# Patient Record
Sex: FEMALE | Race: WHITE | NOT HISPANIC OR LATINO | Employment: OTHER | URBAN - METROPOLITAN AREA
[De-identification: names, ages, dates, MRNs, and addresses within clinical notes are randomized per-mention and may not be internally consistent; named-entity substitution may affect disease eponyms.]

---

## 2017-02-24 ENCOUNTER — TRANSCRIBE ORDERS (OUTPATIENT)
Dept: ADMINISTRATIVE | Facility: HOSPITAL | Age: 77
End: 2017-02-24

## 2017-02-24 DIAGNOSIS — R10.30 LOWER ABDOMINAL PAIN: Primary | ICD-10-CM

## 2017-03-07 ENCOUNTER — HOSPITAL ENCOUNTER (OUTPATIENT)
Dept: ULTRASOUND IMAGING | Facility: HOSPITAL | Age: 77
Discharge: HOME | End: 2017-03-07

## 2017-03-07 ENCOUNTER — HOSPITAL ENCOUNTER (OUTPATIENT)
Dept: ULTRASOUND IMAGING | Facility: HOSPITAL | Age: 77
Discharge: HOME | End: 2017-03-07
Admitting: INTERNAL MEDICINE

## 2017-03-07 DIAGNOSIS — R10.30 LOWER ABDOMINAL PAIN: ICD-10-CM

## 2017-03-07 PROCEDURE — 76700 US EXAM ABDOM COMPLETE: CPT

## 2017-03-07 PROCEDURE — 76856 US EXAM PELVIC COMPLETE: CPT

## 2017-03-15 ENCOUNTER — APPOINTMENT (OUTPATIENT)
Dept: WOMENS IMAGING | Facility: HOSPITAL | Age: 77
End: 2017-03-15

## 2017-03-15 PROCEDURE — G0202 SCR MAMMO BI INCL CAD: HCPCS | Performed by: RADIOLOGY

## 2017-03-15 PROCEDURE — 77063 BREAST TOMOSYNTHESIS BI: CPT | Performed by: RADIOLOGY

## 2018-03-19 ENCOUNTER — APPOINTMENT (OUTPATIENT)
Dept: WOMENS IMAGING | Facility: HOSPITAL | Age: 78
End: 2018-03-19

## 2018-03-19 PROCEDURE — 77063 BREAST TOMOSYNTHESIS BI: CPT | Performed by: RADIOLOGY

## 2018-03-19 PROCEDURE — 77067 SCR MAMMO BI INCL CAD: CPT | Performed by: RADIOLOGY

## 2018-05-14 ENCOUNTER — APPOINTMENT (OUTPATIENT)
Dept: GENERAL RADIOLOGY | Facility: HOSPITAL | Age: 78
End: 2018-05-14

## 2018-05-14 ENCOUNTER — HOSPITAL ENCOUNTER (EMERGENCY)
Facility: HOSPITAL | Age: 78
Discharge: HOME | End: 2018-05-14
Attending: EMERGENCY MEDICINE | Admitting: EMERGENCY MEDICINE

## 2018-05-14 VITALS
WEIGHT: 140 LBS | DIASTOLIC BLOOD PRESSURE: 88 MMHG | HEART RATE: 72 BPM | HEIGHT: 60 IN | TEMPERATURE: 98.3 F | SYSTOLIC BLOOD PRESSURE: 128 MMHG | BODY MASS INDEX: 27.48 KG/M2 | RESPIRATION RATE: 18 BRPM | OXYGEN SATURATION: 98 %

## 2018-05-14 DIAGNOSIS — M54.6 CHRONIC MIDLINE THORACIC BACK PAIN: ICD-10-CM

## 2018-05-14 DIAGNOSIS — R10.84 GENERALIZED ABDOMINAL PAIN: Primary | ICD-10-CM

## 2018-05-14 DIAGNOSIS — E87.1 HYPONATREMIA: ICD-10-CM

## 2018-05-14 DIAGNOSIS — G89.29 CHRONIC MIDLINE THORACIC BACK PAIN: ICD-10-CM

## 2018-05-14 LAB
ALBUMIN SERPL-MCNC: 4.8 G/DL (ref 3.5–5.2)
ALBUMIN/GLOB SERPL: 1.6 G/DL
ALP SERPL-CCNC: 79 U/L (ref 39–117)
ALT SERPL W P-5'-P-CCNC: 8 U/L (ref 1–33)
ANION GAP SERPL CALCULATED.3IONS-SCNC: 13.7 MMOL/L
AST SERPL-CCNC: 14 U/L (ref 1–32)
BACTERIA UR QL AUTO: ABNORMAL /HPF
BASOPHILS # BLD AUTO: 0.01 10*3/MM3 (ref 0–0.2)
BASOPHILS NFR BLD AUTO: 0.1 % (ref 0–1.5)
BILIRUB SERPL-MCNC: 0.6 MG/DL (ref 0.1–1.2)
BILIRUB UR QL STRIP: NEGATIVE
BUN BLD-MCNC: 9 MG/DL (ref 8–23)
BUN/CREAT SERPL: 16.1 (ref 7–25)
CALCIUM SPEC-SCNC: 9.9 MG/DL (ref 8.6–10.5)
CHLORIDE SERPL-SCNC: 84 MMOL/L (ref 98–107)
CLARITY UR: CLEAR
CO2 SERPL-SCNC: 28.3 MMOL/L (ref 22–29)
COLOR UR: YELLOW
CREAT BLD-MCNC: 0.56 MG/DL (ref 0.57–1)
DEPRECATED RDW RBC AUTO: 41.3 FL (ref 37–54)
EOSINOPHIL # BLD AUTO: 0.05 10*3/MM3 (ref 0–0.7)
EOSINOPHIL NFR BLD AUTO: 0.5 % (ref 0.3–6.2)
ERYTHROCYTE [DISTWIDTH] IN BLOOD BY AUTOMATED COUNT: 12.2 % (ref 11.7–13)
GFR SERPL CREATININE-BSD FRML MDRD: 105 ML/MIN/1.73
GLOBULIN UR ELPH-MCNC: 3 GM/DL
GLUCOSE BLD-MCNC: 107 MG/DL (ref 65–99)
GLUCOSE UR STRIP-MCNC: NEGATIVE MG/DL
HCT VFR BLD AUTO: 34.1 % (ref 35.6–45.5)
HGB BLD-MCNC: 11.8 G/DL (ref 11.9–15.5)
HGB UR QL STRIP.AUTO: NEGATIVE
HYALINE CASTS UR QL AUTO: ABNORMAL /LPF
IMM GRANULOCYTES # BLD: 0.02 10*3/MM3 (ref 0–0.03)
IMM GRANULOCYTES NFR BLD: 0.2 % (ref 0–0.5)
KETONES UR QL STRIP: ABNORMAL
LEUKOCYTE ESTERASE UR QL STRIP.AUTO: ABNORMAL
LIPASE SERPL-CCNC: 28 U/L (ref 13–60)
LYMPHOCYTES # BLD AUTO: 1.1 10*3/MM3 (ref 0.9–4.8)
LYMPHOCYTES NFR BLD AUTO: 10.8 % (ref 19.6–45.3)
MCH RBC QN AUTO: 32.2 PG (ref 26.9–32)
MCHC RBC AUTO-ENTMCNC: 34.6 G/DL (ref 32.4–36.3)
MCV RBC AUTO: 92.9 FL (ref 80.5–98.2)
MONOCYTES # BLD AUTO: 0.62 10*3/MM3 (ref 0.2–1.2)
MONOCYTES NFR BLD AUTO: 6.1 % (ref 5–12)
NEUTROPHILS # BLD AUTO: 8.36 10*3/MM3 (ref 1.9–8.1)
NEUTROPHILS NFR BLD AUTO: 82.5 % (ref 42.7–76)
NITRITE UR QL STRIP: NEGATIVE
PH UR STRIP.AUTO: 8 [PH] (ref 5–8)
PLATELET # BLD AUTO: 370 10*3/MM3 (ref 140–500)
PMV BLD AUTO: 8.4 FL (ref 6–12)
POTASSIUM BLD-SCNC: 3.6 MMOL/L (ref 3.5–5.2)
PROT SERPL-MCNC: 7.8 G/DL (ref 6–8.5)
PROT UR QL STRIP: NEGATIVE
RBC # BLD AUTO: 3.67 10*6/MM3 (ref 3.9–5.2)
RBC # UR: ABNORMAL /HPF
REF LAB TEST METHOD: ABNORMAL
SODIUM BLD-SCNC: 126 MMOL/L (ref 136–145)
SP GR UR STRIP: 1.01 (ref 1–1.03)
SQUAMOUS #/AREA URNS HPF: ABNORMAL /HPF
TROPONIN T SERPL-MCNC: <0.01 NG/ML (ref 0–0.03)
TSH SERPL DL<=0.05 MIU/L-ACNC: 2.72 MIU/ML (ref 0.27–4.2)
UROBILINOGEN UR QL STRIP: ABNORMAL
WBC NRBC COR # BLD: 10.14 10*3/MM3 (ref 4.5–10.7)
WBC UR QL AUTO: ABNORMAL /HPF

## 2018-05-14 PROCEDURE — 96374 THER/PROPH/DIAG INJ IV PUSH: CPT

## 2018-05-14 PROCEDURE — 99283 EMERGENCY DEPT VISIT LOW MDM: CPT

## 2018-05-14 PROCEDURE — 72072 X-RAY EXAM THORAC SPINE 3VWS: CPT

## 2018-05-14 PROCEDURE — 80053 COMPREHEN METABOLIC PANEL: CPT | Performed by: EMERGENCY MEDICINE

## 2018-05-14 PROCEDURE — 71046 X-RAY EXAM CHEST 2 VIEWS: CPT

## 2018-05-14 PROCEDURE — 83690 ASSAY OF LIPASE: CPT | Performed by: EMERGENCY MEDICINE

## 2018-05-14 PROCEDURE — 84484 ASSAY OF TROPONIN QUANT: CPT | Performed by: EMERGENCY MEDICINE

## 2018-05-14 PROCEDURE — 85025 COMPLETE CBC W/AUTO DIFF WBC: CPT | Performed by: EMERGENCY MEDICINE

## 2018-05-14 PROCEDURE — 81001 URINALYSIS AUTO W/SCOPE: CPT | Performed by: EMERGENCY MEDICINE

## 2018-05-14 PROCEDURE — 96375 TX/PRO/DX INJ NEW DRUG ADDON: CPT

## 2018-05-14 PROCEDURE — 96361 HYDRATE IV INFUSION ADD-ON: CPT

## 2018-05-14 PROCEDURE — 84443 ASSAY THYROID STIM HORMONE: CPT | Performed by: EMERGENCY MEDICINE

## 2018-05-14 PROCEDURE — 25010000002 KETOROLAC TROMETHAMINE PER 15 MG: Performed by: EMERGENCY MEDICINE

## 2018-05-14 PROCEDURE — 93010 ELECTROCARDIOGRAM REPORT: CPT | Performed by: INTERNAL MEDICINE

## 2018-05-14 PROCEDURE — 93005 ELECTROCARDIOGRAM TRACING: CPT | Performed by: EMERGENCY MEDICINE

## 2018-05-14 RX ORDER — LEVOTHYROXINE SODIUM 0.03 MG/1
25 TABLET ORAL DAILY
COMMUNITY
End: 2022-04-20

## 2018-05-14 RX ORDER — TRIAMTERENE AND HYDROCHLOROTHIAZIDE 37.5; 25 MG/1; MG/1
1 CAPSULE ORAL EVERY MORNING
COMMUNITY
End: 2022-04-20

## 2018-05-14 RX ORDER — FERROUS SULFATE 325(65) MG
325 TABLET ORAL
Status: ON HOLD | COMMUNITY
End: 2022-10-31 | Stop reason: SDUPTHER

## 2018-05-14 RX ORDER — DICYCLOMINE HCL 20 MG
20 TABLET ORAL EVERY 6 HOURS PRN
Qty: 20 TABLET | Refills: 0 | Status: SHIPPED | OUTPATIENT
Start: 2018-05-14 | End: 2022-04-20

## 2018-05-14 RX ORDER — VALSARTAN 320 MG/1
320 TABLET ORAL DAILY
COMMUNITY
End: 2022-04-20

## 2018-05-14 RX ORDER — ZOLPIDEM TARTRATE 10 MG/1
10 TABLET ORAL NIGHTLY
COMMUNITY
End: 2022-05-11 | Stop reason: HOSPADM

## 2018-05-14 RX ORDER — MULTIVIT-MIN/IRON/FOLIC ACID/K 18-600-40
CAPSULE ORAL
COMMUNITY

## 2018-05-14 RX ORDER — POTASSIUM CHLORIDE 600 MG/1
8 CAPSULE, EXTENDED RELEASE ORAL 2 TIMES DAILY
COMMUNITY
End: 2022-04-20

## 2018-05-14 RX ORDER — UBIDECARENONE 75 MG
50 CAPSULE ORAL DAILY
COMMUNITY

## 2018-05-14 RX ORDER — ONDANSETRON 4 MG/1
4 TABLET, ORALLY DISINTEGRATING ORAL EVERY 6 HOURS PRN
Qty: 10 TABLET | Refills: 0 | Status: SHIPPED | OUTPATIENT
Start: 2018-05-14 | End: 2022-04-20

## 2018-05-14 RX ORDER — SODIUM CHLORIDE 0.9 % (FLUSH) 0.9 %
10 SYRINGE (ML) INJECTION AS NEEDED
Status: DISCONTINUED | OUTPATIENT
Start: 2018-05-14 | End: 2018-05-15 | Stop reason: HOSPADM

## 2018-05-14 RX ORDER — KETOROLAC TROMETHAMINE 15 MG/ML
15 INJECTION, SOLUTION INTRAMUSCULAR; INTRAVENOUS ONCE
Status: COMPLETED | OUTPATIENT
Start: 2018-05-14 | End: 2018-05-14

## 2018-05-14 RX ORDER — ALPRAZOLAM 0.25 MG/1
0.25 TABLET ORAL 2 TIMES DAILY
Status: ON HOLD | COMMUNITY
End: 2022-05-11 | Stop reason: SDUPTHER

## 2018-05-14 RX ORDER — ROSUVASTATIN CALCIUM 20 MG/1
20 TABLET, COATED ORAL NIGHTLY
COMMUNITY

## 2018-05-14 RX ORDER — FAMOTIDINE 10 MG/ML
20 INJECTION, SOLUTION INTRAVENOUS ONCE
Status: COMPLETED | OUTPATIENT
Start: 2018-05-14 | End: 2018-05-14

## 2018-05-14 RX ORDER — OMEPRAZOLE 40 MG/1
40 CAPSULE, DELAYED RELEASE ORAL DAILY
COMMUNITY
End: 2022-04-20

## 2018-05-14 RX ORDER — GABAPENTIN 300 MG/1
600 CAPSULE ORAL DAILY
COMMUNITY
End: 2022-04-20

## 2018-05-14 RX ADMIN — KETOROLAC TROMETHAMINE 15 MG: 15 INJECTION, SOLUTION INTRAMUSCULAR; INTRAVENOUS at 20:07

## 2018-05-14 RX ADMIN — SODIUM CHLORIDE 1000 ML: 9 INJECTION, SOLUTION INTRAVENOUS at 20:17

## 2018-05-14 RX ADMIN — FAMOTIDINE 20 MG: 10 INJECTION, SOLUTION INTRAVENOUS at 20:17

## 2018-05-14 NOTE — ED PROVIDER NOTES
[facility] EMERGENCY DEPARTMENT ENCOUNTER    CHIEF COMPLAINT  Chief Complaint: back pain, abd pain  History given by: patient, spouse  History limited by: poor historian  [facility] Room Number:   PMD: Miriam Mercado Reyes, MD      HPI:  Pt is a 78 y.o. female who presents complaining of generalized abd pain for the last week. Pt denies CP, SOA, cough, diarrhea, fever, dysuria, hematuria or unusual urinary frequency but complains of chills and worsening of her chronic thoracic back pain and cold sweats since this morning. Pt's spouse states that the pt has had decreased appetite and generalized malaise over the last week.    Onset: gradual  Duration: one day  Severity: severe  Associated symptoms: cold sweats, chills, thoracic back pain, decreased appetite, generalized malaise  Previous treatment: none    PAST MEDICAL HISTORY  Active Ambulatory Problems     Diagnosis Date Noted   • No Active Ambulatory Problems     Resolved Ambulatory Problems     Diagnosis Date Noted   • No Resolved Ambulatory Problems     Past Medical History:   Diagnosis Date   • Arthritis    • Disease of thyroid gland    • Hernia, hiatal    • Hyperlipidemia    • Hypertension        PAST SURGICAL HISTORY  Past Surgical History:   Procedure Laterality Date   • APPENDECTOMY     • BACK SURGERY     •  SECTION     • HEMORRHOIDECTOMY     • KNEE ARTHROPLASTY         FAMILY HISTORY  History reviewed. No pertinent family history.    SOCIAL HISTORY  Social History     Social History   • Marital status: Unknown     Spouse name: N/A   • Number of children: N/A   • Years of education: N/A     Occupational History   • Not on file.     Social History Main Topics   • Smoking status: Never Smoker   • Smokeless tobacco: Not on file   • Alcohol use 0.6 oz/week     1 Glasses of wine per week   • Drug use: No   • Sexual activity: Not on file     Other Topics Concern   • Not on file     Social History Narrative   • No narrative on file       ALLERGIES  Review of  "patient's allergies indicates no known allergies.    REVIEW OF SYSTEMS  Review of Systems   Constitutional: Positive for appetite change (decreased), chills and diaphoresis (\"cold sweats\"). Negative for fever.        Generalized malaise   HENT: Negative for sore throat.    Eyes: Negative.    Respiratory: Negative for cough and shortness of breath.    Cardiovascular: Negative for chest pain.   Gastrointestinal: Positive for abdominal pain (generalized). Negative for diarrhea and vomiting.   Genitourinary: Negative for dysuria.   Musculoskeletal: Positive for back pain (chronic thoracic back pain, worse this morning). Negative for neck pain.   Skin: Negative for rash.   Allergic/Immunologic: Negative.    Neurological: Negative for weakness, numbness and headaches.   Hematological: Negative.    Psychiatric/Behavioral: Negative.    All other systems reviewed and are negative.      PHYSICAL EXAM  ED Triage Vitals   Temp Heart Rate Resp BP SpO2   05/14/18 1509 05/14/18 1509 05/14/18 1509 05/14/18 1533 05/14/18 1509   98.3 °F (36.8 °C) 99 15 132/90 99 %      Temp src Heart Rate Source Patient Position BP Location FiO2 (%)   05/14/18 1509 05/14/18 1509 05/14/18 1533 05/14/18 1533 --   Tympanic Monitor Sitting Right arm        Physical Exam   Constitutional: She appears distressed (mild).   Pt appears elderly   HENT:   Head: Normocephalic and atraumatic.   Eyes: EOM are normal. Pupils are equal, round, and reactive to light.   Neck: Normal range of motion. Neck supple.   Cardiovascular: Normal rate, regular rhythm and normal heart sounds.    No murmur heard.  Pulmonary/Chest: Effort normal and breath sounds normal. No respiratory distress.   Abdominal: Soft. There is tenderness in the right upper quadrant. There is no rebound, no guarding and negative Velez's sign.   Musculoskeletal: Normal range of motion. She exhibits no edema.   Neurological: She is alert. She has normal sensation and normal strength.   Skin: Skin is " warm and dry. No rash noted.   Psychiatric: Mood and affect normal.   Nursing note and vitals reviewed.      LAB RESULTS  Lab Results (last 24 hours)     Procedure Component Value Units Date/Time    Comprehensive Metabolic Panel [[id number]]  (Abnormal) Collected:  05/14/18 2008    Specimen:  Blood Updated:  05/14/18 2045     Glucose 107 (H) mg/dL      BUN 9 mg/dL      Creatinine 0.56 (L) mg/dL      Sodium 126 (L) mmol/L      Potassium 3.6 mmol/L      Chloride 84 (L) mmol/L      CO2 28.3 mmol/L      Calcium 9.9 mg/dL      Total Protein 7.8 g/dL      Albumin 4.80 g/dL      ALT (SGPT) 8 U/L      AST (SGOT) 14 U/L      Alkaline Phosphatase 79 U/L      Total Bilirubin 0.6 mg/dL      eGFR Non African Amer 105 mL/min/1.73      Globulin 3.0 gm/dL      A/G Ratio 1.6 g/dL      BUN/Creatinine Ratio 16.1     Anion Gap 13.7 mmol/L     Narrative:       The MDRD GFR formula is only valid for adults with stable renal function between ages 18 and 70.    Lipase [[id number]]  (Normal) Collected:  05/14/18 2008    Specimen:  Blood Updated:  05/14/18 2045     Lipase 28 U/L     TSH [[id number]]  (Normal) Collected:  05/14/18 2008    Specimen:  Blood Updated:  05/14/18 2052     TSH 2.720 mIU/mL     Urinalysis With / Culture If Indicated - Urine, Clean Catch [[id number]]  (Abnormal) Collected:  05/14/18 2008    Specimen:  Urine from Urine, Clean Catch Updated:  05/14/18 2029     Color, UA Yellow     Appearance, UA Clear     pH, UA 8.0     Specific Gravity, UA 1.008     Glucose, UA Negative     Ketones, UA Trace (A)     Bilirubin, UA Negative     Blood, UA Negative     Protein, UA Negative     Leuk Esterase, UA Small (1+) (A)     Nitrite, UA Negative     Urobilinogen, UA 1.0 E.U./dL    Troponin [[id number]]  (Normal) Collected:  05/14/18 2008    Specimen:  Blood Updated:  05/14/18 2052     Troponin T <0.010 ng/mL     Narrative:       Troponin T Reference Ranges:  Less than 0.03 ng/mL:    Negative for AMI  0.03 to 0.09 ng/mL:      Indeterminant  for AMI  Greater than 0.09 ng/mL: Positive for AMI    CBC & Differential [[id number]] Collected:  05/14/18 2008    Specimen:  Blood Updated:  05/14/18 2021    Narrative:       The following orders were created for panel order CBC & Differential.  Procedure                               Abnormality         Status                     ---------                               -----------         ------                     CBC Auto Differential[[id number]]        Abnormal            Final result                 Please view results for these tests on the individual orders.    CBC Auto Differential [[id number]]  (Abnormal) Collected:  05/14/18 2008    Specimen:  Blood Updated:  05/14/18 2021     WBC 10.14 10*3/mm3      RBC 3.67 (L) 10*6/mm3      Hemoglobin 11.8 (L) g/dL      Hematocrit 34.1 (L) %      MCV 92.9 fL      MCH 32.2 (H) pg      MCHC 34.6 g/dL      RDW 12.2 %      RDW-SD 41.3 fl      MPV 8.4 fL      Platelets 370 10*3/mm3      Neutrophil % 82.5 (H) %      Lymphocyte % 10.8 (L) %      Monocyte % 6.1 %      Eosinophil % 0.5 %      Basophil % 0.1 %      Immature Grans % 0.2 %      Neutrophils, Absolute 8.36 (H) 10*3/mm3      Lymphocytes, Absolute 1.10 10*3/mm3      Monocytes, Absolute 0.62 10*3/mm3      Eosinophils, Absolute 0.05 10*3/mm3      Basophils, Absolute 0.01 10*3/mm3      Immature Grans, Absolute 0.02 10*3/mm3     Urinalysis, Microscopic Only - Urine, Clean Catch [[id number]]  (Abnormal) Collected:  05/14/18 2008    Specimen:  Urine from Urine, Clean Catch Updated:  05/14/18 2029     RBC, UA 0-2 /HPF      WBC, UA 3-5 (A) /HPF      Bacteria, UA None Seen /HPF      Squamous Epithelial Cells, UA 0-2 /HPF      Hyaline Casts, UA None Seen /LPF      Methodology Automated Microscopy          I ordered the above labs and reviewed the results    RADIOLOGY  XR Chest 2 View         XR Spine Thoracic 3 View   Final Result         T-Spine XR shows mild diffuse osteoporosis but no evidence of acute compression fracture.  There is some mild disc space narrowing and spurring, particularly in the mid and lower thoracic region.     CXR shows a calcified granuloma in the upper lobe seen on the lateral view. There is no acute disease.    I ordered the above noted radiological studies. Interpreted by radiologist. Reviewed by me in PACS.       PROCEDURES  Procedures  EKG           EKG time: 2024  Rhythm/Rate: NSR, 66  P waves and NC: normal  QRS, axis: normal   ST and T waves: normal     Interpreted Contemporaneously by me, independently viewed  unchanged compared to prior on 1/7/15      PROGRESS AND CONSULTS  ED Course     1953- Notified pt and family of the pt's T-Spine XR results. D/w pt and family that there were no gallstones seen on an abdominal US about one year ago and that it would be unusual for the pt to have developed gallstones within the last year. Discussed the plan to order lab work for further evaluation of her symptoms. Pt understands and agrees with the plan, all questions answered.    1958- Ordered blood work, TSH, lipase, troponin, UA, EKG and CXR for further evaluation. Ordered Toradol and Pepcid for pain and IVF for hydration.     2107- Rechecked pt. Pt is resting comfortably. Notified pt and family of the pt's lab and imaging results, including the pt's normal TSH, mild chronic anemia, WBC count and hyponatremia. Discussed the plan to discharge the pt home with instructions to follow up with her PCP and endocrinologist. Pt understands and agrees with the plan, all questions answered.      MEDICAL DECISION MAKING  Results were reviewed/discussed with the patient and they were also made aware of online access. Pt also made aware that some labs, such as cultures, will not be resulted during ER visit and follow up with PMD is necessary.     MDM  Number of Diagnoses or Management Options  Acute on chronic midline thoracic back pain:   Generalized abdominal pain:   Hyponatremia:      Amount and/or Complexity of Data  Reviewed  Clinical lab tests: ordered and reviewed (Sodium=126)  Tests in the radiology section of CPT®: ordered and reviewed (T- spine XR shows chronic changes but nothing acute)  Tests in the medicine section of CPT®: ordered and reviewed (See EKG procedure note)  Decide to obtain previous medical records or to obtain history from someone other than the patient: yes  Obtain history from someone other than the patient: yes (D/w spouse)  Review and summarize past medical records: yes (Pt's sodium was 129 three years ago)  Independent visualization of images, tracings, or specimens: yes    Patient Progress  Patient progress: stable         DIAGNOSIS  Final diagnoses:   Generalized abdominal pain   Acute on chronic midline thoracic back pain   Hyponatremia       DISPOSITION  DISCHARGE    Patient discharged in stable condition.    Reviewed implications of results, diagnosis, meds, responsibility to follow up, warning signs and symptoms of possible worsening, potential complications and reasons to return to ER, including fever, worsening pain or any concerns.    Patient/Family voiced understanding of above instructions.    Discussed plan for discharge, as there is no emergent indication for admission. Patient referred to primary care provider for BP management due to today's BP. Pt/family is agreeable and understands need for follow up and repeat testing.  Pt is aware that discharge does not mean that nothing is wrong but it indicates no emergency is present that requires admission and they must continue care with follow-up as given below or physician of their choice.     FOLLOW-UP  Leyda Acevedo MD  [address] [facility] [id number] [phone]    Go on 5/16/2018  as scheduled    Miriam Mercado Reyes, MD  [address] [facility] [id number] [phone]    Schedule an appointment as soon as possible for a visit            Medication List      New Prescriptions    dicyclomine 20 MG  tablet  Commonly known as:  BENTYL  Take 1 tablet by mouth Every 6 (Six) Hours As Needed (cramping).     ondansetron ODT 4 MG disintegrating tablet  Commonly known as:  ZOFRAN-ODT  Take 1 tablet by mouth Every 6 (Six) Hours As Needed for Nausea.              Latest Documented Vital Signs:  As of 9:26 PM  BP- 132/90 HR- 99 Temp- 98.3 °F (36.8 °C) (Tympanic) O2 sat- 99%    --  Documentation assistance provided by tangela Hays for Dr. Rodriguez.  Information recorded by the tangela was done at my direction and has been verified and validated by me.     April Hays  05/14/18 2124       Geovanni Rodriguez MD  05/14/18 [id number]

## 2018-05-14 NOTE — ED TRIAGE NOTES
Patient presents from home via private vehicle.  Patient c/o mid back pain for five days.  NKI.  Pt denies numbness or tingling down legs. Pt denies loosing control or bowel or bladder.

## 2018-05-15 NOTE — ED NOTES
Pt c/o mid back pain that started approx 1 week ago. Pt reports generalized abd pain, generalized malaise and chills. Denies fever, n/v/d. Bowel sounds present. Pt tender to abd     Angela Rashid RN  05/14/18 2040

## 2018-05-15 NOTE — ED NOTES
As per Dr. Rodriguez pt. to receive a 300 mL more prior to discharge.     Kelvin Jamison RN  05/14/18 [id number]

## 2018-07-26 ENCOUNTER — OFFICE (OUTPATIENT)
Dept: URBAN - METROPOLITAN AREA CLINIC 75 | Facility: CLINIC | Age: 78
End: 2018-07-26

## 2018-07-26 VITALS — WEIGHT: 142 LBS | DIASTOLIC BLOOD PRESSURE: 82 MMHG | SYSTOLIC BLOOD PRESSURE: 150 MMHG | HEART RATE: 108 BPM

## 2018-07-26 DIAGNOSIS — R10.31 RIGHT LOWER QUADRANT PAIN: ICD-10-CM

## 2018-07-26 DIAGNOSIS — R10.32 LEFT LOWER QUADRANT PAIN: ICD-10-CM

## 2018-07-26 DIAGNOSIS — R19.7 DIARRHEA, UNSPECIFIED: ICD-10-CM

## 2018-07-26 DIAGNOSIS — D50.9 IRON DEFICIENCY ANEMIA, UNSPECIFIED: ICD-10-CM

## 2018-07-26 DIAGNOSIS — K57.92 DIVERTICULITIS OF INTESTINE, PART UNSPECIFIED, WITHOUT PERFO: ICD-10-CM

## 2018-07-26 PROCEDURE — 99203 OFFICE O/P NEW LOW 30 MIN: CPT | Performed by: INTERNAL MEDICINE

## 2018-07-31 ENCOUNTER — OFFICE (OUTPATIENT)
Dept: URBAN - METROPOLITAN AREA LAB 2 | Facility: LAB | Age: 78
End: 2018-07-31
Payer: MEDICARE

## 2018-07-31 DIAGNOSIS — R19.7 DIARRHEA, UNSPECIFIED: ICD-10-CM

## 2018-07-31 PROCEDURE — 87507 IADNA-DNA/RNA PROBE TQ 12-25: CPT | Performed by: INTERNAL MEDICINE

## 2018-07-31 PROCEDURE — 83630 LACTOFERRIN FECAL (QUAL): CPT | Performed by: INTERNAL MEDICINE

## 2018-08-06 VITALS
SYSTOLIC BLOOD PRESSURE: 144 MMHG | HEART RATE: 78 BPM | SYSTOLIC BLOOD PRESSURE: 148 MMHG | RESPIRATION RATE: 14 BRPM | HEART RATE: 87 BPM | DIASTOLIC BLOOD PRESSURE: 85 MMHG | RESPIRATION RATE: 29 BRPM | HEART RATE: 73 BPM | TEMPERATURE: 98.2 F | HEART RATE: 97 BPM | DIASTOLIC BLOOD PRESSURE: 67 MMHG | HEART RATE: 70 BPM | SYSTOLIC BLOOD PRESSURE: 146 MMHG | HEART RATE: 83 BPM | SYSTOLIC BLOOD PRESSURE: 129 MMHG | OXYGEN SATURATION: 96 % | DIASTOLIC BLOOD PRESSURE: 74 MMHG | HEART RATE: 74 BPM | OXYGEN SATURATION: 99 % | DIASTOLIC BLOOD PRESSURE: 77 MMHG | HEART RATE: 81 BPM | OXYGEN SATURATION: 100 % | RESPIRATION RATE: 34 BRPM | DIASTOLIC BLOOD PRESSURE: 82 MMHG | DIASTOLIC BLOOD PRESSURE: 80 MMHG | OXYGEN SATURATION: 98 % | HEART RATE: 90 BPM | WEIGHT: 142 LBS | OXYGEN SATURATION: 97 % | SYSTOLIC BLOOD PRESSURE: 151 MMHG | RESPIRATION RATE: 36 BRPM | HEART RATE: 60 BPM | DIASTOLIC BLOOD PRESSURE: 84 MMHG | DIASTOLIC BLOOD PRESSURE: 64 MMHG | SYSTOLIC BLOOD PRESSURE: 133 MMHG | DIASTOLIC BLOOD PRESSURE: 68 MMHG | SYSTOLIC BLOOD PRESSURE: 169 MMHG | RESPIRATION RATE: 16 BRPM | SYSTOLIC BLOOD PRESSURE: 143 MMHG | RESPIRATION RATE: 28 BRPM | DIASTOLIC BLOOD PRESSURE: 79 MMHG | SYSTOLIC BLOOD PRESSURE: 159 MMHG | TEMPERATURE: 98.1 F | RESPIRATION RATE: 35 BRPM

## 2018-08-08 ENCOUNTER — AMBULATORY SURGICAL CENTER (OUTPATIENT)
Dept: URBAN - METROPOLITAN AREA SURGERY 17 | Facility: SURGERY | Age: 78
End: 2018-08-08

## 2018-08-08 DIAGNOSIS — D64.9 ANEMIA, UNSPECIFIED: ICD-10-CM

## 2018-08-08 DIAGNOSIS — R19.7 DIARRHEA, UNSPECIFIED: ICD-10-CM

## 2018-08-08 DIAGNOSIS — K57.30 DIVERTICULOSIS OF LARGE INTESTINE WITHOUT PERFORATION OR ABS: ICD-10-CM

## 2018-08-08 DIAGNOSIS — K64.9 UNSPECIFIED HEMORRHOIDS: ICD-10-CM

## 2018-08-08 DIAGNOSIS — R10.31 RIGHT LOWER QUADRANT PAIN: ICD-10-CM

## 2018-08-08 DIAGNOSIS — R10.32 LEFT LOWER QUADRANT PAIN: ICD-10-CM

## 2018-08-08 DIAGNOSIS — D50.9 IRON DEFICIENCY ANEMIA, UNSPECIFIED: ICD-10-CM

## 2018-08-08 PROCEDURE — 45378 DIAGNOSTIC COLONOSCOPY: CPT | Performed by: INTERNAL MEDICINE

## 2018-08-08 NOTE — SERVICEHPINOTES
Patient is a 79 yo F with h/o HLD, HTN, hypothyroidism, diverticulitis, s/p appendectomy here due to multiple GI symptoms. She had been having flank pain/back pain. She was seen by spine center and she had an MRI, which was unrevealing. She was told she had some spots on her liver. She had CT scan that showed multifocal hepatic cysts and diverticulitis with possible contained perf. She was given abx- cipro/flagyl for 10 days. She still continues to have abdominal pain. It is not like the pain she had prior to being diagnosed with the diverticulits. She had abdominal pain this AM and went to the bathroom 7 times. Usually the rest of the day is ok. She is on ferrous sulfate. For months, her BMs have not been normal. The abx made her stools even more loose. She is unsure why her iron levels were low. She was told she had anemia mid July and that is when iron tablets were started. The pain is in her lower abdomen and her upper abdomen sometimes too. The pain in her lower abdomen gets better with BMs. Per patient, her daughter passed away with GIST at age 45. Patient has never had colon polyps before. She does not take NSAIDs regularly. Per chart reviewBR7/10/2018 - LABS - hgb 9.8, WBC 7.2, plat 395, creat 0.77, AP 64, AST 29, ALT 19 BR6/25/2018 - CT - ABDOMEN - multifocal hepatic cysts, focal diverticulitis vs contained perf, small HH, gastric fundal diverticulum BR1/23/2014 EGD-Colonoscopy - small HH, GEj ring s/p 15mm balloon dilator. diverticulosis with mucosal hypertrophy and congestion, int hemorrhoidsBRnormal duodenum, minimal chronic gastritis, neg HP. normal DC and sigmoid colon BR11/19/2009 - Colonoscopy - sigmoid diverticulosis, int hemorrhoids. 5 year recommended

## 2018-08-20 VITALS
TEMPERATURE: 97.3 F | DIASTOLIC BLOOD PRESSURE: 65 MMHG | SYSTOLIC BLOOD PRESSURE: 139 MMHG | OXYGEN SATURATION: 97 % | HEART RATE: 86 BPM | SYSTOLIC BLOOD PRESSURE: 138 MMHG | HEART RATE: 77 BPM | RESPIRATION RATE: 20 BRPM | HEART RATE: 69 BPM | RESPIRATION RATE: 16 BRPM | OXYGEN SATURATION: 99 % | HEART RATE: 76 BPM | TEMPERATURE: 96 F | RESPIRATION RATE: 18 BRPM | OXYGEN SATURATION: 95 % | SYSTOLIC BLOOD PRESSURE: 168 MMHG | WEIGHT: 135 LBS | DIASTOLIC BLOOD PRESSURE: 70 MMHG | SYSTOLIC BLOOD PRESSURE: 134 MMHG | RESPIRATION RATE: 12 BRPM | SYSTOLIC BLOOD PRESSURE: 145 MMHG | DIASTOLIC BLOOD PRESSURE: 55 MMHG | SYSTOLIC BLOOD PRESSURE: 122 MMHG | HEART RATE: 70 BPM | HEART RATE: 68 BPM | OXYGEN SATURATION: 100 % | DIASTOLIC BLOOD PRESSURE: 80 MMHG | DIASTOLIC BLOOD PRESSURE: 66 MMHG

## 2018-08-22 ENCOUNTER — AMBULATORY SURGICAL CENTER (OUTPATIENT)
Dept: URBAN - METROPOLITAN AREA SURGERY 17 | Facility: SURGERY | Age: 78
End: 2018-08-22

## 2018-08-22 ENCOUNTER — OFFICE (OUTPATIENT)
Dept: URBAN - METROPOLITAN AREA PATHOLOGY 4 | Facility: PATHOLOGY | Age: 78
End: 2018-08-22
Payer: MEDICARE

## 2018-08-22 DIAGNOSIS — K29.70 GASTRITIS, UNSPECIFIED, WITHOUT BLEEDING: ICD-10-CM

## 2018-08-22 DIAGNOSIS — D50.9 IRON DEFICIENCY ANEMIA, UNSPECIFIED: ICD-10-CM

## 2018-08-22 DIAGNOSIS — K29.50 UNSPECIFIED CHRONIC GASTRITIS WITHOUT BLEEDING: ICD-10-CM

## 2018-08-22 DIAGNOSIS — K22.2 ESOPHAGEAL OBSTRUCTION: ICD-10-CM

## 2018-08-22 DIAGNOSIS — K44.9 DIAPHRAGMATIC HERNIA WITHOUT OBSTRUCTION OR GANGRENE: ICD-10-CM

## 2018-08-22 LAB
GI HISTOLOGY: A. UNSPECIFIED: (no result)
GI HISTOLOGY: B. SELECT: (no result)
GI HISTOLOGY: PDF REPORT: (no result)

## 2018-08-22 PROCEDURE — 88342 IMHCHEM/IMCYTCHM 1ST ANTB: CPT | Performed by: INTERNAL MEDICINE

## 2018-08-22 PROCEDURE — 43239 EGD BIOPSY SINGLE/MULTIPLE: CPT | Performed by: INTERNAL MEDICINE

## 2018-08-22 PROCEDURE — 88305 TISSUE EXAM BY PATHOLOGIST: CPT | Performed by: INTERNAL MEDICINE

## 2018-11-29 ENCOUNTER — OFFICE (OUTPATIENT)
Dept: URBAN - METROPOLITAN AREA CLINIC 75 | Facility: CLINIC | Age: 78
End: 2018-11-29

## 2018-11-29 VITALS — WEIGHT: 132 LBS | SYSTOLIC BLOOD PRESSURE: 110 MMHG | DIASTOLIC BLOOD PRESSURE: 78 MMHG | HEART RATE: 70 BPM

## 2018-11-29 DIAGNOSIS — R19.7 DIARRHEA, UNSPECIFIED: ICD-10-CM

## 2018-11-29 DIAGNOSIS — D50.9 IRON DEFICIENCY ANEMIA, UNSPECIFIED: ICD-10-CM

## 2018-11-29 PROCEDURE — 99213 OFFICE O/P EST LOW 20 MIN: CPT | Performed by: INTERNAL MEDICINE

## 2018-11-29 RX ORDER — LINACLOTIDE 145 UG/1
CAPSULE, GELATIN COATED ORAL
Qty: 90 | Refills: 3 | Status: COMPLETED
End: 2018-11-29

## 2019-01-08 ENCOUNTER — OFFICE (OUTPATIENT)
Dept: URBAN - METROPOLITAN AREA CLINIC 75 | Facility: CLINIC | Age: 79
End: 2019-01-08

## 2019-01-08 VITALS
HEART RATE: 90 BPM | WEIGHT: 134 LBS | DIASTOLIC BLOOD PRESSURE: 82 MMHG | RESPIRATION RATE: 16 BRPM | SYSTOLIC BLOOD PRESSURE: 128 MMHG

## 2019-01-08 DIAGNOSIS — K59.00 CONSTIPATION, UNSPECIFIED: ICD-10-CM

## 2019-01-08 DIAGNOSIS — K64.9 UNSPECIFIED HEMORRHOIDS: ICD-10-CM

## 2019-01-08 PROCEDURE — 99213 OFFICE O/P EST LOW 20 MIN: CPT | Performed by: INTERNAL MEDICINE

## 2019-01-08 RX ORDER — HYDROCORTISONE ACETATE AND PRAMOXINE HYDROCHLORIDE 25; 10 MG/G; MG/G
CREAM TOPICAL
Qty: 30 | Refills: 2 | Status: COMPLETED
Start: 2019-01-08 | End: 2019-03-26

## 2019-03-21 ENCOUNTER — HOSPITAL ENCOUNTER (EMERGENCY)
Facility: HOSPITAL | Age: 79
Discharge: HOME | End: 2019-03-21
Attending: EMERGENCY MEDICINE | Admitting: EMERGENCY MEDICINE

## 2019-03-21 ENCOUNTER — APPOINTMENT (OUTPATIENT)
Dept: CT IMAGING | Facility: HOSPITAL | Age: 79
End: 2019-03-21

## 2019-03-21 VITALS
RESPIRATION RATE: 18 BRPM | TEMPERATURE: 99 F | BODY MASS INDEX: 25.72 KG/M2 | WEIGHT: 131 LBS | HEIGHT: 60 IN | OXYGEN SATURATION: 97 % | DIASTOLIC BLOOD PRESSURE: 54 MMHG | SYSTOLIC BLOOD PRESSURE: 116 MMHG | HEART RATE: 91 BPM

## 2019-03-21 DIAGNOSIS — K52.9 COLITIS: Primary | ICD-10-CM

## 2019-03-21 DIAGNOSIS — R53.1 WEAKNESS: ICD-10-CM

## 2019-03-21 LAB
ALBUMIN SERPL-MCNC: 4 G/DL (ref 3.5–5.2)
ALBUMIN/GLOB SERPL: 1.4 G/DL
ALP SERPL-CCNC: 60 U/L (ref 39–117)
ALT SERPL W P-5'-P-CCNC: 12 U/L (ref 1–33)
ANION GAP SERPL CALCULATED.3IONS-SCNC: 15.5 MMOL/L
AST SERPL-CCNC: 15 U/L (ref 1–32)
BACTERIA UR QL AUTO: ABNORMAL /HPF
BILIRUB SERPL-MCNC: 0.6 MG/DL (ref 0.2–1.2)
BILIRUB UR QL STRIP: NEGATIVE
BUN BLD-MCNC: 25 MG/DL (ref 8–23)
BUN/CREAT SERPL: 30.1 (ref 7–25)
CALCIUM SPEC-SCNC: 9.3 MG/DL (ref 8.6–10.5)
CHLORIDE SERPL-SCNC: 90 MMOL/L (ref 98–107)
CLARITY UR: CLEAR
CO2 SERPL-SCNC: 22.5 MMOL/L (ref 22–29)
COLOR UR: YELLOW
CREAT BLD-MCNC: 0.83 MG/DL (ref 0.57–1)
DEPRECATED RDW RBC AUTO: 42 FL (ref 37–54)
EOSINOPHIL # BLD MANUAL: 0.08 10*3/MM3 (ref 0–0.4)
EOSINOPHIL NFR BLD MANUAL: 1 % (ref 0.3–6.2)
ERYTHROCYTE [DISTWIDTH] IN BLOOD BY AUTOMATED COUNT: 12.6 % (ref 12.3–15.4)
GFR SERPL CREATININE-BSD FRML MDRD: 66 ML/MIN/1.73
GLOBULIN UR ELPH-MCNC: 2.9 GM/DL
GLUCOSE BLD-MCNC: 114 MG/DL (ref 65–99)
GLUCOSE UR STRIP-MCNC: NEGATIVE MG/DL
HCT VFR BLD AUTO: 32.3 % (ref 34–46.6)
HGB BLD-MCNC: 10.9 G/DL (ref 12–15.9)
HGB UR QL STRIP.AUTO: ABNORMAL
HOLD SPECIMEN: NORMAL
HOLD SPECIMEN: NORMAL
HYALINE CASTS UR QL AUTO: ABNORMAL /LPF
KETONES UR QL STRIP: ABNORMAL
LEUKOCYTE ESTERASE UR QL STRIP.AUTO: ABNORMAL
LYMPHOCYTES # BLD MANUAL: 0.72 10*3/MM3 (ref 0.7–3.1)
LYMPHOCYTES NFR BLD MANUAL: 24 % (ref 5–12)
LYMPHOCYTES NFR BLD MANUAL: 9 % (ref 19.6–45.3)
MAGNESIUM SERPL-MCNC: 1.7 MG/DL (ref 1.6–2.4)
MCH RBC QN AUTO: 30.8 PG (ref 26.6–33)
MCHC RBC AUTO-ENTMCNC: 33.7 G/DL (ref 31.5–35.7)
MCV RBC AUTO: 91.2 FL (ref 79–97)
MONOCYTES # BLD AUTO: 1.92 10*3/MM3 (ref 0.1–0.9)
NEUTROPHILS # BLD AUTO: 5.11 10*3/MM3 (ref 1.4–7)
NEUTROPHILS NFR BLD MANUAL: 64 % (ref 42.7–76)
NITRITE UR QL STRIP: NEGATIVE
PH UR STRIP.AUTO: 5.5 [PH] (ref 5–8)
PLAT MORPH BLD: NORMAL
PLATELET # BLD AUTO: 327 10*3/MM3 (ref 140–450)
PMV BLD AUTO: 8.8 FL (ref 6–12)
POTASSIUM BLD-SCNC: 3.5 MMOL/L (ref 3.5–5.2)
PROT SERPL-MCNC: 6.9 G/DL (ref 6–8.5)
PROT UR QL STRIP: NEGATIVE
RBC # BLD AUTO: 3.54 10*6/MM3 (ref 3.77–5.28)
RBC # UR: ABNORMAL /HPF
RBC MORPH BLD: NORMAL
REF LAB TEST METHOD: ABNORMAL
SODIUM BLD-SCNC: 128 MMOL/L (ref 136–145)
SP GR UR STRIP: >=1.03 (ref 1–1.03)
SQUAMOUS #/AREA URNS HPF: ABNORMAL /HPF
UROBILINOGEN UR QL STRIP: ABNORMAL
VARIANT LYMPHS NFR BLD MANUAL: 2 % (ref 0–5)
WBC MORPH BLD: NORMAL
WBC NRBC COR # BLD: 7.99 10*3/MM3 (ref 3.4–10.8)
WBC UR QL AUTO: ABNORMAL /HPF
WHOLE BLOOD HOLD SPECIMEN: NORMAL
WHOLE BLOOD HOLD SPECIMEN: NORMAL

## 2019-03-21 PROCEDURE — 83735 ASSAY OF MAGNESIUM: CPT | Performed by: EMERGENCY MEDICINE

## 2019-03-21 PROCEDURE — 85007 BL SMEAR W/DIFF WBC COUNT: CPT | Performed by: EMERGENCY MEDICINE

## 2019-03-21 PROCEDURE — 99284 EMERGENCY DEPT VISIT MOD MDM: CPT

## 2019-03-21 PROCEDURE — 80053 COMPREHEN METABOLIC PANEL: CPT | Performed by: EMERGENCY MEDICINE

## 2019-03-21 PROCEDURE — 96360 HYDRATION IV INFUSION INIT: CPT

## 2019-03-21 PROCEDURE — 81001 URINALYSIS AUTO W/SCOPE: CPT | Performed by: EMERGENCY MEDICINE

## 2019-03-21 PROCEDURE — 25010000002 IOPAMIDOL 61 % SOLUTION: Performed by: EMERGENCY MEDICINE

## 2019-03-21 PROCEDURE — 74177 CT ABD & PELVIS W/CONTRAST: CPT

## 2019-03-21 PROCEDURE — 85025 COMPLETE CBC W/AUTO DIFF WBC: CPT | Performed by: EMERGENCY MEDICINE

## 2019-03-21 RX ORDER — METRONIDAZOLE 500 MG/1
500 TABLET ORAL ONCE
Status: COMPLETED | OUTPATIENT
Start: 2019-03-21 | End: 2019-03-21

## 2019-03-21 RX ORDER — METRONIDAZOLE 500 MG/1
500 TABLET ORAL 2 TIMES DAILY
Qty: 13 TABLET | Refills: 0 | Status: SHIPPED | OUTPATIENT
Start: 2019-03-21 | End: 2022-04-20

## 2019-03-21 RX ORDER — CIPROFLOXACIN 500 MG/1
500 TABLET, FILM COATED ORAL EVERY 12 HOURS
Qty: 13 TABLET | Refills: 0 | Status: SHIPPED | OUTPATIENT
Start: 2019-03-21 | End: 2019-03-28

## 2019-03-21 RX ORDER — CIPROFLOXACIN 500 MG/1
500 TABLET, FILM COATED ORAL ONCE
Status: COMPLETED | OUTPATIENT
Start: 2019-03-21 | End: 2019-03-21

## 2019-03-21 RX ORDER — ONDANSETRON 4 MG/1
4 TABLET, ORALLY DISINTEGRATING ORAL EVERY 8 HOURS PRN
Qty: 10 TABLET | Refills: 0 | Status: SHIPPED | OUTPATIENT
Start: 2019-03-21 | End: 2019-03-24

## 2019-03-21 RX ADMIN — IOPAMIDOL 85 ML: 612 INJECTION, SOLUTION INTRAVENOUS at 13:16

## 2019-03-21 RX ADMIN — METRONIDAZOLE 500 MG: 500 TABLET, FILM COATED ORAL at 14:37

## 2019-03-21 RX ADMIN — SODIUM CHLORIDE 1000 ML: 9 INJECTION, SOLUTION INTRAVENOUS at 12:14

## 2019-03-21 RX ADMIN — CIPROFLOXACIN 500 MG: 500 TABLET, FILM COATED ORAL at 14:37

## 2019-03-21 NOTE — ED PROVIDER NOTES
EMERGENCY DEPARTMENT ENCOUNTER    CHIEF COMPLAINT  Chief Complaint: Diarrhea  History given by: Pt  History limited by: Nothing  Room Number: /  PMD: Reyes, Miriam Mercado, MD      HPI:  Pt is a 79 y.o. female who presents complaining of diarrhea for the last 5 days, and is starting to improve. The pt states that she has had a decreased appetite beginning 6 days ago, due to having a bad taste in her mouth after taking a bite of food. The pt confirms nausea and vomiting (small amount per pt), and denies fever or cough. The pt has not had any recent travel or taken antibiotics.     Duration:  5 days  Onset: Gradual  Timing: Intermittent  Location: GI  Quality: Light brown  Intensity/Severity: Moderate  Progression: Improving  Associated Symptoms: Nausea, decreased appetite, vomiting  Aggravating Factors: Unknown  Alleviating Factors: Unknown  Previous Episodes: Unknown  Treatment before arrival: None    PAST MEDICAL HISTORY  Active Ambulatory Problems     Diagnosis Date Noted   • No Active Ambulatory Problems     Resolved Ambulatory Problems     Diagnosis Date Noted   • No Resolved Ambulatory Problems     Past Medical History:   Diagnosis Date   • Arthritis    • Disease of thyroid gland    • Hernia, hiatal    • Hyperlipidemia    • Hypertension        PAST SURGICAL HISTORY  Past Surgical History:   Procedure Laterality Date   • APPENDECTOMY     • BACK SURGERY     •  SECTION     • HEMORRHOIDECTOMY     • KNEE ARTHROPLASTY         FAMILY HISTORY  History reviewed. No pertinent family history.    SOCIAL HISTORY  Social History     Socioeconomic History   • Marital status: Unknown     Spouse name: Not on file   • Number of children: Not on file   • Years of education: Not on file   • Highest education level: Not on file   Tobacco Use   • Smoking status: Never Smoker   • Smokeless tobacco: Never Used   Substance and Sexual Activity   • Alcohol use: Yes     Alcohol/week: 12.6 oz     Types: 7 Glasses of wine, 14  Shots of liquor per week   • Drug use: No       ALLERGIES  Patient has no known allergies.    REVIEW OF SYSTEMS  Review of Systems   Constitutional: Positive for appetite change (Decreased) and chills. Negative for fever.   HENT: Negative for sore throat.    Eyes: Negative.    Respiratory: Negative for cough and shortness of breath.    Cardiovascular: Negative for chest pain.   Gastrointestinal: Positive for diarrhea, nausea and vomiting. Negative for abdominal pain.   Genitourinary: Negative for dysuria.   Musculoskeletal: Negative for neck pain.   Skin: Negative for rash.   Neurological: Negative for weakness, numbness and headaches.   Hematological: Negative.    Psychiatric/Behavioral: Negative.    All other systems reviewed and are negative.      PHYSICAL EXAM  ED Triage Vitals   Temp Heart Rate Resp BP SpO2   03/21/19 1058 03/21/19 1058 03/21/19 1058 03/21/19 1127 03/21/19 1058   99 °F (37.2 °C) 108 18 100/57 97 %      Temp src Heart Rate Source Patient Position BP Location FiO2 (%)   03/21/19 1058 -- -- -- --   Tympanic           Physical Exam   Constitutional: She is oriented to person, place, and time and well-developed, well-nourished, and in no distress. No distress.   Well appearing   HENT:   Mouth/Throat: Mucous membranes are normal. Mucous membranes are not dry.   Eyes: EOM are normal.   Cardiovascular: Normal rate and regular rhythm. Exam reveals no gallop and no friction rub.   No murmur heard.  Pulmonary/Chest: Effort normal. No respiratory distress. She has no wheezes. She has no rhonchi. She has no rales.   Breath sounds are symmetric.   Abdominal: Soft. She exhibits no distension. There is generalized tenderness (Mild). There is no guarding.   Musculoskeletal: Normal range of motion. She exhibits no deformity.   Neurological: She is alert and oriented to person, place, and time.   moving all extremities, no focal deficits   Skin: Skin is warm and dry.   Psychiatric:   Calm, cooperative       LAB  RESULTS  Lab Results (last 24 hours)     Procedure Component Value Units Date/Time    CBC & Differential [[id number]] Collected:  03/21/19 1137    Specimen:  Blood Updated:  03/21/19 1246    Narrative:       The following orders were created for panel order CBC & Differential.  Procedure                               Abnormality         Status                     ---------                               -----------         ------                     CBC Auto Differential[[id number]]        Abnormal            Final result                 Please view results for these tests on the individual orders.    Comprehensive Metabolic Panel [[id number]]  (Abnormal) Collected:  03/21/19 1137    Specimen:  Blood Updated:  03/21/19 1218     Glucose 114 mg/dL      BUN 25 mg/dL      Creatinine 0.83 mg/dL      Sodium 128 mmol/L      Potassium 3.5 mmol/L      Chloride 90 mmol/L      CO2 22.5 mmol/L      Calcium 9.3 mg/dL      Total Protein 6.9 g/dL      Albumin 4.00 g/dL      ALT (SGPT) 12 U/L      AST (SGOT) 15 U/L      Alkaline Phosphatase 60 U/L      Total Bilirubin 0.6 mg/dL      eGFR Non African Amer 66 mL/min/1.73      Globulin 2.9 gm/dL      A/G Ratio 1.4 g/dL      BUN/Creatinine Ratio 30.1     Anion Gap 15.5 mmol/L     Narrative:       GFR Normal >60  Chronic Kidney Disease <60  Kidney Failure <15    Magnesium [[id number]]  (Normal) Collected:  03/21/19 1137    Specimen:  Blood Updated:  03/21/19 1218     Magnesium 1.7 mg/dL     CBC Auto Differential [[id number]]  (Abnormal) Collected:  03/21/19 1137    Specimen:  Blood Updated:  03/21/19 1246     WBC 7.99 10*3/mm3      RBC 3.54 10*6/mm3      Hemoglobin 10.9 g/dL      Hematocrit 32.3 %      MCV 91.2 fL      MCH 30.8 pg      MCHC 33.7 g/dL      RDW 12.6 %      RDW-SD 42.0 fl      MPV 8.8 fL      Platelets 327 10*3/mm3     Manual Differential [[id number]]  (Abnormal) Collected:  03/21/19 1137    Specimen:  Blood Updated:  03/21/19 1246     Neutrophil % 64.0 %      Lymphocyte % 9.0 %       Monocyte % 24.0 %      Eosinophil % 1.0 %      Atypical Lymphocyte % 2.0 %      Neutrophils Absolute 5.11 10*3/mm3      Lymphocytes Absolute 0.72 10*3/mm3      Monocytes Absolute 1.92 10*3/mm3      Eosinophils Absolute 0.08 10*3/mm3      RBC Morphology Normal     WBC Morphology Normal     Platelet Morphology Normal    Urinalysis With Microscopic If Indicated (No Culture) - Urine, Clean Catch [[id number]]  (Abnormal) Collected:  03/21/19 1350    Specimen:  Urine, Clean Catch Updated:  03/21/19 1412     Color, UA Yellow     Appearance, UA Clear     pH, UA 5.5     Specific Gravity, UA >=1.030     Glucose, UA Negative     Ketones, UA Trace     Bilirubin, UA Negative     Blood, UA Trace     Protein, UA Negative     Leuk Esterase, UA Small (1+)     Nitrite, UA Negative     Urobilinogen, UA 0.2 E.U./dL    Urinalysis, Microscopic Only - Urine, Clean Catch [[id number]]  (Abnormal) Collected:  03/21/19 1350    Specimen:  Urine, Clean Catch Updated:  03/21/19 1423     RBC, UA 0-2 /HPF      WBC, UA 6-12 /HPF      Bacteria, UA None Seen /HPF      Squamous Epithelial Cells, UA 0-2 /HPF      Hyaline Casts, UA 0-2 /LPF      Methodology Manual Light Microscopy          I ordered the above labs and reviewed the results    RADIOLOGY  CT Abdomen Pelvis With Contrast      CONCLUSION: Pancolitis.      Findings of this report called to Dr. Mueller in the emergency room at the  time of completion 1:49 PM.        I ordered the above noted radiological studies. Interpreted by radiologist. Discussed with radiologist (Dr. Porter). Reviewed by me in PACS.       PROCEDURES  Procedures      PROGRESS AND CONSULTS     1203 Ordered CBC, CMP, UA, and magnesium for further evaluation. Ordered fluids for hydration.     1247 Ordered CT abd pelvis for further evaluation.     1420 Rechecked the pt who is resting comfortably. Discussed the pt's labs which were overall good, and CT abd pelvis which showed pancolitis. Discussed inpatient and outpatient  treatment options. The pt states that she is comfortable going home with antibiotics. The pt agrees with the plan and all questions were addressed.     1429 Ordered cipro and flagyl for the pt's colitis.     MEDICAL DECISION MAKING  Results were reviewed/discussed with the patient and they were also made aware of online access. Pt also made aware that some labs, such as cultures, will not be resulted during ER visit and follow up with PMD is necessary.     MDM  Number of Diagnoses or Management Options  Colitis:   Weakness:   Diagnosis management comments: Patient with pan-colitis on CT, labs unremarkable. She has received IVF, as well as oral doses of cipro and flagyl. Discussed with patient observation vs discharge, patient wants to go home. Discussed PCP follow up, and completing course of prescribed antibiotics. Patient given nausea medications as well. Return for worsening symptoms.        Amount and/or Complexity of Data Reviewed  Clinical lab tests: ordered and reviewed (Sodium: 128)  Tests in the radiology section of CPT®: ordered and reviewed (CT abd pelvis: significant pancolitis)  Discussion of test results with the performing providers: yes (Dr. Porter (radiology))  Decide to obtain previous medical records or to obtain history from someone other than the patient: yes  Review and summarize past medical records: yes    Patient Progress  Patient progress: stable         DIAGNOSIS  Final diagnoses:   Colitis   Weakness       DISPOSITION  Disposition: Discharged.    Patient discharged in stable condition.    Reviewed implications of results, diagnosis, meds, responsibility to follow up, warning signs and symptoms of possible worsening, potential complications and reasons to return to ER, including new or worsening symptoms.    Patient/Family voiced understanding of above instructions.    Discussed plan for discharge, as there is no emergent indication for admission.  Pt/family is agreeable and understands  need for follow up and repeat testing.  Pt is aware that discharge does not mean that nothing is wrong but it indicates no emergency is present and they must continue care with follow-up as given below or physician of their choice.     FOLLOW-UP  Reyes, Miriam Mercado, MD  [address] [id number] [phone]    Call       [facility] Emergency Department  [address] [facility] [id number] [phone]    As needed, If symptoms worsen    Amada Oliva MD  [address] [id number] [phone]    Schedule an appointment as soon as possible for a visit            Medication List      No changes were made to your prescriptions during this visit.         Latest Documented Vital Signs:  As of 2:47 PM  BP- 112/56 HR- 84 Temp- 99 °F (37.2 °C) (Tympanic) O2 sat- 99%    --  Documentation assistance provided by tangela Leong for Dr. Mueller.  Information recorded by the scribe was done at my direction and has been verified and validated by me.       Joann Leong  03/21/19 1422       Joann Leong  03/21/19 4215       Ramos Mueller MD  03/21/19 7100

## 2019-03-21 NOTE — DISCHARGE INSTRUCTIONS
Follow up with PCP and GI as recommended. Complete course of prescribed antibiotics. Drink plenty of fluids. Return for worsening symptoms as needed.

## 2019-03-21 NOTE — ED NOTES
Pt c/o diarrhea since Saturday. Reports 2-6 episodes daily. Reports decrease appetite with weakness and chills. Denies fever. Reports right sided pain that started last night.      Angela Rashid, RN  03/21/19 2845

## 2019-03-26 ENCOUNTER — OFFICE (OUTPATIENT)
Dept: URBAN - METROPOLITAN AREA CLINIC 75 | Facility: CLINIC | Age: 79
End: 2019-03-26

## 2019-03-26 VITALS — SYSTOLIC BLOOD PRESSURE: 124 MMHG | HEART RATE: 80 BPM | WEIGHT: 129 LBS | DIASTOLIC BLOOD PRESSURE: 78 MMHG

## 2019-03-26 DIAGNOSIS — R10.10 UPPER ABDOMINAL PAIN, UNSPECIFIED: ICD-10-CM

## 2019-03-26 DIAGNOSIS — R93.3 ABNORMAL FINDINGS ON DIAGNOSTIC IMAGING OF OTHER PARTS OF DI: ICD-10-CM

## 2019-03-26 DIAGNOSIS — R11.0 NAUSEA: ICD-10-CM

## 2019-03-26 DIAGNOSIS — R19.7 DIARRHEA, UNSPECIFIED: ICD-10-CM

## 2019-03-26 PROCEDURE — 99213 OFFICE O/P EST LOW 20 MIN: CPT | Performed by: INTERNAL MEDICINE

## 2019-03-26 RX ORDER — ONDANSETRON 4 MG/1
12 TABLET, ORALLY DISINTEGRATING ORAL
Qty: 45 | Refills: 4 | Status: COMPLETED
Start: 2019-03-26 | End: 2019-07-16

## 2019-05-07 ENCOUNTER — OFFICE (OUTPATIENT)
Dept: URBAN - METROPOLITAN AREA CLINIC 75 | Facility: CLINIC | Age: 79
End: 2019-05-07

## 2019-05-07 VITALS — SYSTOLIC BLOOD PRESSURE: 126 MMHG | DIASTOLIC BLOOD PRESSURE: 64 MMHG | WEIGHT: 126 LBS | HEART RATE: 80 BPM

## 2019-05-07 DIAGNOSIS — R19.7 DIARRHEA, UNSPECIFIED: ICD-10-CM

## 2019-05-07 PROCEDURE — 99213 OFFICE O/P EST LOW 20 MIN: CPT | Performed by: INTERNAL MEDICINE

## 2019-06-06 ENCOUNTER — APPOINTMENT (OUTPATIENT)
Dept: WOMENS IMAGING | Facility: HOSPITAL | Age: 79
End: 2019-06-06

## 2019-06-06 PROCEDURE — 77063 BREAST TOMOSYNTHESIS BI: CPT | Performed by: RADIOLOGY

## 2019-06-06 PROCEDURE — 77067 SCR MAMMO BI INCL CAD: CPT | Performed by: RADIOLOGY

## 2019-07-16 ENCOUNTER — OFFICE (OUTPATIENT)
Dept: URBAN - METROPOLITAN AREA CLINIC 75 | Facility: CLINIC | Age: 79
End: 2019-07-16

## 2019-07-16 VITALS — WEIGHT: 130 LBS | SYSTOLIC BLOOD PRESSURE: 122 MMHG | DIASTOLIC BLOOD PRESSURE: 60 MMHG | HEART RATE: 66 BPM

## 2019-07-16 DIAGNOSIS — K59.00 CONSTIPATION, UNSPECIFIED: ICD-10-CM

## 2019-07-16 PROCEDURE — 99213 OFFICE O/P EST LOW 20 MIN: CPT | Performed by: INTERNAL MEDICINE

## 2020-04-07 ENCOUNTER — OFFICE (OUTPATIENT)
Dept: URBAN - METROPOLITAN AREA CLINIC 75 | Facility: CLINIC | Age: 80
End: 2020-04-07
Payer: MEDICARE

## 2020-04-07 DIAGNOSIS — K59.04 CHRONIC IDIOPATHIC CONSTIPATION: ICD-10-CM

## 2020-04-07 PROCEDURE — G2012 BRIEF CHECK IN BY MD/QHP: HCPCS | Performed by: INTERNAL MEDICINE

## 2020-04-07 RX ORDER — PLECANATIDE 3 MG/1
3 TABLET ORAL
Qty: 90 | Refills: 3 | Status: COMPLETED
Start: 2020-03-27 | End: 2021-01-28

## 2020-04-07 NOTE — SERVICEHPINOTES
Patient is an 81 yo F with h/o HLD, HTN, hypothyroidism, diverticulitis, s/p appendectomy here for virtual check in regarding constipation. She had CT scan 6/18 that showed multifocal hepatic cysts and diverticulitis with possible contained perf. She was given abx- cipro/flagyl for 10 days. She had colonoscopy as below. She also underwent EGD due to having YAIR. She was started on Dexilant and this has helped her epigastric pain. I saw her 11/29/18 due to diarrhea. This resolved after stopping linzess and ferralet. She was seen at [facility] ER 3/21/19 due to severe diarrhea. She had CT scan as below that showed pancolitis. She was discharged on cipro/flagyl as well as zofran. BRShe had stools studies with normal cx and cdiff. Her calprotectin was elevated. She was started on apriso, which helped. We had decided if symptoms recurred, we would send for colonoscopy. Her symptoms did not recure and she is now off the apriso. She has tried OTC stool softeners. She had previously tried linzess 145mcg and did not feel like it did anything. She gets this every 2.5-3 weeks. She does take miralax and prune juice, but still is having issues. She is still having constipation.  she says she is constipated most of the time.  she tried amitiza 8cmg and then 24mcg with no difference.  Now that she has been out of the samples, so now she is taking amitiza 8mcg because that is what she had left. When she does have a BM, they are hard and uncomfortable.  Trulance is very expensive for her so she has not tried this.  She continues to take iron supplementation.  she did have blood test with her pcp.  She was told her labs were ok.  Per chart reviewBR1/23/2014 EGD-Colonoscopy - small HH, GEj ring s/p 15mm balloon dilator. diverticulosis with mucosal hypertrophy and congestion, int hemorrhoidsBRnormal duodenum, minimal chronic gastritis, neg HP. normal DC and sigmoid colon BR11/19/2009 - Colonoscopy - sigmoid diverticulosis, int hemorrhoids. 5 year recommendedBR7/10/2018 - LABS - hgb 9.8, WBC 7.2, plat 395, creat 0.77, AP 64, AST 29, ALT 19 BR6/25/2018 - CT - ABDOMEN - multifocal hepatic cysts, focal diverticulitis vs contained perf, small HH, gastric fundal diverticulum BR7/31/2018 - Biofire report - negative 7/31/2018 - Lactoferrin report - negative BR8/18 colonoscopy- diverticulosis, int/ext hemorrhoids BREGD 8/18- paraesoph hernia, esophageal ring, gastritisBRpath- normal duodenum, chronic gastritis, neg HP BR1/4/2019- WBC 8.4, hgb 11.1, plat 349, iron 61, TIBC 232L, ferritin 643H BR3/21/19 creat 0.83, nL LFTs, hgb 10.9, plat 327BR3/21/19 CT scan with- hepatic cysts, focus of fatty infilt left lobe, nL GB, gastric fundal diverticulum, pancolitisBR3/27/2019 - calprotectin 331, cdiff neg, stool cx neg BR5/9/2019 Calprotectin, Fecal - borderline- 116

## 2020-11-24 ENCOUNTER — OFFICE (OUTPATIENT)
Dept: URBAN - METROPOLITAN AREA CLINIC 75 | Facility: CLINIC | Age: 80
End: 2020-11-24

## 2020-11-24 VITALS — TEMPERATURE: 96.5 F | WEIGHT: 133 LBS | HEART RATE: 66 BPM | OXYGEN SATURATION: 96 %

## 2020-11-24 DIAGNOSIS — K59.04 CHRONIC IDIOPATHIC CONSTIPATION: ICD-10-CM

## 2020-11-24 PROCEDURE — 99214 OFFICE O/P EST MOD 30 MIN: CPT | Performed by: NURSE PRACTITIONER

## 2021-01-28 ENCOUNTER — OFFICE (OUTPATIENT)
Dept: URBAN - METROPOLITAN AREA CLINIC 75 | Facility: CLINIC | Age: 81
End: 2021-01-28
Payer: MEDICARE

## 2021-01-28 VITALS — WEIGHT: 130 LBS

## 2021-01-28 DIAGNOSIS — R11.0 NAUSEA: ICD-10-CM

## 2021-01-28 DIAGNOSIS — R10.32 LEFT LOWER QUADRANT PAIN: ICD-10-CM

## 2021-01-28 DIAGNOSIS — K59.04 CHRONIC IDIOPATHIC CONSTIPATION: ICD-10-CM

## 2021-01-28 PROCEDURE — G2012 BRIEF CHECK IN BY MD/QHP: HCPCS | Performed by: NURSE PRACTITIONER

## 2021-01-28 RX ORDER — CIPROFLOXACIN 500 MG/1
1000 TABLET, FILM COATED ORAL
Qty: 20 | Refills: 0 | Status: COMPLETED
Start: 2021-01-28 | End: 2021-02-19

## 2021-01-28 RX ORDER — METRONIDAZOLE 500 MG/1
1500 TABLET, FILM COATED ORAL
Qty: 30 | Refills: 0 | Status: COMPLETED
Start: 2021-01-28 | End: 2021-03-12

## 2021-01-29 ENCOUNTER — TRANSCRIBE ORDERS (OUTPATIENT)
Dept: ADMINISTRATIVE | Facility: HOSPITAL | Age: 81
End: 2021-01-29

## 2021-01-29 DIAGNOSIS — R10.32 LEFT LOWER QUADRANT PAIN: Primary | ICD-10-CM

## 2021-02-19 ENCOUNTER — OFFICE (OUTPATIENT)
Dept: URBAN - METROPOLITAN AREA CLINIC 75 | Facility: CLINIC | Age: 81
End: 2021-02-19

## 2021-02-19 VITALS — HEART RATE: 114 BPM | WEIGHT: 134 LBS | OXYGEN SATURATION: 97 % | TEMPERATURE: 96.7 F

## 2021-02-19 DIAGNOSIS — R19.7 DIARRHEA, UNSPECIFIED: ICD-10-CM

## 2021-02-19 DIAGNOSIS — R10.32 LEFT LOWER QUADRANT PAIN: ICD-10-CM

## 2021-02-19 DIAGNOSIS — K57.92 DIVERTICULITIS OF INTESTINE, PART UNSPECIFIED, WITHOUT PERFO: ICD-10-CM

## 2021-02-19 DIAGNOSIS — R10.10 UPPER ABDOMINAL PAIN, UNSPECIFIED: ICD-10-CM

## 2021-02-19 DIAGNOSIS — R11.0 NAUSEA: ICD-10-CM

## 2021-02-19 DIAGNOSIS — R93.3 ABNORMAL FINDINGS ON DIAGNOSTIC IMAGING OF OTHER PARTS OF DI: ICD-10-CM

## 2021-02-19 PROCEDURE — 99214 OFFICE O/P EST MOD 30 MIN: CPT | Performed by: NURSE PRACTITIONER

## 2021-02-19 RX ORDER — PROMETHAZINE HYDROCHLORIDE 12.5 MG/1
TABLET ORAL
Qty: 60 | Refills: 1 | Status: COMPLETED
Start: 2021-02-19 | End: 2022-09-02

## 2021-02-19 RX ORDER — DICYCLOMINE HYDROCHLORIDE 10 MG/1
CAPSULE ORAL
Qty: 120 | Refills: 3 | Status: COMPLETED
Start: 2021-02-19 | End: 2021-04-06 | Stop reason: SDUPTHER

## 2021-02-22 ENCOUNTER — TRANSCRIBE ORDERS (OUTPATIENT)
Dept: ADMINISTRATIVE | Facility: HOSPITAL | Age: 81
End: 2021-02-22

## 2021-02-22 DIAGNOSIS — R93.3 ABNORMAL VIRTUAL COLONOSCOPE: Primary | ICD-10-CM

## 2021-02-26 ENCOUNTER — APPOINTMENT (OUTPATIENT)
Dept: OTHER | Facility: HOSPITAL | Age: 81
End: 2021-02-26

## 2021-02-26 ENCOUNTER — HOSPITAL ENCOUNTER (OUTPATIENT)
Dept: CT IMAGING | Facility: HOSPITAL | Age: 81
Discharge: HOME | End: 2021-02-26

## 2021-02-26 DIAGNOSIS — R93.3 ABNORMAL VIRTUAL COLONOSCOPE: ICD-10-CM

## 2021-02-26 DIAGNOSIS — Z09 FOLLOW UP: ICD-10-CM

## 2021-02-26 LAB — CREAT BLDA-MCNC: 0.4 MG/DL (ref 0.6–1.3)

## 2021-02-26 PROCEDURE — 25010000002 IOPAMIDOL 61 % SOLUTION: Performed by: NURSE PRACTITIONER

## 2021-02-26 PROCEDURE — 74177 CT ABD & PELVIS W/CONTRAST: CPT

## 2021-02-26 PROCEDURE — 82565 ASSAY OF CREATININE: CPT

## 2021-02-26 PROCEDURE — 0 DIATRIZOATE MEGLUMINE & SODIUM PER 1 ML: Performed by: NURSE PRACTITIONER

## 2021-02-26 RX ADMIN — IOPAMIDOL 85 ML: 612 INJECTION, SOLUTION INTRAVENOUS at 14:41

## 2021-02-26 RX ADMIN — DIATRIZOATE MEGLUMINE AND DIATRIZOATE SODIUM 30 ML: 660; 100 LIQUID ORAL; RECTAL at 14:41

## 2021-03-02 DIAGNOSIS — Z23 IMMUNIZATION DUE: ICD-10-CM

## 2021-03-12 ENCOUNTER — OFFICE (OUTPATIENT)
Dept: URBAN - METROPOLITAN AREA CLINIC 75 | Facility: CLINIC | Age: 81
End: 2021-03-12
Payer: MEDICARE

## 2021-03-12 VITALS — WEIGHT: 127 LBS

## 2021-03-12 DIAGNOSIS — K59.04 CHRONIC IDIOPATHIC CONSTIPATION: ICD-10-CM

## 2021-03-12 DIAGNOSIS — K57.92 DIVERTICULITIS OF INTESTINE, PART UNSPECIFIED, WITHOUT PERFO: ICD-10-CM

## 2021-03-12 DIAGNOSIS — K57.30 DIVERTICULOSIS OF LARGE INTESTINE WITHOUT PERFORATION OR ABS: ICD-10-CM

## 2021-03-12 DIAGNOSIS — R10.10 UPPER ABDOMINAL PAIN, UNSPECIFIED: ICD-10-CM

## 2021-03-12 DIAGNOSIS — R10.31 RIGHT LOWER QUADRANT PAIN: ICD-10-CM

## 2021-03-12 DIAGNOSIS — R19.7 DIARRHEA, UNSPECIFIED: ICD-10-CM

## 2021-03-12 DIAGNOSIS — R11.0 NAUSEA: ICD-10-CM

## 2021-03-12 DIAGNOSIS — R10.32 LEFT LOWER QUADRANT PAIN: ICD-10-CM

## 2021-03-12 PROCEDURE — 99214 OFFICE O/P EST MOD 30 MIN: CPT | Mod: 95 | Performed by: NURSE PRACTITIONER

## 2021-03-12 RX ORDER — FERROUS SULFATE TAB EC 325 MG (65 MG FE EQUIVALENT) 325 (65 FE) MG
TABLET DELAYED RESPONSE ORAL
Qty: 180 | Refills: 1 | Status: COMPLETED
Start: 2021-03-12 | End: 2023-04-14

## 2021-03-12 RX ORDER — DICYCLOMINE HYDROCHLORIDE 10 MG/1
CAPSULE ORAL
Qty: 240 | Refills: 11 | Status: COMPLETED
Start: 2021-03-12 | End: 2022-03-04

## 2021-03-12 RX ORDER — PROMETHAZINE HYDROCHLORIDE 12.5 MG/1
TABLET ORAL
Qty: 60 | Refills: 1 | Status: COMPLETED
Start: 2021-02-19 | End: 2022-09-02

## 2021-03-30 ENCOUNTER — TRANSCRIBE ORDERS (OUTPATIENT)
Dept: ADMINISTRATIVE | Facility: HOSPITAL | Age: 81
End: 2021-03-30

## 2021-03-30 DIAGNOSIS — N85.00 ENDOMETRIAL HYPERPLASIA: Primary | ICD-10-CM

## 2021-04-06 VITALS
OXYGEN SATURATION: 100 % | DIASTOLIC BLOOD PRESSURE: 83 MMHG | DIASTOLIC BLOOD PRESSURE: 65 MMHG | DIASTOLIC BLOOD PRESSURE: 91 MMHG | RESPIRATION RATE: 17 BRPM | RESPIRATION RATE: 18 BRPM | RESPIRATION RATE: 22 BRPM | SYSTOLIC BLOOD PRESSURE: 165 MMHG | HEART RATE: 100 BPM | HEART RATE: 76 BPM | SYSTOLIC BLOOD PRESSURE: 162 MMHG | RESPIRATION RATE: 14 BRPM | RESPIRATION RATE: 24 BRPM | DIASTOLIC BLOOD PRESSURE: 76 MMHG | HEART RATE: 84 BPM | RESPIRATION RATE: 16 BRPM | SYSTOLIC BLOOD PRESSURE: 120 MMHG | SYSTOLIC BLOOD PRESSURE: 141 MMHG | HEART RATE: 74 BPM | SYSTOLIC BLOOD PRESSURE: 155 MMHG | HEART RATE: 80 BPM | DIASTOLIC BLOOD PRESSURE: 73 MMHG | RESPIRATION RATE: 23 BRPM | RESPIRATION RATE: 26 BRPM | HEART RATE: 87 BPM | DIASTOLIC BLOOD PRESSURE: 84 MMHG | WEIGHT: 127 LBS | SYSTOLIC BLOOD PRESSURE: 118 MMHG | SYSTOLIC BLOOD PRESSURE: 138 MMHG | TEMPERATURE: 97 F | HEART RATE: 79 BPM | TEMPERATURE: 97.1 F | RESPIRATION RATE: 25 BRPM | DIASTOLIC BLOOD PRESSURE: 77 MMHG | HEART RATE: 82 BPM | SYSTOLIC BLOOD PRESSURE: 125 MMHG | HEART RATE: 88 BPM | SYSTOLIC BLOOD PRESSURE: 135 MMHG | DIASTOLIC BLOOD PRESSURE: 64 MMHG | SYSTOLIC BLOOD PRESSURE: 119 MMHG | HEART RATE: 78 BPM | SYSTOLIC BLOOD PRESSURE: 142 MMHG | HEART RATE: 81 BPM | OXYGEN SATURATION: 99 % | SYSTOLIC BLOOD PRESSURE: 130 MMHG | DIASTOLIC BLOOD PRESSURE: 70 MMHG | RESPIRATION RATE: 12 BRPM | SYSTOLIC BLOOD PRESSURE: 151 MMHG | DIASTOLIC BLOOD PRESSURE: 74 MMHG | OXYGEN SATURATION: 98 %

## 2021-04-07 ENCOUNTER — AMBULATORY SURGICAL CENTER (OUTPATIENT)
Dept: URBAN - METROPOLITAN AREA SURGERY 17 | Facility: SURGERY | Age: 81
End: 2021-04-07

## 2021-04-07 DIAGNOSIS — K64.4 RESIDUAL HEMORRHOIDAL SKIN TAGS: ICD-10-CM

## 2021-04-07 DIAGNOSIS — K57.30 DIVERTICULOSIS OF LARGE INTESTINE WITHOUT PERFORATION OR ABS: ICD-10-CM

## 2021-04-07 DIAGNOSIS — K64.8 OTHER HEMORRHOIDS: ICD-10-CM

## 2021-04-07 DIAGNOSIS — K57.92 DIVERTICULITIS OF INTESTINE, PART UNSPECIFIED, WITHOUT PERFO: ICD-10-CM

## 2021-04-07 DIAGNOSIS — R19.7 DIARRHEA, UNSPECIFIED: ICD-10-CM

## 2021-04-07 PROCEDURE — 45330 DIAGNOSTIC SIGMOIDOSCOPY: CPT | Performed by: INTERNAL MEDICINE

## 2021-04-26 ENCOUNTER — HOSPITAL ENCOUNTER (OUTPATIENT)
Dept: ULTRASOUND IMAGING | Facility: HOSPITAL | Age: 81
End: 2021-04-26

## 2021-04-26 ENCOUNTER — HOSPITAL ENCOUNTER (OUTPATIENT)
Dept: ULTRASOUND IMAGING | Facility: HOSPITAL | Age: 81
Discharge: HOME | End: 2021-04-26
Admitting: FAMILY MEDICINE

## 2021-04-26 DIAGNOSIS — N85.00 ENDOMETRIAL HYPERPLASIA: ICD-10-CM

## 2021-04-26 PROCEDURE — 76856 US EXAM PELVIC COMPLETE: CPT

## 2021-05-21 ENCOUNTER — OFFICE (OUTPATIENT)
Dept: URBAN - METROPOLITAN AREA CLINIC 75 | Facility: CLINIC | Age: 81
End: 2021-05-21

## 2021-05-21 VITALS
HEART RATE: 96 BPM | OXYGEN SATURATION: 100 % | WEIGHT: 128 LBS | TEMPERATURE: 96.6 F | SYSTOLIC BLOOD PRESSURE: 124 MMHG | DIASTOLIC BLOOD PRESSURE: 76 MMHG

## 2021-05-21 DIAGNOSIS — K57.30 DIVERTICULOSIS OF LARGE INTESTINE WITHOUT PERFORATION OR ABS: ICD-10-CM

## 2021-05-21 DIAGNOSIS — R93.3 ABNORMAL FINDINGS ON DIAGNOSTIC IMAGING OF OTHER PARTS OF DI: ICD-10-CM

## 2021-05-21 DIAGNOSIS — R93.5 ABNORMAL FINDINGS ON DIAGNOSTIC IMAGING OF OTHER ABDOMINAL R: ICD-10-CM

## 2021-05-21 DIAGNOSIS — K63.2 FISTULA OF INTESTINE: ICD-10-CM

## 2021-05-21 DIAGNOSIS — R10.32 LEFT LOWER QUADRANT PAIN: ICD-10-CM

## 2021-05-21 DIAGNOSIS — D50.9 IRON DEFICIENCY ANEMIA, UNSPECIFIED: ICD-10-CM

## 2021-05-21 PROCEDURE — 99214 OFFICE O/P EST MOD 30 MIN: CPT | Performed by: NURSE PRACTITIONER

## 2022-03-04 ENCOUNTER — OFFICE (OUTPATIENT)
Dept: URBAN - METROPOLITAN AREA CLINIC 75 | Facility: CLINIC | Age: 82
End: 2022-03-04

## 2022-03-04 VITALS
HEIGHT: 64 IN | OXYGEN SATURATION: 98 % | DIASTOLIC BLOOD PRESSURE: 80 MMHG | HEART RATE: 68 BPM | WEIGHT: 133 LBS | SYSTOLIC BLOOD PRESSURE: 141 MMHG

## 2022-03-04 DIAGNOSIS — Z90.49 ACQUIRED ABSENCE OF OTHER SPECIFIED PARTS OF DIGESTIVE TRACT: ICD-10-CM

## 2022-03-04 DIAGNOSIS — R19.4 CHANGE IN BOWEL HABIT: ICD-10-CM

## 2022-03-04 PROCEDURE — 99213 OFFICE O/P EST LOW 20 MIN: CPT | Performed by: NURSE PRACTITIONER

## 2022-04-07 PROBLEM — E03.9 HYPOTHYROIDISM: Status: ACTIVE | Noted: 2021-12-15

## 2022-04-07 PROBLEM — I10 ESSENTIAL HYPERTENSION: Status: ACTIVE | Noted: 2021-02-04

## 2022-04-07 PROBLEM — E78.5 HYPERLIPIDEMIA: Status: ACTIVE | Noted: 2021-02-04

## 2022-04-07 PROBLEM — F41.9 ANXIETY: Status: ACTIVE | Noted: 2021-12-15

## 2022-04-11 NOTE — PROGRESS NOTES
Ritu Martino, 82 y.o., Gender: female    Assessment/Plan    1.  Pt w/ hypothyroidism secondary to unknown cause suspect is likely age related decline in thyroid status.  TSH not known as no current lab data was provided.  Counseled that the goal of tx will be to keep TSH 2.5-4.5 uIU/mL, which is the rec range for someone on tx at this age.  Rec cont current tx w/ 50 mcg daily.  Took some time but did confirm pt is on branded tx so sent for refill of that to ensure has enough of this dose to get to next visit.  Found out pt has never taken medication correctly mixing with all other AM meds including mxl mineral items.  This explains the varying TSH results and freq dose changes.  First focus will be to take medication correctly and though that is a simple concept it was not easy to explain to the pt and her  what needs to be done.  They were given a handout which clearly explains what to do but still took an extended discussion of about 15 mins just about this one topic.  Hopefully they will get this organized as that is the main issue and why thyroid replacement has not been optimal.  Counseled pt about dose and to have f/u tft's to show being at goal range will then proceed from there w/ other f/u if needed to optimize dose.    2.  Counseled in detail proper way to take thyroid replacement treatment to be as follows.  Pt is to take first thing in the morning on an empty stomach with a large glass of water ONLY. No milk or any juice product. Do not take any other medications or eat anything for 45-60 minutes after taking the medication. You can eat, drink, and take other medications after 45-60 minutes.  Do not take any supplements for at least 3 hours after taking the medication.  Calcium and iron should be taken later in day if possible.  Also, medications to treat excess gastric acid should be taken later in the day.  3.  There was separate notation about pt having low sodium which appears to be  chronic based on limited data available.  Pt was on a diuretic but seems that was stopped after hospital stay in '21.  At this time this is not acute and thus not of sig concern.  Will focus on getting thyroid adequately replaced as that is something that can be fixed and might affect the sodium balance.  Will do some basic labs prior to next visit.  Likely pt will just need to continue with the sodium replacement as she has been doing.       Time Counseled: 30  Minutes  Total Time: 45  Minutes    Return to office in:  2-3   Months      HPI Summary    Pt here for evaluation of hypothyroidism.  Pt reports unsure how long has been on replacement and  thinks has been since around '18.  Pt thinks mostly has been on 50 mcg, though we did see notations of doses from 25 mcg to 75 mcg.  On further questioning pt does remember was on 75 mcg late '21 and that was changed to 50 mcg at some point earlier this year she is not sure.  On further questioning found out pt was never directed on how to correctly take the thyroid medication so has always mixed it with all other AM items including multiple mineral supplements.  Pt has many chronic c/o which unable to determine if are related to thyroid status are not.  Pt reports since having GI surgery mid '21 for ruptured diverticular dz she has ongoing fluctuating bowels, freq nausea, not eating as much as was before, baseline cold intolerance, chronic poor sleep, baseline anxiety made worse when not able to sleep, occ tremor related to anxiety, fatigue, weakness at times, mental slowness, dry skin and recent skin infections related to the surgery, and change in voice.  At this time, pt denies any heat intolerance, excessive sweating, weight change, palpitations,  muscle cramps, joint pain, edema, excessive hair loss, dysphagia, or shortness of breath when lying down.  Pt without any other complaints related to thyroid at this time.    Review of Systems  see HPI    Patient  History    Past History (reviewed):    Medical:   Past Medical History:   Diagnosis Date   • Anxiety    • Arthritis    • Bowel dysfunction 2021    since having surgery for diverticular infection   • Diverticulitis 2021    w/ rupture and infection required surgery and ostomy   • Essential hypertension, benign    • GERD (gastroesophageal reflux disease)    • Hernia, hiatal    • Hypercholesterolemia    • Hypokalemia    • Hyponatremia     chronic low with occasional normal level   • Hypothyroidism     estimated time frame pt nor  could remember exactly how long has been on medication   • Insomnia    • Iron deficiency        Surgery:   Past Surgical History:   Procedure Laterality Date   • APPENDECTOMY     • BACK SURGERY     •  SECTION     • COLON SURGERY  2021    to address ruptured and infected diverticular dz, ostomy also placed   • COLOSTOMY CLOSURE  10/2021    ostomy removed   • HEMORRHOIDECTOMY     • KNEE ARTHROPLASTY           Social History (reviewed):  Smoking 0.25 ppd 35 yrs quit early , occ ETOH, - Drugs, , 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    Allergies:  No Known Allergies    Physical Exam    VITALS:    Vitals:    22 1506   BP: 132/76   Pulse: 102   Temp: 97.7 °F (36.5 °C)   SpO2: 99%     Body mass index is 24.96 kg/m².    GENERAL:  Looks stated age, Well developed  HEAD/EYES:  N/C, A/T, Mask in place  EXTREMITIES:  FROM  CNS:  A&Ox2 some confusion noted was not able to determine if has dementia     Full exam not done today      Labs/Imaging  All available lab and imaging data were reviewed.      Uche Garcia MD, NOHELIA, FACE, ECNU  2022  16:27 EDT

## 2022-04-19 PROBLEM — E78.5 HYPERLIPIDEMIA: Status: RESOLVED | Noted: 2021-02-04 | Resolved: 2022-04-19

## 2022-04-19 PROBLEM — E87.6 HYPOKALEMIA: Status: ACTIVE | Noted: 2022-04-19

## 2022-04-19 PROBLEM — I10 ESSENTIAL HYPERTENSION: Status: RESOLVED | Noted: 2021-02-04 | Resolved: 2022-04-19

## 2022-04-19 PROBLEM — I10 ESSENTIAL HYPERTENSION, BENIGN: Status: ACTIVE | Noted: 2022-04-19

## 2022-04-19 PROBLEM — E61.1 IRON DEFICIENCY: Status: ACTIVE | Noted: 2022-04-19

## 2022-04-20 ENCOUNTER — OFFICE VISIT (OUTPATIENT)
Dept: ENDOCRINOLOGY | Age: 82
End: 2022-04-20

## 2022-04-20 VITALS
HEIGHT: 60 IN | HEART RATE: 102 BPM | TEMPERATURE: 97.7 F | OXYGEN SATURATION: 99 % | BODY MASS INDEX: 25.09 KG/M2 | SYSTOLIC BLOOD PRESSURE: 132 MMHG | WEIGHT: 127.8 LBS | DIASTOLIC BLOOD PRESSURE: 76 MMHG

## 2022-04-20 DIAGNOSIS — E87.1 HYPONATREMIA: ICD-10-CM

## 2022-04-20 DIAGNOSIS — E03.9 HYPOTHYROIDISM, UNSPECIFIED TYPE: Primary | ICD-10-CM

## 2022-04-20 PROBLEM — E87.6 HYPOKALEMIA: Status: RESOLVED | Noted: 2022-04-19 | Resolved: 2022-04-20

## 2022-04-20 PROCEDURE — 99204 OFFICE O/P NEW MOD 45 MIN: CPT | Performed by: INTERNAL MEDICINE

## 2022-04-20 RX ORDER — KETOCONAZOLE 20 MG/G
CREAM TOPICAL 2 TIMES DAILY
COMMUNITY
Start: 2022-04-01 | End: 2022-06-28 | Stop reason: ALTCHOICE

## 2022-04-20 RX ORDER — CYCLOSPORINE 0.5 MG/ML
1 EMULSION OPHTHALMIC DAILY
COMMUNITY
End: 2022-06-21

## 2022-04-20 RX ORDER — LEVOTHYROXINE SODIUM 50 MCG
TABLET ORAL
COMMUNITY
Start: 2022-02-25 | End: 2022-04-20 | Stop reason: SDUPTHER

## 2022-04-20 RX ORDER — LEVOTHYROXINE SODIUM 50 MCG
TABLET ORAL
Qty: 90 TABLET | Refills: 2 | Status: SHIPPED | OUTPATIENT
Start: 2022-04-20 | End: 2022-06-21

## 2022-04-20 RX ORDER — NYSTATIN 100000 [USP'U]/G
POWDER TOPICAL 2 TIMES DAILY
COMMUNITY
Start: 2022-04-12 | End: 2022-06-21

## 2022-04-20 RX ORDER — BENAZEPRIL HYDROCHLORIDE 20 MG/1
20 TABLET ORAL DAILY
COMMUNITY
Start: 2022-03-07 | End: 2022-07-06 | Stop reason: HOSPADM

## 2022-04-25 ENCOUNTER — OFFICE (OUTPATIENT)
Dept: URBAN - METROPOLITAN AREA CLINIC 75 | Facility: CLINIC | Age: 82
End: 2022-04-25

## 2022-04-25 ENCOUNTER — TELEPHONE (OUTPATIENT)
Dept: ENDOCRINOLOGY | Age: 82
End: 2022-04-25

## 2022-04-25 VITALS
WEIGHT: 126 LBS | OXYGEN SATURATION: 99 % | DIASTOLIC BLOOD PRESSURE: 80 MMHG | SYSTOLIC BLOOD PRESSURE: 119 MMHG | HEIGHT: 64 IN | HEART RATE: 69 BPM

## 2022-04-25 DIAGNOSIS — K57.92 DIVERTICULITIS OF INTESTINE, PART UNSPECIFIED, WITHOUT PERFO: ICD-10-CM

## 2022-04-25 DIAGNOSIS — R19.7 DIARRHEA, UNSPECIFIED: ICD-10-CM

## 2022-04-25 DIAGNOSIS — K59.04 CHRONIC IDIOPATHIC CONSTIPATION: ICD-10-CM

## 2022-04-25 PROBLEM — K22.2 ESOPHAGEAL OBSTRUCTION: Status: ACTIVE | Noted: 2018-08-22

## 2022-04-25 PROBLEM — D64.9 UNEXPLAINED IRON DEFICIENCY ANEMIA: Status: ACTIVE | Noted: 2018-08-08

## 2022-04-25 PROBLEM — K29.70 GASTRITIS, UNSPECIFIED, WITHOUT BLEEDING: Status: ACTIVE | Noted: 2018-08-22

## 2022-04-25 PROBLEM — K64.9 UNSPECIFIED HEMORRHOIDS: Status: ACTIVE | Noted: 2018-08-08

## 2022-04-25 PROBLEM — K44.9 DIAPHRAGMATIC HERNIA WITHOUT OBSTRUCTION OR GANGRENE: Status: ACTIVE | Noted: 2018-08-22

## 2022-04-25 PROCEDURE — 99214 OFFICE O/P EST MOD 30 MIN: CPT | Performed by: NURSE PRACTITIONER

## 2022-04-25 RX ORDER — PRUCALOPRIDE 2 MG/1
2 TABLET, FILM COATED ORAL
Qty: 30 | Refills: 11 | Status: COMPLETED
Start: 2022-04-25 | End: 2023-04-14

## 2022-05-03 NOTE — TELEPHONE ENCOUNTER
Left a detailed message for pt that we were returning her call about dose and strength of her Rxs if she could please elizabeth the office back that and ask to speak with myself bishop or megan we are all 3 MA's and would be more than willing to help with any questions or concerns she may be having

## 2022-05-04 ENCOUNTER — TELEPHONE (OUTPATIENT)
Dept: ENDOCRINOLOGY | Age: 82
End: 2022-05-04

## 2022-05-04 NOTE — TELEPHONE ENCOUNTER
pts  called to let us know that the phone message he left yesterday was not intended for  Our office and he apologized for the inconvience

## 2022-05-07 ENCOUNTER — HOSPITAL ENCOUNTER (INPATIENT)
Facility: HOSPITAL | Age: 82
LOS: 4 days | Discharge: HOME HEALTH | End: 2022-05-11
Attending: EMERGENCY MEDICINE | Admitting: INTERNAL MEDICINE

## 2022-05-07 ENCOUNTER — APPOINTMENT (OUTPATIENT)
Dept: CT IMAGING | Facility: HOSPITAL | Age: 82
End: 2022-05-07

## 2022-05-07 ENCOUNTER — APPOINTMENT (OUTPATIENT)
Dept: GENERAL RADIOLOGY | Facility: HOSPITAL | Age: 82
End: 2022-05-07

## 2022-05-07 DIAGNOSIS — R47.01 FLUENT APHASIA: Primary | ICD-10-CM

## 2022-05-07 DIAGNOSIS — F41.9 ANXIETY: ICD-10-CM

## 2022-05-07 DIAGNOSIS — N39.0 URINARY TRACT INFECTION WITHOUT HEMATURIA, SITE UNSPECIFIED: ICD-10-CM

## 2022-05-07 PROBLEM — F13.20 BENZODIAZEPINE DEPENDENCE (HCC): Status: ACTIVE | Noted: 2022-05-07

## 2022-05-07 PROBLEM — E87.8 HYPOCHLOREMIA: Status: ACTIVE | Noted: 2022-05-07

## 2022-05-07 LAB
ALBUMIN SERPL-MCNC: 4.5 G/DL (ref 3.5–5.2)
ALBUMIN/GLOB SERPL: 1.8 G/DL
ALP SERPL-CCNC: 57 U/L (ref 39–117)
ALT SERPL W P-5'-P-CCNC: 19 U/L (ref 1–33)
AMPHET+METHAMPHET UR QL: NEGATIVE
ANION GAP SERPL CALCULATED.3IONS-SCNC: 13.4 MMOL/L (ref 5–15)
APTT PPP: 29.9 SECONDS (ref 22.7–35.4)
AST SERPL-CCNC: 28 U/L (ref 1–32)
BACTERIA UR QL AUTO: ABNORMAL /HPF
BARBITURATES UR QL SCN: NEGATIVE
BASOPHILS # BLD AUTO: 0.01 10*3/MM3 (ref 0–0.2)
BASOPHILS NFR BLD AUTO: 0.1 % (ref 0–1.5)
BENZODIAZ UR QL SCN: POSITIVE
BILIRUB SERPL-MCNC: 1 MG/DL (ref 0–1.2)
BILIRUB UR QL STRIP: NEGATIVE
BUN SERPL-MCNC: 7 MG/DL (ref 8–23)
BUN/CREAT SERPL: 11.1 (ref 7–25)
CALCIUM SPEC-SCNC: 9.4 MG/DL (ref 8.6–10.5)
CANNABINOIDS SERPL QL: NEGATIVE
CHLORIDE SERPL-SCNC: 85 MMOL/L (ref 98–107)
CHOLEST SERPL-MCNC: 149 MG/DL (ref 0–200)
CK SERPL-CCNC: 233 U/L (ref 20–180)
CLARITY UR: ABNORMAL
CO2 SERPL-SCNC: 23.6 MMOL/L (ref 22–29)
COCAINE UR QL: NEGATIVE
COLOR UR: YELLOW
CREAT SERPL-MCNC: 0.63 MG/DL (ref 0.57–1)
DEPRECATED RDW RBC AUTO: 42.3 FL (ref 37–54)
EGFRCR SERPLBLD CKD-EPI 2021: 88.7 ML/MIN/1.73
EOSINOPHIL # BLD AUTO: 0.06 10*3/MM3 (ref 0–0.4)
EOSINOPHIL NFR BLD AUTO: 0.8 % (ref 0.3–6.2)
ERYTHROCYTE [DISTWIDTH] IN BLOOD BY AUTOMATED COUNT: 12.1 % (ref 12.3–15.4)
ETHANOL BLD-MCNC: <10 MG/DL (ref 0–10)
ETHANOL UR QL: <0.01 %
GLOBULIN UR ELPH-MCNC: 2.5 GM/DL
GLUCOSE BLDC GLUCOMTR-MCNC: 111 MG/DL (ref 70–130)
GLUCOSE BLDC GLUCOMTR-MCNC: 129 MG/DL (ref 70–130)
GLUCOSE BLDC GLUCOMTR-MCNC: 150 MG/DL (ref 70–130)
GLUCOSE BLDC GLUCOMTR-MCNC: 97 MG/DL (ref 70–130)
GLUCOSE SERPL-MCNC: 101 MG/DL (ref 65–99)
GLUCOSE UR STRIP-MCNC: NEGATIVE MG/DL
HBA1C MFR BLD: 5 % (ref 4.8–5.6)
HCT VFR BLD AUTO: 37.5 % (ref 34–46.6)
HDLC SERPL-MCNC: 99 MG/DL (ref 40–60)
HGB BLD-MCNC: 12.8 G/DL (ref 12–15.9)
HGB UR QL STRIP.AUTO: NEGATIVE
HYALINE CASTS UR QL AUTO: ABNORMAL /LPF
IMM GRANULOCYTES # BLD AUTO: 0.02 10*3/MM3 (ref 0–0.05)
IMM GRANULOCYTES NFR BLD AUTO: 0.3 % (ref 0–0.5)
INR PPP: 0.87 (ref 0.9–1.1)
KETONES UR QL STRIP: ABNORMAL
LDLC SERPL CALC-MCNC: 39 MG/DL (ref 0–100)
LDLC/HDLC SERPL: 0.41 {RATIO}
LEUKOCYTE ESTERASE UR QL STRIP.AUTO: ABNORMAL
LYMPHOCYTES # BLD AUTO: 0.94 10*3/MM3 (ref 0.7–3.1)
LYMPHOCYTES NFR BLD AUTO: 13.1 % (ref 19.6–45.3)
MAGNESIUM SERPL-MCNC: 1.5 MG/DL (ref 1.6–2.4)
MCH RBC QN AUTO: 32.2 PG (ref 26.6–33)
MCHC RBC AUTO-ENTMCNC: 34.1 G/DL (ref 31.5–35.7)
MCV RBC AUTO: 94.2 FL (ref 79–97)
METHADONE UR QL SCN: NEGATIVE
MONOCYTES # BLD AUTO: 0.85 10*3/MM3 (ref 0.1–0.9)
MONOCYTES NFR BLD AUTO: 11.8 % (ref 5–12)
NEUTROPHILS NFR BLD AUTO: 5.3 10*3/MM3 (ref 1.7–7)
NEUTROPHILS NFR BLD AUTO: 73.9 % (ref 42.7–76)
NITRITE UR QL STRIP: NEGATIVE
NRBC BLD AUTO-RTO: 0 /100 WBC (ref 0–0.2)
NT-PROBNP SERPL-MCNC: 75.9 PG/ML (ref 0–1800)
OPIATES UR QL: NEGATIVE
OSMOLALITY UR: 279 MOSM/KG
OXYCODONE UR QL SCN: NEGATIVE
PH UR STRIP.AUTO: 7 [PH] (ref 5–8)
PLATELET # BLD AUTO: 294 10*3/MM3 (ref 140–450)
PMV BLD AUTO: 8.1 FL (ref 6–12)
POTASSIUM SERPL-SCNC: 3.4 MMOL/L (ref 3.5–5.2)
PROT SERPL-MCNC: 7 G/DL (ref 6–8.5)
PROT UR QL STRIP: NEGATIVE
PROTHROMBIN TIME: 11.7 SECONDS (ref 11.7–14.2)
QT INTERVAL: 361 MS
RBC # BLD AUTO: 3.98 10*6/MM3 (ref 3.77–5.28)
RBC # UR STRIP: ABNORMAL /HPF
REF LAB TEST METHOD: ABNORMAL
SARS-COV-2 RNA RESP QL NAA+PROBE: NOT DETECTED
SODIUM SERPL-SCNC: 122 MMOL/L (ref 136–145)
SODIUM SERPL-SCNC: 124 MMOL/L (ref 136–145)
SODIUM UR-SCNC: 95 MMOL/L
SP GR UR STRIP: 1.01 (ref 1–1.03)
SQUAMOUS #/AREA URNS HPF: ABNORMAL /HPF
TRIGL SERPL-MCNC: 49 MG/DL (ref 0–150)
TROPONIN T SERPL-MCNC: <0.01 NG/ML (ref 0–0.03)
TROPONIN T SERPL-MCNC: <0.01 NG/ML (ref 0–0.03)
TSH SERPL DL<=0.05 MIU/L-ACNC: 1.79 UIU/ML (ref 0.27–4.2)
UROBILINOGEN UR QL STRIP: ABNORMAL
VLDLC SERPL-MCNC: 11 MG/DL (ref 5–40)
WBC # UR STRIP: ABNORMAL /HPF
WBC NRBC COR # BLD: 7.18 10*3/MM3 (ref 3.4–10.8)

## 2022-05-07 PROCEDURE — 80307 DRUG TEST PRSMV CHEM ANLYZR: CPT | Performed by: EMERGENCY MEDICINE

## 2022-05-07 PROCEDURE — 71045 X-RAY EXAM CHEST 1 VIEW: CPT

## 2022-05-07 PROCEDURE — 82962 GLUCOSE BLOOD TEST: CPT

## 2022-05-07 PROCEDURE — 84295 ASSAY OF SERUM SODIUM: CPT | Performed by: HOSPITALIST

## 2022-05-07 PROCEDURE — 93010 ELECTROCARDIOGRAM REPORT: CPT | Performed by: INTERNAL MEDICINE

## 2022-05-07 PROCEDURE — 70498 CT ANGIOGRAPHY NECK: CPT

## 2022-05-07 PROCEDURE — 36415 COLL VENOUS BLD VENIPUNCTURE: CPT | Performed by: NURSE PRACTITIONER

## 2022-05-07 PROCEDURE — 92610 EVALUATE SWALLOWING FUNCTION: CPT

## 2022-05-07 PROCEDURE — 25010000002 CEFTRIAXONE PER 250 MG: Performed by: EMERGENCY MEDICINE

## 2022-05-07 PROCEDURE — 82550 ASSAY OF CK (CPK): CPT | Performed by: EMERGENCY MEDICINE

## 2022-05-07 PROCEDURE — 93005 ELECTROCARDIOGRAM TRACING: CPT | Performed by: EMERGENCY MEDICINE

## 2022-05-07 PROCEDURE — 97535 SELF CARE MNGMENT TRAINING: CPT

## 2022-05-07 PROCEDURE — 0 IOPAMIDOL PER 1 ML: Performed by: EMERGENCY MEDICINE

## 2022-05-07 PROCEDURE — 99285 EMERGENCY DEPT VISIT HI MDM: CPT

## 2022-05-07 PROCEDURE — 84300 ASSAY OF URINE SODIUM: CPT | Performed by: HOSPITALIST

## 2022-05-07 PROCEDURE — 83880 ASSAY OF NATRIURETIC PEPTIDE: CPT | Performed by: EMERGENCY MEDICINE

## 2022-05-07 PROCEDURE — 83036 HEMOGLOBIN GLYCOSYLATED A1C: CPT | Performed by: NURSE PRACTITIONER

## 2022-05-07 PROCEDURE — 83735 ASSAY OF MAGNESIUM: CPT | Performed by: EMERGENCY MEDICINE

## 2022-05-07 PROCEDURE — 85730 THROMBOPLASTIN TIME PARTIAL: CPT | Performed by: EMERGENCY MEDICINE

## 2022-05-07 PROCEDURE — U0003 INFECTIOUS AGENT DETECTION BY NUCLEIC ACID (DNA OR RNA); SEVERE ACUTE RESPIRATORY SYNDROME CORONAVIRUS 2 (SARS-COV-2) (CORONAVIRUS DISEASE [COVID-19]), AMPLIFIED PROBE TECHNIQUE, MAKING USE OF HIGH THROUGHPUT TECHNOLOGIES AS DESCRIBED BY CMS-2020-01-R: HCPCS | Performed by: EMERGENCY MEDICINE

## 2022-05-07 PROCEDURE — 84484 ASSAY OF TROPONIN QUANT: CPT | Performed by: EMERGENCY MEDICINE

## 2022-05-07 PROCEDURE — 83935 ASSAY OF URINE OSMOLALITY: CPT | Performed by: HOSPITALIST

## 2022-05-07 PROCEDURE — 97161 PT EVAL LOW COMPLEX 20 MIN: CPT

## 2022-05-07 PROCEDURE — 99223 1ST HOSP IP/OBS HIGH 75: CPT | Performed by: PSYCHIATRY & NEUROLOGY

## 2022-05-07 PROCEDURE — 25010000002 PROCHLORPERAZINE 10 MG/2ML SOLUTION: Performed by: EMERGENCY MEDICINE

## 2022-05-07 PROCEDURE — 84443 ASSAY THYROID STIM HORMONE: CPT | Performed by: NURSE PRACTITIONER

## 2022-05-07 PROCEDURE — 85610 PROTHROMBIN TIME: CPT | Performed by: EMERGENCY MEDICINE

## 2022-05-07 PROCEDURE — 80053 COMPREHEN METABOLIC PANEL: CPT | Performed by: EMERGENCY MEDICINE

## 2022-05-07 PROCEDURE — 97530 THERAPEUTIC ACTIVITIES: CPT

## 2022-05-07 PROCEDURE — 81001 URINALYSIS AUTO W/SCOPE: CPT | Performed by: EMERGENCY MEDICINE

## 2022-05-07 PROCEDURE — 85025 COMPLETE CBC W/AUTO DIFF WBC: CPT | Performed by: EMERGENCY MEDICINE

## 2022-05-07 PROCEDURE — 36415 COLL VENOUS BLD VENIPUNCTURE: CPT

## 2022-05-07 PROCEDURE — 80061 LIPID PANEL: CPT | Performed by: NURSE PRACTITIONER

## 2022-05-07 PROCEDURE — 97166 OT EVAL MOD COMPLEX 45 MIN: CPT

## 2022-05-07 PROCEDURE — 97110 THERAPEUTIC EXERCISES: CPT

## 2022-05-07 PROCEDURE — 82077 ASSAY SPEC XCP UR&BREATH IA: CPT | Performed by: EMERGENCY MEDICINE

## 2022-05-07 PROCEDURE — 25010000002 MAGNESIUM SULFATE 2 GM/50ML SOLUTION: Performed by: HOSPITALIST

## 2022-05-07 PROCEDURE — 70496 CT ANGIOGRAPHY HEAD: CPT

## 2022-05-07 RX ORDER — ATORVASTATIN CALCIUM 80 MG/1
80 TABLET, FILM COATED ORAL NIGHTLY
Status: DISCONTINUED | OUTPATIENT
Start: 2022-05-07 | End: 2022-05-11 | Stop reason: HOSPADM

## 2022-05-07 RX ORDER — SODIUM CHLORIDE 0.9 % (FLUSH) 0.9 %
10 SYRINGE (ML) INJECTION EVERY 12 HOURS SCHEDULED
Status: DISCONTINUED | OUTPATIENT
Start: 2022-05-07 | End: 2022-05-11 | Stop reason: HOSPADM

## 2022-05-07 RX ORDER — PROCHLORPERAZINE EDISYLATE 5 MG/ML
10 INJECTION INTRAMUSCULAR; INTRAVENOUS ONCE
Status: COMPLETED | OUTPATIENT
Start: 2022-05-07 | End: 2022-05-07

## 2022-05-07 RX ORDER — ACETAMINOPHEN 650 MG/1
650 SUPPOSITORY RECTAL EVERY 4 HOURS PRN
Status: DISCONTINUED | OUTPATIENT
Start: 2022-05-07 | End: 2022-05-11 | Stop reason: HOSPADM

## 2022-05-07 RX ORDER — NYSTATIN 100000 [USP'U]/G
POWDER TOPICAL EVERY 12 HOURS SCHEDULED
Status: DISCONTINUED | OUTPATIENT
Start: 2022-05-07 | End: 2022-05-11 | Stop reason: HOSPADM

## 2022-05-07 RX ORDER — LISINOPRIL 20 MG/1
20 TABLET ORAL
Status: DISCONTINUED | OUTPATIENT
Start: 2022-05-07 | End: 2022-05-11 | Stop reason: HOSPADM

## 2022-05-07 RX ORDER — ACETAMINOPHEN 160 MG/5ML
650 SOLUTION ORAL EVERY 4 HOURS PRN
Status: DISCONTINUED | OUTPATIENT
Start: 2022-05-07 | End: 2022-05-11 | Stop reason: HOSPADM

## 2022-05-07 RX ORDER — MAGNESIUM SULFATE HEPTAHYDRATE 40 MG/ML
2 INJECTION, SOLUTION INTRAVENOUS ONCE
Status: COMPLETED | OUTPATIENT
Start: 2022-05-07 | End: 2022-05-07

## 2022-05-07 RX ORDER — SODIUM CHLORIDE 0.9 % (FLUSH) 0.9 %
10 SYRINGE (ML) INJECTION AS NEEDED
Status: DISCONTINUED | OUTPATIENT
Start: 2022-05-07 | End: 2022-05-11 | Stop reason: HOSPADM

## 2022-05-07 RX ORDER — ASPIRIN 325 MG
325 TABLET ORAL DAILY
Status: DISCONTINUED | OUTPATIENT
Start: 2022-05-07 | End: 2022-05-11 | Stop reason: HOSPADM

## 2022-05-07 RX ORDER — SODIUM CHLORIDE 1000 MG
2 TABLET, SOLUBLE MISCELLANEOUS
Status: DISCONTINUED | OUTPATIENT
Start: 2022-05-07 | End: 2022-05-11 | Stop reason: HOSPADM

## 2022-05-07 RX ORDER — ALPRAZOLAM 0.25 MG/1
0.25 TABLET ORAL 2 TIMES DAILY
Status: DISCONTINUED | OUTPATIENT
Start: 2022-05-07 | End: 2022-05-11 | Stop reason: HOSPADM

## 2022-05-07 RX ORDER — LEVOTHYROXINE SODIUM 0.05 MG/1
50 TABLET ORAL
Status: DISCONTINUED | OUTPATIENT
Start: 2022-05-07 | End: 2022-05-11 | Stop reason: HOSPADM

## 2022-05-07 RX ORDER — ASPIRIN 300 MG/1
300 SUPPOSITORY RECTAL DAILY
Status: DISCONTINUED | OUTPATIENT
Start: 2022-05-07 | End: 2022-05-09

## 2022-05-07 RX ORDER — NITROGLYCERIN 0.4 MG/1
0.4 TABLET SUBLINGUAL
Status: DISCONTINUED | OUTPATIENT
Start: 2022-05-07 | End: 2022-05-11 | Stop reason: HOSPADM

## 2022-05-07 RX ORDER — SODIUM CHLORIDE 1000 MG
1 TABLET, SOLUBLE MISCELLANEOUS
Status: DISCONTINUED | OUTPATIENT
Start: 2022-05-07 | End: 2022-05-07

## 2022-05-07 RX ORDER — ACETAMINOPHEN 325 MG/1
650 TABLET ORAL EVERY 4 HOURS PRN
Status: DISCONTINUED | OUTPATIENT
Start: 2022-05-07 | End: 2022-05-11 | Stop reason: HOSPADM

## 2022-05-07 RX ORDER — KETOCONAZOLE 20 MG/G
1 CREAM TOPICAL
Status: DISCONTINUED | OUTPATIENT
Start: 2022-05-07 | End: 2022-05-11 | Stop reason: HOSPADM

## 2022-05-07 RX ADMIN — Medication 10 ML: at 21:07

## 2022-05-07 RX ADMIN — ALPRAZOLAM 0.25 MG: 0.25 TABLET ORAL at 12:26

## 2022-05-07 RX ADMIN — IOPAMIDOL 95 ML: 755 INJECTION, SOLUTION INTRAVENOUS at 02:55

## 2022-05-07 RX ADMIN — CEFTRIAXONE 1 G: 1 INJECTION, POWDER, FOR SOLUTION INTRAMUSCULAR; INTRAVENOUS at 05:00

## 2022-05-07 RX ADMIN — Medication 15 G: at 22:48

## 2022-05-07 RX ADMIN — LISINOPRIL 20 MG: 20 TABLET ORAL at 12:26

## 2022-05-07 RX ADMIN — KETOCONAZOLE 1 APPLICATION: 20 CREAM TOPICAL at 14:15

## 2022-05-07 RX ADMIN — PROCHLORPERAZINE EDISYLATE 10 MG: 5 INJECTION INTRAMUSCULAR; INTRAVENOUS at 04:58

## 2022-05-07 RX ADMIN — LEVOTHYROXINE SODIUM 50 MCG: 0.05 TABLET ORAL at 12:26

## 2022-05-07 RX ADMIN — Medication 10 ML: at 09:28

## 2022-05-07 RX ADMIN — ALPRAZOLAM 0.25 MG: 0.25 TABLET ORAL at 21:07

## 2022-05-07 RX ADMIN — SODIUM CHLORIDE TAB 1 GM 2 G: 1 TAB at 17:26

## 2022-05-07 RX ADMIN — ATORVASTATIN CALCIUM 80 MG: 80 TABLET, FILM COATED ORAL at 21:07

## 2022-05-07 RX ADMIN — ASPIRIN 325 MG: 325 TABLET ORAL at 09:27

## 2022-05-07 RX ADMIN — NYSTATIN: 100000 POWDER TOPICAL at 21:07

## 2022-05-07 RX ADMIN — MAGNESIUM SULFATE HEPTAHYDRATE 2 G: 2 INJECTION, SOLUTION INTRAVENOUS at 15:34

## 2022-05-07 RX ADMIN — NYSTATIN: 100000 POWDER TOPICAL at 14:16

## 2022-05-07 RX ADMIN — SODIUM CHLORIDE TAB 1 GM 1 G: 1 TAB at 14:16

## 2022-05-07 NOTE — PLAN OF CARE
Goal Outcome Evaluation:  Plan of Care Reviewed With: patient           Outcome Evaluation: Pt is an 81 yo female admitted with AMS, generalized weakness, and difficulty standing. Admitted for UTI without hematuria and stroke workup. She lives with spouse in a home with 3 LEE, independent with gait without assistive device at baseline. She currently presents with generalized weakness and impaired activity tolerance. She was able to perform bed mobility with CGA, STS with SB/CGA, and gait x 40 then 30 ft with RW CGA. Acute PT indicated to address functional deficits and maximize level of independence prior to discharge home with A and HHPT.  ..Patient was not wearing a face mask during this therapy encounter. Therapist used appropriate personal protective equipment including eye protection, mask, and gloves.  Mask used was standard procedure mask. Appropriate PPE was worn during the entire therapy session. Hand hygiene was completed before and after therapy session. Patient is not in enhanced droplet precautions.

## 2022-05-07 NOTE — PLAN OF CARE
Goal Outcome Evaluation:  Plan of Care Reviewed With: patient, spouse           Outcome Evaluation: Pt admit from home with weakness for 2 weeks, AMS 1 day. Spouse reports woke up and pt on floor about midnight on 5/7/22.  Pt work up for UTI and metabolic encephalopathy.  Pt seen by OT and is CGA to ambulate to/from bathroom with unsteadiness noted.  OT brings walker at end of session and pt improve to SBA.  Pt completes UB ADL tasks with set up and cueing.  Mild confusion noted.  WIll cont to follow for skilled OT to increase safety and independence. Pt/spouse anticipate return home with spouse assist.  May benefit from home health OT at d/c.

## 2022-05-07 NOTE — CONSULTS
Stroke Consult Note    Patient Name: Ritu Martino   MRN: [id number]  Age: 82 y.o.  Sex: female  : 1940    Primary Care Physician: Reyes, Miriam Mercado, MD  Referring Physician:  Dwight Haddad MD    Handedness: Right  Race: White    Chief Complaint/Reason for Consultation: Altered mental status    Subjective .  HPI: 82-year-old right-handed white female with known diagnosis of hypertension, hyperlipidemia, hypothyroidism, who comes in with 2 weeks of generalized weakness and 1 day of disorientation/confusion and altered mentation.  No obvious focal weakness/numbness/facial droop or slurred speech was been noted.  Patient has had similar episode in the past with hyponatremia, which had improved after her sodium levels improved.  Patient is generally tired, and most of the information obtained from the daughter at the bedside.    Last Known Normal Date/Time: Unclear EST     Review of Systems   Constitutional: Positive for activity change, appetite change and fatigue.   HENT: Negative.    Eyes: Negative.    Respiratory: Negative.    Cardiovascular: Negative.    Gastrointestinal: Negative.    Endocrine: Negative.    Genitourinary: Negative.    Musculoskeletal: Positive for myalgias.   Skin: Negative.    Allergic/Immunologic: Negative.    Psychiatric/Behavioral: Positive for confusion and decreased concentration.      Past Medical History:   Diagnosis Date   • Anxiety    • Arthritis    • Bowel dysfunction 2021    since having surgery for diverticular infection   • Diverticulitis 2021    w/ rupture and infection required surgery and ostomy   • Essential hypertension, benign    • GERD (gastroesophageal reflux disease)    • Hernia, hiatal    • Hypercholesterolemia    • Hypokalemia    • Hyponatremia     chronic low with occasional normal level   • Hypothyroidism     estimated time frame pt nor  could remember exactly how long has been on medication   • Insomnia    • Iron deficiency       Past Surgical History:   Procedure Laterality Date   • APPENDECTOMY     • BACK SURGERY     •  SECTION     • COLON SURGERY  2021    to address ruptured and infected diverticular dz, ostomy also placed   • COLOSTOMY CLOSURE  10/2021    ostomy removed   • HEMORRHOIDECTOMY     • KNEE ARTHROPLASTY       No family history on file.  Social History     Socioeconomic History   • Marital status:    Tobacco Use   • Smoking status: Never Smoker   • Smokeless tobacco: Never Used   Vaping Use   • Vaping Use: Never used   Substance and Sexual Activity   • Alcohol use: Yes     Alcohol/week: 8.0 standard drinks     Types: 4 Glasses of wine, 4 Shots of liquor per week   • Drug use: No   • Sexual activity: Defer     No Known Allergies  Prior to Admission medications    Medication Sig Start Date End Date Taking? Authorizing Provider   ALPRAZolam (XANAX) 0.25 MG tablet Take 0.25 mg by mouth 2 (Two) Times a Day.   Yes Meet Cook MD   benazepril (LOTENSIN) 20 MG tablet Take 20 mg by mouth Daily. 3/7/22  Yes Meet Cook MD   Cholecalciferol (VITAMIN D) 2000 units capsule Take  by mouth Daily With Dinner.   Yes Meet Cook MD   cycloSPORINE (RESTASIS) 0.05 % ophthalmic emulsion Administer 1 drop to both eyes Daily.   Yes Meet Cook MD   ferrous sulfate 325 (65 FE) MG tablet Take 325 mg by mouth 2 (Two) Times a Day Before Meals.   Yes Meet Cook MD   Probiotic Product (ALIGN PO) Take 1 capsule by mouth Daily With Lunch.   Yes Meet Cook MD   rosuvastatin (CRESTOR) 20 MG tablet Take 20 mg by mouth Every Night.   Yes Meet Cook MD   SODIUM CHLORIDE PO Take  by mouth 3 (Three) Times a Day.   Yes Meet Cook MD   Synthroid 50 MCG tablet By mouth take 1 tab daily in AM as directed on empty stomach with water only.  Brand Medically Necessary 22  Yes Uche Garcia MD   vitamin B-12 (CYANOCOBALAMIN) 100 MCG tablet Take 50  mcg by mouth Daily.   Yes Meet Cook MD   zolpidem (AMBIEN) 10 MG tablet Take 10 mg by mouth Every Night.   Yes Meet Cook MD   ketoconazole (NIZORAL) 2 % cream Apply  topically to the appropriate area as directed 2 (Two) Times a Day. to affected area 4/1/22   Meet Cook MD   nystatin 525164 UNIT/GM powder Apply  topically to the appropriate area as directed 2 (Two) Times a Day. to affected area 4/12/22   Meet Cook MD             Objective     Temp:  [97.8 °F (36.6 °C)-97.9 °F (36.6 °C)] 97.9 °F (36.6 °C)  Heart Rate:  [67-95] 73  Resp:  [16-17] 17  BP: (111-165)/(60-96) 145/78  Neurological Exam  Mental Status  Alert and arousable to verbal stimuli. Speech is normal. Language is fluent with no aphasia. Attention and concentration: Decreased attention and concentration.    Cranial Nerves  CN II: Visual fields full to confrontation.  CN III, IV, VI: Extraocular movements intact bilaterally. Normal lids and orbits bilaterally. Pupils equal round and reactive to light bilaterally.  CN V: Facial sensation is normal.  CN VII: Full and symmetric facial movement.  CN VIII: Equal hearing bilaterally.  CN IX, X: Palate elevates symmetrically  CN XI: Shoulder shrug strength is normal.  CN XII: Tongue midline without atrophy or fasciculations.    Motor  Decreased muscle bulk throughout. No fasciculations present. Normal muscle tone.  No obvious focal weakness appreciated.    Sensory  Light touch is normal in upper and lower extremities.     Reflexes  Deep tendon reflexes are 2+ and symmetric in all four extremities.    Coordination    No dysmetria.    Gait    Not assessed.      Physical Exam  Vitals and nursing note reviewed.   Constitutional:       Appearance: Normal appearance.   HENT:      Head: Normocephalic and atraumatic.      Mouth/Throat:      Mouth: Mucous membranes are moist.      Pharynx: Oropharynx is clear.   Eyes:      General: Lids are normal.      Extraocular  Movements: Extraocular movements intact.      Pupils: Pupils are equal, round, and reactive to light.   Cardiovascular:      Rate and Rhythm: Normal rate and regular rhythm.   Pulmonary:      Effort: Pulmonary effort is normal. No respiratory distress.   Musculoskeletal:      Cervical back: Normal range of motion and neck supple.   Neurological:      Mental Status: She is alert.      Deep Tendon Reflexes: Reflexes are normal and symmetric.   Psychiatric:         Mood and Affect: Mood normal.         Speech: Speech normal.         Behavior: Behavior normal.         Acute Stroke Data    IV Thrombolytic (TPA/Tenecteplase) Inclusion / Exclusion Criteria-not a stroke    Time: 11:33 EDT  Person Administering Scale: Kit Tracy MD    Inclusion Criteria  []   18 years of age or greater   []   Onset of symptoms < 4.5 hours before beginning treatment (stroke onset = time patient was last seen well or without symptoms).   []   Diagnosis of acute ischemic stroke causing measurable disabling deficit (Complete Hemianopia, Any Aphasia, Visual or Sensory Extinction, Any weakness limiting sustained effort against gravity)   []   Any remaining deficit considered potentially disabling in view of patient and practitioner   Exclusion criteria (Do not proceed with Alteplase if any are checked under exclusion criteria)  []   Onset unknown or GREATER than 4.5 hours   []   ICH on CT/MRI   []   CT demonstrates hypodensity representing acute or subacute infarct   []   Significant head trauma or prior stroke in the previous 3 months   []   Symptoms suggestive of subarachnoid hemorrhage   []   History of un-ruptured intracranial aneurysm GREATER than 10 mm   []   Recent intracranial or intraspinal surgery within the last 3 months   []   Arterial puncture at a non-compressible site in the previous 7 days   []   Active internal bleeding   []   Acute bleeding tendency   []   Platelet count LESS than 100,000 for known hematological diseases  such as leukemia, thrombocytopenia or chronic cirrhosis   []   Current use of anticoagulant with INR GREATER than 1.7 or PT GREATER than 15 seconds, aPTT GREATER than 40 seconds   []   Heparin received within 48 hours, resulting in abnormally elevated aPTT GREATER than upper limit of normal   []   Current use of direct thrombin inhibitors or direct factor Xa inhibitors in the past 48 hours   []   Elevated blood pressure refractory to treatment (systolic GREATER than 185 mm/Hg or diastolic  GREATER than 110 mm/Hg   []   Suspected infective endocarditis and aortic arch dissection   []   Current use of therapeutic treatment dose of low-molecular-weight heparin (LMWH) within the previous 24 hours   []   Structural GI malignancy or bleed   Relative exclusion for all patients  []   Only minor nondisabling symptoms   []   Pregnancy   []   Seizure at onset with postictal residual neurological impairments   []   Major surgery or previous trauma within past 14 days   []   History of previous spontaneous ICH, intracranial neoplasm, or AV malformation   []   Postpartum (within previous 14 days)   []   Recent GI or urinary tract hemorrhage (within previous 21 days)   []   Recent acute MI (within previous 3 months)   []   History of unruptured intracranial aneurysm LESS than 10 mm   []   History of ruptured intracranial aneurysm   []   Blood glucose LESS than 50 mg/dL (2.7 mmol/L)   []   Dural puncture within the last 7 days   []   Known GREATER than 10 cerebral microbleeds   Additional exclusions for patients with symptoms onset between 3 and 4.5 hours.  []   Age > 80.   []   On any anticoagulants regardless of INR  >>> Warfarin (Coumadin), Heparin, Enoxaparin (Lovenox), fondaparinux (Arixtra), bivalirudin (Angiomax), Argatroban, dabigatran (Pradaxa), rivaroxaban (Xarelto), or apixaban (Eliquis)   []   Severe stroke (NIHSS > 25).   []   History of BOTH diabetes and previous ischemic stroke.   []   The risks and benefits have  been discussed with the patient or family related to the administration of IV alteplase for stroke symptoms.   []   I have discussed and reviewed the patient's case and imaging with the attending prior to IV Thrombolytic (TPA/Tenecteplase).    Time Thrombolytic administered       Hospital Meds:  Scheduled- ALPRAZolam, 0.25 mg, Oral, BID  aspirin, 325 mg, Oral, Daily   Or  aspirin, 300 mg, Rectal, Daily  atorvastatin, 80 mg, Oral, Nightly  levothyroxine, 50 mcg, Oral, Q AM  lisinopril, 20 mg, Oral, Q24H  sodium chloride, 10 mL, Intravenous, Q12H      Infusions-     PRNs- •  acetaminophen **OR** acetaminophen **OR** acetaminophen  •  nitroglycerin  •  [COMPLETED] Insert peripheral IV **AND** sodium chloride  •  sodium chloride    Functional Status Prior to Current Stroke/Kittery Score: 2    NIH Stroke Scale  Time: 11:33 EDT  Person Administering Scale: iKt Tracy MD    1a  Level of consciousness: 1=not alert but arousable by minor stimulation to obey, answer or respond   1b. LOC questions:  0=Performs both tasks correctly   1c. LOC commands: 0=Performs both tasks correctly   2.  Best Gaze: 0=normal   3.  Visual: 0=No visual loss   4. Facial Palsy: 0=Normal symmetric movement   5a.  Motor left arm: 0=No drift, limb holds 90 (or 45) degrees for full 10 seconds   5b.  Motor right arm: 0=No drift, limb holds 90 (or 45) degrees for full 10 seconds   6a. motor left le=No drift, limb holds 90 (or 45) degrees for full 10 seconds   6b  Motor right le=No drift, limb holds 90 (or 45) degrees for full 10 seconds   7. Limb Ataxia: 0=Absent   8.  Sensory: 0=Normal; no sensory loss   9. Best Language:  0=No aphasia, normal   10. Dysarthria: 0=Normal   11. Extinction and Inattention: 0=No abnormality    Total:   1       Results Reviewed:  I have personally reviewed current lab, radiology, and data  CT head shows no acute changes, no hemorrhage, does have moderate white matter disease.  CT angiogram of head and neck shows  no significant stenosis or occlusion  Reviewed her labs.              Assessment/Plan:      1. Metabolic encephalopathy.  Likely secondary to hyponatremia and hypomagnesemia and urinary tract infection.  Reviewed patient CT head and CT angiogram which looks okay.  Getting MRI brain is low yield, and daughter noticed that patient is claustrophobic.  Okay to hold getting MRI at this time, if she develops any focal symptoms, will reconsider.  Continue her home medications, and correcting electrolyte abnormality.  Avoid any sedatives.  2. Hyponatremia and hypomagnesemia.  Her sodium level is 122 and magnesium 1.5.  Correct electrolyte abnormalities.  3. Urinary tract infection.  Patient also noted to have mild UTI, for which she can be started on antibiotics as appropriate..  4. Essential hypertension.  Normal blood pressure goals for her.  No need for permissive hypertension.  5. Increase activity as tolerated.    Case was discussed with patient, her daughter, nursing and will talk to the primary team.  No further neuro work-up at this time.  Please call us with questions.  Thank you for the consult.          Kit Tracy MD  May 7, 2022  11:33 EDT

## 2022-05-07 NOTE — THERAPY EVALUATION
Patient Name: Ritu Martino  : 1940    MRN: [id number]                              Today's Date: 2022       Admit Date: 2022    Visit Dx:     ICD-10-CM ICD-9-CM   1. Fluent aphasia  R47.01 784.3   2. Urinary tract infection without hematuria, site unspecified  N39.0 599.0     Patient Active Problem List   Diagnosis   • Anxiety   • Hypothyroidism   • Hyponatremia   • Iron deficiency   • Essential hypertension, benign   • Fluent aphasia   • Hypochloremia   • Benzodiazepine dependence (HCC)     Past Medical History:   Diagnosis Date   • Anxiety    • Arthritis    • Bowel dysfunction 2021    since having surgery for diverticular infection   • Diverticulitis 2021    w/ rupture and infection required surgery and ostomy   • Essential hypertension, benign    • GERD (gastroesophageal reflux disease)    • Hernia, hiatal    • Hypercholesterolemia    • Hypokalemia    • Hyponatremia     chronic low with occasional normal level   • Hypothyroidism 2018    estimated time frame pt nor  could remember exactly how long has been on medication   • Insomnia    • Iron deficiency      Past Surgical History:   Procedure Laterality Date   • APPENDECTOMY     • BACK SURGERY     •  SECTION     • COLON SURGERY  2021    to address ruptured and infected diverticular dz, ostomy also placed   • COLOSTOMY CLOSURE  10/2021    ostomy removed   • HEMORRHOIDECTOMY     • KNEE ARTHROPLASTY        General Information     Row Name 22 1444          OT Time and Intention    Document Type evaluation  -LE     Mode of Treatment individual therapy;occupational therapy  -LE     Row Name 22 1444          General Information    Patient Profile Reviewed yes  -LE     Prior Level of Function --  usually does ADL and med without AD but  assisting days prior to admit.  -LE     Existing Precautions/Restrictions fall  -LE     Barriers to Rehab medically complex;previous functional deficit;cognitive  status  -     Row Name 05/07/22 1444          Living Environment    People in Home spouse  -     Row Name 05/07/22 1444          Cognition    Orientation Status (Cognition) oriented to;person;place  cues and assist for attention to detail, problem solving, sequencing.  -     Row Name 05/07/22 1444          Safety Issues, Functional Mobility    Safety Issues Affecting Function (Mobility) insight into deficits/self-awareness;judgment;safety precaution awareness  -LE     Impairments Affecting Function (Mobility) endurance/activity tolerance;postural/trunk control  -LE     Comment, Safety Issues/Impairments (Mobility) gait belt and non skid socks.  did best with walker. report have one at home and  plans for pt to use at home.  -LE           User Key  (r) = Recorded By, (t) = Taken By, (c) = Cosigned By    Initials Name Provider Type    Carrol Solorio OTR Occupational Therapist                 Mobility/ADL's     Row Name 05/07/22 1446          Bed Mobility    Bed Mobility sit-supine;supine-sit-supine  -LE     Supine-Sit [place] (Bed Mobility) standby assist  -LE     Sit-Supine [place] (Bed Mobility) standby assist  -LE     Assistive Device (Bed Mobility) bed rails;head of bed elevated  -LE     Comment, (Bed Mobility) increase time.  -     Row Name 05/07/22 1446          Transfers    Transfers sit-stand transfer;stand-sit transfer;toilet transfer  -LE     Comment, (Transfers) with and without walker.  best with walker.  -LE     Sit-Stand [place] (Transfers) contact guard;verbal cues;nonverbal cues (demo/gesture)  -LE     Stand-Sit [place] (Transfers) standby assist;nonverbal cues (demo/gesture);verbal cues  -LE     [place] Level (Toilet Transfer) nonverbal cues (demo/gesture);verbal cues;contact guard  -LE     Assistive Device (Toilet Transfer) grab bars/safety frame  -     Row Name 05/07/22 1446          Sit-Stand Transfer    Assistive Device (Sit-Stand Transfers) walker,  front-wheeled  -LE     Comment, (Sit-Stand Transfer) CGA without walker, SBA with walker.  -LE     Row Name 05/07/22 1446          Toilet Transfer    Type (Toilet Transfer) stand pivot/stand step  -LE     Row Name 05/07/22 1446          Functional Mobility    Functional Mobility- Ind. Level contact guard assist  -LE     Functional Mobility-Distance (Feet) --  bed to sink to groom to bathroom to sink and back to bed.  -LE     Functional Mobility- Safety Issues step length decreased;sequencing ability decreased;weight-shifting ability decreased  -LE     Functional Mobility- Comment walker for sink back to bed and improved balance. rec. using and spouse plans to get ready for pt to use at  home when d/c.  -LE     Row Name 05/07/22 1446          Activities of Daily Living    BADL Assessment/Intervention toileting;grooming;lower body dressing  -     Row Name 05/07/22 1446          Toileting Assessment/Training    [place] Level (Toileting) perform perineal hygiene;adjust/manage clothing;contact guard assist  -LE     Position (Toileting) unsupported sitting;unsupported standing  -LE     Comment, (Toileting) purwick cont in place. RN spoke with pt about removing at beginning of OT.  Pt insists keeping purwick since does not want to wait for staff to come asssist.  -     Row Name 05/07/22 1446          Grooming Assessment/Training    [place] Level (Grooming) oral care regimen;wash face, hands;set up;standby assist;verbal cues  -LE     Position (Grooming) sink side;unsupported standing  -LE     Row Name 05/07/22 1446          Lower Body Dressing Assessment/Training    Comment, (Lower Body Dressing) pt slides feet into houseshoes when stand at EOB with Close SBA.  -LE           User Key  (r) = Recorded By, (t) = Taken By, (c) = Cosigned By    Initials Name Provider Type    Carrol Solorio OTR Occupational Therapist               Obj/Interventions     Row Name 05/07/22 1452          Range of Motion Comprehensive     General Range of Motion bilateral upper extremity ROM WFL  -LE     [facility] Name 05/07/22 1450          Strength Comprehensive (MMT)    Comment, General Manual Muscle Testing (MMT) Assessment B UE functional for ADL needs.  -[facility] Name 05/07/22 1450          Balance    Balance Assessment sitting static balance;standing static balance;standing dynamic balance  -LE     Static Sitting Balance standby assist  -LE     Static Standing Balance standby assist  -LE     Dynamic Standing Balance contact guard  -LE     Comment, Balance cues for posture, benefit of walker,  -LE           User Key  (r) = Recorded By, (t) = Taken By, (c) = Cosigned By    Initials Name Provider Type    Carrol Solorio OTR Occupational Therapist               Goals/Plan     [facility] Name 05/07/22 1456          Transfer Goal 1 (OT)    Activity/Assistive Device (Transfer Goal 1, OT) sit-to-stand/stand-to-sit;bed-to-chair/chair-to-bed;toilet  distant SBA.  -LE     [place] Level/Cues Needed (Transfer Goal 1, OT) standby assist  -LE     Time Frame (Transfer Goal 1, OT) 2 weeks  -LE     Progress/Outcome (Transfer Goal 1, OT) goal ongoing  -[facility] Name 05/07/22 1456          Dressing Goal 1 (OT)    Activity/Device (Dressing Goal 1, OT) dressing skills, all  -LE     [place]/Cues Needed (Dressing Goal 1, OT) standby assist;set-up required  -LE     Time Frame (Dressing Goal 1, OT) 2 weeks  -LE     Progress/Outcome (Dressing Goal 1, OT) goal ongoing  -[facility] Name 05/07/22 1456          Toileting Goal 1 (OT)    Activity/Device (Toileting Goal 1, OT) toileting skills, all  -LE     [place] Level/Cues Needed (Toileting Goal 1, OT) standby assist;set-up required  -LE     Time Frame (Toileting Goal 1, OT) 2 weeks  -LE     Progress/Outcome (Toileting Goal 1, OT) goal ongoing  -LE     Row Name 05/07/22 1456          Problem Specific Goal 1 (OT)    Problem Specific Goal 1 (OT) Distant SBA with AD PRN to walk to/from bathroom  -LE     Time Frame  (Problem Specific Goal 1, OT) 2 weeks  -LE     Progress/Outcome (Problem Specific Goal 1, OT) goal ongoing  -LE     Row Name 05/07/22 2656          Therapy Assessment/Plan (OT)    Planned Therapy Interventions (OT) activity tolerance training;adaptive equipment training;BADL retraining;ROM/therapeutic exercise;transfer/mobility retraining;patient/caregiver education/training;occupation/activity based interventions  -LE           User Key  (r) = Recorded By, (t) = Taken By, (c) = Cosigned By    Initials Name Provider Type    Carrol Solorio OTR Occupational Therapist               Clinical Impression     Row Name 05/07/22 1451          Pain Assessment    Pretreatment Pain Rating 0/10 - no pain  -LE     Row Name 05/07/22 9963          Plan of Care Review    Plan of Care Reviewed With patient;spouse  -LE     Outcome Evaluation Pt admit from home with weakness for 2 weeks, AMS 1 day. Spouse reports woke up and pt on floor about midnight on 5/7/22.  Pt work up for UTI and metabolic encephalopathy.  Pt seen by OT and is CGA to ambulate to/from bathroom with unsteadiness noted.  OT brings walker at end of session and pt improve to SBA.  Pt completes UB ADL tasks with set up and cueing.  Mild confusion noted.  WIll cont to follow for skilled OT to increase safety and independence. Pt/spouse anticipate return home with spouse assist.  May benefit from home health OT at d/c.  -     Row Name 05/07/22 4290          Therapy Assessment/Plan (OT)    Patient/Family Therapy Goal Statement (OT) be able to care for self.  -LE     Rehab Potential (OT) fair, will monitor progress closely  -LE     Criteria for Skilled Therapeutic Interventions Met (OT) meets criteria;yes  -LE     Therapy Frequency (OT) 3 times/wk  -LE     Row Name 05/07/22 1916          Therapy Plan Review/Discharge Plan (OT)    Equipment Needs Upon Discharge (OT) --  rec. walker which already own and pt used in past.  -LE     Anticipated Discharge Disposition (OT)  home with 24/7 care;home with home health  -     Row Name 05/07/22 1453          Vital Signs    O2 Delivery Pre Treatment room air  -LE     O2 Delivery Intra Treatment room air  -LE     O2 Delivery Post Treatment room air  -LE     Pre Patient Position Supine  -LE     Intra Patient Position Standing  -LE     Post Patient Position Supine  -LE     Row Name 05/07/22 1453          Positioning and Restraints    Pre-Treatment Position in bed  -LE     Post Treatment Position bed  -LE     In Bed notified nsg;fowlers;call light within reach;encouraged to call for assist;exit alarm on;patient within staff view;with family/caregiver;heels elevated  -LE           User Key  (r) = Recorded By, (t) = Taken By, (c) = Cosigned By    Initials Name Provider Type    Carrol Solorio OTR Occupational Therapist               Outcome Measures     Row Name 05/07/22 1458          How much help from another is currently needed...    Putting on and taking off regular lower body clothing? 3  -LE     Bathing (including washing, rinsing, and drying) 3  -LE     Toileting (which includes using toilet bed pan or urinal) 3  -LE     Putting on and taking off regular upper body clothing 3  -LE     Taking care of personal grooming (such as brushing teeth) 3  -LE     Eating meals 4  -LE     AM-PAC 6 Clicks Score (OT) 19  -LE     Row Name 05/07/22 1305          How much help from another person do you currently need...    Turning from your back to your side while in flat bed without using bedrails? 3  -MW     Moving from lying on back to sitting on the side of a flat bed without bedrails? 3  -MW     Moving to and from a bed to a chair (including a wheelchair)? 3  -MW     Standing up from a chair using your arms (e.g., wheelchair, bedside chair)? 3  -MW     Climbing 3-5 steps with a railing? 2  -MW     To walk in hospital room? 3  -MW     AM-PAC 6 Clicks Score (PT) 17  -MW     Highest level of mobility 5 --> Static standing  -MW     Row Name 05/07/22  1458 05/07/22 1305       Modified [place] Scale    Modified [place] Scale 4 - Moderately severe disability.  Unable to walk without assistance, and unable to attend to own bodily needs without assistance.  -LE 4 - Moderately severe disability.  Unable to walk without assistance, and unable to attend to own bodily needs without assistance.  -AIXA    Row Name 05/07/22 1458 05/07/22 1305       Functional Assessment    Outcome Measure Options AM-PAC 6 Clicks Daily Activity (OT)  -LE AM-PAC 6 Clicks Basic Mobility (PT);Modified [place]  -MW          User Key  (r) = Recorded By, (t) = Taken By, (c) = Cosigned By    Initials Name Provider Type    Carrol Solorio, OTR Occupational Therapist    Mray Mcmillan, PT Physical Therapist                Occupational Therapy Education                 Title: PT OT SLP Therapies (In Progress)     Topic: Occupational Therapy (Done)     Point: ADL training (Done)     Description:   Instruct learner(s) on proper safety adaptation and remediation techniques during self care or transfers.   Instruct in proper use of assistive devices.              Learning Progress Summary           Patient Acceptance, E,D, Bed IU by PAN at 5/7/2022 1458    Comment: role of OT,plan of care, xer tech. use of walker and need for assist when up and prompting cues.   Family Acceptance, E,D, Bed IU by PAN at 5/7/2022 1458    Comment: role of OT,plan of care, xer tech. use of walker and need for assist when up and prompting cues.                   Point: Home exercise program (Done)     Description:   Instruct learner(s) on appropriate technique for monitoring, assisting and/or progressing therapeutic exercises/activities.              Learning Progress Summary           Patient Acceptance, E,D, Bed IU by PAN at 5/7/2022 1458    Comment: role of OT,plan of care, xer tech. use of walker and need for assist when up and prompting cues.   Family Acceptance, E,D, Bed IU by PAN at 5/7/2022 1458    Comment: role of OT,plan  of care, xer tech. use of walker and need for assist when up and prompting cues.                   Point: Precautions (Done)     Description:   Instruct learner(s) on prescribed precautions during self-care and functional transfers.              Learning Progress Summary           Patient Acceptance, E,D, Bed IU by PAN at 5/7/2022 1458    Comment: role of OT,plan of care, xer tech. use of walker and need for assist when up and prompting cues.   Family Acceptance, E,D, Bed IU by PAN at 5/7/2022 1458    Comment: role of OT,plan of care, xer tech. use of walker and need for assist when up and prompting cues.                   Point: Body mechanics (Done)     Description:   Instruct learner(s) on proper positioning and spine alignment during self-care, functional mobility activities and/or exercises.              Learning Progress Summary           Patient Acceptance, E,D, Bed IU by PAN at 5/7/2022 1458    Comment: role of OT,plan of care, xer tech. use of walker and need for assist when up and prompting cues.   Family Acceptance, E,D, Bed IU by PAN at 5/7/2022 1458    Comment: role of OT,plan of care, xer tech. use of walker and need for assist when up and prompting cues.                               User Key     Initials Effective Dates Name Provider Type Discipline    PAN 06/16/21 -  Carrol Robertson OTR Occupational Therapist OT              OT Recommendation and Plan  Planned Therapy Interventions (OT): activity tolerance training, adaptive equipment training, BADL retraining, ROM/therapeutic exercise, transfer/mobility retraining, patient/caregiver education/training, occupation/activity based interventions  Therapy Frequency (OT): 3 times/wk  Plan of Care Review  Plan of Care Reviewed With: patient, spouse  Outcome Evaluation: Pt admit from home with weakness for 2 weeks, AMS 1 day. Spouse reports woke up and pt on floor about midnight on 5/7/22.  Pt work up for UTI and metabolic encephalopathy.  Pt seen by OT and is  CGA to ambulate to/from bathroom with unsteadiness noted.  OT brings walker at end of session and pt improve to SBA.  Pt completes UB ADL tasks with set up and cueing.  Mild confusion noted.  WIll cont to follow for skilled OT to increase safety and independence. Pt/spouse anticipate return home with spouse assist.  May benefit from home health OT at d/c.     Time Calculation:    Time Calculation- OT     Row Name 05/07/22 1459             Time Calculation- OT    OT Start Time 1418  -LE      OT Stop Time 1440  -LE      OT Time Calculation (min) 22 min  -LE      Total Timed Code Minutes- OT 12 minute(s)  -LE      OT Received On 05/07/22  -LE      OT - Next Appointment 05/09/22  -LE      OT Goal Re-Cert Due Date 05/21/22  -LE              Timed Charges    90702 - OT Self Care/Mgmt Minutes 12  -LE              Untimed Charges    OT Eval/Re-eval Minutes 10  -LE              Total Minutes    Timed Charges Total Minutes 12  -LE      Untimed Charges Total Minutes 10  -LE       Total Minutes 22  -LE            User Key  (r) = Recorded By, (t) = Taken By, (c) = Cosigned By    Initials Name Provider Type    Carrol Solorio OTR Occupational Therapist              Therapy Charges for Today     Code Description Service Date Service Provider Modifiers Qty    83143080953 HC OT EVAL MOD COMPLEXITY 2 5/7/2022 Carrol Robertson OTR GO 1    10788960335 HC OT SELF CARE/MGMT/TRAIN EA 15 MIN 5/7/2022 Carrol Robertson OTR GO 1               GATO Benson  5/7/2022

## 2022-05-07 NOTE — THERAPY EVALUATION
Patient Name: Ritu Martino  : 1940    MRN: [id number]                              Today's Date: 2022       Admit Date: 2022    Visit Dx:     ICD-10-CM ICD-9-CM   1. Fluent aphasia  R47.01 784.3   2. Urinary tract infection without hematuria, site unspecified  N39.0 599.0     Patient Active Problem List   Diagnosis   • Anxiety   • Hypothyroidism   • Hyponatremia   • Iron deficiency   • Essential hypertension, benign   • Fluent aphasia   • Hypochloremia   • Benzodiazepine dependence (HCC)     Past Medical History:   Diagnosis Date   • Anxiety    • Arthritis    • Bowel dysfunction 2021    since having surgery for diverticular infection   • Diverticulitis 2021    w/ rupture and infection required surgery and ostomy   • Essential hypertension, benign    • GERD (gastroesophageal reflux disease)    • Hernia, hiatal    • Hypercholesterolemia    • Hypokalemia    • Hyponatremia     chronic low with occasional normal level   • Hypothyroidism     estimated time frame pt nor  could remember exactly how long has been on medication   • Insomnia    • Iron deficiency      Past Surgical History:   Procedure Laterality Date   • APPENDECTOMY     • BACK SURGERY     •  SECTION     • COLON SURGERY  2021    to address ruptured and infected diverticular dz, ostomy also placed   • COLOSTOMY CLOSURE  10/2021    ostomy removed   • HEMORRHOIDECTOMY     • KNEE ARTHROPLASTY        General Information     Row Name 22 1121          Physical Therapy Time and Intention    Document Type evaluation  -MW     Mode of Treatment physical therapy  -MW     Row Name 22 1121          General Information    Patient Profile Reviewed yes  -MW     Prior Level of Function independent:;gait  -MW     Existing Precautions/Restrictions fall  -MW     Row Name 22 1121          Living Environment    People in Home spouse  -MW     Row Name 22 1121          Home Main Entrance    Number of  Stairs, Main Entrance three  -MW     Row Name 05/07/22 1121          Cognition    Orientation Status (Cognition) oriented x 3  -MW     Row Name 05/07/22 1121          Safety Issues, Functional Mobility    Impairments Affecting Function (Mobility) endurance/activity tolerance;balance;strength  -MW     Comment, Safety Issues/Impairments (Mobility) Gait belt and nonslip socks used for saety  -MW           User Key  (r) = Recorded By, (t) = Taken By, (c) = Cosigned By    Initials Name Provider Type    Mary Mcmillan PT Physical Therapist               Mobility     Row Name 05/07/22 1248          Bed Mobility    Bed Mobility supine-sit  -MW     Supine-Sit [place] (Bed Mobility) contact guard  -MW     Assistive Device (Bed Mobility) bed rails;head of bed elevated  -MW     Row Name 05/07/22 1248          Sit-Stand Transfer    Sit-Stand [place] (Transfers) contact guard;verbal cues;standby assist  -MW     Assistive Device (Sit-Stand Transfers) walker, front-wheeled  -MW     Comment, (Sit-Stand Transfer) STS at EOB and recliner; stand to sit at toilet SB/CGA  -MW     Row Name 05/07/22 1248          Gait/Stairs (Locomotion)    [place] Level (Gait) contact guard  -MW     Assistive Device (Gait) walker, front-wheeled  -MW     Distance in Feet (Gait) 40, 30  -MW     Deviations/Abnormal Patterns (Gait) gait speed decreased;stride length decreased  -MW     Bilateral Gait Deviations forward flexed posture  -MW     Comment, (Gait/Stairs) Pt ambulated from bed to door to recliner than recliner to bathroom, no LOB or overt unsteadiness; asked if she wanted to try without AD since that's what she did at baseline, pt wanted to use RW  -MW           User Key  (r) = Recorded By, (t) = Taken By, (c) = Cosigned By    Initials Name Provider Type    Mary Mcmillan PT Physical Therapist               Obj/Interventions     Row Name 05/07/22 1252          Range of Motion Comprehensive    General Range of Motion bilateral  lower extremity ROM WFL  -MW     Row Name 05/07/22 1252          Strength Comprehensive (MMT)    General Manual Muscle Testing (MMT) Assessment lower extremity strength deficits identified  -MW     Comment, General Manual Muscle Testing (MMT) Assessment Generalized weakness  -MW     Row Name 05/07/22 1252          Balance    Balance Assessment sitting static balance;sitting dynamic balance  -MW     Static Sitting Balance supervision  -MW     Dynamic Sitting Balance contact guard  -MW     Position, Sitting Balance unsupported;sitting edge of bed  -MW     Balance Interventions sitting;standing;sit to stand;supported;static;dynamic  -MW           User Key  (r) = Recorded By, (t) = Taken By, (c) = Cosigned By    Initials Name Provider Type    MW Mary Severino, PT Physical Therapist               Goals/Plan     Row Name 05/07/22 1304          Bed Mobility Goal 1 (PT)    Activity/Assistive Device (Bed Mobility Goal 1, PT) bed mobility activities, all  -MW     [place] Level/Cues Needed (Bed Mobility Goal 1, PT) modified independence  -MW     Time Frame (Bed Mobility Goal 1, PT) 1 week  -MW     Row Name 05/07/22 1304          Transfer Goal 1 (PT)    Activity/Assistive Device (Transfer Goal 1, PT) transfers, all;walker, rolling  -MW     [place] Level/Cues Needed (Transfer Goal 1, PT) modified independence  -MW     Time Frame (Transfer Goal 1, PT) 1 week  -MW     Row Name 05/07/22 1304          Gait Training Goal 1 (PT)    Activity/Assistive Device (Gait Training Goal 1, PT) gait (walking locomotion);assistive device use;walker, rolling  -MW     [place] Level (Gait Training Goal 1, PT) supervision required  -MW     Distance (Gait Training Goal 1, PT) 100  -MW     Time Frame (Gait Training Goal 1, PT) 1 week  -MW     Row Name 05/07/22 1304          Therapy Assessment/Plan (PT)    Planned Therapy Interventions (PT) balance training;bed mobility training;gait training;home exercise program;neuromuscular  re-education;patient/family education;postural re-education;strengthening;transfer training  -MW           User Key  (r) = Recorded By, (t) = Taken By, (c) = Cosigned By    Initials Name Provider Type    Mary Mcmillan, PT Physical Therapist               Clinical Impression     Row Name 05/07/22 1257          Pain    Pretreatment Pain Rating 0/10 - no pain  -MW     Posttreatment Pain Rating 0/10 - no pain  -MW     Row Name 05/07/22 1256          Plan of Care Review    Plan of Care Reviewed With patient  -MW     Outcome Evaluation Pt is an 81 yo female admitted with AMS, generalized weakness, and difficulty standing. Admitted for UTI without hematuria and stroke workup. She lives with spouse in a home with 3 LEE, independent with gait without assistive device at baseline. She currently presents with generalized weakness and impaired activity tolerance. She was able to perform bed mobility with CGA, STS with SB/CGA, and gait x 40 then 30 ft with RW CGA. Acute PT indicated to address functional deficits and maximize level of independence prior to discharge home with A and HHPT.  -MW     Row Name 05/07/22 1257          Therapy Assessment/Plan (PT)    Rehab Potential (PT) good, to achieve stated therapy goals  -     Criteria for Skilled Interventions Met (PT) yes;meets criteria  -MW     Therapy Frequency (PT) 5 times/wk  -MW     Row Name 05/07/22 1253          Vital Signs    Pre Systolic BP Rehab 145  -MW     Pre Treatment Diastolic BP 78  -MW     Pretreatment Heart Rate (beats/min) 76  -MW     O2 Delivery Pre Treatment room air  -MW     O2 Delivery Intra Treatment room air  -MW     O2 Delivery Post Treatment room air  -MW     Pre Patient Position Supine  -MW     Intra Patient Position Standing  -MW     Post Patient Position Sitting  -MW     Row Name 05/07/22 1257          Positioning and Restraints    Pre-Treatment Position in bed  -MW     Post Treatment Position bathroom  -MW     Bathroom notified  nsg;sitting;call light within reach;encouraged to call for assist;with family/caregiver;with nsg  with nsg assistant  -           User Key  (r) = Recorded By, (t) = Taken By, (c) = Cosigned By    Initials Name Provider Type    Mary Mcmillan PT Physical Therapist               Outcome Measures     Row Name 05/07/22 1305          How much help from another person do you currently need...    Turning from your back to your side while in flat bed without using bedrails? 3  -MW     Moving from lying on back to sitting on the side of a flat bed without bedrails? 3  -MW     Moving to and from a bed to a chair (including a wheelchair)? 3  -MW     Standing up from a chair using your arms (e.g., wheelchair, bedside chair)? 3  -MW     Climbing 3-5 steps with a railing? 2  -MW     To walk in hospital room? 3  -MW     AM-PAC 6 Clicks Score (PT) 17  -MW     Highest level of mobility 5 --> Static standing  -MW     Row Name 05/07/22 1305          Modified [place] Scale    Modified [place] Scale 4 - Moderately severe disability.  Unable to walk without assistance, and unable to attend to own bodily needs without assistance.  -MW     Row Name 05/07/22 1305          Functional Assessment    Outcome Measure Options AM-PAC 6 Clicks Basic Mobility (PT);Modified [place]  -           User Key  (r) = Recorded By, (t) = Taken By, (c) = Cosigned By    Initials Name Provider Type    Mary Mcmillan PT Physical Therapist                             Physical Therapy Education                 Title: PT OT SLP Therapies (In Progress)     Topic: Physical Therapy (In Progress)     Point: Mobility training (Done)     Learning Progress Summary           Patient Acceptance, E, VU,NR by AIXA at 5/7/2022 1306   Significant Other Acceptance, E, VU,NR by AIXA at 5/7/2022 1306                   Point: Home exercise program (Not Started)     Learner Progress:  Not documented in this visit.          Point: Body mechanics (Done)     Learning Progress  Summary           Patient Acceptance, E, VU,NR by  at 5/7/2022 1306   Significant Other Acceptance, E, VU,NR by  at 5/7/2022 1306                   Point: Precautions (Done)     Learning Progress Summary           Patient Acceptance, E, VU,NR by  at 5/7/2022 1306   Significant Other Acceptance, E, VU,NR by  at 5/7/2022 1306                               User Key     Initials Effective Dates Name Provider Type Discipline     06/16/21 -  Mary Severino PT Physical Therapist PT              PT Recommendation and Plan  Planned Therapy Interventions (PT): balance training, bed mobility training, gait training, home exercise program, neuromuscular re-education, patient/family education, postural re-education, strengthening, transfer training  Plan of Care Reviewed With: patient  Outcome Evaluation: Pt is an 83 yo female admitted with AMS, generalized weakness, and difficulty standing. Admitted for UTI without hematuria and stroke workup. She lives with spouse in a home with 3 LEE, independent with gait without assistive device at baseline. She currently presents with generalized weakness and impaired activity tolerance. She was able to perform bed mobility with CGA, STS with SB/CGA, and gait x 40 then 30 ft with RW CGA. Acute PT indicated to address functional deficits and maximize level of independence prior to discharge home with A and HHPT.     Time Calculation:    PT Charges     Row Name 05/07/22 1224 05/07/22 1109          Time Calculation    Start Time 1058  -MW --     Stop Time 1128  -MW --     Time Calculation (min) 30 min  -MW --     PT Received On -- 05/07/22  -MW     PT - Next Appointment 05/09/22  -[facility]IXA --     PT Goal Re-Cert Due Date 05/21/22  -AIXA --            Time Calculation- PT    Total Timed Code Minutes- PT 28 minute(s)  -MW --           User Key  (r) = Recorded By, (t) = Taken By, (c) = Cosigned By    Initials Name Provider Type    Mary Mcmillan PT Physical Therapist              Therapy  Charges for Today     Code Description Service Date Service Provider Modifiers Qty    79219677343 HC PT EVAL LOW COMPLEXITY 2 5/7/2022 Mary Severino, PT GP 1    39444826210 HC PT THER PROC EA 15 MIN 5/7/2022 Mary Severino, PT GP 1    80051990252 HC PT THERAPEUTIC ACT EA 15 MIN 5/7/2022 Mary Severino, PT GP 1          PT G-Codes  Outcome Measure Options: AM-PAC 6 Clicks Basic Mobility (PT), Modified [place]  AM-PAC 6 Clicks Score (PT): 17  Modified [place] Scale: 4 - Moderately severe disability.  Unable to walk without assistance, and unable to attend to own bodily needs without assistance.    Mary Severino, PT  5/7/2022

## 2022-05-07 NOTE — ED NOTES
.  Nursing report ED to floor  Ritu Martino  82 y.o.  female    HPI :   Chief Complaint   Patient presents with   • Altered Mental Status   • Unable to speak   • Fatigue       Admitting doctor:   Dwight Haddad MD    Admitting diagnosis:   The primary encounter diagnosis was Fluent aphasia. A diagnosis of Urinary tract infection without hematuria, site unspecified was also pertinent to this visit.    Code status:   Current Code Status     Date Active Code Status Order ID Comments User Context       Not on file    Advance Care Planning Activity          Allergies:   Patient has no known allergies.    Intake and Output  No intake or output data in the 24 hours ending 05/07/22 0531    Weight:   There were no vitals filed for this visit.    Most recent vitals:   Vitals:    05/07/22 0231 05/07/22 0235 05/07/22 0431 05/07/22 0501   BP: 163/96  137/69 142/70   Pulse: 85 95 69 86   Resp:       Temp:       TempSrc:       SpO2: 98% 96% 96% 95%       Active LDAs/IV Access:   Lines, Drains & Airways     Active LDAs     Name Placement date Placement time Site Days    Peripheral IV 05/07/22 0145 Right Antecubital 05/07/22  0145  Antecubital  less than 1                Labs (abnormal labs have a star):   Labs Reviewed   COMPREHENSIVE METABOLIC PANEL - Abnormal; Notable for the following components:       Result Value    Glucose 101 (*)     BUN 7 (*)     Sodium 122 (*)     Potassium 3.4 (*)     Chloride 85 (*)     All other components within normal limits    Narrative:     GFR Normal >60  Chronic Kidney Disease <60  Kidney Failure <15     PROTIME-INR - Abnormal; Notable for the following components:    INR 0.87 (*)     All other components within normal limits   URINALYSIS W/ MICROSCOPIC IF INDICATED (NO CULTURE) - Abnormal; Notable for the following components:    Appearance, UA Cloudy (*)     Ketones, UA 15 mg/dL (1+) (*)     Leuk Esterase, UA Moderate (2+) (*)     All other components within normal limits   URINE DRUG  SCREEN - Abnormal; Notable for the following components:    Benzodiazepine Screen, Urine Positive (*)     All other components within normal limits    Narrative:     Negative Thresholds Per Drugs Screened:    Amphetamines                 500 ng/ml  Barbiturates                 200 ng/ml  Benzodiazepines              100 ng/ml  Cocaine                      300 ng/ml  Methadone                    300 ng/ml  Opiates                      300 ng/ml  Oxycodone                    100 ng/ml  THC                           50 ng/ml    The Normal Value for all drugs tested is negative. This report includes final unconfirmed screening results to be used for medical treatment purposes only. Unconfirmed results must not be used for non-medical purposes such as employment or legal testing. Clinical consideration should be applied to any drug of abuse test, particularly when unconfirmed results are used.           CK - Abnormal; Notable for the following components:    Creatine Kinase 233 (*)     All other components within normal limits   MAGNESIUM - Abnormal; Notable for the following components:    Magnesium 1.5 (*)     All other components within normal limits   CBC WITH AUTO DIFFERENTIAL - Abnormal; Notable for the following components:    RDW 12.1 (*)     Lymphocyte % 13.1 (*)     All other components within normal limits   URINALYSIS, MICROSCOPIC ONLY - Abnormal; Notable for the following components:    WBC, UA 3-5 (*)     Bacteria, UA Trace (*)     All other components within normal limits   COVID-19,[facility] IN-HOUSE CEPHEID/TAO, NP SWAB IN TRANSPORT MEDIA 8-12 HR TAT - Normal    Narrative:     Fact sheet for providers: https://www.fda.gov/media/781955/download     Fact sheet for patients: https://www.fda.gov/media/726580/download   APTT - Normal   BNP (IN-HOUSE) - Normal    Narrative:     Among patients with dyspnea, NT-proBNP is highly sensitive for the detection of acute congestive heart failure. In addition NT-proBNP of  <300 pg/ml effectively rules out acute congestive heart failure with 99% negative predictive value.    Results may be falsely decreased if patient taking Biotin.     TROPONIN (IN-HOUSE) - Normal    Narrative:     Troponin T Reference Range:  <= 0.03 ng/mL-   Negative for AMI  >0.03 ng/mL-     Abnormal for myocardial necrosis.  Clinicians would have to utilize clinical acumen, EKG, Troponin and serial changes to determine if it is an Acute Myocardial Infarction or myocardial injury due to an underlying chronic condition.       Results may be falsely decreased if patient taking Biotin.     POCT GLUCOSE FINGERSTICK - Normal   COVID PRE-OP / PRE-PROCEDURE SCREENING ORDER (NO ISOLATION)    Narrative:     The following orders were created for panel order COVID PRE-OP / PRE-PROCEDURE SCREENING ORDER (NO ISOLATION) - Swab, Nasopharynx.  Procedure                               Abnormality         Status                     ---------                               -----------         ------                     COVID-19,[facility] IN-HOUSE...[[id number]]  Normal              Final result                 Please view results for these tests on the individual orders.   ETHANOL   TROPONIN (IN-HOUSE)   POCT GLUCOSE FINGERSTICK   CBC AND DIFFERENTIAL    Narrative:     The following orders were created for panel order CBC & Differential.  Procedure                               Abnormality         Status                     ---------                               -----------         ------                     CBC Auto Differential[[id number]]        Abnormal            Final result                 Please view results for these tests on the individual orders.       EKG:   ECG 12 Lead   Preliminary Result   HEART RATE= 88  bpm   RR Interval= 680  ms   NV Interval= 152  ms   P Horizontal Axis= 15  deg   P Front Axis= 58  deg   QRSD Interval= 88  ms   QT Interval= 361  ms   QRS Axis= 27  deg   T Wave Axis= 6  deg   - NORMAL ECG -   Sinus rhythm    Electronically Signed By:    Date and Time of Study: 2022-05-07 01:59:47          Meds given in ED:   Medications   sodium chloride 0.9 % flush 10 mL (has no administration in time range)   iopamidol (ISOVUE-370) 76 % injection 100 mL (95 mL Intravenous Given 5/7/22 7387)   prochlorperazine (COMPAZINE) injection 10 mg (10 mg Intravenous Given 5/7/22 2697)   cefTRIAXone (ROCEPHIN) 1 g in sodium chloride 0.9 % 100 mL IVPB-VTB (1 g Intravenous New Bag 5/7/22 8080)       Imaging results:  CT Angiogram Neck    Result Date: 5/7/2022  Electronically signed by Lakeisha Madden MD on 05-07-22 at 0424    XR Chest 1 View    Result Date: 5/7/2022  Electronically signed by Naseem Mccray MD on 05-07-22 at 0206    CT Angiogram Head w AI Analysis of LVO    Result Date: 5/7/2022  Electronically signed by Lakeisha Madden MD on 05-07-22 at 0422      Ambulatory status:   - asstx2    Social issues:   Social History     Socioeconomic History   • Marital status:    Tobacco Use   • Smoking status: Never Smoker   • Smokeless tobacco: Never Used   Substance and Sexual Activity   • Alcohol use: Yes     Alcohol/week: 21.0 standard drinks     Types: 7 Glasses of wine, 14 Shots of liquor per week   • Drug use: No       NIH Stroke Scale:   Interval: baseline : 1

## 2022-05-07 NOTE — PROGRESS NOTES
Acute rehab screening referral received via stroke order set. Please note that the acute rehab admission RNs will not be actively evaluating this patient. If it is felt that the patient is or will be acute rehab appropriate please call the admissions office at [phone] and a full evaluation will be initiated. Thank you.     Clary Francis RN  Rehab Admissions Nurse  [phone]

## 2022-05-07 NOTE — ED PROVIDER NOTES
EMERGENCY DEPARTMENT ENCOUNTER    Room Number:    Date of encounter:  2022  PCP: Reyes, Miriam Mercado, MD  Historian: Patient, EMS, family      HPI:  Chief Complaint: Speech difficulties, altered mental status, generalized weakness  A complete HPI/ROS/PMH/PSH/SH/FH are unobtainable due to: None    Context: Ritu Martino is a 82 y.o. female who presents to the ED last seen normal 2 days ago.  Was found today with some generalized weakness difficulty standing up.  Patient also was having some speech difficulties.  His daughter relates that patient has had similar episodes when her sodium gets too low.      PAST MEDICAL HISTORY  Active Ambulatory Problems     Diagnosis Date Noted   • Anxiety 12/15/2021   • Hypothyroidism 12/15/2021   • Hyponatremia    • Iron deficiency 2022   • Essential hypertension, benign 2022     Resolved Ambulatory Problems     Diagnosis Date Noted   • Essential hypertension 2021   • Hyperlipidemia 2021   • Hypokalemia 2022     Past Medical History:   Diagnosis Date   • Arthritis    • Bowel dysfunction 2021   • Diverticulitis 2021   • GERD (gastroesophageal reflux disease)    • Hernia, hiatal    • Hypercholesterolemia    • Insomnia          PAST SURGICAL HISTORY  Past Surgical History:   Procedure Laterality Date   • APPENDECTOMY     • BACK SURGERY     •  SECTION     • COLON SURGERY  2021    to address ruptured and infected diverticular dz, ostomy also placed   • COLOSTOMY CLOSURE  10/2021    ostomy removed   • HEMORRHOIDECTOMY     • KNEE ARTHROPLASTY           FAMILY HISTORY  No family history on file.      SOCIAL HISTORY  Social History     Socioeconomic History   • Marital status:    Tobacco Use   • Smoking status: Never Smoker   • Smokeless tobacco: Never Used   Substance and Sexual Activity   • Alcohol use: Yes     Alcohol/week: 21.0 standard drinks     Types: 7 Glasses of wine, 14 Shots of liquor per week   •  Drug use: No         ALLERGIES  Patient has no known allergies.        REVIEW OF SYSTEMS  Review of Systems     All systems reviewed and negative except for those discussed in HPI.       PHYSICAL EXAM    I have reviewed the triage vital signs and nursing notes.    ED Triage Vitals   Temp Heart Rate Resp BP SpO2   05/07/22 0126 05/07/22 0122 05/07/22 0122 05/07/22 0122 05/07/22 0122   97.8 °F (36.6 °C) 94 16 162/86 99 %      Temp src Heart Rate Source Patient Position BP Location FiO2 (%)   05/07/22 0126 -- -- -- --   Tympanic           Physical Exam  GENERAL: not distressed  HENT: nares patent  EYES: no scleral icterus  CV: regular rhythm, regular rate  RESPIRATORY: normal effort  ABDOMEN: soft  MUSCULOSKELETAL: no deformity  NEURO: alert, moves all extremities, follows commands, 5 and 5 strength upper and lower extremities, patient with fluent aphasia that is intermittent  SKIN: warm, dry        LAB RESULTS  Recent Results (from the past 24 hour(s))   Comprehensive Metabolic Panel    Collection Time: 05/07/22  1:49 AM    Specimen: Blood   Result Value Ref Range    Glucose 101 (H) 65 - 99 mg/dL    BUN 7 (L) 8 - 23 mg/dL    Creatinine 0.63 0.57 - 1.00 mg/dL    Sodium 122 (L) 136 - 145 mmol/L    Potassium 3.4 (L) 3.5 - 5.2 mmol/L    Chloride 85 (L) 98 - 107 mmol/L    CO2 23.6 22.0 - 29.0 mmol/L    Calcium 9.4 8.6 - 10.5 mg/dL    Total Protein 7.0 6.0 - 8.5 g/dL    Albumin 4.50 3.50 - 5.20 g/dL    ALT (SGPT) 19 1 - 33 U/L    AST (SGOT) 28 1 - 32 U/L    Alkaline Phosphatase 57 39 - 117 U/L    Total Bilirubin 1.0 0.0 - 1.2 mg/dL    Globulin 2.5 gm/dL    A/G Ratio 1.8 g/dL    BUN/Creatinine Ratio 11.1 7.0 - 25.0    Anion Gap 13.4 5.0 - 15.0 mmol/L    eGFR 88.7 >60.0 mL/min/1.73   Protime-INR    Collection Time: 05/07/22  1:49 AM    Specimen: Blood   Result Value Ref Range    Protime 11.7 11.7 - 14.2 Seconds    INR 0.87 (L) 0.90 - 1.10   aPTT    Collection Time: 05/07/22  1:49 AM    Specimen: Blood   Result Value Ref Range     PTT 29.9 22.7 - 35.4 seconds   BNP    Collection Time: 05/07/22  1:49 AM    Specimen: Blood   Result Value Ref Range    proBNP 75.9 0.0 - 1,800.0 pg/mL   Troponin    Collection Time: 05/07/22  1:49 AM    Specimen: Blood   Result Value Ref Range    Troponin T <0.010 0.000 - 0.030 ng/mL   Ethanol    Collection Time: 05/07/22  1:49 AM    Specimen: Blood   Result Value Ref Range    Ethanol <10 0 - 10 mg/dL    Ethanol % <0.010 %   CK    Collection Time: 05/07/22  1:49 AM    Specimen: Blood   Result Value Ref Range    Creatine Kinase 233 (H) 20 - 180 U/L   Magnesium    Collection Time: 05/07/22  1:49 AM    Specimen: Blood   Result Value Ref Range    Magnesium 1.5 (L) 1.6 - 2.4 mg/dL   CBC Auto Differential    Collection Time: 05/07/22  1:49 AM    Specimen: Blood   Result Value Ref Range    WBC 7.18 3.40 - 10.80 10*3/mm3    RBC 3.98 3.77 - 5.28 10*6/mm3    Hemoglobin 12.8 12.0 - 15.9 g/dL    Hematocrit 37.5 34.0 - 46.6 %    MCV 94.2 79.0 - 97.0 fL    MCH 32.2 26.6 - 33.0 pg    MCHC 34.1 31.5 - 35.7 g/dL    RDW 12.1 (L) 12.3 - 15.4 %    RDW-SD 42.3 37.0 - 54.0 fl    MPV 8.1 6.0 - 12.0 fL    Platelets 294 140 - 450 10*3/mm3    Neutrophil % 73.9 42.7 - 76.0 %    Lymphocyte % 13.1 (L) 19.6 - 45.3 %    Monocyte % 11.8 5.0 - 12.0 %    Eosinophil % 0.8 0.3 - 6.2 %    Basophil % 0.1 0.0 - 1.5 %    Immature Grans % 0.3 0.0 - 0.5 %    Neutrophils, Absolute 5.30 1.70 - 7.00 10*3/mm3    Lymphocytes, Absolute 0.94 0.70 - 3.10 10*3/mm3    Monocytes, Absolute 0.85 0.10 - 0.90 10*3/mm3    Eosinophils, Absolute 0.06 0.00 - 0.40 10*3/mm3    Basophils, Absolute 0.01 0.00 - 0.20 10*3/mm3    Immature Grans, Absolute 0.02 0.00 - 0.05 10*3/mm3    nRBC 0.0 0.0 - 0.2 /100 WBC   POC Glucose Once    Collection Time: 05/07/22  1:52 AM    Specimen: Blood   Result Value Ref Range    Glucose 97 70 - 130 mg/dL   COVID-19,[facility] CEPHEID/TAO NP SWAB IN TRANSPORT MEDIA 8-12 HR TAT - Swab, Nasopharynx    Collection Time: 05/07/22  1:53 AM     Specimen: Nasopharynx; Swab   Result Value Ref Range    COVID19 Not Detected Not Detected - Ref. Range   ECG 12 Lead    Collection Time: 05/07/22  1:59 AM   Result Value Ref Range    QT Interval 361 ms   Urinalysis With Microscopic If Indicated (No Culture) - Urine, Clean Catch    Collection Time: 05/07/22  2:35 AM    Specimen: Urine, Clean Catch   Result Value Ref Range    Color, UA Yellow Yellow, Straw    Appearance, UA Cloudy (A) Clear    pH, UA 7.0 5.0 - 8.0    Specific Gravity, UA 1.015 1.005 - 1.030    Glucose, UA Negative Negative    Ketones, UA 15 mg/dL (1+) (A) Negative    Bilirubin, UA Negative Negative    Blood, UA Negative Negative    Protein, UA Negative Negative    Leuk Esterase, UA Moderate (2+) (A) Negative    Nitrite, UA Negative Negative    Urobilinogen, UA 0.2 E.U./dL 0.2 - 1.0 E.U./dL   Urine Drug Screen - Urine, Clean Catch    Collection Time: 05/07/22  2:35 AM    Specimen: Urine, Clean Catch   Result Value Ref Range    Amphet/Methamphet, Screen Negative Negative    Barbiturates Screen, Urine Negative Negative    Benzodiazepine Screen, Urine Positive (A) Negative    Cocaine Screen, Urine Negative Negative    Opiate Screen Negative Negative    THC, Screen, Urine Negative Negative    Methadone Screen, Urine Negative Negative    Oxycodone Screen, Urine Negative Negative   Urinalysis, Microscopic Only - Urine, Clean Catch    Collection Time: 05/07/22  2:35 AM    Specimen: Urine, Clean Catch   Result Value Ref Range    RBC, UA None Seen None Seen, 0-2 /HPF    WBC, UA 3-5 (A) None Seen, 0-2 /HPF    Bacteria, UA Trace (A) None Seen /HPF    Squamous Epithelial Cells, UA 0-2 None Seen, 0-2 /HPF    Hyaline Casts, UA 0-2 None Seen /LPF    Methodology Manual Light Microscopy    Troponin    Collection Time: 05/07/22  4:57 AM    Specimen: Blood   Result Value Ref Range    Troponin T <0.010 0.000 - 0.030 ng/mL       Ordered the above labs and independently reviewed the results.        RADIOLOGY  CT Angiogram  Neck    Result Date: 5/7/2022  Patient: NUBIA SCHILLING  Time Out: 04:24 Exam(s): CTA NECK EXAM:   CT Angiography Neck With Intravenous Contrast CLINICAL HISTORY:   Fluent aphasia. TECHNIQUE:   Axial computed tomographic angiography images of the neck with intravenous contrast.  CTDI is 27.6 mGy and DLP is 1117.6 mGy-cm.  This CT exam was performed according to the principle of ALARA (As Low As Reasonably Achievable) by using one or more of the following dose reduction techniques: automated exposure control, adjustment of the mA and or kV according to patient size, and or use of iterative reconstruction technique.   3D reconstructed images were created and reviewed. COMPARISON:   No relevant prior studies available. FINDINGS:  VASCULATURE:   Right common carotid artery:  Minimal atherosclerosis of the proximal right common carotid artery without significant stenosis.  No dissection or occlusion.   Right internal carotid artery:  There is minimal atherosclerosis of the right carotid bulb without significant stenosis.  No dissection or occlusion.   Right external carotid artery:  Unremarkable.  No occlusion.   Right vertebral artery:  Unremarkable.  No significant stenosis.  No dissection or occlusion.   Left common carotid artery:  Unremarkable.  No significant stenosis.  No dissection or occlusion.   Left internal carotid artery:  Unremarkable.  Extracranial segment is patent with no significant stenosis.  No dissection or occlusion.   Left external carotid artery:  Unremarkable.  No occlusion.   Left vertebral artery:  Unremarkable.  No significant stenosis.  No dissection or occlusion.  NECK:   Bones joints:  No acute fracture.  No dislocation.   Soft tissues:  Unremarkable as visualized.  No mass.  CAROTID STENOSIS REFERENCE USING NASCET CRITERIA:   % ICA stenosis = (1 - narrowest ICA diameter diameter of distal cervical ICA) x 100.   Mild - <50% stenosis.   Moderate - 50-69% stenosis.   Severe - 70-94%  stenosis.   Near occlusion - 95-99% stenosis.   Occluded - 100% stenosis. IMPRESSION:       No acute findings in the arteries of the neck.     Electronically signed by Lakeisha Madden MD on 05-07-22 at 0424    XR Chest 1 View    Result Date: 5/7/2022  Patient: NUBIA SCHILLING  Time Out: 02:06 Exam(s): FILM CXR 1 VIEW EXAM:   XR Chest, 1 View CLINICAL HISTORY:    Reason for exam: soa. TECHNIQUE:   Frontal view of the chest. COMPARISON:   5 14 2018. FINDINGS:   Lungs:  Calcified granuloma measuring 0.8 cm at the periphery of the left midlung.   Pleural space:  Unremarkable.  No pneumothorax.   Heart:  Cardiomegaly.   Mediastinum:  Unremarkable.   Bones joints:  Osteopenia.   Soft tissues:  Soft tissues are unremarkable.   Vasculature:  Minimal atherosclerotic disease of the aortic knob.   Other findings:  Mild hypoaeration. IMPRESSION:     1.  Mild cardiomegaly. 2.  Hypoaeration. 3.  Osteopenia.     Electronically signed by Naseem Mccray MD on 05-07-22 at 0206    CT Angiogram Head w AI Analysis of LVO    Result Date: 5/7/2022  Patient: NUBIA SCHILLING  Time Out: 04:22 Exam(s): CTA HEAD EXAM:   CT Angiography Head With Intravenous Contrast CLINICAL HISTORY:   Stroke. TECHNIQUE: AI analysis of LVO was utilized   Axial computed tomographic angiography images of the head with intravenous contrast.  CTDI is 54.8 mGy and DLP is 1001.7 mGy-cm.  This CT exam was performed according to the principle of ALARA (As Low As Reasonably Achievable) by using one or more of the following dose reduction techniques: automated exposure control, adjustment of the mA and or kV according to patient size, and or use of iterative reconstruction technique.   3D and MIP reconstructed images were created and reviewed. COMPARISON:   No relevant prior studies available. FINDINGS:   Right internal carotid artery:  Minimal atherosclerosis of the intracranial portion of the internal carotid arteries bilaterally.  No significant stenosis.  No  aneurysm.   Right anterior cerebral artery:  Unremarkable.  No occlusion or significant stenosis.  No aneurysm.   Right middle cerebral artery:  Unremarkable.  No occlusion or significant stenosis.  No aneurysm.   Right posterior cerebral artery:  Fetal origin of the right PCA, normal variant.  No occlusion or significant stenosis.  No aneurysm.   Right vertebral artery:  Unremarkable as visualized.   Left internal carotid artery:  See above.   Left anterior cerebral artery:  Unremarkable.  No occlusion or significant stenosis.  No aneurysm.   Left middle cerebral artery:  Unremarkable.  No occlusion or significant stenosis.  No aneurysm.   Left posterior cerebral artery:  Unremarkable.  No occlusion or significant stenosis.  No aneurysm.   Left vertebral artery:  Unremarkable as visualized.   Basilar artery:  Unremarkable.  No occlusion or significant stenosis.  No aneurysm.   Brain:  No intracranial hemorrhage, mass-effect or midline shift.  There is no abnormal extra axial fluid collection.  No evidence of acute infarct.  Severe periventricular white matter hypodensities are most consistent with chronic microangiopathy.  There is mild cortical atrophy. IMPRESSION:       No acute findings in the arteries of the head brain.     Electronically signed by Lakeisha Madden MD on 05-07-22 at 0422      I ordered the above noted radiological studies. Reviewed by me and discussed with radiologist.  See dictation for official radiology interpretation.      PROCEDURES    Procedures      MEDICATIONS GIVEN IN ER    Medications   sodium chloride 0.9 % flush 10 mL (has no administration in time range)   sodium chloride 0.9 % flush 10 mL (has no administration in time range)   sodium chloride 0.9 % flush 10 mL (has no administration in time range)   atorvastatin (LIPITOR) tablet 80 mg (has no administration in time range)   nitroglycerin (NITROSTAT) SL tablet 0.4 mg (has no administration in time range)   aspirin tablet 325 mg  (has no administration in time range)     Or   aspirin suppository 300 mg (has no administration in time range)   acetaminophen (TYLENOL) tablet 650 mg (has no administration in time range)     Or   acetaminophen (TYLENOL) 160 MG/5ML solution 650 mg (has no administration in time range)     Or   acetaminophen (TYLENOL) suppository 650 mg (has no administration in time range)   iopamidol (ISOVUE-370) 76 % injection 100 mL (95 mL Intravenous Given 5/7/22 1285)   prochlorperazine (COMPAZINE) injection 10 mg (10 mg Intravenous Given 5/7/22 8578)   cefTRIAXone (ROCEPHIN) 1 g in sodium chloride 0.9 % 100 mL IVPB-VTB (0 g Intravenous Stopped 5/7/22 4429)         PROGRESS, DATA ANALYSIS, CONSULTS, AND MEDICAL DECISION MAKING    All labs have been independently reviewed by me.  All radiology studies have been reviewed by me and discussed with radiologist dictating the report.   EKG's independently viewed and interpreted by me.  Discussion below represents my analysis of pertinent findings related to patient's condition, differential diagnosis, treatment plan and final disposition.        ED Course as of 05/07/22 0627   Sat May 07, 2022   0200 Discussed with Dr. Tracy on-call for stroke.  Will obtain CTA. [TJ]   0625 Leukocytes, UA(!): Moderate (2+) [TJ]   0625 WBC, UA(!): 3-5 [TJ]   0625 Bacteria, UA(!): Trace [TJ]   0625 COVID19: Not Detected [TJ]   0625 Potassium(!): 3.4 [TJ]   0625 Magnesium(!): 1.5 [TJ]      ED Course User Index  [TJ] Todd Medel MD           PPE: The patient wore a surgical mask throughout the entire patient encounter. I wore an N95.    AS OF 06:27 EDT VITALS:    BP - 142/70  HR - 86  TEMP - 97.8 °F (36.6 °C) (Tympanic)  O2 SATS - 95%        DIAGNOSIS  Final diagnoses:   Fluent aphasia   Urinary tract infection without hematuria, site unspecified         DISPOSITION  Admit           Todd Medel MD  05/07/22 0627

## 2022-05-07 NOTE — ED TRIAGE NOTES
Pt to ER via EMS with c/o altered mental status, lethargy, aphasia, last known normal 2 days ago per daughter and . Oriented to person and place, and time. Generalized weakness.

## 2022-05-07 NOTE — PLAN OF CARE
Goal Outcome Evaluation:  Plan of Care Reviewed With: patient        Progress: no change  Outcome Evaluation: Bedside swallow evaluation completed, orders received per stroke protocol. Pt initially admitted with fluent aphasia, primarily resolved, CTH and imaging unremarkable. Oral phase unremarkable. A-P transport and mastication timely and efficient. No oral residuals. Pharyngeal phase with no overt s/s aspiration.  Palpation suggests age-appropriate hyolaryngeal elevation.       Recommend: regular and thin liquid diet. Medications: with thin liquids. Compensations: small bites/sips, upright for all meals.       Discussed results, rationale and recommendations with pt and daughter at bedside. Aphasia resolved, MD notes defer additional MRI imaging d/t claustrophobia.       SLP to sign off at this time as swallow WNL. Please re-consult if additional workup required for dysphagia or speech/language.

## 2022-05-07 NOTE — THERAPY EVALUATION
Acute Care - Speech Language Pathology   Swallow Initial Evaluation [facility]     Patient Name: Ritu Martino  : 1940  MRN: [id number]  Today's Date: 2022               Admit Date: 2022    Visit Dx:     ICD-10-CM ICD-9-CM   1. Fluent aphasia  R47.01 784.3   2. Urinary tract infection without hematuria, site unspecified  N39.0 599.0     Patient Active Problem List   Diagnosis   • Anxiety   • Hypothyroidism   • Hyponatremia   • Iron deficiency   • Essential hypertension, benign   • Fluent aphasia   • Hypochloremia   • Benzodiazepine dependence (HCC)     Past Medical History:   Diagnosis Date   • Anxiety    • Arthritis    • Bowel dysfunction 2021    since having surgery for diverticular infection   • Diverticulitis 2021    w/ rupture and infection required surgery and ostomy   • Essential hypertension, benign    • GERD (gastroesophageal reflux disease)    • Hernia, hiatal    • Hypercholesterolemia    • Hypokalemia    • Hyponatremia     chronic low with occasional normal level   • Hypothyroidism     estimated time frame pt nor  could remember exactly how long has been on medication   • Insomnia    • Iron deficiency      Past Surgical History:   Procedure Laterality Date   • APPENDECTOMY     • BACK SURGERY     •  SECTION     • COLON SURGERY  2021    to address ruptured and infected diverticular dz, ostomy also placed   • COLOSTOMY CLOSURE  10/2021    ostomy removed   • HEMORRHOIDECTOMY     • KNEE ARTHROPLASTY         SLP Recommendation and Plan  Recommend: regular and thin liquid diet. Medications: with thin liquids. Compensations: small bites/sips, upright for all meals.       Discussed results, rationale and recommendations with pt and daughter at bedside. Aphasia resolved, MD notes defer additional MRI imaging d/t claustrophobia.       SLP to sign off at this time as swallow WNL. Please re-consult if additional workup required for dysphagia or  "speech/language.      SWALLOW EVALUATION (last 72 hours)     SLP Adult Swallow Evaluation     Row Name 05/07/22 1200       Rehab Evaluation    Document Type evaluation  -AB    Subjective Information no complaints  -AB    Patient Observations alert;cooperative;agree to therapy  -AB    Patient/Family/Caregiver Comments/Observations Pt pleasant, cooperative. Daughter at bedside throughout. question STM impairment, frequently requesting to urinate despite reminders from daughter/SLP catheter in place.  -AB    Patient Effort adequate  -AB    Symptoms Noted During/After Treatment none  -AB            General Information    Patient Profile Reviewed yes  -AB    Pertinent History Of Current Problem Per H&P, \"82-year-old right-handed white female with known diagnosis of hypertension, hyperlipidemia, hypothyroidism, who comes in with 2 weeks of generalized weakness and 1 day of disorientation/confusion and altered mentation.  No obvious focal weakness/numbness/facial droop or slurred speech was been noted.  Patient has had similar episode in the past with hyponatremia, which had improved after her sodium levels improved.  Patient is generally tired, and most of the information obtained from the daughter at the bedside.\"  -AB    Current Method of Nutrition NPO  -AB    Precautions/Limitations, Vision WFL  -AB    Precautions/Limitations, Hearing WFL  -AB    Prior Level of Function-Communication WFL  -AB    Prior Level of Function-Swallowing regular textures;thin liquids  -AB    Plans/Goals Discussed with patient and family;agreed upon  -AB    Barriers to Rehab none identified  -AB    Patient's Goals for Discharge return to regular diet  -AB    Family Goals for Discharge patient able to return to regular diet  -AB            Pain    Additional Documentation Pain Scale: Numbers Pre/Post-Treatment (Group)  -AB            Pain Scale: Numbers Pre/Post-Treatment    Pretreatment Pain Rating 0/10 - no pain  -AB    Posttreatment Pain " Rating 0/10 - no pain  -AB            Oral Motor Structure and Function    Oral Lesions or Structural Abnormalities and/or variants none  -AB    Dentition Assessment natural, present and adequate  -AB    Secretion Management WNL/WFL  -AB    Mucosal Quality moist, healthy  -AB    Gag Response --  did not assess  -AB    Volitional Swallow WFL  -AB    Volitional Cough weak  -AB            Oral Musculature and Cranial Nerve Assessment    Oral Motor General Assessment WFL  -AB    Oral Labial or Buccal Impairment, Detail, Cranial Nerve VII (Facial): --  mildly reduced protrusion  -AB            General Eating/Swallowing Observations    Respiratory Support Currently in Use room air  -AB    Eating/Swallowing Skills fed by SLP  -AB    Positioning During Eating upright 90 degree  -AB    Utensils Used spoon;cup;straw  -AB    Consistencies Trialed regular textures;mixed consistency;chopped;pureed;ice chips;thin liquids  -AB            Clinical Swallow Eval    Clinical Swallow Evaluation Summary Bedside swallow evaluation completed, orders received per stroke protocol. Pt initially admitted with fluent aphasia, primarily resolved, CTH and imaging unremarkable. Oral phase unremarkable. A-P transport and mastication timely and efficient. No oral residuals. Pharyngeal phase with no overt s/s aspiration.  Palpation suggests age-appropriate hyolaryngeal elevation.   Recommend: regular and thin liquid diet. Medications: with thin liquids. Compensations: small bites/sips, upright for all meals.   Discussed results, rationale and recommendations with pt and daughter at bedside. Aphasia resolved, MD notes defer additional MRI imaging d/t claustrophobia.   SLP to sign off at this time as swallow WNL. Please re-consult if additional workup required for dysphagia or speech/language.  -AB            SLP Evaluation Clinical Impression    SLP Swallowing Diagnosis functional oral phase;functional pharyngeal phase  -AB    Functional Impact no  impact on function  -AB    Swallow Criteria for Skilled Therapeutic Interventions Met no problems identified which require skilled intervention;baseline status  -AB            SLP Treatment Clinical Impressions    Care Plan Review evaluation/treatment results reviewed;care plan/treatment goals reviewed;risks/benefits reviewed;current/potential barriers reviewed;patient/other agree to care plan  -AB    Care Plan Review, Other Participant(s) daughter  -AB            Recommendations    Therapy Frequency (Swallow) evaluation only  -AB    SLP Diet Recommendation regular textures;thin liquids  -AB    Recommended Diagnostics No further SLP services recommended  -AB    Recommended Precautions and Strategies upright posture during/after eating;small bites of food and sips of liquid  -AB    Oral Care Recommendations Oral Care BID/PRN  -AB    SLP Rec. for Method of Medication Administration with thin liquids  -AB    Monitor for Signs of Aspiration yes;notify SLP if any concerns  -AB    Anticipated Discharge Disposition (SLP) unknown  -AB          User Key  (r) = Recorded By, (t) = Taken By, (c) = Cosigned By    Initials Name Effective Dates    Kelsey Schwartz MS CCC-SLP 08/01/21 -                 EDUCATION  The patient has been educated in the following areas:   Dysphagia (Swallowing Impairment).         SLP Outcome Measures (last 72 hours)     SLP Outcome Measures     Row Name 05/07/22 1200             SLP Outcome Measures    Outcome Measure Used? Adult NOMS  -AB              Adult FCM Scores    FCM Chosen Swallowing  -AB      Swallowing FCM Score 7  -AB            User Key  (r) = Recorded By, (t) = Taken By, (c) = Cosigned By    Initials Name Effective Dates    Kelsey Schwartz MS CCC-SLP 08/01/21 -                  Time Calculation:    Time Calculation- SLP     Row Name 05/07/22 5927             Time Calculation- SLP    SLP Received On 05/07/22  -AB              Untimed Charges    21511-IQ Eval Oral Pharyng Swallow  Minutes 60  -AB              Total Minutes    Untimed Charges Total Minutes 60  -AB       Total Minutes 60  -AB            User Key  (r) = Recorded By, (t) = Taken By, (c) = Cosigned By    Initials Name Provider Type    Kelsey Schwartz, MS CCC-SLP Speech and Language Pathologist                Therapy Charges for Today     Code Description Service Date Service Provider Modifiers Qty    [id number] HC ST EVAL ORAL PHARYNG SWALLOW 4 5/7/2022 Kelsey Chavez MS CCC-SLP GN 1             PPE  Patient was not wearing a face mask during this therapy encounter. Therapist used appropriate personal protective equipment including mask, eye protection and gloves.  Mask used was standard procedure mask. Appropriate PPE was worn during the entire therapy session. The patient coughed during this evaluation. Therapist was within 6 feet for 15 minutes or more during the evaluation. Hand hygiene was completed before and after therapy session. Patient is not in enhanced droplet precautions.       Kelsey Chavez MS CCC-SLP  5/7/2022

## 2022-05-07 NOTE — CONSULTS
[facility] Consult Note      Patient Name: Ritu Martino  : 1940  MRN: [id number]  Primary Care Physician:  Reyes, Miriam Mercado, MD  Referring Physician: Dwight Haddad MD  Date of admission: 2022    Subjective     Reason for Consult:  hyponatremia    HPI:   Ritu Martino is a 82 y.o. female known to have history of hypothyroidism, hypertension,  history of anxiety and chronic hyponatremia  followed by [facility] who was brought to the hospital today after being found down unresponsive.  She was noted to have fatigue and aphasia.  Her sodium presentation was 122.  Has been at bedside noted progressive weakness and decline in general health for the past couple of weeks with decreased oral intake.  Patient noted that she drinks around 64 ounces of fluid per day.  The patient is known to have history of chronic hyponatremia and she was to follow with [facility] currently on salt tablets of 1 g 3 times daily patient claims to be compliant with her salt tablets.  She denies any nausea no vomiting.  She had diarrhea that resolved 2 weeks ago.        Review of systems  14 point review of systems is otherwise negative except for mentioned above on HPI    Personal History     Past Medical History:   Diagnosis Date   • Anxiety    • Arthritis    • Bowel dysfunction 2021    since having surgery for diverticular infection   • Diverticulitis 2021    w/ rupture and infection required surgery and ostomy   • Essential hypertension, benign    • GERD (gastroesophageal reflux disease)    • Hernia, hiatal    • Hypercholesterolemia    • Hypokalemia    • Hyponatremia     chronic low with occasional normal level   • Hypothyroidism     estimated time frame pt nor  could remember exactly how long has been on medication   • Insomnia    • Iron deficiency        Past Surgical History:   Procedure Laterality Date   • APPENDECTOMY     • BACK SURGERY     •   SECTION     • COLON SURGERY  2021    to address ruptured and infected diverticular dz, ostomy also placed   • COLOSTOMY CLOSURE  10/2021    ostomy removed   • HEMORRHOIDECTOMY     • KNEE ARTHROPLASTY         Family History: family history is not on file.    Social History:  reports that she has never smoked. She has never used smokeless tobacco. She reports current alcohol use of about 8.0 standard drinks of alcohol per week. She reports that she does not use drugs.    Home Medications:  Prior to Admission medications    Medication Sig Start Date End Date Taking? Authorizing Provider   ALPRAZolam (XANAX) 0.25 MG tablet Take 0.25 mg by mouth 2 (Two) Times a Day.   Yes Meet Cook MD   benazepril (LOTENSIN) 20 MG tablet Take 20 mg by mouth Daily. 3/7/22  Yes Meet Cook MD   Cholecalciferol (VITAMIN D) 2000 units capsule Take  by mouth Daily With Dinner.   Yes Meet Cook MD   cycloSPORINE (RESTASIS) 0.05 % ophthalmic emulsion Administer 1 drop to both eyes Daily.   Yes Meet Cook MD   ferrous sulfate 325 (65 FE) MG tablet Take 325 mg by mouth 2 (Two) Times a Day Before Meals.   Yes Meet Cook MD   Probiotic Product (ALIGN PO) Take 1 capsule by mouth Daily With Lunch.   Yes Meet Cook MD   rosuvastatin (CRESTOR) 20 MG tablet Take 20 mg by mouth Every Night.   Yes Meet Cook MD   SODIUM CHLORIDE PO Take  by mouth 3 (Three) Times a Day.   Yes Meet Cook MD   Synthroid 50 MCG tablet By mouth take 1 tab daily in AM as directed on empty stomach with water only.  Brand Medically Necessary 22  Yes Uche Garcia MD   vitamin B-12 (CYANOCOBALAMIN) 100 MCG tablet Take 50 mcg by mouth Daily.   Yes Meet Cook MD   zolpidem (AMBIEN) 10 MG tablet Take 10 mg by mouth Every Night.   Yes Meet Cook MD   ketoconazole (NIZORAL) 2 % cream Apply  topically to the appropriate area as directed 2 (Two) Times a  Day. to affected area 4/1/22   Provider, MD Meet   nystatin 915777 UNIT/GM powder Apply  topically to the appropriate area as directed 2 (Two) Times a Day. to affected area 4/12/22   Provider, MD Meet       Allergies:  No Known Allergies    Objective     Vitals:   Temp:  [97.8 °F (36.6 °C)-97.9 °F (36.6 °C)] 97.9 °F (36.6 °C)  Heart Rate:  [67-95] 73  Resp:  [16-17] 17  BP: (111-165)/(60-96) 145/78    Intake/Output Summary (Last 24 hours) at 5/7/2022 1308  Last data filed at 5/7/2022 0552  Gross per 24 hour   Intake 100 ml   Output --   Net 100 ml       Physical Exam:   Constitutional: Ill looking and frail.  Slow to respond.  HEENT: Sclera anicteric, no conjunctival injection  Neck: Supple, no thyromegaly, no lymphadenopathy, trachea at midline, no JVD  Respiratory: Clear to auscultation bilaterally, nonlabored respiration  Cardiovascular: RRR, no murmurs, no rubs or gallops, no carotid bruit  Gastrointestinal: Positive bowel sounds, abdomen is soft, nontender and nondistended  : No palpable bladder  Musculoskeletal: No edema, no clubbing or cyanosis  Psychiatric: Slow  Neurologic: Awake and oriented  Skin: Warm and dry       Scheduled Meds:     ALPRAZolam, 0.25 mg, Oral, BID  aspirin, 325 mg, Oral, Daily   Or  aspirin, 300 mg, Rectal, Daily  atorvastatin, 80 mg, Oral, Nightly  ketoconazole, 1 application, Topical, Q24H  levothyroxine, 50 mcg, Oral, Q AM  lisinopril, 20 mg, Oral, Q24H  nystatin, , Topical, Q12H  sodium chloride, 10 mL, Intravenous, Q12H  sodium chloride, 1 g, Oral, TID With Meals      IV Meds:        Results Reviewed:   I have personally reviewed the results from the time of this admission to 5/7/2022 13:08 EDT     Lab Results   Component Value Date    GLUCOSE 101 (H) 05/07/2022    CALCIUM 9.4 05/07/2022     (L) 05/07/2022    K 3.4 (L) 05/07/2022    CO2 23.6 05/07/2022    CL 85 (L) 05/07/2022    BUN 7 (L) 05/07/2022    CREATININE 0.63 05/07/2022    EGFRIFNONA 66 03/21/2019     BCR 11.1 05/07/2022    ANIONGAP 13.4 05/07/2022      Lab Results   Component Value Date    MG 1.5 (L) 05/07/2022    ALBUMIN 4.50 05/07/2022           Assessment / Plan     ASSESSMENT:  Hyponatremia euvolemic: Mildly symptomatic.  Chronic issue for patient with acute exacerbation at this time.  Etiology for acute exacerbation could be related to poor solute intake and mild hypovolemia.  She had chronic hyponatremia  on salt tablets presumably for SIADH.  Patient claimed that she is really compliant with her salt tablet but not with fluid restriction.  She denies any recent NSAID use.  She does have a history of hypothyroidism but her last TSH is at 1.79.  Hypertension blood pressure is controlled.  Hypomagnesemia   Aphasia followed by neurology  5. Metabolic encephalopathy likely secondary to hyponatremia improving.        PLAN:  I believe that recently the reason for acute exacerbation of chronic hyponatremia is secondary to decreased solute intake in the setting of a ACE inhibitor use, as ACE inhibitor use has been associated with hyponatremia by decreasing effective blood volume and stimulating ADH secretion.  Will place patient on fluid restriction at 1000 cc per 24 hours.  Increase salt tablets to 2 g 3 times daily.  Avoid hypotonic fluids.  Check urine sodium and urine osmolarity.  Check morning cortisol  Check sodium every 6 hours  Replace magnesium      Thank you for involving us in the care of Ritu Martino.  Please feel free to call with any questions.    Al Lagos MD  05/07/22  13:08 EDT    [facility]  [phone]      Much of this encounter note is an electronic transcription/translation of spoken language to printed text. The electronic translation of spoken language may permit erroneous, or at times, nonsensical words or phrases to be inadvertently transcribed; Although I have reviewed the note for such errors, some may still exist.

## 2022-05-07 NOTE — H&P
Patient Name:  Ritu Martino  YOB: 1940  MRN:  [id number]  Admit Date:  5/7/2022  Patient Care Team:  Reyes, Miriam Mercado, MD as PCP - General (Family Medicine)      Subjective   History Present Illness     Chief Complaint   Patient presents with   • Altered Mental Status   • Unable to speak   • Fatigue       Ms. Martino is a 82 y.o. never smoker with a history of hypercholesterolemia, hyponatremia, hypothyroidism, hypertension, GERD who presents to [facility] ER with chief complaint of altered mental status, unable to speak, fatigue & admitted for fluent aphasia.    Two days ago normal & progressively weak & went to bed then found on floor at 0100 at 5/7/2022 &  placed on blanket on her at that time. Then got up on her own &  concerned & brought to hospital.    No evidence of lateralizing features on exam.  Recently reported word salad /tangential thought process appears to have resolved.  CTA head and neck unremarkable for acute findings.  Questionable UTI on urinalysis and received empiric antibiotic therapy IV ceftriaxone once.  Denies urinary complaints.    Recommendations pending hospital course.     History of Present Illness  Review of Systems   Constitutional: Positive for activity change, appetite change and fatigue. Negative for chills and fever.   HENT: Negative for congestion and rhinorrhea.    Eyes: Negative for visual disturbance.   Respiratory: Negative for cough and shortness of breath.    Cardiovascular: Negative for chest pain and leg swelling.   Gastrointestinal: Negative for abdominal pain, constipation, diarrhea, nausea and vomiting.   Endocrine: Negative for polydipsia, polyphagia and polyuria.   Genitourinary: Negative for difficulty urinating and dysuria.   Musculoskeletal: Positive for gait problem (due to weak). Negative for myalgias.   Skin: Negative for rash and wound.   Neurological: Positive for weakness (generalized). Negative for syncope and  headaches.   Psychiatric/Behavioral: Positive for confusion. Negative for hallucinations.        Personal History     Past Medical History:   Diagnosis Date   • Anxiety    • Arthritis    • Bowel dysfunction 2021    since having surgery for diverticular infection   • Diverticulitis 2021    w/ rupture and infection required surgery and ostomy   • Essential hypertension, benign    • GERD (gastroesophageal reflux disease)    • Hernia, hiatal    • Hypercholesterolemia    • Hypokalemia    • Hyponatremia     chronic low with occasional normal level   • Hypothyroidism     estimated time frame pt nor  could remember exactly how long has been on medication   • Insomnia    • Iron deficiency      Past Surgical History:   Procedure Laterality Date   • APPENDECTOMY     • BACK SURGERY     •  SECTION     • COLON SURGERY  2021    to address ruptured and infected diverticular dz, ostomy also placed   • COLOSTOMY CLOSURE  10/2021    ostomy removed   • HEMORRHOIDECTOMY     • KNEE ARTHROPLASTY       No family history on file.  Social History     Tobacco Use   • Smoking status: Never Smoker   • Smokeless tobacco: Never Used   Vaping Use   • Vaping Use: Never used   Substance Use Topics   • Alcohol use: Yes     Alcohol/week: 8.0 standard drinks     Types: 4 Glasses of wine, 4 Shots of liquor per week   • Drug use: No     No current facility-administered medications on file prior to encounter.     Current Outpatient Medications on File Prior to Encounter   Medication Sig Dispense Refill   • ALPRAZolam (XANAX) 0.25 MG tablet Take 0.25 mg by mouth 2 (Two) Times a Day.     • benazepril (LOTENSIN) 20 MG tablet Take 20 mg by mouth Daily.     • Cholecalciferol (VITAMIN D) 2000 units capsule Take  by mouth Daily With Dinner.     • cycloSPORINE (RESTASIS) 0.05 % ophthalmic emulsion Administer 1 drop to both eyes Daily.     • ferrous sulfate 325 (65 FE) MG tablet Take 325 mg by mouth 2 (Two) Times a Day  Before Meals.     • Probiotic Product (ALIGN PO) Take 1 capsule by mouth Daily With Lunch.     • rosuvastatin (CRESTOR) 20 MG tablet Take 20 mg by mouth Every Night.     • SODIUM CHLORIDE PO Take  by mouth 3 (Three) Times a Day.     • Synthroid 50 MCG tablet By mouth take 1 tab daily in AM as directed on empty stomach with water only.  Brand Medically Necessary 90 tablet 2   • vitamin B-12 (CYANOCOBALAMIN) 100 MCG tablet Take 50 mcg by mouth Daily.     • zolpidem (AMBIEN) 10 MG tablet Take 10 mg by mouth Every Night.     • ketoconazole (NIZORAL) 2 % cream Apply  topically to the appropriate area as directed 2 (Two) Times a Day. to affected area     • nystatin 178993 UNIT/GM powder Apply  topically to the appropriate area as directed 2 (Two) Times a Day. to affected area       No Known Allergies    Objective    Objective     Vital Signs  Temp:  [97.8 °F (36.6 °C)-97.9 °F (36.6 °C)] 97.9 °F (36.6 °C)  Heart Rate:  [67-95] 73  Resp:  [16-17] 17  BP: (111-165)/(60-96) 145/78  SpO2:  [92 %-99 %] 97 %  on   ;   Device (Oxygen Therapy): room air  There is no height or weight on file to calculate BMI.    Physical Exam  Constitutional:       General: She is not in acute distress.     Appearance: She is not toxic-appearing.      Comments: Lethargic and generally weak   HENT:      Head: Normocephalic and atraumatic.   Eyes:      Extraocular Movements: Extraocular movements intact.      Conjunctiva/sclera: Conjunctivae normal.   Cardiovascular:      Rate and Rhythm: Normal rate.      Heart sounds: Normal heart sounds.   Pulmonary:      Effort: Pulmonary effort is normal.      Breath sounds: Normal breath sounds.   Abdominal:      General: Bowel sounds are normal.      Palpations: Abdomen is soft.   Musculoskeletal:         General: No tenderness.      Cervical back: Normal range of motion and neck supple.      Right lower leg: No edema.      Left lower leg: No edema.   Skin:     General: Skin is warm and dry.   Neurological:       Mental Status: She is alert and oriented to person, place, and time.      Cranial Nerves: No cranial nerve deficit.      Motor: Weakness (Generalized) present.   Psychiatric:         Thought Content: Thought content normal.      Comments: Flat affect         Results Review:  I reviewed the patient's new clinical results.  I reviewed the patient's new imaging results and agree with the interpretation.  I reviewed the patient's other test results and agree with the interpretation  I personally viewed and interpreted the patient's EKG/Telemetry data  Discussed with ED provider.    Lab Results (last 24 hours)     Procedure Component Value Units Date/Time    CBC & Differential [[id number]]  (Abnormal) Collected: 05/07/22 0149    Specimen: Blood Updated: 05/07/22 0157    Narrative:      The following orders were created for panel order CBC & Differential.  Procedure                               Abnormality         Status                     ---------                               -----------         ------                     CBC Auto Differential[[id number]]        Abnormal            Final result                 Please view results for these tests on the individual orders.    Comprehensive Metabolic Panel [[id number]]  (Abnormal) Collected: 05/07/22 0149    Specimen: Blood Updated: 05/07/22 0217     Glucose 101 mg/dL      BUN 7 mg/dL      Creatinine 0.63 mg/dL      Sodium 122 mmol/L      Potassium 3.4 mmol/L      Chloride 85 mmol/L      CO2 23.6 mmol/L      Calcium 9.4 mg/dL      Total Protein 7.0 g/dL      Albumin 4.50 g/dL      ALT (SGPT) 19 U/L      AST (SGOT) 28 U/L      Alkaline Phosphatase 57 U/L      Total Bilirubin 1.0 mg/dL      Globulin 2.5 gm/dL      A/G Ratio 1.8 g/dL      BUN/Creatinine Ratio 11.1     Anion Gap 13.4 mmol/L      eGFR 88.7 mL/min/1.73      Comment: National Kidney Foundation and American Society of Nephrology (ASN) Task Force recommended calculation based on the Chronic Kidney Disease  Epidemiology Collaboration (CKD-EPI) equation refit without adjustment for race.       Narrative:      GFR Normal >60  Chronic Kidney Disease <60  Kidney Failure <15      Protime-INR [[id number]]  (Abnormal) Collected: 05/07/22 0149    Specimen: Blood Updated: 05/07/22 0217     Protime 11.7 Seconds      INR 0.87    aPTT [[id number]]  (Normal) Collected: 05/07/22 0149    Specimen: Blood Updated: 05/07/22 0217     PTT 29.9 seconds     BNP [[id number]]  (Normal) Collected: 05/07/22 0149    Specimen: Blood Updated: 05/07/22 0214     proBNP 75.9 pg/mL     Narrative:      Among patients with dyspnea, NT-proBNP is highly sensitive for the detection of acute congestive heart failure. In addition NT-proBNP of <300 pg/ml effectively rules out acute congestive heart failure with 99% negative predictive value.    Results may be falsely decreased if patient taking Biotin.      Troponin [[id number]]  (Normal) Collected: 05/07/22 0149    Specimen: Blood Updated: 05/07/22 0217     Troponin T <0.010 ng/mL     Narrative:      Troponin T Reference Range:  <= 0.03 ng/mL-   Negative for AMI  >0.03 ng/mL-     Abnormal for myocardial necrosis.  Clinicians would have to utilize clinical acumen, EKG, Troponin and serial changes to determine if it is an Acute Myocardial Infarction or myocardial injury due to an underlying chronic condition.       Results may be falsely decreased if patient taking Biotin.      Ethanol [[id number]] Collected: 05/07/22 0149    Specimen: Blood Updated: 05/07/22 0217     Ethanol <10 mg/dL      Ethanol % <0.010 %     CK [[id number]]  (Abnormal) Collected: 05/07/22 0149    Specimen: Blood Updated: 05/07/22 0217     Creatine Kinase 233 U/L     Magnesium [[id number]]  (Abnormal) Collected: 05/07/22 0149    Specimen: Blood Updated: 05/07/22 0217     Magnesium 1.5 mg/dL     CBC Auto Differential [[id number]]  (Abnormal) Collected: 05/07/22 0149    Specimen: Blood Updated: 05/07/22 0157     WBC 7.18 10*3/mm3      RBC 3.98  10*6/mm3      Hemoglobin 12.8 g/dL      Hematocrit 37.5 %      MCV 94.2 fL      MCH 32.2 pg      MCHC 34.1 g/dL      RDW 12.1 %      RDW-SD 42.3 fl      MPV 8.1 fL      Platelets 294 10*3/mm3      Neutrophil % 73.9 %      Lymphocyte % 13.1 %      Monocyte % 11.8 %      Eosinophil % 0.8 %      Basophil % 0.1 %      Immature Grans % 0.3 %      Neutrophils, Absolute 5.30 10*3/mm3      Lymphocytes, Absolute 0.94 10*3/mm3      Monocytes, Absolute 0.85 10*3/mm3      Eosinophils, Absolute 0.06 10*3/mm3      Basophils, Absolute 0.01 10*3/mm3      Immature Grans, Absolute 0.02 10*3/mm3      nRBC 0.0 /100 WBC     POC Glucose Once [[id number]]  (Normal) Collected: 05/07/22 0152    Specimen: Blood Updated: 05/07/22 0153     Glucose 97 mg/dL      Comment: Meter: [id number] : [id number] Marianne Jovel RN       COVID PRE-OP / PRE-PROCEDURE SCREENING ORDER (NO ISOLATION) - Swab, Nasopharynx [[id number]]  (Normal) Collected: 05/07/22 0153    Specimen: Swab from Nasopharynx Updated: 05/07/22 0241    Narrative:      The following orders were created for panel order COVID PRE-OP / PRE-PROCEDURE SCREENING ORDER (NO ISOLATION) - Swab, Nasopharynx.  Procedure                               Abnormality         Status                     ---------                               -----------         ------                     COVID-19,[facility]...[[id number]]  Normal              Final result                 Please view results for these tests on the individual orders.    COVID-19,[facility] CEPHEID/TAO NP SWAB IN TRANSPORT MEDIA 8-12 HR TAT - Swab, Nasopharynx [[id number]]  (Normal) Collected: 05/07/22 0153    Specimen: Swab from Nasopharynx Updated: 05/07/22 0241     COVID19 Not Detected    Narrative:      Fact sheet for providers: https://www.fda.gov/media/955852/download     Fact sheet for patients: https://www.fda.gov/media/776157/download    Urinalysis With Microscopic If Indicated (No Culture) - Urine, Clean Catch  [[id number]]  (Abnormal) Collected: 05/07/22 0235    Specimen: Urine, Clean Catch Updated: 05/07/22 0301     Color, UA Yellow     Appearance, UA Cloudy     pH, UA 7.0     Specific Gravity, UA 1.015     Glucose, UA Negative     Ketones, UA 15 mg/dL (1+)     Bilirubin, UA Negative     Blood, UA Negative     Protein, UA Negative     Leuk Esterase, UA Moderate (2+)     Nitrite, UA Negative     Urobilinogen, UA 0.2 E.U./dL    Urine Drug Screen - Urine, Clean Catch [[id number]]  (Abnormal) Collected: 05/07/22 0235    Specimen: Urine, Clean Catch Updated: 05/07/22 0304     Amphet/Methamphet, Screen Negative     Barbiturates Screen, Urine Negative     Benzodiazepine Screen, Urine Positive     Cocaine Screen, Urine Negative     Opiate Screen Negative     THC, Screen, Urine Negative     Methadone Screen, Urine Negative     Oxycodone Screen, Urine Negative    Narrative:      Negative Thresholds Per Drugs Screened:    Amphetamines                 500 ng/ml  Barbiturates                 200 ng/ml  Benzodiazepines              100 ng/ml  Cocaine                      300 ng/ml  Methadone                    300 ng/ml  Opiates                      300 ng/ml  Oxycodone                    100 ng/ml  THC                           50 ng/ml    The Normal Value for all drugs tested is negative. This report includes final unconfirmed screening results to be used for medical treatment purposes only. Unconfirmed results must not be used for non-medical purposes such as employment or legal testing. Clinical consideration should be applied to any drug of abuse test, particularly when unconfirmed results are used.            Urinalysis, Microscopic Only - Urine, Clean Catch [[id number]]  (Abnormal) Collected: 05/07/22 0235    Specimen: Urine, Clean Catch Updated: 05/07/22 0321     RBC, UA None Seen /HPF      WBC, UA 3-5 /HPF      Bacteria, UA Trace /HPF      Squamous Epithelial Cells, UA 0-2 /HPF      Hyaline Casts, UA 0-2 /LPF      Methodology  Manual Light Microscopy    Troponin [[id number]]  (Normal) Collected: 05/07/22 0457    Specimen: Blood Updated: 05/07/22 0536     Troponin T <0.010 ng/mL     Narrative:      Troponin T Reference Range:  <= 0.03 ng/mL-   Negative for AMI  >0.03 ng/mL-     Abnormal for myocardial necrosis.  Clinicians would have to utilize clinical acumen, EKG, Troponin and serial changes to determine if it is an Acute Myocardial Infarction or myocardial injury due to an underlying chronic condition.       Results may be falsely decreased if patient taking Biotin.      Hemoglobin A1c [[id number]]  (Normal) Collected: 05/07/22 0911    Specimen: Blood Updated: 05/07/22 0937     Hemoglobin A1C 5.00 %     Narrative:      Hemoglobin A1C Ranges:    Increased Risk for Diabetes  5.7% to 6.4%  Diabetes                     >= 6.5%  Diabetic Goal                < 7.0%    Lipid Panel [[id number]]  (Abnormal) Collected: 05/07/22 0911    Specimen: Blood Updated: 05/07/22 0945     Total Cholesterol 149 mg/dL      Triglycerides 49 mg/dL      HDL Cholesterol 99 mg/dL      LDL Cholesterol  39 mg/dL      VLDL Cholesterol 11 mg/dL      LDL/HDL Ratio 0.41    Narrative:      Cholesterol Reference Ranges  (U.S. Department of Health and Human Services ATP III Classifications)    Desirable          <200 mg/dL  Borderline High    200-239 mg/dL  High Risk          >240 mg/dL      Triglyceride Reference Ranges  (U.S. Department of Health and Human Services ATP III Classifications)    Normal           <150 mg/dL  Borderline High  150-199 mg/dL  High             200-499 mg/dL  Very High        >500 mg/dL    HDL Reference Ranges  (U.S. Department of Health and Human Services ATP III Classifications)    Low     <40 mg/dl (major risk factor for CHD)  High    >60 mg/dl ('negative' risk factor for CHD)        LDL Reference Ranges  (U.S. Department of Health and Human Services ATP III Classifications)    Optimal          <100 mg/dL  Near Optimal     100-129  mg/dL  Borderline High  130-159 mg/dL  High             160-189 mg/dL  Very High        >189 mg/dL    TSH [[id number]]  (Normal) Collected: 05/07/22 0911    Specimen: Blood Updated: 05/07/22 1141     TSH 1.790 uIU/mL     POC Glucose Once [[id number]]  (Abnormal) Collected: 05/07/22 1100    Specimen: Blood Updated: 05/07/22 1101     Glucose 150 mg/dL      Comment: Meter: [id number] : [id number] Alida BENTON             Imaging Results (Last 24 Hours)     Procedure Component Value Units Date/Time    CT Angiogram Neck [[id number]] Collected: 05/07/22 0424     Updated: 05/07/22 0424    Narrative:        Patient: NUBIA SCHILLING  Time Out: 04:24  Exam(s): CTA NECK     EXAM:    CT Angiography Neck With Intravenous Contrast    CLINICAL HISTORY:    Fluent aphasia.    TECHNIQUE:    Axial computed tomographic angiography images of the neck with   intravenous contrast.  CTDI is 27.6 mGy and DLP is 1117.6 mGy-cm.  This   CT exam was performed according to the principle of ALARA (As Low As   Reasonably Achievable) by using one or more of the following dose   reduction techniques: automated exposure control, adjustment of the mA   and or kV according to patient size, and or use of iterative   reconstruction technique.    3D reconstructed images were created and reviewed.    COMPARISON:    No relevant prior studies available.    FINDINGS:     VASCULATURE:    Right common carotid artery:  Minimal atherosclerosis of the proximal   right common carotid artery without significant stenosis.  No dissection   or occlusion.    Right internal carotid artery:  There is minimal atherosclerosis of the   right carotid bulb without significant stenosis.  No dissection or   occlusion.    Right external carotid artery:  Unremarkable.  No occlusion.    Right vertebral artery:  Unremarkable.  No significant stenosis.  No   dissection or occlusion.      Left common carotid artery:  Unremarkable.  No significant stenosis.    No dissection or  occlusion.    Left internal carotid artery:  Unremarkable.  Extracranial segment is   patent with no significant stenosis.  No dissection or occlusion.    Left external carotid artery:  Unremarkable.  No occlusion.    Left vertebral artery:  Unremarkable.  No significant stenosis.  No   dissection or occlusion.     NECK:    Bones joints:  No acute fracture.  No dislocation.    Soft tissues:  Unremarkable as visualized.  No mass.     CAROTID STENOSIS REFERENCE USING NASCET CRITERIA:    % ICA stenosis = (1 - narrowest ICA diameter diameter of distal   cervical ICA) x 100.    Mild - <50% stenosis.    Moderate - 50-69% stenosis.    Severe - 70-94% stenosis.    Near occlusion - 95-99% stenosis.    Occluded - 100% stenosis.    IMPRESSION:         No acute findings in the arteries of the neck.      Impression:          Electronically signed by Lakeisha Madden MD on 05-07-22 at 0424    CT Angiogram Head w AI Analysis of LVO [[id number]] Collected: 05/07/22 0423     Updated: 05/07/22 0423    Narrative:        Patient: NUBIA SCHILLING  Time Out: 04:22  Exam(s): CTA HEAD     EXAM:    CT Angiography Head With Intravenous Contrast    CLINICAL HISTORY:    Stroke.    TECHNIQUE:  AI analysis of LVO was utilized    Axial computed tomographic angiography images of the head with   intravenous contrast.  CTDI is 54.8 mGy and DLP is 1001.7 mGy-cm.  This   CT exam was performed according to the principle of ALARA (As Low As   Reasonably Achievable) by using one or more of the following dose   reduction techniques: automated exposure control, adjustment of the mA   and or kV according to patient size, and or use of iterative   reconstruction technique.    3D and MIP reconstructed images were created and reviewed.    COMPARISON:    No relevant prior studies available.    FINDINGS:    Right internal carotid artery:  Minimal atherosclerosis of the   intracranial portion of the internal carotid arteries bilaterally.  No   significant  stenosis.  No aneurysm.    Right anterior cerebral artery:  Unremarkable.  No occlusion or   significant stenosis.  No aneurysm.    Right middle cerebral artery:  Unremarkable.  No occlusion or   significant stenosis.  No aneurysm.    Right posterior cerebral artery:  Fetal origin of the right PCA, normal   variant.  No occlusion or significant stenosis.  No aneurysm.    Right vertebral artery:  Unremarkable as visualized.      Left internal carotid artery:  See above.    Left anterior cerebral artery:  Unremarkable.  No occlusion or   significant stenosis.  No aneurysm.    Left middle cerebral artery:  Unremarkable.  No occlusion or   significant stenosis.  No aneurysm.    Left posterior cerebral artery:  Unremarkable.  No occlusion or   significant stenosis.  No aneurysm.    Left vertebral artery:  Unremarkable as visualized.      Basilar artery:  Unremarkable.  No occlusion or significant stenosis.    No aneurysm.    Brain:  No intracranial hemorrhage, mass-effect or midline shift.    There is no abnormal extra axial fluid collection.  No evidence of acute   infarct.  Severe periventricular white matter hypodensities are most   consistent with chronic microangiopathy.  There is mild cortical atrophy.    IMPRESSION:         No acute findings in the arteries of the head brain.      Impression:          Electronically signed by Lakeisha Madden MD on 05-07-22 at 0422    XR Chest 1 View [[id number]] Collected: 05/07/22 0206     Updated: 05/07/22 0206    Narrative:        Patient: NUBIA SCHILLING  Time Out: 02:06  Exam(s): FILM CXR 1 VIEW     EXAM:    XR Chest, 1 View    CLINICAL HISTORY:     Reason for exam: soa.    TECHNIQUE:    Frontal view of the chest.    COMPARISON:    5 14 2018.    FINDINGS:    Lungs:  Calcified granuloma measuring 0.8 cm at the periphery of the   left midlung.    Pleural space:  Unremarkable.  No pneumothorax.    Heart:  Cardiomegaly.    Mediastinum:  Unremarkable.    Bones joints:   Osteopenia.    Soft tissues:  Soft tissues are unremarkable.    Vasculature:  Minimal atherosclerotic disease of the aortic knob.    Other findings:  Mild hypoaeration.    IMPRESSION:       1.  Mild cardiomegaly.  2.  Hypoaeration.  3.  Osteopenia.      Impression:          Electronically signed by Naseem Mccray MD on 05-07-22 at 0206              ECG 12 Lead   Preliminary Result   HEART RATE= 88  bpm   RR Interval= 680  ms   IL Interval= 152  ms   P Horizontal Axis= 15  deg   P Front Axis= 58  deg   QRSD Interval= 88  ms   QT Interval= 361  ms   QRS Axis= 27  deg   T Wave Axis= 6  deg   - NORMAL ECG -   Sinus rhythm   Electronically Signed By:    Date and Time of Study: 2022-05-07 01:59:47           Assessment/Plan     Active Hospital Problems    Diagnosis  POA   • **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       Ms. Martino is a 82 y.o. never smoker with a history of hypercholesterolemia, hyponatremia, hypothyroidism, hypertension, GERD who presents to [facility] ER with chief complaint of altered mental status, unable to speak, fatigue & admitted for fluent aphasia.      Fluent aphasia: Doubtful acute neurological event given unremarkable CTA head and neck findings and no lateralizing features on exam.  Neurology following recommend MRI brain; however, patient refusing due to claustrophobia.  Recommend avoid sedatives.  Unremarkable TSH.  PT consult.  Urinalysis less concerning for UTI and patient denies urinary complaints.  Status post IV ceftriaxone once.  Monitor patient off antibiotics for now.      Hyponatremia /hypochloremia: Persistent despite reported compliance with sodium chloride 1 g p.o. 3 times daily.  Possibly contributing to #1.  Consult nephrology to advise further.  Resume sodium  chloride 1 g p.o. 3 times daily per home dose regimen for now.  A.m. labs.      Essential hypertension, benign: BP acceptable, hemodynamically stable.  Resume antihypertensive agents per home dose regimen.      Benzodiazepine dependence (HCC): Likely contributing to #1.  Consult psychiatry.      I discussed the patient's findings and my recommendations with Dr. Huber.     VTE Prophylaxis - SCDs.  Code Status - CPR       FIONA Godwin  [facility]  05/07/22  12:15 EDT

## 2022-05-08 LAB
ALBUMIN SERPL-MCNC: 4.3 G/DL (ref 3.5–5.2)
ANION GAP SERPL CALCULATED.3IONS-SCNC: 14 MMOL/L (ref 5–15)
BASOPHILS # BLD AUTO: 0.01 10*3/MM3 (ref 0–0.2)
BASOPHILS NFR BLD AUTO: 0.1 % (ref 0–1.5)
BUN SERPL-MCNC: 15 MG/DL (ref 8–23)
BUN/CREAT SERPL: 33.3 (ref 7–25)
CALCIUM SPEC-SCNC: 9.2 MG/DL (ref 8.6–10.5)
CHLORIDE SERPL-SCNC: 87 MMOL/L (ref 98–107)
CO2 SERPL-SCNC: 23 MMOL/L (ref 22–29)
CREAT SERPL-MCNC: 0.45 MG/DL (ref 0.57–1)
DEPRECATED RDW RBC AUTO: 36.7 FL (ref 37–54)
EGFRCR SERPLBLD CKD-EPI 2021: 96.2 ML/MIN/1.73
EOSINOPHIL # BLD AUTO: 0.03 10*3/MM3 (ref 0–0.4)
EOSINOPHIL NFR BLD AUTO: 0.4 % (ref 0.3–6.2)
ERYTHROCYTE [DISTWIDTH] IN BLOOD BY AUTOMATED COUNT: 11.4 % (ref 12.3–15.4)
GLUCOSE BLDC GLUCOMTR-MCNC: 103 MG/DL (ref 70–130)
GLUCOSE BLDC GLUCOMTR-MCNC: 113 MG/DL (ref 70–130)
GLUCOSE BLDC GLUCOMTR-MCNC: 113 MG/DL (ref 70–130)
GLUCOSE BLDC GLUCOMTR-MCNC: 114 MG/DL (ref 70–130)
GLUCOSE SERPL-MCNC: 122 MG/DL (ref 65–99)
HCT VFR BLD AUTO: 35.7 % (ref 34–46.6)
HGB BLD-MCNC: 13.2 G/DL (ref 12–15.9)
IMM GRANULOCYTES # BLD AUTO: 0.03 10*3/MM3 (ref 0–0.05)
IMM GRANULOCYTES NFR BLD AUTO: 0.4 % (ref 0–0.5)
LYMPHOCYTES # BLD AUTO: 0.58 10*3/MM3 (ref 0.7–3.1)
LYMPHOCYTES NFR BLD AUTO: 7.6 % (ref 19.6–45.3)
MAGNESIUM SERPL-MCNC: 1.9 MG/DL (ref 1.6–2.4)
MCH RBC QN AUTO: 32.6 PG (ref 26.6–33)
MCHC RBC AUTO-ENTMCNC: 37 G/DL (ref 31.5–35.7)
MCV RBC AUTO: 88.1 FL (ref 79–97)
MONOCYTES # BLD AUTO: 0.81 10*3/MM3 (ref 0.1–0.9)
MONOCYTES NFR BLD AUTO: 10.6 % (ref 5–12)
NEUTROPHILS NFR BLD AUTO: 6.16 10*3/MM3 (ref 1.7–7)
NEUTROPHILS NFR BLD AUTO: 80.9 % (ref 42.7–76)
NRBC BLD AUTO-RTO: 0 /100 WBC (ref 0–0.2)
PHOSPHATE SERPL-MCNC: 2.8 MG/DL (ref 2.5–4.5)
PLATELET # BLD AUTO: 291 10*3/MM3 (ref 140–450)
PMV BLD AUTO: 8.2 FL (ref 6–12)
POTASSIUM SERPL-SCNC: 2.8 MMOL/L (ref 3.5–5.2)
POTASSIUM SERPL-SCNC: 4.5 MMOL/L (ref 3.5–5.2)
RBC # BLD AUTO: 4.05 10*6/MM3 (ref 3.77–5.28)
SODIUM SERPL-SCNC: 124 MMOL/L (ref 136–145)
WBC NRBC COR # BLD: 7.62 10*3/MM3 (ref 3.4–10.8)

## 2022-05-08 PROCEDURE — 84132 ASSAY OF SERUM POTASSIUM: CPT | Performed by: INTERNAL MEDICINE

## 2022-05-08 PROCEDURE — 85025 COMPLETE CBC W/AUTO DIFF WBC: CPT | Performed by: NURSE PRACTITIONER

## 2022-05-08 PROCEDURE — 82962 GLUCOSE BLOOD TEST: CPT

## 2022-05-08 PROCEDURE — 83735 ASSAY OF MAGNESIUM: CPT | Performed by: HOSPITALIST

## 2022-05-08 PROCEDURE — 25010000002 CEFTRIAXONE PER 250 MG: Performed by: NURSE PRACTITIONER

## 2022-05-08 PROCEDURE — 80069 RENAL FUNCTION PANEL: CPT | Performed by: HOSPITALIST

## 2022-05-08 RX ORDER — POTASSIUM CHLORIDE 750 MG/1
40 TABLET, FILM COATED, EXTENDED RELEASE ORAL ONCE
Status: COMPLETED | OUTPATIENT
Start: 2022-05-08 | End: 2022-05-08

## 2022-05-08 RX ORDER — POTASSIUM CHLORIDE 750 MG/1
40 TABLET, FILM COATED, EXTENDED RELEASE ORAL AS NEEDED
Status: DISCONTINUED | OUTPATIENT
Start: 2022-05-08 | End: 2022-05-11 | Stop reason: HOSPADM

## 2022-05-08 RX ORDER — POTASSIUM CHLORIDE 7.45 MG/ML
10 INJECTION INTRAVENOUS
Status: DISCONTINUED | OUTPATIENT
Start: 2022-05-08 | End: 2022-05-11 | Stop reason: HOSPADM

## 2022-05-08 RX ORDER — POTASSIUM CHLORIDE 1.5 G/1.77G
40 POWDER, FOR SOLUTION ORAL AS NEEDED
Status: DISCONTINUED | OUTPATIENT
Start: 2022-05-08 | End: 2022-05-11 | Stop reason: HOSPADM

## 2022-05-08 RX ADMIN — ASPIRIN 325 MG: 325 TABLET ORAL at 11:21

## 2022-05-08 RX ADMIN — ALPRAZOLAM 0.25 MG: 0.25 TABLET ORAL at 21:03

## 2022-05-08 RX ADMIN — ATORVASTATIN CALCIUM 80 MG: 80 TABLET, FILM COATED ORAL at 21:03

## 2022-05-08 RX ADMIN — NYSTATIN: 100000 POWDER TOPICAL at 11:22

## 2022-05-08 RX ADMIN — SODIUM CHLORIDE TAB 1 GM 2 G: 1 TAB at 18:10

## 2022-05-08 RX ADMIN — SODIUM CHLORIDE TAB 1 GM 2 G: 1 TAB at 15:32

## 2022-05-08 RX ADMIN — LISINOPRIL 20 MG: 20 TABLET ORAL at 11:20

## 2022-05-08 RX ADMIN — POTASSIUM CHLORIDE 40 MEQ: 10 TABLET, EXTENDED RELEASE ORAL at 11:21

## 2022-05-08 RX ADMIN — SODIUM CHLORIDE TAB 1 GM 2 G: 1 TAB at 11:21

## 2022-05-08 RX ADMIN — KETOCONAZOLE 1 APPLICATION: 20 CREAM TOPICAL at 11:22

## 2022-05-08 RX ADMIN — LEVOTHYROXINE SODIUM 50 MCG: 0.05 TABLET ORAL at 05:14

## 2022-05-08 RX ADMIN — CEFTRIAXONE 1 G: 1 INJECTION, POWDER, FOR SOLUTION INTRAMUSCULAR; INTRAVENOUS at 15:33

## 2022-05-08 RX ADMIN — Medication 15 G: at 11:20

## 2022-05-08 RX ADMIN — ALPRAZOLAM 0.25 MG: 0.25 TABLET ORAL at 11:21

## 2022-05-08 RX ADMIN — POTASSIUM CHLORIDE 40 MEQ: 10 TABLET, EXTENDED RELEASE ORAL at 18:10

## 2022-05-08 RX ADMIN — Medication 10 ML: at 11:21

## 2022-05-08 RX ADMIN — Medication 30 G: at 21:03

## 2022-05-08 RX ADMIN — POTASSIUM CHLORIDE 40 MEQ: 10 TABLET, EXTENDED RELEASE ORAL at 15:33

## 2022-05-08 RX ADMIN — NYSTATIN: 100000 POWDER TOPICAL at 21:12

## 2022-05-08 NOTE — PROGRESS NOTES
[facility] Progress Note      Patient Name: Ritu Martino  : 1940  MRN: [id number]  Primary Care Physician:  Reyes, Miriam Mercado, MD  Date of admission: 2022    Subjective     Interval History:   The patient was seen and examined today for follow-up on  Hyponatremia   Patient more awake today.  Denies any nausea no vomiting.  No fever no chills.  She continues to feel weak.  She has very poor oral intake    Review of Systems:   As noted above    Objective     Vitals:   Temp:  [97.8 °F (36.6 °C)-98.6 °F (37 °C)] 98.6 °F (37 °C)  Heart Rate:  [74-98] 98  Resp:  [16-18] 16  BP: (150-171)/(76-89) 150/76    Intake/Output Summary (Last 24 hours) at 2022 1635  Last data filed at 2022 1445  Gross per 24 hour   Intake 130 ml   Output 540 ml   Net -410 ml       Physical Exam:    General Appearance: Ill looking, slow and weak  Skin: warm and dry  HEENT: oral mucosa normal, nonicteric sclera  Neck: supple, no JVD  Lungs: CTA  Heart: RRR, normal S1 and S2  Abdomen: soft, nontender, nondistended  : no palpable bladder  Extremities: No lower extremity edema, cyanosis or clubbing  Neuro: Slow speech.  Moves extremity very slowly    Scheduled Meds:     ALPRAZolam, 0.25 mg, Oral, BID  aspirin, 325 mg, Oral, Daily   Or  aspirin, 300 mg, Rectal, Daily  atorvastatin, 80 mg, Oral, Nightly  cefTRIAXone, 1 g, Intravenous, Q24H  ketoconazole, 1 application, Topical, Q24H  levothyroxine, 50 mcg, Oral, Q AM  lisinopril, 20 mg, Oral, Q24H  nystatin, , Topical, Q12H  sodium chloride, 10 mL, Intravenous, Q12H  sodium chloride, 2 g, Oral, TID With Meals  Urea, 15 g, Oral, BID      IV Meds:        Results Reviewed:   I have personally reviewed the results from the time of this admission to 2022 16:35 EDT     Results from last 7 days   Lab Units 22  0614 22  1512 22  0149   SODIUM mmol/L 124* 124* 122*   POTASSIUM mmol/L 2.8*  --  3.4*   CHLORIDE mmol/L 87*  --  85*    CO2 mmol/L 23.0  --  23.6   BUN mg/dL 15  --  7*   CREATININE mg/dL 0.45*  --  0.63   CALCIUM mg/dL 9.2  --  9.4   BILIRUBIN mg/dL  --   --  1.0   ALK PHOS U/L  --   --  57   ALT (SGPT) U/L  --   --  19   AST (SGOT) U/L  --   --  28   GLUCOSE mg/dL 122*  --  101*       Estimated Creatinine Clearance: 76.8 mL/min (A) (by C-G formula based on SCr of 0.45 mg/dL (L)).    Results from last 7 days   Lab Units 05/08/22  0614 05/07/22  0149   MAGNESIUM mg/dL 1.9 1.5*   PHOSPHORUS mg/dL 2.8  --              Results from last 7 days   Lab Units 05/08/22  0614 05/07/22  0149   WBC 10*3/mm3 7.62 7.18   HEMOGLOBIN g/dL 13.2 12.8   PLATELETS 10*3/mm3 291 294       Results from last 7 days   Lab Units 05/07/22  0149   INR  0.87*       Assessment / Plan     ASSESSMENT:  Hyponatremia euvolemic: Mildly symptomatic.  Chronic issue for patient with acute exacerbation.  Sodium on presentation was 122. 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  She denies any recent NSAID use. She does have a history of hypothyroidism but her last TSH is at 1.79.        sodium is slightly up to 124 despite fluid restriction and upping her salt tablet to 2       g 3 times daily.         Hypertension blood pressure is uncontrolled  Hypomagnesemia improved   Aphasia likely secondary to hyponatremia improved followed by neurology  5. Metabolic encephalopathy likely secondary to hyponatremia improving.  6.  Hypokalemia           PLAN:  Sodium mildly improving and patient continues to have very poor oral intake.  We will add urea tablets and continue salt tablets 2 g p.o. 3 times daily.  Continue fluid restriction at 1000 cc per 24 hours  Avoid hypotonic fluids.  Replace potassium and magnesium as needed  Surveillance labs       Thank you for  involving us in the care of Ritu Martino.  Please feel free to call with any questions.    Al Lagos MD  05/08/22  16:35 EDT    [facility]  [phone]      Much of this encounter note is an electronic transcription/translation of spoken language to printed text. The electronic translation of spoken language may permit erroneous, or at times, nonsensical words or phrases to be inadvertently transcribed; Although I have reviewed the note for such errors, some may still exist.

## 2022-05-08 NOTE — PROGRESS NOTES
Name: Ritu Martino ADMIT: 2022   : 1940  PCP: Reyes, Miriam Mercado, MD    MRN: [id number] LOS: 1 days   AGE/SEX: 82 y.o. female  ROOM: [facility]     Subjective   Subjective   Patient appears more alert today.  Anxious at times.  Wants to leave the hospital.   at bedside--provides redirection.  Tolerating p.o.  Reportedly slept overnight.    Review of Systems   Constitutional: Negative for chills and fever.   Respiratory: Negative for cough and shortness of breath.    Cardiovascular: Negative for chest pain and leg swelling.   Gastrointestinal: Negative for constipation, diarrhea, nausea and vomiting.   Genitourinary: Negative for difficulty urinating and dysuria.   Musculoskeletal: Positive for gait problem (2/2 generalized weakness).   Neurological: Positive for weakness (Generalized). Negative for light-headedness.   Psychiatric/Behavioral: Positive for confusion (Forgot events of yesterday). Negative for hallucinations. The patient is nervous/anxious.         Objective   Objective   Vital Signs  Temp:  [97.8 °F (36.6 °C)-98.6 °F (37 °C)] 98.6 °F (37 °C)  Heart Rate:  [74-98] 98  Resp:  [16-18] 16  BP: (150-171)/(76-89) 150/76  SpO2:  [96 %-97 %] 96 %  on   ;   Device (Oxygen Therapy): room air  Body mass index is 24.97 kg/m².     Physical Exam   Constitutional:       General: She is not in acute distress.     Appearance: She is not toxic-appearing.      Comments: generally weak   Cardiovascular:      Rate and Rhythm: Normal rate.      Heart sounds: Normal heart sounds.   Pulmonary:      Effort: Pulmonary effort is normal.      Breath sounds: Normal breath sounds.   Abdominal:      General: Bowel sounds are normal.      Palpations: Abdomen is soft.   Neurological:      Mental Status: She is alert and oriented to person, place, and time.      Cranial Nerves: No cranial nerve deficit.      Motor: Weakness (Generalized) present.   Psychiatric:         Thought Content: Thought content normal.       Comments: Anxious at times.    Results Review     I reviewed the patient's new clinical results.  Results from last 7 days   Lab Units 05/08/22  0614 05/07/22  0149   WBC 10*3/mm3 7.62 7.18   HEMOGLOBIN g/dL 13.2 12.8   PLATELETS 10*3/mm3 291 294     Results from last 7 days   Lab Units 05/08/22  0614 05/07/22  1512 05/07/22  0149   SODIUM mmol/L 124* 124* 122*   POTASSIUM mmol/L 2.8*  --  3.4*   CHLORIDE mmol/L 87*  --  85*   CO2 mmol/L 23.0  --  23.6   BUN mg/dL 15  --  7*   CREATININE mg/dL 0.45*  --  0.63   GLUCOSE mg/dL 122*  --  101*   EGFR mL/min/1.73 96.2  --  88.7     Results from last 7 days   Lab Units 05/08/22  0614 05/07/22  0149   ALBUMIN g/dL 4.30 4.50   BILIRUBIN mg/dL  --  1.0   ALK PHOS U/L  --  57   AST (SGOT) U/L  --  28   ALT (SGPT) U/L  --  19     Results from last 7 days   Lab Units 05/08/22  0614 05/07/22  0149   CALCIUM mg/dL 9.2 9.4   ALBUMIN g/dL 4.30 4.50   MAGNESIUM mg/dL 1.9 1.5*   PHOSPHORUS mg/dL 2.8  --        Hemoglobin A1C   Date/Time Value Ref Range Status   05/07/2022 0911 5.00 4.80 - 5.60 % Final     Glucose   Date/Time Value Ref Range Status   05/08/2022 1106 113 70 - 130 mg/dL Final     Comment:     Meter: [id number] : [id number] Todorcas Bourbon NA   05/08/2022 0619 114 70 - 130 mg/dL Final     Comment:     Meter: [id number] : [id number] Gabrieleme Sibite NA   05/07/2022 2118 129 70 - 130 mg/dL Final     Comment:     Meter: [id number] : [id number] Kpintchame Sibite NA   05/07/2022 1548 111 70 - 130 mg/dL Final     Comment:     Meter: [id number] : [id number] Todorovic Bourbon NA   05/07/2022 1100 150 (H) 70 - 130 mg/dL Final     Comment:     Meter: [id number] : [id number] Alida BENTON   05/07/2022 0152 97 70 - 130 mg/dL Final     Comment:     Meter: [id number] : [id number] Marianne Jovel RN       CT Angiogram Neck    Result Date: 5/7/2022  Electronically signed by Lakeisha Madden MD on 05-07-22 at 0424    XR Chest 1 View    Result Date:  5/7/2022  Electronically signed by Naseem Mccray MD on 05-07-22 at 0206    CT Angiogram Head w AI Analysis of LVO    Result Date: 5/7/2022  Electronically signed by Lakeisha Madden MD on 05-07-22 at 0422    Scheduled Medications  ALPRAZolam, 0.25 mg, Oral, BID  aspirin, 325 mg, Oral, Daily   Or  aspirin, 300 mg, Rectal, Daily  atorvastatin, 80 mg, Oral, Nightly  cefTRIAXone, 1 g, Intravenous, Q24H  ketoconazole, 1 application, Topical, Q24H  levothyroxine, 50 mcg, Oral, Q AM  lisinopril, 20 mg, Oral, Q24H  nystatin, , Topical, Q12H  sodium chloride, 10 mL, Intravenous, Q12H  sodium chloride, 2 g, Oral, TID With Meals  Urea, 15 g, Oral, BID    Infusions   Diet  Diet Regular; Daily Fluid Restriction; 1000 mL Fluid Per Day       Assessment/Plan     Active Hospital Problems    Diagnosis  POA   • **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       82 y.o. female admitted with Fluent aphasia.      Fluent aphasia: Doubtful acute neurological event given unremarkable CTA head and neck findings and no lateralizing features on exam.  Neurology following recommend no further neuro work-up at this time given MRI brain is low yield.  Recommend avoid sedatives.  Unremarkable TSH.  PT /  OT following recommend home with HHPT / OT.  Urinalysis suspicious for UTI & plan to continue empiric IV ceftriaxone now given increasing neutrophil % 80.       Hyponatremia /hypochloremia: Persistent despite reported compliance with sodium chloride 1 g p.o. 3 times daily.  Serum sodium slightly better today. Possibly contributing to #1.  Nephrology following plan fluid restriction at 1000 cc per 24 hours.  Resume sodium chloride 1 g p.o. 3 times daily per home dose regimen for now.  A.m. labs.       Essential hypertension, benign: BP  acceptable, hemodynamically stable.  continue current antihypertensive agents.       Benzodiazepine dependence (HCC): Likely contributing to #1.  Psychiatry consulted.  Continue Xanax per home regimen to avoid withdrawal.    SCDs for DVT prophylaxis.  Full code.  Discussed with patient, family, & RN.   Anticipate discharge hopeful discharge home in next 1-2 days.      FIONA Godwin  [facility]  05/08/22  14:21 EDT

## 2022-05-08 NOTE — PLAN OF CARE
Problem: Fall Injury Risk  Goal: Absence of Fall and Fall-Related Injury  Outcome: Ongoing, Progressing  Intervention: Identify and Manage Contributors  Description: Develop a fall prevention plan with the patient and caregiver/family.  Provide reorientation, appropriate sensory stimulation and routines with changes in mental status to decrease risk of fall.  Promote use of personal vision and auditory aids.  Assess assistance level required for safe and effective self-care; provide support as needed, such as toileting, mobilization. For age 65 and older, implement timed toileting with assistance.  Encourage physical activity, such as performance of mobility and self-care at highest level of patient ability, multicomponent exercise program and provision of appropriate assistive devices.  If fall occurs, assess the severity of injury; implement fall injury protocol. Determine the cause and revise fall injury prevention plan.  Regularly review medication contribution to fall risk; adjust medication administration times to minimize risk of falling.  Consider risk related to polypharmacy and age.  Balance adequate pain management with potential for oversedation.  Recent Flowsheet Documentation  Taken 5/8/2022 0200 by Esperanza Monsalve RN  Medication Review/Management: medications reviewed  Taken 5/8/2022 0005 by Esperanza Monsalve, RN  Medication Review/Management: medications reviewed  Intervention: Promote Injury-Free Environment  Description: Provide a safe, barrier-free environment that encourages independent activity.  Keep care area uncluttered and well-lighted.  Determine need for increased observation or monitoring.  Avoid use of devices that minimize mobility, such as restraints or indwelling urinary catheter.  Recent Flowsheet Documentation  Taken 5/8/2022 0200 by Esperanza Monsalve, RN  Safety Promotion/Fall Prevention:   assistive device/personal items within reach   clutter free environment maintained   fall  prevention program maintained   nonskid shoes/slippers when out of bed   room organization consistent   safety round/check completed  Taken 5/8/2022 0005 by Esperanza Monsalve RN  Safety Promotion/Fall Prevention:   activity supervised   assistive device/personal items within reach   clutter free environment maintained   fall prevention program maintained   nonskid shoes/slippers when out of bed   room organization consistent   safety round/check completed   lighting adjusted     Problem: Adult Inpatient Plan of Care  Goal: Plan of Care Review  Outcome: Ongoing, Progressing  Flowsheets (Taken 5/8/2022 0248)  Plan of Care Reviewed With: patient  Outcome Evaluation: VITALS AS DOCUMENTED. SBA TO AMBULATE TO BR. STEADY GAIT NOTED. SOME ANXIETY, IMPULSIVENESS NOTED. FORGETFUL. BED ALARM ACTIVATED FOR SAFETY. CALL LIGHT IN REACH. NO S/SX OF DISTRESS. DENIES PAIN, CP OR SOA. SINUS ON MONITOR. RA. PURE WICK PLACED PER REQUEST R/T INCONT. OF URINE.  Goal: Patient-Specific Goal (Individualized)  Outcome: Ongoing, Progressing  Goal: Absence of Hospital-Acquired Illness or Injury  Outcome: Ongoing, Progressing  Intervention: Identify and Manage Fall Risk  Description: Perform standard risk assessment on admission using a validated tool or comprehensive approach appropriate to the patient; reassess fall risk frequently, with change in status or transfer to another level of care.  Communicate fall injury risk to interprofessional healthcare team.  Determine need for increased observation, equipment and environmental modification, such as low bed, signage and supportive, nonskid footwear.  Adjust safety measures to individual developmental age, stage and identified risk factors.  Reinforce the importance of safety and physical activity with patient and family.  Perform regular intentional rounding to assess need for position change, pain assessment and personal needs, including assistance with toileting.  Recent Flowsheet  Documentation  Taken 5/8/2022 0200 by Esperanza Monsalve RN  Safety Promotion/Fall Prevention:   assistive device/personal items within reach   clutter free environment maintained   fall prevention program maintained   nonskid shoes/slippers when out of bed   room organization consistent   safety round/check completed  Taken 5/8/2022 0005 by Esperanza Monsalve RN  Safety Promotion/Fall Prevention:   activity supervised   assistive device/personal items within reach   clutter free environment maintained   fall prevention program maintained   nonskid shoes/slippers when out of bed   room organization consistent   safety round/check completed   lighting adjusted  Intervention: Prevent Skin Injury  Description: Perform a screening for skin injury risk, such as pressure or moisture associated skin damage on admission and at regular intervals throughout hospital stay.  Keep all areas of skin (especially folds) clean and dry.  Maintain adequate skin hydration.  Relieve and redistribute pressure and protect bony prominences; implement measures based on patient-specific risk factors.  Match turning and repositioning schedule to clinical condition.  Encourage weight shift frequently; assist with reposition if unable to complete independently.  Float heels off bed; avoid pressure on the Achilles tendon.  Keep skin free from extended contact with medical devices.  Encourage functional activity and mobility, as early as tolerated.  Use aids (e.g., slide boards, mechanical lift) during transfer.  Recent Flowsheet Documentation  Taken 5/8/2022 0200 by Esperanza Monsalve, RN  Body Position:   position changed independently   left   side-lying   neutral body alignment   neutral head position  Taken 5/8/2022 0005 by Esperanza Monsalve RN  Body Position:   position changed independently   neutral body alignment   neutral head position   supine   legs elevated  Skin Protection:   adhesive use limited   incontinence pads utilized   tubing/devices  free from skin contact   skin sealant/moisture barrier applied  Intervention: Prevent and Manage VTE (Venous Thromboembolism) Risk  Description: Assess for VTE (venous thromboembolism) risk.  Encourage and assist with early ambulation.  Initiate and maintain compression or other therapy, as indicated, based on identified risk in accordance with organizational protocol and provider order.  Encourage both active and passive leg exercises while in bed, if unable to ambulate.  Recent Flowsheet Documentation  Taken 5/8/2022 0200 by Esperanza Monsalve RN  Activity Management: activity adjusted per tolerance  Taken 5/8/2022 0005 by Esperanza Monsalve RN  Activity Management:   activity adjusted per tolerance   ambulated to bathroom  VTE Prevention/Management:   bilateral   dorsiflexion/plantar flexion performed  Intervention: Prevent Infection  Description: Maintain skin and mucous membrane integrity; promote hand, oral and pulmonary hygiene.  Optimize fluid balance, nutrition, sleep and glycemic control to maximize infection resistance.  Identify potential sources of infection early to prevent or mitigate progression of infection (e.g., wound, lines, devices).  Evaluate ongoing need for invasive devices; remove promptly when no longer indicated.  Recent Flowsheet Documentation  Taken 5/8/2022 0200 by Esperanza Monsalve, RN  Infection Prevention:   environmental surveillance performed   hand hygiene promoted   equipment surfaces disinfected   personal protective equipment utilized   rest/sleep promoted   single patient room provided   visitors restricted/screened  Taken 5/8/2022 0005 by Esperanza Monsalve RN  Infection Prevention:   environmental surveillance performed   equipment surfaces disinfected   hand hygiene promoted   personal protective equipment utilized   rest/sleep promoted   single patient room provided   visitors restricted/screened  Goal: Optimal Comfort and Wellbeing  Outcome: Ongoing, Progressing  Intervention:  Provide Person-Centered Care  Description: Use a family-focused approach to care.  Develop trust and rapport by proactively providing information, encouraging questions, addressing concerns and offering reassurance.  Acknowledge emotional response to hospitalization.  Recognize and utilize personal coping strategies.  Honor spiritual and cultural preferences.  Recent Flowsheet Documentation  Taken 5/8/2022 0005 by Esperanza Monsalve RN  Trust Relationship/Rapport:   care explained   choices provided   emotional support provided   empathic listening provided   questions answered   questions encouraged   reassurance provided   thoughts/feelings acknowledged  Goal: Readiness for Transition of Care  Outcome: Ongoing, Progressing     Problem: Skin Injury Risk Increased  Goal: Skin Health and Integrity  Outcome: Ongoing, Progressing  Intervention: Optimize Skin Protection  Description: Perform a full pressure injury risk assessment, as indicated by screening, upon admission to care unit.  Reassess skin (injury risk, full inspection) frequently (e.g., scheduled interval, with change in condition) to provide optimal early detection and prevention.  Maintain adequate tissue perfusion (e.g., encourage fluid balance; avoid crossing legs, constrictive clothing or devices) to promote tissue oxygenation.  Maintain head of bed at lowest degree of elevation tolerated, considering medical condition and other restrictions.  Avoid positioning onto an area that remains reddened.  Minimize incontinence and moisture (e.g., toileting schedule; moisture-wicking pad, diaper or incontinence collection device; skin moisture barrier).  Cleanse skin promptly and gently when soiled utilizing a pH-balanced cleanser.  Relieve and redistribute pressure (e.g., scheduled position changes, weight shifts, use of support surface, medical device repositioning, protective dressing application, use of positioning device, microclimate control, use of  pressure-injury-monitor  Encourage increased activity, such as sitting in a chair at the bedside or early mobilization, when able to tolerate.  Recent Flowsheet Documentation  Taken 5/8/2022 0200 by Esperanza Monsalve, RN  Head of Bed (HOB) Positioning: HOB at 20-30 degrees  Taken 5/8/2022 0005 by Esperanza Monsalve, RN  Pressure Reduction Techniques: frequent weight shift encouraged  Head of Bed (HOB) Positioning: HOB at 30 degrees  Pressure Reduction Devices: alternating pressure pump (ADD)  Skin Protection:   adhesive use limited   incontinence pads utilized   tubing/devices free from skin contact   skin sealant/moisture barrier applied     Problem: Electrolyte Imbalance  Goal: Electrolyte Balance  Outcome: Ongoing, Progressing   Goal Outcome Evaluation:  Plan of Care Reviewed With: patient           Outcome Evaluation: VITALS AS DOCUMENTED. SBA TO AMBULATE TO BR. STEADY GAIT NOTED. SOME ANXIETY, IMPULSIVENESS NOTED. FORGETFUL. BED ALARM ACTIVATED FOR SAFETY. CALL LIGHT IN REACH. NO S/SX OF DISTRESS. DENIES PAIN, CP OR SOA. SINUS ON MONITOR. RA. PURE WICK PLACED PER REQUEST R/T INCONT. OF URINE.

## 2022-05-09 ENCOUNTER — APPOINTMENT (OUTPATIENT)
Dept: MRI IMAGING | Facility: HOSPITAL | Age: 82
End: 2022-05-09

## 2022-05-09 ENCOUNTER — APPOINTMENT (OUTPATIENT)
Dept: CT IMAGING | Facility: HOSPITAL | Age: 82
End: 2022-05-09

## 2022-05-09 LAB
ALBUMIN SERPL-MCNC: 4.6 G/DL (ref 3.5–5.2)
ALBUMIN/GLOB SERPL: 1.6 G/DL
ALP SERPL-CCNC: 61 U/L (ref 39–117)
ALT SERPL W P-5'-P-CCNC: 26 U/L (ref 1–33)
ANION GAP SERPL CALCULATED.3IONS-SCNC: 16.5 MMOL/L (ref 5–15)
ARTERIAL PATENCY WRIST A: POSITIVE
AST SERPL-CCNC: 20 U/L (ref 1–32)
ATMOSPHERIC PRESS: 748.1 MMHG
BASE EXCESS BLDA CALC-SCNC: -1.4 MMOL/L (ref 0–2)
BASOPHILS # BLD AUTO: 0.01 10*3/MM3 (ref 0–0.2)
BASOPHILS NFR BLD AUTO: 0.1 % (ref 0–1.5)
BDY SITE: ABNORMAL
BILIRUB SERPL-MCNC: 0.9 MG/DL (ref 0–1.2)
BILIRUB UR QL STRIP: NEGATIVE
BUN SERPL-MCNC: 36 MG/DL (ref 8–23)
BUN/CREAT SERPL: 65.5 (ref 7–25)
CALCIUM SPEC-SCNC: 9.8 MG/DL (ref 8.6–10.5)
CHLORIDE SERPL-SCNC: 97 MMOL/L (ref 98–107)
CLARITY UR: CLEAR
CO2 SERPL-SCNC: 16.5 MMOL/L (ref 22–29)
COLOR UR: YELLOW
CREAT SERPL-MCNC: 0.55 MG/DL (ref 0.57–1)
DEPRECATED RDW RBC AUTO: 40.1 FL (ref 37–54)
EGFRCR SERPLBLD CKD-EPI 2021: 91.6 ML/MIN/1.73
EOSINOPHIL # BLD AUTO: 0.02 10*3/MM3 (ref 0–0.4)
EOSINOPHIL NFR BLD AUTO: 0.3 % (ref 0.3–6.2)
ERYTHROCYTE [DISTWIDTH] IN BLOOD BY AUTOMATED COUNT: 12 % (ref 12.3–15.4)
GLOBULIN UR ELPH-MCNC: 2.9 GM/DL
GLUCOSE BLDC GLUCOMTR-MCNC: 100 MG/DL (ref 70–130)
GLUCOSE BLDC GLUCOMTR-MCNC: 126 MG/DL (ref 70–130)
GLUCOSE BLDC GLUCOMTR-MCNC: 131 MG/DL (ref 70–130)
GLUCOSE BLDC GLUCOMTR-MCNC: 147 MG/DL (ref 70–130)
GLUCOSE BLDC GLUCOMTR-MCNC: 95 MG/DL (ref 70–130)
GLUCOSE BLDC GLUCOMTR-MCNC: 98 MG/DL (ref 70–130)
GLUCOSE SERPL-MCNC: 129 MG/DL (ref 65–99)
GLUCOSE UR STRIP-MCNC: NEGATIVE MG/DL
HCO3 BLDA-SCNC: 18.6 MMOL/L (ref 22–28)
HCT VFR BLD AUTO: 40 % (ref 34–46.6)
HGB BLD-MCNC: 14.4 G/DL (ref 12–15.9)
HGB UR QL STRIP.AUTO: NEGATIVE
IMM GRANULOCYTES # BLD AUTO: 0.02 10*3/MM3 (ref 0–0.05)
IMM GRANULOCYTES NFR BLD AUTO: 0.3 % (ref 0–0.5)
KETONES UR QL STRIP: ABNORMAL
LEUKOCYTE ESTERASE UR QL STRIP.AUTO: NEGATIVE
LYMPHOCYTES # BLD AUTO: 0.66 10*3/MM3 (ref 0.7–3.1)
LYMPHOCYTES NFR BLD AUTO: 8.4 % (ref 19.6–45.3)
MAGNESIUM SERPL-MCNC: 1.8 MG/DL (ref 1.6–2.4)
MCH RBC QN AUTO: 32.9 PG (ref 26.6–33)
MCHC RBC AUTO-ENTMCNC: 36 G/DL (ref 31.5–35.7)
MCV RBC AUTO: 91.3 FL (ref 79–97)
MODALITY: ABNORMAL
MONOCYTES # BLD AUTO: 0.77 10*3/MM3 (ref 0.1–0.9)
MONOCYTES NFR BLD AUTO: 9.8 % (ref 5–12)
NEUTROPHILS NFR BLD AUTO: 6.38 10*3/MM3 (ref 1.7–7)
NEUTROPHILS NFR BLD AUTO: 81.1 % (ref 42.7–76)
NITRITE UR QL STRIP: NEGATIVE
NRBC BLD AUTO-RTO: 0 /100 WBC (ref 0–0.2)
PCO2 BLDA: 20.6 MM HG (ref 35–45)
PH BLDA: 7.56 PH UNITS (ref 7.35–7.45)
PH UR STRIP.AUTO: 7.5 [PH] (ref 5–8)
PHOSPHATE SERPL-MCNC: 1.5 MG/DL (ref 2.5–4.5)
PHOSPHATE SERPL-MCNC: 3.4 MG/DL (ref 2.5–4.5)
PLATELET # BLD AUTO: 321 10*3/MM3 (ref 140–450)
PMV BLD AUTO: 8.2 FL (ref 6–12)
PO2 BLDA: 61 MM HG (ref 80–100)
POTASSIUM SERPL-SCNC: 4 MMOL/L (ref 3.5–5.2)
PROT SERPL-MCNC: 7.5 G/DL (ref 6–8.5)
PROT UR QL STRIP: ABNORMAL
RBC # BLD AUTO: 4.38 10*6/MM3 (ref 3.77–5.28)
SAO2 % BLDCOA: 94.8 % (ref 92–99)
SODIUM SERPL-SCNC: 130 MMOL/L (ref 136–145)
SP GR UR STRIP: 1.02 (ref 1–1.03)
TOTAL RATE: 17 BREATHS/MINUTE
UROBILINOGEN UR QL STRIP: ABNORMAL
WBC NRBC COR # BLD: 7.86 10*3/MM3 (ref 3.4–10.8)

## 2022-05-09 PROCEDURE — 82962 GLUCOSE BLOOD TEST: CPT

## 2022-05-09 PROCEDURE — 0 IOPAMIDOL PER 1 ML: Performed by: INTERNAL MEDICINE

## 2022-05-09 PROCEDURE — 25010000002 LORAZEPAM PER 2 MG: Performed by: STUDENT IN AN ORGANIZED HEALTH CARE EDUCATION/TRAINING PROGRAM

## 2022-05-09 PROCEDURE — 0042T HC CT CEREBRAL PERFUSION W/WO CONTRAST: CPT

## 2022-05-09 PROCEDURE — 70496 CT ANGIOGRAPHY HEAD: CPT

## 2022-05-09 PROCEDURE — 81003 URINALYSIS AUTO W/O SCOPE: CPT | Performed by: HOSPITALIST

## 2022-05-09 PROCEDURE — 97116 GAIT TRAINING THERAPY: CPT

## 2022-05-09 PROCEDURE — 83735 ASSAY OF MAGNESIUM: CPT | Performed by: HOSPITALIST

## 2022-05-09 PROCEDURE — 85025 COMPLETE CBC W/AUTO DIFF WBC: CPT | Performed by: NURSE PRACTITIONER

## 2022-05-09 PROCEDURE — A9577 INJ MULTIHANCE: HCPCS | Performed by: INTERNAL MEDICINE

## 2022-05-09 PROCEDURE — 25010000002 LORAZEPAM PER 2 MG: Performed by: PSYCHIATRY & NEUROLOGY

## 2022-05-09 PROCEDURE — 70553 MRI BRAIN STEM W/O & W/DYE: CPT

## 2022-05-09 PROCEDURE — 0 GADOBENATE DIMEGLUMINE 529 MG/ML SOLUTION: Performed by: INTERNAL MEDICINE

## 2022-05-09 PROCEDURE — 80053 COMPREHEN METABOLIC PANEL: CPT | Performed by: NURSE PRACTITIONER

## 2022-05-09 PROCEDURE — 36600 WITHDRAWAL OF ARTERIAL BLOOD: CPT

## 2022-05-09 PROCEDURE — 25010000002 CEFTRIAXONE PER 250 MG: Performed by: NURSE PRACTITIONER

## 2022-05-09 PROCEDURE — 84100 ASSAY OF PHOSPHORUS: CPT | Performed by: HOSPITALIST

## 2022-05-09 PROCEDURE — 70498 CT ANGIOGRAPHY NECK: CPT

## 2022-05-09 PROCEDURE — 99222 1ST HOSP IP/OBS MODERATE 55: CPT | Performed by: PSYCHIATRY & NEUROLOGY

## 2022-05-09 PROCEDURE — 84100 ASSAY OF PHOSPHORUS: CPT | Performed by: INTERNAL MEDICINE

## 2022-05-09 PROCEDURE — 82803 BLOOD GASES ANY COMBINATION: CPT

## 2022-05-09 RX ORDER — LORAZEPAM 2 MG/ML
1 INJECTION INTRAMUSCULAR EVERY 4 HOURS PRN
Status: DISCONTINUED | OUTPATIENT
Start: 2022-05-09 | End: 2022-05-11 | Stop reason: HOSPADM

## 2022-05-09 RX ORDER — LORAZEPAM 2 MG/ML
2 INJECTION INTRAMUSCULAR ONCE AS NEEDED
Status: COMPLETED | OUTPATIENT
Start: 2022-05-09 | End: 2022-05-09

## 2022-05-09 RX ADMIN — Medication 10 ML: at 08:27

## 2022-05-09 RX ADMIN — Medication 2 PACKET: at 06:15

## 2022-05-09 RX ADMIN — LORAZEPAM 2 MG: 2 INJECTION INTRAMUSCULAR; INTRAVENOUS at 21:19

## 2022-05-09 RX ADMIN — LEVOTHYROXINE SODIUM 50 MCG: 0.05 TABLET ORAL at 07:40

## 2022-05-09 RX ADMIN — SODIUM CHLORIDE TAB 1 GM 2 G: 1 TAB at 17:54

## 2022-05-09 RX ADMIN — KETOCONAZOLE 1 APPLICATION: 20 CREAM TOPICAL at 08:30

## 2022-05-09 RX ADMIN — GADOBENATE DIMEGLUMINE 11 ML: 529 INJECTION, SOLUTION INTRAVENOUS at 22:14

## 2022-05-09 RX ADMIN — NYSTATIN: 100000 POWDER TOPICAL at 20:53

## 2022-05-09 RX ADMIN — ATORVASTATIN CALCIUM 80 MG: 80 TABLET, FILM COATED ORAL at 20:52

## 2022-05-09 RX ADMIN — ALPRAZOLAM 0.25 MG: 0.25 TABLET ORAL at 20:52

## 2022-05-09 RX ADMIN — Medication 10 ML: at 20:53

## 2022-05-09 RX ADMIN — IOPAMIDOL 150 ML: 755 INJECTION, SOLUTION INTRAVENOUS at 10:09

## 2022-05-09 RX ADMIN — CEFTRIAXONE 1 G: 1 INJECTION, POWDER, FOR SOLUTION INTRAMUSCULAR; INTRAVENOUS at 14:46

## 2022-05-09 RX ADMIN — LORAZEPAM 1 MG: 2 INJECTION INTRAMUSCULAR; INTRAVENOUS at 09:56

## 2022-05-09 NOTE — PROGRESS NOTES
Name: Ritu Martino ADMIT: 2022   : 1940  PCP: Reyes, Miriam Mercado, MD    MRN: [id number] LOS: 2 days   AGE/SEX: 82 y.o. female  ROOM: [facility]     Subjective   Subjective   Patient was found unresponsive at the time of my evaluation, rapid response was called, code neuro.       Objective   Objective   Vital Signs  Temp:  [97 °F (36.1 °C)-99 °F (37.2 °C)] 97 °F (36.1 °C)  Heart Rate:  [] 112  Resp:  [16-22] 22  BP: (139-190)/() 146/90  SpO2:  [95 %-100 %] 98 %  on   ;   Device (Oxygen Therapy): room air  Body mass index is 24.97 kg/m².  Physical Exam  General, altered mental status  Head and ENT, normocephalic and atraumatic.  Lungs, symmetric expansion, equal air entry bilaterally.  Heart, regular rate and rhythm.  Abdomen, soft and nontender.  Extremities, no clubbing or cyanosis.  Neuro, unable to fully evaluate.  Clinically appears to be seizing  Skin: Warm and no rash.  Psych, unable to fully evaluate  Musculoskeletal, joint examination is grossly normal.    Results Review     I reviewed the patient's new clinical results.  Results from last 7 days   Lab Units 22  0455 22  0614 22  0149   WBC 10*3/mm3 7.86 7.62 7.18   HEMOGLOBIN g/dL 14.4 13.2 12.8   PLATELETS 10*3/mm3 321 291 294     Results from last 7 days   Lab Units 22  0455 22  2320 22  0614 22  1512 22  0149   SODIUM mmol/L 130*  --  124* 124* 122*   POTASSIUM mmol/L 4.0 4.5 2.8*  --  3.4*   CHLORIDE mmol/L 97*  --  87*  --  85*   CO2 mmol/L 16.5*  --  23.0  --  23.6   BUN mg/dL 36*  --  15  --  7*   CREATININE mg/dL 0.55*  --  0.45*  --  0.63   GLUCOSE mg/dL 129*  --  122*  --  101*   EGFR mL/min/1.73 91.6  --  96.2  --  88.7     Results from last 7 days   Lab Units 22  0455 22  0614 22  0149   ALBUMIN g/dL 4.60 4.30 4.50   BILIRUBIN mg/dL 0.9  --  1.0   ALK PHOS U/L 61  --  57   AST (SGOT) U/L 20  --  28   ALT (SGPT) U/L 26  --  19     Results from last 7  days   Lab Units 05/09/22  1245 05/09/22  0455 05/08/22  0614 05/07/22  0149   CALCIUM mg/dL  --  9.8 9.2 9.4   ALBUMIN g/dL  --  4.60 4.30 4.50   MAGNESIUM mg/dL  --  1.8 1.9 1.5*   PHOSPHORUS mg/dL 3.4 1.5* 2.8  --        Hemoglobin A1C   Date/Time Value Ref Range Status   05/07/2022 0911 5.00 4.80 - 5.60 % Final     Glucose   Date/Time Value Ref Range Status   05/09/2022 1102 98 70 - 130 mg/dL Final     Comment:     Meter: [id number] : [id number] Eric [place] CNA   05/09/2022 0931 126 70 - 130 mg/dL Final     Comment:     Meter: [id number] : [id number] Hill Mcmullen RN   05/09/2022 0610 131 (H) 70 - 130 mg/dL Final     Comment:     Meter: [id number] : [id number] Anthony BENTON   05/09/2022 0428 147 (H) 70 - 130 mg/dL Final     Comment:     Meter: [id number] : [id number] Jair Lopes RN   05/08/2022 2233 113 70 - 130 mg/dL Final     Comment:     Meter: [id number] : [id number] Anthony [place] NA   05/08/2022 1616 103 70 - 130 mg/dL Final     Comment:     Meter: [id number] : [id number] Alida Orellanaa NA   05/08/2022 1106 113 70 - 130 mg/dL Final     Comment:     Meter: [id number] : [id number] Todorovic [place] NA       CT Angiogram Neck    Result Date: 5/9/2022  No evidence of acute infarction, intracranial hemorrhage or of abnormal intra-axial enhancement. A small partially calcified dural based mass is appreciated overlying the left frontal lobe superolaterally consistent with a meningioma measuring approximately 10 x 8 x 4 mm in size. There was 0% stenosis involving the carotid bifurcations. There is no evidence of proximal intracranial high-grade stenosis, aneurysm or occlusion. The CT perfusion demonstrated a 5 cc questionable area of mildly delayed (4-6 seconds) perfusion involving the left temporal lobe inferiorly and medially. This may be artifactual.  The noncontrasted CT examination was made available for interpretation at 1009 hours and a preliminary report called to   Angela at 1010 hours. The CT angiogram was made available for interpretation at 1022 hours and the preliminary report given to Dr. Miller at 1025 hours.    AI analysis of LVO was utilized.  Radiation dose reduction techniques were utilized, including automated exposure control and exposure modulation based on body size.  This report was finalized on 5/9/2022 12:03 PM by Dr. Santiago Hurtado M.D.      CT Angiogram Head w AI Analysis of LVO    Result Date: 5/9/2022  No evidence of acute infarction, intracranial hemorrhage or of abnormal intra-axial enhancement. A small partially calcified dural based mass is appreciated overlying the left frontal lobe superolaterally consistent with a meningioma measuring approximately 10 x 8 x 4 mm in size. There was 0% stenosis involving the carotid bifurcations. There is no evidence of proximal intracranial high-grade stenosis, aneurysm or occlusion. The CT perfusion demonstrated a 5 cc questionable area of mildly delayed (4-6 seconds) perfusion involving the left temporal lobe inferiorly and medially. This may be artifactual.  The noncontrasted CT examination was made available for interpretation at 1009 hours and a preliminary report called to Dr. Miller at 1010 hours. The CT angiogram was made available for interpretation at 1022 hours and the preliminary report given to Dr. Miller at 1025 hours.    AI analysis of LVO was utilized.  Radiation dose reduction techniques were utilized, including automated exposure control and exposure modulation based on body size.  This report was finalized on 5/9/2022 12:03 PM by Dr. Santiago Hurtado M.D.      CT CEREBRAL PERFUSION WITH & WITHOUT CONTRAST    Result Date: 5/9/2022  No evidence of acute infarction, intracranial hemorrhage or of abnormal intra-axial enhancement. A small partially calcified dural based mass is appreciated overlying the left frontal lobe superolaterally consistent with a meningioma measuring approximately 10 x  8 x 4 mm in size. There was 0% stenosis involving the carotid bifurcations. There is no evidence of proximal intracranial high-grade stenosis, aneurysm or occlusion. The CT perfusion demonstrated a 5 cc questionable area of mildly delayed (4-6 seconds) perfusion involving the left temporal lobe inferiorly and medially. This may be artifactual.  The noncontrasted CT examination was made available for interpretation at 1009 hours and a preliminary report called to Dr. Miller at 1010 hours. The CT angiogram was made available for interpretation at 1022 hours and the preliminary report given to Dr. Miller at 1025 hours.    AI analysis of LVO was utilized.  Radiation dose reduction techniques were utilized, including automated exposure control and exposure modulation based on body size.  This report was finalized on 5/9/2022 12:03 PM by Dr. Santiago Hurtado M.D.      Scheduled Medications  ALPRAZolam, 0.25 mg, Oral, BID  aspirin, 325 mg, Oral, Daily  atorvastatin, 80 mg, Oral, Nightly  cefTRIAXone, 1 g, Intravenous, Q24H  ketoconazole, 1 application, Topical, Q24H  levothyroxine, 50 mcg, Oral, Q AM  lisinopril, 20 mg, Oral, Q24H  nystatin, , Topical, Q12H  sodium chloride, 10 mL, Intravenous, Q12H  sodium chloride, 2 g, Oral, TID With Meals  Urea, 30 g, Oral, BID    Infusions   Diet  NPO Diet NPO Type: Strict NPO       Assessment/Plan     Active Hospital Problems    Diagnosis  POA   • **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       82 y.o. female admitted with Fluent aphasia.    Assessment and plan:  1.  Seizure disorder, neurology consultation was requested, code neuro was called, I evaluated patient at bedside.  Appears clinically to be having seizures.  EEG and MRI of the brain will also be ordered.  Plan  of care discussed with patient's  at bedside.  CT scan of the head, shows cerebral meningioma.    2.  Hyponatremia and metabolic acidosis with hypophosphatemia, continue to recheck labs.  Repeat labs show improved phosphorus levels.  Nephrology on board and following, follow recommendations.    3.  Hypothyroidism, continue Synthroid.    4.  Essential hypertension, continue lisinopril.     5.  Chronic benzodiazepine dependence, contributing towards altered mental status.    6.  CODE STATUS is full code.  Further plans based on hospital course.    7.  DVT prophylaxis, use SCDs.           Yusef Huber MD  [facility]  05/09/22  14:30 EDT

## 2022-05-09 NOTE — THERAPY TREATMENT NOTE
Patient Name: Ritu Martino  : 1940    MRN: [id number]                              Today's Date: 2022       Admit Date: 2022    Visit Dx:     ICD-10-CM ICD-9-CM   1. Fluent aphasia  R47.01 784.3   2. Urinary tract infection without hematuria, site unspecified  N39.0 599.0     Patient Active Problem List   Diagnosis   • Anxiety   • Hypothyroidism   • Hyponatremia   • Iron deficiency   • Essential hypertension, benign   • Fluent aphasia   • Hypochloremia   • Benzodiazepine dependence (HCC)     Past Medical History:   Diagnosis Date   • Anxiety    • Arthritis    • Bowel dysfunction 2021    since having surgery for diverticular infection   • Diverticulitis 2021    w/ rupture and infection required surgery and ostomy   • Essential hypertension, benign    • GERD (gastroesophageal reflux disease)    • Hernia, hiatal    • Hypercholesterolemia    • Hypokalemia    • Hyponatremia 2014    chronic low with occasional normal level   • Hypothyroidism 2018    estimated time frame pt nor  could remember exactly how long has been on medication   • Insomnia    • Iron deficiency      Past Surgical History:   Procedure Laterality Date   • APPENDECTOMY     • BACK SURGERY     •  SECTION     • COLON SURGERY  2021    to address ruptured and infected diverticular dz, ostomy also placed   • COLOSTOMY CLOSURE  10/2021    ostomy removed   • HEMORRHOIDECTOMY     • KNEE ARTHROPLASTY        General Information     Row Name 22 1609          Physical Therapy Time and Intention    Document Type therapy note (daily note)  -AG     Mode of Treatment individual therapy;physical therapy  -AG     Row Name 22 1609          General Information    Patient Profile Reviewed yes  -AG     Existing Precautions/Restrictions fall  -AG           User Key  (r) = Recorded By, (t) = Taken By, (c) = Cosigned By    Initials Name Provider Type    AG Malorie Mcgregor, DEMETRIUS Physical Therapist               Mobility      Row Name 05/09/22 1609          Bed Mobility    Bed Mobility sit-supine;supine-sit-supine  -AG     Supine-Sit [place] (Bed Mobility) standby assist  -AG     Sit-Supine [place] (Bed Mobility) standby assist  -AG     Assistive Device (Bed Mobility) bed rails  -AG     Comment, (Bed Mobility) increased time  -AG     Row Name 05/09/22 1609          Transfers    Comment, (Transfers) CGA with RW, min A/CGA without AD  -AG     Row Name 05/09/22 1609          Bed-Chair Transfer    Bed-Chair [place] (Transfers) contact guard;minimum assist (75% patient effort)  -AG     Assistive Device (Bed-Chair Transfers) walker, front-wheeled  -AG     Row Name 05/09/22 1609          Sit-Stand Transfer    Sit-Stand [place] (Transfers) contact guard;verbal cues;nonverbal cues (demo/gesture)  -AG     Assistive Device (Sit-Stand Transfers) walker, front-wheeled  -AG     Comment, (Sit-Stand Transfer) CGA with RW, min A/CGA without AD  -AG     Row Name 05/09/22 1609          Gait/Stairs (Locomotion)    [place] Level (Gait) minimum assist (75% patient effort)  -AG     Assistive Device (Gait) walker, front-wheeled  -AG     Distance in Feet (Gait) 150ft with AD and 80ft without AD  -AG     Comment, (Gait/Stairs) 150ft with RW with path deviation noted and impaired obstacle navigation especially with turning to left requiring cuing and assistance to keep FWW within BERTRAM. min A up to mod A due to 2x LOB when ambulating 80ft without AD due to occaasional scissoring type gait noted with left foot.  -AG           User Key  (r) = Recorded By, (t) = Taken By, (c) = Cosigned By    Initials Name Provider Type    Malorie Zamudio PT Physical Therapist               Obj/Interventions    No documentation.                Goals/Plan    No documentation.                Clinical Impression     Row Name 05/09/22 1613          Plan of Care Review    Plan of Care Reviewed With patient;daughter  -AG     Outcome Evaluation Pt and daughter  report she appears more confused and lethargic today. Per RN pt had a rapid response earlier this morning, possible seizure. Pt requires min A for ambulation with RW up to 180ft due to path deviation, poor navigation of obsticales, especially with left turns. Pt able to ambualte 80ft without AD however demonstrates occasionall scissoring type gate requiring up to mod A when LOB occurs. Recommend continued acute skiled PT to maximize independence with least restrictive AD, increase balance, safety, endurance and generalized strength required for functional tasks in order to decrease risk for falls and burden of care. at this time pt does not appears safe to d/c home alone with spouse and recommend assistance other than spouse vs SNF pending progress next session  -AG     Row Name 05/09/22 1613          Therapy Assessment/Plan (PT)    Rehab Potential (PT) good, to achieve stated therapy goals  -AG     Criteria for Skilled Interventions Met (PT) yes;meets criteria  -AG     Therapy Frequency (PT) 5 times/wk  -AG     Row Name 05/09/22 1613          Vital Signs    O2 Delivery Pre Treatment room air  -AG     O2 Delivery Intra Treatment room air  -AG     O2 Delivery Post Treatment room air  -AG     Pre Patient Position Supine  -AG     Post Patient Position Supine  -AG     Row Name 05/09/22 1613          Positioning and Restraints    Pre-Treatment Position in bed  -AG     In Bed call light within reach;encouraged to call for assist;exit alarm on;with family/caregiver  -AG           User Key  (r) = Recorded By, (t) = Taken By, (c) = Cosigned By    Initials Name Provider Type    AG Malorie Mcgregor, PT Physical Therapist               Outcome Measures     Row Name 05/09/22 1617          How much help from another person do you currently need...    Turning from your back to your side while in flat bed without using bedrails? 3  -AG     Moving from lying on back to sitting on the side of a flat bed without bedrails? 3  -AG      Moving to and from a bed to a chair (including a wheelchair)? 3  -AG     Standing up from a chair using your arms (e.g., wheelchair, bedside chair)? 3  -AG     Climbing 3-5 steps with a railing? 2  -AG     To walk in hospital room? 3  -AG     AM-PAC 6 Clicks Score (PT) 17  -AG     Highest level of mobility 5 --> Static standing  -AG           User Key  (r) = Recorded By, (t) = Taken By, (c) = Cosigned By    Initials Name Provider Type    AG Malorie Mcgregor, DEMETRIUS Physical Therapist                             Physical Therapy Education                 Title: PT OT SLP Therapies (In Progress)     Topic: Physical Therapy (In Progress)     Point: Mobility training (Done)     Learning Progress Summary           Patient Acceptance, E, VU,NR by  at 5/7/2022 1306   Significant Other Acceptance, E, VU,NR by  at 5/7/2022 1306                   Point: Home exercise program (Not Started)     Learner Progress:  Not documented in this visit.          Point: Body mechanics (Done)     Learning Progress Summary           Patient Acceptance, E, VU,NR by  at 5/7/2022 1306   Significant Other Acceptance, E, VU,NR by  at 5/7/2022 1306                   Point: Precautions (Done)     Learning Progress Summary           Patient Acceptance, E, VU,NR by  at 5/7/2022 1306   Significant Other Acceptance, E, VU,NR by  at 5/7/2022 1306                               User Key     Initials Effective Dates Name Provider Type Discipline     06/16/21 -  Mary Severino, PT Physical Therapist PT              PT Recommendation and Plan     Plan of Care Reviewed With: patient, daughter  Outcome Evaluation: Pt and daughter report she appears more confused and lethargic today. Per RN pt had a rapid response earlier this morning, possible seizure. Pt requires min A for ambulation with RW up to 180ft due to path deviation, poor navigation of obsticales, especially with left turns. Pt able to ambualte 80ft without AD however demonstrates  occasionall scissoring type gate requiring up to mod A when LOB occurs. Recommend continued acute skiled PT to maximize independence with least restrictive AD, increase balance, safety, endurance and generalized strength required for functional tasks in order to decrease risk for falls and burden of care. at this time pt does not appears safe to d/c home alone with spouse and recommend assistance other than spouse vs SNF pending progress next session     Time Calculation:    PT Charges     Row Name 05/09/22 1619             Time Calculation    Start Time 1507  -AG      Stop Time 1524  -AG      Time Calculation (min) 17 min  -AG      PT Received On 05/09/22  -AG      PT - Next Appointment 05/10/22  -AG      PT Goal Re-Cert Due Date 05/21/22  -AG              Time Calculation- PT    Total Timed Code Minutes- PT 17 minute(s)  -AG              Timed Charges    99773 - Gait Training Minutes  17  -AG              Total Minutes    Timed Charges Total Minutes 17  -AG       Total Minutes 17  -AG            User Key  (r) = Recorded By, (t) = Taken By, (c) = Cosigned By    Initials Name Provider Type    AG Malorie Mcgregor, PT Physical Therapist              Therapy Charges for Today     Code Description Service Date Service Provider Modifiers Qty    [id number] HC GAIT TRAINING EA 15 MIN 5/9/2022 Malorie Mcgregor, PT GP 1          PT G-Codes  Outcome Measure Options: AM-PAC 6 Clicks Daily Activity (OT)  AM-PAC 6 Clicks Score (PT): 17  AM-PAC 6 Clicks Score (OT): 19  Modified [place] Scale: 4 - Moderately severe disability.  Unable to walk without assistance, and unable to attend to own bodily needs without assistance.    Malorie Mcgregor PT  5/9/2022

## 2022-05-09 NOTE — PLAN OF CARE
Goal Outcome Evaluation:  Plan of Care Reviewed With: patient     Aox3. VSS on RA. NIH 0. Agitated and confused most of the night, attempting to get out bed. Episode of labored breathing,  tachycardia, abdominal pain, increased anxiousness, and increased confusion. STAT ABG with critical pH, provider notified. Patient improved after therapeutic communication and calming techniques. Critical phosphorus replaced per protocol. Await follow up labs.

## 2022-05-09 NOTE — PROGRESS NOTES
[facility] Progress Note      Patient Name: Ritu Martino  : 1940  MRN: [id number]  Primary Care Physician:  Reyes, Miriam Mercado, MD  Date of admission: 2022    Subjective     Interval History:   The patient was seen and examined today for follow-up on hyponatremia.  Confused today.  Unable to respond to any questions.  Constantly talking.  No events noted per nursing staff R       Objective     Vitals:   Temp:  [97 °F (36.1 °C)-99 °F (37.2 °C)] 97 °F (36.1 °C)  Heart Rate:  [] 112  Resp:  [16-22] 22  BP: (139-190)/() 146/90    Intake/Output Summary (Last 24 hours) at 2022 1553  Last data filed at 2022 0700  Gross per 24 hour   Intake 390 ml   Output --   Net 390 ml       Physical Exam:    General Appearance: Confused  Skin: warm and dry  HEENT: oral mucosa normal, nonicteric sclera  Neck: supple, no JVD  Lungs: CTA  Heart: RRR, normal S1 and S2  Abdomen: soft, nontender, nondistended  : no palpable bladder  Extremities: No lower extremity edema, cyanosis or clubbing  Neuro: Unable to assess    Scheduled Meds:     ALPRAZolam, 0.25 mg, Oral, BID  aspirin, 325 mg, Oral, Daily  atorvastatin, 80 mg, Oral, Nightly  cefTRIAXone, 1 g, Intravenous, Q24H  ketoconazole, 1 application, Topical, Q24H  levothyroxine, 50 mcg, Oral, Q AM  lisinopril, 20 mg, Oral, Q24H  nystatin, , Topical, Q12H  sodium chloride, 10 mL, Intravenous, Q12H  sodium chloride, 2 g, Oral, TID With Meals  Urea, 30 g, Oral, BID      IV Meds:        Results Reviewed:   I have personally reviewed the results from the time of this admission to 2022 15:53 EDT     Results from last 7 days   Lab Units 22  0455 22  2320 22  0614 22  1512 22  0149   SODIUM mmol/L 130*  --  124* 124* 122*   POTASSIUM mmol/L 4.0 4.5 2.8*  --  3.4*   CHLORIDE mmol/L 97*  --  87*  --  85*   CO2 mmol/L 16.5*  --  23.0  --  23.6   BUN mg/dL 36*  --  15  --  7*   CREATININE mg/dL 0.55*   --  0.45*  --  0.63   CALCIUM mg/dL 9.8  --  9.2  --  9.4   BILIRUBIN mg/dL 0.9  --   --   --  1.0   ALK PHOS U/L 61  --   --   --  57   ALT (SGPT) U/L 26  --   --   --  19   AST (SGOT) U/L 20  --   --   --  28   GLUCOSE mg/dL 129*  --  122*  --  101*       Estimated Creatinine Clearance: 62.9 mL/min (A) (by C-G formula based on SCr of 0.55 mg/dL (L)).    Results from last 7 days   Lab Units 05/09/22  1245 05/09/22  0455 05/08/22  0614 05/07/22  0149   MAGNESIUM mg/dL  --  1.8 1.9 1.5*   PHOSPHORUS mg/dL 3.4 1.5* 2.8  --              Results from last 7 days   Lab Units 05/09/22  0455 05/08/22  0614 05/07/22  0149   WBC 10*3/mm3 7.86 7.62 7.18   HEMOGLOBIN g/dL 14.4 13.2 12.8   PLATELETS 10*3/mm3 321 291 294       Results from last 7 days   Lab Units 05/07/22  0149   INR  0.87*       Assessment / Plan     ASSESSMENT:  Hyponatremia euvolemic: Mildly symptomatic.  Chronic issue for patient with acute exacerbation.  Sodium on presentation was 122. 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  She denies any recent NSAID use. She does have a history of hypothyroidism but her last TSH is at 1.79.        sodium is slightly up to 124 despite fluid restriction and upping her salt tablet to 2       g 3 times daily.         Hypertension blood pressure is uncontrolled  Hypomagnesemia improved   Aphasia likely secondary to hyponatremia improved followed by neurology  5. Metabolic encephalopathy likely secondary to hyponatremia improving.  6.  Hypokalemia           PLAN:  Her sodium continues to improve.  We will continue salt tablets in addition to urea tablets.  Continue surveillance labs  We will check urinalysis and culture because of confusion.       Thank you for involving us in the care of Ritu Martino.   Please feel free to call with any questions.    Al Lagos MD  05/09/22  15:53 EDT    [facility]  [phone]      Much of this encounter note is an electronic transcription/translation of spoken language to printed text. The electronic translation of spoken language may permit erroneous, or at times, nonsensical words or phrases to be inadvertently transcribed; Although I have reviewed the note for such errors, some may still exist.

## 2022-05-09 NOTE — NURSING NOTE
Pt returned to room. Lethargic, likely d/t ativan, but responsive. Seizure precautions initiated. Awaiting EEG and neuro consult.  at bedside and updated on POC. WCTM.

## 2022-05-09 NOTE — PLAN OF CARE
Goal Outcome Evaluation:  Plan of Care Reviewed With: patient, daughter           Outcome Evaluation: Pt and daughter report she appears more confused and lethargic today. Per RN pt had a rapid response earlier this morning, possible seizure. Pt requires min A for ambulation with RW up to 180ft due to path deviation, poor navigation of obsticales, especially with left turns. Pt able to ambualte 80ft without AD however demonstrates occasionall scissoring type gate requiring up to mod A when LOB occurs. Recommend continued acute skiled PT to maximize independence with least restrictive AD, increase balance, safety, endurance and generalized strength required for functional tasks in order to decrease risk for falls and burden of care. at this time pt does not appears safe to d/c home alone with spouse and recommend assistance other than spouse vs SNF pending progress next session

## 2022-05-09 NOTE — CODE DOCUMENTATION
Patient Name:  Ritu Martino  YOB: 1940  MRN:  [id number]  Admit Date:  5/7/2022    Visit Diagnoses:     ICD-10-CM ICD-9-CM   1. Fluent aphasia  R47.01 784.3   2. Urinary tract infection without hematuria, site unspecified  N39.0 599.0       Reason For Rapid: In house team D   LKN 0925.  On aspirin. NKA  Ambulated to  with assist x1. After returning to bed she became globally unresponsive. Hypertensive    0937 On my arrival eyes were partially open and rhythmic blinking of eyelids noted bilaterally. The blinking was intermittent in slow groups of 3-4 blinks at a time. She has some inconsistent roving eye movements with brief periods of fixed deviation towards left and then towards right.  She did not make any attempts to interact or respond to painful stimuli. NIH scored 26.  reports she had a similar episode in 2021.   0944 Team D pager called.  Dr Miller updated. New orders for ativan 1 mg and team D imaging.  CT called. Will go to CT 3   0956 Ativan given  1003 arrived at CT.  Dr Miller notified  1005 Update to Dr Hurtado  1014 Ok to proceed with full imaging  1024 NEG CTA/CTP  1026 Dr Miller at bedside.  She is now responding to painful stimuli.  New orders for EEG and general neuroology consult  1036 Returned to room.  Repeat VS and NIH much improved      RN Communicated With:  Dr Miller    Rapid Outcome:  Remain on unit. General neurology consult    Communication From Rapid Team:   Please re-call rapid for any worsening neurological symptoms.     Most Recent Vital Signs  Temp:  [98.5 °F (36.9 °C)-99 °F (37.2 °C)] 98.5 °F (36.9 °C)  Heart Rate:  [] 97  Resp:  [16-22] 22  BP: (139-190)/() 160/88  SpO2:  [95 %-100 %] 98 %  on   ;   Device (Oxygen Therapy): room air    Labs:  Results from last 7 days   Lab Units 05/07/22  0153   COVID19  Not Detected     Glucose   Date/Time Value Ref Range Status   05/09/2022 0931 126 70 - 130 mg/dL Final     Comment:     Meter:  [id number] : [id number] Hill Mcmullen RN   05/09/2022 0610 131 (H) 70 - 130 mg/dL Final     Comment:     Meter: [id number] : [id number] Anthony Espana NA   05/09/2022 0428 147 (H) 70 - 130 mg/dL Final     Comment:     Meter: [id number] : [id number] Jair Lopes RN   05/08/2022 2233 113 70 - 130 mg/dL Final     Comment:     Meter: [id number] : [id number] Anthony Espana NA   05/08/2022 1616 103 70 - 130 mg/dL Final     Comment:     Meter: [id number] : [id number] [place]issa [place] NA   05/08/2022 1106 113 70 - 130 mg/dL Final     Comment:     Meter: [id number] : [id number] [place]issa [place] NA   05/08/2022 0619 114 70 - 130 mg/dL Final     Comment:     Meter: [id number] : [id number] Radha Umernicole NA     Site   Date Value Ref Range Status   05/09/2022 Arterial: right radial  Final     Lex's Test   Date Value Ref Range Status   05/09/2022 Positive  Final     pH, Arterial   Date Value Ref Range Status   05/09/2022 7.564 (C) 7.350 - 7.450 pH units Final     Comment:     Meter: [id number] : [id number] Dav NyCritical:Notify DANYEL caicedo (09-May-22 04:52:00)Read back ok     pCO2, Arterial   Date Value Ref Range Status   05/09/2022 20.6 (L) 35.0 - 45.0 mm Hg Final     pO2, Arterial   Date Value Ref Range Status   05/09/2022 61.0 (L) 80.0 - 100.0 mm Hg Final     HCO3, Arterial   Date Value Ref Range Status   05/09/2022 18.6 (L) 22.0 - 28.0 mmol/L Final     Base Excess, Arterial   Date Value Ref Range Status   05/09/2022 -1.4 (L) 0.0 - 2.0 mmol/L Final     Barometric Pressure for Blood Gas   Date Value Ref Range Status   05/09/2022 748.1 mmHg Final     Modality   Date Value Ref Range Status   05/09/2022 Room Air  Final     Results from last 7 days   Lab Units 05/09/22  0455 05/08/22  0614 05/07/22  0149   WBC 10*3/mm3 7.86 7.62 7.18   HEMOGLOBIN g/dL 14.4 13.2 12.8   PLATELETS 10*3/mm3 321 291 294     Results from last 7 days   Lab Units 05/09/22  0455 05/08/22  2325  05/08/22  0614 05/07/22  1512 05/07/22  0149   SODIUM mmol/L 130*  --  124* 124* 122*   POTASSIUM mmol/L 4.0 4.5 2.8*  --  3.4*   CHLORIDE mmol/L 97*  --  87*  --  85*   CO2 mmol/L 16.5*  --  23.0  --  23.6   BUN mg/dL 36*  --  15  --  7*   CREATININE mg/dL 0.55*  --  0.45*  --  0.63   GLUCOSE mg/dL 129*  --  122*  --  101*   ALBUMIN g/dL 4.60  --  4.30  --  4.50   BILIRUBIN mg/dL 0.9  --   --   --  1.0   ALK PHOS U/L 61  --   --   --  57   AST (SGOT) U/L 20  --   --   --  28   ALT (SGPT) U/L 26  --   --   --  19   Estimated Creatinine Clearance: 62.9 mL/min (A) (by C-G formula based on SCr of 0.55 mg/dL (L)).  Results from last 7 days   Lab Units 05/07/22  0457 05/07/22  0149   CK TOTAL U/L  --  233*   TROPONIN T ng/mL <0.010 <0.010   PROBNP pg/mL  --  75.9         Results from last 7 days   Lab Units 05/09/22  0450   PH, ARTERIAL pH units 7.564*   PO2 ART mm Hg 61.0*   PCO2, ARTERIAL mm Hg 20.6*   HCO3 ART mmol/L 18.6*   MODALITY  Room Air   No results found for: STREPPNEUAG, LEGANTIGENUR        NIH Stroke Scale:  Interval: baseline  1a. Level of Consciousness: 3-->Responds only with reflex motor or autonomic effects or totally unresponsive, flaccid, and areflexic (per Madelyn Devries)  1b. LOC Questions: 2-->Answers neither question correctly  1c. LOC Commands: 2-->Performs neither task correctly  2. Best Gaze: 0-->Normal  3. Visual: 3-->Bilateral hemianopia (blind including cortical blindness)  4. Facial Palsy: 0-->Normal symmetrical movements  5a. Motor Arm, Left: 2-->Some effort against gravity, limb cannot get to or maintain (if cued) 90 (or 45) degrees, drifts down to bed, but has some effort against gravity  5b. Motor Arm, Right: 2-->Some effort against gravity, limb cannot get to or maintain (if cued) 90 (or 45) degrees, drifts down to bed, but has some effort against gravity  6a. Motor Leg, Left: 2-->Some effort against gravity, leg falls to bed by 5 secs, but has some effort against gravity  6b. Motor Leg,  Right: 2-->Some effort against gravity, leg falls to bed by 5 secs, but has some effort against gravity  7. Limb Ataxia: 0-->Absent  8. Sensory: 1-->Mild-to-moderate sensory loss, patient feels pinprick is less sharp or is dull on the affected side, or there is a loss of superficial pain with pinprick, but patient is aware of being touched  9. Best Language: 3-->Mute, global aphasia, no usable speech or auditory comprehension  10. Dysarthria: 2-->Severe dysarthria, patients speech is so slurred as to be unintelligible in the absence of or out of proportion to any dysphasia, or is mute/anarthric  11. Extinction and Inattention (formerly Neglect): 2-->Profound ford-inattention/extinction more than 1 modality    Total (NIH Stroke Scale): 26    Please refer to full rapid documentation on summary page under Index / Code Timeline

## 2022-05-09 NOTE — PROGRESS NOTES
The patient seemed to be doing reasonably well when seen by this physician and  reported that she seemed improved with adjustment to her previous dose of alprazolam.  Shortly after having been seen by this physician, the patient became quite lethargic and a rapid response was called.  Continuing to follow with you.

## 2022-05-09 NOTE — PLAN OF CARE
Goal Outcome Evaluation:              Outcome Evaluation: Pt a/oX4 forgetful at times, VSS. Pt given K & Na replacement see MAR. Pt tends to be impulsive, bed alarm set and family at bedside at this time. Will continue to monitor

## 2022-05-09 NOTE — PROGRESS NOTES
[facility] Acute Inpt Rehab Note     Referral received via stroke order set.  Please note this is a screening only, rehab admissions will not actively be evaluating this patient.  If felt patient is appropriate for our services once therapies begin, please call our office at [phone], to initiate a full referral.    Thank you,   Sarah More RN   Rehab Admission Nurse

## 2022-05-09 NOTE — DISCHARGE PLACEMENT REQUEST
"Ritu Schilling (82 y.o. Female)             Date of Birth   1940    Social Security Number       [address]   [address] [place] [address]    Home Phone   [phone]    MRN   [id number]       Sabianism   None    Marital Status                               Admission Date   5/7/22    Admission Type   Emergency    Admitting Provider   Dwight Haddad MD    Attending Provider   Yusef Huber MD    Department, Room/Bed   [address], [address]/1       Discharge Date       Discharge Disposition       Discharge Destination                               Attending Provider: Yusef Huber MD    Allergies: No Known Allergies    Isolation: None   Infection: None   Code Status: CPR   Advance Care Planning Activity    Ht: 152.4 cm (60\")   Wt: 58 kg (127 lb 13.9 oz)    Admission Cmt: None   Principal Problem: Fluent aphasia [R47.01]                 Active Insurance as of 5/7/2022     Primary Coverage     Payor Plan Insurance Group Employer/Plan Group    [facility] MEDICARE REPLACEMENT [facility] MEDICARE REPLACEMENT [id number]     Payor Plan Address Payor Plan Phone Number Payor Plan Fax Number Effective Dates    [address] [address]   1/1/2021 - None Entered    [facility] [id number]       Subscriber Name Subscriber Birth Date Member ID       RITU SCHILLING 1940 [id number]                 Emergency Contacts      (Rel.) Home Phone Work Phone Mobile Phone    Nu Schilling (Spouse) [phone] -- [phone]    Marielle Regan (Daughter) -- -- [phone]              "

## 2022-05-09 NOTE — NURSING NOTE
Went to pt's room to assist to bathroom. When pt got back to bed she became nonverbal and not following commands. Winced to sternal rub, but otherwise nonresponsive. Rapid response called. Pt received 1 mg ativan during rapid response and left floor to CT with rapid response RN.

## 2022-05-09 NOTE — CASE MANAGEMENT/SOCIAL WORK
Discharge Planning Assessment  [facility]     Patient Name: Ritu Martino  MRN: [id number]  Today's Date: 5/9/2022    Admit Date: 5/7/2022     Discharge Needs Assessment     Row Name 05/09/22 1813       Living Environment    People in Home spouse    Current Living Arrangements condominium    Primary Care Provided by spouse/significant other    Provides Primary Care For no one, unable/limited ability to care for self    Family Caregiver if Needed spouse;child(huseyin), adult    Quality of Family Relationships helpful;involved;supportive       Transition Planning    Patient/Family Anticipates Transition to inpatient rehabilitation facility;home with help/services    Patient/Family Anticipated Services at Transition skilled nursing;home health care    Transportation Anticipated family or friend will provide       Discharge Needs Assessment    Readmission Within the Last 30 Days no previous admission in last 30 days    Equipment Currently Used at Home cane, straight;walker, rolling;rollator;commode;shower chair    Concerns to be Addressed discharge planning    Anticipated Changes Related to Illness inability to care for self    Provided Post Acute Provider List? Yes    Post Acute Provider List Nursing Home    Provided Post Acute Provider Quality & Resource List? Yes    Post Acute Provider Quality and Resource List Nursing Home    Delivered To Support Person    Method of Delivery In person    Current Discharge Risk physical impairment               Discharge Plan     Row Name 05/09/22 1817       Plan    Plan SNF    Plan Comments S/W pt, her  Nu and their dtr Marielle Chaney at bedside.  Facesheet info confirmed.  Pt lives in a 2 story condo with Nu - bedroom and bath are on the main floor.  See list of home DME.  Nu assists pt at home as needed, including with managing her meds, household chores and transporation.  Pthas use HH in the past but does not recall which agency. No hx of SNF.  Nu feels pt will  need rehab upon DC. CMS Compare info given.  He requests a facilty with at least a 3 star rating.  Referrals made to [facility], [facility], [facility], [facility].  Pt would need [facility] MCR precert. NORIS mary followup tomorrow  .........Bailey DE LEON/ NORIS              Continued Care and Services - Admitted Since 5/7/2022     Destination     Service Provider Request Status Selected Services Address Phone Fax Patient Preferred    [facility]  Pending - Request Sent N/A [address] , [facility] [id number] [phone] --    [facility]  [facility] - [facility] N[facility] [address] [facility] [facility] [id number] [phone] --    [facility]  Pending - Request Sent N/A [address] [address] [phone] --    [facility]  Pending - Request Sent N/A [address] [facility] [address] [phone] --    [facility]  Pending - Request Sent N/A [address] [facility] [address] [phone] --              Expected Discharge Date and Time     Expected Discharge Date Expected Discharge Time    May 11, 2022          Demographic Summary     Row Name 05/09/22 1812       General Information    Admission Type inpatient    Arrived From home    Required Notices Provided Important Message from Medicare    Referral Source admission list    Reason for Consult discharge planning    Preferred Language English               Functional Status     Row Name 05/09/22 1812       Functional Status    Usual Activity Tolerance moderate    Current Activity Tolerance moderate       Functional Status, IADL    Medications assistive person    Meal Preparation assistive person    Housekeeping assistive person    Laundry assistive person    Shopping assistive person                         Bailey Harris RN

## 2022-05-09 NOTE — PLAN OF CARE
Goal Outcome Evaluation:  Plan of Care Reviewed With: patient        Progress: no change  Outcome Evaluation: VSS on RA. Only oriented to self and occasionally time. Orientation waxes and wanes. Had period of unresponsiveness today and Team D initiated. 1 mg ativan given for possible seizure. CT Head showed a maningioma. Neuro saw pt and MRI and EEG was ordered. 2 mg ativan ordered as pre-med for MRI.When pt returned from scans she was responsive and talking again. Pt has been attempting to get OOB multiple times throughout day. Daughter and spouse have remained at bedside and help with care. KELLY.

## 2022-05-09 NOTE — SIGNIFICANT NOTE
05/09/22 0914   OTHER   Discipline occupational therapist   Rehab Time/Intention   Session Not Performed patient/family declined treatment  (OT tx attempted however pt declined. RN reported increased confusion and pt very upset. Will f/u tomorrow.)   Recommendation   OT - Next Appointment 05/10/22

## 2022-05-10 ENCOUNTER — APPOINTMENT (OUTPATIENT)
Dept: NEUROLOGY | Facility: HOSPITAL | Age: 82
End: 2022-05-10

## 2022-05-10 LAB
ALBUMIN SERPL-MCNC: 4.7 G/DL (ref 3.5–5.2)
ALBUMIN/GLOB SERPL: 1.9 G/DL
ALP SERPL-CCNC: 56 U/L (ref 39–117)
ALT SERPL W P-5'-P-CCNC: 17 U/L (ref 1–33)
ANION GAP SERPL CALCULATED.3IONS-SCNC: 13.2 MMOL/L (ref 5–15)
AST SERPL-CCNC: 22 U/L (ref 1–32)
BASOPHILS # BLD AUTO: 0.01 10*3/MM3 (ref 0–0.2)
BASOPHILS NFR BLD AUTO: 0.1 % (ref 0–1.5)
BILIRUB SERPL-MCNC: 0.9 MG/DL (ref 0–1.2)
BUN SERPL-MCNC: 20 MG/DL (ref 8–23)
BUN/CREAT SERPL: 30.3 (ref 7–25)
CALCIUM SPEC-SCNC: 9.6 MG/DL (ref 8.6–10.5)
CHLORIDE SERPL-SCNC: 96 MMOL/L (ref 98–107)
CO2 SERPL-SCNC: 22.8 MMOL/L (ref 22–29)
CREAT SERPL-MCNC: 0.66 MG/DL (ref 0.57–1)
DEPRECATED RDW RBC AUTO: 39.8 FL (ref 37–54)
EGFRCR SERPLBLD CKD-EPI 2021: 87.7 ML/MIN/1.73
EOSINOPHIL # BLD AUTO: 0.03 10*3/MM3 (ref 0–0.4)
EOSINOPHIL NFR BLD AUTO: 0.4 % (ref 0.3–6.2)
ERYTHROCYTE [DISTWIDTH] IN BLOOD BY AUTOMATED COUNT: 11.9 % (ref 12.3–15.4)
GLOBULIN UR ELPH-MCNC: 2.5 GM/DL
GLUCOSE BLDC GLUCOMTR-MCNC: 106 MG/DL (ref 70–130)
GLUCOSE BLDC GLUCOMTR-MCNC: 110 MG/DL (ref 70–130)
GLUCOSE BLDC GLUCOMTR-MCNC: 121 MG/DL (ref 70–130)
GLUCOSE SERPL-MCNC: 100 MG/DL (ref 65–99)
HCT VFR BLD AUTO: 39.8 % (ref 34–46.6)
HGB BLD-MCNC: 14 G/DL (ref 12–15.9)
IMM GRANULOCYTES # BLD AUTO: 0.03 10*3/MM3 (ref 0–0.05)
IMM GRANULOCYTES NFR BLD AUTO: 0.4 % (ref 0–0.5)
LYMPHOCYTES # BLD AUTO: 0.74 10*3/MM3 (ref 0.7–3.1)
LYMPHOCYTES NFR BLD AUTO: 10.3 % (ref 19.6–45.3)
MCH RBC QN AUTO: 32.3 PG (ref 26.6–33)
MCHC RBC AUTO-ENTMCNC: 35.2 G/DL (ref 31.5–35.7)
MCV RBC AUTO: 91.7 FL (ref 79–97)
MONOCYTES # BLD AUTO: 0.72 10*3/MM3 (ref 0.1–0.9)
MONOCYTES NFR BLD AUTO: 10 % (ref 5–12)
NEUTROPHILS NFR BLD AUTO: 5.64 10*3/MM3 (ref 1.7–7)
NEUTROPHILS NFR BLD AUTO: 78.8 % (ref 42.7–76)
NRBC BLD AUTO-RTO: 0 /100 WBC (ref 0–0.2)
PLATELET # BLD AUTO: 330 10*3/MM3 (ref 140–450)
PMV BLD AUTO: 8.3 FL (ref 6–12)
POTASSIUM SERPL-SCNC: 3.6 MMOL/L (ref 3.5–5.2)
PROT SERPL-MCNC: 7.2 G/DL (ref 6–8.5)
RBC # BLD AUTO: 4.34 10*6/MM3 (ref 3.77–5.28)
SODIUM SERPL-SCNC: 132 MMOL/L (ref 136–145)
WBC NRBC COR # BLD: 7.17 10*3/MM3 (ref 3.4–10.8)

## 2022-05-10 PROCEDURE — 95816 EEG AWAKE AND DROWSY: CPT | Performed by: PSYCHIATRY & NEUROLOGY

## 2022-05-10 PROCEDURE — 85025 COMPLETE CBC W/AUTO DIFF WBC: CPT | Performed by: INTERNAL MEDICINE

## 2022-05-10 PROCEDURE — 95816 EEG AWAKE AND DROWSY: CPT

## 2022-05-10 PROCEDURE — 82962 GLUCOSE BLOOD TEST: CPT

## 2022-05-10 PROCEDURE — 25010000002 CEFTRIAXONE PER 250 MG: Performed by: NURSE PRACTITIONER

## 2022-05-10 PROCEDURE — 92610 EVALUATE SWALLOWING FUNCTION: CPT

## 2022-05-10 PROCEDURE — 99233 SBSQ HOSP IP/OBS HIGH 50: CPT | Performed by: NURSE PRACTITIONER

## 2022-05-10 PROCEDURE — 80053 COMPREHEN METABOLIC PANEL: CPT | Performed by: INTERNAL MEDICINE

## 2022-05-10 RX ADMIN — SODIUM CHLORIDE TAB 1 GM 2 G: 1 TAB at 11:13

## 2022-05-10 RX ADMIN — NYSTATIN: 100000 POWDER TOPICAL at 20:30

## 2022-05-10 RX ADMIN — Medication 30 G: at 20:29

## 2022-05-10 RX ADMIN — KETOCONAZOLE 1 APPLICATION: 20 CREAM TOPICAL at 15:28

## 2022-05-10 RX ADMIN — ALPRAZOLAM 0.25 MG: 0.25 TABLET ORAL at 11:13

## 2022-05-10 RX ADMIN — ASPIRIN 325 MG: 325 TABLET ORAL at 11:13

## 2022-05-10 RX ADMIN — ATORVASTATIN CALCIUM 80 MG: 80 TABLET, FILM COATED ORAL at 20:29

## 2022-05-10 RX ADMIN — ALPRAZOLAM 0.25 MG: 0.25 TABLET ORAL at 20:29

## 2022-05-10 RX ADMIN — Medication 10 ML: at 15:29

## 2022-05-10 RX ADMIN — SODIUM CHLORIDE TAB 1 GM 2 G: 1 TAB at 17:12

## 2022-05-10 RX ADMIN — CEFTRIAXONE 1 G: 1 INJECTION, POWDER, FOR SOLUTION INTRAMUSCULAR; INTRAVENOUS at 17:12

## 2022-05-10 RX ADMIN — Medication 10 ML: at 20:29

## 2022-05-10 RX ADMIN — LISINOPRIL 20 MG: 20 TABLET ORAL at 11:13

## 2022-05-10 RX ADMIN — NYSTATIN: 100000 POWDER TOPICAL at 15:29

## 2022-05-10 NOTE — PROGRESS NOTES
[facility] Progress Note      Patient Name: Ritu Martino  : 1940  MRN: [id number]  Primary Care Physician:  Reyes, Miriam Mercado, MD  Date of admission: 2022    Subjective     Interval History:   Follow up hyponatremia. Patient in EEG.  Notes reviewed.       Objective     Vitals:   Temp:  [98 °F (36.7 °C)-98.8 °F (37.1 °C)] 98 °F (36.7 °C)  Heart Rate:  [70-99] 92  Resp:  [16] 16  BP: (116-138)/(71-81) 120/81    Intake/Output Summary (Last 24 hours) at 5/10/2022 1408  Last data filed at 2022  Gross per 24 hour   Intake 840 ml   Output --   Net 840 ml       Physical Exam:    In EEG.    Scheduled Meds:     ALPRAZolam, 0.25 mg, Oral, BID  aspirin, 325 mg, Oral, Daily  atorvastatin, 80 mg, Oral, Nightly  cefTRIAXone, 1 g, Intravenous, Q24H  ketoconazole, 1 application, Topical, Q24H  levothyroxine, 50 mcg, Oral, Q AM  lisinopril, 20 mg, Oral, Q24H  nystatin, , Topical, Q12H  sodium chloride, 10 mL, Intravenous, Q12H  sodium chloride, 2 g, Oral, TID With Meals  Urea, 30 g, Oral, BID      IV Meds:        Results Reviewed:   I have personally reviewed the results from the time of this admission to 5/10/2022 14:08 EDT     Results from last 7 days   Lab Units 05/10/22  0951 22  0455 22  2320 22  0614 22  1512 22  0149   SODIUM mmol/L 132* 130*  --  124*   < > 122*   POTASSIUM mmol/L 3.6 4.0 4.5 2.8*  --  3.4*   CHLORIDE mmol/L 96* 97*  --  87*  --  85*   CO2 mmol/L 22.8 16.5*  --  23.0  --  23.6   BUN mg/dL 20 36*  --  15  --  7*   CREATININE mg/dL 0.66 0.55*  --  0.45*  --  0.63   CALCIUM mg/dL 9.6 9.8  --  9.2  --  9.4   BILIRUBIN mg/dL 0.9 0.9  --   --   --  1.0   ALK PHOS U/L 56 61  --   --   --  57   ALT (SGPT) U/L 17 26  --   --   --  19   AST (SGOT) U/L 22 20  --   --   --  28   GLUCOSE mg/dL 100* 129*  --  122*  --  101*    < > = values in this interval not displayed.       Estimated Creatinine Clearance: 51.9 mL/min (by C-G formula  based on SCr of 0.66 mg/dL).    Results from last 7 days   Lab Units 05/09/22  1245 05/09/22  0455 05/08/22  0614 05/07/22  0149   MAGNESIUM mg/dL  --  1.8 1.9 1.5*   PHOSPHORUS mg/dL 3.4 1.5* 2.8  --              Results from last 7 days   Lab Units 05/10/22  0951 05/09/22  0455 05/08/22  0614 05/07/22  0149   WBC 10*3/mm3 7.17 7.86 7.62 7.18   HEMOGLOBIN g/dL 14.0 14.4 13.2 12.8   PLATELETS 10*3/mm3 330 321 291 294       Results from last 7 days   Lab Units 05/07/22  0149   INR  0.87*       Assessment / Plan     ASSESSMENT:  1. Hyponatremia euvolemic: Acute on chronic. Urine chemistries with uosm 279 and Sarika 95.  Sodium improved with increasing salt tablets and ureNa.   2. HTN better controlled past 24 hours. On lisinopril.   3. Altered mental status, LOC.    Incidental meningioma.     4. Hypothyroid on replacement .     PLAN:   1. Continue current meds.         Nel Bradshaw MD  05/10/22  14:08 EDT    [facility]  [phone]

## 2022-05-10 NOTE — THERAPY EVALUATION
Acute Care - Speech Language Pathology   Swallow Initial Evaluation [facility]     Patient Name: Ritu Martino  : 1940  MRN: [id number]  Today's Date: 5/10/2022               Admit Date: 2022    Visit Dx:     ICD-10-CM ICD-9-CM   1. Fluent aphasia  R47.01 784.3   2. Urinary tract infection without hematuria, site unspecified  N39.0 599.0     Patient Active Problem List   Diagnosis   • Anxiety   • Hypothyroidism   • Hyponatremia   • Iron deficiency   • Essential hypertension, benign   • Fluent aphasia   • Hypochloremia   • Benzodiazepine dependence (HCC)     Past Medical History:   Diagnosis Date   • Anxiety    • Arthritis    • Bowel dysfunction 2021    since having surgery for diverticular infection   • Diverticulitis 2021    w/ rupture and infection required surgery and ostomy   • Essential hypertension, benign    • GERD (gastroesophageal reflux disease)    • Hernia, hiatal    • Hypercholesterolemia    • Hypokalemia    • Hyponatremia     chronic low with occasional normal level   • Hypothyroidism     estimated time frame pt nor  could remember exactly how long has been on medication   • Insomnia    • Iron deficiency      Past Surgical History:   Procedure Laterality Date   • APPENDECTOMY     • BACK SURGERY     •  SECTION     • COLON SURGERY  2021    to address ruptured and infected diverticular dz, ostomy also placed   • COLOSTOMY CLOSURE  10/2021    ostomy removed   • HEMORRHOIDECTOMY     • KNEE ARTHROPLASTY         SLP Recommendation and Plan  SLP Swallowing Diagnosis: R/O pharyngeal dysphagia (05/10/22 1000)  SLP Diet Recommendation: mechanical soft with no mixed consistencies, nectar thick liquids (05/10/22 1000)  Recommended Precautions and Strategies: upright posture during/after eating, small bites of food and sips of liquid (05/10/22 1000)  SLP Rec. for Method of Medication Administration: meds whole, meds crushed, with thick liquids, with pudding or  "applesauce, as tolerated (05/10/22 1000)     Monitor for Signs of Aspiration: yes, notify SLP if any concerns (05/10/22 1000)  Recommended Diagnostics: reassess via VFSS ([facility]) (05/10/22 1000)  Swallow Criteria for Skilled Therapeutic Interventions Met: demonstrates skilled criteria (05/10/22 1000)  Anticipated Discharge Disposition (SLP): unknown (05/10/22 1000)  Rehab Potential/Prognosis, Swallowing: good, to achieve stated therapy goals (05/10/22 1000)  Therapy Frequency (Swallow): PRN (05/10/22 1000)  Predicted Duration Therapy Intervention (Days): until discharge (05/10/22 1000)                                  Plan of Care Reviewed With: patient  Outcome Evaluation: Patient seen for clinical swallow assessment after status change. MRI revealed frontoparietal mass.      SWALLOW EVALUATION (last 72 hours)     SLP Adult Swallow Evaluation     Row Name 05/10/22 1000 05/07/22 1200                Rehab Evaluation    Document Type evaluation  -SH evaluation  -AB       Subjective Information -- no complaints  -AB       Patient Observations -- alert;cooperative;agree to therapy  -AB       Patient/Family/Caregiver Comments/Observations -- Pt pleasant, cooperative. Daughter at bedside throughout. question STM impairment, frequently requesting to urinate despite reminders from daughter/SLP catheter in place.  -AB       Patient Effort adequate  -SH adequate  -AB       Symptoms Noted During/After Treatment -- none  -AB                General Information    Patient Profile Reviewed yes  -SH yes  -AB       Pertinent History Of Current Problem L frontoparietal mass  -SH Per H&P, \"82-year-old right-handed white female with known diagnosis of hypertension, hyperlipidemia, hypothyroidism, who comes in with 2 weeks of generalized weakness and 1 day of disorientation/confusion and altered mentation.  No obvious focal weakness/numbness/facial droop or slurred speech was been noted.  Patient has had similar episode in the past with " "hyponatremia, which had improved after her sodium levels improved.  Patient is generally tired, and most of the information obtained from the daughter at the bedside.\"  -AB       Current Method of Nutrition NPO  -SH NPO  -AB       Precautions/Limitations, Vision WFL  -SH WFL  -AB       Precautions/Limitations, Hearing WFL  -SH WFL  -AB       Prior Level of Function-Communication WFL  -SH WFL  -AB       Prior Level of Function-Swallowing regular textures;thin liquids  -SH regular textures;thin liquids  -AB       Plans/Goals Discussed with patient;agreed upon  -SH patient and family;agreed upon  -AB       Barriers to Rehab none identified  -SH none identified  -AB       Patient's Goals for Discharge -- return to regular diet  -AB       Family Goals for Discharge -- patient able to return to regular diet  -AB                Pain    Additional Documentation Pain Scale: Word Pre/Post-Treatment (Group)  -SH Pain Scale: Numbers Pre/Post-Treatment (Group)  -AB                Pain Scale: Numbers Pre/Post-Treatment    Pretreatment Pain Rating 0/10 - no pain  -SH 0/10 - no pain  -AB       Posttreatment Pain Rating -- 0/10 - no pain  -AB                Oral Motor Structure and Function    Oral Lesions or Structural Abnormalities and/or variants -- none  -AB       Dentition Assessment natural, present and adequate  - natural, present and adequate  -AB       Secretion Management WNL/WFL  -SH WNL/WFL  -AB       Mucosal Quality -- moist, healthy  -AB       Gag Response -- --  did not assess  -AB       Volitional Swallow WFL  -SH WFL  -AB       Volitional Cough weak  -SH weak  -AB                Oral Musculature and Cranial Nerve Assessment    Oral Motor General Assessment oral labial or buccal impairment  -SH WFL  -AB       Oral Labial or Buccal Impairment, Detail, Cranial Nerve VII (Facial): -- --  mildly reduced protrusion  -AB       Oral Motor, Comment Mild L facial assymetry  - --                General Eating/Swallowing " Observations    Respiratory Support Currently in Use -- room air  -AB       Eating/Swallowing Skills -- fed by SLP  -AB       Positioning During Eating -- upright 90 degree  -AB       Utensils Used -- spoon;cup;straw  -AB       Consistencies Trialed -- regular textures;mixed consistency;chopped;pureed;ice chips;thin liquids  -AB                Clinical Swallow Eval    Clinical Swallow Evaluation Summary Patient seen for clinical swallow assessment after status change. MRI revealed L frontoparietal mass. Pt with question of L facial droop at times. Prolonged oral phase with thins and solids. Question of swallow delay with thins. No overt s/s of aspiration with nectar via straw, puree, or mech soft. Immediate cough with thins via cup and straw as liquid wash with regular. SLP recs nectar and mech soft, no mixed. Meds whole or crushed with nectar or puree. Will follow for VFSS to further assess need for diet modification.  - Bedside swallow evaluation completed, orders received per stroke protocol. Pt initially admitted with fluent aphasia, primarily resolved, CTH and imaging unremarkable. Oral phase unremarkable. A-P transport and mastication timely and efficient. No oral residuals. Pharyngeal phase with no overt s/s aspiration.  Palpation suggests age-appropriate hyolaryngeal elevation.   Recommend: regular and thin liquid diet. Medications: with thin liquids. Compensations: small bites/sips, upright for all meals.   Discussed results, rationale and recommendations with pt and daughter at bedside. Aphasia resolved, MD notes defer additional MRI imaging d/t claustrophobia.   SLP to sign off at this time as swallow WNL. Please re-consult if additional workup required for dysphagia or speech/language.  -AB                SLP Evaluation Clinical Impression    SLP Swallowing Diagnosis R/O pharyngeal dysphagia  - functional oral phase;functional pharyngeal phase  -AB       Functional Impact risk of aspiration/pneumonia   -SH no impact on function  -AB       Rehab Potential/Prognosis, Swallowing good, to achieve stated therapy goals  -SH --       Swallow Criteria for Skilled Therapeutic Interventions Met demonstrates skilled criteria  -SH no problems identified which require skilled intervention;baseline status  -AB                SLP Treatment Clinical Impressions    Care Plan Review -- evaluation/treatment results reviewed;care plan/treatment goals reviewed;risks/benefits reviewed;current/potential barriers reviewed;patient/other agree to care plan  -AB       Care Plan Review, Other Participant(s) -- daughter  -AB                Recommendations    Therapy Frequency (Swallow) PRN  -SH evaluation only  -AB       Predicted Duration Therapy Intervention (Days) until discharge  -SH --       SLP Diet Recommendation mechanical soft with no mixed consistencies;nectar thick liquids  -SH regular textures;thin liquids  -AB       Recommended Diagnostics reassess via VFSS ([facility])  -SH No further SLP services recommended  -AB       Recommended Precautions and Strategies upright posture during/after eating;small bites of food and sips of liquid  -SH upright posture during/after eating;small bites of food and sips of liquid  -AB       Oral Care Recommendations Oral Care BID/PRN  -SH Oral Care BID/PRN  -AB       SLP Rec. for Method of Medication Administration meds whole;meds crushed;with thick liquids;with pudding or applesauce;as tolerated  -SH with thin liquids  -AB       Monitor for Signs of Aspiration yes;notify SLP if any concerns  -SH yes;notify SLP if any concerns  -AB       Anticipated Discharge Disposition (SLP) unknown  -SH unknown  -AB                Swallow Goals (SLP)    Oral Nutrition/Hydration Goal Selection (SLP) oral nutrition/hydration, SLP goal 1  -SH --                Oral Nutrition/Hydration Goal 1 (SLP)    Oral Nutrition/Hydration Goal 1, SLP Pt will aren PO without overt s/s of asp.  -SH --       Time Frame (Oral  Nutrition/Hydration Goal 1, SLP) by discharge  - --             User Key  (r) = Recorded By, (t) = Taken By, (c) = Cosigned By    Initials Name Effective Dates     Alejandra Davidson, MS CCC-SLP 06/16/21 -     AB Kelsey Chavez, MS CCC-SLP 08/01/21 -                 EDUCATION  The patient has been educated in the following areas:   Dysphagia (Swallowing Impairment).        SLP GOALS     Row Name 05/10/22 1000             Oral Nutrition/Hydration Goal 1 (SLP)    Oral Nutrition/Hydration Goal 1, SLP Pt will aren PO without overt s/s of asp.  -      Time Frame (Oral Nutrition/Hydration Goal 1, SLP) by discharge  -            User Key  (r) = Recorded By, (t) = Taken By, (c) = Cosigned By    Initials Name Provider Type     Alejandra Davidson MS CCC-SLP Speech and Language Pathologist              SLP Outcome Measures (last 72 hours)     SLP Outcome Measures     Row Name 05/10/22 1000 05/07/22 1200          SLP Outcome Measures    Outcome Measure Used? Adult NOMS  -SH Adult NOMS  -AB            Adult FCM Scores    FCM Chosen Swallowing  -SH Swallowing  -AB     Swallowing FCM Score 4  -SH 7  -AB           User Key  (r) = Recorded By, (t) = Taken By, (c) = Cosigned By    Initials Name Effective Dates     Alejandra Davidson MS ANGELES CCC-SLP 06/16/21 -     AB Kelsey Chavez, MS CCC-SLP 08/01/21 -                  Time Calculation:    Time Calculation- SLP     Row Name 05/10/22 1025             Time Calculation- [facility]    SLP Start Time 0920  -      SLP Received On 05/10/22  -              Untimed Charges    48321-JS Eval Oral Pharyng Swallow Minutes 60  -              Total Minutes    Untimed Charges Total Minutes 60  -       Total Minutes 60  -SH            User Key  (r) = Recorded By, (t) = Taken By, (c) = Cosigned By    Initials Name Provider Type     Alejandra Davidson MS CCC-SLP Speech and Language Pathologist                Therapy Charges for Today     Code Description Service Date Service Provider Modifiers Qty     [id number] [facility] [place] ORAL PHARYNG SWALLOW 4 5/10/2022 Alejandra Davidson, MS CCC-SLP GN 1               Alejandra Davidson MS CCC-SLP  5/10/2022

## 2022-05-10 NOTE — PLAN OF CARE
Goal Outcome Evaluation:  Plan of Care Reviewed With: patient           Outcome Evaluation: Patient seen for clinical swallow assessment after status change. MRI revealed L frontoparietal mass. Pt with question of L facial droop at times. Prolonged oral phase with thins and solids. Question of swallow delay with thins. No overt s/s of aspiration with nectar via straw, puree, or mech soft. Immediate cough with thins via cup and straw as liquid wash with regular. SLP recs nectar and mech soft, no mixed. Meds whole or crushed with nectar or puree. Will follow for VFSS to further assess need for diet modification.     Patient was not wearing a face mask during this therapy encounter. Therapist used appropriate personal protective equipment including mask, eye protection and gloves.  Mask used was standard procedure mask. Appropriate PPE was worn during the entire therapy session. Hand hygiene was completed before and after therapy session. Patient is not in enhanced droplet precautions.

## 2022-05-10 NOTE — PROGRESS NOTES
"DOS: 5/10/2022  NAME: Ritu Martino   : 1940  PCP: Reyes, Miriam Mercado, MD  Chief Complaint   Patient presents with   • Altered Mental Status   • Unable to speak   • Fatigue     Neurology    Subjective: The patient states she feels a little better today. Denies headache or focal weakness. Does not remember the episode of loss of consciousness yesterday. There was not family at the bedside when I saw her.     Objective:  Vital signs: /81 (BP Location: Right arm, Patient Position: Lying)   Pulse 92   Temp 98 °F (36.7 °C) (Oral)   Resp 16   Ht 152.4 cm (60\")   Wt 56.8 kg (125 lb 4.8 oz)   SpO2 96%   BMI 24.47 kg/m²       General appearance: Well developed, well nourished, alert and cooperative.   HEENT: Normocephalic.   Neck: Supple.   Cardiac: Regular rate and rhythm.   Peripheral Vasculature: Radial pulses are equal and symmetric.  Chest Exam: Clear to auscultation bilaterally, no wheezes, no rhonchi.  Extremities: Normal, no edema.   Skin: No rashes or birthmarks.     Higher integrative function: Oriented to time, place, person. Mildly impaired recent memory, normal attn/concentration. Spontaneous speech, fund of vocabulary are normal.   CN II: Visual fields intact.    CN III IV VI: Extraocular movements are full without nystagmus. Pupils are equal, round, and reactive to light.   CN V: Normal facial sensation.  CN VII: Facial movements are symmetric, no weakness.   CN VIII: Auditory acuity is normal.   CN IX & X: Symmetric palatal movement.   CN XI: Sternocleidomastoid and trapezius are normal. No weakness.   CN XII: The tongue is midline. No atrophy or fasciculations.   Motor: Normal muscle strength, bulk, and tone in upper and lower extremities. No fasciculations, rigidity, spasticity or abnormal movements.   Sensation: Normal light touch.  Station and gait: Deferred.  Muscle stretch reflexes:  Plantar reflexes are flexor bilaterally.   Coordination: Finger to nose test showed no " dysmetria. Rapid alternating movements were normal. Heel to shin normal.     Scheduled Meds:ALPRAZolam, 0.25 mg, Oral, BID  aspirin, 325 mg, Oral, Daily  atorvastatin, 80 mg, Oral, Nightly  cefTRIAXone, 1 g, Intravenous, Q24H  ketoconazole, 1 application, Topical, Q24H  levothyroxine, 50 mcg, Oral, Q AM  lisinopril, 20 mg, Oral, Q24H  nystatin, , Topical, Q12H  sodium chloride, 10 mL, Intravenous, Q12H  sodium chloride, 2 g, Oral, TID With Meals  Urea, 30 g, Oral, BID      Continuous Infusions:   PRN Meds:.•  acetaminophen **OR** acetaminophen **OR** acetaminophen  •  LORazepam  •  nitroglycerin  •  potassium & sodium phosphates **OR** potassium & sodium phosphates  •  potassium chloride **OR** potassium chloride **OR** potassium chloride  •  [COMPLETED] Insert peripheral IV **AND** sodium chloride  •  sodium chloride    Laboratory results:  Lab Results   Component Value Date    GLUCOSE 100 (H) 05/10/2022    CALCIUM 9.6 05/10/2022     (L) 05/10/2022    K 3.6 05/10/2022    CO2 22.8 05/10/2022    CL 96 (L) 05/10/2022    BUN 20 05/10/2022    CREATININE 0.66 05/10/2022    EGFRIFNONA 66 03/21/2019    BCR 30.3 (H) 05/10/2022    ANIONGAP 13.2 05/10/2022     Lab Results   Component Value Date    WBC 7.17 05/10/2022    HGB 14.0 05/10/2022    HCT 39.8 05/10/2022    MCV 91.7 05/10/2022     05/10/2022     Lab Results   Component Value Date    CHOL 149 05/07/2022     Lab Results   Component Value Date    HDL 99 (H) 05/07/2022     Lab Results   Component Value Date    LDL 39 05/07/2022     Lab Results   Component Value Date    TRIG 49 05/07/2022         Lab 05/07/22  0911   HEMOGLOBIN A1C 5.00      Review and interpretation of imaging:    Impression:  82-year-old female with HTN, HLD, chronic hyponatremia, hypothyroidism, and GERD who was admitted 5/7 with AMS and difficulty speaking.  She has a questionable UTI and was treated with ceftriaxone.  Sodium was 122 on presentation and she was evaluated by nephrology who  felt she had an acute exacerbation of chronic hyponatremia due to decreased solute intake in the setting of ACE inhibitor use, hyponatremia has improved on fluid restriction and with increase in salt tablet intake.      She was initially seen by Dr. Kit Tracy for AMS and concern for aphasia however he felt she had a metabolic encephalopathy secondary to hyponatremia, hypomagnesemia, and UTI.  CT head and CTA were unremarkable.    Neurology was reconsulted and she was seen by Dr. Amaya yesterday after a rapid response team was called when the patient had a sudden episode of eyes fluttering and eyes rolling back with decreased responsiveness.  Team D imaging was repeated and was only significant for an incidental meningioma, MRI ordered.    Work-up:  CT head 5/9: No acute infarct, small left frontal lobe intra-axial enhancement consistent with meningioma.  CTA head/neck 5/9: No significant stenosis, aneurysm, or occlusion.  CT perfusion 5/9: 5 cc questionable area of mild lead delayed perfusion in the left temporal lobe, possibly artifactual.  MRI brain with and without contrast 5/9: Moderately extensive small vessel disease, tiny enhancing dural based mass in the left frontal parietal region consistent with a meningioma otherwise unremarkable MRI brain.  EEG 5/10: Normal EEG during wakefulness and very early drowsiness, no focal or epileptiform activity seen.  Labs:Alcohol level not elevated, CK level 233, COVID testing negative this admission, hemoglobin A1c 5.0, LDL 39, TSH 1.79    Diagnoses:  Episode of LOC  Incidental finding of small left meningioma  Toxic metabolic encephalopathy related to hyponatremia and UTI, resolved      Plan:  Unsure what caused her episode of LOC however MRI negative for acute findings and left meningioma felt to be incidental, EEG negative, so no AED recommended.  Recommend outpatient follow-up with neurosurgery to follow her meningioma.  Recommend no driving for 90 days following  episode of loss of consciousness, discussed with the patient.  Okay for discharge from a neurology standpoint.  Dr. Davidson has arranged outpatient follow-up with Dr. Emmanuel.  D/W Dr Amaya today. Will sign off but please call with further questions/concerns.

## 2022-05-10 NOTE — PLAN OF CARE
Problem: Fall Injury Risk  Goal: Absence of Fall and Fall-Related Injury  Outcome: Ongoing, Progressing  Intervention: Identify and Manage Contributors  Recent Flowsheet Documentation  Taken 5/10/2022 0016 by Mely Stone RN  Medication Review/Management: medications reviewed  Taken 5/9/2022 2050 by Mely Stone RN  Medication Review/Management: medications reviewed  Intervention: Promote Injury-Free Environment  Recent Flowsheet Documentation  Taken 5/10/2022 0440 by Mely Stone RN  Safety Promotion/Fall Prevention:   activity supervised   assistive device/personal items within reach   clutter free environment maintained   fall prevention program maintained   nonskid shoes/slippers when out of bed   room organization consistent   safety round/check completed  Taken 5/10/2022 0252 by Mely Stone RN  Safety Promotion/Fall Prevention:   assistive device/personal items within reach   activity supervised   clutter free environment maintained   fall prevention program maintained   nonskid shoes/slippers when out of bed   room organization consistent   safety round/check completed  Taken 5/10/2022 0016 by Mely Stone RN  Safety Promotion/Fall Prevention:   activity supervised   assistive device/personal items within reach   clutter free environment maintained   fall prevention program maintained   nonskid shoes/slippers when out of bed   room organization consistent   safety round/check completed  Taken 5/9/2022 2240 by Mely Stone RN  Safety Promotion/Fall Prevention:   activity supervised   assistive device/personal items within reach   clutter free environment maintained   fall prevention program maintained   nonskid shoes/slippers when out of bed   room organization consistent   safety round/check completed  Taken 5/9/2022 2050 by Mely Stone RN  Safety Promotion/Fall Prevention:   activity supervised   assistive device/personal items within reach   clutter free environment maintained   fall  prevention program maintained   nonskid shoes/slippers when out of bed   room organization consistent   safety round/check completed     Problem: Adult Inpatient Plan of Care  Goal: Plan of Care Review  Outcome: Ongoing, Progressing  Goal: Patient-Specific Goal (Individualized)  Outcome: Ongoing, Progressing  Goal: Absence of Hospital-Acquired Illness or Injury  Outcome: Ongoing, Progressing  Intervention: Identify and Manage Fall Risk  Recent Flowsheet Documentation  Taken 5/10/2022 0440 by Mely Stone RN  Safety Promotion/Fall Prevention:   activity supervised   assistive device/personal items within reach   clutter free environment maintained   fall prevention program maintained   nonskid shoes/slippers when out of bed   room organization consistent   safety round/check completed  Taken 5/10/2022 0252 by Mely Stone RN  Safety Promotion/Fall Prevention:   assistive device/personal items within reach   activity supervised   clutter free environment maintained   fall prevention program maintained   nonskid shoes/slippers when out of bed   room organization consistent   safety round/check completed  Taken 5/10/2022 0016 by Mely Stone RN  Safety Promotion/Fall Prevention:   activity supervised   assistive device/personal items within reach   clutter free environment maintained   fall prevention program maintained   nonskid shoes/slippers when out of bed   room organization consistent   safety round/check completed  Taken 5/9/2022 2240 by Mely Stone, DANYEL  Safety Promotion/Fall Prevention:   activity supervised   assistive device/personal items within reach   clutter free environment maintained   fall prevention program maintained   nonskid shoes/slippers when out of bed   room organization consistent   safety round/check completed  Taken 5/9/2022 2050 by Mely Stone, RN  Safety Promotion/Fall Prevention:   activity supervised   assistive device/personal items within reach   clutter free environment  maintained   fall prevention program maintained   nonskid shoes/slippers when out of bed   room organization consistent   safety round/check completed  Intervention: Prevent Skin Injury  Recent Flowsheet Documentation  Taken 5/10/2022 0440 by Mely Stone RN  Body Position: position changed independently  Taken 5/10/2022 0252 by Mely Stone RN  Body Position: position changed independently  Taken 5/10/2022 0016 by Mely Stone RN  Body Position: position changed independently  Skin Protection:   adhesive use limited   tubing/devices free from skin contact   transparent dressing maintained   incontinence pads utilized  Taken 5/9/2022 2240 by Mely Stone RN  Body Position: position changed independently  Taken 5/9/2022 2050 by Mely Stone RN  Body Position: position changed independently  Skin Protection:   adhesive use limited   transparent dressing maintained   tubing/devices free from skin contact  Intervention: Prevent and Manage VTE (Venous Thromboembolism) Risk  Recent Flowsheet Documentation  Taken 5/10/2022 0016 by Mely Stone RN  VTE Prevention/Management:   bilateral   dorsiflexion/plantar flexion performed  Taken 5/9/2022 2050 by Mely Stone RN  VTE Prevention/Management:   bilateral   dorsiflexion/plantar flexion performed  Intervention: Prevent Infection  Recent Flowsheet Documentation  Taken 5/10/2022 0440 by Mely Stone RN  Infection Prevention:   rest/sleep promoted   single patient room provided  Taken 5/10/2022 0252 by Mely Stone RN  Infection Prevention:   rest/sleep promoted   single patient room provided  Taken 5/10/2022 0016 by Mely Stone RN  Infection Prevention:   rest/sleep promoted   single patient room provided  Taken 5/9/2022 2240 by Mely Stone RN  Infection Prevention:   rest/sleep promoted   single patient room provided  Taken 5/9/2022 2050 by Mely Stone RN  Infection Prevention:   rest/sleep promoted   single patient room provided  Goal: Optimal  Comfort and Wellbeing  Outcome: Ongoing, Progressing  Intervention: Provide Person-Centered Care  Recent Flowsheet Documentation  Taken 5/10/2022 0016 by Mely Stone RN  Trust Relationship/Rapport: thoughts/feelings acknowledged  Taken 5/9/2022 2050 by Mely Stone RN  Trust Relationship/Rapport:   care explained   choices provided   emotional support provided   empathic listening provided   questions answered   questions encouraged   reassurance provided   thoughts/feelings acknowledged  Goal: Readiness for Transition of Care  Outcome: Ongoing, Progressing     Problem: Skin Injury Risk Increased  Goal: Skin Health and Integrity  Outcome: Ongoing, Progressing  Intervention: Optimize Skin Protection  Recent Flowsheet Documentation  Taken 5/10/2022 0440 by Mely Stone RN  Head of Bed (HOB) Positioning: HOB at 20-30 degrees  Taken 5/10/2022 0252 by Mely Stone RN  Head of Bed (HOB) Positioning: HOB at 20-30 degrees  Taken 5/10/2022 0016 by Mely Stone RN  Pressure Reduction Techniques:   frequent weight shift encouraged   heels elevated off bed   weight shift assistance provided  Head of Bed (HOB) Positioning: HOB at 20-30 degrees  Pressure Reduction Devices:   alternating pressure pump (ADD)   heel offloading device utilized   positioning supports utilized   pressure-redistributing mattress utilized  Skin Protection:   adhesive use limited   tubing/devices free from skin contact   transparent dressing maintained   incontinence pads utilized  Taken 5/9/2022 2240 by Mely Stone RN  Head of Bed (HOB) Positioning: HOB at 20-30 degrees  Taken 5/9/2022 2050 by Mely Stone RN  Pressure Reduction Techniques:   frequent weight shift encouraged   heels elevated off bed   weight shift assistance provided  Head of Bed (HOB) Positioning: HOB at 20-30 degrees  Pressure Reduction Devices:   alternating pressure pump (ADD)   heel offloading device utilized   positioning supports utilized   pressure-redistributing  mattress utilized  Skin Protection:   adhesive use limited   transparent dressing maintained   tubing/devices free from skin contact     Problem: Electrolyte Imbalance  Goal: Electrolyte Balance  Outcome: Ongoing, Progressing     Problem: Fall Injury Risk  Goal: Absence of Fall and Fall-Related Injury  Outcome: Ongoing, Progressing  Intervention: Identify and Manage Contributors  Recent Flowsheet Documentation  Taken 5/10/2022 0016 by Mely Stone RN  Medication Review/Management: medications reviewed  Taken 5/9/2022 2050 by Mely Stone RN  Medication Review/Management: medications reviewed  Intervention: Promote Injury-Free Environment  Recent Flowsheet Documentation  Taken 5/10/2022 0440 by Mely Stone RN  Safety Promotion/Fall Prevention:   activity supervised   assistive device/personal items within reach   clutter free environment maintained   fall prevention program maintained   nonskid shoes/slippers when out of bed   room organization consistent   safety round/check completed  Taken 5/10/2022 0252 by Mely Stone RN  Safety Promotion/Fall Prevention:   assistive device/personal items within reach   activity supervised   clutter free environment maintained   fall prevention program maintained   nonskid shoes/slippers when out of bed   room organization consistent   safety round/check completed  Taken 5/10/2022 0016 by Mely Stone RN  Safety Promotion/Fall Prevention:   activity supervised   assistive device/personal items within reach   clutter free environment maintained   fall prevention program maintained   nonskid shoes/slippers when out of bed   room organization consistent   safety round/check completed  Taken 5/9/2022 2240 by Mely Stone RN  Safety Promotion/Fall Prevention:   activity supervised   assistive device/personal items within reach   clutter free environment maintained   fall prevention program maintained   nonskid shoes/slippers when out of bed   room organization  consistent   safety round/check completed  Taken 5/9/2022 2050 by Mely Stone RN  Safety Promotion/Fall Prevention:   activity supervised   assistive device/personal items within reach   clutter free environment maintained   fall prevention program maintained   nonskid shoes/slippers when out of bed   room organization consistent   safety round/check completed     Problem: Adult Inpatient Plan of Care  Goal: Plan of Care Review  Outcome: Ongoing, Progressing  Goal: Patient-Specific Goal (Individualized)  Outcome: Ongoing, Progressing  Goal: Absence of Hospital-Acquired Illness or Injury  Outcome: Ongoing, Progressing  Intervention: Identify and Manage Fall Risk  Recent Flowsheet Documentation  Taken 5/10/2022 0440 by Mely Stone RN  Safety Promotion/Fall Prevention:   activity supervised   assistive device/personal items within reach   clutter free environment maintained   fall prevention program maintained   nonskid shoes/slippers when out of bed   room organization consistent   safety round/check completed  Taken 5/10/2022 0252 by Mely Stone RN  Safety Promotion/Fall Prevention:   assistive device/personal items within reach   activity supervised   clutter free environment maintained   fall prevention program maintained   nonskid shoes/slippers when out of bed   room organization consistent   safety round/check completed  Taken 5/10/2022 0016 by Mely Stone RN  Safety Promotion/Fall Prevention:   activity supervised   assistive device/personal items within reach   clutter free environment maintained   fall prevention program maintained   nonskid shoes/slippers when out of bed   room organization consistent   safety round/check completed  Taken 5/9/2022 2240 by Mely Stone, RN  Safety Promotion/Fall Prevention:   activity supervised   assistive device/personal items within reach   clutter free environment maintained   fall prevention program maintained   nonskid shoes/slippers when out of bed   room  organization consistent   safety round/check completed  Taken 5/9/2022 2050 by Mely Stone RN  Safety Promotion/Fall Prevention:   activity supervised   assistive device/personal items within reach   clutter free environment maintained   fall prevention program maintained   nonskid shoes/slippers when out of bed   room organization consistent   safety round/check completed  Intervention: Prevent Skin Injury  Recent Flowsheet Documentation  Taken 5/10/2022 0440 by Mely Stone RN  Body Position: position changed independently  Taken 5/10/2022 0252 by Mely Stone RN  Body Position: position changed independently  Taken 5/10/2022 0016 by Mely Stone RN  Body Position: position changed independently  Skin Protection:   adhesive use limited   tubing/devices free from skin contact   transparent dressing maintained   incontinence pads utilized  Taken 5/9/2022 2240 by Mely Stone RN  Body Position: position changed independently  Taken 5/9/2022 2050 by Mely Stone RN  Body Position: position changed independently  Skin Protection:   adhesive use limited   transparent dressing maintained   tubing/devices free from skin contact  Intervention: Prevent and Manage VTE (Venous Thromboembolism) Risk  Recent Flowsheet Documentation  Taken 5/10/2022 0016 by Mely Stone RN  VTE Prevention/Management:   bilateral   dorsiflexion/plantar flexion performed  Taken 5/9/2022 2050 by Mely Stone RN  VTE Prevention/Management:   bilateral   dorsiflexion/plantar flexion performed  Intervention: Prevent Infection  Recent Flowsheet Documentation  Taken 5/10/2022 0440 by Mely Stone RN  Infection Prevention:   rest/sleep promoted   single patient room provided  Taken 5/10/2022 0252 by Mely Stone RN  Infection Prevention:   rest/sleep promoted   single patient room provided  Taken 5/10/2022 0016 by Mely Stone RN  Infection Prevention:   rest/sleep promoted   single patient room provided  Taken 5/9/2022 2240 by  Stone, Mely, RN  Infection Prevention:   rest/sleep promoted   single patient room provided  Taken 5/9/2022 2050 by Mely Stone RN  Infection Prevention:   rest/sleep promoted   single patient room provided  Goal: Optimal Comfort and Wellbeing  Outcome: Ongoing, Progressing  Intervention: Provide Person-Centered Care  Recent Flowsheet Documentation  Taken 5/10/2022 0016 by Mely Stone RN  Trust Relationship/Rapport: thoughts/feelings acknowledged  Taken 5/9/2022 2050 by Mely Stone RN  Trust Relationship/Rapport:   care explained   choices provided   emotional support provided   empathic listening provided   questions answered   questions encouraged   reassurance provided   thoughts/feelings acknowledged  Goal: Readiness for Transition of Care  Outcome: Ongoing, Progressing     Problem: Skin Injury Risk Increased  Goal: Skin Health and Integrity  Outcome: Ongoing, Progressing  Intervention: Optimize Skin Protection  Recent Flowsheet Documentation  Taken 5/10/2022 0440 by Mely Stone RN  Head of Bed (HOB) Positioning: HOB at 20-30 degrees  Taken 5/10/2022 0252 by Mely Stone RN  Head of Bed (HOB) Positioning: HOB at 20-30 degrees  Taken 5/10/2022 0016 by Mely Stone RN  Pressure Reduction Techniques:   frequent weight shift encouraged   heels elevated off bed   weight shift assistance provided  Head of Bed (HOB) Positioning: HOB at 20-30 degrees  Pressure Reduction Devices:   alternating pressure pump (ADD)   heel offloading device utilized   positioning supports utilized   pressure-redistributing mattress utilized  Skin Protection:   adhesive use limited   tubing/devices free from skin contact   transparent dressing maintained   incontinence pads utilized  Taken 5/9/2022 2240 by Mely Stone RN  Head of Bed (HOB) Positioning: HOB at 20-30 degrees  Taken 5/9/2022 2050 by Mely Stone RN  Pressure Reduction Techniques:   frequent weight shift encouraged   heels elevated off bed   weight shift  assistance provided  Head of Bed (HOB) Positioning: HOB at 20-30 degrees  Pressure Reduction Devices:   alternating pressure pump (ADD)   heel offloading device utilized   positioning supports utilized   pressure-redistributing mattress utilized  Skin Protection:   adhesive use limited   transparent dressing maintained   tubing/devices free from skin contact     Problem: Electrolyte Imbalance  Goal: Electrolyte Balance  Outcome: Ongoing, Progressing   Goal Outcome Evaluation:

## 2022-05-10 NOTE — CASE MANAGEMENT/SOCIAL WORK
Continued Stay Note  [facility]     Patient Name: Ritu Martino  MRN: [id number]  Today's Date: 5/10/2022    Admit Date: 5/7/2022     Discharge Plan     Row Name 05/10/22 1637       Plan    Plan [facility]    Plan Comments S/W pt, her spouse Nu and her dtr Marielle Chaney to discuss SNF options.  [facility] has accepted pending precert. Pt/ family request to check w/ Manfred Velasquez.  S/[facility], they cannot accept.  Pt and family are in agreement / [facility].  Arin/ Vanessa Hart notified and they will start precert.  .........Bailey DE LEON/ NORIS               Discharge Codes    No documentation.               Expected Discharge Date and Time     Expected Discharge Date Expected Discharge Time    May 11, 2022             Bailey Harris RN

## 2022-05-10 NOTE — PROGRESS NOTES
Name: Ritu Martino ADMIT: 2022   : 1940  PCP: Reyes, Miriam Mercado, MD    MRN: [id number] LOS: 3 days   AGE/SEX: 82 y.o. female  ROOM: [facility]     Subjective   Subjective   Patient is seen at bedside.       Objective   Objective   Vital Signs  Temp:  [98 °F (36.7 °C)-98.8 °F (37.1 °C)] 98 °F (36.7 °C)  Heart Rate:  [70-99] 92  Resp:  [16] 16  BP: (116-138)/(71-81) 120/81  SpO2:  [96 %-97 %] 96 %  on   ;   Device (Oxygen Therapy): room air  Body mass index is 24.47 kg/m².  Physical Exam  General, awake and alert.  Appears globally weak  Head and ENT, normocephalic and atraumatic.  Lungs, symmetric expansion, equal air entry bilaterally.  Heart, regular rate and rhythm.  Abdomen, soft and nontender.  Extremities, no clubbing or cyanosis.  Neuro, no focal deficits.  Skin: Warm and no rash.  Psych, normal mood and affect.  Musculoskeletal, joint examination is grossly normal.    Results Review     I reviewed the patient's new clinical results.  Results from last 7 days   Lab Units 05/10/22  0951 22  0149   WBC 10*3/mm3 7.17 7.86 7.62 7.18   HEMOGLOBIN g/dL 14.0 14.4 13.2 12.8   PLATELETS 10*3/mm3 330 321 291 294     Results from last 7 days   Lab Units 05/10/22  0951 22  0455 22  2320 22  0614 22  1512 22  0149   SODIUM mmol/L 132* 130*  --  124* 124* 122*   POTASSIUM mmol/L 3.6 4.0 4.5 2.8*  --  3.4*   CHLORIDE mmol/L 96* 97*  --  87*  --  85*   CO2 mmol/L 22.8 16.5*  --  23.0  --  23.6   BUN mg/dL 20 36*  --  15  --  7*   CREATININE mg/dL 0.66 0.55*  --  0.45*  --  0.63   GLUCOSE mg/dL 100* 129*  --  122*  --  101*   EGFR mL/min/1.73 87.7 91.6  --  96.2  --  88.7     Results from last 7 days   Lab Units 05/10/22  0951 22  0455 22  0614 22  0149   ALBUMIN g/dL 4.70 4.60 4.30 4.50   BILIRUBIN mg/dL 0.9 0.9  --  1.0   ALK PHOS U/L 56 61  --  57   AST (SGOT) U/L 22 20  --  28   ALT (SGPT) U/L 17 26  --  19     Results  from last 7 days   Lab Units 05/10/22  0951 05/09/22  1245 05/09/22  0455 05/08/22  0614 05/07/22  0149   CALCIUM mg/dL 9.6  --  9.8 9.2 9.4   ALBUMIN g/dL 4.70  --  4.60 4.30 4.50   MAGNESIUM mg/dL  --   --  1.8 1.9 1.5*   PHOSPHORUS mg/dL  --  3.4 1.5* 2.8  --        Glucose   Date/Time Value Ref Range Status   05/10/2022 1030 110 70 - 130 mg/dL Final     Comment:     Meter: [id number] : [id number] Kelvin Claire CNA   05/10/2022 0617 106 70 - 130 mg/dL Final     Comment:     Meter: [id number] : [id number] Sotne Mely RN   05/09/2022 2347 100 70 - 130 mg/dL Final     Comment:     Meter: [id number] : [id number] Paddysamuel Moseley NA   05/09/2022 1539 95 70 - 130 mg/dL Final     Comment:     Meter: [id number] : [id number] Jer Lewis NA   05/09/2022 1102 98 70 - 130 mg/dL Final     Comment:     Meter: [id number] : [id number] [facility] CNA   05/09/2022 0931 126 70 - 130 mg/dL Final     Comment:     Meter: [id number] : [id number] Hill Mcmullen RN   05/09/2022 0610 131 (H) 70 - 130 mg/dL Final     Comment:     Meter: [id number] : [id number] Cruz Malorie SERENITY       CT Angiogram Neck    Result Date: 5/9/2022  No evidence of acute infarction, intracranial hemorrhage or of abnormal intra-axial enhancement. A small partially calcified dural based mass is appreciated overlying the left frontal lobe superolaterally consistent with a meningioma measuring approximately 10 x 8 x 4 mm in size. There was 0% stenosis involving the carotid bifurcations. There is no evidence of proximal intracranial high-grade stenosis, aneurysm or occlusion. The CT perfusion demonstrated a 5 cc questionable area of mildly delayed (4-6 seconds) perfusion involving the left temporal lobe inferiorly and medially. This may be artifactual.  The noncontrasted CT examination was made available for interpretation at 1009 hours and a preliminary report called to Dr. Miller at 1010 hours. The CT angiogram was made  available for interpretation at 1022 hours and the preliminary report given to Dr. Miller at 1025 hours.    AI analysis of LVO was utilized.  Radiation dose reduction techniques were utilized, including automated exposure control and exposure modulation based on body size.  This report was finalized on 5/9/2022 12:03 PM by Dr. Santigao Hurtado M.D.      MRI Brain With & Without Contrast    Result Date: 5/9/2022  Evidence of moderately extensive small vessel chronic ischemic change as described. Tiny enhancing dural based mass in the left frontoparietal region consistent with a meningioma. Otherwise unremarkable MRI of the brain. There is no evidence of intracranial metastatic disease.  This report was finalized on 5/9/2022 11:07 PM by Dr. Dex Cuellar M.D.      CT Angiogram Head w AI Analysis of LVO    Result Date: 5/9/2022  No evidence of acute infarction, intracranial hemorrhage or of abnormal intra-axial enhancement. A small partially calcified dural based mass is appreciated overlying the left frontal lobe superolaterally consistent with a meningioma measuring approximately 10 x 8 x 4 mm in size. There was 0% stenosis involving the carotid bifurcations. There is no evidence of proximal intracranial high-grade stenosis, aneurysm or occlusion. The CT perfusion demonstrated a 5 cc questionable area of mildly delayed (4-6 seconds) perfusion involving the left temporal lobe inferiorly and medially. This may be artifactual.  The noncontrasted CT examination was made available for interpretation at 1009 hours and a preliminary report called to Dr. Miller at 1010 hours. The CT angiogram was made available for interpretation at 1022 hours and the preliminary report given to Dr. Miller at 1025 hours.    AI analysis of LVO was utilized.  Radiation dose reduction techniques were utilized, including automated exposure control and exposure modulation based on body size.  This report was finalized on 5/9/2022 12:03 PM  by Dr. Santiago Hurtado M.D.      CT CEREBRAL PERFUSION WITH & WITHOUT CONTRAST    Result Date: 5/9/2022  No evidence of acute infarction, intracranial hemorrhage or of abnormal intra-axial enhancement. A small partially calcified dural based mass is appreciated overlying the left frontal lobe superolaterally consistent with a meningioma measuring approximately 10 x 8 x 4 mm in size. There was 0% stenosis involving the carotid bifurcations. There is no evidence of proximal intracranial high-grade stenosis, aneurysm or occlusion. The CT perfusion demonstrated a 5 cc questionable area of mildly delayed (4-6 seconds) perfusion involving the left temporal lobe inferiorly and medially. This may be artifactual.  The noncontrasted CT examination was made available for interpretation at 1009 hours and a preliminary report called to Dr. Miller at 1010 hours. The CT angiogram was made available for interpretation at 1022 hours and the preliminary report given to Dr. Miller at 1025 hours.    AI analysis of LVO was utilized.  Radiation dose reduction techniques were utilized, including automated exposure control and exposure modulation based on body size.  This report was finalized on 5/9/2022 12:03 PM by Dr. Santiago Hurtado M.D.      Scheduled Medications  ALPRAZolam, 0.25 mg, Oral, BID  aspirin, 325 mg, Oral, Daily  atorvastatin, 80 mg, Oral, Nightly  cefTRIAXone, 1 g, Intravenous, Q24H  ketoconazole, 1 application, Topical, Q24H  levothyroxine, 50 mcg, Oral, Q AM  lisinopril, 20 mg, Oral, Q24H  nystatin, , Topical, Q12H  sodium chloride, 10 mL, Intravenous, Q12H  sodium chloride, 2 g, Oral, TID With Meals  Urea, 30 g, Oral, BID    Infusions   Diet  Diet Dysphagia; IV - Mechanical Soft No Mixed Consistencies; Nectar / Syrup Thick       Assessment/Plan     Active Hospital Problems    Diagnosis  POA   • **Fluent aphasia [R47.01]  Yes   • Hypochloremia [E87.8]  Yes   • Benzodiazepine dependence (HCC) [F13.20]  Yes   •  Essential hypertension, benign [I10]  Yes   • Hyponatremia [E87.1]  Yes      Resolved Hospital Problems   No resolved problems to display.       82 y.o. female admitted with Fluent aphasia.    Assessment and plan:  1.    Altered mental status, neurology consultation was requested, code neuro was called, no indication of antiepileptic drugs based on neurology assessment.  Plan of care discussed with patient's  at bedside.  CT scan of the head, shows cerebral meningioma.  Follow-up with neurosurgery on outpatient basis.     2.  Hyponatremia and metabolic acidosis with hypophosphatemia, continue to recheck labs.  Repeat labs show improved phosphorus levels.  Nephrology on board and following, follow recommendations.     3.  Hypothyroidism, continue Synthroid.     4.  Essential hypertension, continue lisinopril.      5.  Chronic benzodiazepine dependence, contributing towards altered mental status.     6.  CODE STATUS is full code.  Further plans based on hospital course.     7.  DVT prophylaxis, use SCDs.     8.  Disposition, patient will need rehab placement.      Yusef Huber MD  [facility]  05/10/22  13:50 EDT

## 2022-05-11 ENCOUNTER — APPOINTMENT (OUTPATIENT)
Dept: GENERAL RADIOLOGY | Facility: HOSPITAL | Age: 82
End: 2022-05-11

## 2022-05-11 ENCOUNTER — READMISSION MANAGEMENT (OUTPATIENT)
Dept: CALL CENTER | Facility: HOSPITAL | Age: 82
End: 2022-05-11

## 2022-05-11 VITALS
RESPIRATION RATE: 18 BRPM | WEIGHT: 125.3 LBS | TEMPERATURE: 98.4 F | HEART RATE: 89 BPM | BODY MASS INDEX: 24.6 KG/M2 | DIASTOLIC BLOOD PRESSURE: 61 MMHG | SYSTOLIC BLOOD PRESSURE: 108 MMHG | HEIGHT: 60 IN | OXYGEN SATURATION: 99 %

## 2022-05-11 LAB
ALBUMIN SERPL-MCNC: 4.2 G/DL (ref 3.5–5.2)
ALBUMIN/GLOB SERPL: 1.9 G/DL
ALP SERPL-CCNC: 51 U/L (ref 39–117)
ALT SERPL W P-5'-P-CCNC: 20 U/L (ref 1–33)
ANION GAP SERPL CALCULATED.3IONS-SCNC: 10 MMOL/L (ref 5–15)
AST SERPL-CCNC: 21 U/L (ref 1–32)
BASOPHILS # BLD AUTO: 0.02 10*3/MM3 (ref 0–0.2)
BASOPHILS NFR BLD AUTO: 0.4 % (ref 0–1.5)
BILIRUB SERPL-MCNC: 0.6 MG/DL (ref 0–1.2)
BUN SERPL-MCNC: 32 MG/DL (ref 8–23)
BUN/CREAT SERPL: 49.2 (ref 7–25)
CALCIUM SPEC-SCNC: 9.3 MG/DL (ref 8.6–10.5)
CHLORIDE SERPL-SCNC: 101 MMOL/L (ref 98–107)
CO2 SERPL-SCNC: 25 MMOL/L (ref 22–29)
CREAT SERPL-MCNC: 0.65 MG/DL (ref 0.57–1)
DEPRECATED RDW RBC AUTO: 40.6 FL (ref 37–54)
EGFRCR SERPLBLD CKD-EPI 2021: 88 ML/MIN/1.73
EOSINOPHIL # BLD AUTO: 0.07 10*3/MM3 (ref 0–0.4)
EOSINOPHIL NFR BLD AUTO: 1.3 % (ref 0.3–6.2)
ERYTHROCYTE [DISTWIDTH] IN BLOOD BY AUTOMATED COUNT: 11.8 % (ref 12.3–15.4)
GLOBULIN UR ELPH-MCNC: 2.2 GM/DL
GLUCOSE SERPL-MCNC: 109 MG/DL (ref 65–99)
HCT VFR BLD AUTO: 37.6 % (ref 34–46.6)
HGB BLD-MCNC: 12.8 G/DL (ref 12–15.9)
IMM GRANULOCYTES # BLD AUTO: 0.01 10*3/MM3 (ref 0–0.05)
IMM GRANULOCYTES NFR BLD AUTO: 0.2 % (ref 0–0.5)
LYMPHOCYTES # BLD AUTO: 0.77 10*3/MM3 (ref 0.7–3.1)
LYMPHOCYTES NFR BLD AUTO: 14.4 % (ref 19.6–45.3)
MAGNESIUM SERPL-MCNC: 1.9 MG/DL (ref 1.6–2.4)
MCH RBC QN AUTO: 31.8 PG (ref 26.6–33)
MCHC RBC AUTO-ENTMCNC: 34 G/DL (ref 31.5–35.7)
MCV RBC AUTO: 93.5 FL (ref 79–97)
MONOCYTES # BLD AUTO: 0.69 10*3/MM3 (ref 0.1–0.9)
MONOCYTES NFR BLD AUTO: 12.9 % (ref 5–12)
NEUTROPHILS NFR BLD AUTO: 3.77 10*3/MM3 (ref 1.7–7)
NEUTROPHILS NFR BLD AUTO: 70.8 % (ref 42.7–76)
NRBC BLD AUTO-RTO: 0 /100 WBC (ref 0–0.2)
PHOSPHATE SERPL-MCNC: 3.4 MG/DL (ref 2.5–4.5)
PLATELET # BLD AUTO: 299 10*3/MM3 (ref 140–450)
PMV BLD AUTO: 8.2 FL (ref 6–12)
POTASSIUM SERPL-SCNC: 3.8 MMOL/L (ref 3.5–5.2)
PROT SERPL-MCNC: 6.4 G/DL (ref 6–8.5)
RBC # BLD AUTO: 4.02 10*6/MM3 (ref 3.77–5.28)
SODIUM SERPL-SCNC: 136 MMOL/L (ref 136–145)
WBC NRBC COR # BLD: 5.33 10*3/MM3 (ref 3.4–10.8)

## 2022-05-11 PROCEDURE — 83735 ASSAY OF MAGNESIUM: CPT | Performed by: INTERNAL MEDICINE

## 2022-05-11 PROCEDURE — 80053 COMPREHEN METABOLIC PANEL: CPT | Performed by: INTERNAL MEDICINE

## 2022-05-11 PROCEDURE — 85025 COMPLETE CBC W/AUTO DIFF WBC: CPT | Performed by: INTERNAL MEDICINE

## 2022-05-11 PROCEDURE — 74230 X-RAY XM SWLNG FUNCJ C+: CPT

## 2022-05-11 PROCEDURE — 84100 ASSAY OF PHOSPHORUS: CPT | Performed by: INTERNAL MEDICINE

## 2022-05-11 PROCEDURE — 25010000002 LORAZEPAM PER 2 MG: Performed by: STUDENT IN AN ORGANIZED HEALTH CARE EDUCATION/TRAINING PROGRAM

## 2022-05-11 PROCEDURE — 92611 MOTION FLUOROSCOPY/SWALLOW: CPT

## 2022-05-11 RX ORDER — ALPRAZOLAM 0.25 MG/1
0.25 TABLET ORAL 2 TIMES DAILY
Qty: 6 TABLET | Refills: 0 | Status: ON HOLD | OUTPATIENT
Start: 2022-05-11 | End: 2022-06-14 | Stop reason: SDUPTHER

## 2022-05-11 RX ADMIN — NYSTATIN: 100000 POWDER TOPICAL at 09:01

## 2022-05-11 RX ADMIN — SODIUM CHLORIDE TAB 1 GM 2 G: 1 TAB at 09:01

## 2022-05-11 RX ADMIN — LEVOTHYROXINE SODIUM 50 MCG: 0.05 TABLET ORAL at 06:08

## 2022-05-11 RX ADMIN — SODIUM CHLORIDE TAB 1 GM 2 G: 1 TAB at 11:54

## 2022-05-11 RX ADMIN — BARIUM SULFATE 55 ML: 0.81 POWDER, FOR SUSPENSION ORAL at 09:59

## 2022-05-11 RX ADMIN — BARIUM SULFATE 1 TEASPOON(S): 0.6 CREAM ORAL at 09:59

## 2022-05-11 RX ADMIN — Medication 10 ML: at 09:02

## 2022-05-11 RX ADMIN — LORAZEPAM 1 MG: 2 INJECTION INTRAMUSCULAR; INTRAVENOUS at 00:38

## 2022-05-11 RX ADMIN — BARIUM SULFATE 4 ML: 980 POWDER, FOR SUSPENSION ORAL at 09:59

## 2022-05-11 RX ADMIN — ALPRAZOLAM 0.25 MG: 0.25 TABLET ORAL at 09:01

## 2022-05-11 RX ADMIN — LISINOPRIL 20 MG: 20 TABLET ORAL at 09:01

## 2022-05-11 RX ADMIN — Medication 15 G: at 11:55

## 2022-05-11 RX ADMIN — KETOCONAZOLE 1 APPLICATION: 20 CREAM TOPICAL at 09:01

## 2022-05-11 RX ADMIN — BARIUM SULFATE 50 ML: 400 SUSPENSION ORAL at 09:59

## 2022-05-11 RX ADMIN — ASPIRIN 325 MG: 325 TABLET ORAL at 09:01

## 2022-05-11 NOTE — PLAN OF CARE
Problem: Adult Inpatient Plan of Care  Goal: Plan of Care Review  Outcome: Ongoing, Progressing  Flowsheets (Taken 5/11/2022 1021)  Plan of Care Reviewed With: patient  Outcome Evaluation: VFSS completed. Recommend regular textures and thin liquids, small single sips/no straws. Recommend upright, slow rate, alternate liquids/solids, double swallow, and throat clear PRN. SLP to follow for diet tolerance and further evaluation as warranted.   Goal Outcome Evaluation:

## 2022-05-11 NOTE — PLAN OF CARE
VSS on room air. NIH score 1. Pt has been able to ambulate with a gait belt to the bathroom and back to bed with no gait disturbances, weakness, dizziness, or falls. Pt had one episode of anxiety, see MAR. Pt appeared to rest comfortably the rest of the shift.      Problem: Fall Injury Risk  Goal: Absence of Fall and Fall-Related Injury  Outcome: Ongoing, Progressing  Intervention: Identify and Manage Contributors  Description: Develop a fall prevention plan with the patient and caregiver/family.  Provide reorientation, appropriate sensory stimulation and routines with changes in mental status to decrease risk of fall.  Promote use of personal vision and auditory aids.  Assess assistance level required for safe and effective self-care; provide support as needed, such as toileting, mobilization. For age 65 and older, implement timed toileting with assistance.  Encourage physical activity, such as performance of mobility and self-care at highest level of patient ability, multicomponent exercise program and provision of appropriate assistive devices.  If fall occurs, assess the severity of injury; implement fall injury protocol. Determine the cause and revise fall injury prevention plan.  Regularly review medication contribution to fall risk; adjust medication administration times to minimize risk of falling.  Consider risk related to polypharmacy and age.  Balance adequate pain management with potential for oversedation.  Recent Flowsheet Documentation  Taken 5/11/2022 0608 by Ngoc Negrete RN  Medication Review/Management:   medications reviewed   high-risk medications identified  Taken 5/11/2022 0411 by Ngoc Negrete, RN  Medication Review/Management:   medications reviewed   high-risk medications identified  Taken 5/11/2022 0206 by Ngoc Negrete RN  Medication Review/Management:   medications reviewed   high-risk medications identified  Taken 5/11/2022 0039 by Ngoc Negrete, DANYEL  Medication  Review/Management:   medications reviewed   high-risk medications identified  Self-Care Promotion: independence encouraged  Taken 5/10/2022 2200 by Ngoc Negrete RN  Medication Review/Management:   medications reviewed   high-risk medications identified  Taken 5/10/2022 2028 by Ngoc Negrete RN  Medication Review/Management:   medications reviewed   high-risk medications identified  Self-Care Promotion: independence encouraged  Intervention: Promote Injury-Free Environment  Description: Provide a safe, barrier-free environment that encourages independent activity.  Keep care area uncluttered and well-lighted.  Determine need for increased observation or monitoring.  Avoid use of devices that minimize mobility, such as restraints or indwelling urinary catheter.  Recent Flowsheet Documentation  Taken 5/11/2022 0608 by Ngoc Negrete RN  Safety Promotion/Fall Prevention:   safety round/check completed   activity supervised   assistive device/personal items within reach   clutter free environment maintained   lighting adjusted   nonskid shoes/slippers when out of bed   room organization consistent  Taken 5/11/2022 0411 by Ngoc Negrete RN  Safety Promotion/Fall Prevention:   safety round/check completed   activity supervised   assistive device/personal items within reach   clutter free environment maintained   lighting adjusted   nonskid shoes/slippers when out of bed   room organization consistent  Taken 5/11/2022 0206 by Ngoc Negrete RN  Safety Promotion/Fall Prevention:   safety round/check completed   activity supervised   assistive device/personal items within reach   clutter free environment maintained   lighting adjusted   nonskid shoes/slippers when out of bed   room organization consistent  Taken 5/11/2022 0039 by Ngoc Negrete RN  Safety Promotion/Fall Prevention:   safety round/check completed   activity supervised   assistive device/personal items within reach   clutter free  environment maintained   lighting adjusted   nonskid shoes/slippers when out of bed   room organization consistent  Taken 5/10/2022 2200 by Ngoc Negrete, RN  Safety Promotion/Fall Prevention:   safety round/check completed   activity supervised   assistive device/personal items within reach   clutter free environment maintained   lighting adjusted   nonskid shoes/slippers when out of bed   room organization consistent  Taken 5/10/2022 2028 by Ngoc Negrete, RN  Safety Promotion/Fall Prevention:   safety round/check completed   activity supervised   assistive device/personal items within reach   clutter free environment maintained   fall prevention program maintained   lighting adjusted   nonskid shoes/slippers when out of bed   room organization consistent

## 2022-05-11 NOTE — OUTREACH NOTE
Prep Survey    Flowsheet Row Responses   [facility] facility patient discharged from? [facility]   Is LACE score < 7 ? No   Emergency Room discharge w/ pulse ox? No   Eligibility Not Eligible   What are the reasons patient is not eligible? [facility]   Does the patient have one of the following disease processes/diagnoses(primary or secondary)? Other   Prep survey completed? Yes          KARLA REYNOSO - Registered Nurse

## 2022-05-11 NOTE — PLAN OF CARE
Goal Outcome Evaluation:  Plan of Care Reviewed With: patient           Outcome Evaluation: pt being discharged to [facility], transported by family  Problem: Fall Injury Risk  Goal: Absence of Fall and Fall-Related Injury  Outcome: Met  Intervention: Identify and Manage Contributors  Description: Develop a fall prevention plan with the patient and caregiver/family.  Provide reorientation, appropriate sensory stimulation and routines with changes in mental status to decrease risk of fall.  Promote use of personal vision and auditory aids.  Assess assistance level required for safe and effective self-care; provide support as needed, such as toileting, mobilization. For age 65 and older, implement timed toileting with assistance.  Encourage physical activity, such as performance of mobility and self-care at highest level of patient ability, multicomponent exercise program and provision of appropriate assistive devices.  If fall occurs, assess the severity of injury; implement fall injury protocol. Determine the cause and revise fall injury prevention plan.  Regularly review medication contribution to fall risk; adjust medication administration times to minimize risk of falling.  Consider risk related to polypharmacy and age.  Balance adequate pain management with potential for oversedation.  Recent Flowsheet Documentation  Taken 5/11/2022 0905 by Laura Burgess RN  Medication Review/Management: medications reviewed  Intervention: Promote Injury-Free Environment  Description: Provide a safe, barrier-free environment that encourages independent activity.  Keep care area uncluttered and well-lighted.  Determine need for increased observation or monitoring.  Avoid use of devices that minimize mobility, such as restraints or indwelling urinary catheter.  Recent Flowsheet Documentation  Taken 5/11/2022 1000 by Laura Burgess, RN  Safety Promotion/Fall Prevention: patient off unit  Taken 5/11/2022 0905 by Jenifer  DANYEL Sanchez  Safety Promotion/Fall Prevention:   assistive device/personal items within reach   clutter free environment maintained   fall prevention program maintained   nonskid shoes/slippers when out of bed   room organization consistent   safety round/check completed     Problem: Adult Inpatient Plan of Care  Goal: Plan of Care Review  Outcome: Met  Flowsheets (Taken 5/11/2022 1114)  Plan of Care Reviewed With: patient  Outcome Evaluation: pt being discharged to [facility], transported by family  Goal: Patient-Specific Goal (Individualized)  Outcome: Met  Goal: Absence of Hospital-Acquired Illness or Injury  Outcome: Met  Intervention: Identify and Manage Fall Risk  Description: Perform standard risk assessment on admission using a validated tool or comprehensive approach appropriate to the patient; reassess fall risk frequently, with change in status or transfer to another level of care.  Communicate fall injury risk to interprofessional healthcare team.  Determine need for increased observation, equipment and environmental modification, such as low bed, signage and supportive, nonskid footwear.  Adjust safety measures to individual developmental age, stage and identified risk factors.  Reinforce the importance of safety and physical activity with patient and family.  Perform regular intentional rounding to assess need for position change, pain assessment and personal needs, including assistance with toileting.  Recent Flowsheet Documentation  Taken 5/11/2022 1000 by Laura Burgess, RN  Safety Promotion/Fall Prevention: patient off unit  Taken 5/11/2022 0905 by Laura Burgess, RN  Safety Promotion/Fall Prevention:   assistive device/personal items within reach   clutter free environment maintained   fall prevention program maintained   nonskid shoes/slippers when out of bed   room organization consistent   safety round/check completed  Intervention: Prevent Skin Injury  Description: Perform a screening for  skin injury risk, such as pressure or moisture associated skin damage on admission and at regular intervals throughout hospital stay.  Keep all areas of skin (especially folds) clean and dry.  Maintain adequate skin hydration.  Relieve and redistribute pressure and protect bony prominences; implement measures based on patient-specific risk factors.  Match turning and repositioning schedule to clinical condition.  Encourage weight shift frequently; assist with reposition if unable to complete independently.  Float heels off bed; avoid pressure on the Achilles tendon.  Keep skin free from extended contact with medical devices.  Encourage functional activity and mobility, as early as tolerated.  Use aids (e.g., slide boards, mechanical lift) during transfer.  Recent Flowsheet Documentation  Taken 5/11/2022 0905 by Laura Burgess RN  Skin Protection: adhesive use limited  Intervention: Prevent and Manage VTE (Venous Thromboembolism) Risk  Description: Assess for VTE (venous thromboembolism) risk.  Encourage and assist with early ambulation.  Initiate and maintain compression or other therapy, as indicated, based on identified risk in accordance with organizational protocol and provider order.  Encourage both active and passive leg exercises while in bed, if unable to ambulate.  Recent Flowsheet Documentation  Taken 5/11/2022 0905 by Laura Burgess, RN  Activity Management:   activity adjusted per tolerance   activity encouraged  VTE Prevention/Management:   bilateral   dorsiflexion/plantar flexion performed  Intervention: Prevent Infection  Description: Maintain skin and mucous membrane integrity; promote hand, oral and pulmonary hygiene.  Optimize fluid balance, nutrition, sleep and glycemic control to maximize infection resistance.  Identify potential sources of infection early to prevent or mitigate progression of infection (e.g., wound, lines, devices).  Evaluate ongoing need for invasive devices; remove promptly  when no longer indicated.  Recent Flowsheet Documentation  Taken 5/11/2022 0905 by Laura Burgess RN  Infection Prevention: single patient room provided  Goal: Optimal Comfort and Wellbeing  Outcome: Met  Intervention: Provide Person-Centered Care  Description: Use a family-focused approach to care.  Develop trust and rapport by proactively providing information, encouraging questions, addressing concerns and offering reassurance.  Acknowledge emotional response to hospitalization.  Recognize and utilize personal coping strategies.  Honor spiritual and cultural preferences.  Recent Flowsheet Documentation  Taken 5/11/2022 0905 by Laura Burgess RN  Trust Relationship/Rapport:   care explained   thoughts/feelings acknowledged  Goal: Readiness for Transition of Care  Outcome: Met     Problem: Skin Injury Risk Increased  Goal: Skin Health and Integrity  Outcome: Met  Intervention: Optimize Skin Protection  Description: Perform a full pressure injury risk assessment, as indicated by screening, upon admission to care unit.  Reassess skin (injury risk, full inspection) frequently (e.g., scheduled interval, with change in condition) to provide optimal early detection and prevention.  Maintain adequate tissue perfusion (e.g., encourage fluid balance; avoid crossing legs, constrictive clothing or devices) to promote tissue oxygenation.  Maintain head of bed at lowest degree of elevation tolerated, considering medical condition and other restrictions.  Avoid positioning onto an area that remains reddened.  Minimize incontinence and moisture (e.g., toileting schedule; moisture-wicking pad, diaper or incontinence collection device; skin moisture barrier).  Cleanse skin promptly and gently when soiled utilizing a pH-balanced cleanser.  Relieve and redistribute pressure (e.g., scheduled position changes, weight shifts, use of support surface, medical device repositioning, protective dressing application, use of positioning  device, microclimate control, use of pressure-injury-monitor  Encourage increased activity, such as sitting in a chair at the bedside or early mobilization, when able to tolerate.  Recent Flowsheet Documentation  Taken 5/11/2022 0905 by Laura Burgess RN  Pressure Reduction Techniques: frequent weight shift encouraged  Pressure Reduction Devices: positioning supports utilized  Skin Protection: adhesive use limited     Problem: Electrolyte Imbalance  Goal: Electrolyte Balance  Outcome: Met  Intervention: Monitor and Manage Electrolyte Imbalance  Description: Anticipate the need for intravenous or oral electrolyte replacement, binding therapy or restriction.  Monitor weight, intake, output and laboratory values for trends; advocate for adjustment in treatment if imbalance persists.  Assess medication for potential impact on imbalance; advocate need for medication adjustment.  Monitor ECG (electrocardiogram) rate and rhythm for changes that may indicate fluctuation in serum electrolyte levels, such as calcium and potassium.  Recent Flowsheet Documentation  Taken 5/11/2022 0905 by Laura Burgess, RN  Fluid/Electrolyte Management: fluids restricted

## 2022-05-11 NOTE — MBS/VFSS/FEES
Acute Care - Speech Language Pathology   Swallow Initial Evaluation [facility]     Patient Name: Ritu Martino  : 1940  MRN: [id number]  Today's Date: 2022               Admit Date: 2022    Visit Dx:     ICD-10-CM ICD-9-CM   1. Fluent aphasia  R47.01 784.3   2. Urinary tract infection without hematuria, site unspecified  N39.0 599.0   3. Anxiety  F41.9 300.00     Patient Active Problem List   Diagnosis   • Anxiety   • Hypothyroidism   • Hyponatremia   • Iron deficiency   • Essential hypertension, benign   • Fluent aphasia   • Hypochloremia   • Benzodiazepine dependence (HCC)     Past Medical History:   Diagnosis Date   • Anxiety    • Arthritis    • Bowel dysfunction 2021    since having surgery for diverticular infection   • Diverticulitis 2021    w/ rupture and infection required surgery and ostomy   • Essential hypertension, benign    • GERD (gastroesophageal reflux disease)    • Hernia, hiatal    • Hypercholesterolemia    • Hypokalemia    • Hyponatremia     chronic low with occasional normal level   • Hypothyroidism     estimated time frame pt nor  could remember exactly how long has been on medication   • Insomnia    • Iron deficiency      Past Surgical History:   Procedure Laterality Date   • APPENDECTOMY     • BACK SURGERY     •  SECTION     • COLON SURGERY  2021    to address ruptured and infected diverticular dz, ostomy also placed   • COLOSTOMY CLOSURE  10/2021    ostomy removed   • HEMORRHOIDECTOMY     • KNEE ARTHROPLASTY         SLP Recommendation and Plan  SLP Swallowing Diagnosis: mild, oral dysphagia, pharyngeal dysphagia (22 1000)  SLP Diet Recommendation: regular textures, thin liquids, other (see comments) (small single sips) (22 1000)  Recommended Precautions and Strategies: upright posture during/after eating, small bites of food and sips of liquid, no straw, alternate between small bites of food and sips of liquid, other  (see comments) (small single sips) (05/11/22 1000)  SLP Rec. for Method of Medication Administration: meds whole, meds crushed, with pudding or applesauce, as tolerated (05/11/22 1000)     Monitor for Signs of Aspiration: yes, notify SLP if any concerns (05/11/22 1000)     Swallow Criteria for Skilled Therapeutic Interventions Met: demonstrates skilled criteria (05/11/22 1000)  Anticipated Discharge Disposition (SLP): unknown (05/11/22 1000)  Rehab Potential/Prognosis, Swallowing: good, to achieve stated therapy goals (05/11/22 1000)  Therapy Frequency (Swallow): PRN (05/11/22 1000)  Predicted Duration Therapy Intervention (Days): until discharge (05/11/22 1000)                                  Plan of Care Reviewed With: patient  Outcome Evaluation: VFSS completed. Recommend regular textures and thin liquids, small single sips/no straws. Recommend upright, slow rate, alternate liquids/solids, double swallow, and throat clear PRN. SLP to follow for diet tolerance and further evaluation as warranted.      SWALLOW EVALUATION (last 72 hours)     SLP Adult Swallow Evaluation     Row Name 05/11/22 1000 05/10/22 1000                Rehab Evaluation    Document Type evaluation  -OC evaluation  -SH       Subjective Information no complaints  -OC --       Patient Observations alert;cooperative;agree to therapy  -OC --       Patient Effort good  -OC adequate  -SH       Symptoms Noted During/After Treatment none  -OC --                General Information    Patient Profile Reviewed yes  -OC yes  -SH       Pertinent History Of Current Problem -- L frontoparietal mass  -SH       Current Method of Nutrition mechanical soft, no mixed consistencies;nectar/syrup-thick liquids  -OC NPO  -SH       Precautions/Limitations, Vision WFL  -OC WFL  -SH       Precautions/Limitations, Hearing WFL  -OC WFL  -SH       Prior Level of Function-Communication WFL  -OC WFL  -SH       Prior Level of Function-Swallowing regular textures;thin liquids   -OC regular textures;thin liquids  -       Plans/Goals Discussed with patient;agreed upon  -OC patient;agreed upon  -       Barriers to Rehab none identified  - none identified  -       Patient's Goals for Discharge return to regular diet  -OC --                Pain    Additional Documentation -- Pain Scale: Word Pre/Post-Treatment (Group)  -                Pain Scale: Numbers Pre/Post-Treatment    Pretreatment Pain Rating 0/10 - no pain  -OC 0/10 - no pain  -       Posttreatment Pain Rating 0/10 - no pain  - --                Oral Motor Structure and Function    Dentition Assessment -- natural, present and adequate  -       Secretion Management -- WNL/WFL  -       Volitional Swallow -- WFL  -       Volitional Cough -- weak  -                Oral Musculature and Cranial Nerve Assessment    Oral Motor General Assessment -- oral labial or buccal impairment  -       Oral Motor, Comment -- Mild L facial assymetry  -                Clinical Swallow Eval    Clinical Swallow Evaluation Summary -- Patient seen for clinical swallow assessment after status change. MRI revealed L frontoparietal mass. Pt with question of L facial droop at times. Prolonged oral phase with thins and solids. Question of swallow delay with thins. No overt s/s of aspiration with nectar via straw, puree, or mech soft. Immediate cough with thins via cup and straw as liquid wash with regular. SLP recs nectar and mech soft, no mixed. Meds whole or crushed with nectar or puree. Will follow for VFSS to further assess need for diet modification.  -                MBS/VFSS Interpretation    VFSS Summary VFSS completed with Dr. Wyman present. Patient presents with mild oropharyngeal dysphagia characterized by misting and reduced hyolaryngeal excursion. Patient demonstrated inconsistent trace penetration thin liquids, able to clear with throat clear. Patient demonstrated increased penetration with consecutive sips of thin via  straw. No penetration or aspiration with nectar thick via cup, thin single cup sips, puree, mechanical soft, and dry solid. Patient utilized liquid wash to aid in  clearance of mild pharyngeal residue.  -OC --                SLP Communication to Radiology    Summary Statement VFSS completed with Dr. Wyman present. Patient presents with mild oropharyngeal dysphagia characterized by misting and reduced hyolaryngeal excursion. Patient demonstrated inconsistent trace penetration thin liquids, able to clear with throat clear. Patient demonstrated increased penetration with consecutive sips of thin via straw. No penetration or aspiration with nectar thick via cup, thin single cup sips, puree, mechanical soft, and dry solid. Patient utilized liquid wash to aid in  clearance of mild pharyngeal residue.  -OC --                SLP Evaluation Clinical Impression    SLP Swallowing Diagnosis mild;oral dysphagia;pharyngeal dysphagia  -OC R/O pharyngeal dysphagia  -SH       Functional Impact risk of aspiration/pneumonia  -OC risk of aspiration/pneumonia  -       Rehab Potential/Prognosis, Swallowing good, to achieve stated therapy goals  -OC good, to achieve stated therapy goals  -       Swallow Criteria for Skilled Therapeutic Interventions Met demonstrates skilled criteria  -OC demonstrates skilled criteria  -                Recommendations    Therapy Frequency (Swallow) PRN  -OC PRN  -       Predicted Duration Therapy Intervention (Days) until discharge  -OC until discharge  -       SLP Diet Recommendation regular textures;thin liquids;other (see comments)  small single sips  -OC mechanical soft with no mixed consistencies;nectar thick liquids  -       Recommended Diagnostics -- [place] [place]  -       Recommended Precautions and Strategies upright posture during/after eating;small bites of food and sips of liquid;no straw;alternate between small bites of food and sips of liquid;other (see comments)   small single sips  -OC upright posture during/after eating;small bites of food and sips of liquid  -       Oral Care Recommendations Oral Care BID/PRN  -OC Oral Care BID/PRN  -       SLP Rec. for Method of Medication Administration meds whole;meds crushed;with pudding or applesauce;as tolerated  -OC meds whole;meds crushed;with thick liquids;with pudding or applesauce;as tolerated  -       Monitor for Signs of Aspiration yes;notify SLP if any concerns  -OC yes;notify SLP if any concerns  -       Anticipated Discharge Disposition (SLP) unknown  -OC unknown  -                Swallow Goals (SLP)    Oral Nutrition/Hydration Goal Selection (SLP) -- oral nutrition/hydration, SLP goal 1  -                Oral Nutrition/Hydration Goal 1 (SLP)    Oral Nutrition/Hydration Goal 1, SLP Pt will aren PO without overt s/s of asp.  -OC Pt will aren PO without overt s/s of asp.  -       Time Frame (Oral Nutrition/Hydration Goal 1, SLP) by discharge  -OC by discharge  -             User Key  (r) = Recorded By, (t) = Taken By, (c) = Cosigned By    Initials Name Effective Dates    Alysia Haines MA,[facility]-SLP 06/16/21 -      Alejandra Davidson MS CCC-SLP 06/16/21 -                 EDUCATION  The patient has been educated in the following areas:   Dysphagia (Swallowing Impairment).        SLP GOALS     Row Name 05/11/22 1000 05/10/22 1000          Oral Nutrition/Hydration Goal 1 (SLP)    Oral Nutrition/Hydration Goal 1, SLP Pt will aren PO without overt s/s of asp.  -OC Pt will aren PO without overt s/s of asp.  -     Time Frame (Oral Nutrition/Hydration Goal 1, SLP) by discharge  - by discharge  -           User Key  (r) = Recorded By, (t) = Taken By, (c) = Cosigned By    Initials Name Provider Type    Alysia Haines MA,[facility]-SLP Speech and Language Pathologist     Alejandra Davidson MS CCC-SLP Speech and Language Pathologist              SLP Outcome Measures (last 72 hours)     SLP Outcome Measures     Row Name 05/11/22  1000 05/10/22 1000          SLP Outcome Measures    Outcome Measure Used? Adult NOMS  -OC Adult NOMS  -SH            Adult FCM Scores    FCM Chosen Swallowing  -OC Swallowing  -SH     Swallowing FCM Score 6  -OC 4  -SH           User Key  (r) = Recorded By, (t) = Taken By, (c) = Cosigned By    Initials Name Effective Dates    Alysia Haines MA,[facility]-SLP 06/16/21 -      Alejandra Daivdson MS CCC-SLP 06/16/21 -                  Time Calculation:    Time Calculation- SLP     Row Name 05/11/22 1308             Time Calculation- SLP    SLP Start Time 0950  -OC      SLP Received On 05/11/22  -OC              Untimed Charges    SLP Eval/Re-eval  [facility] [employer] Fluoro Eval Swallow - 40920  -OC      81529-LZ Motion Fluoro Eval Swallow Minutes 90  -OC              Total Minutes    Untimed Charges Total Minutes 90  -OC       Total Minutes 90  -OC            User Key  (r) = Recorded By, (t) = Taken By, (c) = Cosigned By    Initials Name Provider Type    Alysia Haines MA,[facility]-SLP Speech and Language Pathologist                Therapy Charges for Today     Code Description Service Date Service Provider Modifiers Qty    [id number]  ST [employer] FLUORO EVAL SWALLOW 6 5/11/2022 Alysia Lau MA,CCC-SLP GN 1               Alysia Lau MA, CCC-SLP  5/11/2022

## 2022-05-11 NOTE — CASE MANAGEMENT/SOCIAL WORK
Case Management Discharge Note      Final Note: d/c [facility]    Provided Post Acute Provider List?: Yes  Post Acute Provider List: Nursing Home  Provided Post Acute Provider Quality & Resource List?: Yes  Post Acute Provider Quality and Resource List: Nursing Home  Delivered To: Support Person  Method of Delivery: In person    Selected Continued Care - Discharged on 5/11/2022 Admission date: 5/7/2022 - Discharge disposition: Home or Self Care    Destination Coordination complete.    Service Provider Selected Services Address Phone Fax Patient Preferred    [facility]  Skilled Nursing [address] [facility] [address] [phone] --          Durable Medical Equipment    No services have been selected for the patient.              Dialysis/Infusion    No services have been selected for the patient.              Home Medical Care    No services have been selected for the patient.              Therapy    No services have been selected for the patient.              Community Resources    No services have been selected for the patient.              Community & DME    No services have been selected for the patient.                  Transportation Services  Private: Car    Final Discharge Disposition Code: 03 - skilled nursing facility (SNF)

## 2022-05-11 NOTE — DISCHARGE PLACEMENT REQUEST
"Ritu Schilling (82 y.o. Female)             Date of Birth   1940    Social Security Number       Address   [address] [place] [address]    Home Phone   [phone]    MRN   [id number]       Adventism   None    Marital Status                               Admission Date   5/7/22    Admission Type   Emergency    Admitting Provider   Dwight Haddad MD    Attending Provider   Yusef Huber MD    Department, Room/Bed   [address], [address]/1       Discharge Date       Discharge Disposition   Home or Self Care    Discharge Destination                               Attending Provider: Yusef Huber MD    Allergies: No Known Allergies    Isolation: None   Infection: None   Code Status: CPR   Advance Care Planning Activity    Ht: 152.4 cm (60\")   Wt: 56.8 kg (125 lb 4.8 oz)    Admission Cmt: None   Principal Problem: Fluent aphasia [R47.01]                 Active Insurance as of 5/7/2022     Primary Coverage     Payor Plan Insurance Group Employer/Plan Group    [facility] MEDICARE REPLACEMENT [facility] MEDICARE REPLACEMENT [id number]     Payor Plan Address Payor Plan Phone Number Payor Plan Fax Number Effective Dates    [address] [address]   1/1/2021 - None Entered    [facility] [id number]       Subscriber Name Subscriber Birth Date Member ID       RITU SCHILLING 1940 [id number]                 Emergency Contacts      (Rel.) Home Phone Work Phone Mobile Phone    Nu Schilling (Spouse) [phone] -- [phone]    Marielle Regan (Daughter) -- -- [phone]                 Discharge Summary      Yusef Huber MD at 05/11/22 [address] [place], [place] [address]IST               [facility]    Date of Discharge:  5/11/2022    PCP: Reyes, Miriam Mercado, MD    Discharge Diagnosis:   Active Hospital Problems    Diagnosis  POA   • **Fluent aphasia [R47.01]  Yes   • Hypochloremia [E87.8]  Yes   • Benzodiazepine " dependence (HCC) [F13.20]  Yes   • Essential hypertension, benign [I10]  Yes   • Hyponatremia [E87.1]  Yes      Resolved Hospital Problems   No resolved problems to display.          Consults     Date and Time Order Name Status Description    5/9/2022 10:36 AM Inpatient Neurology Consult General Completed     5/7/2022 12:11 PM Inpatient Psychiatrist Consult Completed     5/7/2022 11:10 AM Inpatient Nephrology Consult Completed     5/7/2022  6:01 AM Inpatient Neurology Consult Stroke Completed     5/7/2022  4:29 AM LHANGELES (on-call MD unless specified) Details          Hospital Course  82 y.o. female with past medical history significant for hypothyroidism, hypertension, chronic benzodiazepine dependence, presented to the hospital, she has been diagnosed to have aphasia, she had altered mental status.  Neurology evaluation was also requested.  After thorough neuro evaluation, patient did undergo CT scan of the head which shows a cerebral meningioma.  Patient also had an episode of altered mental status, but work-up did not indicate any epilepsy, no indication to start antiepileptic drugs.  She also had hyponatremia, metabolic acidosis with hypophosphatemia, electrolytes were replaced.  Patient was seen by nephrology service as well.  Patient does have history of hypothyroidism, for which Synthroid has been continued.  She does have history of hypertension, for which patient is on ACE inhibitor therapy.  She also had UTI, for which she has completed the course of Rocephin.  I have seen and examined her at bedside, she is deemed stable to be transitioned to rehab facility today.  Total time spent on discharge and management for this patient is more than 30 minutes.        Condition on Discharge: Improved.     Temp:  [97.6 °F (36.4 °C)-98.4 °F (36.9 °C)] 98.4 °F (36.9 °C)  Heart Rate:  [82-96] 89  Resp:  [16-18] 18  BP: (108-120)/(60-81) 108/61  Body mass index is 24.47 kg/m².    Physical Exam   General, awake and alert.   Appears globally weak  Head and ENT, normocephalic and atraumatic.  Lungs, symmetric expansion, equal air entry bilaterally.  Heart, regular rate and rhythm.  Abdomen, soft and nontender.  Extremities, no clubbing or cyanosis.  Neuro, no focal deficits.  Skin: Warm and no rash.  Psych, normal mood and affect.  Musculoskeletal, joint examination is grossly normal.      Disposition: Home or Self Care       Discharge Medications      Continue These Medications      Instructions Start Date   ALIGN PO   1 capsule, Oral, Daily With Lunch      ALPRAZolam 0.25 MG tablet  Commonly known as: XANAX   0.25 mg, Oral, 2 Times Daily      benazepril 20 MG tablet  Commonly known as: LOTENSIN   20 mg, Oral, Daily      cycloSPORINE 0.05 % ophthalmic emulsion  Commonly known as: RESTASIS   1 drop, Both Eyes, Daily      ferrous sulfate 325 (65 FE) MG tablet   325 mg, Oral, 2 Times Daily Before Meals      ketoconazole 2 % cream  Commonly known as: NIZORAL   Topical, 2 Times Daily, to affected area      nystatin 821489 UNIT/GM powder  Generic drug: nystatin   Topical, 2 Times Daily, to affected area      rosuvastatin 20 MG tablet  Commonly known as: CRESTOR   20 mg, Oral, Nightly      SODIUM CHLORIDE PO   Oral, 3 Times Daily      Synthroid 50 MCG tablet  Generic drug: levothyroxine   By mouth take 1 tab daily in AM as directed on empty stomach with water only.  Brand Medically Necessary      vitamin B-12 100 MCG tablet  Commonly known as: CYANOCOBALAMIN   50 mcg, Oral, Daily      Vitamin D 50 MCG (2000 UT) capsule   Oral, Daily With Dinner         Stop These Medications    zolpidem 10 MG tablet  Commonly known as: AMBIEN             Additional Instructions for the Follow-ups that You Need to Schedule     Discharge Follow-up with PCP   As directed       Currently Documented PCP:    Reyes, Miriam Mercado, MD    PCP Phone Number:    [phone]     Follow Up Details: Follow-up with PCP in 7 days.         Discharge Follow-up with Specialty:  Follow-up with neurology in 2 to 3 weeks.   As directed      Specialty: Follow-up with neurology in 2 to 3 weeks.            Contact information for follow-up providers     Farzad Emmanuel MD Follow up in 1 month(s).    Specialty: Neurosurgery  Why: Hospital follow-up for meningioma.  Contact information:  [address]  [facility] [address]  [facility] [id number] [phone]             Reyes, Miriam Mercado, MD .    Specialty: Family Medicine  Why: Follow-up with PCP in 7 days.  Contact information:  [address] [facility] [address]  [phone]                   Contact information for after-discharge care     Destination     [facility] .    Service: Skilled Nursing  Contact information:  [address] [facility] [address] [phone]                               Yusef Thorne MD  05/11/22  11:11 EDT    Discharge time spent greater than 30 minutes.    Electronically signed by Yusef Thorne MD at 05/11/22 1113       Discharge Order (From admission, onward)     Start     Ordered    05/11/22 1110  Discharge patient  Once        Expected Discharge Date: 05/11/22    Discharge Disposition: Home or Self Care    Physician of Record for Attribution - Please select from Treatment Team: YUSEF THORNE [[id number]]    Review needed by CMO to determine Physician of Record: No       Question Answer Comment   Physician of Record for Attribution - Please select from Treatment Team YUSEF THORNE    Review needed by CMO to determine Physician of Record No        05/11/22 1111

## 2022-05-11 NOTE — DISCHARGE SUMMARY
[facility]    Date of Discharge:  5/11/2022    PCP: Reyes, Miriam Mercado, MD    Discharge Diagnosis:   Active Hospital Problems    Diagnosis  POA   • **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          Consults     Date and Time Order Name Status Description    5/9/2022 10:36 AM Inpatient Neurology Consult General Completed     5/7/2022 12:11 PM Inpatient Psychiatrist Consult Completed     5/7/2022 11:10 AM Inpatient Nephrology Consult Completed     5/7/2022  6:01 AM Inpatient Neurology Consult Stroke Completed     5/7/2022  4:29 AM [facility] (on-call MD unless specified) Details          Hospital Course  82 y.o. female with past medical history significant for hypothyroidism, hypertension, chronic benzodiazepine dependence, presented to the hospital, she has been diagnosed to have aphasia, she had altered mental status.  Neurology evaluation was also requested.  After thorough neuro evaluation, patient did undergo CT scan of the head which shows a cerebral meningioma.  Patient also had an episode of altered mental status, but work-up did not indicate any epilepsy, no indication to start antiepileptic drugs.  She also had hyponatremia, metabolic acidosis with hypophosphatemia, electrolytes were replaced.  Patient was seen by nephrology service as well.  Patient does have history of hypothyroidism, for which Synthroid has been continued.  She does have history of hypertension, for which patient is on ACE inhibitor therapy.  She also had UTI, for which she has completed the course of Rocephin.  I have seen and examined her at bedside, she is deemed stable to be transitioned to rehab facility today.  Total time spent on discharge and management for this  patient is more than 30 minutes.        Condition on Discharge: Improved.     Temp:  [97.6 °F (36.4 °C)-98.4 °F (36.9 °C)] 98.4 °F (36.9 °C)  Heart Rate:  [82-96] 89  Resp:  [16-18] 18  BP: (108-120)/(60-81) 108/61  Body mass index is 24.47 kg/m².    Physical Exam   General, awake and alert.  Appears globally weak  Head and ENT, normocephalic and atraumatic.  Lungs, symmetric expansion, equal air entry bilaterally.  Heart, regular rate and rhythm.  Abdomen, soft and nontender.  Extremities, no clubbing or cyanosis.  Neuro, no focal deficits.  Skin: Warm and no rash.  Psych, normal mood and affect.  Musculoskeletal, joint examination is grossly normal.      Disposition: Home or Self Care       Discharge Medications      Continue These Medications      Instructions Start Date   ALIGN PO   1 capsule, Oral, Daily With Lunch      ALPRAZolam 0.25 MG tablet  Commonly known as: XANAX   0.25 mg, Oral, 2 Times Daily      benazepril 20 MG tablet  Commonly known as: LOTENSIN   20 mg, Oral, Daily      cycloSPORINE 0.05 % ophthalmic emulsion  Commonly known as: RESTASIS   1 drop, Both Eyes, Daily      ferrous sulfate 325 (65 FE) MG tablet   325 mg, Oral, 2 Times Daily Before Meals      ketoconazole 2 % cream  Commonly known as: NIZORAL   Topical, 2 Times Daily, to affected area      nystatin 738095 UNIT/GM powder  Generic drug: nystatin   Topical, 2 Times Daily, to affected area      rosuvastatin 20 MG tablet  Commonly known as: CRESTOR   20 mg, Oral, Nightly      SODIUM CHLORIDE PO   Oral, 3 Times Daily      Synthroid 50 MCG tablet  Generic drug: levothyroxine   By mouth take 1 tab daily in AM as directed on empty stomach with water only.  Brand Medically Necessary      vitamin B-12 100 MCG tablet  Commonly known as: CYANOCOBALAMIN   50 mcg, Oral, Daily      Vitamin D 50 MCG (2000 UT) capsule   Oral, Daily With Dinner         Stop These Medications    zolpidem 10 MG tablet  Commonly known as: AMBIEN             Additional  Instructions for the Follow-ups that You Need to Schedule     Discharge Follow-up with PCP   As directed       Currently Documented PCP:    Reyes, Miriam Mercado, MD    PCP Phone Number:    [phone]     Follow Up Details: Follow-up with PCP in 7 days.         Discharge Follow-up with Specialty: Follow-up with neurology in 2 to 3 weeks.   As directed      Specialty: Follow-up with neurology in 2 to 3 weeks.            Contact information for follow-up providers     Farzda Emmanuel MD Follow up in 1 month(s).    Specialty: Neurosurgery  Why: Hospital follow-up for meningioma.  Contact information:  [address] [id number] [phone]             Reyes, Miriam Mercado, MD .    Specialty: Family Medicine  Why: Follow-up with PCP in 7 days.  Contact information:  [address] [facility] [id number] [phone]                   Contact information for after-discharge care     Destination     [facility] .    Service: Skilled Nursing  Contact information:  [address] [place] [address] [phone]                               Yusef Huber MD  05/11/22  11:11 EDT    Discharge time spent greater than 30 minutes.

## 2022-05-12 NOTE — CASE MANAGEMENT/SOCIAL WORK
Case Management Discharge Note      Final Note: Pt went to [facility], but did not want to stay, so family took pt home.  CCP spoke with them today and they would like home health, referrals made.  Nel HARPER RN/CCP    Provided Post Acute Provider List?: Yes  Post Acute Provider List: Nursing Home  Provided Post Acute Provider Quality & Resource List?: Yes  Post Acute Provider Quality and Resource List: Nursing Home  Delivered To: Support Person  Method of Delivery: In person    Selected Continued Care - Discharged on 5/11/2022 Admission date: 5/7/2022 - Discharge disposition: Home or Self Care    Destination Coordination complete.    Service Provider Selected Services Address Phone Fax Patient Preferred    [facility]  Skilled Nursing [address] [facility] [address] [phone] --          Durable Medical Equipment    No services have been selected for the patient.              Dialysis/Infusion    No services have been selected for the patient.              Home Medical Care    No services have been selected for the patient.              Therapy    No services have been selected for the patient.              Community Resources    No services have been selected for the patient.              Community & DME    No services have been selected for the patient.                  Transportation Services  Private: Car    Final Discharge Disposition Code: 01 - home or self-care

## 2022-05-12 NOTE — DISCHARGE PLACEMENT REQUEST
"Ritu Schilling (82 y.o. Female)             Date of Birth   1940    Social Security Number       Address   [address] [place] [address]    Home Phone   [phone]    MRN   [id number]       Oriental orthodox   None    Marital Status                               Admission Date   5/7/22    Admission Type   Emergency    Admitting Provider   Dwight Haddad MD    Attending Provider       Department, Room/Bed   [address], S[address]/1       Discharge Date   5/11/2022    Discharge Disposition   Home or Self Care    Discharge Destination                               Attending Provider: (none)   Allergies: No Known Allergies    Isolation: None   Infection: None   Code Status: Prior   Advance Care Planning Activity    Ht: 152.4 cm (60\")   Wt: 56.8 kg (125 lb 4.8 oz)    Admission Cmt: None   Principal Problem: Fluent aphasia [R47.01]                 Active Insurance as of 5/7/2022     Primary Coverage     Payor Plan Insurance Group Employer/Plan Group    [facility] MEDICARE REPLACEMENT [facility] MEDICARE REPLACEMENT [id number]     Payor Plan Address Payor Plan Phone Number Payor Plan Fax Number Effective Dates    [address] [address]   1/1/2021 - None Entered    [facility] [id number]       Subscriber Name Subscriber Birth Date Member ID       RITU SCHILLING 1940 [id number]                 Emergency Contacts      (Rel.) Home Phone Work Phone Mobile Phone    Nu Schilling (Spouse) [phone] -- [phone]    Marielle Regan (Daughter) -- -- [phone]              "

## 2022-05-13 ENCOUNTER — HOME HEALTH ADMISSION (OUTPATIENT)
Dept: HOME HEALTH SERVICES | Facility: HOME HEALTHCARE | Age: 82
End: 2022-05-13

## 2022-05-13 NOTE — CASE MANAGEMENT/SOCIAL WORK
Case Management Discharge Note      Final Note: [facility] will be taking pt, family updated by phone, spoke with Marielle HARPER RN/CCP    Provided Post Acute Provider List?: Yes  Post Acute Provider List: Nursing Home  Provided Post Acute Provider Quality & Resource List?: Yes  Post Acute Provider Quality and Resource List: Nursing Home  Delivered To: Support Person  Method of Delivery: In person    Selected Continued Care - Discharged on 5/11/2022 Admission date: 5/7/2022 - Discharge disposition: Home or Self Care    Destination Coordination complete.    Service Provider Selected Services Address Phone Fax Patient Preferred    [facility]  Skilled Nursing [address] [facility] [address] [phone] --          Durable Medical Equipment    No services have been selected for the patient.              Dialysis/Infusion    No services have been selected for the patient.              Home Medical Care     Service Provider Selected Services Address Phone Fax Patient Preferred    [facility]  Home Health Services [address] [facility] [address] [phone] --          Therapy    No services have been selected for the patient.              Community Resources    No services have been selected for the patient.              Community & DME    No services have been selected for the patient.                  Transportation Services  Private: Car    Final Discharge Disposition Code: 06 - home with home health care

## 2022-05-24 ENCOUNTER — IMMUNIZATION (OUTPATIENT)
Dept: VACCINE CLINIC | Facility: HOSPITAL | Age: 82
End: 2022-05-24

## 2022-05-24 DIAGNOSIS — Z23 NEED FOR VACCINATION: Primary | ICD-10-CM

## 2022-05-24 PROCEDURE — 91305 HC SARSCOV2 VAC 30 MCG TRS-SUCR PFIZER: CPT | Performed by: INTERNAL MEDICINE

## 2022-05-24 PROCEDURE — 0054A HC ADM SARSCV2 30MCG TRS-SUCR BOOSTER: CPT | Performed by: INTERNAL MEDICINE

## 2022-06-06 ENCOUNTER — LAB (OUTPATIENT)
Dept: ENDOCRINOLOGY | Age: 82
End: 2022-06-06

## 2022-06-06 DIAGNOSIS — E87.1 HYPONATREMIA: ICD-10-CM

## 2022-06-06 DIAGNOSIS — E03.9 HYPOTHYROIDISM, UNSPECIFIED TYPE: ICD-10-CM

## 2022-06-12 ENCOUNTER — APPOINTMENT (OUTPATIENT)
Dept: GENERAL RADIOLOGY | Facility: HOSPITAL | Age: 82
End: 2022-06-12

## 2022-06-12 ENCOUNTER — HOSPITAL ENCOUNTER (OUTPATIENT)
Facility: HOSPITAL | Age: 82
Setting detail: OBSERVATION
Discharge: HOME | End: 2022-06-14
Attending: EMERGENCY MEDICINE | Admitting: INTERNAL MEDICINE

## 2022-06-12 ENCOUNTER — APPOINTMENT (OUTPATIENT)
Dept: CT IMAGING | Facility: HOSPITAL | Age: 82
End: 2022-06-12

## 2022-06-12 DIAGNOSIS — E87.1 HYPONATREMIA: ICD-10-CM

## 2022-06-12 DIAGNOSIS — R40.4 TRANSIENT ALTERATION OF AWARENESS: Primary | ICD-10-CM

## 2022-06-12 DIAGNOSIS — F05 POSTICTAL CONFUSION: ICD-10-CM

## 2022-06-12 DIAGNOSIS — F41.9 ANXIETY: ICD-10-CM

## 2022-06-12 PROBLEM — G47.00 INSOMNIA: Status: ACTIVE | Noted: 2022-06-12

## 2022-06-12 PROBLEM — D64.9 ANEMIA: Status: ACTIVE | Noted: 2022-06-12

## 2022-06-12 PROBLEM — M54.2 CERVICALGIA: Status: ACTIVE | Noted: 2022-06-12

## 2022-06-12 PROBLEM — L30.4 INTERTRIGO: Status: ACTIVE | Noted: 2022-06-12

## 2022-06-12 LAB
ABO GROUP BLD: NORMAL
ALBUMIN SERPL-MCNC: 3.9 G/DL (ref 3.5–5.2)
ALBUMIN/GLOB SERPL: 1.6 G/DL
ALP SERPL-CCNC: 57 U/L (ref 39–117)
ALT SERPL W P-5'-P-CCNC: 18 U/L (ref 1–33)
AMPHET+METHAMPHET UR QL: NEGATIVE
ANION GAP SERPL CALCULATED.3IONS-SCNC: 10 MMOL/L (ref 5–15)
APAP SERPL-MCNC: <5 MCG/ML (ref 0–30)
APTT PPP: 24.3 SECONDS (ref 22.7–35.4)
AST SERPL-CCNC: 19 U/L (ref 1–32)
BACTERIA UR QL AUTO: ABNORMAL /HPF
BARBITURATES UR QL SCN: NEGATIVE
BASOPHILS # BLD AUTO: 0.01 10*3/MM3 (ref 0–0.2)
BASOPHILS NFR BLD AUTO: 0.1 % (ref 0–1.5)
BENZODIAZ UR QL SCN: POSITIVE
BILIRUB SERPL-MCNC: 1.1 MG/DL (ref 0–1.2)
BILIRUB UR QL STRIP: NEGATIVE
BLD GP AB SCN SERPL QL: NEGATIVE
BUN SERPL-MCNC: 7 MG/DL (ref 8–23)
BUN/CREAT SERPL: 10.9 (ref 7–25)
CALCIUM SPEC-SCNC: 9.2 MG/DL (ref 8.6–10.5)
CANNABINOIDS SERPL QL: NEGATIVE
CHLORIDE SERPL-SCNC: 92 MMOL/L (ref 98–107)
CLARITY UR: ABNORMAL
CO2 SERPL-SCNC: 24 MMOL/L (ref 22–29)
COCAINE UR QL: NEGATIVE
COLOR UR: YELLOW
CREAT SERPL-MCNC: 0.64 MG/DL (ref 0.57–1)
DEPRECATED RDW RBC AUTO: 38.6 FL (ref 37–54)
EGFRCR SERPLBLD CKD-EPI 2021: 88.4 ML/MIN/1.73
EOSINOPHIL # BLD AUTO: 0.03 10*3/MM3 (ref 0–0.4)
EOSINOPHIL NFR BLD AUTO: 0.4 % (ref 0.3–6.2)
ERYTHROCYTE [DISTWIDTH] IN BLOOD BY AUTOMATED COUNT: 11.8 % (ref 12.3–15.4)
ETHANOL BLD-MCNC: <10 MG/DL (ref 0–10)
ETHANOL UR QL: <0.01 %
GLOBULIN UR ELPH-MCNC: 2.4 GM/DL
GLUCOSE SERPL-MCNC: 117 MG/DL (ref 65–99)
GLUCOSE UR STRIP-MCNC: NEGATIVE MG/DL
HCT VFR BLD AUTO: 31.2 % (ref 34–46.6)
HGB BLD-MCNC: 11 G/DL (ref 12–15.9)
HGB UR QL STRIP.AUTO: ABNORMAL
HOLD SPECIMEN: NORMAL
HYALINE CASTS UR QL AUTO: ABNORMAL /LPF
IMM GRANULOCYTES # BLD AUTO: 0.03 10*3/MM3 (ref 0–0.05)
IMM GRANULOCYTES NFR BLD AUTO: 0.4 % (ref 0–0.5)
INR PPP: 0.98 (ref 0.9–1.1)
KETONES UR QL STRIP: NEGATIVE
LEUKOCYTE ESTERASE UR QL STRIP.AUTO: ABNORMAL
LYMPHOCYTES # BLD AUTO: 1 10*3/MM3 (ref 0.7–3.1)
LYMPHOCYTES NFR BLD AUTO: 12.9 % (ref 19.6–45.3)
MCH RBC QN AUTO: 31.8 PG (ref 26.6–33)
MCHC RBC AUTO-ENTMCNC: 35.3 G/DL (ref 31.5–35.7)
MCV RBC AUTO: 90.2 FL (ref 79–97)
METHADONE UR QL SCN: NEGATIVE
MONOCYTES # BLD AUTO: 0.67 10*3/MM3 (ref 0.1–0.9)
MONOCYTES NFR BLD AUTO: 8.6 % (ref 5–12)
NEUTROPHILS NFR BLD AUTO: 6.03 10*3/MM3 (ref 1.7–7)
NEUTROPHILS NFR BLD AUTO: 77.6 % (ref 42.7–76)
NITRITE UR QL STRIP: NEGATIVE
NRBC BLD AUTO-RTO: 0 /100 WBC (ref 0–0.2)
OPIATES UR QL: NEGATIVE
OXYCODONE UR QL SCN: NEGATIVE
PH UR STRIP.AUTO: 8.5 [PH] (ref 5–8)
PLATELET # BLD AUTO: 265 10*3/MM3 (ref 140–450)
PMV BLD AUTO: 8.3 FL (ref 6–12)
POTASSIUM SERPL-SCNC: 3.4 MMOL/L (ref 3.5–5.2)
PROT SERPL-MCNC: 6.3 G/DL (ref 6–8.5)
PROT UR QL STRIP: ABNORMAL
PROTHROMBIN TIME: 12.9 SECONDS (ref 11.7–14.2)
RBC # BLD AUTO: 3.46 10*6/MM3 (ref 3.77–5.28)
RBC # UR STRIP: ABNORMAL /HPF
REF LAB TEST METHOD: ABNORMAL
RH BLD: POSITIVE
SALICYLATES SERPL-MCNC: <0.3 MG/DL
SARS-COV-2 RNA PNL SPEC NAA+PROBE: NOT DETECTED
SODIUM SERPL-SCNC: 126 MMOL/L (ref 136–145)
SP GR UR STRIP: 1.01 (ref 1–1.03)
SQUAMOUS #/AREA URNS HPF: ABNORMAL /HPF
T&S EXPIRATION DATE: NORMAL
TROPONIN T SERPL-MCNC: <0.01 NG/ML (ref 0–0.03)
UROBILINOGEN UR QL STRIP: ABNORMAL
WBC # UR STRIP: ABNORMAL /HPF
WBC NRBC COR # BLD: 7.77 10*3/MM3 (ref 3.4–10.8)
WHOLE BLOOD HOLD COAG: NORMAL
WHOLE BLOOD HOLD SPECIMEN: NORMAL

## 2022-06-12 PROCEDURE — 70450 CT HEAD/BRAIN W/O DYE: CPT

## 2022-06-12 PROCEDURE — 86900 BLOOD TYPING SEROLOGIC ABO: CPT | Performed by: EMERGENCY MEDICINE

## 2022-06-12 PROCEDURE — 87635 SARS-COV-2 COVID-19 AMP PRB: CPT | Performed by: EMERGENCY MEDICINE

## 2022-06-12 PROCEDURE — 80143 DRUG ASSAY ACETAMINOPHEN: CPT | Performed by: EMERGENCY MEDICINE

## 2022-06-12 PROCEDURE — G0378 HOSPITAL OBSERVATION PER HR: HCPCS

## 2022-06-12 PROCEDURE — 80307 DRUG TEST PRSMV CHEM ANLYZR: CPT | Performed by: EMERGENCY MEDICINE

## 2022-06-12 PROCEDURE — 93005 ELECTROCARDIOGRAM TRACING: CPT | Performed by: EMERGENCY MEDICINE

## 2022-06-12 PROCEDURE — 87186 SC STD MICRODIL/AGAR DIL: CPT | Performed by: EMERGENCY MEDICINE

## 2022-06-12 PROCEDURE — 96361 HYDRATE IV INFUSION ADD-ON: CPT

## 2022-06-12 PROCEDURE — 99285 EMERGENCY DEPT VISIT HI MDM: CPT

## 2022-06-12 PROCEDURE — 25010000002 LEVETRIRACETAM PER 10 MG: Performed by: EMERGENCY MEDICINE

## 2022-06-12 PROCEDURE — 86850 RBC ANTIBODY SCREEN: CPT | Performed by: EMERGENCY MEDICINE

## 2022-06-12 PROCEDURE — 93010 ELECTROCARDIOGRAM REPORT: CPT | Performed by: INTERNAL MEDICINE

## 2022-06-12 PROCEDURE — 85730 THROMBOPLASTIN TIME PARTIAL: CPT | Performed by: EMERGENCY MEDICINE

## 2022-06-12 PROCEDURE — 86901 BLOOD TYPING SEROLOGIC RH(D): CPT | Performed by: EMERGENCY MEDICINE

## 2022-06-12 PROCEDURE — 71045 X-RAY EXAM CHEST 1 VIEW: CPT

## 2022-06-12 PROCEDURE — 87086 URINE CULTURE/COLONY COUNT: CPT | Performed by: EMERGENCY MEDICINE

## 2022-06-12 PROCEDURE — 84484 ASSAY OF TROPONIN QUANT: CPT | Performed by: EMERGENCY MEDICINE

## 2022-06-12 PROCEDURE — 80179 DRUG ASSAY SALICYLATE: CPT | Performed by: EMERGENCY MEDICINE

## 2022-06-12 PROCEDURE — 82077 ASSAY SPEC XCP UR&BREATH IA: CPT | Performed by: EMERGENCY MEDICINE

## 2022-06-12 PROCEDURE — 87088 URINE BACTERIA CULTURE: CPT | Performed by: EMERGENCY MEDICINE

## 2022-06-12 PROCEDURE — 81001 URINALYSIS AUTO W/SCOPE: CPT | Performed by: EMERGENCY MEDICINE

## 2022-06-12 PROCEDURE — 96375 TX/PRO/DX INJ NEW DRUG ADDON: CPT

## 2022-06-12 PROCEDURE — 80053 COMPREHEN METABOLIC PANEL: CPT | Performed by: EMERGENCY MEDICINE

## 2022-06-12 PROCEDURE — 85610 PROTHROMBIN TIME: CPT | Performed by: EMERGENCY MEDICINE

## 2022-06-12 PROCEDURE — 85025 COMPLETE CBC W/AUTO DIFF WBC: CPT | Performed by: EMERGENCY MEDICINE

## 2022-06-12 RX ORDER — SODIUM CHLORIDE 9 MG/ML
125 INJECTION, SOLUTION INTRAVENOUS CONTINUOUS
Status: DISCONTINUED | OUTPATIENT
Start: 2022-06-12 | End: 2022-06-13

## 2022-06-12 RX ORDER — SODIUM CHLORIDE 0.9 % (FLUSH) 0.9 %
10 SYRINGE (ML) INJECTION AS NEEDED
Status: DISCONTINUED | OUTPATIENT
Start: 2022-06-12 | End: 2022-06-14 | Stop reason: HOSPADM

## 2022-06-12 RX ORDER — HYDROCODONE BITARTRATE AND ACETAMINOPHEN 5; 325 MG/1; MG/1
1 TABLET ORAL EVERY 6 HOURS PRN
COMMUNITY
End: 2022-06-14 | Stop reason: HOSPADM

## 2022-06-12 RX ORDER — ZOLPIDEM TARTRATE 10 MG/1
5 TABLET ORAL NIGHTLY PRN
COMMUNITY
End: 2022-06-14 | Stop reason: HOSPADM

## 2022-06-12 RX ORDER — KETOCONAZOLE 20 MG/G
1 CREAM TOPICAL
Status: DISCONTINUED | OUTPATIENT
Start: 2022-06-12 | End: 2022-06-14 | Stop reason: HOSPADM

## 2022-06-12 RX ORDER — OCTISALATE, AVOBENZONE, HOMOSALATE, AND OCTOCRYLENE 29.4; 29.4; 49; 25.48 MG/ML; MG/ML; MG/ML; MG/ML
4 LOTION TOPICAL
COMMUNITY
End: 2022-06-14 | Stop reason: HOSPADM

## 2022-06-12 RX ORDER — PRUCALOPRIDE 1 MG/1
1 TABLET, FILM COATED ORAL
COMMUNITY
End: 2022-07-12 | Stop reason: SDUPTHER

## 2022-06-12 RX ORDER — LEVETIRACETAM 500 MG/5ML
1000 INJECTION, SOLUTION, CONCENTRATE INTRAVENOUS ONCE
Status: COMPLETED | OUTPATIENT
Start: 2022-06-12 | End: 2022-06-12

## 2022-06-12 RX ORDER — LEVETIRACETAM 500 MG/5ML
500 INJECTION, SOLUTION, CONCENTRATE INTRAVENOUS EVERY 12 HOURS SCHEDULED
Status: DISCONTINUED | OUTPATIENT
Start: 2022-06-13 | End: 2022-06-14 | Stop reason: HOSPADM

## 2022-06-12 RX ADMIN — SODIUM CHLORIDE 500 ML: 9 INJECTION, SOLUTION INTRAVENOUS at 20:52

## 2022-06-12 RX ADMIN — LEVETIRACETAM 1000 MG: 100 INJECTION INTRAVENOUS at 18:43

## 2022-06-12 RX ADMIN — SODIUM CHLORIDE 125 ML/HR: 9 INJECTION, SOLUTION INTRAVENOUS at 20:52

## 2022-06-12 NOTE — ED NOTES
Pt was at home and was reading and fell asleep. pts  states pt was fine before they fell asleep. Pt  attempted to wake pt up, Pt would not wake up and was just sitting there with their mouth open which is abnormal for pt per . Pt  states they called 911 and pt woke up with EMS there and able to answer some questions. Upon arrival to ER pt was unable to answer questions or follow commands, pt was very confused and could not perform for NIH. Pt was taken to ct scan and would not hold still and was unable to be talked down. Pt was eventually calmed and held still for the CT. When pt was brought back to the room, pts  was in the room. Pt began to come around and able to perform NIH and answer questions appropriately. This NIH was documented.

## 2022-06-12 NOTE — ED PROVIDER NOTES
" EMERGENCY DEPARTMENT ENCOUNTER    Room Number:  11/11  Date of encounter:  6/12/2022  PCP: Reyes, Miriam Mercado, MD  Historian: EMS    Patient was placed in face mask during triage process. Patient was wearing facemask when I entered the room and throughout our encounter. I wore full protective equipment throughout this patient encounter including a face mask, eye protection, and gloves. Hand hygiene was performed before donning protective equipment and again following doffing of PPE after leaving the room.    HPI:  Chief Complaint: AMS  A complete HPI/ROS/PMH/PSH/SH/FH are unobtainable due to: AMS  Context: Ritu Martino is a 82 y.o. female who presents to the ED via EMS for altered mental status.  Patient was in the last noted to be in her normal state of health at 1600 today when she lay down for a nap.  Upon waking she try to get out of bed and fell.  Per EMS report, the patient was noted to be more confused than usual.  For them she has had increasing difficulty with expressive aphasia.  She has been restless but seems to be moving all extremities equally.  When she does speak her speech is intelligible.  Patient denies any focal pain for me but tells me she does not feel well.  Per EMS, the patient was initially hypotensive but now is hypertensive.  Blood glucose was within normal limits on their check.  The patient has been in sinus rhythm throughout transport.  Patient reports feeling \"bad\" but cannot provide any further details.      MEDICAL HISTORY REVIEW  EMR reviewed:    Date of admission: 5/7/2022  Date of Discharge:  5/11/2022  Discharge Diagnosis:         Active Hospital Problems     Diagnosis   POA   • **Fluent aphasia [R47.01]   Yes   • Hypochloremia [E87.8]   Yes   • Benzodiazepine dependence (HCC) [F13.20]   Yes   • Essential hypertension, benign [I10]   Yes   • Hyponatremia [E87.1]           PAST MEDICAL HISTORY  Active Ambulatory Problems     Diagnosis Date Noted   • Anxiety 12/15/2021   • " Hypothyroidism 12/15/2021   • Hyponatremia    • Iron deficiency 2022   • Essential hypertension, benign 2022   • Fluent aphasia 2022   • Hypochloremia 2022   • Benzodiazepine dependence (HCC) 2022     Resolved Ambulatory Problems     Diagnosis Date Noted   • Essential hypertension 2021   • Hyperlipidemia 2021   • Hypokalemia 2022     Past Medical History:   Diagnosis Date   • Arthritis    • Bowel dysfunction 2021   • Diverticulitis 2021   • GERD (gastroesophageal reflux disease)    • Hernia, hiatal    • Hypercholesterolemia    • Insomnia          PAST SURGICAL HISTORY  Past Surgical History:   Procedure Laterality Date   • APPENDECTOMY     • BACK SURGERY     •  SECTION     • COLON SURGERY  2021    to address ruptured and infected diverticular dz, ostomy also placed   • COLOSTOMY CLOSURE  10/2021    ostomy removed   • HEMORRHOIDECTOMY     • KNEE ARTHROPLASTY           FAMILY HISTORY  Family History   Problem Relation Age of Onset   • Brain cancer Mother    • Stroke Father          SOCIAL HISTORY  Social History     Socioeconomic History   • Marital status:    Tobacco Use   • Smoking status: Former Smoker   • Smokeless tobacco: Never Used   Vaping Use   • Vaping Use: Never used   Substance and Sexual Activity   • Alcohol use: Yes     Alcohol/week: 8.0 standard drinks     Types: 4 Glasses of wine, 4 Shots of liquor per week   • Drug use: No   • Sexual activity: Defer         ALLERGIES  Patient has no known allergies.        REVIEW OF SYSTEMS  Review of Systems     All systems reviewed and negative except for those discussed in HPI.       PHYSICAL EXAM    I have reviewed the triage vital signs and nursing notes.    Blood pressure 126/73    Physical Exam    Physical Exam   Constitutional: Restless appearing.  Patient moving all extremities equally and moving about the bed.  She is oriented to herself but declines answer very many  questions.  She does not follow all exam requests.  HENT:  Head: Normocephalic and atraumatic.   Oropharynx: Mucous membranes are moist.   Eyes: No scleral icterus. No conjunctival pallor.  PERRL, EOMI  Neck: Painless range of motion noted. Neck supple.  No meningismus appreciated  Cardiovascular: Normal rate, regular rhythm and intact distal pulses.  Pulmonary/Chest: No respiratory distress.  No tachypnea or increased work of breathing.    Abdominal: Soft. There is no tenderness. There is no rebound and no guarding.   Musculoskeletal: Moves all extremities equally spontaneously but not to command..  Neurological: Alert to herself.  Moving all extremities.  Not following all requests on exam..   Skin: Skin is pink, warm, and dry. No pallor.   Psychiatric: Limited evaluation  Nursing note and vitals reviewed.    LAB RESULTS  Recent Results (from the past 24 hour(s))   Comprehensive Metabolic Panel    Collection Time: 06/12/22  6:06 PM    Specimen: Blood   Result Value Ref Range    Glucose 117 (H) 65 - 99 mg/dL    BUN 7 (L) 8 - 23 mg/dL    Creatinine 0.64 0.57 - 1.00 mg/dL    Sodium 126 (L) 136 - 145 mmol/L    Potassium 3.4 (L) 3.5 - 5.2 mmol/L    Chloride 92 (L) 98 - 107 mmol/L    CO2 24.0 22.0 - 29.0 mmol/L    Calcium 9.2 8.6 - 10.5 mg/dL    Total Protein 6.3 6.0 - 8.5 g/dL    Albumin 3.90 3.50 - 5.20 g/dL    ALT (SGPT) 18 1 - 33 U/L    AST (SGOT) 19 1 - 32 U/L    Alkaline Phosphatase 57 39 - 117 U/L    Total Bilirubin 1.1 0.0 - 1.2 mg/dL    Globulin 2.4 gm/dL    A/G Ratio 1.6 g/dL    BUN/Creatinine Ratio 10.9 7.0 - 25.0    Anion Gap 10.0 5.0 - 15.0 mmol/L    eGFR 88.4 >60.0 mL/min/1.73   Protime-INR    Collection Time: 06/12/22  6:06 PM    Specimen: Blood   Result Value Ref Range    Protime 12.9 11.7 - 14.2 Seconds    INR 0.98 0.90 - 1.10   aPTT    Collection Time: 06/12/22  6:06 PM    Specimen: Blood   Result Value Ref Range    PTT 24.3 22.7 - 35.4 seconds   Troponin    Collection Time: 06/12/22  6:06 PM     Specimen: Blood   Result Value Ref Range    Troponin T <0.010 0.000 - 0.030 ng/mL   Type & Screen    Collection Time: 06/12/22  6:06 PM    Specimen: Blood   Result Value Ref Range    ABO Type A     RH type Positive     Antibody Screen Negative     T&S Expiration Date 6/15/2022 11:59:59 PM    Green Top (Gel)    Collection Time: 06/12/22  6:06 PM   Result Value Ref Range    Extra Tube Hold for add-ons.    Lavender Top    Collection Time: 06/12/22  6:06 PM   Result Value Ref Range    Extra Tube hold for add-on    Light Blue Top    Collection Time: 06/12/22  6:06 PM   Result Value Ref Range    Extra Tube Hold for add-ons.    CBC Auto Differential    Collection Time: 06/12/22  6:06 PM    Specimen: Blood   Result Value Ref Range    WBC 7.77 3.40 - 10.80 10*3/mm3    RBC 3.46 (L) 3.77 - 5.28 10*6/mm3    Hemoglobin 11.0 (L) 12.0 - 15.9 g/dL    Hematocrit 31.2 (L) 34.0 - 46.6 %    MCV 90.2 79.0 - 97.0 fL    MCH 31.8 26.6 - 33.0 pg    MCHC 35.3 31.5 - 35.7 g/dL    RDW 11.8 (L) 12.3 - 15.4 %    RDW-SD 38.6 37.0 - 54.0 fl    MPV 8.3 6.0 - 12.0 fL    Platelets 265 140 - 450 10*3/mm3    Neutrophil % 77.6 (H) 42.7 - 76.0 %    Lymphocyte % 12.9 (L) 19.6 - 45.3 %    Monocyte % 8.6 5.0 - 12.0 %    Eosinophil % 0.4 0.3 - 6.2 %    Basophil % 0.1 0.0 - 1.5 %    Immature Grans % 0.4 0.0 - 0.5 %    Neutrophils, Absolute 6.03 1.70 - 7.00 10*3/mm3    Lymphocytes, Absolute 1.00 0.70 - 3.10 10*3/mm3    Monocytes, Absolute 0.67 0.10 - 0.90 10*3/mm3    Eosinophils, Absolute 0.03 0.00 - 0.40 10*3/mm3    Basophils, Absolute 0.01 0.00 - 0.20 10*3/mm3    Immature Grans, Absolute 0.03 0.00 - 0.05 10*3/mm3    nRBC 0.0 0.0 - 0.2 /100 WBC   Acetaminophen Level    Collection Time: 06/12/22  6:06 PM    Specimen: Blood   Result Value Ref Range    Acetaminophen <5.0 0.0 - 30.0 mcg/mL   Salicylate Level    Collection Time: 06/12/22  6:06 PM    Specimen: Blood   Result Value Ref Range    Salicylate <0.3 <=30.0 mg/dL   Ethanol    Collection Time: 06/12/22   6:06 PM    Specimen: Blood   Result Value Ref Range    Ethanol <10 0 - 10 mg/dL    Ethanol % <0.010 %   ECG 12 Lead    Collection Time: 06/12/22  7:19 PM   Result Value Ref Range    QT Interval 422 ms       Ordered the above labs and independently reviewed the results.        RADIOLOGY  XR Chest 1 View    Result Date: 6/12/2022  Portable chest radiograph  HISTORY:Stroke  TECHNIQUE: Single AP portable radiograph of the chest  COMPARISON:Chest radiograph 05/07/2022      FINDINGS AND IMPRESSION: Please note the medial aspects of the bilateral lung apices are obscured by the patient's head and cannot be evaluated.  Somewhat nodular opacification within the left midlung is grossly unchanged since 05/14/2018. Mild bibasilar pulmonary opacification is present, right greater then left, and appears to have increased in size since 05/07/2022 suggestive of atelectasis versus developing pneumonia in the appropriate clinical context and correlation with patient history is recommended. No pneumothorax or pleural effusion is seen. However, a small pneumothorax could be missed within the bilateral medial aspects of the lung apices given obscuration of the patient's head as detailed above.  Cardiac silhouette is at the upper limits of normal to mildly enlarged in size.  This report was finalized on 6/12/2022 7:29 PM by Dr. John Abbott M.D.      CT Head Without Contrast Stroke Protocol    Result Date: 6/12/2022  Patient: NUBIA SCHILLING  Time Out: 19:46 Exam(s): CT HEAD Without Contrast EXAM:   CT Head Without Intravenous Contrast CLINICAL HISTORY:    Reason for exam: Stroke, follow up. TECHNIQUE:   Axial computed tomography images of the head brain without intravenous contrast.  CTDI is 54.8 mGy and DLP is 958 mGy-cm.  This CT exam was performed according to the principle of ALARA (As Low As Reasonably Achievable) by using one or more of the following dose reduction techniques: automated exposure control, adjustment of the mA  and or kV according to patient size, and or use of iterative reconstruction technique. COMPARISON:   MRI brain May 9, 2022. FINDINGS:   Brain:  Gray-white differentiation is maintained.  Moderate supratentorial white matter disease.  No hemorrhage.   Ventricles:  Unremarkable.  No ventriculomegaly.   Bones joints:  Unremarkable.  No acute fracture.   Soft tissues:  Unremarkable.   Sinuses:  Unremarkable as visualized.  No acute sinusitis.   Mastoid air cells:  Unremarkable as visualized.  No mastoid effusion.   Other findings:  Motion degraded exam.  Mild atrophy. IMPRESSION:       No acute intracranial findings.     Electronically signed by Joey Villa MD on 06-12-22 at 1946      I ordered the above noted radiological studies. Reviewed by me and discussed with radiologist.  See dictation for official radiology interpretation.      PROCEDURES    Procedures        MEDICATIONS GIVEN IN ER    Medications   sodium chloride 0.9 % flush 10 mL (has no administration in time range)   sodium chloride 0.9 % bolus 500 mL (has no administration in time range)     Followed by   sodium chloride 0.9 % infusion (has no administration in time range)   levETIRAcetam (KEPPRA) injection 1,000 mg (1,000 mg Intravenous Given 6/12/22 1843)         PROGRESS, DATA ANALYSIS, CONSULTS, AND MEDICAL DECISION MAKING    My differential diagnosis for altered mental status includes but is not limited to:  Hypoglycemia, hyperglycemia, DKA, overdose, ethanol intoxication, thiamine deficiency, niacin deficiency, hypothymia, hyperviscosity, Ced’s disease, hyponatremia, hypernatremia, liver failure, kidney failure, hyper or hypothyroid, no insufficiency, hypoxia, hypercarbia, carbon monoxide poisoning, postanoxic encephalopathy, ischemic stroke, intracranial bleed, subarachnoid hemorrhage, brain tumor, closed head injury, epidural hematoma, epidural hematoma, seizure activity, postictal state, syncopal episode, disseminated encephalomyelitis,  central pontine myelinolysis, post cardiac arrest, bacterial meningitis, viral meningitis, fungal meningitis, encephalitis, brain abscess, subdural empyema, hysteria, catatonic state, malingering, hypertensive encephalopathy, vasculitis, TTP, DIC      All labs have been independently reviewed by me.  All radiology studies have been reviewed by me and discussed with radiologist dictating the report.   EKG's independently viewed and interpreted by me.  Discussion below represents my analysis of pertinent findings related to patient's condition, differential diagnosis, treatment plan and final disposition.      ED Course as of 06/12/22 2000   Sun Jun 12, 2022 1809 CONSULT        Provider: Dr Miller -stroke neurology    Discussion: Reviewed patient history, ED presentation and evaluation.  We also discussed the patient's recent past history and significant work-up 1 month ago.  He recommends only Noncon head CT to ensure no intracranial hemorrhage at this time.  Given the patient's past MRI with meningioma, he also recommends load patient with Keppra for possible seizure.  He would recommend admission for EEG.  He also recommends evaluation for metabolic cause of altered mental status.    Agreeable c treatment and planned disposition.         [RS]   1849 Troponin T: <0.010 [RS]   1930 EKG           EKG time: 1919  Rhythm/Rate: Sinus, 65  P waves and SD: BENNIE within normal limits  QRS, axis: Narrow complex  ST and T waves: STEMI; QTC within normal limits    Interpreted Contemporaneously by me, independently viewed  Comparison: 5/7/2022   [RS]   1930 Sodium(!): 126  Bolus IV fluids initiated. [RS]   1959 CONSULT        Provider: Dr. Shannon-[facility]    Discussion: Reviewed patient history, ED presentation and evaluation.  Agreeable to except patient for observation admission to the telemetry floor for further evaluation and treatment.  We also discussed consultation with neurology and their  recommendations/request.    Agreeable c treatment and planned disposition.         [RS]   2000 Patient much more alert and appropriate at this time.  She is agreeable with plan for admission.  Patient and spouse updated with all results. [RS]      ED Course User Index  [RS] Chalino Arndt MD       AS OF 20:00 EDT VITALS:    BP - 121/63  HR - 74  TEMP -    O2 SATS - 98%        DIAGNOSIS  Final diagnoses:   Transient alteration of awareness   Postictal confusion   Hyponatremia         DISPOSITION  ADMISSION    Discussed treatment plan and reason for admission with pt/family and admitting physician.  Pt/family voiced understanding of the plan for admission for further testing/treatment as needed.               Chalino Arndt MD  06/12/22 2001

## 2022-06-13 ENCOUNTER — APPOINTMENT (OUTPATIENT)
Dept: NEUROLOGY | Facility: HOSPITAL | Age: 82
End: 2022-06-13

## 2022-06-13 PROBLEM — N30.00 ACUTE CYSTITIS: Status: ACTIVE | Noted: 2022-06-13

## 2022-06-13 PROBLEM — G93.41 METABOLIC ENCEPHALOPATHY: Status: ACTIVE | Noted: 2022-06-13

## 2022-06-13 LAB
ANION GAP SERPL CALCULATED.3IONS-SCNC: 10 MMOL/L (ref 5–15)
BUN SERPL-MCNC: 4 MG/DL (ref 8–23)
BUN/CREAT SERPL: 8.3 (ref 7–25)
CALCIUM SPEC-SCNC: 8.3 MG/DL (ref 8.6–10.5)
CHLORIDE SERPL-SCNC: 99 MMOL/L (ref 98–107)
CO2 SERPL-SCNC: 22 MMOL/L (ref 22–29)
CREAT SERPL-MCNC: 0.48 MG/DL (ref 0.57–1)
DEPRECATED RDW RBC AUTO: 37.9 FL (ref 37–54)
EGFRCR SERPLBLD CKD-EPI 2021: 94.7 ML/MIN/1.73
ERYTHROCYTE [DISTWIDTH] IN BLOOD BY AUTOMATED COUNT: 11.7 % (ref 12.3–15.4)
GLUCOSE SERPL-MCNC: 90 MG/DL (ref 65–99)
HCT VFR BLD AUTO: 29.7 % (ref 34–46.6)
HGB BLD-MCNC: 10.4 G/DL (ref 12–15.9)
MAGNESIUM SERPL-MCNC: 1.8 MG/DL (ref 1.6–2.4)
MCH RBC QN AUTO: 31.8 PG (ref 26.6–33)
MCHC RBC AUTO-ENTMCNC: 35 G/DL (ref 31.5–35.7)
MCV RBC AUTO: 90.8 FL (ref 79–97)
PHOSPHATE SERPL-MCNC: 2.6 MG/DL (ref 2.5–4.5)
PLATELET # BLD AUTO: 246 10*3/MM3 (ref 140–450)
PMV BLD AUTO: 8.7 FL (ref 6–12)
POTASSIUM SERPL-SCNC: 3.2 MMOL/L (ref 3.5–5.2)
QT INTERVAL: 422 MS
RBC # BLD AUTO: 3.27 10*6/MM3 (ref 3.77–5.28)
SODIUM SERPL-SCNC: 131 MMOL/L (ref 136–145)
WBC NRBC COR # BLD: 3.31 10*3/MM3 (ref 3.4–10.8)

## 2022-06-13 PROCEDURE — 97161 PT EVAL LOW COMPLEX 20 MIN: CPT | Performed by: PHYSICAL THERAPIST

## 2022-06-13 PROCEDURE — 85027 COMPLETE CBC AUTOMATED: CPT | Performed by: HOSPITALIST

## 2022-06-13 PROCEDURE — G0378 HOSPITAL OBSERVATION PER HR: HCPCS

## 2022-06-13 PROCEDURE — 96376 TX/PRO/DX INJ SAME DRUG ADON: CPT

## 2022-06-13 PROCEDURE — 95816 EEG AWAKE AND DROWSY: CPT

## 2022-06-13 PROCEDURE — 99214 OFFICE O/P EST MOD 30 MIN: CPT | Performed by: PHYSICIAN ASSISTANT

## 2022-06-13 PROCEDURE — 84100 ASSAY OF PHOSPHORUS: CPT | Performed by: HOSPITALIST

## 2022-06-13 PROCEDURE — 95816 EEG AWAKE AND DROWSY: CPT | Performed by: STUDENT IN AN ORGANIZED HEALTH CARE EDUCATION/TRAINING PROGRAM

## 2022-06-13 PROCEDURE — 96361 HYDRATE IV INFUSION ADD-ON: CPT

## 2022-06-13 PROCEDURE — 25010000002 CEFTRIAXONE PER 250 MG: Performed by: INTERNAL MEDICINE

## 2022-06-13 PROCEDURE — 25010000002 LEVETRIRACETAM PER 10 MG: Performed by: HOSPITALIST

## 2022-06-13 PROCEDURE — 83735 ASSAY OF MAGNESIUM: CPT | Performed by: HOSPITALIST

## 2022-06-13 PROCEDURE — 80048 BASIC METABOLIC PNL TOTAL CA: CPT | Performed by: HOSPITALIST

## 2022-06-13 RX ORDER — ONDANSETRON 2 MG/ML
4 INJECTION INTRAMUSCULAR; INTRAVENOUS EVERY 6 HOURS PRN
Status: DISCONTINUED | OUTPATIENT
Start: 2022-06-13 | End: 2022-06-14 | Stop reason: HOSPADM

## 2022-06-13 RX ORDER — LEVOTHYROXINE SODIUM 0.05 MG/1
50 TABLET ORAL
Status: DISCONTINUED | OUTPATIENT
Start: 2022-06-13 | End: 2022-06-14 | Stop reason: HOSPADM

## 2022-06-13 RX ORDER — SODIUM CHLORIDE 1000 MG
1 TABLET, SOLUBLE MISCELLANEOUS
Status: DISCONTINUED | OUTPATIENT
Start: 2022-06-13 | End: 2022-06-14 | Stop reason: HOSPADM

## 2022-06-13 RX ORDER — SODIUM CHLORIDE 0.9 % (FLUSH) 0.9 %
10 SYRINGE (ML) INJECTION EVERY 12 HOURS SCHEDULED
Status: DISCONTINUED | OUTPATIENT
Start: 2022-06-13 | End: 2022-06-14 | Stop reason: HOSPADM

## 2022-06-13 RX ORDER — ONDANSETRON 4 MG/1
4 TABLET, FILM COATED ORAL EVERY 6 HOURS PRN
Status: DISCONTINUED | OUTPATIENT
Start: 2022-06-13 | End: 2022-06-14 | Stop reason: HOSPADM

## 2022-06-13 RX ORDER — LORAZEPAM 2 MG/ML
1 INJECTION INTRAMUSCULAR ONCE AS NEEDED
Status: DISCONTINUED | OUTPATIENT
Start: 2022-06-13 | End: 2022-06-14

## 2022-06-13 RX ORDER — SODIUM CHLORIDE 0.9 % (FLUSH) 0.9 %
10 SYRINGE (ML) INJECTION AS NEEDED
Status: DISCONTINUED | OUTPATIENT
Start: 2022-06-13 | End: 2022-06-14 | Stop reason: HOSPADM

## 2022-06-13 RX ORDER — UREA 10 %
1 LOTION (ML) TOPICAL NIGHTLY
Status: DISCONTINUED | OUTPATIENT
Start: 2022-06-13 | End: 2022-06-14 | Stop reason: HOSPADM

## 2022-06-13 RX ORDER — ACETAMINOPHEN 325 MG/1
650 TABLET ORAL EVERY 4 HOURS PRN
Status: DISCONTINUED | OUTPATIENT
Start: 2022-06-13 | End: 2022-06-14 | Stop reason: HOSPADM

## 2022-06-13 RX ORDER — POTASSIUM CHLORIDE 750 MG/1
40 TABLET, FILM COATED, EXTENDED RELEASE ORAL 2 TIMES DAILY WITH MEALS
Status: COMPLETED | OUTPATIENT
Start: 2022-06-13 | End: 2022-06-13

## 2022-06-13 RX ORDER — ACETAMINOPHEN 160 MG/5ML
650 SOLUTION ORAL EVERY 4 HOURS PRN
Status: DISCONTINUED | OUTPATIENT
Start: 2022-06-13 | End: 2022-06-14 | Stop reason: HOSPADM

## 2022-06-13 RX ORDER — ROSUVASTATIN CALCIUM 20 MG/1
20 TABLET, COATED ORAL NIGHTLY
Status: DISCONTINUED | OUTPATIENT
Start: 2022-06-13 | End: 2022-06-14 | Stop reason: HOSPADM

## 2022-06-13 RX ORDER — ACETAMINOPHEN 650 MG/1
650 SUPPOSITORY RECTAL EVERY 4 HOURS PRN
Status: DISCONTINUED | OUTPATIENT
Start: 2022-06-13 | End: 2022-06-14 | Stop reason: HOSPADM

## 2022-06-13 RX ORDER — ALPRAZOLAM 0.25 MG/1
0.25 TABLET ORAL 2 TIMES DAILY
Status: DISCONTINUED | OUTPATIENT
Start: 2022-06-13 | End: 2022-06-14 | Stop reason: HOSPADM

## 2022-06-13 RX ORDER — UBIDECARENONE 75 MG
50 CAPSULE ORAL DAILY
Status: DISCONTINUED | OUTPATIENT
Start: 2022-06-13 | End: 2022-06-14 | Stop reason: HOSPADM

## 2022-06-13 RX ADMIN — SODIUM CHLORIDE TAB 1 GM 1 G: 1 TAB at 08:59

## 2022-06-13 RX ADMIN — SODIUM CHLORIDE 125 ML/HR: 9 INJECTION, SOLUTION INTRAVENOUS at 01:17

## 2022-06-13 RX ADMIN — ALPRAZOLAM 0.25 MG: 0.25 TABLET ORAL at 23:03

## 2022-06-13 RX ADMIN — ACETAMINOPHEN 650 MG: 325 TABLET ORAL at 01:16

## 2022-06-13 RX ADMIN — LEVETIRACETAM 500 MG: 100 INJECTION INTRAVENOUS at 08:59

## 2022-06-13 RX ADMIN — SODIUM CHLORIDE TAB 1 GM 1 G: 1 TAB at 11:21

## 2022-06-13 RX ADMIN — Medication 10 ML: at 23:03

## 2022-06-13 RX ADMIN — ALPRAZOLAM 0.25 MG: 0.25 TABLET ORAL at 01:16

## 2022-06-13 RX ADMIN — ROSUVASTATIN CALCIUM 20 MG: 20 TABLET, FILM COATED ORAL at 23:03

## 2022-06-13 RX ADMIN — KETOCONAZOLE 1 APPLICATION: 20 CREAM TOPICAL at 23:03

## 2022-06-13 RX ADMIN — Medication 1 MG: at 23:03

## 2022-06-13 RX ADMIN — CEFTRIAXONE 1 G: 1 INJECTION, POWDER, FOR SOLUTION INTRAMUSCULAR; INTRAVENOUS at 06:25

## 2022-06-13 RX ADMIN — KETOCONAZOLE 1 APPLICATION: 20 CREAM TOPICAL at 01:17

## 2022-06-13 RX ADMIN — Medication 50 MCG: at 08:59

## 2022-06-13 RX ADMIN — KETOCONAZOLE 1 APPLICATION: 20 CREAM TOPICAL at 09:00

## 2022-06-13 RX ADMIN — SODIUM CHLORIDE TAB 1 GM 1 G: 1 TAB at 17:27

## 2022-06-13 RX ADMIN — LEVETIRACETAM 500 MG: 100 INJECTION INTRAVENOUS at 23:03

## 2022-06-13 RX ADMIN — POTASSIUM CHLORIDE 40 MEQ: 750 TABLET, EXTENDED RELEASE ORAL at 11:21

## 2022-06-13 RX ADMIN — POTASSIUM CHLORIDE 40 MEQ: 750 TABLET, EXTENDED RELEASE ORAL at 17:27

## 2022-06-13 RX ADMIN — LEVOTHYROXINE SODIUM 50 MCG: 0.05 TABLET ORAL at 05:55

## 2022-06-13 RX ADMIN — ALPRAZOLAM 0.25 MG: 0.25 TABLET ORAL at 08:58

## 2022-06-13 NOTE — H&P
Name: Ritu Martino ADMIT: 2022   : 1940  PCP: Reyes, Miriam Mercado, MD    MRN: [id number] LOS: 0 days   AGE/SEX: 82 y.o. female  ROOM: P586/1     Chief Complaint   Patient presents with   • Stroke       Subjective   Patient is a 82 y.o. female who presents to [facility] with the above chief complaint.  This morning she woke up and was doing pretty much her normal routine.  Per her  she takes frequent naps throughout the day.  Today she was going in to the living room and sat down in her recliner and fell asleep.  Her  left the room and came back and noticed that she was sleeping in a very funny position.  Her mouth was hanging open and she was drooling some.  He went over to find her and she was unarousable.  She could not be awakened and was snoring loudly.  He called 911 and the ambulance was sent.  Per EMS there was some concern the patient may have had a seizure.  She was quite confused and did start to arouse once EMS arrived but was not her normal self.  Slowly over time in the emergency room she has come back to her normal mental status.  She denies any history of seizures but was seen and evaluated here about a month ago during that admission she was evaluated and had an EEG done that was negative for seizure activity.  Imaging did reveal a small meningioma.  Since that last admission they have been titrating her benzodiazepines in the outpatient setting and she was recently started on a sedative hypnotic for sleep.  She has a longstanding history of insomnia and has not slept well the last 3 or 4 nights.  Otherwise she denies any fevers or chills nausea or vomiting but does admit to some urinary frequency.    History of Present Illness    Past Medical History:   Diagnosis Date   • Anxiety    • Arthritis    • Bowel dysfunction 2021    since having surgery for diverticular infection   • Diverticulitis 2021    w/ rupture and infection required surgery  and ostomy   • Essential hypertension, benign    • GERD (gastroesophageal reflux disease)    • Hernia, hiatal    • Hypercholesterolemia    • Hypokalemia    • Hyponatremia     chronic low with occasional normal level   • Hypothyroidism     estimated time frame pt nor  could remember exactly how long has been on medication   • Insomnia    • Iron deficiency      Past Surgical History:   Procedure Laterality Date   • APPENDECTOMY     • BACK SURGERY     •  SECTION     • COLON SURGERY  2021    to address ruptured and infected diverticular dz, ostomy also placed   • COLOSTOMY CLOSURE  10/2021    ostomy removed   • HEMORRHOIDECTOMY     • KNEE ARTHROPLASTY       Family History   Problem Relation Age of Onset   • Brain cancer Mother    • Stroke Father      Social History     Tobacco Use   • Smoking status: Former Smoker   • Smokeless tobacco: Never Used   Vaping Use   • Vaping Use: Never used   Substance Use Topics   • Alcohol use: Yes     Alcohol/week: 8.0 standard drinks     Types: 4 Glasses of wine, 4 Shots of liquor per week   • Drug use: No     Medications Prior to Admission   Medication Sig Dispense Refill Last Dose   • ALPRAZolam (XANAX) 0.25 MG tablet Take 1 tablet by mouth 2 (Two) Times a Day. 6 tablet 0    • benazepril (LOTENSIN) 20 MG tablet Take 20 mg by mouth Daily.      • Cholecalciferol (VITAMIN D) 2000 units capsule Take  by mouth Daily With Dinner.      • cycloSPORINE (RESTASIS) 0.05 % ophthalmic emulsion Administer 1 drop to both eyes Daily.      • ferrous sulfate 325 (65 FE) MG tablet Take 325 mg by mouth 2 (Two) Times a Day Before Meals.      • ketoconazole (NIZORAL) 2 % cream Apply  topically to the appropriate area as directed 2 (Two) Times a Day. to affected area      • nystatin 735873 UNIT/GM powder Apply  topically to the appropriate area as directed 2 (Two) Times a Day. to affected area      • Probiotic Product (ALIGN PO) Take 1 capsule by mouth Daily With Lunch.       • rosuvastatin (CRESTOR) 20 MG tablet Take 20 mg by mouth Every Night.      • SODIUM CHLORIDE PO Take  by mouth 3 (Three) Times a Day.      • Synthroid 50 MCG tablet By mouth take 1 tab daily in AM as directed on empty stomach with water only.  Brand Medically Necessary 90 tablet 2    • vitamin B-12 (CYANOCOBALAMIN) 100 MCG tablet Take 50 mcg by mouth Daily.        Allergies:  Patient has no known allergies.    Review of Systems   Unable to perform ROS: Mental status change        Objective    Vital Signs  Temp:  [97.3 °F (36.3 °C)] 97.3 °F (36.3 °C)  Heart Rate:  [62-90] 69  Resp:  [16-18] 16  BP: (103-127)/() 125/68  SpO2:  [92 %-100 %] 100 %  on   ;   Device (Oxygen Therapy): room air  Body mass index is 23.85 kg/m².    Physical Exam  Vitals and nursing note reviewed.   Constitutional:       General: She is not in acute distress.     Appearance: She is ill-appearing.   Cardiovascular:      Rate and Rhythm: Normal rate and regular rhythm.   Pulmonary:      Effort: Pulmonary effort is normal. No respiratory distress.      Breath sounds: Normal breath sounds.   Abdominal:      General: Bowel sounds are normal.      Palpations: Abdomen is soft.   Skin:     General: Skin is warm and dry.   Neurological:      General: No focal deficit present.      Mental Status: She is alert and oriented to person, place, and time.         Results Review:   I reviewed the patient's new clinical results.  Results from last 7 days   Lab Units 06/12/22  1806   WBC 10*3/mm3 7.77   HEMOGLOBIN g/dL 11.0*   PLATELETS 10*3/mm3 265     Results from last 7 days   Lab Units 06/12/22  1806   SODIUM mmol/L 126*   POTASSIUM mmol/L 3.4*   CHLORIDE mmol/L 92*   CO2 mmol/L 24.0   BUN mg/dL 7*   CREATININE mg/dL 0.64   GLUCOSE mg/dL 117*   ALBUMIN g/dL 3.90   BILIRUBIN mg/dL 1.1   ALK PHOS U/L 57   AST (SGOT) U/L 19   ALT (SGPT) U/L 18   Estimated Creatinine Clearance: 53 mL/min (by C-G formula based on SCr of 0.64 mg/dL).  Results from last 7  days   Lab Units 06/12/22  1806   INR  0.98   TROPONIN T ng/mL <0.010         Invalid input(s): LDLCALC  Results from last 7 days   Lab Units 06/12/22  1922   NITRITE UA  Negative   WBC UA /HPF 6-12*   BACTERIA UA /HPF 3+*   SQUAM EPITHEL UA /HPF 0-2     XR Chest 1 View   Final Result   FINDINGS AND IMPRESSION:   Please note the medial aspects of the bilateral lung apices are obscured   by the patient's head and cannot be evaluated.       Somewhat nodular opacification within the left midlung is grossly   unchanged since 05/14/2018. Mild bibasilar pulmonary opacification is   present, right greater then left, and appears to have increased in size   since 05/07/2022 suggestive of atelectasis versus developing pneumonia   in the appropriate clinical context and correlation with patient history   is recommended. No pneumothorax or pleural effusion is seen. However, a   small pneumothorax could be missed within the bilateral medial aspects   of the lung apices given obscuration of the patient's head as detailed   above.       Cardiac silhouette is at the upper limits of normal to mildly enlarged   in size.       This report was finalized on 6/12/2022 7:29 PM by Dr. John Abbott M.D.          CT Head Without Contrast Stroke Protocol   Final Result         Electronically signed by Joey Villa MD on 06-12-22 at 1946      MRI Brain With & Without Contrast    (Results Pending)     Assessment & Plan       Hypothyroidism    Hyponatremia    Hypokalemia    Essential hypertension, benign    Benzodiazepine dependence (HCC)    Transient alteration of awareness    Anemia    Cervicalgia    Intertrigo      Assessment & Plan  This is an 82-year-old female with a history of a meningioma as well as benzodiazepine dependence hyponatremia among other medical issues who presents to the hospital after an absent episode concerning for possible seizure versus other neurologic issue  -It certainly possible this could represent a  seizure especially given the recurrent symptoms.  She would have lots of reasons to have a seizure.  This could be a symptom of cumulative effect at this point.  She has been tapering off of benzodiazepines she has not been sleeping well she had a new initiation of a sedative hypnotic and also has an underlying meningioma.  We will keep the patient on Keppra for now we will check an EEG in the morning as well as order an MRI of the brain with and without contrast.  Neurology is being consulted for their opinion.  -As far as the cervicalgia goes it is difficult to say much about this as she did not describe it to me.  However neck pain and the constellation of the symptoms above there are some concerns for possible meningeal irritation.  We will hold off on lumbar puncture for now and defer that decision to neurology.  -The patient was adamant about medication for sleep.  I have reordered her home benzodiazepines but I recommend against any additional sleep medication right now.  I do not want this to cloud things further.  -She is also been experiencing urinary frequency hesitancy, urinalysis does have pyuria and bacteriuria and she might have the beginnings of a urinary tract infection.  In the absence of fever and given the low degree of pyuria I am holding off on empiric antibiotics and awaiting the urine culture.  -The patient is quite concerned about her intertrigo.  This was diagnosed on the last hospitalization.  She has 3 creams that she has been putting on this at home but she cannot remember which they are.  I placed the patient on ketoconazole for now we will await the  bring in the rest of the medications to get those ordered.  -Mechanical DVT prophylaxis  -Full code    I discussed the patients findings and my recommendations with patient and nursing staff.      Santiago Shannon MD  [facility]  06/12/22  22:22 EDT

## 2022-06-13 NOTE — PLAN OF CARE
Goal Outcome Evaluation:  Plan of Care Reviewed With: patient           Outcome Evaluation: Pt admitted from home with transient alteration of awareness. Work up in progress. Pt lives with her  and is typically independently mobile without any AD. She has 1 -2 stairs to enter her condo and plans to d/c home. Pt ambulated well without assistance and safely navigated a few stairs. Pt appears to be at or very near baseline level and is safe to d/c home when medically stable. Encouraged pt to ambualte with staff while admitted 2-3 times per day.

## 2022-06-13 NOTE — PROGRESS NOTES
[facility] Medicine Services  PROGRESS NOTE    Patient Name: Ritu Martino  : 1940  MRN: [id number]    Date of Admission: 2022  Primary Care Physician: Reyes, Miriam Mercado, MD    Subjective   Subjective     CC:  Follow-up recent confusion    HPI:  Patient notes that she remembers being confused yesterday but is uncertain why.  She feels much better today.  She notes some possible urinary symptoms including possible dysuria or frequency.  She is currently able to answer orientation questions correctly and denies any other new complaints.  Agrees to MRI only with Ativan.    Review of Systems  No current fevers or chills  No current shortness of breath or cough  No current nausea, vomiting, or diarrhea  No current chest pain or palpitations    Objective   Objective     Vital Signs:   Temp:  [97.3 °F (36.3 °C)-98.3 °F (36.8 °C)] 98.3 °F (36.8 °C)  Heart Rate:  [56-90] 56  Resp:  [16-18] 16  BP: (103-148)/() 132/68  Total (NIH Stroke Scale): 0     Physical Exam:  Constitutional:Awake, alert  HENT: NCAT, mucous membranes moist, neck supple  Respiratory: Clear to auscultation bilaterally, respiratory effort normal, nonlabored breathing   Cardiovascular: RRR, normal radial pulses  Gastrointestinal: Positive bowel sounds, soft, nontender, nondistended  Musculoskeletal: Elderly frail and somewhat chronically debilitated in appearance, no lower extremity edema  Psychiatric: Appropriate affect, cooperative, conversational  Neurologic: Oriented, no slurred speech or facial droop, follows commands  Skin: No rashes or jaundice, warm      Results Reviewed:  Results from last 7 days   Lab Units 22  0724 22  1806   WBC 10*3/mm3 3.31* 7.77   HEMOGLOBIN g/dL 10.4* 11.0*   HEMATOCRIT % 29.7* 31.2*   PLATELETS 10*3/mm3 246 265   INR   --  0.98     Results from last 7 days   Lab Units 22  0724 22  1806   SODIUM mmol/L 131* 126*   POTASSIUM mmol/L 3.2* 3.4*   CHLORIDE mmol/L 99 92*    CO2 mmol/L 22.0 24.0   BUN mg/dL 4* 7*   CREATININE mg/dL 0.48* 0.64   GLUCOSE mg/dL 90 117*   CALCIUM mg/dL 8.3* 9.2   ALT (SGPT) U/L  --  18   AST (SGOT) U/L  --  19   TROPONIN T ng/mL  --  <0.010     Estimated Creatinine Clearance: 73.5 mL/min (A) (by C-G formula based on SCr of 0.48 mg/dL (L)).    Microbiology Results Abnormal     Procedure Component Value - Date/Time    COVID PRE-OP / PRE-PROCEDURE SCREENING ORDER (NO ISOLATION) - Swab, Nasopharynx [[id number]]  (Normal) Collected: 06/12/22 1853    Lab Status: Final result Specimen: Swab from Nasopharynx Updated: 06/12/22 2040    Narrative:      The following orders were created for panel order COVID PRE-OP / PRE-PROCEDURE SCREENING ORDER (NO ISOLATION) - Swab, Nasopharynx.  Procedure                               Abnormality         Status                     ---------                               -----------         ------                     COVID-19,[facility]...[[id number]]  Normal              Final result                 Please view results for these tests on the individual orders.    COVID-19,[facility] CEPHEID/TAO NP SWAB IN TRANSPORT MEDIA 8-12 HR TAT - Swab, Nasopharynx [[id number]]  (Normal) Collected: 06/12/22 1853    Lab Status: Final result Specimen: Swab from Nasopharynx Updated: 06/12/22 2040     COVID19 Not Detected    Narrative:      Fact sheet for providers: https://www.fda.gov/media/444145/download    Fact sheet for patients: https://www.fda.gov/media/937900/download    Test performed by PCR.          Imaging Results (Last 24 Hours)     Procedure Component Value Units Date/Time    CT Head Without Contrast Stroke Protocol [[id number]] Collected: 06/12/22 1903     Updated: 06/12/22 1947    Narrative:        Patient: NUBIA SCHILLING  Time Out: 19:46  Exam(s): CT HEAD Without Contrast     EXAM:    CT Head Without Intravenous Contrast    CLINICAL HISTORY:     Reason for exam: Stroke, follow up.    TECHNIQUE:    Axial computed  tomography images of the head brain without intravenous   contrast.  CTDI is 54.8 mGy and DLP is 958 mGy-cm.  This CT exam was   performed according to the principle of ALARA (As Low As Reasonably   Achievable) by using one or more of the following dose reduction   techniques: automated exposure control, adjustment of the mA and or kV   according to patient size, and or use of iterative reconstruction   technique.    COMPARISON:    MRI brain May 9, 2022.    FINDINGS:    Brain:  Gray-white differentiation is maintained.  Moderate   supratentorial white matter disease.  No hemorrhage.    Ventricles:  Unremarkable.  No ventriculomegaly.    Bones joints:  Unremarkable.  No acute fracture.    Soft tissues:  Unremarkable.    Sinuses:  Unremarkable as visualized.  No acute sinusitis.    Mastoid air cells:  Unremarkable as visualized.  No mastoid effusion.    Other findings:  Motion degraded exam.  Mild atrophy.    IMPRESSION:         No acute intracranial findings.      Impression:          Electronically signed by Joey Villa MD on 06-12-22 at 1946    XR Chest 1 View [[id number]] Collected: 06/12/22 1927     Updated: 06/12/22 1932    Narrative:      Portable chest radiograph     HISTORY:Stroke     TECHNIQUE: Single AP portable radiograph of the chest     COMPARISON:Chest radiograph 05/07/2022       Impression:      FINDINGS AND IMPRESSION:  Please note the medial aspects of the bilateral lung apices are obscured  by the patient's head and cannot be evaluated.     Somewhat nodular opacification within the left midlung is grossly  unchanged since 05/14/2018. Mild bibasilar pulmonary opacification is  present, right greater then left, and appears to have increased in size  since 05/07/2022 suggestive of atelectasis versus developing pneumonia  in the appropriate clinical context and correlation with patient history  is recommended. No pneumothorax or pleural effusion is seen. However, a  small pneumothorax could be  missed within the bilateral medial aspects  of the lung apices given obscuration of the patient's head as detailed  above.     Cardiac silhouette is at the upper limits of normal to mildly enlarged  in size.     This report was finalized on 6/12/2022 7:29 PM by Dr. John Abbott M.D.                 I have reviewed the medications:  Scheduled Meds:ALPRAZolam, 0.25 mg, Oral, BID  cefTRIAXone, 1 g, Intravenous, Q24H  ketoconazole, 1 application, Topical, Q24H  levETIRAcetam, 500 mg, Intravenous, Q12H  levothyroxine, 50 mcg, Oral, Q AM  potassium chloride, 40 mEq, Oral, BID With Meals  rosuvastatin, 20 mg, Oral, Nightly  sodium chloride, 10 mL, Intravenous, Q12H  sodium chloride, 1 g, Oral, TID With Meals  vitamin B-12, 50 mcg, Oral, Daily      Continuous Infusions:   PRN Meds:.•  acetaminophen **OR** acetaminophen **OR** acetaminophen  •  LORazepam  •  ondansetron **OR** ondansetron  •  sodium chloride  •  sodium chloride    Assessment & Plan   Assessment & Plan     Active Hospital Problems    Diagnosis  POA   • **Transient alteration of awareness [R40.4]  Yes   • Acute cystitis [N30.00]  Yes   • Anemia [D64.9]  Yes   • Cervicalgia [M54.2]  Yes   • Intertrigo [L30.4]  Yes   • Insomnia [G47.00]  Yes   • Benzodiazepine dependence (HCC) [F13.20]  Yes   • Essential hypertension, benign [I10]  Yes   • Hypokalemia [E87.6]  Yes   • Hypothyroidism [E03.9]  Yes   • Hyponatremia [E87.1]  Yes      Resolved Hospital Problems   No resolved problems to display.        Brief Hospital Course to date:  Ritu Martino is a 82 y.o. female presents to the hospital with transient alteration of awareness after reportedly recently being started on Ambien and was found to have acute cystitis.  There was also some concern for possible seizure.    Discussion/plan:  -Patient is currently oriented and appears overall hemodynamically stable today.  -Urinalysis came back as pyuria and patient notes recent symptoms and with altered mental  status I feel she warrants UTI treatment with ceftriaxone and culture urine.  -Neurology team consulted for further recommendation.  -Admitting physician has ordered EEG and MRI.  Patient needs premedication with Ativan for MRI she reports.  This is ordered.  -Recommend to discontinue Ambien for insomnia as risks may outweigh benefits.  -Replace potassium today.  -Continue chronic medications for chronic medical issues as listed above.  -PT evaluation for debility.  -Supportive care and symptom treatment.    DVT Prophylaxis: Mechanical    Disposition: Pending    CODE STATUS:   Code Status and Medical Interventions:   Ordered at: 06/12/22 2018     Code Status (Patient has no pulse and is not breathing):    CPR (Attempt to Resuscitate)     Medical Interventions (Patient has pulse or is breathing):    Full Support       Santiago Swift MD  06/13/22

## 2022-06-13 NOTE — PLAN OF CARE
Problem: Adult Inpatient Plan of Care  Goal: Plan of Care Review  Outcome: Ongoing, Progressing  Goal: Patient-Specific Goal (Individualized)  Outcome: Ongoing, Progressing  Goal: Absence of Hospital-Acquired Illness or Injury  Outcome: Ongoing, Progressing  Intervention: Prevent Skin Injury  Recent Flowsheet Documentation  Taken 6/13/2022 0815 by Dia Pires, RN  Skin Protection: skin sealant/moisture barrier applied  Goal: Optimal Comfort and Wellbeing  Outcome: Ongoing, Progressing  Intervention: Monitor Pain and Promote Comfort  Recent Flowsheet Documentation  Taken 6/13/2022 0815 by Dia Pires RN  Pain Management Interventions: position adjusted  Goal: Readiness for Transition of Care  Outcome: Ongoing, Progressing   Goal Outcome Evaluation:                  none known

## 2022-06-13 NOTE — DISCHARGE PLACEMENT REQUEST
"Ritu Schilling (82 y.o. Female)             Date of Birth   1940    Social Security Number       [address]   [address] [place] [address]    Home Phone   [phone]    MRN   [id number]       Mandaen   None    Marital Status                               Admission Date   6/12/22    Admission Type   Emergency    Admitting Provider   Santiago Shannon MD    Attending Provider   Santiago Swift MD    Department, Room/Bed   [address], P586/1       Discharge Date       Discharge Disposition       Discharge Destination                               Attending Provider: Santiago Swift MD    Allergies: No Known Allergies    Isolation: None   Infection: None   Code Status: CPR   Advance Care Planning Activity    Ht: 152.4 cm (60\")   Wt: 60.5 kg (133 lb 6.1 oz)    Admission Cmt: None   Principal Problem: Transient alteration of awareness [R40.4]                 Active Insurance as of 6/12/2022     Primary Coverage     Payor Plan Insurance Group Employer/Plan Group    [facility] MEDICARE REPLACEMENT [facility] MEDICARE REPLACEMENT [id number]     Payor Plan Address Payor Plan Phone Number Payor Plan Fax Number Effective Dates    [address] [address]   1/1/2021 - None Entered    [facility] [id number]       Subscriber Name Subscriber Birth Date Member ID       RITU SCHILLING 1940 [id number]                 Emergency Contacts      (Rel.) Home Phone Work Phone Mobile Phone    Nu Schilling (Spouse) [phone] -- [phone]    Marielle Regan (Daughter) -- -- [phone]            "

## 2022-06-13 NOTE — CONSULTS
Neurology Consult Note    Consult Date: 6/13/2022    Referring MD: Santiago Shannon MD    Reason for Consult I have been asked to see the patient in neurological consultation to render advice and opinion regarding seizure    Ritu Martino is a 82 y.o. female right-handed female who is accompanied by her  Nu.  She presented to the ER after an episode of unresponsiveness.  Patient had a typical morning of running errands.  She was sitting in her arm chair reading the newspaper when she fell asleep for a nap.  This is typical.  Around 3 PM her  noted that she was sleeping with her mouth agape and that was unusual.  He tried to arouse her she could not be awoken.  He gave her small taps on the cheeks she still did not rouse.  He called EMS.  He did not note overt convulsion, no loss of bowel or bladder.  On retrospect he thinks she was shaking her left leg.  She did not bite her tongue.  By the time EMS had gotten there she was starting to come to and was responding to questioning.  She does not really recall the episode.    She has a past medical history of hypertension, chronic hyponatremia, hypothyroidism, insomnia, anxiety, colon abscess status post surgical resection and revised colectomy 1 year ago.  1 month ago she was hospitalized after similar occurrence.  Her  tells that he found her in the middle of the night lying on the floor unable to get up.  She was worked up as team D with altered mental status and aphasia.  MRI brain showed moderate extensive small vessel ischemic disease and tiny dural based mass in the left frontoparietal region consistent with meningioma.  CTA head and neck was negative for significant stenosis or occlusion.  EEG was normal.  Her episode was thought to be secondary to toxic metabolic encephalopathy in the setting of hyponatremia.  Her  tells that it was suggested to decrease her benzodiazepine and stop her sleep aid.  In partnership with her PCP  they did try her off the Ambien that she cannot sleep.  She also decreased her alprazolam to just 1 tablet daily.    Social history: Retired, 2 children-1 ,  for 60 years, does not smoke, drinks 1 glass of wine and bourbon drink nightly    Surgical history: Appendectomy at 15 years old,  x2, back surgery more than 30 years ago, knee surgery 20 years ago, hemorrhoid surgery, colectomy in 2021    Past Medical History:   Diagnosis Date   • Anxiety    • Arthritis    • Bowel dysfunction 2021    since having surgery for diverticular infection   • Diverticulitis 2021    w/ rupture and infection required surgery and ostomy   • Essential hypertension, benign    • GERD (gastroesophageal reflux disease)    • Hernia, hiatal    • Hypercholesterolemia    • Hypokalemia    • Hyponatremia     chronic low with occasional normal level   • Hypothyroidism     estimated time frame pt nor  could remember exactly how long has been on medication   • Insomnia    • Iron deficiency      Past Surgical History:   Procedure Laterality Date   • APPENDECTOMY     • BACK SURGERY     •  SECTION     • COLON SURGERY  2021    to address ruptured and infected diverticular dz, ostomy also placed   • COLOSTOMY CLOSURE  10/2021    ostomy removed   • HEMORRHOIDECTOMY     • KNEE ARTHROPLASTY         Medications On Admission  Medications Prior to Admission   Medication Sig Dispense Refill Last Dose   • ALPRAZolam (XANAX) 0.25 MG tablet Take 1 tablet by mouth 2 (Two) Times a Day. (Patient taking differently: Take 0.25 mg by mouth Daily.) 6 tablet 0    • benazepril (LOTENSIN) 20 MG tablet Take 20 mg by mouth Daily.      • Cholecalciferol (VITAMIN D) 2000 units capsule Take  by mouth Daily With Dinner.      • cycloSPORINE (RESTASIS) 0.05 % ophthalmic emulsion Administer 1 drop to both eyes Daily.      • ferrous sulfate 325 (65 FE) MG tablet Take 325 mg by mouth 2 (Two) Times a Day Before Meals.       • HYDROcodone-acetaminophen (NORCO) 5-325 MG per tablet Take 1 tablet by mouth Every 6 (Six) Hours As Needed.      • ketoconazole (NIZORAL) 2 % cream Apply  topically to the appropriate area as directed 2 (Two) Times a Day. to affected area      • nystatin 083481 UNIT/GM powder Apply  topically to the appropriate area as directed 2 (Two) Times a Day. to affected area      • Probiotic Product (ALIGN PO) Take 1 capsule by mouth Daily With Lunch.      • Probiotic Product (Align) 4 MG capsule Take 4 mg by mouth Daily With Lunch.      • Prucalopride Succinate (Motegrity) 1 MG tablet Take 1 mg by mouth.      • rosuvastatin (CRESTOR) 20 MG tablet Take 20 mg by mouth Every Night.      • SODIUM CHLORIDE PO Take  by mouth 3 (Three) Times a Day.      • Synthroid 50 MCG tablet By mouth take 1 tab daily in AM as directed on empty stomach with water only.  Brand Medically Necessary 90 tablet 2    • vitamin B-12 (CYANOCOBALAMIN) 100 MCG tablet Take 50 mcg by mouth Daily.      • zolpidem (Ambien) 10 MG tablet Take 5 mg by mouth At Night As Needed for Sleep.          Allergies:  No Known Allergies    Social Hx:  Social History     Socioeconomic History   • Marital status:    Tobacco Use   • Smoking status: Former Smoker   • Smokeless tobacco: Never Used   Vaping Use   • Vaping Use: Never used   Substance and Sexual Activity   • Alcohol use: Yes     Alcohol/week: 8.0 standard drinks     Types: 4 Glasses of wine, 4 Shots of liquor per week   • Drug use: No   • Sexual activity: Defer       Family Hx:  Family History   Problem Relation Age of Onset   • Brain cancer Mother    • Stroke Father        Review of systems  Constitutional: [No fevers, chills]  Eye: [No vision loss, diplopia]  HEENT: [no hearing loss, vertigo]  Cardiovascular: [No Chest pain, palpitations]  Neurologic: [No weakness, numbness]  Psychiatric: [No anxiety, depression]    All other systems reviewed and are negative    Exam    /64 (BP Location: Left  "arm, Patient Position: Lying)   Pulse 64   Temp 99 °F (37.2 °C) (Oral)   Resp 16   Ht 152.4 cm (60\")   Wt 60.5 kg (133 lb 6.1 oz)   SpO2 100%   BMI 26.05 kg/m²       General: pt well appearing, no distress  HEENT: Normocephalic, conjunctiva normal, external canals normal, no nasal discharge, moist mucous membranes  Neck: No lymphadenopathy, thyroid not enlarged, no JVD  CV: Regular rate and rhythm, no murmurs negative no bruits auscultated at neck, equal pulses  Pulmonary: Normal respiratory effort, clear to auscultation bilaterally    Mental: alert, conversant, AOx2, good attention, good insight, good memory and 3/3 recall  CN: CN II-XII intact, PERRL, EOMi, no gaze palsy or nystagmus, intact facial sensation, no facial assymetry, intact hearing, symmetric palate elevation, tongue midline, good SCM strength  Motor: no abnormal movements, no pronator drift, normal tone, 5/5 strength b/l UE & LE  Sensory: normal sensation to crude touch, temperature, and vibration throughout  Reflexes: 2+ left brachioradialis, left patella is brisk, on right trace brachioradialis,triceps and patella throughout, neg babinski  Coordination: no ataxia on finger-nose, heel-shin  Gait: normal gait, no ataxia    DATA:    Lab Results   Component Value Date    GLUCOSE 90 06/13/2022    CALCIUM 8.3 (L) 06/13/2022     (L) 06/13/2022    K 3.2 (L) 06/13/2022    CO2 22.0 06/13/2022    CL 99 06/13/2022    BUN 4 (L) 06/13/2022    CREATININE 0.48 (L) 06/13/2022    EGFRIFNONA 66 03/21/2019    BCR 8.3 06/13/2022    ANIONGAP 10.0 06/13/2022     Lab Results   Component Value Date    WBC 3.31 (L) 06/13/2022    HGB 10.4 (L) 06/13/2022    HCT 29.7 (L) 06/13/2022    MCV 90.8 06/13/2022     06/13/2022     Lab Results   Component Value Date    LDL 39 05/07/2022     Lab Results   Component Value Date    HGBA1C 5.00 05/07/2022     Lab Results   Component Value Date    INR 0.98 06/12/2022    INR 0.87 (L) 05/07/2022    INR 1.0 12/12/2014    " PROTIME 12.9 06/12/2022    PROTIME 11.7 05/07/2022    PROTIME 10.9 12/12/2014       Lab review: Glucose 90, creatinine 1.48, sodium 131 today and 126 on 6/12, WBCs 3000, hemoglobin 10, platelets 246.  Urine drug screen positive for benzodiazepines.  Urinalysis with moderate blood, trace protein, moderate leukocytes, negative for nitrites.  Culture in progress    Imaging review: Head CT showing moderate white matter disease and again L frontal dural calcification consistent with meningioma.  No acute findings.  CTA was negative for significant stenosis, aneurysm, or occlusion    Diagnoses:  1. Loss of consciousness  2. Concern for seziure  3. Chronic hyponatremia      Impression: 82-year-old female with history of hypertension, hypothyroidism, chronic hyponatremia, pelvic abscess s/p diverticular resection and later ileostomy takedown who presented to the ER via EMS after an repeat episode of unresponsiveness.  Work-up 1 month ago revealed what was felt to be an incidental left meningioma and her EEG was normal.  Her exam is nonfocal today and so I do not think repeating her MRI is of utility.  We will follow-up on EEG report.  There has been no discrete convulsive activity though on retrospect  thinks that her left leg was shaking yesterday.  There is provocation history with hyponatremia and recent tapering of benzodiazepines, but I do not see such for her initial episode.  I think it prudent to continue her on Keppra.  We can likely DC later in clinic if no other episodes.    Plan:  1.  Do not feel strongly about MRI brain.  Will f/u on EEG reading.  If normal ok to d/c from neurology standpoint  2.  Continue Keppra 500/500.  Patient will need prescription  3.  Discussed seizure precautions with patient, patient's daughter, .  4.  She has taken alprazolam 0.5 mgs twice daily for many years.  This was recently tapered by her PCP over a span of a couple days. We discussed resuming her usual dosage and  she may need a longer taper if goal is to decrease dosage.  Discussed with pharmacist and can try    0.25 mg BID is better than 1 mg daily. Or can taper to 0.5 mg in am and 0.25 mg in pm for a couple weeks then to 0.25 mg BID    Seizure Precautions: Patient is not to drive for at least 3 months until cleared by a physician, operate heavy machinery, take tub baths, swim unattended, climb heights, or perform any other activities that can bring harm to himself/herself or others during an episode of altered awareness.    5. F/u in clinic.  Will send message to office staff to schedule appt    Clemencia Curran PA-C  [phone]      Thank you for this consultation.  Discussed above plan with neuro attending, Dr. Tracy who agrees with above plan.  Neurology team is available for concerns or questions.    I spent at least 30 minutes interviewing, examining, and counseling patient.  I independently reviewed documentation, laboratory and diagnostic findings, external documentation where applicable, and formulated treatment plan which was discussed with the patient.

## 2022-06-13 NOTE — ED NOTES
Nursing report ED to floor  Ritu Martino  82 y.o.  female    HPI :   Chief Complaint   Patient presents with   • Stroke       Admitting doctor:   Santiago Shannon MD    Admitting diagnosis:   The primary encounter diagnosis was Transient alteration of awareness. Diagnoses of Postictal confusion and Hyponatremia were also pertinent to this visit.    Code status:   Current Code Status     Date Active Code Status Order ID Comments User Context       6/12/2022 2018 CPR (Attempt to Resuscitate) [id number]  Santiago Shannon MD ED     Advance Care Planning Activity      Questions for Current Code Status     Question Answer    Code Status (Patient has no pulse and is not breathing) CPR (Attempt to Resuscitate)    Medical Interventions (Patient has pulse or is breathing) Full Support          Allergies:   Patient has no known allergies.    Intake and Output  No intake or output data in the 24 hours ending 06/12/22 2104    Weight:       06/12/22 1803   Weight: 55.4 kg (122 lb 2.2 oz)       Most recent vitals:   Vitals:    06/12/22 2030 06/12/22 2045 06/12/22 2055 06/12/22 2100   BP: 125/68      Pulse: 69 73  69   Resp: 16      Temp:   97.3 °F (36.3 °C)    TempSrc:   Tympanic    SpO2: 99% 99%  100%   Weight:           Active LDAs/IV Access:   Lines, Drains & Airways     Active LDAs     Name Placement date Placement time Site Days    Peripheral IV 06/12/22 Left Antecubital 06/12/22  --  Antecubital  less than 1    Peripheral IV 06/12/22 1805 Right Antecubital 06/12/22 1805  Antecubital  less than 1                Labs (abnormal labs have a star):   Labs Reviewed   COMPREHENSIVE METABOLIC PANEL - Abnormal; Notable for the following components:       Result Value    Glucose 117 (*)     BUN 7 (*)     Sodium 126 (*)     Potassium 3.4 (*)     Chloride 92 (*)     All other components within normal limits    Narrative:     GFR Normal >60  Chronic Kidney Disease <60  Kidney Failure <15     CBC WITH AUTO DIFFERENTIAL -  Abnormal; Notable for the following components:    RBC 3.46 (*)     Hemoglobin 11.0 (*)     Hematocrit 31.2 (*)     RDW 11.8 (*)     Neutrophil % 77.6 (*)     Lymphocyte % 12.9 (*)     All other components within normal limits   URINALYSIS W/ CULTURE IF INDICATED - Abnormal; Notable for the following components:    Appearance, UA Cloudy (*)     pH, UA 8.5 (*)     Blood, UA Moderate (2+) (*)     Protein, UA Trace (*)     Leuk Esterase, UA Moderate (2+) (*)     All other components within normal limits    Narrative:     In absence of clinical symptoms, the presence of pyuria, bacteria, and/or nitrites on the urinalysis result does not correlate with infection.   URINALYSIS, MICROSCOPIC ONLY - Abnormal; Notable for the following components:    RBC, UA 6-12 (*)     WBC, UA 6-12 (*)     Bacteria, UA 3+ (*)     All other components within normal limits   COVID-19,[facility] IN-HOUSE CEPHEID/TAO, NP SWAB IN TRANSPORT MEDIA 8-12 HR TAT - Normal    Narrative:     Fact sheet for providers: https://www.fda.gov/media/487453/download    Fact sheet for patients: https://www.fda.gov/media/561039/download    Test performed by PCR.   PROTIME-INR - Normal   APTT - Normal   TROPONIN (IN-HOUSE) - Normal    Narrative:     Troponin T Reference Range:  <= 0.03 ng/mL-   Negative for AMI  >0.03 ng/mL-     Abnormal for myocardial necrosis.  Clinicians would have to utilize clinical acumen, EKG, Troponin and serial changes to determine if it is an Acute Myocardial Infarction or myocardial injury due to an underlying chronic condition.       Results may be falsely decreased if patient taking Biotin.     ACETAMINOPHEN LEVEL - Normal   SALICYLATE LEVEL - Normal    Narrative:     Therapeutic range for Salicylates:  3.0 - 10.0 mg/dL for antipyretic/analgesic conditions  15.0 - 30.0 mg/dL for anti-inflammatory conditions   COVID PRE-OP / PRE-PROCEDURE SCREENING ORDER (NO ISOLATION)    Narrative:     The following orders were created for panel order  COVID PRE-OP / PRE-PROCEDURE SCREENING ORDER (NO ISOLATION) - Swab, Nasopharynx.  Procedure                               Abnormality         Status                     ---------                               -----------         ------                     COVID-19,[facility] IN-HOUSE...[[id number]]  Normal              Final result                 Please view results for these tests on the individual orders.   URINE CULTURE   RAINBOW DRAW    Narrative:     The following orders were created for panel order [place] Draw.  Procedure                               Abnormality         Status                     ---------                               -----------         ------                     Green Top (Gel)[[id number]]                                  Final result               Lavender Top[[id number]]                                     Final result               Gold Top - SST[[id number]]                                                              Light Blue Top[[id number]]                                   Final result                 Please view results for these tests on the individual orders.   ETHANOL   URINE DRUG SCREEN   POCT GLUCOSE FINGERSTICK   TYPE AND SCREEN   CBC AND DIFFERENTIAL    Narrative:     The following orders were created for panel order CBC & Differential.  Procedure                               Abnormality         Status                     ---------                               -----------         ------                     CBC Auto Differential[[id number]]        Abnormal            Final result                 Please view results for these tests on the individual orders.   GREEN TOP   LAVENDER TOP   LIGHT BLUE TOP       EKG:   ECG 12 Lead   Preliminary Result   HEART RATE= 64  bpm   RR Interval= 938  ms   AL Interval= 170  ms   P Horizontal Axis= 1  deg   P Front Axis= 22  deg   QRSD Interval= 98  ms   QT Interval= 422  ms   QRS Axis= 15  deg   T Wave Axis= 5  deg   - NORMAL ECG -   Sinus rhythm    Electronically Signed By:    Date and Time of Study: 2022-06-12 19:19:19          Meds given in ED:   Medications   sodium chloride 0.9 % flush 10 mL (has no administration in time range)   sodium chloride 0.9 % bolus 500 mL (500 mL Intravenous New Bag 6/12/22 2052)     Followed by   sodium chloride 0.9 % infusion (125 mL/hr Intravenous New Bag 6/12/22 2052)   levETIRAcetam (KEPPRA) injection 500 mg (has no administration in time range)   levETIRAcetam (KEPPRA) injection 1,000 mg (1,000 mg Intravenous Given 6/12/22 1843)       Imaging results:  XR Chest 1 View    Result Date: 6/12/2022  FINDINGS AND IMPRESSION: Please note the medial aspects of the bilateral lung apices are obscured by the patient's head and cannot be evaluated.  Somewhat nodular opacification within the left midlung is grossly unchanged since 05/14/2018. Mild bibasilar pulmonary opacification is present, right greater then left, and appears to have increased in size since 05/07/2022 suggestive of atelectasis versus developing pneumonia in the appropriate clinical context and correlation with patient history is recommended. No pneumothorax or pleural effusion is seen. However, a small pneumothorax could be missed within the bilateral medial aspects of the lung apices given obscuration of the patient's head as detailed above.  Cardiac silhouette is at the upper limits of normal to mildly enlarged in size.  This report was finalized on 6/12/2022 7:29 PM by Dr. John Abbott M.D.      CT Head Without Contrast Stroke Protocol    Result Date: 6/12/2022  Electronically signed by Joey Villa MD on 06-12-22 at 1946      Ambulatory status:   - up with assist    Social issues:   Social History     Socioeconomic History   • Marital status:    Tobacco Use   • Smoking status: Former Smoker   • Smokeless tobacco: Never Used   Vaping Use   • Vaping Use: Never used   Substance and Sexual Activity   • Alcohol use: Yes     Alcohol/week: 8.0 standard  drinks     Types: 4 Glasses of wine, 4 Shots of liquor per week   • Drug use: No   • Sexual activity: Defer       NIH Stroke Scale:  Interval: baseline (pt unable to follow any commands to complete)     Nursing report ED to floor:

## 2022-06-13 NOTE — PROGRESS NOTES
Discharge Planning Assessment  [facility]     Patient Name: Ritu Martino  MRN: [id number]  Today's Date: 6/13/2022    Admit Date: 6/12/2022     Discharge Needs Assessment     Row Name 06/13/22 1511       Living Environment    People in Home spouse    Name(s) of People in Home Jimmy Judge [phone]    Current Living Arrangements home    Primary Care Provided by self    Provides Primary Care For no one    Family Caregiver if Needed spouse    Quality of Family Relationships helpful;involved;supportive    Able to Return to Prior Arrangements yes       Resource/Environmental Concerns    Resource/Environmental Concerns none       Transition Planning    Patient/Family Anticipates Transition to home with family;home with help/services    Patient/Family Anticipated Services at Transition home health care    Transportation Anticipated family or friend will provide       Discharge Needs Assessment    Current Outpatient/Agency/Support Group homecare agency    Equipment Currently Used at Home cane, quad;walker, rolling    Concerns to be Addressed no discharge needs identified;denies needs/concerns at this time    Outpatient/Agency/Support Group Needs homecare agency    Discharge Facility/Level of Care Needs home with home health    Provided Post Acute Provider List? Refused    [facility]Progress [facility]               Discharge Plan     Row Name 06/13/22 8335       [facility]    [facility]    Patient/Family in Agreement with Plan yes    Plan Comments CCP met with pt and  (Nu Martino, [phone]) at bedside to discuss d/c planning. Pt resides with her  in a two level home in which pt remains on main level with no steps. Pt has quad cane and walker. Pt is current with [facility] which she plans to resume at d/c (awaiting confirmation). Pt has been to [facility] for past rehab and would not return. Pt declines Meds to beds, prefers [facility]  pharmacy [place]. CCP to follow. Sabrina German LCSW              Continued Care and Services - Admitted Since 6/12/2022     Home Medical Care     Service Provider Request Status Selected Services Address Phone Fax Patient Preferred    [facility]  Pending - Request Sent N/A [address], [address], [facility] [address] [phone] --            Selected Continued Care - Prior Encounters Includes selections from prior encounters from 3/14/2022 to 6/13/2022    Discharged on 5/11/2022 Admission date: 5/7/2022 - Discharge disposition: Home-Health Care [facility]    Destination     Service Provider Selected Services Address Phone Fax Patient Preferred    [facility]  Skilled Nursing [address] [facility] [address] [phone] --          Home Medical Care     Service Provider Selected Services Address Phone Fax Patient Preferred    [facility]  Home Health Services [address], [facility] [address] [phone] --                    Expected Discharge Date and Time     Expected Discharge Date Expected Discharge Time    Jun 14, 2022          Demographic Summary     Row Name 06/13/22 1510       General Information    Admission Type observation    Arrived From home    Required Notices Provided Observation Status Notice    Referral Source admission list    Reason for Consult discharge planning    Preferred Language English               Functional Status     Row Name 06/13/22 1511       Functional Status    Usual Activity Tolerance good    Current Activity Tolerance moderate       Functional Status, IADL    Medications independent    Meal Preparation independent    Housekeeping independent    Laundry independent    Shopping independent       Mental Status Summary    Recent Changes in Mental Status/Cognitive Functioning no changes               Psychosocial    No documentation.                 Abuse/Neglect    No documentation.                Legal    No documentation.                Substance Abuse    No documentation.                Patient Forms    No documentation.                   Tatianna German LCSW

## 2022-06-13 NOTE — THERAPY EVALUATION
Patient Name: Ritu Martino  : 1940    MRN: [id number]                              Today's Date: 2022       Admit Date: 2022    Visit Dx:     ICD-10-CM ICD-9-CM   1. Transient alteration of awareness  R40.4 780.02   2. Postictal confusion  F05 293.0   3. Hyponatremia  E87.1 276.1     Patient Active Problem List   Diagnosis   • Anxiety   • Hypothyroidism   • Hyponatremia   • Iron deficiency   • Hypokalemia   • Essential hypertension, benign   • Fluent aphasia   • Hypochloremia   • Benzodiazepine dependence (HCC)   • Transient alteration of awareness   • Anemia   • Cervicalgia   • Intertrigo   • Insomnia   • Acute cystitis     Past Medical History:   Diagnosis Date   • Anxiety    • Arthritis    • Bowel dysfunction 2021    since having surgery for diverticular infection   • Diverticulitis 2021    w/ rupture and infection required surgery and ostomy   • Essential hypertension, benign    • GERD (gastroesophageal reflux disease)    • Hernia, hiatal    • Hypercholesterolemia    • Hypokalemia    • Hyponatremia 2014    chronic low with occasional normal level   • Hypothyroidism     estimated time frame pt nor  could remember exactly how long has been on medication   • Insomnia    • Iron deficiency      Past Surgical History:   Procedure Laterality Date   • APPENDECTOMY     • BACK SURGERY     •  SECTION     • COLON SURGERY  2021    to address ruptured and infected diverticular dz, ostomy also placed   • COLOSTOMY CLOSURE  10/2021    ostomy removed   • HEMORRHOIDECTOMY     • KNEE ARTHROPLASTY        General Information     Row Name 22 1434          Physical Therapy Time and Intention    Document Type evaluation  -     Mode of Treatment individual therapy;physical therapy  -     Row Name 22 1434          General Information    Existing Precautions/Restrictions fall  -     Barriers to Rehab none identified  -     Row Name 22 1434          Living  Environment    People in Home spouse  -     Row Name 06/13/22 1434          Stairs Within Home, Primary    Number of Stairs, Within Home, Primary two  -[facility] Name 06/13/22 1434          Cognition    Orientation Status (Cognition) oriented x 4  -           User Key  (r) = Recorded By, (t) = Taken By, (c) = Cosigned By    Initials Name Provider Type    Emi Ruby PT Physical Therapist               Mobility     Row Name 06/13/22 1435          Bed Mobility    Bed Mobility supine-sit;sit-supine  -     Supine-Sit [place] (Bed Mobility) independent  -     Sit-Supine [place] (Bed Mobility) independent  -     Row Name 06/13/22 1435          Sit-Stand Transfer    Sit-Stand [place] (Transfers) standby assist  -[facility] Name 06/13/22 1435          Gait/Stairs (Locomotion)    [place] Level (Gait) standby assist  -     Distance in Feet (Gait) 150  -KH     Deviations/Abnormal Patterns (Gait) gait speed decreased  -     [place] Level (Stairs) contact guard  -     Handrail Location (Stairs) left side (ascending)  -     Number of Steps (Stairs) 3  -KH     Ascending Technique (Stairs) step-to-step  -     Descending Technique (Stairs) step-to-step  -KH           User Key  (r) = Recorded By, (t) = Taken By, (c) = Cosigned By    Initials Name Provider Type    Emi Ruby PT Physical Therapist               Obj/Interventions     Row Name 06/13/22 1436          Range of Motion Comprehensive    General Range of Motion no range of motion deficits identified  -[facility] Name 06/13/22 1436          Strength Comprehensive (MMT)    General Manual Muscle Testing (MMT) Assessment no strength deficits identified  -[facility] Name 06/13/22 1436          Balance    Balance Assessment sitting static balance;sitting dynamic balance;standing static balance;standing dynamic balance  -     Static Sitting Balance independent  -     Dynamic Sitting Balance independent   -     Position, Sitting Balance unsupported;sitting edge of bed  -     Static Standing Balance standby assist  -     Dynamic Standing Balance contact guard  -           User Key  (r) = Recorded By, (t) = Taken By, (c) = Cosigned By    Initials Name Provider Type    Emi Ruby, PT Physical Therapist               Goals/Plan    No documentation.                Clinical Impression     Row Name 06/13/22 1437          Pain    Pretreatment Pain Rating 0/10 - no pain  -     Posttreatment Pain Rating 0/10 - no pain  -     Row Name 06/13/22 1437          Plan of Care Review    Plan of Care Reviewed With patient  -     Outcome Evaluation Pt admitted from home with transient alteration of awareness. Work up in progress. Pt lives with her  and is typically independently mobile without any AD. She has 1 -2 stairs to enter her condo and plans to d/c home. Pt ambulated well without assistance and safely navigated a few stairs. Pt appears to be at or very near baseline level and is safe to d/c home when medically stable. Encouraged pt to ambualte with staff while admitted 2-3 times per day.  -     Row Name 06/13/22 1437          Therapy Assessment/Plan (PT)    Patient/Family Therapy Goals Statement (PT) returnh ome to PLOF  -     Criteria for Skilled Interventions Met (PT) no problems identified which require skilled intervention  -     Therapy Frequency (PT) evaluation only  -     Row Name 06/13/22 1437          Positioning and Restraints    Pre-Treatment Position in bed  -     Post Treatment Position bed  -KH     In Bed fowlers;call light within reach;encouraged to call for assist;exit alarm on  -           User Key  (r) = Recorded By, (t) = Taken By, (c) = Cosigned By    Initials Name Provider Type    Emi Ruby, PT Physical Therapist               Outcome Measures     Row Name 06/13/22 1442          How much help from another person do you currently need...     Turning from your back to your side while in flat bed without using bedrails? 4  -KH     Moving from lying on back to sitting on the side of a flat bed without bedrails? 4  -KH     Moving to and from a bed to a chair (including a wheelchair)? 3  -KH     Standing up from a chair using your arms (e.g., wheelchair, bedside chair)? 4  -KH     Climbing 3-5 steps with a railing? 3  -KH     To walk in hospital room? 3  -KH     AM-PAC 6 Clicks Score (PT) 21  -KH     Highest level of mobility 6 --> Walked 10 steps or more  -     Row Name 06/13/22 1442          Functional Assessment    Outcome Measure Options AM-PAC 6 Clicks Basic Mobility (PT)  -           User Key  (r) = Recorded By, (t) = Taken By, (c) = Cosigned By    Initials Name Provider Type    Emi Ruby PT Physical Therapist                               PT Recommendation and Plan     Plan of Care Reviewed With: patient  Outcome Evaluation: Pt admitted from home with transient alteration of awareness. Work up in progress. Pt lives with her  and is typically independently mobile without any AD. She has 1 -2 stairs to enter her condo and plans to d/c home. Pt ambulated well without assistance and safely navigated a few stairs. Pt appears to be at or very near baseline level and is safe to d/c home when medically stable. Encouraged pt to ambualte with staff while admitted 2-3 times per day.     Time Calculation:    PT Charges     Row Name 06/13/22 1443             Time Calculation    Start Time 0915  -KH      Stop Time 0930  -KH      Time Calculation (min) 15 min  -KH              Untimed Charges    PT Eval/Re-eval Minutes 15  -KH              Total Minutes    Untimed Charges Total Minutes 15  -KH       Total Minutes 15  -KH            User Key  (r) = Recorded By, (t) = Taken By, (c) = Cosigned By    Initials Name Provider Type    Emi Ruby PT Physical Therapist              Therapy Charges for Today     Code Description  Service Date Service Provider Modifiers Qty    [id number] HC PT EVAL LOW COMPLEXITY 1 6/13/2022 Emi Araiza, PT GP 1          PT G-Codes  Outcome Measure Options: AM-PAC 6 Clicks Basic Mobility (PT)  AM-PAC 6 Clicks Score (PT): 21    Emi Araiza, PT  6/13/2022

## 2022-06-13 NOTE — PLAN OF CARE
Goal Outcome Evaluation:     VSS. No seizure activity noted overnight. Pt slept well last night. C/o pain to neck, managed per MAR. MRI and Neurology consult planned for today.       Problem: Adult Inpatient Plan of Care  Goal: Plan of Care Review  Outcome: Ongoing, Progressing  Goal: Patient-Specific Goal (Individualized)  Outcome: Ongoing, Progressing  Goal: Absence of Hospital-Acquired Illness or Injury  Outcome: Ongoing, Progressing  Intervention: Identify and Manage Fall Risk  Recent Flowsheet Documentation  Taken 6/13/2022 0229 by Tram Cross RN  Safety Promotion/Fall Prevention: safety round/check completed  Taken 6/13/2022 0020 by Tram Cross RN  Safety Promotion/Fall Prevention: safety round/check completed  Taken 6/12/2022 2230 by Tram Cross RN  Safety Promotion/Fall Prevention:   assistive device/personal items within reach   clutter free environment maintained   fall prevention program maintained   lighting adjusted   nonskid shoes/slippers when out of bed   room organization consistent   safety round/check completed  Intervention: Prevent Skin Injury  Recent Flowsheet Documentation  Taken 6/13/2022 0229 by Tram Cross RN  Body Position: position changed independently  Taken 6/13/2022 0020 by Tram Cross RN  Body Position: position changed independently  Taken 6/12/2022 2230 by Tram Cross RN  Body Position: position changed independently  Intervention: Prevent and Manage VTE (Venous Thromboembolism) Risk  Recent Flowsheet Documentation  Taken 6/13/2022 0027 by Tram Cross RN  VTE Prevention/Management: patient refused intervention  Goal: Optimal Comfort and Wellbeing  Outcome: Ongoing, Progressing  Goal: Readiness for Transition of Care  Outcome: Ongoing, Progressing  Intervention: Mutually Develop Transition Plan  Recent Flowsheet Documentation  Taken 6/13/2022 0008 by Tram Cross, DANYEL  Equipment Currently Used at Home: none  Transportation Anticipated:  family or friend will provide  Patient/Family Anticipated Services at Transition: none  Patient/Family Anticipates Transition to: home with family     Problem: Seizure, Active Management  Goal: Absence of Seizure/Seizure-Related Injury  Outcome: Ongoing, Progressing     Problem: Electrolyte Imbalance  Goal: Electrolyte Balance  Outcome: Ongoing, Progressing

## 2022-06-14 ENCOUNTER — READMISSION MANAGEMENT (OUTPATIENT)
Dept: CALL CENTER | Facility: HOSPITAL | Age: 82
End: 2022-06-14

## 2022-06-14 VITALS
DIASTOLIC BLOOD PRESSURE: 68 MMHG | HEART RATE: 83 BPM | RESPIRATION RATE: 18 BRPM | SYSTOLIC BLOOD PRESSURE: 132 MMHG | BODY MASS INDEX: 26.19 KG/M2 | HEIGHT: 60 IN | OXYGEN SATURATION: 98 % | TEMPERATURE: 98 F | WEIGHT: 133.38 LBS

## 2022-06-14 PROBLEM — R40.4 TRANSIENT ALTERATION OF AWARENESS: Status: RESOLVED | Noted: 2022-06-12 | Resolved: 2022-06-14

## 2022-06-14 PROBLEM — A49.8 E COLI INFECTION: Status: ACTIVE | Noted: 2022-06-14

## 2022-06-14 PROCEDURE — 25010000002 CEFTRIAXONE PER 250 MG: Performed by: INTERNAL MEDICINE

## 2022-06-14 PROCEDURE — 99214 OFFICE O/P EST MOD 30 MIN: CPT | Performed by: PHYSICIAN ASSISTANT

## 2022-06-14 PROCEDURE — 96376 TX/PRO/DX INJ SAME DRUG ADON: CPT

## 2022-06-14 PROCEDURE — G0378 HOSPITAL OBSERVATION PER HR: HCPCS

## 2022-06-14 PROCEDURE — 96365 THER/PROPH/DIAG IV INF INIT: CPT

## 2022-06-14 PROCEDURE — 25010000002 LEVETRIRACETAM PER 10 MG: Performed by: HOSPITALIST

## 2022-06-14 RX ORDER — ACETAMINOPHEN 325 MG/1
650 TABLET ORAL EVERY 6 HOURS PRN
Start: 2022-06-14

## 2022-06-14 RX ORDER — LISINOPRIL 20 MG/1
20 TABLET ORAL
Status: DISCONTINUED | OUTPATIENT
Start: 2022-06-14 | End: 2022-06-14 | Stop reason: HOSPADM

## 2022-06-14 RX ORDER — UREA 10 %
2 LOTION (ML) TOPICAL NIGHTLY PRN
Qty: 30 TABLET
Start: 2022-06-14 | End: 2022-06-21

## 2022-06-14 RX ORDER — POTASSIUM CHLORIDE 750 MG/1
40 TABLET, FILM COATED, EXTENDED RELEASE ORAL 2 TIMES DAILY WITH MEALS
Status: DISCONTINUED | OUTPATIENT
Start: 2022-06-14 | End: 2022-06-14 | Stop reason: HOSPADM

## 2022-06-14 RX ORDER — LEVETIRACETAM 500 MG/1
500 TABLET ORAL 2 TIMES DAILY
Qty: 60 TABLET | Refills: 0 | Status: SHIPPED | OUTPATIENT
Start: 2022-06-14 | End: 2022-06-21

## 2022-06-14 RX ORDER — CEFDINIR 300 MG/1
300 CAPSULE ORAL 2 TIMES DAILY
Qty: 6 CAPSULE | Refills: 0 | Status: SHIPPED | OUTPATIENT
Start: 2022-06-14 | End: 2022-06-17

## 2022-06-14 RX ORDER — ALPRAZOLAM 0.25 MG/1
0.25 TABLET ORAL DAILY
Start: 2022-06-14 | End: 2022-06-28 | Stop reason: SDUPTHER

## 2022-06-14 RX ADMIN — LISINOPRIL 20 MG: 20 TABLET ORAL at 11:14

## 2022-06-14 RX ADMIN — Medication 10 ML: at 08:17

## 2022-06-14 RX ADMIN — LEVOTHYROXINE SODIUM 50 MCG: 0.05 TABLET ORAL at 07:11

## 2022-06-14 RX ADMIN — ALPRAZOLAM 0.25 MG: 0.25 TABLET ORAL at 08:16

## 2022-06-14 RX ADMIN — POTASSIUM CHLORIDE 40 MEQ: 750 TABLET, EXTENDED RELEASE ORAL at 08:16

## 2022-06-14 RX ADMIN — SODIUM CHLORIDE TAB 1 GM 1 G: 1 TAB at 11:14

## 2022-06-14 RX ADMIN — Medication 50 MCG: at 08:16

## 2022-06-14 RX ADMIN — LEVETIRACETAM 500 MG: 100 INJECTION INTRAVENOUS at 09:24

## 2022-06-14 RX ADMIN — SODIUM CHLORIDE TAB 1 GM 1 G: 1 TAB at 08:16

## 2022-06-14 RX ADMIN — CEFTRIAXONE 1 G: 1 INJECTION, POWDER, FOR SOLUTION INTRAMUSCULAR; INTRAVENOUS at 07:10

## 2022-06-14 NOTE — PROGRESS NOTES
"DOS: 2022  NAME: Ritu Martino   : 1940  PCP: Reyes, Miriam Mercado, MD  Chief Complaint   Patient presents with   • Stroke       Chief complaint: Loss of consciousness, possible seizure  Subjective: Patient sitting in chair.  No overnight events.  She appears drowsy, but reports she feels well and ready to go home.   is at the bedside with the notepad and some questions from daughter.    Objective:  Vital signs: /68 (BP Location: Left arm, Patient Position: Sitting)   Pulse 83   Temp 98 °F (36.7 °C) (Oral)   Resp 18   Ht 152.4 cm (60\")   Wt 60.5 kg (133 lb 6.1 oz)   SpO2 98%   BMI 26.05 kg/m²    Gen: NAD, vitals reviewed  MS: oriented x3, recent/remote memory intact, normal attention/concentration, language intact, no neglect.  CN: visual acuity grossly normal, PERRL, EOMI, no facial droop, no dysarthria  Motor: 5/5 throughout upper and lower extremities, normal tone  Sensory: intact to light touch all 4 ext.    ROS:  No weakness, numbness  No fevers, chills      Laboratory results:  Lab Results   Component Value Date    GLUCOSE 90 2022    CALCIUM 8.3 (L) 2022     (L) 2022    K 3.2 (L) 2022    CO2 22.0 2022    CL 99 2022    BUN 4 (L) 2022    CREATININE 0.48 (L) 2022    EGFRIFNONA 66 2019    BCR 8.3 2022    ANIONGAP 10.0 2022     Lab Results   Component Value Date    WBC 3.31 (L) 2022    HGB 10.4 (L) 2022    HCT 29.7 (L) 2022    MCV 90.8 2022     2022     Lab Results   Component Value Date    LDL 39 2022            Review of labs: Glucose 90, creatinine 0.48, sodium 131, potassium 3.2.  WBCs 3000, hemoglobin 10, platelets 246.Urine drug screen positive for benzodiazepines.  Urinalysis with moderate blood, trace protein, moderate leukocytes, negative for nitrites.  Culture grew E. coli    Review and interpretation of imaging:   Head CT showing moderate white matter " disease and again L frontal dural calcification consistent with meningioma.  No acute findings.  CTA was negative for significant stenosis, aneurysm, or occlusion.  MRI of brain on 5/9 showed moderate extensive small vessel disease, tiny enhancing dural based mass in the left frontoparietal region consistent with meningioma.  Workup to date:  EEG on 5/10 is normal.  EEG on 6/13 was abnormal noting runs of parasagittal sharply contoured slowing but is not epileptiform and is of unclear significance    Diagnoses:  1. Loss of consciousness  2. Concern for seziure  3. Chronic hyponatremia        Impression: 82-year-old female with history of hypertension, hypothyroidism, chronic hyponatremia, pelvic abscess s/p diverticular resection and later ileostomy takedown who presented to the ER via EMS after an repeat episode of unresponsiveness.  Work-up 1 month ago revealed what was felt to be an incidental left meningioma and her EEG was normal.  Her exam is nonfocal today and so I do not think repeating her MRI is of utility.  We will follow-up on EEG report.  There has been no discrete convulsive activity though on retrospect  thinks that her left leg was shaking yesterday.  There is provocation history with hyponatremia and recent tapering of benzodiazepines, but I do not see such for her initial episode.  I think it prudent to continue her on Keppra.  We can likely DC later in clinic if no other episodes.     Plan:  1.  Continue Keppra 500/500  2.  Antibiotic for UTI  3.  Plans for longer benzodiazepine taper.  Pt will follow-up with PCP for continued surveillance during this taper.  4.  Follow-up in clinic        Seizure Precautions: Patient is not to drive for at least 3 months until cleared by a physician, operate heavy machinery, take tub baths, swim unattended, climb heights, or perform any other activities that can bring harm to himself/herself or others during an episode of altered awareness.     Clemencia  Magdy ALCARAZ  [phone]        Thank you for this consultation.  Discussed above plan with neuro attending, Dr. Brian who agrees with above plan.  Neurology team is available for concerns or questions.     I spent at least 30 minutes interviewing, examining, and counseling patient.  I independently reviewed documentation, laboratory and diagnostic findings, external documentation where applicable, and formulated treatment plan which was discussed with the patient.

## 2022-06-14 NOTE — PLAN OF CARE
Patient seen by Dr Swift and ok to d/c home.  Awaiting Lisinopril from pharmacy at this time to administer prior to d/c. Pt instructed to resume BP med at home. Patient will be going home with .      Problem: Adult Inpatient Plan of Care  Goal: Plan of Care Review  Outcome: Met  Flowsheets (Taken 6/14/2022 1042)  Plan of Care Reviewed With:   patient   spouse  Goal: Patient-Specific Goal (Individualized)  Outcome: Met  Goal: Absence of Hospital-Acquired Illness or Injury  Outcome: Met  Intervention: Identify and Manage Fall Risk  Description: Perform standard risk assessment on admission using a validated tool or comprehensive approach appropriate to the patient; reassess fall risk frequently, with change in status or transfer to another level of care.  Communicate fall injury risk to interprofessional healthcare team.  Determine need for increased observation, equipment and environmental modification, such as low bed, signage and supportive, nonskid footwear.  Adjust safety measures to individual developmental age, stage and identified risk factors.  Reinforce the importance of safety and physical activity with patient and family.  Perform regular intentional rounding to assess need for position change, pain assessment and personal needs, including assistance with toileting.  Recent Flowsheet Documentation  Taken 6/14/2022 1042 by Mena Mosher, RN  Safety Promotion/Fall Prevention:   safety round/check completed   assistive device/personal items within reach  Taken 6/14/2022 0820 by Mena Mosher, RN  Safety Promotion/Fall Prevention:   assistive device/personal items within reach   safety round/check completed  Intervention: Prevent Skin Injury  Description: Perform a screening for skin injury risk, such as pressure or moisture associated skin damage on admission and at regular intervals throughout hospital stay.  Keep all areas of skin (especially folds) clean and dry.  Maintain adequate skin  hydration.  Relieve and redistribute pressure and protect bony prominences; implement measures based on patient-specific risk factors.  Match turning and repositioning schedule to clinical condition.  Encourage weight shift frequently; assist with reposition if unable to complete independently.  Float heels off bed; avoid pressure on the Achilles tendon.  Keep skin free from extended contact with medical devices.  Encourage functional activity and mobility, as early as tolerated.  Use aids (e.g., slide boards, mechanical lift) during transfer.  Recent Flowsheet Documentation  Taken 6/14/2022 0820 by Mena Mosher RN  Body Position: position changed independently  Intervention: Prevent and Manage VTE (Venous Thromboembolism) Risk  Description: Assess for VTE (venous thromboembolism) risk.  Encourage and assist with early ambulation.  Initiate and maintain compression or other therapy, as indicated, based on identified risk in accordance with organizational protocol and provider order.  Encourage both active and passive leg exercises while in bed, if unable to ambulate.  Recent Flowsheet Documentation  Taken 6/14/2022 0820 by Mena Mosher RN  Activity Management: activity adjusted per tolerance  VTE Prevention/Management:   bilateral   dorsiflexion/plantar flexion performed   sequential compression devices off   patient refused intervention  Intervention: Prevent Infection  Description: Maintain skin and mucous membrane integrity; promote hand, oral and pulmonary hygiene.  Optimize fluid balance, nutrition, sleep and glycemic control to maximize infection resistance.  Identify potential sources of infection early to prevent or mitigate progression of infection (e.g., wound, lines, devices).  Evaluate ongoing need for invasive devices; remove promptly when no longer indicated.  Recent Flowsheet Documentation  Taken 6/14/2022 1042 by Mena Mosher RN  Infection Prevention:   environmental surveillance performed    personal protective equipment utilized  Taken 6/14/2022 0820 by Mena Mosher, RN  Infection Prevention:   environmental surveillance performed   personal protective equipment utilized  Goal: Optimal Comfort and Wellbeing  Outcome: Met  Goal: Readiness for Transition of Care  Outcome: Met   Goal Outcome Evaluation:  Plan of Care Reviewed With: patient, spouse

## 2022-06-14 NOTE — OUTREACH NOTE
Prep Survey    Flowsheet Row Responses   [facility] patient discharged from? [facility]   Is LACE score < 7 ? Yes   Emergency Room discharge w/ pulse ox? No   Eligibility [facility]   Date of Admission 06/12/22   Date of Discharge 06/14/22   Discharge Disposition Home-Health Care Sv   Discharge diagnosis acute cystitis, hyponatremia   Does the patient have one of the following disease processes/diagnoses(primary or secondary)? Other   Does the patient have Home health ordered? Yes   What is the Home health agency?  continued [facility]    Is there a DME ordered? No   Prep survey completed? Yes          MARCI MATUTE - Registered Nurse

## 2022-06-14 NOTE — PLAN OF CARE
Goal Outcome Evaluation:        Problem: Adult Inpatient Plan of Care  Goal: Plan of Care Review  Outcome: Ongoing, Progressing  Goal: Patient-Specific Goal (Individualized)  Outcome: Ongoing, Progressing  Goal: Absence of Hospital-Acquired Illness or Injury  Outcome: Ongoing, Progressing  Intervention: Identify and Manage Fall Risk  Recent Flowsheet Documentation  Taken 6/14/2022 0006 by Cecilia Cortez RN  Safety Promotion/Fall Prevention: safety round/check completed  Taken 6/13/2022 2206 by Cecilia Cortez RN  Safety Promotion/Fall Prevention: safety round/check completed  Taken 6/13/2022 2000 by Cecilia Cortez RN  Safety Promotion/Fall Prevention:   activity supervised   fall prevention program maintained   nonskid shoes/slippers when out of bed   safety round/check completed  Intervention: Prevent Skin Injury  Recent Flowsheet Documentation  Taken 6/14/2022 0006 by Cecilia Cortez RN  Body Position: position changed independently  Taken 6/13/2022 2206 by Cecilia Cortez RN  Body Position: position changed independently  Taken 6/13/2022 2000 by Cecilia Cortez RN  Body Position: position changed independently  Skin Protection:   adhesive use limited   tubing/devices free from skin contact  Intervention: Prevent and Manage VTE (Venous Thromboembolism) Risk  Recent Flowsheet Documentation  Taken 6/14/2022 0537 by Cecilia Cortez RN  Activity Management: ambulated outside room  Taken 6/13/2022 2000 by Cecilia Cortez RN  Activity Management: activity adjusted per tolerance  VTE Prevention/Management:   bilateral   dorsiflexion/plantar flexion performed   patient refused intervention  Goal: Optimal Comfort and Wellbeing  Outcome: Ongoing, Progressing  Intervention: Provide Person-Centered Care  Recent Flowsheet Documentation  Taken 6/13/2022 2000 by Cecilia Cortez RN  Trust Relationship/Rapport:   care explained   thoughts/feelings acknowledged  Goal: Readiness for Transition of  Care  Outcome: Ongoing, Progressing     Problem: Seizure, Active Management  Goal: Absence of Seizure/Seizure-Related Injury  Outcome: Ongoing, Progressing     Problem: Electrolyte Imbalance  Goal: Electrolyte Balance  Outcome: Ongoing, Progressing     Problem: Fall Injury Risk  Goal: Absence of Fall and Fall-Related Injury  Outcome: Ongoing, Progressing  Intervention: Identify and Manage Contributors  Recent Flowsheet Documentation  Taken 6/13/2022 2000 by Cecilia Cortez RN  Medication Review/Management: medications reviewed  Intervention: Promote Injury-Free Environment  Recent Flowsheet Documentation  Taken 6/14/2022 0006 by Cecilia Cortez, RN  Safety Promotion/Fall Prevention: safety round/check completed  Taken 6/13/2022 2206 by Cecilia Cortez, RN  Safety Promotion/Fall Prevention: safety round/check completed  Taken 6/13/2022 2000 by Cecilia Cortez, RN  Safety Promotion/Fall Prevention:   activity supervised   fall prevention program maintained   nonskid shoes/slippers when out of bed   safety round/check completed

## 2022-06-14 NOTE — DISCHARGE SUMMARY
[facility] Medicine Services  DISCHARGE SUMMARY    Patient Name: Ritu Martino  : 1940  MRN: [id number]    Date of Admission: 2022  6:07 PM  Date of Discharge: 2022  Primary Care Physician: Reyes, Miriam Mercado, MD    Consults     Date and Time Order Name Status Description    2022  9:20 AM Inpatient Neurology Consult General Completed     2022  7:32 PM Inpatient Neurology Consult General Completed     2022  7:32 PM A (on-call MD unless specified) Details      2022  6:03 PM Inpatient Neurology Consult Stroke Completed     2022  6:03 PM Inpatient Neurology Consult Stroke Completed           Hospital Course       Active Hospital Problems    Diagnosis  POA   • Acute cystitis [N30.00]  Yes   • Anemia [D64.9]  Yes   • Cervicalgia [M54.2]  Yes   • Intertrigo [L30.4]  Yes   • Insomnia [G47.00]  Yes   • Benzodiazepine dependence (HCC) [F13.20]  Yes   • Essential hypertension, benign [I10]  Yes   • Hypokalemia [E87.6]  Yes   • Hypothyroidism [E03.9]  Yes   • Hyponatremia [E87.1]  Yes      Resolved Hospital Problems    Diagnosis Date Resolved POA   • **Transient alteration of awareness [R40.4] 2022 Yes          Hospital Course:  Ritu Martino is a 82 y.o. female  presents to the hospital with transient alteration of awareness in the setting of Ambien use for insomnia, acute cystitis, and reported potential seizure-like activities.      Patient was started on Keppra.  She was seen by neurology consult team who recommended Keppra be continued on discharge and few follow-up outpatient neurology clinic.  Neurology is arranging for [facility] outpatient neurology follow-up and patient agrees with the plan to follow-up closely in this clinic.    EEG performed and was abnormal.  Please see full report for details.  They were notable sharp waves.  Initially there was plan for repeat MRI however patient had just received recent MRI and neurology did not feel that repeat MRI  would be useful and patient patient agrees as she has severe claustrophobia.    Recommended discharge she continue to minimize sedating medicines.  I would recommend she replace Ambien for melatonin or at least discuss this with prescribing physician and continue to discuss the risks versus benefits of sleep medicines.    After reportedly recently being started on Ambien and was found to have acute cystitis.  There was also some concern for possible seizure.    Patient additionally had hyponatremia.  Etiology is unclear but sodium level has improved.  Recommend repeat BMP at primary care follow-up and may consider gentle fluid restriction if hyponatremia recurs or worsens.     Patient has been cleared to discharge by neurology team and is eager to go home.  She agrees to see primary care provider within 1 week for general hospital follow-up and will need refills on Keppra through primary care until she follows up with neurology.  At the time of discharge patient was told to take all medications as prescribed, keep all follow-up appointments, and call their doctor or return to the hospital with any worsening or concerning symptoms.    Please note that this note was made using Dragon voice recognition software            Day of Discharge     HPI:   Feels well.  Oriented.  Wants to go home.  Denies any new neurological symptoms.    ROS:  No current fevers or chills  No current shortness of breath or cough  No current nausea, vomiting, or diarrhea  No current chest pain or palpitations    Vital Signs:   Temp:  [98 °F (36.7 °C)-99 °F (37.2 °C)] 98 °F (36.7 °C)  Heart Rate:  [64-97] 83  Resp:  [16-18] 18  BP: (135-175)/() 175/101     Physical Exam:  Constitutional:Awake, alert  HENT: NCAT, mucous membranes moist, neck supple  Respiratory: Clear to auscultation bilaterally, respiratory effort normal, nonlabored breathing   Cardiovascular: RRR, normal radial pulses  Gastrointestinal: Positive bowel sounds, soft,  nontender, nondistended  Musculoskeletal: Elderly and somewhat frail in appearance  Psychiatric: Appropriate affect, cooperative, conversational  Neurologic: Oriented, no slurred speech or facial droop, follows commands  Skin: No rashes or jaundice, warm      Pertinent  and/or Most Recent Results     Results from last 7 days   Lab Units 06/13/22  0724 06/12/22  1806   WBC 10*3/mm3 3.31* 7.77   HEMOGLOBIN g/dL 10.4* 11.0*   HEMATOCRIT % 29.7* 31.2*   PLATELETS 10*3/mm3 246 265   SODIUM mmol/L 131* 126*   POTASSIUM mmol/L 3.2* 3.4*   CHLORIDE mmol/L 99 92*   CO2 mmol/L 22.0 24.0   BUN mg/dL 4* 7*   CREATININE mg/dL 0.48* 0.64   GLUCOSE mg/dL 90 117*   CALCIUM mg/dL 8.3* 9.2     Results from last 7 days   Lab Units 06/12/22  1806   BILIRUBIN mg/dL 1.1   ALK PHOS U/L 57   ALT (SGPT) U/L 18   AST (SGOT) U/L 19   PROTIME Seconds 12.9   INR  0.98   APTT seconds 24.3           Invalid input(s): TG, LDLCALC, LDLREALC  Results from last 7 days   Lab Units 06/12/22  1806   TROPONIN T ng/mL <0.010       Brief Urine Lab Results  (Last result in the past 365 days)      Color   Clarity   Blood   Leuk Est   Nitrite   Protein   CREAT   Urine HCG        06/12/22 1922 Yellow   Cloudy   Moderate (2+)   Moderate (2+)   Negative   Trace                 Microbiology Results Abnormal     Procedure Component Value - Date/Time    COVID PRE-OP / PRE-PROCEDURE SCREENING ORDER (NO ISOLATION) - Swab, Nasopharynx [[id number]]  (Normal) Collected: 06/12/22 1853    Lab Status: Final result Specimen: Swab from Nasopharynx Updated: 06/12/22 2040    Narrative:      The following orders were created for panel order COVID PRE-OP / PRE-PROCEDURE SCREENING ORDER (NO ISOLATION) - Swab, Nasopharynx.  Procedure                               Abnormality         Status                     ---------                               -----------         ------                     COVID-19,[facility] IN-HOUSE...[[id number]]  Normal              Final result                  Please view results for these tests on the individual orders.    COVID-19,[facility] IN-HOUSE CEPHEID/TAO NP SWAB IN TRANSPORT MEDIA 8-12 HR TAT - Swab, Nasopharynx [[id number]]  (Normal) Collected: 06/12/22 1853    Lab Status: Final result Specimen: Swab from Nasopharynx Updated: 06/12/22 2040     COVID19 Not Detected    Narrative:      Fact sheet for providers: https://www.fda.gov/media/888024/download    Fact sheet for patients: https://www.fda.gov/media/067395/download    Test performed by PCR.          Imaging Results (All)     Procedure Component Value Units Date/Time    CT Head Without Contrast Stroke Protocol [[id number]] Collected: 06/12/22 1903     Updated: 06/12/22 1947    Narrative:        Patient: NUBIA SCHILLING  Time Out: 19:46  Exam(s): CT HEAD Without Contrast     EXAM:    CT Head Without Intravenous Contrast    CLINICAL HISTORY:     Reason for exam: Stroke, follow up.    TECHNIQUE:    Axial computed tomography images of the head brain without intravenous   contrast.  CTDI is 54.8 mGy and DLP is 958 mGy-cm.  This CT exam was   performed according to the principle of ALARA (As Low As Reasonably   Achievable) by using one or more of the following dose reduction   techniques: automated exposure control, adjustment of the mA and or kV   according to patient size, and or use of iterative reconstruction   technique.    COMPARISON:    MRI brain May 9, 2022.    FINDINGS:    Brain:  Gray-white differentiation is maintained.  Moderate   supratentorial white matter disease.  No hemorrhage.    Ventricles:  Unremarkable.  No ventriculomegaly.    Bones joints:  Unremarkable.  No acute fracture.    Soft tissues:  Unremarkable.    Sinuses:  Unremarkable as visualized.  No acute sinusitis.    Mastoid air cells:  Unremarkable as visualized.  No mastoid effusion.    Other findings:  Motion degraded exam.  Mild atrophy.    IMPRESSION:         No acute intracranial findings.      Impression:          Electronically  signed by Joey Villa MD on 06-12-22 at 1946    XR Chest 1 View [[id number]] Collected: 06/12/22 1927     Updated: 06/12/22 1932    Narrative:      Portable chest radiograph     HISTORY:Stroke     TECHNIQUE: Single AP portable radiograph of the chest     COMPARISON:Chest radiograph 05/07/2022       Impression:      FINDINGS AND IMPRESSION:  Please note the medial aspects of the bilateral lung apices are obscured  by the patient's head and cannot be evaluated.     Somewhat nodular opacification within the left midlung is grossly  unchanged since 05/14/2018. Mild bibasilar pulmonary opacification is  present, right greater then left, and appears to have increased in size  since 05/07/2022 suggestive of atelectasis versus developing pneumonia  in the appropriate clinical context and correlation with patient history  is recommended. No pneumothorax or pleural effusion is seen. However, a  small pneumothorax could be missed within the bilateral medial aspects  of the lung apices given obscuration of the patient's head as detailed  above.     Cardiac silhouette is at the upper limits of normal to mildly enlarged  in size.     This report was finalized on 6/12/2022 7:29 PM by Dr. John Abbott M.D.             Discharge Details        Discharge Medications      New Medications      Instructions Start Date   acetaminophen 325 MG tablet  Commonly known as: TYLENOL   650 mg, Oral, Every 6 Hours PRN      cefdinir 300 MG capsule  Commonly known as: OMNICEF   300 mg, Oral, 2 Times Daily      levETIRAcetam 500 MG tablet  Commonly known as: Keppra   500 mg, Oral, 2 Times Daily      melatonin 1 MG tablet   2 mg, Oral, Nightly PRN         Changes to Medications      Instructions Start Date   ALIGN PO  What changed: Another medication with the same name was removed. Continue taking this medication, and follow the directions you see here.   1 capsule, Oral, Daily With Lunch         Continue These Medications      Instructions  Start Date   ALPRAZolam 0.25 MG tablet  Commonly known as: XANAX   0.25 mg, Oral, Daily      benazepril 20 MG tablet  Commonly known as: LOTENSIN   20 mg, Oral, Daily      cycloSPORINE 0.05 % ophthalmic emulsion  Commonly known as: RESTASIS   1 drop, Both Eyes, Daily      ferrous sulfate 325 (65 FE) MG tablet   325 mg, Oral, 2 Times Daily Before Meals      ketoconazole 2 % cream  Commonly known as: NIZORAL   Topical, 2 Times Daily, to affected area      Motegrity 1 MG tablet  Generic drug: Prucalopride Succinate   1 mg, Oral      nystatin 315030 UNIT/GM powder  Generic drug: nystatin   Topical, 2 Times Daily, to affected area      rosuvastatin 20 MG tablet  Commonly known as: CRESTOR   20 mg, Oral, Nightly      SODIUM CHLORIDE PO   Oral, 3 Times Daily      Synthroid 50 MCG tablet  Generic drug: levothyroxine   By mouth take 1 tab daily in AM as directed on empty stomach with water only.  Brand Medically Necessary      vitamin B-12 100 MCG tablet  Commonly known as: CYANOCOBALAMIN   50 mcg, Oral, Daily      Vitamin D 50 MCG (2000 UT) capsule   Oral, Daily With Dinner         Stop These Medications    Ambien 10 MG tablet  Generic drug: zolpidem     HYDROcodone-acetaminophen 5-325 MG per tablet  Commonly known as: NORCO            No Known Allergies      Discharge Disposition:  Home or Self Care    Diet:  Hospital:  Diet Order   Procedures   • Diet Regular       Activity:  Activity Instructions     Activity as Tolerated                 CODE STATUS:    Code Status and Medical Interventions:   Ordered at: 06/12/22 2018     Code Status (Patient has no pulse and is not breathing):    CPR (Attempt to Resuscitate)     Medical Interventions (Patient has pulse or is breathing):    Full Support       Future Appointments   Date Time Provider Department Center   6/20/2022  1:30 PM Uche Garcia MD [facility]   6/24/2022  1:30 PM Farzad Emmanuel MD [facility]   7/12/2022  2:00 PM Gaurav Barger MD [facility]   8/16/2022  2:00 PM Clemencia Curran PA [facility]           Additional Instructions for the Follow-ups that You Need to Schedule     Discharge Follow-up with PCP   As directed       Currently Documented PCP:    Reyes, Miriam Mercado, MD    PCP Phone Number:    [phone]     Follow Up Details: 1 week         Discharge Follow-up with Specified Provider: Follow-up in [facility] for possible seizures, they are arranging follow-up.  If you do not receive a call for appointment within 1 week please call [facility] and confirm.   As directed      To: Follow-up in [facility] for possible seizures, they are arranging follow-up.  If you do not receive a call for appointment within 1 week please call [facility] and confirm.            Follow-up Information     Reyes, Miriam Mercado, MD .    Specialty: Family Medicine  Why: 1 week  Contact information:  [address] [address]  [phone]                             Santiago Swift MD  06/14/22      Time Spent on Discharge:  I spent greater than 30 minutes on this discharge activity which included: face-to-face encounter with the patient, reviewing the data in the system, coordination of the care with the nursing staff as well as consultants, documentation, and entering orders.

## 2022-06-15 ENCOUNTER — TRANSITIONAL CARE MANAGEMENT TELEPHONE ENCOUNTER (OUTPATIENT)
Dept: CALL CENTER | Facility: HOSPITAL | Age: 82
End: 2022-06-15

## 2022-06-15 LAB — BACTERIA SPEC AEROBE CULT: ABNORMAL

## 2022-06-15 NOTE — OUTREACH NOTE
Call Center TCM Note    Flowsheet Row Responses   [facility] patient discharged from? [place]   Does the patient have one of the following disease processes/diagnoses(primary or secondary)? Other   TCM attempt successful? Yes   Call start time 1153   Call end time 1159   Discharge diagnosis Acute cystitis, hyponatremia   Person spoke with today (if not patient) and relationship  and patient    Meds reviewed with patient/caregiver? Yes   Is the patient having any side effects they believe may be caused by any medication additions or changes? No   Does the patient have all medications ordered at discharge? Yes   Is the patient taking all medications as directed (includes completed medication regime)? Yes   Does the patient have a primary care provider?  Yes   Does the patient have an appointment with their PCP within 7 days of discharge? Greater than 7 days   Comments regarding PCP Hospital (scheduled as a new patient) d/c f/u appt is on 6/28/22 at 2:00 pm    What is preventing the patient from scheduling follow up appointments within 7 days of discharge? Earlier appointment not available   Nursing Interventions Verified appointment date/time/provider   Has the patient kept scheduled appointments due by today? N/A   Has home health visited the patient within 72 hours of discharge? No   Psychosocial issues? No   Did the patient receive a copy of their discharge instructions? Yes   Nursing interventions Reviewed instructions with patient   What is the patient's perception of their health status since discharge? Same  [Pt reports she doesn't feel well]   Is the patient/caregiver able to teach back signs and symptoms related to disease process for when to call PCP? Yes   Is the patient/caregiver able to teach back signs and symptoms related to disease process for when to call 911? Yes   Is the patient/caregiver able to teach back the hierarchy of who to call/visit for symptoms/problems? PCP, Specialist,  Home health nurse, Urgent Care, ED, 911 Yes   If the patient is a current smoker, are they able to teach back resources for cessation? Not a smoker   TCM call completed? Yes          Anita Ferrell RN    6/15/2022, 12:00 EDT

## 2022-06-16 ENCOUNTER — TELEPHONE (OUTPATIENT)
Dept: ENDOCRINOLOGY | Age: 82
End: 2022-06-16

## 2022-06-16 LAB
BUN SERPL-MCNC: 6 MG/DL (ref 8–27)
BUN/CREAT SERPL: 10 (ref 12–28)
CALCIUM SERPL-MCNC: 10 MG/DL (ref 8.7–10.3)
CHLORIDE SERPL-SCNC: 94 MMOL/L (ref 96–106)
CO2 SERPL-SCNC: 24 MMOL/L (ref 20–29)
CREAT SERPL-MCNC: 0.6 MG/DL (ref 0.57–1)
EGFRCR SERPLBLD CKD-EPI 2021: 90 ML/MIN/1.73
GLUCOSE SERPL-MCNC: 102 MG/DL (ref 65–99)
OSMOLALITY SERPL: 267 MOSMOL/KG (ref 280–301)
OSMOLALITY UR: 573 MOSMOL/KG
POTASSIUM SERPL-SCNC: 4.7 MMOL/L (ref 3.5–5.2)
SODIUM SERPL-SCNC: 132 MMOL/L (ref 134–144)
T3FREE SERPL-MCNC: 2.7 PG/ML (ref 2–4.4)
T4 FREE SERPL-MCNC: 1.71 NG/DL (ref 0.82–1.77)
THYROGLOB AB SERPL-ACNC: 3.2 IU/ML (ref 0–0.9)
THYROPEROXIDASE AB SERPL-ACNC: 13 IU/ML (ref 0–34)
TSH SERPL DL<=0.005 MIU/L-ACNC: 1.54 UIU/ML (ref 0.45–4.5)
VASOPRESSIN SERPL-MCNC: <0.8 PG/ML (ref 0–4.7)

## 2022-06-20 ENCOUNTER — APPOINTMENT (OUTPATIENT)
Dept: GENERAL RADIOLOGY | Facility: HOSPITAL | Age: 82
End: 2022-06-20

## 2022-06-20 ENCOUNTER — HOSPITAL ENCOUNTER (EMERGENCY)
Facility: HOSPITAL | Age: 82
Discharge: HOME | End: 2022-06-20
Attending: EMERGENCY MEDICINE | Admitting: EMERGENCY MEDICINE

## 2022-06-20 VITALS
SYSTOLIC BLOOD PRESSURE: 144 MMHG | DIASTOLIC BLOOD PRESSURE: 88 MMHG | TEMPERATURE: 97.6 F | RESPIRATION RATE: 20 BRPM | HEART RATE: 92 BPM | OXYGEN SATURATION: 97 %

## 2022-06-20 DIAGNOSIS — F41.1 ANXIETY REACTION: Primary | ICD-10-CM

## 2022-06-20 DIAGNOSIS — R07.89 ATYPICAL CHEST PAIN: ICD-10-CM

## 2022-06-20 DIAGNOSIS — E87.1 HYPONATREMIA: ICD-10-CM

## 2022-06-20 LAB
ALBUMIN SERPL-MCNC: 4.8 G/DL (ref 3.5–5.2)
ALBUMIN/GLOB SERPL: 1.9 G/DL
ALP SERPL-CCNC: 71 U/L (ref 39–117)
ALT SERPL W P-5'-P-CCNC: 18 U/L (ref 1–33)
ANION GAP SERPL CALCULATED.3IONS-SCNC: 11 MMOL/L (ref 5–15)
AST SERPL-CCNC: 18 U/L (ref 1–32)
BASOPHILS # BLD AUTO: 0 10*3/MM3 (ref 0–0.2)
BASOPHILS NFR BLD AUTO: 0 % (ref 0–1.5)
BILIRUB SERPL-MCNC: 1 MG/DL (ref 0–1.2)
BUN SERPL-MCNC: 9 MG/DL (ref 8–23)
BUN/CREAT SERPL: 14.1 (ref 7–25)
CALCIUM SPEC-SCNC: 10.1 MG/DL (ref 8.6–10.5)
CHLORIDE SERPL-SCNC: 92 MMOL/L (ref 98–107)
CO2 SERPL-SCNC: 23 MMOL/L (ref 22–29)
CREAT SERPL-MCNC: 0.64 MG/DL (ref 0.57–1)
DEPRECATED RDW RBC AUTO: 38.3 FL (ref 37–54)
EGFRCR SERPLBLD CKD-EPI 2021: 88.4 ML/MIN/1.73
EOSINOPHIL # BLD AUTO: 0 10*3/MM3 (ref 0–0.4)
EOSINOPHIL NFR BLD AUTO: 0 % (ref 0.3–6.2)
ERYTHROCYTE [DISTWIDTH] IN BLOOD BY AUTOMATED COUNT: 11.7 % (ref 12.3–15.4)
GLOBULIN UR ELPH-MCNC: 2.5 GM/DL
GLUCOSE SERPL-MCNC: 116 MG/DL (ref 65–99)
HCT VFR BLD AUTO: 35.4 % (ref 34–46.6)
HGB BLD-MCNC: 12.4 G/DL (ref 12–15.9)
HOLD SPECIMEN: NORMAL
HOLD SPECIMEN: NORMAL
IMM GRANULOCYTES # BLD AUTO: 0.03 10*3/MM3 (ref 0–0.05)
IMM GRANULOCYTES NFR BLD AUTO: 0.3 % (ref 0–0.5)
LYMPHOCYTES # BLD AUTO: 0.33 10*3/MM3 (ref 0.7–3.1)
LYMPHOCYTES NFR BLD AUTO: 3.8 % (ref 19.6–45.3)
MCH RBC QN AUTO: 31.6 PG (ref 26.6–33)
MCHC RBC AUTO-ENTMCNC: 35 G/DL (ref 31.5–35.7)
MCV RBC AUTO: 90.1 FL (ref 79–97)
MONOCYTES # BLD AUTO: 0.31 10*3/MM3 (ref 0.1–0.9)
MONOCYTES NFR BLD AUTO: 3.6 % (ref 5–12)
NEUTROPHILS NFR BLD AUTO: 7.99 10*3/MM3 (ref 1.7–7)
NEUTROPHILS NFR BLD AUTO: 92.3 % (ref 42.7–76)
NRBC BLD AUTO-RTO: 0 /100 WBC (ref 0–0.2)
PLATELET # BLD AUTO: 422 10*3/MM3 (ref 140–450)
PMV BLD AUTO: 8.1 FL (ref 6–12)
POTASSIUM SERPL-SCNC: 3.8 MMOL/L (ref 3.5–5.2)
PROT SERPL-MCNC: 7.3 G/DL (ref 6–8.5)
QT INTERVAL: 346 MS
RBC # BLD AUTO: 3.93 10*6/MM3 (ref 3.77–5.28)
SODIUM SERPL-SCNC: 126 MMOL/L (ref 136–145)
T4 FREE SERPL-MCNC: 2.1 NG/DL (ref 0.93–1.7)
TROPONIN T SERPL-MCNC: <0.01 NG/ML (ref 0–0.03)
TROPONIN T SERPL-MCNC: <0.01 NG/ML (ref 0–0.03)
TSH SERPL DL<=0.05 MIU/L-ACNC: 0.42 UIU/ML (ref 0.27–4.2)
WBC NRBC COR # BLD: 8.66 10*3/MM3 (ref 3.4–10.8)
WHOLE BLOOD HOLD SPECIMEN: NORMAL

## 2022-06-20 PROCEDURE — 85025 COMPLETE CBC W/AUTO DIFF WBC: CPT

## 2022-06-20 PROCEDURE — 36415 COLL VENOUS BLD VENIPUNCTURE: CPT

## 2022-06-20 PROCEDURE — 71046 X-RAY EXAM CHEST 2 VIEWS: CPT

## 2022-06-20 PROCEDURE — 96374 THER/PROPH/DIAG INJ IV PUSH: CPT

## 2022-06-20 PROCEDURE — 80053 COMPREHEN METABOLIC PANEL: CPT

## 2022-06-20 PROCEDURE — 93005 ELECTROCARDIOGRAM TRACING: CPT

## 2022-06-20 PROCEDURE — 84484 ASSAY OF TROPONIN QUANT: CPT

## 2022-06-20 PROCEDURE — 84484 ASSAY OF TROPONIN QUANT: CPT | Performed by: EMERGENCY MEDICINE

## 2022-06-20 PROCEDURE — 25010000002 LORAZEPAM PER 2 MG: Performed by: NURSE PRACTITIONER

## 2022-06-20 PROCEDURE — 84439 ASSAY OF FREE THYROXINE: CPT | Performed by: NURSE PRACTITIONER

## 2022-06-20 PROCEDURE — 99283 EMERGENCY DEPT VISIT LOW MDM: CPT

## 2022-06-20 PROCEDURE — 93010 ELECTROCARDIOGRAM REPORT: CPT | Performed by: INTERNAL MEDICINE

## 2022-06-20 PROCEDURE — 84443 ASSAY THYROID STIM HORMONE: CPT | Performed by: NURSE PRACTITIONER

## 2022-06-20 RX ORDER — SODIUM CHLORIDE 0.9 % (FLUSH) 0.9 %
10 SYRINGE (ML) INJECTION AS NEEDED
Status: DISCONTINUED | OUTPATIENT
Start: 2022-06-20 | End: 2022-06-21 | Stop reason: HOSPADM

## 2022-06-20 RX ORDER — LORAZEPAM 2 MG/ML
1 INJECTION INTRAMUSCULAR ONCE
Status: COMPLETED | OUTPATIENT
Start: 2022-06-20 | End: 2022-06-20

## 2022-06-20 RX ORDER — HYDROXYZINE HYDROCHLORIDE 10 MG/1
10 TABLET, FILM COATED ORAL EVERY 8 HOURS PRN
Qty: 20 TABLET | Refills: 0 | Status: SHIPPED | OUTPATIENT
Start: 2022-06-20 | End: 2022-06-27 | Stop reason: SDUPTHER

## 2022-06-20 RX ADMIN — Medication 10 ML: at 19:44

## 2022-06-20 RX ADMIN — LORAZEPAM 1 MG: 2 INJECTION INTRAMUSCULAR; INTRAVENOUS at 19:43

## 2022-06-20 RX ADMIN — SODIUM CHLORIDE 500 ML: 9 INJECTION, SOLUTION INTRAVENOUS at 20:24

## 2022-06-20 NOTE — ED TRIAGE NOTES
Pt c/o sternal chest pressure that has been intermittent since yesterday. Pt was sitting in chair when chest pain started. Pt reports soa and nausea    Pt wearing mask on arrival. Staff wearing mask and goggles at time of triage.

## 2022-06-20 NOTE — ED PROVIDER NOTES
EMERGENCY DEPARTMENT ENCOUNTER    Room Number:  WALTER/WALTER  Date seen:  6/20/2022  Time seen: 19:17 EDT  PCP: Reyes, Miriam Mercado, MD  Historian: patient,     HPI:  Chief complaint: anxiety, chest tightness  A complete HPI/ROS/PMH/PSH/SH/FH are unobtainable due to: n/a  Context:Ritu Martino is a 82 y.o. female past medical history significant for anxiety, hypothyroid, hyponatremia, hypertension, benzodiazepine dependence and anemia who presents to the ED with c/o with feeling increased restlessness, inability to sit still and several times today she felt tightness in her chest which she thinks is due to recent Prednisone.  Her symptoms are not made better/worse by anything. She reports a painful rash and redness to her perirectal area for which she has been using creams, diaper rash ointment and when it became worse the dermatologist prescribed steroids.  She started these on Friday.  She denies chest pain, tightness and has no shortness of breath.      Patient was placed in face mask in first look. Patient was wearing facemask when I entered the room and throughout our encounter. I wore full protective equipment throughout this patient encounter including a N95 face mask, eye shield and gloves. Hand hygiene/washing of hands was performed before donning protective equipment and after removal when leaving the room.    MEDICAL RECORD REVIEW    ALLERGIES  Patient has no known allergies.    PAST MEDICAL HISTORY  Active Ambulatory Problems     Diagnosis Date Noted   • Anxiety 12/15/2021   • Hypothyroidism 12/15/2021   • Hyponatremia 2014   • Iron deficiency 04/19/2022   • Hypokalemia 04/19/2022   • Essential hypertension, benign 04/19/2022   • Fluent aphasia 05/07/2022   • Hypochloremia 05/07/2022   • Benzodiazepine dependence (HCC) 05/07/2022   • Anemia 06/12/2022   • Cervicalgia 06/12/2022   • Intertrigo 06/12/2022   • Insomnia 06/12/2022   • Acute cystitis 06/13/2022   • E coli infection 06/14/2022      Resolved Ambulatory Problems     Diagnosis Date Noted   • Essential hypertension 2021   • Hyperlipidemia 2021   • Transient alteration of awareness 2022     Past Medical History:   Diagnosis Date   • Arthritis    • Bowel dysfunction 2021   • Diverticulitis 2021   • GERD (gastroesophageal reflux disease)    • Hernia, hiatal    • Hypercholesterolemia        PAST SURGICAL HISTORY  Past Surgical History:   Procedure Laterality Date   • APPENDECTOMY     • BACK SURGERY     •  SECTION     • COLON SURGERY  2021    to address ruptured and infected diverticular dz, ostomy also placed   • COLOSTOMY CLOSURE  10/2021    ostomy removed   • HEMORRHOIDECTOMY     • KNEE ARTHROPLASTY         FAMILY HISTORY  Family History   Problem Relation Age of Onset   • Brain cancer Mother    • Stroke Father        SOCIAL HISTORY  Social History     Socioeconomic History   • Marital status:    Tobacco Use   • Smoking status: Former Smoker   • Smokeless tobacco: Never Used   Vaping Use   • Vaping Use: Never used   Substance and Sexual Activity   • Alcohol use: Yes     Alcohol/week: 8.0 standard drinks     Types: 4 Glasses of wine, 4 Shots of liquor per week   • Drug use: No   • Sexual activity: Defer       REVIEW OF SYSTEMS  Review of Systems    All systems reviewed and negative except for those discussed in HPI.     PHYSICAL EXAM    ED Triage Vitals   Temp Heart Rate Resp BP SpO2   22 1630 22 1630 22 1630 22 1631 22 1630   97.6 °F (36.4 °C) 112 20 173/99 97 %      Temp src Heart Rate Source Patient Position BP Location FiO2 (%)   22 1630 22 1630 22 1630 22 1630 --   Tympanic Monitor Standing Left arm      Physical Exam    I have reviewed the triage vital signs and nursing notes.      GENERAL: anxious, restless  HENT: nares patent, mm moist  EYES: no scleral icterus  NECK: no ROM limitations  CV: regular rhythm, mild tachycardia, no murmur,  no rubs, no gallups  RESPIRATORY: normal effort, CTAB  ABDOMEN: soft  : reddened rash around her anal area.   MUSCULOSKELETAL: no deformity, no lower extremity edema  NEURO: alert, moves all extremities, follows commands  SKIN: warm, dry    HEARTSCORE    History  Highly suspicious              2    Moderately suspicious             1    Slightly or non-suspicious             0    ECG  Significant ST depression              2    Nonspecific repol disturbance            1    Normal                           0    Age  > or = 65                          2     46-65                           1    < or = 45                          0    Risk factors (hypercholesterolemia, HTN, DM, smoking, pos fam hx, obesity)                            > or = to 3 RF for atherosclerotic dx   2    1 or 2                 1    No risk factors                0    Troponin > or = 3x normal limit               2    1-3x normal limit    1    < or = Normal limit    0    Score  0 - 3 is low risk (0.9-1.7% risk of adverse cardiac event)  Score  4 - 6 is moderate risk (12-16.6% risk of adverse cardiac event)  Score  6 - 9 is high risk (50-65% risk of adverse cardiac event)    This patient's HEART score is 3        LAB RESULTS  Recent Results (from the past 24 hour(s))   ECG 12 Lead    Collection Time: 06/20/22  4:35 PM   Result Value Ref Range    QT Interval 346 ms   Comprehensive Metabolic Panel    Collection Time: 06/20/22  6:02 PM    Specimen: Blood   Result Value Ref Range    Glucose 116 (H) 65 - 99 mg/dL    BUN 9 8 - 23 mg/dL    Creatinine 0.64 0.57 - 1.00 mg/dL    Sodium 126 (L) 136 - 145 mmol/L    Potassium 3.8 3.5 - 5.2 mmol/L    Chloride 92 (L) 98 - 107 mmol/L    CO2 23.0 22.0 - 29.0 mmol/L    Calcium 10.1 8.6 - 10.5 mg/dL    Total Protein 7.3 6.0 - 8.5 g/dL    Albumin 4.80 3.50 - 5.20 g/dL    ALT (SGPT) 18 1 - 33 U/L    AST (SGOT) 18 1 - 32 U/L    Alkaline Phosphatase 71 39 - 117 U/L    Total Bilirubin 1.0 0.0 - 1.2 mg/dL    Globulin  2.5 gm/dL    A/G Ratio 1.9 g/dL    BUN/Creatinine Ratio 14.1 7.0 - 25.0    Anion Gap 11.0 5.0 - 15.0 mmol/L    eGFR 88.4 >60.0 mL/min/1.73   Troponin    Collection Time: 06/20/22  6:02 PM    Specimen: Blood   Result Value Ref Range    Troponin T <0.010 0.000 - 0.030 ng/mL   Green Top (Gel)    Collection Time: 06/20/22  6:02 PM   Result Value Ref Range    Extra Tube Hold for add-ons.    Lavender Top    Collection Time: 06/20/22  6:02 PM   Result Value Ref Range    Extra Tube hold for add-on    CBC Auto Differential    Collection Time: 06/20/22  6:02 PM    Specimen: Blood   Result Value Ref Range    WBC 8.66 3.40 - 10.80 10*3/mm3    RBC 3.93 3.77 - 5.28 10*6/mm3    Hemoglobin 12.4 12.0 - 15.9 g/dL    Hematocrit 35.4 34.0 - 46.6 %    MCV 90.1 79.0 - 97.0 fL    MCH 31.6 26.6 - 33.0 pg    MCHC 35.0 31.5 - 35.7 g/dL    RDW 11.7 (L) 12.3 - 15.4 %    RDW-SD 38.3 37.0 - 54.0 fl    MPV 8.1 6.0 - 12.0 fL    Platelets 422 140 - 450 10*3/mm3    Neutrophil % 92.3 (H) 42.7 - 76.0 %    Lymphocyte % 3.8 (L) 19.6 - 45.3 %    Monocyte % 3.6 (L) 5.0 - 12.0 %    Eosinophil % 0.0 (L) 0.3 - 6.2 %    Basophil % 0.0 0.0 - 1.5 %    Immature Grans % 0.3 0.0 - 0.5 %    Neutrophils, Absolute 7.99 (H) 1.70 - 7.00 10*3/mm3    Lymphocytes, Absolute 0.33 (L) 0.70 - 3.10 10*3/mm3    Monocytes, Absolute 0.31 0.10 - 0.90 10*3/mm3    Eosinophils, Absolute 0.00 0.00 - 0.40 10*3/mm3    Basophils, Absolute 0.00 0.00 - 0.20 10*3/mm3    Immature Grans, Absolute 0.03 0.00 - 0.05 10*3/mm3    nRBC 0.0 0.0 - 0.2 /100 WBC   TSH    Collection Time: 06/20/22  6:02 PM    Specimen: Blood   Result Value Ref Range    TSH 0.421 0.270 - 4.200 uIU/mL   T4, Free    Collection Time: 06/20/22  6:02 PM    Specimen: Blood   Result Value Ref Range    Free T4 2.10 (H) 0.93 - 1.70 ng/dL   Troponin    Collection Time: 06/20/22  7:43 PM    Specimen: Blood   Result Value Ref Range    Troponin T <0.010 0.000 - 0.030 ng/mL   Gold Top - SST    Collection Time: 06/20/22  7:43 PM    Result Value Ref Range    Extra Tube Hold for add-ons.          RADIOLOGY RESULTS  XR Chest 2 View    Result Date: 6/20/2022  PA AND LATERAL CHEST  CLINICAL HISTORY: Chest pain  Compared to a previous chest dated 06/27/2012.  The lungs are well-expanded and appear free of active infiltrates. There are no pleural effusions. There is no pneumothorax. The cardiomediastinal silhouette is unremarkable.  IMPRESSIONS: No evidence of active disease within the chest.  This report was finalized on 6/20/2022 5:54 PM by Dr. Dex Cuellar M.D.           PROGRESS, DATA ANALYSIS, CONSULTS AND MEDICAL DECISION MAKING  All labs have been independently reviewed by me.  All radiology studies have been reviewed by me and discussed with radiologist dictating the report.  EKG's independently viewed and interpreted by me unless stated otherwise. Discussion below represents my analysis of pertinent findings related to patient's condition, differential diagnosis, treatment plan and final disposition.     ED Course as of 06/20/22 2317   Mon Jun 20, 2022 2130 EKG   viewed by ER MD prior to my interpretation      EKG time: 1635  Rhythm/Rate: 95, sinus rhythm  P waves and WV: Normal WV, normal OSMAN  QRS, axis: Normal QRS, normal axis  ST and T waves: No acute ST-T wave abnormalities    Interpreted Contemporaneously by me, independently viewed  Change from prior dated 6/12/2022 with regard to rate only   [EW]   2144 Lengthy discussion with patient, her  and her daughter.  I suspect a lot of this is anxiety which somewhat is driven recently by course of prednisone.  She did get some good relief from dose of Ativan here in the ER.  She is asking for this for home which I have declined due to the fact that she takes Ambien at night and is on benzo twice a day.  I discussed trying some L-theanine, CBD Gummies purchased at [facility] pharmacy and that I will give her as needed breakthrough hydroxyzine at lower dose.  I have encouraged her  multiple times to discuss with her primary care provider whether or not an antidepressant would be helpful for her in the long run. [EW]      ED Course User Index  [EW] Saniya Cantrell APRN     DDX: anxiety, atypical chest pain     Reviewed pt's history and workup with Dr. Broussard.  After a bedside evaluation, Dr. Broussard agrees with the plan of care.    The patient's history, physical exam, and lab findings were discussed with the physician, who also performed a face to face history and physical exam.  I discussed all results and noted any abnormalities with patient.  Discussed absoute need to recheck abnormalities with their family physician.  I answered any of the patient's questions.  Discussed plan for discharge, as there is no emergent indication for admission.  Pt is agreeable and understands need for follow up and repeat testing.  Pt is aware that discharge does not mean that nothing is wrong but it indicates no emergency is present and they must continue care with their family physician.  Pt is discharged with instructions to follow up with primary care doctor to have their blood pressure rechecked.     Disposition vitals:  /88   Pulse 92   Temp 97.6 °F (36.4 °C) (Tympanic)   Resp 20   SpO2 97%       DIAGNOSIS  Final diagnoses:   Anxiety reaction   Atypical chest pain   Hyponatremia       FOLLOW UP   Reyes, Miriam Mercado, MD  [address] [address]  [phone]    Schedule an appointment as soon as possible for a visit            Saniya Cantrell APRN  06/20/22 [id number]       Saniya Cantrell APRN  06/20/22 [id number]

## 2022-06-21 ENCOUNTER — OFFICE VISIT (OUTPATIENT)
Dept: ENDOCRINOLOGY | Age: 82
End: 2022-06-21

## 2022-06-21 VITALS
WEIGHT: 122 LBS | OXYGEN SATURATION: 99 % | DIASTOLIC BLOOD PRESSURE: 92 MMHG | BODY MASS INDEX: 23.95 KG/M2 | HEIGHT: 60 IN | TEMPERATURE: 97.1 F | SYSTOLIC BLOOD PRESSURE: 138 MMHG | HEART RATE: 101 BPM

## 2022-06-21 DIAGNOSIS — E03.9 HYPOTHYROIDISM, UNSPECIFIED TYPE: Primary | ICD-10-CM

## 2022-06-21 DIAGNOSIS — E87.1 HYPONATREMIA: ICD-10-CM

## 2022-06-21 PROCEDURE — 99213 OFFICE O/P EST LOW 20 MIN: CPT | Performed by: INTERNAL MEDICINE

## 2022-06-21 RX ORDER — AMMONIUM LACTATE 12 G/100G
LOTION TOPICAL 2 TIMES DAILY
Status: ON HOLD | COMMUNITY
Start: 2022-04-25 | End: 2022-06-30

## 2022-06-21 RX ORDER — CLOBETASOL PROPIONATE 0.5 MG/G
CREAM TOPICAL 2 TIMES DAILY
COMMUNITY
Start: 2022-04-29 | End: 2022-06-28 | Stop reason: ALTCHOICE

## 2022-06-21 RX ORDER — LEVOTHYROXINE SODIUM 0.05 MG/1
50 TABLET ORAL DAILY
COMMUNITY

## 2022-06-21 RX ORDER — ZOLPIDEM TARTRATE 5 MG/1
5 TABLET ORAL
COMMUNITY
Start: 2022-06-04 | End: 2022-08-04 | Stop reason: DRUGHIGH

## 2022-06-21 RX ORDER — FLUCONAZOLE 200 MG/1
200 TABLET ORAL DAILY
COMMUNITY
Start: 2022-04-21 | End: 2022-06-27

## 2022-06-21 RX ORDER — LIDOCAINE HYDROCHLORIDE AND HYDROCORTISONE ACETATE 30; 25 MG/G; MG/G
GEL RECTAL
COMMUNITY
Start: 2022-06-17 | End: 2022-06-28 | Stop reason: ALTCHOICE

## 2022-06-21 RX ORDER — ERYTHROMYCIN 5 MG/G
OINTMENT OPHTHALMIC
Status: ON HOLD | COMMUNITY
End: 2022-06-30

## 2022-06-21 RX ORDER — PREDNISONE 10 MG/1
TABLET ORAL
COMMUNITY
Start: 2022-06-17 | End: 2022-07-06 | Stop reason: HOSPADM

## 2022-06-21 RX ORDER — CYCLOSPORINE 0.5 MG/ML
1 EMULSION OPHTHALMIC 2 TIMES DAILY
COMMUNITY

## 2022-06-21 RX ORDER — NYSTATIN 100000 U/G
OINTMENT TOPICAL
COMMUNITY
Start: 2022-05-31 | End: 2022-06-21

## 2022-06-21 RX ORDER — CLOTRIMAZOLE AND BETAMETHASONE DIPROPIONATE 10; .64 MG/G; MG/G
CREAM TOPICAL SEE ADMIN INSTRUCTIONS
COMMUNITY
Start: 2022-06-17 | End: 2022-06-28 | Stop reason: SDUPTHER

## 2022-06-21 NOTE — PATIENT INSTRUCTIONS
As discussed thyroid problem is a partial problem in that there is still some function remaining and medication is there to fill in the gap for the rest.  With taking medication as directed now you are slightly over replaced.  Advise keeping the branded 50 mcg take 1 tab six days week and once a week same day each week skip that day, thus lowering the weekly dose by 1 tab.  Goal for the TSH is to be between 2.5-4.5 uIU/mL.  Advise having this checked in 3-4 months and if TSH is at goal they could check less often likely twice a year.     As discussed for the low sodium did not find a direct hormonal cause for that so appears is a renal lose of sodium of some sort.  What you are doing is what can be done for that.  Discuss with your PCP closer lab monitoring of that if there is concern for the level fluctuating as was mentioned.

## 2022-06-21 NOTE — PROGRESS NOTES
Ritu Martino, 82 y.o., Gender: female    Assessment/Plan    1.  Pt w/ partial hypothyroidism secondary to prior Hashimoto's dz based on labs.  TSH is slightly below goal.  Counseled that the goal of tx will be to keep TSH 2.5-4.5 uIU/mL, which is the rec range for someone on tx at this age.  Rec cont w/ 50 mcg six days a week and once a week same day each week skip a tab and TSH should improve to goal.  At first visit found out pt has never taken medication correctly mixing with all other AM meds including mxl mineral items.  Think now that pt is taking correctly lower TSH is indication that there was absorption issue present related to mixing with other items.  Counseled pt about dose and to have f/u tft's to show being at goal range will then proceed from there w/ other f/u if needed to optimize dose.    2.  Counseled in detail proper way to take thyroid replacement treatment to be as follows.  Pt is to take first thing in the morning on an empty stomach with a large glass of water ONLY. No milk or any juice product. Do not take any other medications or eat anything for 45-60 minutes after taking the medication. You can eat, drink, and take other medications after 45-60 minutes.  Do not take any supplements for at least 3 hours after taking the medication.  Calcium and iron should be taken later in day if possible.  Also, medications to treat excess gastric acid should be taken later in the day.  3.  There was separate notation about pt having low sodium which appears to be chronic based on limited data available.  Pt was on a diuretic but seems that was stopped after hospital stay in '21.  At this time this is not acute and thus not of sig concern.  After thyroid was adequately replaced did do basic labs for this.  No direct cause found in that this is not hormone mediated and is stable as has been for some time.  Nothing more to do with this from endocrine aspect.  Pt's daughter is concerned not enough lab  f/u is being done for this and leave that to pt's PCP to sort out as they have been monitoring and address this w/ salt tabs for some time.  They then noted they are looking for a new PCP related to concerns about how things were being done at current location.        Time Counseled: 12  Minutes  Total Time: 20  Minutes    Return to office in:  back to PCP      HPI Summary    Pt here for evaluation of hypothyroidism.  Pt reports unsure how long has been on replacement and  thinks has been since around '18.  Pt's daughter is withher today.  At last visit on further questioning found out pt was never directed on how to correctly take the thyroid medication so has always mixed it with all other AM items including multiple mineral supplements.  That was sorted out and pt has been taking correctly over the past 2 mons.  Pt's c/o mostly unchanged from previous.  Pt reports since having GI surgery mid '21 for ruptured diverticular dz she has ongoing fluctuating bowels, freq nausea, not eating as much as was before, baseline cold intolerance as improved recently and pt has had some heat intolerance, chronic poor sleep, baseline anxiety made worse when not able to sleep, occ tremor related to anxiety, cont fatigue, weakness at times, mental slowness, dry skin and recent skin infections related to the surgery, and change in voice.  At this time, pt denies any heat intolerance, excessive sweating, weight change, palpitations,  muscle cramps, joint pain, edema, excessive hair loss, dysphagia, or shortness of breath when lying down.  Pt without any other complaints related to thyroid at this time.    Review of Systems  see HPI    Patient History    Past History (reviewed):    Medical:   Past Medical History:   Diagnosis Date   • Anxiety    • Arthritis    • Bowel dysfunction 08/2021    since having surgery for diverticular infection   • Diverticulitis 08/2021    w/ rupture and infection required surgery and ostomy   •  Essential hypertension, benign    • GERD (gastroesophageal reflux disease)    • Hernia, hiatal    • Hypercholesterolemia    • Hypokalemia    • Hyponatremia     chronic low with occasional normal level   • Hypothyroidism     estimated time frame pt nor  could remember exactly how long has been on medication   • Insomnia    • Iron deficiency        Surgery:   Past Surgical History:   Procedure Laterality Date   • APPENDECTOMY     • BACK SURGERY     •  SECTION     • COLON SURGERY  2021    to address ruptured and infected diverticular dz, ostomy also placed   • COLOSTOMY CLOSURE  10/2021    ostomy removed   • HEMORRHOIDECTOMY     • KNEE ARTHROPLASTY           Social History (reviewed):  Smoking 0.25 ppd 35 yrs quit early , occ ETOH, - Drugs, , Occupation retired [employer] 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    Allergies:  No Known Allergies    Physical Exam    VITALS:    Vitals:    22 1445   BP: 138/92   Pulse: 101   Temp: 97.1 °F (36.2 °C)   SpO2: 99%     Body mass index is 23.83 kg/m².    GENERAL:  Looks stated age, Well developed  HEAD/EYES:  N/C, A/T, Mask in place  EXTREMITIES:  FROM  CNS:  A&Ox2 some confusion noted was not able to determine if has dementia     Full exam not done today      Labs/Imaging  All available lab and imaging data were reviewed.      Uche Garcia MD, NOHELIA, FACE, [facility]  2022  15:09 EDT

## 2022-06-24 NOTE — PROGRESS NOTES
Subjective   History of Present Illness: Ritu Martino is a 82 y.o. female is being seen for consultation today at the request of Yusef Huber MD for a meningioma.  MRI brain done on 22.  She is very anxious today.  She is with her  today.  He reports that on May 7 at 1 AM she was found on the living room floor confused and having difficulty getting up.  She was seen in the hospital at that time.  Since that she has been started on a medication for possible seizures.  She is followed by neurology.  He reports that 2 weeks ago she also had another episode of confusion.  She has not yet followed up with her primary care provider.  She has a history of hyponatremia.  She reports that she recently changed the dose frequency of her Synthroid.  She reports she is being treated for a UTI.  She is on a steroid Dosepak for a skin lesion around her rectum.    History of Present Illness    The following portions of the patient's history were reviewed and updated as appropriate: allergies, past family history, past medical history, past social history, past surgical history and problem list.    Past Medical History:   Diagnosis Date   • Anxiety    • Arthritis    • Bowel dysfunction 2021    since having surgery for diverticular infection   • Diverticulitis 2021    w/ rupture and infection required surgery and ostomy   • Essential hypertension, benign    • GERD (gastroesophageal reflux disease)    • Hernia, hiatal    • Hypercholesterolemia    • Hypokalemia    • Hyponatremia     chronic low with occasional normal level   • Hypothyroidism     estimated time frame pt nor  could remember exactly how long has been on medication   • Insomnia    • Iron deficiency         Past Surgical History:   Procedure Laterality Date   • APPENDECTOMY     • BACK SURGERY     •  SECTION     • COLON SURGERY  2021    to address ruptured and infected diverticular dz, ostomy also placed   •  COLOSTOMY CLOSURE  10/2021    ostomy removed   • HEMORRHOIDECTOMY     • KNEE ARTHROPLASTY            Current Outpatient Medications:   •  ALPRAZolam (XANAX) 0.25 MG tablet, Take 1 tablet by mouth Daily., Disp: , Rfl:   •  ammonium lactate (LAC-HYDRIN) 12 % lotion, Apply  topically to the appropriate area as directed 2 (Two) Times a Day. to affected area, Disp: , Rfl:   •  benazepril (LOTENSIN) 20 MG tablet, Take 20 mg by mouth Daily., Disp: , Rfl:   •  busPIRone (BUSPAR) 5 MG tablet, Take 5 mg by mouth 3 (Three) Times a Day., Disp: , Rfl:   •  Calcium Carb-Cholecalciferol (CALCIUM 600-D PO), Take  by mouth., Disp: , Rfl:   •  Cholecalciferol (VITAMIN D) 2000 units capsule, Take  by mouth Daily With Dinner., Disp: , Rfl:   •  clobetasol (TEMOVATE) 0.05 % cream, Apply  topically to the appropriate area as directed 2 (Two) Times a Day. to affected area, Disp: , Rfl:   •  clotrimazole-betamethasone (LOTRISONE) 1-0.05 % cream, Apply  topically to the appropriate area as directed See Admin Instructions. Apply topically to the affected area twice daily, Disp: , Rfl:   •  cycloSPORINE (RESTASIS) 0.05 % ophthalmic emulsion, 1 drop 2 (Two) Times a Day., Disp: , Rfl:   •  ferrous sulfate 325 (65 FE) MG tablet, Take 325 mg by mouth 2 (Two) Times a Day Before Meals., Disp: , Rfl:   •  hydrocortisone 2.5 % ointment, APPLY TO RASH ON NECK AND CHEST TWICE DAILY AS NEEDED, Disp: , Rfl:   •  hydrOXYzine (ATARAX) 10 MG tablet, Take 1 tablet by mouth Every 8 (Eight) Hours As Needed for Anxiety (take 1-2 tablets)., Disp: 20 tablet, Rfl: 0  •  ketoconazole (NIZORAL) 2 % cream, Apply  topically to the appropriate area as directed 2 (Two) Times a Day. to affected area, Disp: , Rfl:   •  levETIRAcetam (KEPPRA) 500 MG tablet, Take 500 mg by mouth 2 (Two) Times a Day., Disp: , Rfl:   •  levothyroxine (SYNTHROID, LEVOTHROID) 50 MCG tablet, Take 50 mcg by mouth Daily., Disp: , Rfl:   •  predniSONE (DELTASONE) 10 MG tablet, TAKE 4 TABLETS DAILY  FOR 4 DAYS THEN 3 DAILY FOR 4 DAYS THEN 2 DAILY FOR 4 DAYS THEN 1 DAILY FOR 4 DAYS, Disp: , Rfl:   •  Probiotic Product (ALIGN PO), Take 1 capsule by mouth Daily With Lunch., Disp: , Rfl:   •  Prucalopride Succinate (Motegrity) 1 MG tablet, Take 1 mg by mouth., Disp: , Rfl:   •  rosuvastatin (CRESTOR) 20 MG tablet, Take 20 mg by mouth Every Night., Disp: , Rfl:   •  SODIUM CHLORIDE PO, Take  by mouth 3 (Three) Times a Day., Disp: , Rfl:   •  vitamin B-12 (CYANOCOBALAMIN) 100 MCG tablet, Take 50 mcg by mouth Daily., Disp: , Rfl:   •  zolpidem (AMBIEN) 5 MG tablet, Take 5 mg by mouth every night at bedtime., Disp: , Rfl:   •  acetaminophen (TYLENOL) 325 MG tablet, Take 2 tablets by mouth Every 6 (Six) Hours As Needed for Mild Pain ., Disp: , Rfl:   •  erythromycin (ROMYCIN) 5 MG/GM ophthalmic ointment, 2 (two) times a day., Disp: , Rfl:   •  fluconazole (DIFLUCAN) 200 MG tablet, Take 200 mg by mouth Daily., Disp: , Rfl:   •  Lidocaine-Hydrocortisone Ace 3-2.5 % kit, , Disp: , Rfl:      No Known Allergies     Social History     Socioeconomic History   • Marital status:    Tobacco Use   • Smoking status: Former Smoker   • Smokeless tobacco: Never Used   Vaping Use   • Vaping Use: Never used   Substance and Sexual Activity   • Alcohol use: Yes     Alcohol/week: 8.0 standard drinks     Types: 4 Glasses of wine, 4 Shots of liquor per week   • Drug use: No   • Sexual activity: Defer        Family History   Problem Relation Age of Onset   • Brain cancer Mother    • Stroke Father         Review of Systems   Constitutional: Negative for chills and fever.   HENT: Negative for ear pain and tinnitus.    Eyes: Negative for pain and visual disturbance.   Respiratory: Positive for shortness of breath. Negative for cough.    Cardiovascular: Positive for chest pain and palpitations.   Gastrointestinal: Negative for abdominal pain and vomiting.   Genitourinary: Negative for difficulty urinating and enuresis.   Musculoskeletal:  "Positive for gait problem (off balance).   Skin: Positive for rash.   Neurological: Negative for dizziness, tremors, seizures, syncope, speech difficulty, light-headedness, numbness and headaches.   Psychiatric/Behavioral: Positive for decreased concentration. Negative for confusion.       Objective     Vitals:    22 1103   BP: 118/76   Pulse: 120   Resp: 16   Temp: 96.2 °F (35.7 °C)   SpO2: 99%   Weight: 54.4 kg (120 lb)   Height: 152.4 cm (60\")     Body mass index is 23.44 kg/m².      Physical Exam  Neurologic Exam  Awake, alert, oriented x3  Pupils equal round reactive to light  Extraocular muscles intact  Face symmetric  Speech is fluent and clear  No pronator drift  Motor exam  Bilateral deltoids 5/5, bilateral biceps 5/5, bilateral triceps 5/5, bilateral wrist extension 5/5 bilateral hand  5/5  Bilateral hip flexion 5/5, bilateral knee extension 5/5, bilateral DF/PF 5/5  No clonus  No Vianney's reflex  Steady normal gait  Able to walk heel-to-toe without difficulty  Able to detect  light touch in all 4 extremities    Assessment & Plan   Independent Review of Radiographic Studies:      I personally reviewed the images from the following studies.  MRI brain with and without contrast from 2022, CTA head and neck from May 7, 2022  The MRI brain shows a very small extra-axial lesion over the left parietal/frontal lobe consistent with a 3 x 7 mm meningioma there is no contact with the brain.  There is no mass-effect on the brain.  The CTA images show no evidence of large vessel occlusion.    Medical Decision Makin-year-old female with a incidentally found tiny left-sided meningioma  -I reviewed the MRI images and there is a small extra-axial lesion most likely representing a meningioma.  I have recommended a follow-up in 3 to 4 months with repeat MRI to confirm the stability of the lesion.  -She is requesting Xanax from a today for severe anxiety.  I am concerned that her mood " instability/anxiety could have several causes.  I have asked her to call her primary care provider today.  If she is not able to get a hold of her primary care provider I recommended that she go to urgent care.  She has severe hyponatremia, she is on a steroid Dosepak, she was started on a new antiepileptic medication, she has a UTI and is still symptomatic, she has a slightly elevated free T4.  All of these could factor into her emotional lability and anxiety.  I recommended that she follow-up with her PCP or neurologist for further evaluation.  -The meningioma is very small at this time and would not be expected to cause any symptoms.    Diagnoses and all orders for this visit:    1. Meningioma (HCC) (Primary)  -     MRI Brain With & Without Contrast; Future      Return in about 3 months (around 9/27/2022).

## 2022-06-27 ENCOUNTER — OFFICE VISIT (OUTPATIENT)
Dept: NEUROSURGERY | Facility: CLINIC | Age: 82
End: 2022-06-27

## 2022-06-27 VITALS
HEART RATE: 120 BPM | TEMPERATURE: 96.2 F | WEIGHT: 120 LBS | DIASTOLIC BLOOD PRESSURE: 76 MMHG | BODY MASS INDEX: 23.56 KG/M2 | OXYGEN SATURATION: 99 % | HEIGHT: 60 IN | RESPIRATION RATE: 16 BRPM | SYSTOLIC BLOOD PRESSURE: 118 MMHG

## 2022-06-27 DIAGNOSIS — D32.9 MENINGIOMA: Primary | ICD-10-CM

## 2022-06-27 PROCEDURE — 99204 OFFICE O/P NEW MOD 45 MIN: CPT | Performed by: NEUROLOGICAL SURGERY

## 2022-06-27 RX ORDER — LEVETIRACETAM 500 MG/1
500 TABLET ORAL 2 TIMES DAILY
COMMUNITY
End: 2022-07-07 | Stop reason: SDUPTHER

## 2022-06-27 RX ORDER — BUSPIRONE HYDROCHLORIDE 5 MG/1
5 TABLET ORAL 3 TIMES DAILY
COMMUNITY
End: 2022-06-28 | Stop reason: ALTCHOICE

## 2022-06-28 ENCOUNTER — OFFICE VISIT (OUTPATIENT)
Dept: FAMILY MEDICINE CLINIC | Facility: CLINIC | Age: 82
End: 2022-06-28

## 2022-06-28 VITALS
TEMPERATURE: 97.1 F | HEART RATE: 109 BPM | DIASTOLIC BLOOD PRESSURE: 80 MMHG | OXYGEN SATURATION: 99 % | RESPIRATION RATE: 15 BRPM | SYSTOLIC BLOOD PRESSURE: 142 MMHG | BODY MASS INDEX: 23.52 KG/M2 | WEIGHT: 119.8 LBS | HEIGHT: 60 IN

## 2022-06-28 DIAGNOSIS — I10 ESSENTIAL HYPERTENSION, BENIGN: ICD-10-CM

## 2022-06-28 DIAGNOSIS — E55.9 VITAMIN D DEFICIENCY, UNSPECIFIED: ICD-10-CM

## 2022-06-28 DIAGNOSIS — E87.1 HYPONATREMIA: ICD-10-CM

## 2022-06-28 DIAGNOSIS — E03.9 HYPOTHYROIDISM, UNSPECIFIED TYPE: ICD-10-CM

## 2022-06-28 DIAGNOSIS — F41.9 ANXIETY: Primary | ICD-10-CM

## 2022-06-28 PROBLEM — R56.9 SEIZURES (HCC): Status: ACTIVE | Noted: 2022-06-28

## 2022-06-28 PROBLEM — M19.90 ARTHRITIS: Status: ACTIVE | Noted: 2022-06-28

## 2022-06-28 PROBLEM — D32.9 MENINGIOMA: Status: ACTIVE | Noted: 2022-06-28

## 2022-06-28 PROBLEM — K57.92 DIVERTICULITIS: Status: ACTIVE | Noted: 2022-06-28

## 2022-06-28 PROBLEM — K21.9 GERD (GASTROESOPHAGEAL REFLUX DISEASE): Status: ACTIVE | Noted: 2022-06-28

## 2022-06-28 PROBLEM — K44.9 HERNIA, HIATAL: Status: ACTIVE | Noted: 2022-06-28

## 2022-06-28 PROBLEM — K59.9 BOWEL DYSFUNCTION: Status: ACTIVE | Noted: 2021-08-01

## 2022-06-28 LAB
25(OH)D3 SERPL-MCNC: 43.7 NG/ML (ref 30–100)
ALBUMIN SERPL-MCNC: 4.9 G/DL (ref 3.5–5.2)
ALBUMIN/GLOB SERPL: 2.1 G/DL
ALP SERPL-CCNC: 73 U/L (ref 39–117)
ALT SERPL W P-5'-P-CCNC: 18 U/L (ref 1–33)
ANION GAP SERPL CALCULATED.3IONS-SCNC: 13 MMOL/L (ref 5–15)
AST SERPL-CCNC: 14 U/L (ref 1–32)
BILIRUB SERPL-MCNC: 2.5 MG/DL (ref 0–1.2)
BUN SERPL-MCNC: 11 MG/DL (ref 8–23)
BUN/CREAT SERPL: 16.7 (ref 7–25)
CALCIUM SPEC-SCNC: 10.2 MG/DL (ref 8.6–10.5)
CHLORIDE SERPL-SCNC: 85 MMOL/L (ref 98–107)
CHOLEST SERPL-MCNC: 204 MG/DL (ref 0–200)
CO2 SERPL-SCNC: 25 MMOL/L (ref 22–29)
CREAT SERPL-MCNC: 0.66 MG/DL (ref 0.57–1)
DEPRECATED RDW RBC AUTO: 39 FL (ref 37–54)
EGFRCR SERPLBLD CKD-EPI 2021: 87.7 ML/MIN/1.73
ERYTHROCYTE [DISTWIDTH] IN BLOOD BY AUTOMATED COUNT: 11.7 % (ref 12.3–15.4)
GLOBULIN UR ELPH-MCNC: 2.3 GM/DL
GLUCOSE SERPL-MCNC: 105 MG/DL (ref 65–99)
HCT VFR BLD AUTO: 38.9 % (ref 34–46.6)
HDLC SERPL-MCNC: 121 MG/DL (ref 40–60)
HGB BLD-MCNC: 13.5 G/DL (ref 12–15.9)
LDLC SERPL CALC-MCNC: 71 MG/DL (ref 0–100)
LDLC/HDLC SERPL: 0.58 {RATIO}
MCH RBC QN AUTO: 32.1 PG (ref 26.6–33)
MCHC RBC AUTO-ENTMCNC: 34.7 G/DL (ref 31.5–35.7)
MCV RBC AUTO: 92.6 FL (ref 79–97)
OSMOLALITY SERPL: 264 MOSM/KG (ref 280–301)
OSMOLALITY UR: 464 MOSM/KG
PLATELET # BLD AUTO: 459 10*3/MM3 (ref 140–450)
PMV BLD AUTO: 8.5 FL (ref 6–12)
POTASSIUM SERPL-SCNC: 3.9 MMOL/L (ref 3.5–5.2)
PROT SERPL-MCNC: 7.2 G/DL (ref 6–8.5)
RBC # BLD AUTO: 4.2 10*6/MM3 (ref 3.77–5.28)
SODIUM SERPL-SCNC: 123 MMOL/L (ref 136–145)
T-UPTAKE NFR SERPL: 0.91 TBI (ref 0.8–1.3)
T4 SERPL-MCNC: 9.01 MCG/DL (ref 4.5–11.7)
TRIGL SERPL-MCNC: 66 MG/DL (ref 0–150)
TSH SERPL DL<=0.05 MIU/L-ACNC: 0.42 UIU/ML (ref 0.27–4.2)
VLDLC SERPL-MCNC: 12 MG/DL (ref 5–40)
WBC NRBC COR # BLD: 15.77 10*3/MM3 (ref 3.4–10.8)

## 2022-06-28 PROCEDURE — 80053 COMPREHEN METABOLIC PANEL: CPT | Performed by: INTERNAL MEDICINE

## 2022-06-28 PROCEDURE — 80061 LIPID PANEL: CPT | Performed by: INTERNAL MEDICINE

## 2022-06-28 PROCEDURE — 83935 ASSAY OF URINE OSMOLALITY: CPT | Performed by: INTERNAL MEDICINE

## 2022-06-28 PROCEDURE — 84479 ASSAY OF THYROID (T3 OR T4): CPT | Performed by: INTERNAL MEDICINE

## 2022-06-28 PROCEDURE — 84443 ASSAY THYROID STIM HORMONE: CPT | Performed by: INTERNAL MEDICINE

## 2022-06-28 PROCEDURE — 85027 COMPLETE CBC AUTOMATED: CPT | Performed by: INTERNAL MEDICINE

## 2022-06-28 PROCEDURE — 82306 VITAMIN D 25 HYDROXY: CPT | Performed by: INTERNAL MEDICINE

## 2022-06-28 PROCEDURE — 83930 ASSAY OF BLOOD OSMOLALITY: CPT | Performed by: INTERNAL MEDICINE

## 2022-06-28 PROCEDURE — 36415 COLL VENOUS BLD VENIPUNCTURE: CPT | Performed by: INTERNAL MEDICINE

## 2022-06-28 PROCEDURE — 84436 ASSAY OF TOTAL THYROXINE: CPT | Performed by: INTERNAL MEDICINE

## 2022-06-28 PROCEDURE — 99204 OFFICE O/P NEW MOD 45 MIN: CPT | Performed by: INTERNAL MEDICINE

## 2022-06-28 RX ORDER — FLUCONAZOLE 100 MG/1
100 TABLET ORAL ONCE
Qty: 14 TABLET | Refills: 3 | Status: SHIPPED | OUTPATIENT
Start: 2022-06-28 | End: 2022-06-28

## 2022-06-28 RX ORDER — ALPRAZOLAM 0.25 MG/1
0.25 TABLET ORAL 2 TIMES DAILY
Qty: 60 TABLET | Refills: 3
Start: 2022-06-28 | End: 2022-07-12 | Stop reason: SDUPTHER

## 2022-06-28 RX ORDER — CLOTRIMAZOLE AND BETAMETHASONE DIPROPIONATE 10; .64 MG/G; MG/G
CREAM TOPICAL SEE ADMIN INSTRUCTIONS
Qty: 45 G | Refills: 3 | Status: SHIPPED | OUTPATIENT
Start: 2022-06-28 | End: 2022-08-16

## 2022-06-28 NOTE — PROGRESS NOTES
Soo Martino is a 82 y.o. female.     Vitals:    06/28/22 1411   BP: 142/80   Pulse: 109   Resp: 15   Temp: 97.1 °F (36.2 °C)   SpO2: 99%      Body mass index is 23.4 kg/m².     HPI  Current outpatient and discharge medications have been reconciled for the patient.  Reviewed by: Gaurav Barger MD  Patient was seen for hospital follow-up for anxiety.  Patient been having severe anxiety and agitation.  Patient has been taking BuSpar 5 mg 3 times daily, hydroxyzine 10 mg 3 times daily.  Patient was changed to Zoloft 50 mg daily and DC BuSpar.  Patient was also placed on Xanax 0.25 mg twice daily.  Patient wants to continue Ambien 10 mg nightly.  Patient was warned that these could cause her to fall and break a hip.  Patient was instructed to use her walker at bedtime.  Patient was also taking levothyroxine and could possibly be getting too much.  Labs were drawn today and results are pending.  Patient is also hyponatremic with a sodium of 126.  Patient is having labs to check for SIADH.  Patient was advised to fluid restrict until lab results.  Patient's blood sugars been running 140/90 with a pulse rate in the 100s.  Patient is felt to have overactive thyroid.  Medication will probably have to be reduced.    Dictated utilizing Dragon dictation. If there are questions or for further clarification, please contact me.    The following portions of the patient's history were reviewed and updated as appropriate: allergies, current medications, past family history, past medical history, past social history, past surgical history and problem list.    Review of Systems   Constitutional: Negative for fatigue and fever.   HENT: Positive for congestion. Negative for trouble swallowing.    Eyes: Negative for discharge and visual disturbance.   Respiratory: Negative for choking and shortness of breath.    Cardiovascular: Negative for chest pain and palpitations.   Gastrointestinal: Negative for abdominal pain  and blood in stool.   Endocrine: Negative.    Genitourinary: Negative for genital sores and hematuria.   Musculoskeletal: Negative for gait problem and joint swelling.   Skin: Negative for color change, pallor, rash and wound.   Allergic/Immunologic: Positive for environmental allergies. Negative for immunocompromised state.   Neurological: Negative for facial asymmetry and speech difficulty.   Psychiatric/Behavioral: Positive for dysphoric mood. Negative for hallucinations and suicidal ideas. The patient is nervous/anxious.        Objective   Physical Exam  Vitals and nursing note reviewed.   Constitutional:       Appearance: Normal appearance. She is well-developed.   HENT:      Head: Normocephalic and atraumatic.      Nose: Nose normal.      Mouth/Throat:      Mouth: Mucous membranes are moist.      Pharynx: Oropharynx is clear.   Eyes:      Extraocular Movements: Extraocular movements intact.      Conjunctiva/sclera: Conjunctivae normal.      Pupils: Pupils are equal, round, and reactive to light.   Cardiovascular:      Rate and Rhythm: Normal rate and regular rhythm.      Heart sounds: Normal heart sounds. No murmur heard.    No friction rub. No gallop.   Pulmonary:      Effort: Pulmonary effort is normal. No respiratory distress.      Breath sounds: Normal breath sounds. No stridor. No wheezing, rhonchi or rales.   Chest:      Chest wall: No tenderness.   Abdominal:      General: Bowel sounds are normal.      Palpations: Abdomen is soft.   Musculoskeletal:         General: Normal range of motion.      Cervical back: Normal range of motion and neck supple.   Skin:     General: Skin is warm and dry.   Neurological:      General: No focal deficit present.      Mental Status: She is alert and oriented to person, place, and time. Mental status is at baseline.   Psychiatric:         Behavior: Behavior normal.         Thought Content: Thought content normal.         Judgment: Judgment normal.      Comments: Severe  anxiety         Assessment & Plan #1 monitor blood pressure pulse #2 DC BuSpar, Zoloft 50 mg daily number three 0.25 Xanax twice daily, hydroxyzine 10 mg 3 times daily if needed (advised her to stop) #4 labs #5 follow-up in a month 5 list of counselors  Problems Addressed this Visit        Cardiac and Vasculature    Essential hypertension, benign       Endocrine and Metabolic    Hypothyroidism    Relevant Orders    CBC (No Diff)    Comprehensive Metabolic Panel    Lipid Panel    Vitamin D 25 Hydroxy    Thyroid Panel With TSH    Osmolality, Serum    Osmolality, Urine - Urine, Clean Catch       Genitourinary and Reproductive     Hyponatremia    Relevant Orders    CBC (No Diff)    Comprehensive Metabolic Panel    Lipid Panel    Vitamin D 25 Hydroxy    Thyroid Panel With TSH    Osmolality, Serum    Osmolality, Urine - Urine, Clean Catch       Mental Health    Anxiety - Primary    Relevant Medications    ALPRAZolam (XANAX) 0.25 MG tablet    Other Relevant Orders    CBC (No Diff)    Comprehensive Metabolic Panel    Lipid Panel    Vitamin D 25 Hydroxy    Thyroid Panel With TSH    Osmolality, Serum    Osmolality, Urine - Urine, Clean Catch      Other Visit Diagnoses     Vitamin D deficiency, unspecified         Relevant Orders    Vitamin D 25 Hydroxy      Diagnoses     Diagnosis Codes Comments    Anxiety    -  Primary ICD-10-CM: F41.9  ICD-9-CM: 300.00     Hypothyroidism, unspecified type     ICD-10-CM: E03.9  ICD-9-CM: 244.9     Hyponatremia     ICD-10-CM: E87.1  ICD-9-CM: 276.1     Vitamin D deficiency, unspecified      ICD-10-CM: E55.9  ICD-9-CM: 268.9     Essential hypertension, benign     ICD-10-CM: I10  ICD-9-CM: 401.1

## 2022-06-29 ENCOUNTER — TELEPHONE (OUTPATIENT)
Dept: FAMILY MEDICINE CLINIC | Facility: CLINIC | Age: 82
End: 2022-06-29

## 2022-06-29 DIAGNOSIS — R53.1 WEAKNESS: ICD-10-CM

## 2022-06-29 DIAGNOSIS — D72.829 LEUKOCYTOSIS, UNSPECIFIED TYPE: Primary | ICD-10-CM

## 2022-06-29 NOTE — TELEPHONE ENCOUNTER
Marielle Regan () [phone] (H)         Caller: Marielle Regan    Relationship: Emergency Contact        What test was performed: URINE / LABS    When was the test performed: *YESTERDAY     Where was the test performed:  OFFICE    Additional notes: WANTED TO KNOW INFORMATION ABOUT LAB RESULTS AND IF MEDICATIONS NEEDED TO BE CHANGED

## 2022-06-29 NOTE — TELEPHONE ENCOUNTER
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  Try not to give her more than 6 ounces a day of fluids.  Her white count was 15,000.  If she could have a bladder infection I would recommend come in and get tested.  Her yeast infections can cause the high white count.  It needs to be rechecked when her treatment for yeast is finished.  UA C&S and CBC have been ordered if she wants to do them but did not do the CBC until done with her yeast treatment

## 2022-06-30 ENCOUNTER — HOSPITAL ENCOUNTER (INPATIENT)
Facility: HOSPITAL | Age: 82
LOS: 6 days | Discharge: HOME HEALTH | End: 2022-07-06
Attending: EMERGENCY MEDICINE | Admitting: INTERNAL MEDICINE

## 2022-06-30 ENCOUNTER — APPOINTMENT (OUTPATIENT)
Dept: GENERAL RADIOLOGY | Facility: HOSPITAL | Age: 82
End: 2022-06-30

## 2022-06-30 ENCOUNTER — TELEPHONE (OUTPATIENT)
Dept: FAMILY MEDICINE CLINIC | Facility: CLINIC | Age: 82
End: 2022-06-30

## 2022-06-30 DIAGNOSIS — D72.829 LEUKOCYTOSIS, UNSPECIFIED TYPE: ICD-10-CM

## 2022-06-30 DIAGNOSIS — M25.572 ACUTE LEFT ANKLE PAIN: Primary | ICD-10-CM

## 2022-06-30 DIAGNOSIS — S82.832A CLOSED FRACTURE OF DISTAL END OF LEFT FIBULA, UNSPECIFIED FRACTURE MORPHOLOGY, INITIAL ENCOUNTER: Primary | ICD-10-CM

## 2022-06-30 DIAGNOSIS — R29.6 MULTIPLE FALLS: ICD-10-CM

## 2022-06-30 DIAGNOSIS — R07.9 CHEST PAIN, UNSPECIFIED TYPE: ICD-10-CM

## 2022-06-30 DIAGNOSIS — E87.1 HYPONATREMIA: ICD-10-CM

## 2022-06-30 LAB
ALBUMIN SERPL-MCNC: 4.6 G/DL (ref 3.5–5.2)
ALBUMIN/GLOB SERPL: 1.7 G/DL
ALP SERPL-CCNC: 69 U/L (ref 39–117)
ALT SERPL W P-5'-P-CCNC: 23 U/L (ref 1–33)
ANION GAP SERPL CALCULATED.3IONS-SCNC: 10.4 MMOL/L (ref 5–15)
AST SERPL-CCNC: 20 U/L (ref 1–32)
BASOPHILS # BLD AUTO: 0.01 10*3/MM3 (ref 0–0.2)
BASOPHILS NFR BLD AUTO: 0.1 % (ref 0–1.5)
BILIRUB SERPL-MCNC: 2.7 MG/DL (ref 0–1.2)
BILIRUB UR QL STRIP: NEGATIVE
BUN SERPL-MCNC: 11 MG/DL (ref 8–23)
BUN/CREAT SERPL: 18.3 (ref 7–25)
CALCIUM SPEC-SCNC: 8.9 MG/DL (ref 8.6–10.5)
CHLORIDE SERPL-SCNC: 83 MMOL/L (ref 98–107)
CLARITY UR: CLEAR
CO2 SERPL-SCNC: 23.6 MMOL/L (ref 22–29)
COLOR UR: YELLOW
CREAT SERPL-MCNC: 0.6 MG/DL (ref 0.57–1)
DEPRECATED RDW RBC AUTO: 38.8 FL (ref 37–54)
EGFRCR SERPLBLD CKD-EPI 2021: 89.7 ML/MIN/1.73
EOSINOPHIL # BLD AUTO: 0 10*3/MM3 (ref 0–0.4)
EOSINOPHIL NFR BLD AUTO: 0 % (ref 0.3–6.2)
ERYTHROCYTE [DISTWIDTH] IN BLOOD BY AUTOMATED COUNT: 11.8 % (ref 12.3–15.4)
GLOBULIN UR ELPH-MCNC: 2.7 GM/DL
GLUCOSE SERPL-MCNC: 123 MG/DL (ref 65–99)
GLUCOSE UR STRIP-MCNC: NEGATIVE MG/DL
HCT VFR BLD AUTO: 36.1 % (ref 34–46.6)
HGB BLD-MCNC: 12.6 G/DL (ref 12–15.9)
HGB UR QL STRIP.AUTO: NEGATIVE
IMM GRANULOCYTES # BLD AUTO: 0.09 10*3/MM3 (ref 0–0.05)
IMM GRANULOCYTES NFR BLD AUTO: 0.5 % (ref 0–0.5)
KETONES UR QL STRIP: NEGATIVE
LEUKOCYTE ESTERASE UR QL STRIP.AUTO: NEGATIVE
LIPASE SERPL-CCNC: 44 U/L (ref 13–60)
LYMPHOCYTES # BLD AUTO: 0.3 10*3/MM3 (ref 0.7–3.1)
LYMPHOCYTES NFR BLD AUTO: 1.8 % (ref 19.6–45.3)
MCH RBC QN AUTO: 31.6 PG (ref 26.6–33)
MCHC RBC AUTO-ENTMCNC: 34.9 G/DL (ref 31.5–35.7)
MCV RBC AUTO: 90.5 FL (ref 79–97)
MONOCYTES # BLD AUTO: 0.56 10*3/MM3 (ref 0.1–0.9)
MONOCYTES NFR BLD AUTO: 3.4 % (ref 5–12)
NEUTROPHILS NFR BLD AUTO: 15.61 10*3/MM3 (ref 1.7–7)
NEUTROPHILS NFR BLD AUTO: 94.2 % (ref 42.7–76)
NITRITE UR QL STRIP: NEGATIVE
NRBC BLD AUTO-RTO: 0 /100 WBC (ref 0–0.2)
OSMOLALITY UR: 272 MOSM/KG
PH UR STRIP.AUTO: 6.5 [PH] (ref 5–8)
PLATELET # BLD AUTO: 345 10*3/MM3 (ref 140–450)
PMV BLD AUTO: 8 FL (ref 6–12)
POTASSIUM SERPL-SCNC: 3.7 MMOL/L (ref 3.5–5.2)
PROT SERPL-MCNC: 7.3 G/DL (ref 6–8.5)
PROT UR QL STRIP: NEGATIVE
QT INTERVAL: 366 MS
RBC # BLD AUTO: 3.99 10*6/MM3 (ref 3.77–5.28)
SARS-COV-2 ORF1AB RESP QL NAA+PROBE: NOT DETECTED
SODIUM SERPL-SCNC: 117 MMOL/L (ref 136–145)
SODIUM SERPL-SCNC: 118 MMOL/L (ref 136–145)
SODIUM UR-SCNC: 77 MMOL/L
SP GR UR STRIP: 1.02 (ref 1–1.03)
TROPONIN T SERPL-MCNC: <0.01 NG/ML (ref 0–0.03)
TROPONIN T SERPL-MCNC: <0.01 NG/ML (ref 0–0.03)
UROBILINOGEN UR QL STRIP: NORMAL
WBC NRBC COR # BLD: 16.57 10*3/MM3 (ref 3.4–10.8)

## 2022-06-30 PROCEDURE — 83690 ASSAY OF LIPASE: CPT | Performed by: EMERGENCY MEDICINE

## 2022-06-30 PROCEDURE — 73610 X-RAY EXAM OF ANKLE: CPT

## 2022-06-30 PROCEDURE — 93010 ELECTROCARDIOGRAM REPORT: CPT | Performed by: INTERNAL MEDICINE

## 2022-06-30 PROCEDURE — 99284 EMERGENCY DEPT VISIT MOD MDM: CPT

## 2022-06-30 PROCEDURE — 83935 ASSAY OF URINE OSMOLALITY: CPT | Performed by: INTERNAL MEDICINE

## 2022-06-30 PROCEDURE — 84484 ASSAY OF TROPONIN QUANT: CPT | Performed by: EMERGENCY MEDICINE

## 2022-06-30 PROCEDURE — 80053 COMPREHEN METABOLIC PANEL: CPT | Performed by: EMERGENCY MEDICINE

## 2022-06-30 PROCEDURE — 25010000002 ONDANSETRON PER 1 MG: Performed by: EMERGENCY MEDICINE

## 2022-06-30 PROCEDURE — U0004 COV-19 TEST NON-CDC HGH THRU: HCPCS | Performed by: EMERGENCY MEDICINE

## 2022-06-30 PROCEDURE — 85025 COMPLETE CBC W/AUTO DIFF WBC: CPT | Performed by: EMERGENCY MEDICINE

## 2022-06-30 PROCEDURE — 81003 URINALYSIS AUTO W/O SCOPE: CPT | Performed by: EMERGENCY MEDICINE

## 2022-06-30 PROCEDURE — 84295 ASSAY OF SERUM SODIUM: CPT | Performed by: INTERNAL MEDICINE

## 2022-06-30 PROCEDURE — 71045 X-RAY EXAM CHEST 1 VIEW: CPT

## 2022-06-30 PROCEDURE — 25010000002 ENOXAPARIN PER 10 MG: Performed by: INTERNAL MEDICINE

## 2022-06-30 PROCEDURE — 84300 ASSAY OF URINE SODIUM: CPT | Performed by: INTERNAL MEDICINE

## 2022-06-30 PROCEDURE — 93005 ELECTROCARDIOGRAM TRACING: CPT | Performed by: EMERGENCY MEDICINE

## 2022-06-30 PROCEDURE — 36415 COLL VENOUS BLD VENIPUNCTURE: CPT

## 2022-06-30 RX ORDER — SODIUM CHLORIDE 1000 MG
1 TABLET, SOLUBLE MISCELLANEOUS 3 TIMES DAILY
Status: DISCONTINUED | OUTPATIENT
Start: 2022-06-30 | End: 2022-07-01

## 2022-06-30 RX ORDER — L.ACID,PARA/B.BIFIDUM/S.THERM 8B CELL
1 CAPSULE ORAL
Status: DISCONTINUED | OUTPATIENT
Start: 2022-07-01 | End: 2022-07-06 | Stop reason: HOSPADM

## 2022-06-30 RX ORDER — SODIUM CHLORIDE 0.9 % (FLUSH) 0.9 %
10 SYRINGE (ML) INJECTION AS NEEDED
Status: DISCONTINUED | OUTPATIENT
Start: 2022-06-30 | End: 2022-07-06 | Stop reason: HOSPADM

## 2022-06-30 RX ORDER — ENOXAPARIN SODIUM 100 MG/ML
40 INJECTION SUBCUTANEOUS EVERY 24 HOURS
Status: DISCONTINUED | OUTPATIENT
Start: 2022-06-30 | End: 2022-07-06 | Stop reason: HOSPADM

## 2022-06-30 RX ORDER — HYDROCODONE BITARTRATE AND ACETAMINOPHEN 5; 325 MG/1; MG/1
1 TABLET ORAL EVERY 6 HOURS PRN
Status: DISCONTINUED | OUTPATIENT
Start: 2022-06-30 | End: 2022-07-06 | Stop reason: HOSPADM

## 2022-06-30 RX ORDER — LISINOPRIL 20 MG/1
20 TABLET ORAL
Status: DISCONTINUED | OUTPATIENT
Start: 2022-06-30 | End: 2022-07-01

## 2022-06-30 RX ORDER — FERROUS SULFATE 325(65) MG
325 TABLET ORAL
Status: DISCONTINUED | OUTPATIENT
Start: 2022-06-30 | End: 2022-07-06 | Stop reason: HOSPADM

## 2022-06-30 RX ORDER — MELATONIN
2000
Status: DISCONTINUED | OUTPATIENT
Start: 2022-06-30 | End: 2022-07-06 | Stop reason: HOSPADM

## 2022-06-30 RX ORDER — NITROFURANTOIN 25; 75 MG/1; MG/1
100 CAPSULE ORAL EVERY 12 HOURS SCHEDULED
Status: DISCONTINUED | OUTPATIENT
Start: 2022-06-30 | End: 2022-07-01

## 2022-06-30 RX ORDER — FLUCONAZOLE 100 MG/1
100 TABLET ORAL EVERY 24 HOURS
Status: DISCONTINUED | OUTPATIENT
Start: 2022-07-01 | End: 2022-07-06 | Stop reason: HOSPADM

## 2022-06-30 RX ORDER — ONDANSETRON 2 MG/ML
4 INJECTION INTRAMUSCULAR; INTRAVENOUS ONCE
Status: COMPLETED | OUTPATIENT
Start: 2022-06-30 | End: 2022-06-30

## 2022-06-30 RX ORDER — ZOLPIDEM TARTRATE 5 MG/1
5 TABLET ORAL NIGHTLY PRN
Status: DISCONTINUED | OUTPATIENT
Start: 2022-06-30 | End: 2022-07-06 | Stop reason: HOSPADM

## 2022-06-30 RX ORDER — UREA 10 %
3 LOTION (ML) TOPICAL NIGHTLY PRN
Status: DISCONTINUED | OUTPATIENT
Start: 2022-06-30 | End: 2022-07-06 | Stop reason: HOSPADM

## 2022-06-30 RX ORDER — CYCLOSPORINE 0.5 MG/ML
1 EMULSION OPHTHALMIC 2 TIMES DAILY
Status: DISCONTINUED | OUTPATIENT
Start: 2022-06-30 | End: 2022-07-06 | Stop reason: HOSPADM

## 2022-06-30 RX ORDER — ACETAMINOPHEN 325 MG/1
650 TABLET ORAL EVERY 4 HOURS PRN
Status: DISCONTINUED | OUTPATIENT
Start: 2022-06-30 | End: 2022-07-06 | Stop reason: HOSPADM

## 2022-06-30 RX ORDER — PREDNISONE 10 MG/1
10 TABLET ORAL
Status: DISCONTINUED | OUTPATIENT
Start: 2022-07-01 | End: 2022-07-01

## 2022-06-30 RX ORDER — LEVOTHYROXINE SODIUM 0.05 MG/1
50 TABLET ORAL DAILY
Status: DISCONTINUED | OUTPATIENT
Start: 2022-06-30 | End: 2022-07-06 | Stop reason: HOSPADM

## 2022-06-30 RX ORDER — LEVETIRACETAM 500 MG/1
500 TABLET ORAL 2 TIMES DAILY
Status: DISCONTINUED | OUTPATIENT
Start: 2022-06-30 | End: 2022-07-06 | Stop reason: HOSPADM

## 2022-06-30 RX ORDER — ALPRAZOLAM 0.25 MG/1
0.25 TABLET ORAL 2 TIMES DAILY
Status: DISCONTINUED | OUTPATIENT
Start: 2022-06-30 | End: 2022-07-04

## 2022-06-30 RX ORDER — NITROGLYCERIN 0.4 MG/1
0.4 TABLET SUBLINGUAL
Status: DISCONTINUED | OUTPATIENT
Start: 2022-06-30 | End: 2022-07-06 | Stop reason: HOSPADM

## 2022-06-30 RX ORDER — ROSUVASTATIN CALCIUM 20 MG/1
20 TABLET, COATED ORAL NIGHTLY
Status: DISCONTINUED | OUTPATIENT
Start: 2022-06-30 | End: 2022-07-06 | Stop reason: HOSPADM

## 2022-06-30 RX ORDER — UBIDECARENONE 75 MG
50 CAPSULE ORAL DAILY
Status: DISCONTINUED | OUTPATIENT
Start: 2022-06-30 | End: 2022-07-06 | Stop reason: HOSPADM

## 2022-06-30 RX ORDER — ONDANSETRON 2 MG/ML
4 INJECTION INTRAMUSCULAR; INTRAVENOUS EVERY 6 HOURS PRN
Status: DISCONTINUED | OUTPATIENT
Start: 2022-06-30 | End: 2022-07-06 | Stop reason: HOSPADM

## 2022-06-30 RX ORDER — HYDROXYZINE HYDROCHLORIDE 10 MG/1
10 TABLET, FILM COATED ORAL EVERY 8 HOURS PRN
Status: DISCONTINUED | OUTPATIENT
Start: 2022-06-30 | End: 2022-07-04

## 2022-06-30 RX ORDER — ONDANSETRON 4 MG/1
4 TABLET, FILM COATED ORAL EVERY 6 HOURS PRN
Status: DISCONTINUED | OUTPATIENT
Start: 2022-06-30 | End: 2022-07-06 | Stop reason: HOSPADM

## 2022-06-30 RX ADMIN — ENOXAPARIN SODIUM 40 MG: 100 INJECTION SUBCUTANEOUS at 23:27

## 2022-06-30 RX ADMIN — NITROFURANTOIN MONOHYDRATE/MACROCRYSTALLINE 100 MG: 25; 75 CAPSULE ORAL at 23:27

## 2022-06-30 RX ADMIN — Medication 50 MCG: at 23:26

## 2022-06-30 RX ADMIN — ALPRAZOLAM 0.25 MG: 0.25 TABLET ORAL at 21:16

## 2022-06-30 RX ADMIN — ONDANSETRON 4 MG: 2 INJECTION INTRAMUSCULAR; INTRAVENOUS at 16:20

## 2022-06-30 RX ADMIN — SODIUM CHLORIDE 500 ML: 9 INJECTION, SOLUTION INTRAVENOUS at 16:20

## 2022-06-30 RX ADMIN — LEVETIRACETAM 500 MG: 500 TABLET, FILM COATED ORAL at 23:27

## 2022-06-30 RX ADMIN — Medication 2000 UNITS: at 23:28

## 2022-06-30 RX ADMIN — ROSUVASTATIN CALCIUM 20 MG: 20 TABLET, FILM COATED ORAL at 23:27

## 2022-06-30 RX ADMIN — LISINOPRIL 20 MG: 20 TABLET ORAL at 23:28

## 2022-06-30 RX ADMIN — SODIUM CHLORIDE TAB 1 GM 1 G: 1 TAB at 23:27

## 2022-06-30 RX ADMIN — ZOLPIDEM TARTRATE 5 MG: 5 TABLET, FILM COATED ORAL at 23:26

## 2022-07-01 ENCOUNTER — PATIENT ROUNDING (BHMG ONLY) (OUTPATIENT)
Dept: NEUROSURGERY | Facility: CLINIC | Age: 82
End: 2022-07-01
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
## 2022-07-01 PROBLEM — R53.81 DEBILITY: Status: ACTIVE | Noted: 2022-07-01

## 2022-07-01 PROBLEM — Z87.898 HISTORY OF SEIZURE: Status: ACTIVE | Noted: 2022-07-01

## 2022-07-01 LAB
ANION GAP SERPL CALCULATED.3IONS-SCNC: 10.2 MMOL/L (ref 5–15)
ANION GAP SERPL CALCULATED.3IONS-SCNC: 11 MMOL/L (ref 5–15)
ANION GAP SERPL CALCULATED.3IONS-SCNC: 13 MMOL/L (ref 5–15)
BUN SERPL-MCNC: 11 MG/DL (ref 8–23)
BUN SERPL-MCNC: 11 MG/DL (ref 8–23)
BUN SERPL-MCNC: 7 MG/DL (ref 8–23)
BUN/CREAT SERPL: 10.4 (ref 7–25)
BUN/CREAT SERPL: 15.1 (ref 7–25)
BUN/CREAT SERPL: 19 (ref 7–25)
CALCIUM SPEC-SCNC: 8.4 MG/DL (ref 8.6–10.5)
CALCIUM SPEC-SCNC: 8.7 MG/DL (ref 8.6–10.5)
CALCIUM SPEC-SCNC: 9 MG/DL (ref 8.6–10.5)
CHLORIDE SERPL-SCNC: 84 MMOL/L (ref 98–107)
CHLORIDE SERPL-SCNC: 86 MMOL/L (ref 98–107)
CHLORIDE SERPL-SCNC: 89 MMOL/L (ref 98–107)
CO2 SERPL-SCNC: 19 MMOL/L (ref 22–29)
CO2 SERPL-SCNC: 22.8 MMOL/L (ref 22–29)
CO2 SERPL-SCNC: 23 MMOL/L (ref 22–29)
CREAT SERPL-MCNC: 0.58 MG/DL (ref 0.57–1)
CREAT SERPL-MCNC: 0.67 MG/DL (ref 0.57–1)
CREAT SERPL-MCNC: 0.73 MG/DL (ref 0.57–1)
DEPRECATED RDW RBC AUTO: 37.4 FL (ref 37–54)
EGFRCR SERPLBLD CKD-EPI 2021: 82.2 ML/MIN/1.73
EGFRCR SERPLBLD CKD-EPI 2021: 87.4 ML/MIN/1.73
EGFRCR SERPLBLD CKD-EPI 2021: 90.5 ML/MIN/1.73
ERYTHROCYTE [DISTWIDTH] IN BLOOD BY AUTOMATED COUNT: 11.8 % (ref 12.3–15.4)
GLUCOSE SERPL-MCNC: 108 MG/DL (ref 65–99)
GLUCOSE SERPL-MCNC: 127 MG/DL (ref 65–99)
GLUCOSE SERPL-MCNC: 135 MG/DL (ref 65–99)
HCT VFR BLD AUTO: 36.3 % (ref 34–46.6)
HGB BLD-MCNC: 13.2 G/DL (ref 12–15.9)
MCH RBC QN AUTO: 32.2 PG (ref 26.6–33)
MCHC RBC AUTO-ENTMCNC: 36.4 G/DL (ref 31.5–35.7)
MCV RBC AUTO: 88.5 FL (ref 79–97)
OSMOLALITY SERPL: 248 MOSM/KG (ref 280–301)
PLATELET # BLD AUTO: 327 10*3/MM3 (ref 140–450)
PMV BLD AUTO: 8 FL (ref 6–12)
POTASSIUM SERPL-SCNC: 3.3 MMOL/L (ref 3.5–5.2)
POTASSIUM SERPL-SCNC: 3.6 MMOL/L (ref 3.5–5.2)
POTASSIUM SERPL-SCNC: 4.2 MMOL/L (ref 3.5–5.2)
RBC # BLD AUTO: 4.1 10*6/MM3 (ref 3.77–5.28)
SODIUM SERPL-SCNC: 117 MMOL/L (ref 136–145)
SODIUM SERPL-SCNC: 120 MMOL/L (ref 136–145)
SODIUM SERPL-SCNC: 121 MMOL/L (ref 136–145)
SODIUM SERPL-SCNC: 121 MMOL/L (ref 136–145)
URATE SERPL-MCNC: 1.7 MG/DL (ref 2.4–5.7)
WBC NRBC COR # BLD: 10.89 10*3/MM3 (ref 3.4–10.8)

## 2022-07-01 PROCEDURE — 84550 ASSAY OF BLOOD/URIC ACID: CPT | Performed by: INTERNAL MEDICINE

## 2022-07-01 PROCEDURE — 97162 PT EVAL MOD COMPLEX 30 MIN: CPT

## 2022-07-01 PROCEDURE — 85027 COMPLETE CBC AUTOMATED: CPT | Performed by: INTERNAL MEDICINE

## 2022-07-01 PROCEDURE — 97165 OT EVAL LOW COMPLEX 30 MIN: CPT

## 2022-07-01 PROCEDURE — 80048 BASIC METABOLIC PNL TOTAL CA: CPT | Performed by: INTERNAL MEDICINE

## 2022-07-01 PROCEDURE — 63710000001 PREDNISONE PER 5 MG

## 2022-07-01 PROCEDURE — 84295 ASSAY OF SERUM SODIUM: CPT | Performed by: INTERNAL MEDICINE

## 2022-07-01 PROCEDURE — 83930 ASSAY OF BLOOD OSMOLALITY: CPT | Performed by: INTERNAL MEDICINE

## 2022-07-01 PROCEDURE — 25010000002 ENOXAPARIN PER 10 MG: Performed by: INTERNAL MEDICINE

## 2022-07-01 PROCEDURE — 97535 SELF CARE MNGMENT TRAINING: CPT

## 2022-07-01 RX ORDER — TOLVAPTAN 15 MG/1
15 TABLET ORAL ONCE
Status: COMPLETED | OUTPATIENT
Start: 2022-07-01 | End: 2022-07-01

## 2022-07-01 RX ORDER — POTASSIUM CHLORIDE 750 MG/1
20 TABLET, FILM COATED, EXTENDED RELEASE ORAL 2 TIMES DAILY WITH MEALS
Status: COMPLETED | OUTPATIENT
Start: 2022-07-01 | End: 2022-07-02

## 2022-07-01 RX ORDER — AMLODIPINE BESYLATE 5 MG/1
5 TABLET ORAL
Status: DISCONTINUED | OUTPATIENT
Start: 2022-07-01 | End: 2022-07-06 | Stop reason: HOSPADM

## 2022-07-01 RX ADMIN — ROSUVASTATIN CALCIUM 20 MG: 20 TABLET, FILM COATED ORAL at 21:07

## 2022-07-01 RX ADMIN — HYDROXYZINE HYDROCHLORIDE 10 MG: 10 TABLET ORAL at 04:53

## 2022-07-01 RX ADMIN — TOLVAPTAN 15 MG: 15 TABLET ORAL at 14:27

## 2022-07-01 RX ADMIN — FERROUS SULFATE TAB 325 MG (65 MG ELEMENTAL FE) 325 MG: 325 (65 FE) TAB at 08:14

## 2022-07-01 RX ADMIN — SODIUM CHLORIDE TAB 1 GM 1 G: 1 TAB at 08:14

## 2022-07-01 RX ADMIN — FLUCONAZOLE 100 MG: 100 TABLET ORAL at 08:14

## 2022-07-01 RX ADMIN — ALPRAZOLAM 0.25 MG: 0.25 TABLET ORAL at 21:07

## 2022-07-01 RX ADMIN — HYDROCODONE BITARTRATE AND ACETAMINOPHEN 1 TABLET: 5; 325 TABLET ORAL at 09:52

## 2022-07-01 RX ADMIN — ZOLPIDEM TARTRATE 5 MG: 5 TABLET, FILM COATED ORAL at 22:31

## 2022-07-01 RX ADMIN — PREDNISONE 10 MG: 10 TABLET ORAL at 08:14

## 2022-07-01 RX ADMIN — ENOXAPARIN SODIUM 40 MG: 100 INJECTION SUBCUTANEOUS at 21:07

## 2022-07-01 RX ADMIN — CYCLOSPORINE 1 DROP: 0.5 EMULSION OPHTHALMIC at 21:07

## 2022-07-01 RX ADMIN — FERROUS SULFATE TAB 325 MG (65 MG ELEMENTAL FE) 325 MG: 325 (65 FE) TAB at 17:08

## 2022-07-01 RX ADMIN — POTASSIUM CHLORIDE 20 MEQ: 750 TABLET, EXTENDED RELEASE ORAL at 17:08

## 2022-07-01 RX ADMIN — LEVETIRACETAM 500 MG: 500 TABLET, FILM COATED ORAL at 21:07

## 2022-07-01 RX ADMIN — LEVETIRACETAM 500 MG: 500 TABLET, FILM COATED ORAL at 08:14

## 2022-07-01 RX ADMIN — HYDROXYZINE HYDROCHLORIDE 10 MG: 10 TABLET ORAL at 22:31

## 2022-07-01 RX ADMIN — Medication 10 ML: at 08:14

## 2022-07-01 RX ADMIN — LISINOPRIL 20 MG: 20 TABLET ORAL at 08:14

## 2022-07-01 RX ADMIN — Medication 10 ML: at 21:07

## 2022-07-01 RX ADMIN — AMLODIPINE BESYLATE 5 MG: 5 TABLET ORAL at 14:26

## 2022-07-01 RX ADMIN — Medication 2000 UNITS: at 17:08

## 2022-07-01 RX ADMIN — CYCLOSPORINE 1 DROP: 0.5 EMULSION OPHTHALMIC at 08:15

## 2022-07-01 RX ADMIN — NITROFURANTOIN MONOHYDRATE/MACROCRYSTALLINE 100 MG: 25; 75 CAPSULE ORAL at 08:14

## 2022-07-01 RX ADMIN — LEVOTHYROXINE SODIUM 50 MCG: 0.05 TABLET ORAL at 08:14

## 2022-07-01 RX ADMIN — ALPRAZOLAM 0.25 MG: 0.25 TABLET ORAL at 08:14

## 2022-07-01 RX ADMIN — HYDROXYZINE HYDROCHLORIDE 10 MG: 10 TABLET ORAL at 14:30

## 2022-07-01 RX ADMIN — Medication 1 CAPSULE: at 14:28

## 2022-07-01 NOTE — DISCHARGE PLACEMENT REQUEST
"Ritu Schilling (82 y.o. Female)             Date of Birth   1940    Social Security Number       Address   [address] [place] [address]    Home Phone   [phone]    MRN   [id number]       Christianity   None    Marital Status                               Admission Date   6/30/22    Admission Type   Emergency    Admitting Provider   Jessenia Gonzalez MD    Attending Provider   Santiago Swift MD    Department, Room/Bed   [address], [address]/1       Discharge Date       Discharge Disposition       Discharge Destination                               Attending Provider: Santiago Swift MD    Allergies: No Known Allergies    Isolation: None   Infection: None   Code Status: CPR   Advance Care Planning Activity    Ht: 152.4 cm (60\")   Wt: 58.6 kg (129 lb 1.6 oz)    Admission Cmt: None   Principal Problem: Hyponatremia [E87.1] More...                 Active Insurance as of 6/30/2022     Primary Coverage     Payor Plan Insurance Group Employer/Plan Group    [facility] MEDICARE REPLACEMENT [facility] MEDICARE REPLACEMENT [id number]     Payor Plan Address Payor Plan Phone Number Payor Plan Fax Number Effective Dates    [address] [address]   1/1/2021 - None Entered    [facility] [id number]       Subscriber Name Subscriber Birth Date Member ID       RITU SCHILLING 1940 [id number]                 Emergency Contacts      (Rel.) Home Phone Work Phone Mobile Phone    Nu Schilling (Spouse) [phone] -- [phone]    Marielle Regan (Daughter) [phone] -- [phone]            "

## 2022-07-01 NOTE — PLAN OF CARE
Goal Outcome Evaluation:  Plan of Care Reviewed With: patient, spouse    Outcome Evaluation: Pt is an 81 y/o female who presents to [facility] s/p fall at home resulting in L distal fibula fx. Ortho recs pending at this time, followed NWB LLE until clearance. Hx of anxiety, seizures, costomy. Pt lives with her , she reports being indep with ADLs, use of cane for fxl mobility at all times lately d/t falls. Also has SC and RW. Pt found supine in bed with family present, walking boot on LLE. She performs bed mobility with spv, intact sitting balance. Use of RW for stand pivot TF bed<>BSC while NWB on LLE, CGA provided. CGA when standing for clothing mgt for toileting. She is able to take ~5 steps from BSC to sink for participation in grooming tasks with set-upA. CGA for ambulation using RW. Pt will benefit from OT services in the acute care setting to address fxl deficits, anticipate d/c home with family assist.    Hand hygiene completed before and after session. Appropriate PPE worn t/o

## 2022-07-01 NOTE — THERAPY EVALUATION
Patient Name: Ritu Martino  : 1940    MRN: [id number]                              Today's Date: 2022       Admit Date: 2022    Visit Dx:     ICD-10-CM ICD-9-CM   1. Closed fracture of distal end of left fibula, unspecified fracture morphology, initial encounter  S82.832A 824.8   2. Hyponatremia  E87.1 276.1   3. Multiple falls  R29.6 V15.88   4. Leukocytosis, unspecified type  D72.829 288.60   5. Chest pain, unspecified type  R07.9 786.50     Patient Active Problem List   Diagnosis   • Anxiety   • Hypothyroidism   • Hyponatremia   • Iron deficiency   • Hypokalemia   • Essential hypertension, benign   • Fluent aphasia   • Hypochloremia   • Benzodiazepine dependence (HCC)   • Anemia   • Cervicalgia   • Intertrigo   • Insomnia   • Acute cystitis   • E coli infection   • Diverticulitis   • Arthritis   • Bowel dysfunction   • GERD (gastroesophageal reflux disease)   • Hernia, hiatal   • Seizures (HCC)   • Meningioma ([facility])   • Closed fracture of left distal fibula   • Debility   • History of seizure     Past Medical History:   Diagnosis Date   • Anemia    • Anxiety    • Arthritis    • Bowel dysfunction 2021    since having surgery for diverticular infection   • Diverticulitis 2021    w/ rupture and infection required surgery and ostomy   • Essential hypertension, benign    • GERD (gastroesophageal reflux disease)    • Hernia, hiatal    • Hypercholesterolemia    • Hypokalemia    • Hyponatremia     chronic low with occasional normal level   • Hypothyroidism     estimated time frame pt nor  could remember exactly how long has been on medication   • Insomnia    • Iron deficiency    • Seizures (HCC)      Past Surgical History:   Procedure Laterality Date   • APPENDECTOMY     • BACK SURGERY     •  SECTION     • COLON SURGERY  2021    to address ruptured and infected diverticular dz, ostomy also placed   • COLONOSCOPY     • COLOSTOMY CLOSURE  10/2021     ostomy removed   • EYE SURGERY  2 X cataract   • HEMORRHOIDECTOMY     • KNEE ARTHROPLASTY     • TONSILLECTOMY  1946      General Information     Row Name 07/01/22 1606          OT Time and Intention    Document Type evaluation  -     Mode of Treatment individual therapy;occupational therapy  -     Row Name 07/01/22 1606          General Information    Patient Profile Reviewed yes  -     Prior Level of Function independent:;ADL's;all household mobility  indep with ADLs, fxl mobility using a cane all the time recently d/t falls  -     Existing Precautions/Restrictions fall  follow-up with ortho for WB recs. Assume WBAT but followed NWB today until ortho clearance  -     Barriers to Rehab none identified  -     Row Name 07/01/22 1606          Living Environment    People in Home spouse  -     Row Name 07/01/22 1606          Cognition    Orientation Status (Cognition) oriented x 3  -     Row Name 07/01/22 1606          Safety Issues, Functional Mobility    Safety Issues Affecting Function (Mobility) insight into deficits/self-awareness;problem-solving  -     Impairments Affecting Function (Mobility) balance;strength  -           User Key  (r) = Recorded By, (t) = Taken By, (c) = Cosigned By    Initials Name Provider Type     Corinne Eagle OT Occupational Therapist                 Mobility/ADL's     Row Name 07/01/22 1608          Bed Mobility    Bed Mobility supine-sit;sit-supine  -     Supine-Sit [place] (Bed Mobility) supervision  -     Sit-Supine [place] (Bed Mobility) supervision  -     Assistive Device (Bed Mobility) bed rails;head of bed elevated  -     Row Name 07/01/22 1608          Transfers    Transfers toilet transfer;sit-stand transfer  -     Sit-Stand [place] (Transfers) contact guard;verbal cues  -     [place] Level (Toilet Transfer) contact guard;verbal cues  -     Assistive Device (Toilet Transfer) commode, 3-in-1;walker, front-wheeled  -     [place]  Name 07/01/22 1608          Sit-Stand Transfer    Assistive Device (Sit-Stand Transfers) walker, front-wheeled  -     Row Name 07/01/22 1608          Toilet Transfer    Type (Toilet Transfer) stand pivot/stand step  -     Comment, (Toilet Transfer) stand pivot TF to BSC  -     Row Name 07/01/22 1608          Functional Mobility    Functional Mobility- Ind. Level contact guard assist;verbal cues required  -     Functional Mobility- Device walker, front-wheeled  -     Functional Mobility- Comment able to take ~5 steps from bed to sink using RW following NWB on LLE  -     Row Name 07/01/22 1608          Activities of Daily Living    BADL Assessment/Intervention grooming;toileting  -[facility] Name 07/01/22 1608          Mobility    Extremity Weight-bearing Status --  follow-up with ortho for WB  -     Row Name 07/01/22 1608          Grooming Assessment/Training    [place] Level (Grooming) oral care regimen;set up;standby assist  -     Position (Grooming) sink side;unsupported standing  -     Row Name 07/01/22 1608          Toileting Assessment/Training    [place] Level (Toileting) toileting skills;adjust/manage clothing;perform perineal hygiene;standby assist;contact guard assist  -     Assistive Devices (Toileting) commode, 3-in-1  -JW     Position (Toileting) supported standing;supported sitting  -     Comment, (Toileting) CGA when standing for clothing mgt  -           User Key  (r) = Recorded By, (t) = Taken By, (c) = Cosigned By    Initials Name Provider Type    JW Corinne Eagle OT Occupational Therapist               Obj/Interventions     Row Name 07/01/22 1610          Sensory Assessment (Somatosensory)    Sensory Assessment (Somatosensory) UE sensation intact  -     Row Name 07/01/22 1610          Vision Assessment/Intervention    Visual Impairment/Limitations WNL  -     Row Name 07/01/22 1610          Range of Motion Comprehensive    General Range of Motion bilateral upper  extremity ROM WNL  -     Row Name 07/01/22 1610          Strength Comprehensive (MMT)    Comment, General Manual Muscle Testing (MMT) Assessment generalized weakness  -     Row Name 07/01/22 1610          Balance    Balance Assessment sitting static balance;sitting dynamic balance;standing static balance;standing dynamic balance  -     Static Sitting Balance supervision  -     Dynamic Sitting Balance supervision  -     Position, Sitting Balance unsupported;sitting edge of bed  -     Static Standing Balance standby assist  -     Dynamic Standing Balance contact guard  -     Position/Device Used, Standing Balance supported;walker, front-wheeled  -     Balance Interventions sitting;standing;occupation based/functional task  -           User Key  (r) = Recorded By, (t) = Taken By, (c) = Cosigned By    Initials Name Provider Type    Corinne Dunn OT Occupational Therapist               Goals/Plan     Row Name 07/01/22 1617          Transfer Goal 1 (OT)    Activity/Assistive Device (Transfer Goal 1, OT) transfers, all;bed-to-chair/chair-to-bed;toilet;shower chair  -     [place] Level/Cues Needed (Transfer Goal 1, OT) modified independence  -     Time Frame (Transfer Goal 1, OT) short term goal (STG);2 weeks  -     Progress/Outcome (Transfer Goal 1, OT) goal ongoing  -     Row Name 07/01/22 1617          Bathing Goal 1 (OT)    Activity/Device (Bathing Goal 1, OT) bathing skills, all;lower body bathing;grab bar, tub/shower;hand-held shower spray hose;shower chair;long-handled sponge  -     [place] Level/Cues Needed (Bathing Goal 1, OT) modified independence  -     Time Frame (Bathing Goal 1, OT) short term goal (STG);2 weeks  -     Progress/Outcomes (Bathing Goal 1, OT) goal ongoing  -     Row Name 07/01/22 1617          Dressing Goal 1 (OT)    Activity/Device (Dressing Goal 1, OT) dressing skills, all;lower body dressing  -     [place]/Cues Needed (Dressing Goal 1, OT)  modified independence  -     Time Frame (Dressing Goal 1, OT) short term goal (STG);2 weeks  -JW     Strategies/Barriers (Dressing Goal 1, OT) use of AD as needed  -     Progress/Outcome (Dressing Goal 1, OT) goal ongoing  -     Row Name 07/01/22 1617          Toileting Goal 1 (OT)    Activity/Device (Toileting Goal 1, OT) toileting skills, all  -     [place] Level/Cues Needed (Toileting Goal 1, OT) modified independence  -     Time Frame (Toileting Goal 1, OT) short term goal (STG);2 weeks  -JW     Progress/Outcome (Toileting Goal 1, OT) goal ongoing  -     Row Name 07/01/22 1617          Therapy Assessment/Plan (OT)    Planned Therapy Interventions (OT) activity tolerance training;adaptive equipment training;BADL retraining;functional balance retraining;occupation/activity based interventions;patient/caregiver education/training;transfer/mobility retraining  -           User Key  (r) = Recorded By, (t) = Taken By, (c) = Cosigned By    Initials Name Provider Type     Corinne Eagle, OT Occupational Therapist               Clinical Impression     Row Name 07/01/22 1611          Pain Assessment    Pretreatment Pain Rating 2/10  -     Posttreatment Pain Rating 2/10  -     Pain Location - Side/Orientation Left  -     Pain Location lower  -     Pain Location - extremity  -     Pain Intervention(s) Repositioned;Ambulation/increased activity  -     Row Name 07/01/22 1611          Plan of Care Review    Plan of Care Reviewed With patient;spouse  -     Outcome Evaluation Pt is an 83 y/o female who presents to [facility] s/p fall at home resulting in L distal fibula fx. Ortho recs pending at this time, followed NWB LLE until clearance. Hx of anxiety, seizures, costomy. Pt lives with her , she reports being indep with ADLs, use of cane for fxl mobility at all times lately d/t falls. Also has SC and RW. Pt found supine in bed with family present, walking boot on LLE. She performs bed mobility  with spv, intact sitting balance. Use of RW for stand pivot TF bed<>BSC while NWB on LLE, CGA provided. CGA when standing for clothing mgt for toileting. She is able to take ~5 steps from BSC to sink for participation in grooming tasks with set-upA. CGA for ambulation using RW. Pt will benefit from OT services in the acute care setting to address fxl deficits, anticipate d/c home with family assist.  -     Row Name 07/01/22 1611          Therapy Assessment/Plan (OT)    Rehab Potential (OT) good, to achieve stated therapy goals  -     Criteria for Skilled Therapeutic Interventions Met (OT) yes;skilled treatment is necessary  -     Therapy Frequency (OT) 5 times/wk  -     Row Name 07/01/22 1611          Therapy Plan Review/Discharge Plan (OT)    Anticipated Discharge Disposition (OT) home with assist  -     Row Name 07/01/22 1611          Positioning and Restraints    Pre-Treatment Position in bed  -JW     Post Treatment Position bed  -JW     In Bed fowlers;call light within reach;encouraged to call for assist;exit alarm on;with family/caregiver  -           User Key  (r) = Recorded By, (t) = Taken By, (c) = Cosigned By    Initials Name Provider Type    Corinne Dunn, DEREK Occupational Therapist               Outcome Measures     Row Name 07/01/22 1618          How much help from another is currently needed...    Putting on and taking off regular lower body clothing? 3  -JW     Bathing (including washing, rinsing, and drying) 3  -JW     Toileting (which includes using toilet bed pan or urinal) 3  -JW     Putting on and taking off regular upper body clothing 3  -JW     Taking care of personal grooming (such as brushing teeth) 3  -JW     Eating meals 4  -JW     AM-PAC 6 Clicks Score (OT) 19  -     Row Name 07/01/22 1618          Functional Assessment    Outcome Measure Options AM-PAC 6 Clicks Daily Activity (OT)  -           User Key  (r) = Recorded By, (t) = Taken By, (c) = Cosigned By    Initials Name  Provider Type    Corinne Dunn OT Occupational Therapist                Occupational Therapy Education                 Title: PT OT SLP Therapies (Done)     Topic: Occupational Therapy (Done)     Point: ADL training (Done)     Description:   Instruct learner(s) on proper safety adaptation and remediation techniques during self care or transfers.   Instruct in proper use of assistive devices.              Learning Progress Summary           Patient Acceptance, E, VU by  at 7/1/2022 1618    Comment: role of OT, plan of care, safety                   Point: Home exercise program (Done)     Description:   Instruct learner(s) on appropriate technique for monitoring, assisting and/or progressing therapeutic exercises/activities.              Learning Progress Summary           Patient Acceptance, E, VU by MICHAEL at 7/1/2022 1618    Comment: role of OT, plan of care, safety                   Point: Precautions (Done)     Description:   Instruct learner(s) on prescribed precautions during self-care and functional transfers.              Learning Progress Summary           Patient Acceptance, E, VU by MICHAEL at 7/1/2022 1618    Comment: role of OT, plan of care, safety                   Point: Body mechanics (Done)     Description:   Instruct learner(s) on proper positioning and spine alignment during self-care, functional mobility activities and/or exercises.              Learning Progress Summary           Patient Acceptance, E, VU by  at 7/1/2022 1618    Comment: role of OT, plan of care, safety                               User Key     Initials Effective Dates Name Provider Type [facility] 06/10/21 -  Corinne Eagle OT Occupational Therapist OT              OT Recommendation and Plan  Planned Therapy Interventions (OT): activity tolerance training, adaptive equipment training, BADL retraining, functional balance retraining, occupation/activity based interventions, patient/caregiver education/training,  transfer/mobility retraining  Therapy Frequency (OT): 5 times/wk  Plan of Care Review  Plan of Care Reviewed With: patient, spouse  Outcome Evaluation: Pt is an 81 y/o female who presents to [facility] s/p fall at home resulting in L distal fibula fx. Ortho recs pending at this time, followed NWB LLE until clearance. Hx of anxiety, seizures, costomy. Pt lives with her , she reports being indep with ADLs, use of cane for fxl mobility at all times lately d/t falls. Also has SC and RW. Pt found supine in bed with family present, walking boot on LLE. She performs bed mobility with spv, intact sitting balance. Use of RW for stand pivot TF bed<>BSC while NWB on LLE, CGA provided. CGA when standing for clothing mgt for toileting. She is able to take ~5 steps from BSC to sink for participation in grooming tasks with set-upA. CGA for ambulation using RW. Pt will benefit from OT services in the acute care setting to address fxl deficits, anticipate d/c home with family assist.     Time Calculation:    Time Calculation- OT     Row Name 07/01/22 1619             Time Calculation- OT    OT Start Time 1515  -      OT Stop Time 1534  -      OT Time Calculation (min) 19 min  -      Total Timed Code Minutes- OT 10 minute(s)  -      OT Received On 07/01/22  -      OT - Next Appointment 07/04/22  -      OT Goal Re-Cert Due Date 07/15/22  -              Timed Charges    66886 - OT Self Care/Mgmt Minutes 10  -              Untimed Charges    OT Eval/Re-eval Minutes 9  -              Total Minutes    Timed Charges Total Minutes 10  -      Untimed Charges Total Minutes 9  -       Total Minutes 19  -            User Key  (r) = Recorded By, (t) = Taken By, (c) = Cosigned By    Initials Name Provider Type    Corinne Dunn OT Occupational Therapist              Therapy Charges for Today     Code Description Service Date Service Provider Modifiers Qty    [id number] HC OT SELF CARE/MGMT/TRAIN EA 15 MIN 7/1/2022 White,  DEREK Sun GO 1    [id number]  OT EVAL LOW COMPLEXITY 3 7/1/2022 Corinne Eagle OT GO 1               Corinne Eagle OT  7/1/2022

## 2022-07-01 NOTE — THERAPY EVALUATION
Patient Name: Ritu Martino  : 1940    MRN: [id number]                              Today's Date: 2022       Admit Date: 2022    Visit Dx:     ICD-10-CM ICD-9-CM   1. Closed fracture of distal end of left fibula, unspecified fracture morphology, initial encounter  S82.832A 824.8   2. Hyponatremia  E87.1 276.1   3. Multiple falls  R29.6 V15.88   4. Leukocytosis, unspecified type  D72.829 288.60   5. Chest pain, unspecified type  R07.9 786.50     Patient Active Problem List   Diagnosis   • Anxiety   • Hypothyroidism   • Hyponatremia   • Iron deficiency   • Hypokalemia   • Essential hypertension, benign   • Fluent aphasia   • Hypochloremia   • Benzodiazepine dependence (HCC)   • Anemia   • Cervicalgia   • Intertrigo   • Insomnia   • Acute cystitis   • E coli infection   • Diverticulitis   • Arthritis   • Bowel dysfunction   • GERD (gastroesophageal reflux disease)   • Hernia, hiatal   • Seizures (HCC)   • Meningioma ([facility])   • Closed fracture of left distal fibula   • Debility   • History of seizure     Past Medical History:   Diagnosis Date   • Anemia    • Anxiety    • Arthritis    • Bowel dysfunction 2021    since having surgery for diverticular infection   • Diverticulitis 2021    w/ rupture and infection required surgery and ostomy   • Essential hypertension, benign    • GERD (gastroesophageal reflux disease)    • Hernia, hiatal    • Hypercholesterolemia    • Hypokalemia    • Hyponatremia     chronic low with occasional normal level   • Hypothyroidism     estimated time frame pt nor  could remember exactly how long has been on medication   • Insomnia    • Iron deficiency    • Seizures (HCC)      Past Surgical History:   Procedure Laterality Date   • APPENDECTOMY     • BACK SURGERY     •  SECTION     • COLON SURGERY  2021    to address ruptured and infected diverticular dz, ostomy also placed   • COLONOSCOPY     • COLOSTOMY CLOSURE  10/2021     ostomy removed   • EYE SURGERY  2 X cataract   • HEMORRHOIDECTOMY     • KNEE ARTHROPLASTY     • TONSILLECTOMY  1946      General Information     Row Name 07/01/22 1629          Physical Therapy Time and Intention    Document Type evaluation  -     Mode of Treatment individual therapy;physical therapy  -     Row Name 07/01/22 1629          General Information    Prior Level of Function independent:;gait;transfer;bed mobility  -     Existing Precautions/Restrictions fall  waiting for ortho to clarify WBing status, pt remained NWB on LLE for now  -     Barriers to Rehab none identified  -     Row Name 07/01/22 1629          Living Environment    People in Home spouse  -     Row Name 07/01/22 1629          Home Main Entrance    Number of Stairs, Main Entrance two  -     Stair Railings, Main Entrance railings safe and in good condition  -     Row Name 07/01/22 1629          Cognition    Orientation Status (Cognition) oriented x 3  -     Row Name 07/01/22 1629          Safety Issues, Functional Mobility    Impairments Affecting Function (Mobility) balance;strength  -           User Key  (r) = Recorded By, (t) = Taken By, (c) = Cosigned By    Initials Name Provider Type     Ermelinda Brunner, PT Physical Therapist               Mobility     Row Name 07/01/22 1631          Bed Mobility    Bed Mobility supine-sit;sit-supine  -     Supine-Sit [place] (Bed Mobility) verbal cues;supervision  -     Sit-Supine [place] (Bed Mobility) verbal cues;supervision  -     Assistive Device (Bed Mobility) bed rails;head of bed elevated  -     Row Name 07/01/22 1631          Sit-Stand Transfer    Sit-Stand [place] (Transfers) verbal cues;nonverbal cues (demo/gesture);contact guard  -     Assistive Device (Sit-Stand Transfers) walker, front-wheeled  -     Comment, (Sit-Stand Transfer) NWB on LLE until clarified by ortho  -     Row Name 07/01/22 1631          Gait/Stairs (Locomotion)     [place] Level (Gait) verbal cues;nonverbal cues (demo/gesture);contact guard  -     Assistive Device (Gait) walker, front-wheeled  -     Distance in Feet (Gait) 4 side scoots on R foot to HOB  -     Deviations/Abnormal Patterns (Gait) maddy decreased;festinating/shuffling;gait speed decreased;stride length decreased  -     Comment, (Gait/Stairs) NWB maintained on LLE with just verbal cuing  -           User Key  (r) = Recorded By, (t) = Taken By, (c) = Cosigned By    Initials Name Provider Type     Ermelinda Brunner, PT Physical Therapist               Obj/Interventions     Row Name 07/01/22 1633          Range of Motion Comprehensive    Comment, General Range of Motion L ankle not tested, all other ROM WFL  -     Row Name 07/01/22 1633          Strength Comprehensive (MMT)    Comment, General Manual Muscle Testing (MMT) Assessment mild generalized weakness noted  -     Row Name 07/01/22 1633          Motor Skills    Therapeutic Exercise --  10 reps B LE LAQ, R LE AP  -     Row Name 07/01/22 1633          Balance    Balance Assessment standing static balance;standing dynamic balance  -     Static Standing Balance standby assist  -     Dynamic Standing Balance contact guard  -     Position/Device Used, Standing Balance walker, rolling  -           User Key  (r) = Recorded By, (t) = Taken By, (c) = Cosigned By    Initials Name Provider Type     Ermelinda Brunner, PT Physical Therapist               Goals/Plan     Row Name 07/01/22 1637          Bed Mobility Goal 1 (PT)    Activity/Assistive Device (Bed Mobility Goal 1, PT) bed mobility activities, all  -     [place] Level/Cues Needed (Bed Mobility Goal 1, PT) supervision required  -     Time Frame (Bed Mobility Goal 1, PT) 1 week  -     Row Name 07/01/22 1637          Transfer Goal 1 (PT)    Activity/Assistive Device (Transfer Goal 1, PT) transfers, all;walker, rolling  -     [place] Level/Cues Needed (Transfer  Goal 1, PT) supervision required  -CH     Time Frame (Transfer Goal 1, PT) 1 week  -CH     Strategies/Barriers (Transfers Goal 1, PT) if able to WB on LLE  -CH     Row Name 07/01/22 1637          Gait Training Goal 1 (PT)    Activity/Assistive Device (Gait Training Goal 1, PT) gait (walking locomotion);walker, rolling  -CH     [place] Level (Gait Training Goal 1, PT) supervision required  -CH     Distance (Gait Training Goal 1, PT) 150  -CH     Time Frame (Gait Training Goal 1, PT) 1 week  -CH     Strategies/Barriers (Gait Training Goal 1, PT) if able to WB on LLE  -CH     Row Name 07/01/22 1637          Therapy Assessment/Plan (PT)    Planned Therapy Interventions (PT) balance training;bed mobility training;gait training;home exercise program;patient/family education;strengthening;transfer training  -           User Key  (r) = Recorded By, (t) = Taken By, (c) = Cosigned By    Initials Name Provider Type     Ermelinda Brunner, PT Physical Therapist               Clinical Impression     Row Name 07/01/22 1634          Pain    Pretreatment Pain Rating 0/10 - no pain  -     Posttreatment Pain Rating 0/10 - no pain  -     Row Name 07/01/22 1634          Plan of Care Review    Plan of Care Reviewed With patient  -     Outcome Evaluation Pt is a 81 yo F who was admitted with a closed L fibular fracture s/p fall. Ortho has been consulted but had not entered recommendattion at time of PT evaluation, therefore PT instructed pt to remain NWB on LLE until further clarification on WBing status is recieved. Pt presents to PT with some impaired functional mobility and gait secondary to generalized weakness and decreased WBing on L LE. Pt may benefit from skilled PT to address strength, mobility, transfers, and gait.  -     Row Name 07/01/22 1635          Therapy Assessment/Plan (PT)    Patient/Family Therapy Goals Statement (PT) to return to PLOF  -     Rehab Potential (PT) good, to achieve stated therapy  goals  -     Criteria for Skilled Interventions Met (PT) skilled treatment is necessary  -     Therapy Frequency (PT) 6 times/wk  -     Row Name 07/01/22 1634          Positioning and Restraints    Pre-Treatment Position in bed  -     Post Treatment Position bed  -     In Bed supine;call light within reach;encouraged to call for assist;exit alarm on  -           User Key  (r) = Recorded By, (t) = Taken By, (c) = Cosigned By    Initials Name Provider Type     Ermelinda Brunner, PT Physical Therapist               Outcome Measures     Row Name 07/01/22 1638          How much help from another person do you currently need...    Turning from your back to your side while in flat bed without using bedrails? 3  -CH     Moving from lying on back to sitting on the side of a flat bed without bedrails? 3  -CH     Moving to and from a bed to a chair (including a wheelchair)? 3  -CH     Standing up from a chair using your arms (e.g., wheelchair, bedside chair)? 3  -CH     Climbing 3-5 steps with a railing? 2  -CH     To walk in hospital room? 2  -CH     AM-PAC 6 Clicks Score (PT) 16  -     Highest level of mobility 5 --> Static standing  -     Row Name 07/01/22 1638 07/01/22 1618       Functional Assessment    Outcome Measure Options AM-PAC 6 Clicks Basic Mobility (PT)  - AM-PAC 6 Clicks Daily Activity (OT)  -          User Key  (r) = Recorded By, (t) = Taken By, (c) = Cosigned By    Initials Name Provider Type     Ermelinda Brunner, PT Physical Therapist    Corinne Dunn OT Occupational Therapist                             Physical Therapy Education                 Title: PT OT SLP Therapies (Done)     Topic: Physical Therapy (Done)     Point: Mobility training (Done)     Learning Progress Summary           Patient Acceptance, E,TB,D, VU,NR by  at 7/1/2022 1639                   Point: Home exercise program (Done)     Learning Progress Summary           Patient Acceptance, E,TB,D, VU,NR by  at  7/1/2022 1639                   Point: Body mechanics (Done)     Learning Progress Summary           Patient Acceptance, E,TB,D, VU,NR by  at 7/1/2022 1639                   Point: Precautions (Done)     Learning Progress Summary           Patient Acceptance, E,TB,D, VU,NR by  at 7/1/2022 1639                               User Key     Initials Effective Dates Name Provider Type [facility] 06/16/21 -  Ermelinda Brunner, PT Physical Therapist PT              PT Recommendation and Plan  Planned Therapy Interventions (PT): balance training, bed mobility training, gait training, home exercise program, patient/family education, strengthening, transfer training  Plan of Care Reviewed With: patient  Outcome Evaluation: Pt is a 83 yo F who was admitted with a closed L fibular fracture s/p fall. Ortho has been consulted but had not entered recommendattion at time of PT evaluation, therefore PT instructed pt to remain NWB on LLE until further clarification on WBing status is recieved. Pt presents to PT with some impaired functional mobility and gait secondary to generalized weakness and decreased WBing on L LE. Pt may benefit from skilled PT to address strength, mobility, transfers, and gait.     Time Calculation:    PT Charges     Row Name 07/01/22 1639             Time Calculation    Start Time 1430  -      Stop Time 1440  -      Time Calculation (min) 10 min  -      PT Received On 07/01/22  -      PT - Next Appointment 07/02/22  -      PT Goal Re-Cert Due Date 07/08/22  -            User Key  (r) = Recorded By, (t) = Taken By, (c) = Cosigned By    Initials Name Provider Type     Ermelinda Brunner PT Physical Therapist              Therapy Charges for Today     Code Description Service Date Service Provider Modifiers Qty    [id number]  PT EVAL MOD COMPLEXITY 2 7/1/2022 Ermelinda Brunner, PT GP 1          PT G-Codes  Outcome Measure Options: AM-PAC 6 Clicks Basic Mobility (PT)  AM-PAC 6 Clicks  Score (PT): 16  AM-PAC 6 Clicks Score (OT): 19    Ermelinda Brunner, PT  7/1/2022

## 2022-07-01 NOTE — PROGRESS NOTES
[facility] Medicine Services  PROGRESS NOTE    Patient Name: Ritu Martino  : 1940  MRN: [id number]    Date of Admission: 2022  Primary Care Physician: Gaurav Barger MD    Subjective   Subjective     CC:  Follow-up fall, left leg pain    HPI:  Patient continues to complain of left leg pain.  She denies any dysuria.  She notes generalized weakness.  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  She agrees to work on fluid restriction.    Review of Systems  No current fevers or chills  No current shortness of breath or cough  No current nausea, vomiting, or diarrhea  No current chest pain or palpitations      Objective   Objective     Vital Signs:   Temp:  [97.6 °F (36.4 °C)-98.4 °F (36.9 °C)] 98.2 °F (36.8 °C)  Heart Rate:  [68-93] 93  Resp:  [16-18] 18  BP: (120-175)/() 120/69        Physical Exam:  Constitutional:Awake, alert, chronically ill-appearing  HENT: NCAT, mucous membranes moist, neck supple  Respiratory: Clear to auscultation bilaterally, respiratory effort normal, nonlabored breathing   Cardiovascular: RRR, normal radial pulses  Gastrointestinal: Positive bowel sounds, soft, nontender, nondistended  Musculoskeletal: Elderly frail and chronically debilitated in appearance, BMI is 25, mild lower extremity edema  Psychiatric: Anxious but calm affect, cooperative, conversational  Neurologic: No slurred speech or facial droop, follows commands  Skin: No rashes or jaundice, warm      Results Reviewed:  Results from last 7 days   Lab Units 22  0837 22  1542 22  1607   WBC 10*3/mm3 10.89* 16.57* 15.77*   HEMOGLOBIN g/dL 13.2 12.6 13.5   HEMATOCRIT % 36.3 36.1 38.9   PLATELETS 10*3/mm3 327 345 459*     Results from last 7 days   Lab Units 22  1228 22  0837 22  0128 22  1914 22  1658 22  1506 22  1606   SODIUM mmol/L 120* 117* 121*   < >  --  117* 123*   POTASSIUM mmol/L 3.6 3.3*  --   --   --   3.7 3.9   CHLORIDE mmol/L 86* 84*  --   --   --  83* 85*   CO2 mmol/L 23.0 22.8  --   --   --  23.6 25.0   BUN mg/dL 11 7*  --   --   --  11 11   CREATININE mg/dL 0.58 0.67  --   --   --  0.60 0.66   GLUCOSE mg/dL 108* 127*  --   --   --  123* 105*   CALCIUM mg/dL 8.4* 8.7  --   --   --  8.9 10.2   ALT (SGPT) U/L  --   --   --   --   --  23 18   AST (SGOT) U/L  --   --   --   --   --  20 14   TROPONIN T ng/mL  --   --   --   --  <0.010 <0.010  --     < > = values in this interval not displayed.     Estimated Creatinine Clearance: 59.9 mL/min (by C-G formula based on SCr of 0.58 mg/dL).    Microbiology Results Abnormal     Procedure Component Value - Date/Time    COVID PRE-OP / PRE-PROCEDURE SCREENING ORDER (NO ISOLATION) - Swab, Nasopharynx [[id number]]  (Normal) Collected: 06/30/22 1508    Lab Status: Final result Specimen: Swab from Nasopharynx Updated: 06/30/22 2047    Narrative:      The following orders were created for panel order COVID PRE-OP / PRE-PROCEDURE SCREENING ORDER (NO ISOLATION) - Swab, Nasopharynx.  Procedure                               Abnormality         Status                     ---------                               -----------         ------                     COVID-19,APTIMA PANTHER(...[[id number]]  Normal              Final result                 Please view results for these tests on the individual orders.    COVID-19,APTIMA PANTHER(MICHELLE),[facility]/ NOEMÍ, NP/OP SWAB IN UTM/VTM/SALINE TRANSPORT MEDIA,24 HR TAT - Swab, Nasopharynx [[id number]]  (Normal) Collected: 06/30/22 1508    Lab Status: Final result Specimen: Swab from Nasopharynx Updated: 06/30/22 2047     COVID19 Not Detected    Narrative:      Fact sheet for providers: https://www.fda.gov/media/020997/download     Fact sheet for patients: https://www.fda.gov/media/557497/download    Test performed by RT PCR.          Imaging Results (Last 24 Hours)     Procedure Component Value Units Date/Time    XR Ankle 3+ View Left [[id number]]  Collected: 06/30/22 1555     Updated: 06/30/22 1558    Narrative:      XR ANKLE 3+ VW LEFT-     INDICATIONS: Trauma     TECHNIQUE: 3 views of the left ankle     COMPARISON: None available     FINDINGS:        Obliquely oriented fracture is seen at the distal fibular metaphysis  with adjacent soft tissue swelling. No other fractures are noted. No  dislocation.       Impression:         Distal fibular fracture.     This report was finalized on 6/30/2022 3:55 PM by Dr. Homer Maynard M.D.       XR Chest 1 View [[id number]] Collected: 06/30/22 1545     Updated: 06/30/22 1558    Narrative:      XR CHEST 1 VW-     HISTORY: Female who is 82 years-old,  chest pain     TECHNIQUE: Frontal views of the chest     COMPARISON: 06/20/2022     FINDINGS: Heart size is normal. Aorta is tortuous, calcified. Pulmonary  vasculature is unremarkable. No focal pulmonary consolidation, pleural  effusion, or pneumothorax. Old granulomatous disease is apparent. No  acute osseous process.       Impression:      No focal pulmonary consolidation. Tortuous aorta. Follow-up  as clinically indicated.     This report was finalized on 6/30/2022 3:55 PM by Dr. Homer Maynard M.D.                 I have reviewed the medications:  Scheduled Meds:ALPRAZolam, 0.25 mg, Oral, BID  amLODIPine, 5 mg, Oral, Q24H  cholecalciferol, 2,000 Units, Oral, Daily With Dinner  cycloSPORINE, 1 drop, Both Eyes, BID  enoxaparin, 40 mg, Subcutaneous, Q24H  ferrous sulfate, 325 mg, Oral, BID AC  fluconazole, 100 mg, Oral, Q24H  lactobacillus acidophilus, 1 capsule, Oral, Daily With Lunch  levETIRAcetam, 500 mg, Oral, BID  levothyroxine, 50 mcg, Oral, Daily  potassium chloride, 20 mEq, Oral, BID With Meals  rosuvastatin, 20 mg, Oral, Nightly  tolvaptan, 15 mg, Oral, Once  vitamin B-12, 50 mcg, Oral, Daily      Continuous Infusions:   PRN Meds:.•  acetaminophen  •  HYDROcodone-acetaminophen  •  hydrOXYzine  •  melatonin  •  nitroglycerin  •  ondansetron **OR**  ondansetron  •  [COMPLETED] Insert peripheral IV **AND** sodium chloride  •  zolpidem    Assessment & Plan   Assessment & Plan     Active Hospital Problems    Diagnosis  POA   • **Hyponatremia [E87.1]  Yes   • Debility [R53.81]  Yes   • History of seizure [Z87.898]  Yes   • Closed fracture of left distal fibula [S82.832A]  Yes   • GERD (gastroesophageal reflux disease) [K21.9]  Yes   • Arthritis [M19.90]  Yes   • Insomnia [G47.00]  Yes   • Benzodiazepine dependence (HCC) [F13.20]  Yes   • Iron deficiency [E61.1]  Yes   • Essential hypertension, benign [I10]  Yes   • Hypothyroidism [E03.9]  Yes   • Anxiety [F41.9]  Yes      Resolved Hospital Problems   No resolved problems to display.        Brief Hospital Course to date:  Ritu Martino is a 82 y.o. female presents to the hospital with a fall, severe hyponatremia in the setting of reported polydipsia, and left fibular fracture.    Hyponatremia:  Discontinue Zoloft.  Nephrology following.  Samsca has been initiated.  Trend sodium level.  Once Samsca is discontinued will need fluid restriction as patient reports polydipsia.    Fall with left femur fracture:  Fall precautions.  Consult orthopedic surgery.  Supportive care and symptom treatment.  PT.    History of seizure: Continue AED.  Seizure precautions.    Insomnia: History noted, continue chronic Ambien.  Patient is aware of increased fall risk but wishes to continue this medication.    Anxiety with chronic benzodiazepine dependence: Continue home medication.  Stable.    Hypothyroid: Continue Synthroid.  Stable.    Recent urinary tract infection:  Recently taking Macrobid.  Urinalysis completely normal.  Discontinue Macrobid as I do not see any further indication and risks outweigh benefits at this point.    Recent prednisone use:  Patient and family report patient has been taking prednisone to treat a rash.  Rash minimal.  I think at this point the risks of continuing short course prednisone outweigh the  benefit.  Plan to have wound care evaluate for skin needs.  Frequent turning.    Essential hypertension: Continue home medicines.  Currently normotensive.    DVT Prophylaxis: Lovenox      Disposition: Pending clinical course    CODE STATUS:   Code Status and Medical Interventions:   Ordered at: 06/30/22 1724     Code Status (Patient has no pulse and is not breathing):    CPR (Attempt to Resuscitate)     Medical Interventions (Patient has pulse or is breathing):    Full       Santiago Swift MD  07/01/22

## 2022-07-01 NOTE — PLAN OF CARE
Goal Outcome Evaluation:         Family at bedside, VSS. Xanax, Ambien and Atarax given for sleep/anxiety. Immobilizer boot worn on LLE at all times. No complaints of pain. Nitrofurantoin, prednisone, and diflucan orders placed per FIONA Piña. Dr. Flores notified of urine sample results. Left message for ortho consult. Unable to speak with anyone in person.

## 2022-07-01 NOTE — CONSULTS
[facility]                                                                                             Nephrology Initial Consult Note    Patient Identification:  Name: Ritu Martino MRN: [id number]  Age: 82 y.o. : 1940  Sex: female  Date:2022    Requesting Physician: As per consult order.  Reason for Consultation: Hyponatremia  Information from:patient/ family/ chart      History of Present Illness: This is a 82 y.o. year old female who appears to have a longstanding history of hyponatremia dating back to , does not follow with nephrologist currently.  Sodium has been very labile over that period of time since  but seems to average around 130.  Recently started on an SSRI but that was fairly recent, within the last week.  She is not on a thiazide diuretic, is on lisinopril for hypertension.  Came to the ER yesterday after a fall and period of unresponsiveness.  Denies any complaints now but had some abdominal pain earlier in the day.  Main complaint continues to be extreme anxiety which is why she was started on the Zoloft.  Her  reports an unresponsive episode at home but does not sound like a true seizure.  Her  does state that she drinks large amount of fluid over the course of the day, no other current nausea vomiting abdominal pain shortness of breath chest pain or dysuria.  Sodium was initially 117 after IV fluids, got up to 121 but she is back down to 117 this morning.  Continues to feel symptomatic with the hyponatremia.  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      The following medical history and medications personally reviewed by me:    Problem List:   Patient Active Problem List    Diagnosis    • *Hyponatremia [E87.1]    • Debility [R53.81]    • History of seizure [Z87.898]    •  Closed fracture of left distal fibula [S82.832A]    • Diverticulitis [K57.92]    • Arthritis [M19.90]    • GERD (gastroesophageal reflux disease) [K21.9]    • Hernia, hiatal [K44.9]    • Seizures (HCC) [R56.9]    • Meningioma (HCC) [D32.9]    • E coli infection [A49.8]    • Acute cystitis [N30.00]    • Anemia [D64.9]    • Cervicalgia [M54.2]    • Intertrigo [L30.4]    • Insomnia [G47.00]    • Fluent aphasia [R47.01]    • Hypochloremia [E87.8]    • Benzodiazepine dependence (HCC) [F13.20]    • Iron deficiency [E61.1]    • Hypokalemia [E87.6]    • Essential hypertension, benign [I10]    • Anxiety [F41.9]    • Hypothyroidism [E03.9]    • Bowel dysfunction [K59.9]        Past Medical History:  Past Medical History:   Diagnosis Date   • Anemia    • Anxiety    • Arthritis    • Bowel dysfunction 2021    since having surgery for diverticular infection   • Diverticulitis 2021    w/ rupture and infection required surgery and ostomy   • Essential hypertension, benign    • GERD (gastroesophageal reflux disease)    • Hernia, hiatal    • Hypercholesterolemia    • Hypokalemia    • Hyponatremia     chronic low with occasional normal level   • Hypothyroidism 2018    estimated time frame pt nor  could remember exactly how long has been on medication   • Insomnia    • Iron deficiency    • Seizures (HCC)        Past Surgical History:  Past Surgical History:   Procedure Laterality Date   • APPENDECTOMY     • BACK SURGERY     •  SECTION     • COLON SURGERY  2021    to address ruptured and infected diverticular dz, ostomy also placed   • COLONOSCOPY     • COLOSTOMY CLOSURE  10/2021    ostomy removed   • EYE SURGERY  2 X cataract   • HEMORRHOIDECTOMY     • KNEE ARTHROPLASTY     • TONSILLECTOMY          Home Meds:   Medications Prior to Admission   Medication Sig Dispense Refill Last Dose   • acetaminophen (TYLENOL) 325 MG tablet Take 2 tablets by mouth Every 6 (Six) Hours As Needed for  Mild Pain .      • ALPRAZolam (XANAX) 0.25 MG tablet Take 1 tablet by mouth 2 (Two) Times a Day. 60 tablet 3    • benazepril (LOTENSIN) 20 MG tablet Take 20 mg by mouth Daily.      • Cholecalciferol (VITAMIN D) 2000 units capsule Take  by mouth Daily With Dinner.      • clotrimazole-betamethasone (LOTRISONE) 1-0.05 % cream Apply  topically to the appropriate area as directed See Admin Instructions. Apply topically to the affected area twice daily 45 g 3    • cycloSPORINE (RESTASIS) 0.05 % ophthalmic emulsion 1 drop 2 (Two) Times a Day.      • ferrous sulfate 325 (65 FE) MG tablet Take 325 mg by mouth 2 (Two) Times a Day Before Meals.      • hydrOXYzine (ATARAX) 10 MG tablet Take 1 tablet by mouth Every 8 (Eight) Hours As Needed for Anxiety (take 1-2 tablets). 40 tablet 0    • levETIRAcetam (KEPPRA) 500 MG tablet Take 500 mg by mouth 2 (Two) Times a Day.      • levothyroxine (SYNTHROID, LEVOTHROID) 50 MCG tablet Take 50 mcg by mouth Daily.      • predniSONE (DELTASONE) 10 MG tablet TAKE 4 TABLETS DAILY FOR 4 DAYS THEN 3 DAILY FOR 4 DAYS THEN 2 DAILY FOR 4 DAYS THEN 1 DAILY FOR 4 DAYS      • Probiotic Product (ALIGN PO) Take 1 capsule by mouth Daily With Lunch.      • Prucalopride Succinate (Motegrity) 1 MG tablet Take 1 mg by mouth.      • rosuvastatin (CRESTOR) 20 MG tablet Take 20 mg by mouth Every Night.      • sertraline (Zoloft) 50 MG tablet Take 1 tablet by mouth Daily. 30 tablet 3    • SODIUM CHLORIDE PO Take  by mouth 3 (Three) Times a Day.      • vitamin B-12 (CYANOCOBALAMIN) 100 MCG tablet Take 50 mcg by mouth Daily.      • zolpidem (AMBIEN) 5 MG tablet Take 5 mg by mouth every night at bedtime.          Current Meds:   Current Facility-Administered Medications   Medication Dose Route Frequency Provider Last Rate Last Admin   • acetaminophen (TYLENOL) tablet 650 mg  650 mg Oral Q4H PRN Jessenia Gonzalez MD       • ALPRAZolam (XANAX) tablet 0.25 mg  0.25 mg Oral BID Jessenia Gonzalez MD   0.25 mg  at 07/01/22 0814   • cholecalciferol (VITAMIN D3) tablet 2,000 Units  2,000 Units Oral Daily With Dinner Jessenia Gonzalez MD   2,000 Units at 06/30/22 2328   • cycloSPORINE (RESTASIS) 0.05 % ophthalmic emulsion 1 drop  1 drop Both Eyes BID Jessenia Gonzalez MD   1 drop at 07/01/22 0815   • Enoxaparin Sodium (LOVENOX) syringe 40 mg  40 mg Subcutaneous Q24H Jessenia Gonzalez MD   40 mg at 06/30/22 2327   • ferrous sulfate tablet 325 mg  325 mg Oral BID AC Jessenia Gonzalez MD   325 mg at 07/01/22 0814   • fluconazole (DIFLUCAN) tablet 100 mg  100 mg Oral Q24H Stephen Brooks APRN   100 mg at 07/01/22 0814   • HYDROcodone-acetaminophen (NORCO) 5-325 MG per tablet 1 tablet  1 tablet Oral Q6H PRN Jessenia Gonzalez MD   1 tablet at 07/01/22 0952   • hydrOXYzine (ATARAX) tablet 10 mg  10 mg Oral Q8H PRN Jessenia Gonzalez MD   10 mg at 07/01/22 0453   • lactobacillus acidophilus (RISAQUAD) capsule 1 capsule  1 capsule Oral Daily With Lunch Jessenia Gonzalez MD       • levETIRAcetam (KEPPRA) tablet 500 mg  500 mg Oral BID Jessenia Gonzalez MD   500 mg at 07/01/22 0814   • levothyroxine (SYNTHROID, LEVOTHROID) tablet 50 mcg  50 mcg Oral Daily Jessenia Gonzalez MD   50 mcg at 07/01/22 0814   • lisinopril (PRINIVIL,ZESTRIL) tablet 20 mg  20 mg Oral Q24H Jessenia Gonzalez MD   20 mg at 07/01/22 0814   • melatonin tablet 3 mg  3 mg Oral Nightly PRN Jessenia Gonzalez MD       • nitrofurantoin (macrocrystal-monohydrate) (MACROBID) capsule 100 mg  100 mg Oral Q12H Stephen Brooks APRN   100 mg at 07/01/22 0814   • nitroglycerin (NITROSTAT) SL tablet 0.4 mg  0.4 mg Sublingual Q5 Min PRN Jessenia Gonzalez MD       • ondansetron (ZOFRAN) tablet 4 mg  4 mg Oral Q6H PRN Jessenia Gonzalez MD        Or   • ondansetron (ZOFRAN) injection 4 mg  4 mg Intravenous Q6H PRN Jessenia Gonzalez MD       • predniSONE (DELTASONE) tablet 10 mg  10 mg Oral Daily With  Breakfast Stephen Brooks APRN   10 mg at 22   • rosuvastatin (CRESTOR) tablet 20 mg  20 mg Oral Nightly Jessenia Gonzalez MD   20 mg at 22   • sodium chloride 0.9 % flush 10 mL  10 mL Intravenous PRN Nikhil Mazariegos MD   10 mL at 22   • sodium chloride tablet 1 g  1 g Oral TID Jessenia Gonzalez MD   1 g at 22   • vitamin B-12 (CYANOCOBALAMIN) tablet 50 mcg  50 mcg Oral Daily Jessenia Gonzalez MD   50 mcg at 22   • zolpidem (AMBIEN) tablet 5 mg  5 mg Oral Nightly PRN Jessenia Gonzalez MD   5 mg at 22       Allergies:  No Known Allergies    Social History:   Social History     Socioeconomic History   • Marital status:    Tobacco Use   • Smoking status: Former Smoker     Packs/day: 0.25     Years: 15.00     Pack years: 3.75     Types: Cigarettes     Start date: 1956     Quit date: 1971     Years since quittin.5   • Smokeless tobacco: Never Used   Vaping Use   • Vaping Use: Never used   Substance and Sexual Activity   • Alcohol use: Yes     Alcohol/week: 8.0 standard drinks     Types: 4 Glasses of wine, 4 Shots of liquor per week     Comment: occasional   • Drug use: No   • Sexual activity: Not Currently     Partners: Male        Family History:  Family History   Problem Relation Age of Onset   • Brain cancer Mother    • Cancer Mother         Brain tumor,    • Stroke Father         Age 46   • Cancer Brother         pancreatic        Review of Systems: as per HPI, in addition:    General:      + Weakness / fatigue,                       No fevers / chills                       no weight loss  HEENT:       no dysphagia / odynophagia  Neck:           normal range of motion, no swelling  Respiratory: no cough / congestion                      No shortness of air                       No wheezing  CV:              No chest pain                       No palpitations  Abdomen/GI: no nausea / vomiting               "        No diarrhea / constipation                      Resolved abdominal pain  :             no dysuria / urinary frequency                       No urgency, normal output  Endocrine:   no polyuria /+ polydipsia,                      No heat or cold intolerance  Skin:           no rashes or skin breakdown   Vascular:   No edema                     No claudication  Psych:        no depression/ anxiety  Neuro:        Resolved weakness and mental status changes  Musculoskeletal: no joint pain or deformities      Physical Exam:  Vitals:   Temp (24hrs), Av.1 °F (36.7 °C), Min:97.6 °F (36.4 °C), Max:98.4 °F (36.9 °C)    /89 (BP Location: Right arm, Patient Position: Lying)   Pulse 68   Temp 97.8 °F (36.6 °C) (Oral)   Resp 17   Ht 152.4 cm (60\")   Wt 58.6 kg (129 lb 1.6 oz)   SpO2 97%   BMI 25.21 kg/m²   Intake/Output:     Intake/Output Summary (Last 24 hours) at 2022 1143  Last data filed at 2022 1141  Gross per 24 hour   Intake 118 ml   Output 600 ml   Net -482 ml        Wt Readings from Last 1 Encounters:   22 0507 58.6 kg (129 lb 1.6 oz)   22 1432 54 kg (119 lb)       Exam:    General Appearance:  Awake, alert, oriented x3, no acute distress  Chronically ill-appearing   Head and Face:  Normocephalic, atraumatic, mucus membranes moist, oropharynx clear   Eyes:  No icterus, pupils equal round and reactive to light, extraocular movements intact    ENMT: Moist mucosa, tongue symmetric    Neck: Supple  no jugular venous distention  no thyromegaly   Pulmonary:  Respiratory effort: Normal  Auscultation of lungs: Clear bilaterally  No wheezes  No rhonchi  Good air movement, good expansion   Chest wall:  No tenderness or deformity   Cardiovascular:  Auscultation of the heart: Normal rhythm, no murmurs  No significant edema of bilateral lower extremities   Abdomen:  Abdomen: soft, non-tender, normal bowel sounds all four quadrants, no masses   Liver and spleen: no hepatosplenomegaly "   Musculoskeletal: Digits and nails: normal  Normal range of motion  No joint swelling or gross deformities    Skin: Skin inspection: color normal, no visible rashes or lesions  Skin palpation: texture, turgor normal, no palpable lesions   Lymphatic:  no cervical lymphadenopathy    Psychiatric: Judgement and insight: normal  Orientation to person place and time: normal  Mood and affect: normal       DATA:  Radiology and Labs:  The following labs independently reviewed by me, additional AM labs ordered  Old records independently reviewed showing chronic hyponatremia as detailed above  The following radiologic studies independently viewed by me, findings chest x-ray nothing acute, no masses  Interval notes, chart personally reviewed by me.  I have reviewed and summarized old records as detailed above  Plan of care discussed with patient and her  at bedside  New problems include symptomatic hyponatremia, acute on chronic      Risk/ complexity of medical care/ medical decision making: High complexity, need for close monitoring of her sodium level  I discussed with the patient the risks and benefits associated with medical management of their hyponatremia.  Benefits of correction include avoiding any adverse effects of low sodium including seizures, confusion, disorientation and the associated increased risk of falls with persistently low or falling sodium levels.  The side effects can be serious and potentially life-threatening making correction of the low sodium desirable.  I discussed that every effort will be made to closely monitor sodium levels and raise sodium levels at an acceptable rate of correction..  Risks associated with treatment of hyponatremia include osmotic demyelination with permanent brain damage even if every effort is being made to closely monitor levels and raise sodium at an acceptable rate.  I discussed with the patient that hyponatremia can be an extremely difficult condition to manage.   We discussed that at times sodium levels can remain stable or even fall with appropriate corrective measures and then suddenly increase, sometimes at a rate higher than desirable despite no change in the method of correction.  If overcorrection occurs we can give medications including IV fluids to decrease the rate of correction.  After this explanation, patient consents to start treatment to correct the underlying hyponatremia    Chronic illness with severe exacerbation or progression      Labs:   Recent Results (from the past 24 hour(s))   ECG 12 Lead    Collection Time: 06/30/22  2:46 PM   Result Value Ref Range    QT Interval 366 ms   Comprehensive Metabolic Panel    Collection Time: 06/30/22  3:06 PM    Specimen: Blood   Result Value Ref Range    Glucose 123 (H) 65 - 99 mg/dL    BUN 11 8 - 23 mg/dL    Creatinine 0.60 0.57 - 1.00 mg/dL    Sodium 117 (C) 136 - 145 mmol/L    Potassium 3.7 3.5 - 5.2 mmol/L    Chloride 83 (L) 98 - 107 mmol/L    CO2 23.6 22.0 - 29.0 mmol/L    Calcium 8.9 8.6 - 10.5 mg/dL    Total Protein 7.3 6.0 - 8.5 g/dL    Albumin 4.60 3.50 - 5.20 g/dL    ALT (SGPT) 23 1 - 33 U/L    AST (SGOT) 20 1 - 32 U/L    Alkaline Phosphatase 69 39 - 117 U/L    Total Bilirubin 2.7 (H) 0.0 - 1.2 mg/dL    Globulin 2.7 gm/dL    A/G Ratio 1.7 g/dL    BUN/Creatinine Ratio 18.3 7.0 - 25.0    Anion Gap 10.4 5.0 - 15.0 mmol/L    eGFR 89.7 >60.0 mL/min/1.73   Urinalysis With Microscopic If Indicated (No Culture) - Urine, Clean Catch    Collection Time: 06/30/22  3:06 PM    Specimen: Urine, Clean Catch   Result Value Ref Range    Color, UA Yellow Yellow, Straw    Appearance, UA Clear Clear    pH, UA 6.5 5.0 - 8.0    Specific Gravity, UA 1.018 1.005 - 1.030    Glucose, UA Negative Negative    Ketones, UA Negative Negative    Bilirubin, UA Negative Negative    Blood, UA Negative Negative    Protein, UA Negative Negative    Leuk Esterase, UA Negative Negative    Nitrite, UA Negative Negative    Urobilinogen, UA 0.2  E.U./dL 0.2 - 1.0 E.U./dL   Troponin    Collection Time: 06/30/22  3:06 PM    Specimen: Blood   Result Value Ref Range    Troponin T <0.010 0.000 - 0.030 ng/mL   Lipase    Collection Time: 06/30/22  3:06 PM    Specimen: Blood   Result Value Ref Range    Lipase 44 13 - 60 U/L   COVID-19,APTIMA PANTHER(MICHELLE),[facility], NP/OP SWAB IN UTM/VTM/[facility] TRANSPORT MEDIA,24 HR TAT - Swab, Nasopharynx    Collection Time: 06/30/22  3:08 PM    Specimen: Nasopharynx; Swab   Result Value Ref Range    COVID19 Not Detected Not Detected - Ref. Range   CBC Auto Differential    Collection Time: 06/30/22  3:42 PM    Specimen: Blood   Result Value Ref Range    WBC 16.57 (H) 3.40 - 10.80 10*3/mm3    RBC 3.99 3.77 - 5.28 10*6/mm3    Hemoglobin 12.6 12.0 - 15.9 g/dL    Hematocrit 36.1 34.0 - 46.6 %    MCV 90.5 79.0 - 97.0 fL    MCH 31.6 26.6 - 33.0 pg    MCHC 34.9 31.5 - 35.7 g/dL    RDW 11.8 (L) 12.3 - 15.4 %    RDW-SD 38.8 37.0 - 54.0 fl    MPV 8.0 6.0 - 12.0 fL    Platelets 345 140 - 450 10*3/mm3    Neutrophil % 94.2 (H) 42.7 - 76.0 %    Lymphocyte % 1.8 (L) 19.6 - 45.3 %    Monocyte % 3.4 (L) 5.0 - 12.0 %    Eosinophil % 0.0 (L) 0.3 - 6.2 %    Basophil % 0.1 0.0 - 1.5 %    Immature Grans % 0.5 0.0 - 0.5 %    Neutrophils, Absolute 15.61 (H) 1.70 - 7.00 10*3/mm3    Lymphocytes, Absolute 0.30 (L) 0.70 - 3.10 10*3/mm3    Monocytes, Absolute 0.56 0.10 - 0.90 10*3/mm3    Eosinophils, Absolute 0.00 0.00 - 0.40 10*3/mm3    Basophils, Absolute 0.01 0.00 - 0.20 10*3/mm3    Immature Grans, Absolute 0.09 (H) 0.00 - 0.05 10*3/mm3    nRBC 0.0 0.0 - 0.2 /100 WBC   Troponin    Collection Time: 06/30/22  4:58 PM    Specimen: Blood   Result Value Ref Range    Troponin T <0.010 0.000 - 0.030 ng/mL   Sodium    Collection Time: 06/30/22  7:14 PM    Specimen: Blood   Result Value Ref Range    Sodium 118 (C) 136 - 145 mmol/L   Osmolality, Urine - Urine, Clean Catch    Collection Time: 06/30/22  7:36 PM    Specimen: Urine, Clean Catch   Result Value Ref  Range    Osmolality, Urine 272 mOsm/kg   Sodium, Urine, Random - Urine, Clean Catch    Collection Time: 06/30/22  7:36 PM    Specimen: Urine, Clean Catch   Result Value Ref Range    Sodium, Urine 77 mmol/L   Sodium    Collection Time: 07/01/22  1:28 AM    Specimen: Arm, Right; Blood   Result Value Ref Range    Sodium 121 (L) 136 - 145 mmol/L   CBC (No Diff)    Collection Time: 07/01/22  8:37 AM    Specimen: Blood   Result Value Ref Range    WBC 10.89 (H) 3.40 - 10.80 10*3/mm3    RBC 4.10 3.77 - 5.28 10*6/mm3    Hemoglobin 13.2 12.0 - 15.9 g/dL    Hematocrit 36.3 34.0 - 46.6 %    MCV 88.5 79.0 - 97.0 fL    MCH 32.2 26.6 - 33.0 pg    MCHC 36.4 (H) 31.5 - 35.7 g/dL    RDW 11.8 (L) 12.3 - 15.4 %    RDW-SD 37.4 37.0 - 54.0 fl    MPV 8.0 6.0 - 12.0 fL    Platelets 327 140 - 450 10*3/mm3   Osmolality, Serum    Collection Time: 07/01/22  8:37 AM    Specimen: Blood   Result Value Ref Range    Osmolality 248 (L) 280 - 301 mOsm/kg   Basic Metabolic Panel    Collection Time: 07/01/22  8:37 AM    Specimen: Blood   Result Value Ref Range    Glucose 127 (H) 65 - 99 mg/dL    BUN 7 (L) 8 - 23 mg/dL    Creatinine 0.67 0.57 - 1.00 mg/dL    Sodium 117 (C) 136 - 145 mmol/L    Potassium 3.3 (L) 3.5 - 5.2 mmol/L    Chloride 84 (L) 98 - 107 mmol/L    CO2 22.8 22.0 - 29.0 mmol/L    Calcium 8.7 8.6 - 10.5 mg/dL    BUN/Creatinine Ratio 10.4 7.0 - 25.0    Anion Gap 10.2 5.0 - 15.0 mmol/L    eGFR 87.4 >60.0 mL/min/1.73   Uric Acid    Collection Time: 07/01/22  8:37 AM    Specimen: Blood   Result Value Ref Range    Uric Acid 1.7 (L) 2.4 - 5.7 mg/dL       Radiology:  Imaging Results (Last 24 Hours)     Procedure Component Value Units Date/Time    XR Ankle 3+ View Left [[id number]] Collected: 06/30/22 1555     Updated: 06/30/22 1558    Narrative:      XR ANKLE 3+ VW LEFT-     INDICATIONS: Trauma     TECHNIQUE: 3 views of the left ankle     COMPARISON: None available     FINDINGS:        Obliquely oriented fracture is seen at the distal fibular  "metaphysis  with adjacent soft tissue swelling. No other fractures are noted. No  dislocation.       Impression:         Distal fibular fracture.     This report was finalized on 6/30/2022 3:55 PM by Dr. Homer Maynard M.D.       XR Chest 1 View [[id number]] Collected: 06/30/22 1545     Updated: 06/30/22 1558    Narrative:      XR CHEST 1 VW-     HISTORY: Female who is 82 years-old,  chest pain     TECHNIQUE: Frontal views of the chest     COMPARISON: 06/20/2022     FINDINGS: Heart size is normal. Aorta is tortuous, calcified. Pulmonary  vasculature is unremarkable. No focal pulmonary consolidation, pleural  effusion, or pneumothorax. Old granulomatous disease is apparent. No  acute osseous process.       Impression:      No focal pulmonary consolidation. Tortuous aorta. Follow-up  as clinically indicated.     This report was finalized on 6/30/2022 3:55 PM by Dr. Homer Maynard M.D.                  ASSESSMENT:     Hyponatremia, acute on chronic.  Symptomatic.  Likely exacerbated by polydipsia and SSRI.Urine studies and low uric acid consistent with SIADH. Still mildly symptomatic.  A \"baseline\" sodium around 130. Worsening sodium likely due to polydipsia and recent SSRI  New, hypokalemia  Hypouricemia related to SIADH  SIADH    Anxiety    Hypothyroidism    Iron deficiency    Essential hypertension, benign    Benzodiazepine dependence (HCC)    Anemia    Insomnia    Arthritis    GERD (gastroesophageal reflux disease)    Closed fracture of left distal fibula    Debility    History of seizure        DISCUSSION/PLAN:   Agree with discontinuation of Zoloft, recommend alternative medications for treatment of her anxiety  Good candidate for Samsca.  We will give 15 mg dose x1 now, serial sodium levels per Samsca protocol, avoid overcorrection.  Goal sodium by tomorrow 123-125  Once her sodium is improved will restart salt tabs and fluid restriction  Advised patient about restricting fluid intake when she goes " home  Replace potassium orally and check magnesium level  ACE inhibitor may not be the best antihypertensive with chronic hyponatremia: Switch to amlodipine    Continue to monitor electrolytes and volume closely, avoid IV contrast and nephrotoxic medications     I appreciate the consult request, please call if any questions      Jn Baptiste M.D  [facility]  Office phone number: [phone]  Answering service phone number: [phone]        7/1/2022        Dictation via Dragon dictation software

## 2022-07-01 NOTE — PLAN OF CARE
Goal Outcome Evaluation:  Plan of Care Reviewed With: patient           Outcome Evaluation: Pt is a 81 yo F who was admitted with a closed L fibular fracture s/p fall. Ortho has been consulted but had not entered recommendattion at time of PT evaluation, therefore PT instructed pt to remain NWB on LLE until further clarification on WBing status is recieved. Pt presents to PT with some impaired functional mobility and gait secondary to generalized weakness and decreased WBing on L LE. Pt may benefit from skilled PT to address strength, mobility, transfers, and gait.

## 2022-07-02 LAB
ALBUMIN SERPL-MCNC: 3.7 G/DL (ref 3.5–5.2)
ANION GAP SERPL CALCULATED.3IONS-SCNC: 11.4 MMOL/L (ref 5–15)
ANION GAP SERPL CALCULATED.3IONS-SCNC: 9 MMOL/L (ref 5–15)
BUN SERPL-MCNC: 13 MG/DL (ref 8–23)
BUN SERPL-MCNC: 15 MG/DL (ref 8–23)
BUN/CREAT SERPL: 22.4 (ref 7–25)
BUN/CREAT SERPL: 23.1 (ref 7–25)
CALCIUM SPEC-SCNC: 8.5 MG/DL (ref 8.6–10.5)
CALCIUM SPEC-SCNC: 8.8 MG/DL (ref 8.6–10.5)
CHLORIDE SERPL-SCNC: 90 MMOL/L (ref 98–107)
CHLORIDE SERPL-SCNC: 92 MMOL/L (ref 98–107)
CO2 SERPL-SCNC: 21.6 MMOL/L (ref 22–29)
CO2 SERPL-SCNC: 23 MMOL/L (ref 22–29)
CREAT SERPL-MCNC: 0.58 MG/DL (ref 0.57–1)
CREAT SERPL-MCNC: 0.65 MG/DL (ref 0.57–1)
DEPRECATED RDW RBC AUTO: 38.4 FL (ref 37–54)
EGFRCR SERPLBLD CKD-EPI 2021: 88 ML/MIN/1.73
EGFRCR SERPLBLD CKD-EPI 2021: 90.5 ML/MIN/1.73
ERYTHROCYTE [DISTWIDTH] IN BLOOD BY AUTOMATED COUNT: 11.9 % (ref 12.3–15.4)
GLUCOSE SERPL-MCNC: 114 MG/DL (ref 65–99)
GLUCOSE SERPL-MCNC: 94 MG/DL (ref 65–99)
HCT VFR BLD AUTO: 33.3 % (ref 34–46.6)
HGB BLD-MCNC: 11.8 G/DL (ref 12–15.9)
MAGNESIUM SERPL-MCNC: 1.8 MG/DL (ref 1.6–2.4)
MCH RBC QN AUTO: 32 PG (ref 26.6–33)
MCHC RBC AUTO-ENTMCNC: 35.4 G/DL (ref 31.5–35.7)
MCV RBC AUTO: 90.2 FL (ref 79–97)
PHOSPHATE SERPL-MCNC: 3 MG/DL (ref 2.5–4.5)
PLATELET # BLD AUTO: 305 10*3/MM3 (ref 140–450)
PMV BLD AUTO: 8.4 FL (ref 6–12)
POTASSIUM SERPL-SCNC: 3.9 MMOL/L (ref 3.5–5.2)
POTASSIUM SERPL-SCNC: 4 MMOL/L (ref 3.5–5.2)
RBC # BLD AUTO: 3.69 10*6/MM3 (ref 3.77–5.28)
SODIUM SERPL-SCNC: 123 MMOL/L (ref 136–145)
SODIUM SERPL-SCNC: 124 MMOL/L (ref 136–145)
WBC NRBC COR # BLD: 7.36 10*3/MM3 (ref 3.4–10.8)

## 2022-07-02 PROCEDURE — 80048 BASIC METABOLIC PNL TOTAL CA: CPT | Performed by: INTERNAL MEDICINE

## 2022-07-02 PROCEDURE — 97110 THERAPEUTIC EXERCISES: CPT

## 2022-07-02 PROCEDURE — 25010000002 ONDANSETRON PER 1 MG: Performed by: INTERNAL MEDICINE

## 2022-07-02 PROCEDURE — 85027 COMPLETE CBC AUTOMATED: CPT | Performed by: INTERNAL MEDICINE

## 2022-07-02 PROCEDURE — 25010000002 ENOXAPARIN PER 10 MG: Performed by: INTERNAL MEDICINE

## 2022-07-02 PROCEDURE — 80069 RENAL FUNCTION PANEL: CPT | Performed by: INTERNAL MEDICINE

## 2022-07-02 PROCEDURE — 97530 THERAPEUTIC ACTIVITIES: CPT

## 2022-07-02 PROCEDURE — 83735 ASSAY OF MAGNESIUM: CPT | Performed by: INTERNAL MEDICINE

## 2022-07-02 RX ORDER — ACETAMINOPHEN 500 MG
1000 TABLET ORAL EVERY 8 HOURS
Status: DISCONTINUED | OUTPATIENT
Start: 2022-07-02 | End: 2022-07-05

## 2022-07-02 RX ORDER — DOCUSATE SODIUM 100 MG/1
100 CAPSULE, LIQUID FILLED ORAL 2 TIMES DAILY
Status: DISCONTINUED | OUTPATIENT
Start: 2022-07-02 | End: 2022-07-04

## 2022-07-02 RX ORDER — SODIUM CHLORIDE 1000 MG
1 TABLET, SOLUBLE MISCELLANEOUS
Status: DISCONTINUED | OUTPATIENT
Start: 2022-07-02 | End: 2022-07-06 | Stop reason: HOSPADM

## 2022-07-02 RX ORDER — BISACODYL 5 MG/1
10 TABLET, DELAYED RELEASE ORAL DAILY PRN
Status: DISCONTINUED | OUTPATIENT
Start: 2022-07-02 | End: 2022-07-03

## 2022-07-02 RX ADMIN — ALPRAZOLAM 0.25 MG: 0.25 TABLET ORAL at 20:06

## 2022-07-02 RX ADMIN — Medication 1 CAPSULE: at 11:29

## 2022-07-02 RX ADMIN — HYDROCODONE BITARTRATE AND ACETAMINOPHEN 1 TABLET: 5; 325 TABLET ORAL at 11:29

## 2022-07-02 RX ADMIN — Medication 50 MCG: at 09:31

## 2022-07-02 RX ADMIN — POTASSIUM CHLORIDE 20 MEQ: 750 TABLET, EXTENDED RELEASE ORAL at 09:30

## 2022-07-02 RX ADMIN — ACETAMINOPHEN 1000 MG: 500 TABLET ORAL at 18:30

## 2022-07-02 RX ADMIN — FLUCONAZOLE 100 MG: 100 TABLET ORAL at 09:30

## 2022-07-02 RX ADMIN — FERROUS SULFATE TAB 325 MG (65 MG ELEMENTAL FE) 325 MG: 325 (65 FE) TAB at 09:31

## 2022-07-02 RX ADMIN — SODIUM CHLORIDE TAB 1 GM 1 G: 1 TAB at 09:30

## 2022-07-02 RX ADMIN — CYCLOSPORINE 1 DROP: 0.5 EMULSION OPHTHALMIC at 20:06

## 2022-07-02 RX ADMIN — SODIUM CHLORIDE TAB 1 GM 1 G: 1 TAB at 11:29

## 2022-07-02 RX ADMIN — ONDANSETRON 4 MG: 2 INJECTION INTRAMUSCULAR; INTRAVENOUS at 06:57

## 2022-07-02 RX ADMIN — LEVETIRACETAM 500 MG: 500 TABLET, FILM COATED ORAL at 09:30

## 2022-07-02 RX ADMIN — ACETAMINOPHEN 1000 MG: 500 TABLET ORAL at 10:15

## 2022-07-02 RX ADMIN — LEVOTHYROXINE SODIUM 50 MCG: 0.05 TABLET ORAL at 09:31

## 2022-07-02 RX ADMIN — AMLODIPINE BESYLATE 5 MG: 5 TABLET ORAL at 09:31

## 2022-07-02 RX ADMIN — ROSUVASTATIN CALCIUM 20 MG: 20 TABLET, FILM COATED ORAL at 20:06

## 2022-07-02 RX ADMIN — ENOXAPARIN SODIUM 40 MG: 100 INJECTION SUBCUTANEOUS at 20:06

## 2022-07-02 RX ADMIN — ALPRAZOLAM 0.25 MG: 0.25 TABLET ORAL at 09:31

## 2022-07-02 RX ADMIN — POTASSIUM CHLORIDE 20 MEQ: 750 TABLET, EXTENDED RELEASE ORAL at 17:57

## 2022-07-02 RX ADMIN — CYCLOSPORINE 1 DROP: 0.5 EMULSION OPHTHALMIC at 09:31

## 2022-07-02 RX ADMIN — PRUCALOPRIDE 1 MG: 1 TABLET, FILM COATED ORAL at 17:55

## 2022-07-02 RX ADMIN — LEVETIRACETAM 500 MG: 500 TABLET, FILM COATED ORAL at 20:06

## 2022-07-02 RX ADMIN — DOCUSATE SODIUM 100 MG: 100 CAPSULE, LIQUID FILLED ORAL at 20:06

## 2022-07-02 RX ADMIN — DOCUSATE SODIUM 100 MG: 100 CAPSULE, LIQUID FILLED ORAL at 10:15

## 2022-07-02 RX ADMIN — SODIUM CHLORIDE TAB 1 GM 1 G: 1 TAB at 17:54

## 2022-07-02 NOTE — THERAPY TREATMENT NOTE
Patient Name: Ritu Martino  : 1940    MRN: [id number]                              Today's Date: 2022       Admit Date: 2022    Visit Dx:     ICD-10-CM ICD-9-CM   1. Closed fracture of distal end of left fibula, unspecified fracture morphology, initial encounter  S82.832A 824.8   2. Hyponatremia  E87.1 276.1   3. Multiple falls  R29.6 V15.88   4. Leukocytosis, unspecified type  D72.829 288.60   5. Chest pain, unspecified type  R07.9 786.50     Patient Active Problem List   Diagnosis   • Anxiety   • Hypothyroidism   • Hyponatremia   • Iron deficiency   • Hypokalemia   • Essential hypertension, benign   • Fluent aphasia   • Hypochloremia   • Benzodiazepine dependence (HCC)   • Anemia   • Cervicalgia   • Intertrigo   • Insomnia   • Acute cystitis   • E coli infection   • Diverticulitis   • Arthritis   • Bowel dysfunction   • GERD (gastroesophageal reflux disease)   • Hernia, hiatal   • Seizures (HCC)   • Meningioma (HCC)   • Closed fracture of left distal fibula   • Debility   • History of seizure   • Fall     Past Medical History:   Diagnosis Date   • Anemia    • Anxiety    • Arthritis    • Bowel dysfunction 2021    since having surgery for diverticular infection   • Chronic constipation    • Diverticulitis 2021    w/ rupture and infection required surgery and ostomy   • Essential hypertension, benign    • GERD (gastroesophageal reflux disease)    • Hernia, hiatal    • Hypercholesterolemia    • Hypokalemia    • Hyponatremia     chronic low with occasional normal level   • Hypothyroidism     estimated time frame pt nor  could remember exactly how long has been on medication   • Insomnia    • Iron deficiency    • Seizures (HCC)      Past Surgical History:   Procedure Laterality Date   • APPENDECTOMY     • BACK SURGERY     •  SECTION     • COLON SURGERY  2021    to address ruptured and infected diverticular dz, ostomy also placed   • COLONOSCOPY      • COLOSTOMY CLOSURE  10/2021    ostomy removed   • EYE SURGERY  2 X cataract   • HEMORRHOIDECTOMY     • KNEE ARTHROPLASTY     • TONSILLECTOMY  1946      General Information     Row Name 07/02/22 1355          Physical Therapy Time and Intention    Document Type therapy note (daily note)  -RS     Mode of Treatment individual therapy;physical therapy  -RS     Row Name 07/02/22 1356          General Information    Patient Profile Reviewed yes  -RS     Existing Precautions/Restrictions fall  WBAT with boot LLE  -RS           User Key  (r) = Recorded By, (t) = Taken By, (c) = Cosigned By    Initials Name Provider Type    RS Indira Quispe PT Physical Therapist               Mobility     Row Name 07/02/22 1356          Bed Mobility    Bed Mobility supine-sit;sit-supine  -RS     Supine-Sit Dahinda (Bed Mobility) verbal cues;supervision  -RS     Sit-Supine Dahinda (Bed Mobility) verbal cues;supervision  -RS     Assistive Device (Bed Mobility) bed rails;head of bed elevated  -RS     Row Name 07/02/22 1350          Sit-Stand Transfer    Sit-Stand Dahinda (Transfers) verbal cues;nonverbal cues (demo/gesture);contact guard  -RS     Assistive Device (Sit-Stand Transfers) walker, front-wheeled  -RS     Row Name 07/02/22 1352          Gait/Stairs (Locomotion)    Dahinda Level (Gait) verbal cues;nonverbal cues (demo/gesture);contact guard  -RS     Assistive Device (Gait) walker, front-wheeled  -RS     Distance in Feet (Gait) 10 feet  -RS     Deviations/Abnormal Patterns (Gait) maddy decreased;festinating/shuffling;gait speed decreased;stride length decreased  -RS     Comment, (Gait/Stairs) decreased ability to WB LLE due to increased pain, step to pattern, small steps  -RS           User Key  (r) = Recorded By, (t) = Taken By, (c) = Cosigned By    Initials Name Provider Type    RS Indira Quispe PT Physical Therapist               Obj/Interventions     Row Name 07/02/22 1359          Motor Skills     Therapeutic Exercise --  heel slide, LAQ, R AP x 10  -RS           User Key  (r) = Recorded By, (t) = Taken By, (c) = Cosigned By    Initials Name Provider Type    RS Indira Quispe PT Physical Therapist               Goals/Plan    No documentation.                Clinical Impression     Row Name 07/02/22 1357          Pain    Pretreatment Pain Rating 8/10  -RS     Pain Location - Side/Orientation Left  -RS     Pain Location lower  -RS     Pain Location - extremity  -RS     Pain Intervention(s) Repositioned  notified RN  -RS     Row Name 07/02/22 1357          Plan of Care Review    Plan of Care Reviewed With patient  -RS     Progress improving  -RS     Outcome Evaluation Pt reports increased L ankle pain this date however tolerates short distance ambulation in the rool to bedside commode with CGA, she was WBAT LLE with boot donned.  Pt continues to be appropriate for skilled PT to address limited functional mobility as pt has 3 LEE home with B handrails and pain remains elevated.  -RS     Row Name 07/02/22 1357          Positioning and Restraints    Pre-Treatment Position in bed  -RS     Post Treatment Position bed  -RS     In Bed notified nsg;call light within reach;encouraged to call for assist;exit alarm on;supine  -RS           User Key  (r) = Recorded By, (t) = Taken By, (c) = Cosigned By    Initials Name Provider Type    RS Indira Quispe PT Physical Therapist               Outcome Measures     Row Name 07/02/22 1400          How much help from another person do you currently need...    Turning from your back to your side while in flat bed without using bedrails? 4  -RS     Moving from lying on back to sitting on the side of a flat bed without bedrails? 3  -RS     Moving to and from a bed to a chair (including a wheelchair)? 3  -RS     Standing up from a chair using your arms (e.g., wheelchair, bedside chair)? 3  -RS     Climbing 3-5 steps with a railing? 2  -RS     To walk in hospital room? 3  -RS      AM-PAC 6 Clicks Score (PT) 18  -     Highest level of mobility 6 --> Walked 10 steps or more  -     Row Name 07/02/22 1400          Functional Assessment    Outcome Measure Options AM-PAC 6 Clicks Basic Mobility (PT)  -RS           User Key  (r) = Recorded By, (t) = Taken By, (c) = Cosigned By    Initials Name Provider Type    RS Indira Quispe, DEMETRIUS Physical Therapist                             Physical Therapy Education                 Title: PT OT SLP Therapies (Done)     Topic: Physical Therapy (Done)     Point: Mobility training (Done)     Learning Progress Summary           Patient Acceptance, E, VU by  at 7/2/2022 1400    Acceptance, E,TB,D, VU,NR by  at 7/1/2022 1639                   Point: Home exercise program (Done)     Learning Progress Summary           Patient Acceptance, E, VU by  at 7/2/2022 1400    Acceptance, E,TB,D, VU,NR by  at 7/1/2022 1639                   Point: Body mechanics (Done)     Learning Progress Summary           Patient Acceptance, E, VU by  at 7/2/2022 1400    Acceptance, E,TB,D, VU,NR by  at 7/1/2022 1639                   Point: Precautions (Done)     Learning Progress Summary           Patient Acceptance, E, VU by  at 7/2/2022 1400    Acceptance, E,TB,D, VU,NR by  at 7/1/2022 1639                               User Key     Initials Effective Dates Name Provider Type Discipline     06/16/21 -  Ermelinda Brunner, PT Physical Therapist PT     06/16/21 -  Indira Quispe PT Physical Therapist PT              PT Recommendation and Plan     Plan of Care Reviewed With: patient  Progress: improving  Outcome Evaluation: Pt reports increased L ankle pain this date however tolerates short distance ambulation in the rool to bedside commode with CGA, she was WBAT LLE with boot donned.  Pt continues to be appropriate for skilled PT to address limited functional mobility as pt has 3 LEE home with B handrails and pain remains elevated.     Time Calculation:     PT Charges     Row Name 07/02/22 1400             Time Calculation    Start Time 0934  -RS      Stop Time 0957  -RS      Time Calculation (min) 23 min  -RS      PT - Next Appointment 07/03/22  -RS              Timed Charges    74220 - PT Therapeutic Exercise Minutes 8  -RS      72986 - PT Therapeutic Activity Minutes 15  -RS              Total Minutes    Timed Charges Total Minutes 23  -RS       Total Minutes 23  -RS            User Key  (r) = Recorded By, (t) = Taken By, (c) = Cosigned By    Initials Name Provider Type    RS Indira Quispe, PT Physical Therapist              Therapy Charges for Today     Code Description Service Date Service Provider Modifiers Qty    76163943890 HC PT THER PROC EA 15 MIN 7/2/2022 Indira Quispe, PT GP 1    91778572794 HC PT THERAPEUTIC ACT EA 15 MIN 7/2/2022 Indira Quispe, PT GP 1          PT G-Codes  Outcome Measure Options: AM-PAC 6 Clicks Basic Mobility (PT)  AM-PAC 6 Clicks Score (PT): 18  AM-PAC 6 Clicks Score (OT): 19    Indira Quispe PT  7/2/2022

## 2022-07-02 NOTE — PROGRESS NOTES
[facility] Medicine Services  PROGRESS NOTE    Patient Name: Ritu Martino  : 1940  MRN: [id number]    Date of Admission: 2022  Primary Care Physician: Gaurav Barger MD    Subjective   Subjective     CC:      HPI:  Follow-up fall, left leg pain from fibular fracture.  Daughter and granddaughter at bedside.  She is also reporting generalized malaise.  Sodium level has improved to 124.    Review of Systems  No current fevers or chills  No current shortness of breath or cough  No current nausea, vomiting, or diarrhea  No current chest pain or palpitations      Objective   Objective     Vital Signs:   Temp:  [97.7 °F (36.5 °C)-98.2 °F (36.8 °C)] 98.2 °F (36.8 °C)  Heart Rate:  [] 106  Resp:  [16-20] 20  BP: ()/(54-87) 111/57        Physical Exam:  Constitutional:Awake, alert, chronically ill-appearing  HENT: NCAT, mucous membranes moist, neck supple  Respiratory: Clear to auscultation bilaterally, respiratory effort normal, nonlabored breathing   Cardiovascular: RRR, normal radial pulses  Gastrointestinal: Positive bowel sounds, soft, nontender, nondistended  Musculoskeletal: Elderly frail and chronically debilitated in appearance, BMI is 25, mild lower extremity edema  Psychiatric: calm affect, cooperative, conversational  Neurologic: No slurred speech or facial droop, follows commands  Skin: No rashes or jaundice, warm      Results Reviewed:  Results from last 7 days   Lab Units 22  0530 22  0837 22  1542   WBC 10*3/mm3 7.36 10.89* 16.57*   HEMOGLOBIN g/dL 11.8* 13.2 12.6   HEMATOCRIT % 33.3* 36.3 36.1   PLATELETS 10*3/mm3 305 327 345     Results from last 7 days   Lab Units 22  0530 22  0006 22  1815 22  1914 22  1658 22  1506 22  1606   SODIUM mmol/L 124* 123* 121*   < >  --  117* 123*   POTASSIUM mmol/L 3.9 4.0 4.2   < >  --  3.7 3.9   CHLORIDE mmol/L 92* 90* 89*   < >  --  83* 85*   CO2 mmol/L 23.0 21.6* 19.0*   < >   --  23.6 25.0   BUN mg/dL 13 15 11   < >  --  11 11   CREATININE mg/dL 0.58 0.65 0.73   < >  --  0.60 0.66   GLUCOSE mg/dL 94 114* 135*   < >  --  123* 105*   CALCIUM mg/dL 8.5* 8.8 9.0   < >  --  8.9 10.2   ALT (SGPT) U/L  --   --   --   --   --  23 18   AST (SGOT) U/L  --   --   --   --   --  20 14   TROPONIN T ng/mL  --   --   --   --  <0.010 <0.010  --     < > = values in this interval not displayed.     Estimated Creatinine Clearance: 58 mL/min (by C-G formula based on SCr of 0.58 mg/dL).    Microbiology Results Abnormal     Procedure Component Value - Date/Time    COVID PRE-OP / PRE-PROCEDURE SCREENING ORDER (NO ISOLATION) - Swab, Nasopharynx [[id number]]  (Normal) Collected: 06/30/22 1508    Lab Status: Final result Specimen: Swab from Nasopharynx Updated: 06/30/22 2047    Narrative:      The following orders were created for panel order COVID PRE-OP / PRE-PROCEDURE SCREENING ORDER (NO ISOLATION) - Swab, Nasopharynx.  Procedure                               Abnormality         Status                     ---------                               -----------         ------                     COVID-19,APTIMA PANTHER(...[[id number]]  Normal              Final result                 Please view results for these tests on the individual orders.    COVID-19,APTIMA PANTHER(MICHELLE),[facility], NP/OP SWAB IN UTM/VTM/SALINE TRANSPORT MEDIA,24 HR TAT - Swab, Nasopharynx [[id number]]  (Normal) Collected: 06/30/22 1508    Lab Status: Final result Specimen: Swab from Nasopharynx Updated: 06/30/22 2047     COVID19 Not Detected    Narrative:      Fact sheet for providers: https://www.fda.gov/media/528497/download     Fact sheet for patients: https://www.fda.gov/media/292871/download    Test performed by RT PCR.          Imaging Results (Last 24 Hours)     ** No results found for the last 24 hours. **              I have reviewed the medications:  Scheduled Meds:acetaminophen, 1,000 mg, Oral, Q8H  ALPRAZolam, 0.25 mg, Oral,  BID  amLODIPine, 5 mg, Oral, Q24H  cholecalciferol, 2,000 Units, Oral, Daily With Dinner  cycloSPORINE, 1 drop, Both Eyes, BID  docusate sodium, 100 mg, Oral, BID  enoxaparin, 40 mg, Subcutaneous, Q24H  ferrous sulfate, 325 mg, Oral, BID AC  fluconazole, 100 mg, Oral, Q24H  lactobacillus acidophilus, 1 capsule, Oral, Daily With Lunch  levETIRAcetam, 500 mg, Oral, BID  levothyroxine, 50 mcg, Oral, Daily  potassium chloride, 20 mEq, Oral, BID With Meals  rosuvastatin, 20 mg, Oral, Nightly  sodium chloride, 1 g, Oral, TID With Meals  vitamin B-12, 50 mcg, Oral, Daily      Continuous Infusions:   PRN Meds:.acetaminophen  •  bisacodyl  •  HYDROcodone-acetaminophen  •  hydrOXYzine  •  melatonin  •  nitroglycerin  •  ondansetron **OR** ondansetron  •  [COMPLETED] Insert peripheral IV **AND** sodium chloride  •  zolpidem    Assessment & Plan   Assessment & Plan     Active Hospital Problems    Diagnosis  POA   • **Hyponatremia [E87.1]  Yes   • Debility [R53.81]  Yes   • History of seizure [Z87.898]  Yes   • Fall [W19.XXXA]  Yes   • Closed fracture of left distal fibula [S82.832A]  Yes   • GERD (gastroesophageal reflux disease) [K21.9]  Yes   • Arthritis [M19.90]  Yes   • Insomnia [G47.00]  Yes   • Benzodiazepine dependence (HCC) [F13.20]  Yes   • Iron deficiency [E61.1]  Yes   • Essential hypertension, benign [I10]  Yes   • Hypothyroidism [E03.9]  Yes   • Anxiety [F41.9]  Yes      Resolved Hospital Problems   No resolved problems to display.        Brief Hospital Course to date:  Ritu Martino is a 82 y.o. female presents to the hospital with a fall, severe hyponatremia in the setting of reported polydipsia, and left fibular fracture.    Hyponatremia:  Discontinue Zoloft.  Nephrology following.  Samsca given yesterday.  Trend sodium level.  Samsca has been stopped, lhe is on a 1 L fluid restriction and continues sodium chloride 1 g 3 times daily AC.  Daughter is requesting information on antidepressant that would not  cause hyponatremia.    Fall with left fibula fracture:  Fall precautions.  Orthopedic surgery consulted.  Supportive care and symptom treatment.  PT. Weightbearing as tolerated in boot.    History of seizure: Continue AED.  Seizure precautions.    Insomnia: History noted, continue chronic Ambien.  Patient is aware of increased fall risk but wishes to continue this medication.    Anxiety with chronic benzodiazepine dependence: Continue home medication.  Stable.    Hypothyroid: Continue Synthroid.  Stable.    Recent urinary tract infection:  Recently taking Macrobid.  Urinalysis completely normal.  Discontinue Macrobid as I do not see any further indication and risks outweigh benefits at this point.    Recent prednisone use:  Patient and family report patient has been taking prednisone to treat a rash.  Rash minimal.  I think at this point the risks of continuing short course prednisone outweigh the benefit.  Plan to have wound care evaluate for skin needs.  Frequent turning.    Essential hypertension: Continue home medicines.  Currently normotensive.    DVT Prophylaxis: Lovenox    Disposition: Pending clinical course; yoko with patient and daughter, likely ready for discharge early next week Monday/Tuesday    CODE STATUS:   Code Status and Medical Interventions:   Ordered at: 06/30/22 1724     Code Status (Patient has no pulse and is not breathing):    CPR (Attempt to Resuscitate)     Medical Interventions (Patient has pulse or is breathing):    Full       Tatyana Castro MD  07/02/22

## 2022-07-02 NOTE — PROGRESS NOTES
"RENAL/KCC:     LOS: 2 days    Patient Care Team:  Gaurav Barger MD as PCP - General (Internal Medicine)    Chief Complaint:  Hyponatremia    Subjective     Interval History:   Chart reviewed, asking about left foot boot    Objective     Vital Sign Min/Max for last 24 hours  Temp  Min: 97.7 °F (36.5 °C)  Max: 98.2 °F (36.8 °C)   BP  Min: 95/54  Max: 120/69   Pulse  Min: 75  Max: 110   Resp  Min: 16  Max: 20   SpO2  Min: 96 %  Max: 98 %   No data recorded   Weight  Min: 54.6 kg (120 lb 4.8 oz)  Max: 54.6 kg (120 lb 4.8 oz)     Flowsheet Rows    Flowsheet Row First Filed Value   Admission Height 152.4 cm (60\") Documented at 06/30/2022 1432   Admission Weight 54 kg (119 lb) Documented at 06/30/2022 1432          I/O this shift:  In: 118 [P.O.:118]  Out: -   I/O last 3 completed shifts:  In: 258 [P.O.:258]  Out: 1100 [Urine:1100]    Physical Exam:  GEN: Awake, NAD  ENT: PERRL, EOMI, MMM  NECK: Supple, no JVD  CHEST: CTAB, no W/R/C  CV: RRR, no M/G/R  ABD: Soft, NT, +BS  SKIN: Warm and Dry  NEURO: CN's intact      WBC WBC   Date Value Ref Range Status   07/02/2022 7.36 3.40 - 10.80 10*3/mm3 Final   07/01/2022 10.89 (H) 3.40 - 10.80 10*3/mm3 Final   06/30/2022 16.57 (H) 3.40 - 10.80 10*3/mm3 Final      HGB Hemoglobin   Date Value Ref Range Status   07/02/2022 11.8 (L) 12.0 - 15.9 g/dL Final   07/01/2022 13.2 12.0 - 15.9 g/dL Final   06/30/2022 12.6 12.0 - 15.9 g/dL Final      HCT Hematocrit   Date Value Ref Range Status   07/02/2022 33.3 (L) 34.0 - 46.6 % Final   07/01/2022 36.3 34.0 - 46.6 % Final   06/30/2022 36.1 34.0 - 46.6 % Final      Platlets No results found for: LABPLAT   MCV MCV   Date Value Ref Range Status   07/02/2022 90.2 79.0 - 97.0 fL Final   07/01/2022 88.5 79.0 - 97.0 fL Final   06/30/2022 90.5 79.0 - 97.0 fL Final          Sodium Sodium   Date Value Ref Range Status   07/02/2022 124 (L) 136 - 145 mmol/L Final   07/02/2022 123 (L) 136 - 145 mmol/L Final   07/01/2022 121 (L) 136 - 145 mmol/L Final "   07/01/2022 120 (L) 136 - 145 mmol/L Final   07/01/2022 117 (C) 136 - 145 mmol/L Final   07/01/2022 121 (L) 136 - 145 mmol/L Final   06/30/2022 118 (C) 136 - 145 mmol/L Final   06/30/2022 117 (C) 136 - 145 mmol/L Final      Potassium Potassium   Date Value Ref Range Status   07/02/2022 3.9 3.5 - 5.2 mmol/L Final   07/02/2022 4.0 3.5 - 5.2 mmol/L Final   07/01/2022 4.2 3.5 - 5.2 mmol/L Final   07/01/2022 3.6 3.5 - 5.2 mmol/L Final     Comment:     Slight hemolysis detected by analyzer. Results may be affected.   07/01/2022 3.3 (L) 3.5 - 5.2 mmol/L Final   06/30/2022 3.7 3.5 - 5.2 mmol/L Final      Chloride Chloride   Date Value Ref Range Status   07/02/2022 92 (L) 98 - 107 mmol/L Final   07/02/2022 90 (L) 98 - 107 mmol/L Final   07/01/2022 89 (L) 98 - 107 mmol/L Final   07/01/2022 86 (L) 98 - 107 mmol/L Final   07/01/2022 84 (L) 98 - 107 mmol/L Final   06/30/2022 83 (L) 98 - 107 mmol/L Final      CO2 CO2   Date Value Ref Range Status   07/02/2022 23.0 22.0 - 29.0 mmol/L Final   07/02/2022 21.6 (L) 22.0 - 29.0 mmol/L Final   07/01/2022 19.0 (L) 22.0 - 29.0 mmol/L Final   07/01/2022 23.0 22.0 - 29.0 mmol/L Final   07/01/2022 22.8 22.0 - 29.0 mmol/L Final   06/30/2022 23.6 22.0 - 29.0 mmol/L Final      BUN BUN   Date Value Ref Range Status   07/02/2022 13 8 - 23 mg/dL Final   07/02/2022 15 8 - 23 mg/dL Final   07/01/2022 11 8 - 23 mg/dL Final   07/01/2022 11 8 - 23 mg/dL Final   07/01/2022 7 (L) 8 - 23 mg/dL Final   06/30/2022 11 8 - 23 mg/dL Final      Creatinine Creatinine   Date Value Ref Range Status   07/02/2022 0.58 0.57 - 1.00 mg/dL Final   07/02/2022 0.65 0.57 - 1.00 mg/dL Final   07/01/2022 0.73 0.57 - 1.00 mg/dL Final   07/01/2022 0.58 0.57 - 1.00 mg/dL Final   07/01/2022 0.67 0.57 - 1.00 mg/dL Final   06/30/2022 0.60 0.57 - 1.00 mg/dL Final      Calcium Calcium   Date Value Ref Range Status   07/02/2022 8.5 (L) 8.6 - 10.5 mg/dL Final   07/02/2022 8.8 8.6 - 10.5 mg/dL Final   07/01/2022 9.0 8.6 - 10.5 mg/dL  Final   07/01/2022 8.4 (L) 8.6 - 10.5 mg/dL Final   07/01/2022 8.7 8.6 - 10.5 mg/dL Final   06/30/2022 8.9 8.6 - 10.5 mg/dL Final      PO4 No results found for: CAPO4   Albumin Albumin   Date Value Ref Range Status   07/02/2022 3.70 3.50 - 5.20 g/dL Final   06/30/2022 4.60 3.50 - 5.20 g/dL Final      Magnesium Magnesium   Date Value Ref Range Status   07/02/2022 1.8 1.6 - 2.4 mg/dL Final      Uric Acid Uric Acid   Date Value Ref Range Status   07/01/2022 1.7 (L) 2.4 - 5.7 mg/dL Final           Results Review:     I reviewed the patient's new clinical results.    ALPRAZolam, 0.25 mg, Oral, BID  amLODIPine, 5 mg, Oral, Q24H  cholecalciferol, 2,000 Units, Oral, Daily With Dinner  cycloSPORINE, 1 drop, Both Eyes, BID  enoxaparin, 40 mg, Subcutaneous, Q24H  ferrous sulfate, 325 mg, Oral, BID AC  fluconazole, 100 mg, Oral, Q24H  lactobacillus acidophilus, 1 capsule, Oral, Daily With Lunch  levETIRAcetam, 500 mg, Oral, BID  levothyroxine, 50 mcg, Oral, Daily  potassium chloride, 20 mEq, Oral, BID With Meals  rosuvastatin, 20 mg, Oral, Nightly  sodium chloride, 1 g, Oral, TID With Meals  vitamin B-12, 50 mcg, Oral, Daily           Medication Review: Reviewed    Assessment & Plan       SIADH    Hyponatremia    Anxiety    Hypothyroidism    Iron deficiency    Essential hypertension, benign    Benzodiazepine dependence (HCC)    Insomnia    Arthritis    GERD (gastroesophageal reflux disease)    Closed fracture of left distal fibula    Debility    History of seizure    Fall      Plan: Na improved nicely to 124 s/p Samsca.  Resume oral NaCl and fluid restriction.  Keep off Zoloft indefinitely.  Will follow.      Dex Luna MD   [facility]  07/02/22  09:15 EDT

## 2022-07-02 NOTE — PLAN OF CARE
Goal Outcome Evaluation:               No complaints overnight. Private sitter at bedside. Up to BSC with assist x1.Turns as tolerated. VSS. Will continue to monitor.

## 2022-07-02 NOTE — PLAN OF CARE
Goal Outcome Evaluation:  Plan of Care Reviewed With: patient        Progress: improving  Outcome Evaluation: Pt reports increased L ankle pain this date however tolerates short distance ambulation in the rool to bedside commode with CGA, she was WBAT LLE with boot donned.  Pt continues to be appropriate for skilled PT to address limited functional mobility as pt has 3 LEE home with B handrails and pain remains elevated.

## 2022-07-03 LAB
ALBUMIN SERPL-MCNC: 3.4 G/DL (ref 3.5–5.2)
ANION GAP SERPL CALCULATED.3IONS-SCNC: 9.7 MMOL/L (ref 5–15)
BUN SERPL-MCNC: 15 MG/DL (ref 8–23)
BUN/CREAT SERPL: 23.1 (ref 7–25)
CALCIUM SPEC-SCNC: 8.6 MG/DL (ref 8.6–10.5)
CHLORIDE SERPL-SCNC: 97 MMOL/L (ref 98–107)
CO2 SERPL-SCNC: 17.3 MMOL/L (ref 22–29)
CREAT SERPL-MCNC: 0.65 MG/DL (ref 0.57–1)
DEPRECATED RDW RBC AUTO: 38.8 FL (ref 37–54)
EGFRCR SERPLBLD CKD-EPI 2021: 88 ML/MIN/1.73
ERYTHROCYTE [DISTWIDTH] IN BLOOD BY AUTOMATED COUNT: 11.6 % (ref 12.3–15.4)
GLUCOSE SERPL-MCNC: 134 MG/DL (ref 65–99)
HCT VFR BLD AUTO: 36.7 % (ref 34–46.6)
HGB BLD-MCNC: 12.7 G/DL (ref 12–15.9)
MCH RBC QN AUTO: 32.3 PG (ref 26.6–33)
MCHC RBC AUTO-ENTMCNC: 34.6 G/DL (ref 31.5–35.7)
MCV RBC AUTO: 93.4 FL (ref 79–97)
PHOSPHATE SERPL-MCNC: 2.3 MG/DL (ref 2.5–4.5)
PLATELET # BLD AUTO: 329 10*3/MM3 (ref 140–450)
PMV BLD AUTO: 8.5 FL (ref 6–12)
POTASSIUM SERPL-SCNC: 3.8 MMOL/L (ref 3.5–5.2)
QT INTERVAL: 327 MS
RBC # BLD AUTO: 3.93 10*6/MM3 (ref 3.77–5.28)
SODIUM SERPL-SCNC: 124 MMOL/L (ref 136–145)
TROPONIN T SERPL-MCNC: <0.01 NG/ML (ref 0–0.03)
WBC NRBC COR # BLD: 11.71 10*3/MM3 (ref 3.4–10.8)

## 2022-07-03 PROCEDURE — 90791 PSYCH DIAGNOSTIC EVALUATION: CPT

## 2022-07-03 PROCEDURE — 84484 ASSAY OF TROPONIN QUANT: CPT | Performed by: STUDENT IN AN ORGANIZED HEALTH CARE EDUCATION/TRAINING PROGRAM

## 2022-07-03 PROCEDURE — 80069 RENAL FUNCTION PANEL: CPT | Performed by: INTERNAL MEDICINE

## 2022-07-03 PROCEDURE — 93005 ELECTROCARDIOGRAM TRACING: CPT | Performed by: STUDENT IN AN ORGANIZED HEALTH CARE EDUCATION/TRAINING PROGRAM

## 2022-07-03 PROCEDURE — 25010000002 ENOXAPARIN PER 10 MG: Performed by: INTERNAL MEDICINE

## 2022-07-03 PROCEDURE — 85027 COMPLETE CBC AUTOMATED: CPT | Performed by: INTERNAL MEDICINE

## 2022-07-03 PROCEDURE — 93010 ELECTROCARDIOGRAM REPORT: CPT | Performed by: INTERNAL MEDICINE

## 2022-07-03 PROCEDURE — 97530 THERAPEUTIC ACTIVITIES: CPT

## 2022-07-03 RX ORDER — POLYETHYLENE GLYCOL 3350 17 G/17G
17 POWDER, FOR SOLUTION ORAL DAILY
Status: DISCONTINUED | OUTPATIENT
Start: 2022-07-03 | End: 2022-07-03

## 2022-07-03 RX ORDER — BISACODYL 10 MG
10 SUPPOSITORY, RECTAL RECTAL DAILY PRN
Status: DISCONTINUED | OUTPATIENT
Start: 2022-07-03 | End: 2022-07-05

## 2022-07-03 RX ORDER — POLYETHYLENE GLYCOL 3350 17 G/17G
17 POWDER, FOR SOLUTION ORAL 2 TIMES DAILY
Status: DISCONTINUED | OUTPATIENT
Start: 2022-07-03 | End: 2022-07-05

## 2022-07-03 RX ADMIN — Medication 1 CAPSULE: at 11:04

## 2022-07-03 RX ADMIN — LEVETIRACETAM 500 MG: 500 TABLET, FILM COATED ORAL at 20:27

## 2022-07-03 RX ADMIN — ZOLPIDEM TARTRATE 5 MG: 5 TABLET, FILM COATED ORAL at 00:23

## 2022-07-03 RX ADMIN — CYCLOSPORINE 1 DROP: 0.5 EMULSION OPHTHALMIC at 08:12

## 2022-07-03 RX ADMIN — ALPRAZOLAM 0.25 MG: 0.25 TABLET ORAL at 20:27

## 2022-07-03 RX ADMIN — POLYETHYLENE GLYCOL 3350 17 G: 17 POWDER, FOR SOLUTION ORAL at 20:27

## 2022-07-03 RX ADMIN — FERROUS SULFATE TAB 325 MG (65 MG ELEMENTAL FE) 325 MG: 325 (65 FE) TAB at 08:12

## 2022-07-03 RX ADMIN — SODIUM CHLORIDE TAB 1 GM 1 G: 1 TAB at 11:04

## 2022-07-03 RX ADMIN — POLYETHYLENE GLYCOL 3350 17 G: 17 POWDER, FOR SOLUTION ORAL at 12:42

## 2022-07-03 RX ADMIN — DOCUSATE SODIUM 100 MG: 100 CAPSULE, LIQUID FILLED ORAL at 20:27

## 2022-07-03 RX ADMIN — ACETAMINOPHEN 1000 MG: 500 TABLET ORAL at 18:15

## 2022-07-03 RX ADMIN — BISACODYL 10 MG: 10 SUPPOSITORY RECTAL at 11:04

## 2022-07-03 RX ADMIN — HYDROXYZINE HYDROCHLORIDE 10 MG: 10 TABLET ORAL at 19:57

## 2022-07-03 RX ADMIN — ACETAMINOPHEN 1000 MG: 500 TABLET ORAL at 11:04

## 2022-07-03 RX ADMIN — SODIUM CHLORIDE TAB 1 GM 1 G: 1 TAB at 18:15

## 2022-07-03 RX ADMIN — DOCUSATE SODIUM 100 MG: 100 CAPSULE, LIQUID FILLED ORAL at 08:12

## 2022-07-03 RX ADMIN — LEVOTHYROXINE SODIUM 50 MCG: 0.05 TABLET ORAL at 06:03

## 2022-07-03 RX ADMIN — HYDROXYZINE HYDROCHLORIDE 10 MG: 10 TABLET ORAL at 00:23

## 2022-07-03 RX ADMIN — ALPRAZOLAM 0.25 MG: 0.25 TABLET ORAL at 08:12

## 2022-07-03 RX ADMIN — Medication 50 MCG: at 08:12

## 2022-07-03 RX ADMIN — AMLODIPINE BESYLATE 5 MG: 5 TABLET ORAL at 08:12

## 2022-07-03 RX ADMIN — PRUCALOPRIDE 1 MG: 1 TABLET, FILM COATED ORAL at 08:16

## 2022-07-03 RX ADMIN — ZOLPIDEM TARTRATE 5 MG: 5 TABLET, FILM COATED ORAL at 22:52

## 2022-07-03 RX ADMIN — CYCLOSPORINE 1 DROP: 0.5 EMULSION OPHTHALMIC at 20:27

## 2022-07-03 RX ADMIN — LEVETIRACETAM 500 MG: 500 TABLET, FILM COATED ORAL at 08:12

## 2022-07-03 RX ADMIN — SODIUM CHLORIDE TAB 1 GM 1 G: 1 TAB at 08:11

## 2022-07-03 RX ADMIN — ENOXAPARIN SODIUM 40 MG: 100 INJECTION SUBCUTANEOUS at 20:27

## 2022-07-03 RX ADMIN — FLUCONAZOLE 100 MG: 100 TABLET ORAL at 08:11

## 2022-07-03 RX ADMIN — ROSUVASTATIN CALCIUM 20 MG: 20 TABLET, FILM COATED ORAL at 20:27

## 2022-07-03 RX ADMIN — ACETAMINOPHEN 1000 MG: 500 TABLET ORAL at 04:12

## 2022-07-03 NOTE — PROGRESS NOTES
"    [facility] Medicine Services  PROGRESS NOTE    Patient Name: Ritu Martino  : 1940  MRN: [id number]    Date of Admission: 2022  Primary Care Physician: Gaurav Barger MD    Subjective   Subjective     HPI:  Daughter at bedside. Patient asleep on my arrival but easily awakens to voice. Tachycardic on the monitor to 120 but denies any symptoms besides \"feeling poorly.\"     Review of Systems  No current fevers or chills  No current shortness of breath or cough  No current nausea, vomiting, or diarrhea  No current chest pain or palpitations      Objective   Objective     Vital Signs:   Temp:  [97.6 °F (36.4 °C)-98.1 °F (36.7 °C)] 97.6 °F (36.4 °C)  Heart Rate:  [77-87] 87  Resp:  [16] 16  BP: (115-127)/(60-74) 127/74        Physical Exam:  Constitutional:Awake, alert, chronically ill-appearing  HENT: NCAT, mucous membranes moist, neck supple  Respiratory: Clear to auscultation bilaterally, respiratory effort normal, nonlabored breathing   Cardiovascular: Regular rhythm, tachycardic rate, normal radial pulses  Gastrointestinal: Positive bowel sounds, soft, nontender, nondistended  Musculoskeletal: Elderly frail and chronically debilitated in appearance, BMI is 25, mild lower extremity edema  Psychiatric: calm affect, cooperative, conversational  Neurologic: No slurred speech or facial droop, follows commands  Skin: No rashes or jaundice, warm      Results Reviewed:  Results from last 7 days   Lab Units 22  1126 22  0530 22  0837   WBC 10*3/mm3 11.71* 7.36 10.89*   HEMOGLOBIN g/dL 12.7 11.8* 13.2   HEMATOCRIT % 36.7 33.3* 36.3   PLATELETS 10*3/mm3 329 305 327     Results from last 7 days   Lab Units 22  1126 22  0530 22  0006 22  1914 22  1658 22  1506 22  1606   SODIUM mmol/L 124* 124* 123*   < >  --  117* 123*   POTASSIUM mmol/L 3.8 3.9 4.0   < >  --  3.7 3.9   CHLORIDE mmol/L 97* 92* 90*   < >  --  83* 85*   CO2 mmol/L 17.3* 23.0 21.6* "   < >  --  23.6 25.0   BUN mg/dL 15 13 15   < >  --  11 11   CREATININE mg/dL 0.65 0.58 0.65   < >  --  0.60 0.66   GLUCOSE mg/dL 134* 94 114*   < >  --  123* 105*   CALCIUM mg/dL 8.6 8.5* 8.8   < >  --  8.9 10.2   ALT (SGPT) U/L  --   --   --   --   --  23 18   AST (SGOT) U/L  --   --   --   --   --  20 14   TROPONIN T ng/mL <0.010  --   --   --  <0.010 <0.010  --     < > = values in this interval not displayed.     Estimated Creatinine Clearance: 51.8 mL/min (by C-G formula based on SCr of 0.65 mg/dL).    Microbiology Results Abnormal     Procedure Component Value - Date/Time    COVID PRE-OP / PRE-PROCEDURE SCREENING ORDER (NO ISOLATION) - Swab, Nasopharynx [[id number]]  (Normal) Collected: 06/30/22 1508    Lab Status: Final result Specimen: Swab from Nasopharynx Updated: 06/30/22 2047    Narrative:      The following orders were created for panel order COVID PRE-OP / PRE-PROCEDURE SCREENING ORDER (NO ISOLATION) - Swab, Nasopharynx.  Procedure                               Abnormality         Status                     ---------                               -----------         ------                     COVID-19,APTIMA PANTHER(...[[id number]]  Normal              Final result                 Please view results for these tests on the individual orders.    COVID-19,APTIMA PANTHER(MICHELLE),[facility], NP/OP SWAB IN UTM/VTM/SALINE TRANSPORT MEDIA,24 HR TAT - Swab, Nasopharynx [[id number]]  (Normal) Collected: 06/30/22 1508    Lab Status: Final result Specimen: Swab from Nasopharynx Updated: 06/30/22 2047     COVID19 Not Detected    Narrative:      Fact sheet for providers: https://www.fda.gov/media/177187/download     Fact sheet for patients: https://www.fda.gov/media/673830/download    Test performed by RT PCR.          Imaging Results (Last 24 Hours)     ** No results found for the last 24 hours. **              I have reviewed the medications:  Scheduled Meds:acetaminophen, 1,000 mg, Oral, Q8H  ALPRAZolam, 0.25 mg,  Oral, BID  amLODIPine, 5 mg, Oral, Q24H  cholecalciferol, 2,000 Units, Oral, Daily With Dinner  cycloSPORINE, 1 drop, Both Eyes, BID  docusate sodium, 100 mg, Oral, BID  enoxaparin, 40 mg, Subcutaneous, Q24H  ferrous sulfate, 325 mg, Oral, BID AC  fluconazole, 100 mg, Oral, Q24H  lactobacillus acidophilus, 1 capsule, Oral, Daily With Lunch  levETIRAcetam, 500 mg, Oral, BID  levothyroxine, 50 mcg, Oral, Daily  polyethylene glycol, 17 g, Oral, Daily  Prucalopride Succinate, 1 mg, Oral, Daily  rosuvastatin, 20 mg, Oral, Nightly  sodium chloride, 1 g, Oral, TID With Meals  vitamin B-12, 50 mcg, Oral, Daily      Continuous Infusions:   PRN Meds:.acetaminophen  •  bisacodyl  •  HYDROcodone-acetaminophen  •  hydrOXYzine  •  melatonin  •  nitroglycerin  •  ondansetron **OR** ondansetron  •  [COMPLETED] Insert peripheral IV **AND** sodium chloride  •  zolpidem    Assessment & Plan   Assessment & Plan     Active Hospital Problems    Diagnosis  POA   • **Hyponatremia [E87.1]  Yes   • Debility [R53.81]  Yes   • History of seizure [Z87.898]  Yes   • Fall [W19.XXXA]  Yes   • Closed fracture of left distal fibula [S82.832A]  Yes   • GERD (gastroesophageal reflux disease) [K21.9]  Yes   • Arthritis [M19.90]  Yes   • Insomnia [G47.00]  Yes   • Benzodiazepine dependence (HCC) [F13.20]  Yes   • Iron deficiency [E61.1]  Yes   • Essential hypertension, benign [I10]  Yes   • Hypothyroidism [E03.9]  Yes   • Anxiety [F41.9]  Yes      Resolved Hospital Problems   No resolved problems to display.        Brief Hospital Course to date:  Ritu Martino is a 82 y.o. female presents to the hospital with a fall, severe hyponatremia in the setting of reported polydipsia, and left fibular fracture.    Hyponatremia:  Discontinue Zoloft.  Nephrology following.  Samsca given 7/1.  Trend sodium level.  She is on a 1 L fluid restriction and continues sodium chloride 1 g 3 times daily AC.  Sodium stable at 124.     Tachycardia  -Mild, asymptomatic; EKG  obtained, sinus tach; trop negative; monitor    Fall with left fibula fracture:  Fall precautions.  Orthopedic surgery consulted.  Supportive care and symptom treatment.  PT. Weightbearing as tolerated in boot.    History of seizure: Continue AED.  Seizure precautions.    Insomnia: History noted, continue chronic Ambien.  Patient is aware of increased fall risk but wishes to continue this medication.    Anxiety with chronic benzodiazepine dependence: Continue home medication.  Stable.    Hypothyroid: Continue Synthroid.  Stable.    Essential hypertension: Continue home medicines.  Currently normotensive.    DVT Prophylaxis: Lovenox    Disposition: Pending clinical course; yoko with patient and daughter, likely ready for discharge early next week Monday/Tuesday    CODE STATUS:   Code Status and Medical Interventions:   Ordered at: 06/30/22 1724     Code Status (Patient has no pulse and is not breathing):    CPR (Attempt to Resuscitate)     Medical Interventions (Patient has pulse or is breathing):    Full       Tatyana Castro MD  07/03/22

## 2022-07-03 NOTE — CONSULTS
"Access center consult, regarding depression. Pt is evaluated in Rm 611, with daughter at bedside. Permission is given to speak freely. Pt is admitted with hyponatremia.     Pt is an 82 year old MWF, with one daughter. She had another daughter who passed away from cancer about 15 years ago. Pt thinks her depression and anxiety could have started back then. However, anxiety has gotten worse the past 2 months because she hasn't been feeling well. Pt lives at home with her . When she is feeling better, she enjoys dining out, and doing activities with her women's club.     Pt took a small dose of alprazolam once per day for years, and then increased to 2x/day at the start of the pandemic. Meds are prescribed by her PCP. She also takes Atarax PRN, and Ambien at bedtime. She recently started Sertraline, but only took it for 2 days. This was discontinued due to hyponatremia. Pt has not seen an outpatient psychiatrist. She did see Dr. Man and Dr. Piña recently in 5/2022. No hx of inpatient psych admissions. Pt's mother also had anxiety, no other family hx of psych disorders. Pt denies any past or current SI.     Her sleep is ok with Ambien. Pt's appetite hasn't been good lately, but maybe a little better the last couple of days. She states \"I'm so tired of being sick\", and feels miserable because she is constipated. She rates depression 7/10 and anxiety 8/10.     Pt used to drink a cocktail every night with her . Now, she only has an occasional sip of wine. Denies illicit drug use.     Spoke with primary RN. Consult in place for Dr. Piña to discuss psych meds.   Access will follow.   "

## 2022-07-03 NOTE — THERAPY TREATMENT NOTE
Patient Name: Ritu Martino  : 1940    MRN: [id number]                              Today's Date: 7/3/2022       Admit Date: 2022    Visit Dx:     ICD-10-CM ICD-9-CM   1. Closed fracture of distal end of left fibula, unspecified fracture morphology, initial encounter  S82.832A 824.8   2. Hyponatremia  E87.1 276.1   3. Multiple falls  R29.6 V15.88   4. Leukocytosis, unspecified type  D72.829 288.60   5. Chest pain, unspecified type  R07.9 786.50     Patient Active Problem List   Diagnosis   • Anxiety   • Hypothyroidism   • Hyponatremia   • Iron deficiency   • Hypokalemia   • Essential hypertension, benign   • Fluent aphasia   • Hypochloremia   • Benzodiazepine dependence (HCC)   • Anemia   • Cervicalgia   • Intertrigo   • Insomnia   • Acute cystitis   • E coli infection   • Diverticulitis   • Arthritis   • Bowel dysfunction   • GERD (gastroesophageal reflux disease)   • Hernia, hiatal   • Seizures (HCC)   • Meningioma (HCC)   • Closed fracture of left distal fibula   • Debility   • History of seizure   • Fall     Past Medical History:   Diagnosis Date   • Anemia    • Anxiety    • Arthritis    • Bowel dysfunction 2021    since having surgery for diverticular infection   • Chronic constipation    • Diverticulitis 2021    w/ rupture and infection required surgery and ostomy   • Essential hypertension, benign    • GERD (gastroesophageal reflux disease)    • Hernia, hiatal    • Hypercholesterolemia    • Hypokalemia    • Hyponatremia     chronic low with occasional normal level   • Hypothyroidism     estimated time frame pt nor  could remember exactly how long has been on medication   • Insomnia    • Iron deficiency    • Seizures (HCC)      Past Surgical History:   Procedure Laterality Date   • APPENDECTOMY     • BACK SURGERY     •  SECTION     • COLON SURGERY  2021    to address ruptured and infected diverticular dz, ostomy also placed   • COLONOSCOPY      • COLOSTOMY CLOSURE  10/2021    ostomy removed   • EYE SURGERY  2 X cataract   • HEMORRHOIDECTOMY     • KNEE ARTHROPLASTY     • TONSILLECTOMY  1946      General Information     Row Name 07/03/22 1632          Physical Therapy Time and Intention    Document Type therapy note (daily note)  -RS     Mode of Treatment individual therapy;physical therapy  -RS     Row Name 07/03/22 1632          General Information    Patient Profile Reviewed yes  -RS     Existing Precautions/Restrictions fall  -RS           User Key  (r) = Recorded By, (t) = Taken By, (c) = Cosigned By    Initials Name Provider Type    RS Indira Quispe PT Physical Therapist               Mobility     Row Name 07/03/22 1632          Bed Mobility    Bed Mobility supine-sit;sit-supine  -RS     Supine-Sit Lane (Bed Mobility) verbal cues;supervision  -RS     Sit-Supine Lane (Bed Mobility) verbal cues;supervision  -RS     Assistive Device (Bed Mobility) bed rails;head of bed elevated  -RS     Row Name 07/03/22 1632          Sit-Stand Transfer    Sit-Stand Lane (Transfers) verbal cues;nonverbal cues (demo/gesture);supervision  -RS     Assistive Device (Sit-Stand Transfers) walker, front-wheeled  -RS     Row Name 07/03/22 1632          Gait/Stairs (Locomotion)    Lane Level (Gait) verbal cues;nonverbal cues (demo/gesture);contact guard  -RS     Assistive Device (Gait) walker, front-wheeled  -RS     Distance in Feet (Gait) 15 ft x 2 in room to BR  -RS     Deviations/Abnormal Patterns (Gait) maddy decreased;festinating/shuffling;gait speed decreased;stride length decreased  -RS     Row Name 07/03/22 1632          Mobility    Extremity Weight-bearing Status left lower extremity  -RS     Left Lower Extremity (Weight-bearing Status) weight-bearing as tolerated (WBAT)  -RS           User Key  (r) = Recorded By, (t) = Taken By, (c) = Cosigned By    Initials Name Provider Type    RS Indira Quispe PT Physical Therapist        "        Obj/Interventions    No documentation.                Goals/Plan    No documentation.                Clinical Impression     Row Name 07/03/22 1633          Pain    Pretreatment Pain Rating 4/10  -RS     Pain Location - Side/Orientation Left  -RS     Pain Location upper  -RS     Pain Location - extremity  -RS     Row Name 07/03/22 2665          Plan of Care Review    Plan of Care Reviewed With patient  -RS     Progress improving  -RS     Outcome Evaluation Pt reports L ankle pain is decreased compared to previous date however she reports discomfort due to constipation. She voices desire to ambulate further distances therefore education provided regarding MD order to WBAT LLE for transfers only. Reviewed safe functional mobility ith pt and her  and reviewed donning and doffing boot L ankle as pt/ had questions regarding appropriate fit once discharged to home. Pt and  verbalized understanding.Also reviewed stair mechanics verbally with pt (\"up with the good, down with the bad\") and she reported understanding.  She continues to be appropriate for skilled PT to address limitations in mobility and potentially address stair training if recommended by MD as pt has steps to enter home.  -RS     Row Name 07/03/22 9272          Positioning and Restraints    Pre-Treatment Position in bed  -RS     Post Treatment Position bed  -RS     In Bed supine;call light within reach;encouraged to call for assist;exit alarm on  -RS           User Key  (r) = Recorded By, (t) = Taken By, (c) = Cosigned By    Initials Name Provider Type    RS Indira Quispe PT Physical Therapist               Outcome Measures     Row Name 07/03/22 9927          How much help from another person do you currently need...    Turning from your back to your side while in flat bed without using bedrails? 4  -RS     Moving from lying on back to sitting on the side of a flat bed without bedrails? 4  -RS     Moving to and from a bed to " a chair (including a wheelchair)? 3  -RS     Standing up from a chair using your arms (e.g., wheelchair, bedside chair)? 3  -RS     Climbing 3-5 steps with a railing? 3  -RS     To walk in hospital room? 3  -RS     AM-PAC 6 Clicks Score (PT) 20  -RS     Highest level of mobility 6 --> Walked 10 steps or more  -RS     Row Name 07/03/22 1639          Functional Assessment    Outcome Measure Options AM-PAC 6 Clicks Basic Mobility (PT)  -RS           User Key  (r) = Recorded By, (t) = Taken By, (c) = Cosigned By    Initials Name Provider Type    RS Indira Quispe, PT Physical Therapist                             Physical Therapy Education                 Title: PT OT SLP Therapies (Done)     Topic: Physical Therapy (Done)     Point: Mobility training (Done)     Learning Progress Summary           Patient Acceptance, E,D, DU,VU by RS at 7/3/2022 1639    Acceptance, E, VU by RS at 7/2/2022 1400    Acceptance, E,TB,D, VU,NR by  at 7/1/2022 1639   Significant Other Acceptance, E,D, DU,VU by RS at 7/3/2022 1639                   Point: Home exercise program (Done)     Learning Progress Summary           Patient Acceptance, E,D, DU,VU by RS at 7/3/2022 1639    Acceptance, E, VU by RS at 7/2/2022 1400    Acceptance, E,TB,D, VU,NR by  at 7/1/2022 1639   Significant Other Acceptance, E,D, DU,VU by RS at 7/3/2022 1639                   Point: Body mechanics (Done)     Learning Progress Summary           Patient Acceptance, E,D, DU,VU by RS at 7/3/2022 1639    Acceptance, E, VU by RS at 7/2/2022 1400    Acceptance, E,TB,D, VU,NR by  at 7/1/2022 1639   Significant Other Acceptance, E,D, DU,VU by RS at 7/3/2022 1639                   Point: Precautions (Done)     Learning Progress Summary           Patient Acceptance, E,D, DU,VU by RS at 7/3/2022 1639    Acceptance, E, VU by RS at 7/2/2022 1400    Acceptance, E,TB,D, VU,NR by  at 7/1/2022 1637   Significant Other Acceptance, E,D, DU,VU by RS at 7/3/2022 5342              "                  User Key     Initials Effective Dates Name Provider Type Discipline     06/16/21 -  Ermelinda Brunner PT Physical Therapist PT    RS 06/16/21 -  Indira Quispe PT Physical Therapist PT              PT Recommendation and Plan     Plan of Care Reviewed With: patient  Progress: improving  Outcome Evaluation: Pt reports L ankle pain is decreased compared to previous date however she reports discomfort due to constipation. She voices desire to ambulate further distances therefore education provided regarding MD order to WBAT LLE for transfers only. Reviewed safe functional mobility ith pt and her  and reviewed donning and doffing boot L ankle as pt/ had questions regarding appropriate fit once discharged to home. Pt and  verbalized understanding.Also reviewed stair mechanics verbally with pt (\"up with the good, down with the bad\") and she reported understanding.  She continues to be appropriate for skilled PT to address limitations in mobility and potentially address stair training if recommended by MD as pt has steps to enter home.     Time Calculation:    PT Charges     Row Name 07/03/22 1638             Time Calculation    Start Time 1540  -RS      Stop Time 1603  -RS      Time Calculation (min) 23 min  -RS              Timed Charges    77280 - PT Therapeutic Activity Minutes 23  -RS              Total Minutes    Timed Charges Total Minutes 23  -RS       Total Minutes 23  -RS            User Key  (r) = Recorded By, (t) = Taken By, (c) = Cosigned By    Initials Name Provider Type    RS Indira Quispe PT Physical Therapist              Therapy Charges for Today     Code Description Service Date Service Provider Modifiers Qty    10792726506 HC PT THER PROC EA 15 MIN 7/2/2022 Indira Quispe, PT GP 1    10558426514 HC PT THERAPEUTIC ACT EA 15 MIN 7/2/2022 Indira Quispe PT GP 1    03475724389 HC PT THERAPEUTIC ACT EA 15 MIN 7/3/2022 Indira Quispe, PT GP 2    "       PT G-Codes  Outcome Measure Options: AM-PAC 6 Clicks Basic Mobility (PT)  AM-PAC 6 Clicks Score (PT): 20  AM-PAC 6 Clicks Score (OT): 19    Indira Quispe, PT  7/3/2022

## 2022-07-03 NOTE — PLAN OF CARE
"Goal Outcome Evaluation:  Plan of Care Reviewed With: patient        Progress: improving  Outcome Evaluation: Pt reports L ankle pain is decreased compared to previous date however she reports discomfort due to constipation. She voices desire to ambulate further distances therefore education provided regarding MD order to WBAT LLE for transfers only. Reviewed safe functional mobility ith pt and her  and reviewed donning and doffing boot L ankle as pt/ had questions regarding appropriate fit once discharged to home. Pt and  verbalized understanding.Also reviewed stair mechanics verbally with pt (\"up with the good, down with the bad\") and she reported understanding.  She continues to be appropriate for skilled PT to address limitations in mobility and potentially address stair training if recommended by MD as pt has steps to enter home.  "

## 2022-07-03 NOTE — PROGRESS NOTES
"RENAL/KCC:     LOS: 3 days    Patient Care Team:  Gaurav Barger MD as PCP - General (Internal Medicine)    Chief Complaint:  Hyponatremia    Subjective     Interval History:   Chart reviewed, generally states she does not feel well    Objective     Vital Sign Min/Max for last 24 hours  Temp  Min: 97.6 °F (36.4 °C)  Max: 98.1 °F (36.7 °C)   BP  Min: 93/55  Max: 127/74   Pulse  Min: 77  Max: 87   Resp  Min: 16  Max: 16   SpO2  Min: 97 %  Max: 100 %   No data recorded   Weight  Min: 54.7 kg (120 lb 8 oz)  Max: 54.7 kg (120 lb 8 oz)     Flowsheet Rows    Flowsheet Row First Filed Value   Admission Height 152.4 cm (60\") Documented at 06/30/2022 1432   Admission Weight 54 kg (119 lb) Documented at 06/30/2022 1432          I/O this shift:  In: 60 [P.O.:60]  Out: 300 [Urine:300]  I/O last 3 completed shifts:  In: 1228 [P.O.:1228]  Out: 750 [Urine:750]    Physical Exam:  GEN: Awake, NAD  ENT: PERRL, EOMI, MMM  NECK: Supple, no JVD  CHEST: CTAB, no W/R/C  CV: RRR, no M/G/R  ABD: Soft, NT, +BS  SKIN: Warm and Dry  NEURO: CN's intact      WBC WBC   Date Value Ref Range Status   07/02/2022 7.36 3.40 - 10.80 10*3/mm3 Final   07/01/2022 10.89 (H) 3.40 - 10.80 10*3/mm3 Final   06/30/2022 16.57 (H) 3.40 - 10.80 10*3/mm3 Final      HGB Hemoglobin   Date Value Ref Range Status   07/02/2022 11.8 (L) 12.0 - 15.9 g/dL Final   07/01/2022 13.2 12.0 - 15.9 g/dL Final   06/30/2022 12.6 12.0 - 15.9 g/dL Final      HCT Hematocrit   Date Value Ref Range Status   07/02/2022 33.3 (L) 34.0 - 46.6 % Final   07/01/2022 36.3 34.0 - 46.6 % Final   06/30/2022 36.1 34.0 - 46.6 % Final      Platlets No results found for: LABPLAT   MCV MCV   Date Value Ref Range Status   07/02/2022 90.2 79.0 - 97.0 fL Final   07/01/2022 88.5 79.0 - 97.0 fL Final   06/30/2022 90.5 79.0 - 97.0 fL Final          Sodium Sodium   Date Value Ref Range Status   07/02/2022 124 (L) 136 - 145 mmol/L Final   07/02/2022 123 (L) 136 - 145 mmol/L Final   07/01/2022 121 (L) 136 - " 145 mmol/L Final   07/01/2022 120 (L) 136 - 145 mmol/L Final   07/01/2022 117 (C) 136 - 145 mmol/L Final   07/01/2022 121 (L) 136 - 145 mmol/L Final   06/30/2022 118 (C) 136 - 145 mmol/L Final   06/30/2022 117 (C) 136 - 145 mmol/L Final      Potassium Potassium   Date Value Ref Range Status   07/02/2022 3.9 3.5 - 5.2 mmol/L Final   07/02/2022 4.0 3.5 - 5.2 mmol/L Final   07/01/2022 4.2 3.5 - 5.2 mmol/L Final   07/01/2022 3.6 3.5 - 5.2 mmol/L Final     Comment:     Slight hemolysis detected by analyzer. Results may be affected.   07/01/2022 3.3 (L) 3.5 - 5.2 mmol/L Final   06/30/2022 3.7 3.5 - 5.2 mmol/L Final      Chloride Chloride   Date Value Ref Range Status   07/02/2022 92 (L) 98 - 107 mmol/L Final   07/02/2022 90 (L) 98 - 107 mmol/L Final   07/01/2022 89 (L) 98 - 107 mmol/L Final   07/01/2022 86 (L) 98 - 107 mmol/L Final   07/01/2022 84 (L) 98 - 107 mmol/L Final   06/30/2022 83 (L) 98 - 107 mmol/L Final      CO2 CO2   Date Value Ref Range Status   07/02/2022 23.0 22.0 - 29.0 mmol/L Final   07/02/2022 21.6 (L) 22.0 - 29.0 mmol/L Final   07/01/2022 19.0 (L) 22.0 - 29.0 mmol/L Final   07/01/2022 23.0 22.0 - 29.0 mmol/L Final   07/01/2022 22.8 22.0 - 29.0 mmol/L Final   06/30/2022 23.6 22.0 - 29.0 mmol/L Final      BUN BUN   Date Value Ref Range Status   07/02/2022 13 8 - 23 mg/dL Final   07/02/2022 15 8 - 23 mg/dL Final   07/01/2022 11 8 - 23 mg/dL Final   07/01/2022 11 8 - 23 mg/dL Final   07/01/2022 7 (L) 8 - 23 mg/dL Final   06/30/2022 11 8 - 23 mg/dL Final      Creatinine Creatinine   Date Value Ref Range Status   07/02/2022 0.58 0.57 - 1.00 mg/dL Final   07/02/2022 0.65 0.57 - 1.00 mg/dL Final   07/01/2022 0.73 0.57 - 1.00 mg/dL Final   07/01/2022 0.58 0.57 - 1.00 mg/dL Final   07/01/2022 0.67 0.57 - 1.00 mg/dL Final   06/30/2022 0.60 0.57 - 1.00 mg/dL Final      Calcium Calcium   Date Value Ref Range Status   07/02/2022 8.5 (L) 8.6 - 10.5 mg/dL Final   07/02/2022 8.8 8.6 - 10.5 mg/dL Final   07/01/2022 9.0  8.6 - 10.5 mg/dL Final   07/01/2022 8.4 (L) 8.6 - 10.5 mg/dL Final   07/01/2022 8.7 8.6 - 10.5 mg/dL Final   06/30/2022 8.9 8.6 - 10.5 mg/dL Final      PO4 No results found for: CAPO4   Albumin Albumin   Date Value Ref Range Status   07/02/2022 3.70 3.50 - 5.20 g/dL Final   06/30/2022 4.60 3.50 - 5.20 g/dL Final      Magnesium Magnesium   Date Value Ref Range Status   07/02/2022 1.8 1.6 - 2.4 mg/dL Final      Uric Acid Uric Acid   Date Value Ref Range Status   07/01/2022 1.7 (L) 2.4 - 5.7 mg/dL Final           Results Review:     I reviewed the patient's new clinical results.    acetaminophen, 1,000 mg, Oral, Q8H  ALPRAZolam, 0.25 mg, Oral, BID  amLODIPine, 5 mg, Oral, Q24H  cholecalciferol, 2,000 Units, Oral, Daily With Dinner  cycloSPORINE, 1 drop, Both Eyes, BID  docusate sodium, 100 mg, Oral, BID  enoxaparin, 40 mg, Subcutaneous, Q24H  ferrous sulfate, 325 mg, Oral, BID AC  fluconazole, 100 mg, Oral, Q24H  lactobacillus acidophilus, 1 capsule, Oral, Daily With Lunch  levETIRAcetam, 500 mg, Oral, BID  levothyroxine, 50 mcg, Oral, Daily  Prucalopride Succinate, 1 mg, Oral, Daily  rosuvastatin, 20 mg, Oral, Nightly  sodium chloride, 1 g, Oral, TID With Meals  vitamin B-12, 50 mcg, Oral, Daily           Medication Review: Reviewed    Assessment & Plan       SIADH    Hyponatremia    Anxiety    Hypothyroidism    Iron deficiency    Essential hypertension, benign    Benzodiazepine dependence (HCC)    Insomnia    Arthritis    GERD (gastroesophageal reflux disease)    Closed fracture of left distal fibula    Debility    History of seizure    Fall      Plan: Na improved to 124 yesterday s/p Samsca.  Now back on oral NaCl and fluid restriction.  F/U BMP this AM.  Keep off Zoloft indefinitely.  Will follow.      Dex Luna MD   [facility]  07/03/22  10:24 EDT

## 2022-07-04 LAB
ALBUMIN SERPL-MCNC: 4.1 G/DL (ref 3.5–5.2)
ANION GAP SERPL CALCULATED.3IONS-SCNC: 13 MMOL/L (ref 5–15)
ANION GAP SERPL CALCULATED.3IONS-SCNC: 14 MMOL/L (ref 5–15)
BUN SERPL-MCNC: 10 MG/DL (ref 8–23)
BUN SERPL-MCNC: 11 MG/DL (ref 8–23)
BUN/CREAT SERPL: 17.9 (ref 7–25)
BUN/CREAT SERPL: 21.2 (ref 7–25)
CALCIUM SPEC-SCNC: 8.9 MG/DL (ref 8.6–10.5)
CALCIUM SPEC-SCNC: 8.9 MG/DL (ref 8.6–10.5)
CHLORIDE SERPL-SCNC: 94 MMOL/L (ref 98–107)
CHLORIDE SERPL-SCNC: 95 MMOL/L (ref 98–107)
CO2 SERPL-SCNC: 20 MMOL/L (ref 22–29)
CO2 SERPL-SCNC: 21 MMOL/L (ref 22–29)
CREAT SERPL-MCNC: 0.52 MG/DL (ref 0.57–1)
CREAT SERPL-MCNC: 0.56 MG/DL (ref 0.57–1)
DEPRECATED RDW RBC AUTO: 40.3 FL (ref 37–54)
EGFRCR SERPLBLD CKD-EPI 2021: 91.3 ML/MIN/1.73
EGFRCR SERPLBLD CKD-EPI 2021: 92.9 ML/MIN/1.73
ERYTHROCYTE [DISTWIDTH] IN BLOOD BY AUTOMATED COUNT: 12.2 % (ref 12.3–15.4)
GLUCOSE SERPL-MCNC: 136 MG/DL (ref 65–99)
GLUCOSE SERPL-MCNC: 97 MG/DL (ref 65–99)
HCT VFR BLD AUTO: 34.2 % (ref 34–46.6)
HGB BLD-MCNC: 11.9 G/DL (ref 12–15.9)
MCH RBC QN AUTO: 31.9 PG (ref 26.6–33)
MCHC RBC AUTO-ENTMCNC: 34.8 G/DL (ref 31.5–35.7)
MCV RBC AUTO: 91.7 FL (ref 79–97)
PHOSPHATE SERPL-MCNC: 2.1 MG/DL (ref 2.5–4.5)
PLATELET # BLD AUTO: 300 10*3/MM3 (ref 140–450)
PMV BLD AUTO: 8.3 FL (ref 6–12)
POTASSIUM SERPL-SCNC: 3.6 MMOL/L (ref 3.5–5.2)
POTASSIUM SERPL-SCNC: 4 MMOL/L (ref 3.5–5.2)
RBC # BLD AUTO: 3.73 10*6/MM3 (ref 3.77–5.28)
SODIUM SERPL-SCNC: 127 MMOL/L (ref 136–145)
SODIUM SERPL-SCNC: 130 MMOL/L (ref 136–145)
WBC NRBC COR # BLD: 10.26 10*3/MM3 (ref 3.4–10.8)

## 2022-07-04 PROCEDURE — 25010000002 ONDANSETRON PER 1 MG: Performed by: INTERNAL MEDICINE

## 2022-07-04 PROCEDURE — 80048 BASIC METABOLIC PNL TOTAL CA: CPT | Performed by: STUDENT IN AN ORGANIZED HEALTH CARE EDUCATION/TRAINING PROGRAM

## 2022-07-04 PROCEDURE — 80069 RENAL FUNCTION PANEL: CPT | Performed by: INTERNAL MEDICINE

## 2022-07-04 PROCEDURE — 85027 COMPLETE CBC AUTOMATED: CPT | Performed by: INTERNAL MEDICINE

## 2022-07-04 PROCEDURE — 25010000002 ENOXAPARIN PER 10 MG: Performed by: INTERNAL MEDICINE

## 2022-07-04 PROCEDURE — 97530 THERAPEUTIC ACTIVITIES: CPT

## 2022-07-04 RX ORDER — BISACODYL 5 MG/1
10 TABLET, DELAYED RELEASE ORAL DAILY
Status: DISCONTINUED | OUTPATIENT
Start: 2022-07-04 | End: 2022-07-05

## 2022-07-04 RX ORDER — HYDROXYZINE HYDROCHLORIDE 25 MG/1
25 TABLET, FILM COATED ORAL EVERY 8 HOURS PRN
Status: DISCONTINUED | OUTPATIENT
Start: 2022-07-04 | End: 2022-07-04

## 2022-07-04 RX ORDER — AMOXICILLIN 250 MG
2 CAPSULE ORAL 2 TIMES DAILY
Status: DISCONTINUED | OUTPATIENT
Start: 2022-07-04 | End: 2022-07-05

## 2022-07-04 RX ORDER — DESVENLAFAXINE 25 MG/1
25 TABLET, EXTENDED RELEASE ORAL DAILY
Status: DISCONTINUED | OUTPATIENT
Start: 2022-07-04 | End: 2022-07-06 | Stop reason: HOSPADM

## 2022-07-04 RX ORDER — HYDROXYZINE HYDROCHLORIDE 10 MG/1
10 TABLET, FILM COATED ORAL EVERY 8 HOURS PRN
Status: DISCONTINUED | OUTPATIENT
Start: 2022-07-04 | End: 2022-07-06 | Stop reason: HOSPADM

## 2022-07-04 RX ORDER — ALPRAZOLAM 0.25 MG/1
0.25 TABLET ORAL 3 TIMES DAILY PRN
Status: DISCONTINUED | OUTPATIENT
Start: 2022-07-04 | End: 2022-07-06 | Stop reason: HOSPADM

## 2022-07-04 RX ORDER — CLOTRIMAZOLE AND BETAMETHASONE DIPROPIONATE 10; .64 MG/G; MG/G
1 CREAM TOPICAL EVERY 12 HOURS SCHEDULED
Status: DISCONTINUED | OUTPATIENT
Start: 2022-07-04 | End: 2022-07-06 | Stop reason: HOSPADM

## 2022-07-04 RX ADMIN — Medication 2000 UNITS: at 17:53

## 2022-07-04 RX ADMIN — HYDROXYZINE HYDROCHLORIDE 10 MG: 10 TABLET ORAL at 05:12

## 2022-07-04 RX ADMIN — FERROUS SULFATE TAB 325 MG (65 MG ELEMENTAL FE) 325 MG: 325 (65 FE) TAB at 17:53

## 2022-07-04 RX ADMIN — BISACODYL 10 MG: 10 SUPPOSITORY RECTAL at 14:43

## 2022-07-04 RX ADMIN — HYDROXYZINE HYDROCHLORIDE 10 MG: 10 TABLET ORAL at 21:06

## 2022-07-04 RX ADMIN — DOCUSATE SODIUM 50MG AND SENNOSIDES 8.6MG 2 TABLET: 8.6; 5 TABLET, FILM COATED ORAL at 16:31

## 2022-07-04 RX ADMIN — ALPRAZOLAM 0.25 MG: 0.25 TABLET ORAL at 16:32

## 2022-07-04 RX ADMIN — ZOLPIDEM TARTRATE 5 MG: 5 TABLET, FILM COATED ORAL at 22:56

## 2022-07-04 RX ADMIN — BISACODYL 10 MG: 5 TABLET ORAL at 16:31

## 2022-07-04 RX ADMIN — ACETAMINOPHEN 650 MG: 325 TABLET ORAL at 15:09

## 2022-07-04 RX ADMIN — ACETAMINOPHEN 1000 MG: 500 TABLET ORAL at 12:13

## 2022-07-04 RX ADMIN — LEVETIRACETAM 500 MG: 500 TABLET, FILM COATED ORAL at 21:06

## 2022-07-04 RX ADMIN — LEVOTHYROXINE SODIUM 50 MCG: 0.05 TABLET ORAL at 06:40

## 2022-07-04 RX ADMIN — DOCUSATE SODIUM 100 MG: 100 CAPSULE, LIQUID FILLED ORAL at 09:04

## 2022-07-04 RX ADMIN — LEVETIRACETAM 500 MG: 500 TABLET, FILM COATED ORAL at 09:04

## 2022-07-04 RX ADMIN — CLOTRIMAZOLE AND BETAMETHASONE DIPROPIONATE 1 APPLICATION: 10; .5 CREAM TOPICAL at 21:06

## 2022-07-04 RX ADMIN — Medication 50 MCG: at 09:04

## 2022-07-04 RX ADMIN — FLUCONAZOLE 100 MG: 100 TABLET ORAL at 09:04

## 2022-07-04 RX ADMIN — SODIUM CHLORIDE TAB 1 GM 1 G: 1 TAB at 09:04

## 2022-07-04 RX ADMIN — ENOXAPARIN SODIUM 40 MG: 100 INJECTION SUBCUTANEOUS at 21:06

## 2022-07-04 RX ADMIN — SODIUM CHLORIDE TAB 1 GM 1 G: 1 TAB at 17:53

## 2022-07-04 RX ADMIN — Medication 10 ML: at 09:04

## 2022-07-04 RX ADMIN — Medication 1 CAPSULE: at 12:13

## 2022-07-04 RX ADMIN — POLYETHYLENE GLYCOL 3350 17 G: 17 POWDER, FOR SOLUTION ORAL at 09:04

## 2022-07-04 RX ADMIN — PRUCALOPRIDE 1 MG: 1 TABLET, FILM COATED ORAL at 09:04

## 2022-07-04 RX ADMIN — ACETAMINOPHEN 1000 MG: 500 TABLET ORAL at 03:16

## 2022-07-04 RX ADMIN — DESVENLAFAXINE SUCCINATE 25 MG: 25 TABLET, EXTENDED RELEASE ORAL at 17:53

## 2022-07-04 RX ADMIN — HYDROCODONE BITARTRATE AND ACETAMINOPHEN 1 TABLET: 5; 325 TABLET ORAL at 06:50

## 2022-07-04 RX ADMIN — ROSUVASTATIN CALCIUM 20 MG: 20 TABLET, FILM COATED ORAL at 21:06

## 2022-07-04 RX ADMIN — ONDANSETRON 4 MG: 2 INJECTION INTRAMUSCULAR; INTRAVENOUS at 05:12

## 2022-07-04 RX ADMIN — HYDROXYZINE HYDROCHLORIDE 25 MG: 25 TABLET ORAL at 12:12

## 2022-07-04 RX ADMIN — FERROUS SULFATE TAB 325 MG (65 MG ELEMENTAL FE) 325 MG: 325 (65 FE) TAB at 09:04

## 2022-07-04 RX ADMIN — CYCLOSPORINE 1 DROP: 0.5 EMULSION OPHTHALMIC at 09:04

## 2022-07-04 RX ADMIN — AMLODIPINE BESYLATE 5 MG: 5 TABLET ORAL at 09:04

## 2022-07-04 RX ADMIN — Medication 3 MG: at 03:16

## 2022-07-04 RX ADMIN — SODIUM CHLORIDE TAB 1 GM 1 G: 1 TAB at 12:13

## 2022-07-04 RX ADMIN — ALPRAZOLAM 0.25 MG: 0.25 TABLET ORAL at 09:04

## 2022-07-04 NOTE — CONSULTS
"Requesting Provider:  Dr. Castro  Reason for Consult: Alternate antidepressant that would not increased risk of hyponatremia.    Date of admission: June 30, 2022  Date of assessment: July 4, 2022    Chief Complaint: None given    History of presenting illness: Patient is an 82 y.o. female with a past psychiatric history of generalized anxiety disorder seen on consultation for acute mental status changes. The patient had previously been seen on consultation by this physician May 8, 2022.  She was also seen by Dr. Piña at this time.  Please see these notes. The patient had presented to the ED from home after having had 2 episodes in which she may have had a seizure.  During 1 of these episodes she apparently sat on her foot and subsequently fracture of the distal end of her left fibula.    The patient has no history of inpatient psychiatric admissions.  She does not have a current psychiatrist.  She has never had a suicide attempt.  She is currently on Xanax 0.25 mg 2 times daily that is prescribed by her primary care physician.    She has no history of benzodiazepine dependence or withdrawal.  She was placed on Zoloft 2 days prior to presentation for depression and anxiety by her primary care physician.    The patient has a significant history for hyponatremia.  In fact, she has had multiple recent admissions due to hyponatremia.  Her hyponatremia has not been caused by an antidepressant.  Serum osmolality on July 1, 2022 was reduced at 248.  Urine osmolality was normal at 272.  Serum ADH on June 6, 2022 was less than 0.8.  On previous consultation, the patient's  had indicated to me that she drinks \"a lot of water.\"  When asked to further quantify this currently he indicates that she might drink approximately 1 quart of liquids daily.  The patient reports that she typically urinates 5-6 times a day.    Patient reports that her mood has been increasingly depressed.  She also complaining of a great deal " of anxiety.  She feels her depression is related to ongoing medical issues and not being able to find out why her sodium is low.  She denies suicidal ideation, homicidal ideation and audiovisual hallucinations.    Past psychiatric history: See HPI    Past medical history:  Diagnoses:  Hyperlipidemia, hyponatremia, hypothyroidism, GERD, hypertension.  Medications:    Current Facility-Administered Medications   Medication Dose Route Frequency Provider Last Rate Last Admin   • acetaminophen (TYLENOL) tablet 1,000 mg  1,000 mg Oral Q8H Krystle Kenny MD   1,000 mg at 07/04/22 1213   • acetaminophen (TYLENOL) tablet 650 mg  650 mg Oral Q4H PRN Jessenia Gonzalez MD       • ALPRAZolam (XANAX) tablet 0.25 mg  0.25 mg Oral BID Jessenia Gonzalez MD   0.25 mg at 07/04/22 0904   • amLODIPine (NORVASC) tablet 5 mg  5 mg Oral Q24H Jn Baptiste MD   5 mg at 07/04/22 0904   • bisacodyl (DULCOLAX) suppository 10 mg  10 mg Rectal Daily PRN Krystle Kenny MD   10 mg at 07/03/22 1104   • cholecalciferol (VITAMIN D3) tablet 2,000 Units  2,000 Units Oral Daily With Dinner Jessenia Gonzalez MD   2,000 Units at 07/01/22 1708   • clotrimazole-betamethasone (LOTRISONE) 1-0.05 % cream 1 application  1 application Topical Q12H Tatyana Castro MD       • cycloSPORINE (RESTASIS) 0.05 % ophthalmic emulsion 1 drop  1 drop Both Eyes BID Jessenia Gonzalez MD   1 drop at 07/04/22 0904   • docusate sodium (COLACE) capsule 100 mg  100 mg Oral BID Krystle Kenny MD   100 mg at 07/04/22 0904   • Enoxaparin Sodium (LOVENOX) syringe 40 mg  40 mg Subcutaneous Q24H Jessenia Gonzalez MD   40 mg at 07/03/22 2027   • ferrous sulfate tablet 325 mg  325 mg Oral BID AC Jessenia Gonzalez MD   325 mg at 07/04/22 0904   • fluconazole (DIFLUCAN) tablet 100 mg  100 mg Oral Q24H Stephen Brooks APRN   100 mg at 07/04/22 0904   • HYDROcodone-acetaminophen (NORCO) 5-325 MG per tablet 1 tablet  1  tablet Oral Q6H PRN Jessenia Gonzalez MD   1 tablet at 07/04/22 0650   • hydrOXYzine (ATARAX) tablet 25 mg  25 mg Oral Q8H PRN Tatyana Castro MD   25 mg at 07/04/22 1212   • lactobacillus acidophilus (RISAQUAD) capsule 1 capsule  1 capsule Oral Daily With Lunch Jessenia Gonzalez MD   1 capsule at 07/04/22 1213   • levETIRAcetam (KEPPRA) tablet 500 mg  500 mg Oral BID Jessenia Gonzalez MD   500 mg at 07/04/22 0904   • levothyroxine (SYNTHROID, LEVOTHROID) tablet 50 mcg  50 mcg Oral Daily Jessenia Gonzalez MD   50 mcg at 07/04/22 0640   • melatonin tablet 3 mg  3 mg Oral Nightly PRN Jessenia Gonzalez MD   3 mg at 07/04/22 0316   • nitroglycerin (NITROSTAT) SL tablet 0.4 mg  0.4 mg Sublingual Q5 Min PRN Jessenia Gonzalez MD       • ondansetron (ZOFRAN) tablet 4 mg  4 mg Oral Q6H PRN Jessenia Gonzalez MD        Or   • ondansetron (ZOFRAN) injection 4 mg  4 mg Intravenous Q6H PRN Jessenia Gonzalez MD   4 mg at 07/04/22 0512   • polyethylene glycol (MIRALAX) packet 17 g  17 g Oral BID Tatyana Castro MD   17 g at 07/04/22 0904   • Prucalopride Succinate tablet 1 mg  1 mg Oral Daily Tatyana Castro MD   1 mg at 07/04/22 0904   • rosuvastatin (CRESTOR) tablet 20 mg  20 mg Oral Nightly Jessenia Gonzalez MD   20 mg at 07/03/22 2027   • sodium chloride 0.9 % flush 10 mL  10 mL Intravenous PRN Nikhil Mazariegos MD   10 mL at 07/04/22 0904   • sodium chloride tablet 1 g  1 g Oral TID With Meals Dex Luna MD   1 g at 07/04/22 1213   • vitamin B-12 (CYANOCOBALAMIN) tablet 50 mcg  50 mcg Oral Daily Jessenia Gonzalez MD   50 mcg at 07/04/22 0904   • zolpidem (AMBIEN) tablet 5 mg  5 mg Oral Nightly PRN Jessenia Gonzalez MD   5 mg at 07/03/22 8828     Medication allergies: NKDA    Social history: Noncontributory    Family history: Noncontributory    Substance abuse history: None    Vital Signs    Temp:  97.6  Heart Rate: 109   Resp:  18  BP:   145/73    Mental Status Exam: The patient is found sitting up in bed.   is at bedside.  She is awake and alert.  She is dressed in hospital attire.  She is generally appropriate and cooperative to the interview process.  She is oriented times 4.  Speech is fluent with a decreased tone and volume.  Mood is anxious and depressed.  Affect congruent.  She denies any acute suicidal ideation, homicidal ideations or audiovisual hallucinations.  Thought processes are circumstantial.  Judgment and insight are okay.  Memory is intact.    Assessment:   Generalized anxiety disorder;  Major depressive order, moderate, recurrent.    Treatment Plan:     It does not appear that her hyponatremia was due to use of Zoloft as she was only on it for 2 days prior to presentation.  She further has a history of multiple episodes of hyponatremia without being on antidepressant.  Nonetheless, I feel that should be better on a antidepressant that is less likely to cause hyponatremia and then Zoloft.  I will initiate Pristiq 25 mg daily with first dose today.  She will likely need a increase to 50 mg in the next 2 weeks.  The patient has never been physiologically dependent on a benzodiazepine.  She has not had a history of abusing benzodiazepines.  I will increase Xanax 0.25 mg 3 times a day as needed for anxiety.  I will change her Atarax to 10 mg 3 times a day as needed for anxiety given the risk of increase in thirst while being on fluid restrictions.  Regarding her hyponatremia, does not appear she has a primary polydipsia. (? Diabetes insipidus?-Deferred to primary.)    The patient may follow up with this physician at [facility].  Thank you for this consultation.  Please contact [facility] for any additional requests.  Psychiatry will continue to follow.

## 2022-07-04 NOTE — NURSING NOTE
"[facility] follow-up:    Pt sitting bedside.  present. Pt's mood anxious and depressed. She expresses much anxiety regards her health. She rated current anxiety 8/10 due to \"my medical issues\". Depression, 9/10. Appetite is poor. She feels her sleep \"is messed up to.... maybe due to pills\".     She relayed mood has been \"not good\" for past 6 months, as she cannot to the things she used to due to health.  stated, \"a year ago she was going outside, doing things, going to events..\".      reports PCP gave them list of psychiatrists and psychologists in the past week. Explained the difference between the two and recommended pt seek both after discharge to manage any medications prescribed by psychiatrist consult here and counseling for coping regards depression/anxiety.   "

## 2022-07-04 NOTE — PLAN OF CARE
Goal Outcome Evaluation:  Plan of Care Reviewed With: patient        Progress: improving  Outcome Evaluation: Pt reports relatively well controlled L ankle pain, continues to have discomfort due to constipation. She ambulates in room with supervision up to CGA with rolling walker (15ft x 2), further ambulation distance limited per MD order for transfers only and no hallway ambulation. Discussed stair navigation with pt and daughter who reported understanding.  She demonstrated one small LOB while attempting SLS to point to her foot, recovered with CGA and cues to put L foot back down and hold onto RW. She remains appropriate for skilled PT, will address stair navigation or further distance ambulation if deemed appropriate by MD.

## 2022-07-04 NOTE — PROGRESS NOTES
"RENAL/KCC:     LOS: 4 days    Patient Care Team:  Gaurav Barger MD as PCP - General (Internal Medicine)    Chief Complaint:  Hyponatremia    Subjective     Interval History:   Chart reviewed, generally states she does not feel well  Still very anxious, flat affect  No soa, CP, n/v    Objective     Vital Sign Min/Max for last 24 hours  Temp  Min: 97.6 °F (36.4 °C)  Max: 97.8 °F (36.6 °C)   BP  Min: 126/65  Max: 161/82   Pulse  Min: 70  Max: 81   Resp  Min: 18  Max: 20   SpO2  Min: 99 %  Max: 100 %   No data recorded   Weight  Min: 54.4 kg (120 lb)  Max: 54.4 kg (120 lb)     Flowsheet Rows    Flowsheet Row First Filed Value   Admission Height 152.4 cm (60\") Documented at 06/30/2022 1432   Admission Weight 54 kg (119 lb) Documented at 06/30/2022 1432          No intake/output data recorded.  I/O last 3 completed shifts:  In: 840 [P.O.:840]  Out: 500 [Urine:500]    Physical Exam:  GEN: Awake, NAD  ENT: PERRL, EOMI, MMM  NECK: Supple, no JVD  CHEST: CTAB, no W/R/C  CV: RRR, no M/G/R  ABD: Soft, NT, +BS  SKIN: Warm and Dry  NEURO: CN's intact      WBC WBC   Date Value Ref Range Status   07/04/2022 10.26 3.40 - 10.80 10*3/mm3 Final   07/03/2022 11.71 (H) 3.40 - 10.80 10*3/mm3 Final   07/02/2022 7.36 3.40 - 10.80 10*3/mm3 Final      HGB Hemoglobin   Date Value Ref Range Status   07/04/2022 11.9 (L) 12.0 - 15.9 g/dL Final   07/03/2022 12.7 12.0 - 15.9 g/dL Final   07/02/2022 11.8 (L) 12.0 - 15.9 g/dL Final      HCT Hematocrit   Date Value Ref Range Status   07/04/2022 34.2 34.0 - 46.6 % Final   07/03/2022 36.7 34.0 - 46.6 % Final   07/02/2022 33.3 (L) 34.0 - 46.6 % Final      Platlets No results found for: LABPLAT   MCV MCV   Date Value Ref Range Status   07/04/2022 91.7 79.0 - 97.0 fL Final   07/03/2022 93.4 79.0 - 97.0 fL Final   07/02/2022 90.2 79.0 - 97.0 fL Final          Sodium Sodium   Date Value Ref Range Status   07/04/2022 130 (L) 136 - 145 mmol/L Final   07/04/2022 127 (L) 136 - 145 mmol/L Final   07/03/2022 " 124 (L) 136 - 145 mmol/L Final   07/02/2022 124 (L) 136 - 145 mmol/L Final   07/02/2022 123 (L) 136 - 145 mmol/L Final   07/01/2022 121 (L) 136 - 145 mmol/L Final      Potassium Potassium   Date Value Ref Range Status   07/04/2022 4.0 3.5 - 5.2 mmol/L Final   07/04/2022 3.6 3.5 - 5.2 mmol/L Final   07/03/2022 3.8 3.5 - 5.2 mmol/L Final     Comment:     Slight hemolysis detected by analyzer. Results may be affected.   07/02/2022 3.9 3.5 - 5.2 mmol/L Final   07/02/2022 4.0 3.5 - 5.2 mmol/L Final   07/01/2022 4.2 3.5 - 5.2 mmol/L Final      Chloride Chloride   Date Value Ref Range Status   07/04/2022 95 (L) 98 - 107 mmol/L Final   07/04/2022 94 (L) 98 - 107 mmol/L Final   07/03/2022 97 (L) 98 - 107 mmol/L Final   07/02/2022 92 (L) 98 - 107 mmol/L Final   07/02/2022 90 (L) 98 - 107 mmol/L Final   07/01/2022 89 (L) 98 - 107 mmol/L Final      CO2 CO2   Date Value Ref Range Status   07/04/2022 21.0 (L) 22.0 - 29.0 mmol/L Final   07/04/2022 20.0 (L) 22.0 - 29.0 mmol/L Final   07/03/2022 17.3 (L) 22.0 - 29.0 mmol/L Final   07/02/2022 23.0 22.0 - 29.0 mmol/L Final   07/02/2022 21.6 (L) 22.0 - 29.0 mmol/L Final   07/01/2022 19.0 (L) 22.0 - 29.0 mmol/L Final      BUN BUN   Date Value Ref Range Status   07/04/2022 10 8 - 23 mg/dL Final   07/04/2022 11 8 - 23 mg/dL Final   07/03/2022 15 8 - 23 mg/dL Final   07/02/2022 13 8 - 23 mg/dL Final   07/02/2022 15 8 - 23 mg/dL Final   07/01/2022 11 8 - 23 mg/dL Final      Creatinine Creatinine   Date Value Ref Range Status   07/04/2022 0.56 (L) 0.57 - 1.00 mg/dL Final   07/04/2022 0.52 (L) 0.57 - 1.00 mg/dL Final   07/03/2022 0.65 0.57 - 1.00 mg/dL Final   07/02/2022 0.58 0.57 - 1.00 mg/dL Final   07/02/2022 0.65 0.57 - 1.00 mg/dL Final   07/01/2022 0.73 0.57 - 1.00 mg/dL Final      Calcium Calcium   Date Value Ref Range Status   07/04/2022 8.9 8.6 - 10.5 mg/dL Final   07/04/2022 8.9 8.6 - 10.5 mg/dL Final   07/03/2022 8.6 8.6 - 10.5 mg/dL Final   07/02/2022 8.5 (L) 8.6 - 10.5 mg/dL Final  "  07/02/2022 8.8 8.6 - 10.5 mg/dL Final   07/01/2022 9.0 8.6 - 10.5 mg/dL Final      PO4 No results found for: CAPO4   Albumin Albumin   Date Value Ref Range Status   07/04/2022 4.10 3.50 - 5.20 g/dL Final   07/03/2022 3.40 (L) 3.50 - 5.20 g/dL Final   07/02/2022 3.70 3.50 - 5.20 g/dL Final      Magnesium Magnesium   Date Value Ref Range Status   07/02/2022 1.8 1.6 - 2.4 mg/dL Final      Uric Acid No results found for: URICACID        Results Review:     I reviewed the patient's new clinical results.    acetaminophen, 1,000 mg, Oral, Q8H  ALPRAZolam, 0.25 mg, Oral, BID  amLODIPine, 5 mg, Oral, Q24H  cholecalciferol, 2,000 Units, Oral, Daily With Dinner  cycloSPORINE, 1 drop, Both Eyes, BID  docusate sodium, 100 mg, Oral, BID  enoxaparin, 40 mg, Subcutaneous, Q24H  ferrous sulfate, 325 mg, Oral, BID AC  fluconazole, 100 mg, Oral, Q24H  lactobacillus acidophilus, 1 capsule, Oral, Daily With Lunch  levETIRAcetam, 500 mg, Oral, BID  levothyroxine, 50 mcg, Oral, Daily  polyethylene glycol, 17 g, Oral, BID  Prucalopride Succinate, 1 mg, Oral, Daily  rosuvastatin, 20 mg, Oral, Nightly  sodium chloride, 1 g, Oral, TID With Meals  vitamin B-12, 50 mcg, Oral, Daily           Medication Review: Reviewed    Assessment & Plan       SIADH, improved    Hyponatremia    Anxiety    Hypothyroidism    Iron deficiency    Essential hypertension, benign    Benzodiazepine dependence (HCC)    Insomnia    Arthritis    GERD (gastroesophageal reflux disease)    Closed fracture of left distal fibula    Debility    History of seizure    Fall      Plan:   Na improved to 130  s/p Samsca x1  Continue current salt tabs and fluid restriction  Sodium is at \"baseline\"   Keep off Zoloft indefinitely.  Will follow.  Psych to see for reccs: re depression/ anxiety, avoid SSRIs  D/w Dr. Gloria Baptiste MD   [facility]  07/04/22  13:26 EDT  "

## 2022-07-04 NOTE — PLAN OF CARE
Goal Outcome Evaluation:  Plan of Care Reviewed With: patient        Progress: no change  Outcome Evaluation: Pt c/o lower abdominal discomfort due to constipation. PRN meds given. Bowel regimen meds adjusted. Tap water enema given without success. Psyc saw and adjusted meds. Worked w/ PT. Up to bsc w/ assist x's 1. VSS. Poss d/c tomorrow. Will continue to monitor.

## 2022-07-04 NOTE — PLAN OF CARE
Problem: Adult Inpatient Plan of Care  Goal: Plan of Care Review  Recent Flowsheet Documentation  Taken 7/4/2022 1608 by Indira Quispe, DEMETRIUS  Outcome Evaluation: Pt reports relatively well controlled L ankle pain, continues to have discomfort due to constipation. She ambulates in room with supervision up to CGA with rolling walker (15ft x 2), further ambulation distance limited per MD order for transfers only and no hallway ambulation. Discussed stair navigation with pt and daughter who reported understanding.  She demonstrated one small LOB while attempting SLS to point to her foot, recovered with CGA and cues to put L foot back down and hold onto RW. She remains appropriate for skilled PT, will address stair navigation or further distance ambulation if deemed appropriate by MD.

## 2022-07-04 NOTE — PLAN OF CARE
"Goal Outcome Evaluation:               Patient extremely anxious overnight. Scheduled xanax given, PRN hydroxyzine, Ambien, and melatonin given. Patient continues to complain of anxiety and feeling \"antsy\". Patient states that she would like to see a hospital  and thinks it may help with her anxiety. Private sitter at bedside. Up to BSC. Patient had one small bowel movement. VSS. Will continue to monitor.   "

## 2022-07-04 NOTE — CONSULTS
Attempted Spiritual Consult with patient today three times. Each time patient has been busy or it hasn't been a good time. Will try again tomorrow.

## 2022-07-04 NOTE — PROGRESS NOTES
"    [facility] Medicine Services  PROGRESS NOTE    Patient Name: Ritu Martino  : 1940  MRN: [id number]    Date of Admission: 2022  Primary Care Physician: Gaurav Barger MD    Subjective   Subjective     HPI:   at bedside today.  Patient reports that she is frustrated so far this admission.  Reports \"every doctor just brings me more bad news.\"  She cites extreme anxiety and told the RN \"isn't there something that they can just do to knock me out.\"  Long discussion today about anxiety medications including her use of scheduled benzodiazepines for the last several years.  Patient is to see psychiatrist today and has already seen psychiatry nurse.  Sodium has improved.  Complaining of constipation, left foot pain is improved today.    Review of Systems  No current fevers or chills  No current shortness of breath or cough  No current nausea, vomiting, or diarrhea  No current chest pain or palpitations    Objective   Objective     Vital Signs:   Temp:  [97.6 °F (36.4 °C)-97.8 °F (36.6 °C)] 97.6 °F (36.4 °C)  Heart Rate:  [] 109  Resp:  [18-20] 18  BP: (126-161)/(65-82) 145/73        Physical Exam:  Constitutional:Awake, alert, chronically ill-appearing  HENT: NCAT, mucous membranes moist, neck supple  Respiratory: Respiratory effort normal, nonlabored breathing   Cardiovascular: Regular rhythm, tachycardic rate, normal radial pulses  Gastrointestinal: Positive bowel sounds, soft, nontender, nondistended  Musculoskeletal: Elderly frail and chronically debilitated in appearance, BMI is 25, mild lower extremity edema  Psychiatric: calm affect, cooperative, conversational  Neurologic: No slurred speech or facial droop, follows commands  Skin: No rashes or jaundice, warm      Results Reviewed:  Results from last 7 days   Lab Units 22  0720 22  1126 22  0530   WBC 10*3/mm3 10.26 11.71* 7.36   HEMOGLOBIN g/dL 11.9* 12.7 11.8*   HEMATOCRIT % 34.2 36.7 33.3*   PLATELETS " 10*3/mm3 300 329 305     Results from last 7 days   Lab Units 07/04/22  0843 07/04/22  0720 07/03/22  1126 06/30/22  1914 06/30/22  1658 06/30/22  1506 06/28/22  1606   SODIUM mmol/L 130* 127* 124*   < >  --  117* 123*   POTASSIUM mmol/L 4.0 3.6 3.8   < >  --  3.7 3.9   CHLORIDE mmol/L 95* 94* 97*   < >  --  83* 85*   CO2 mmol/L 21.0* 20.0* 17.3*   < >  --  23.6 25.0   BUN mg/dL 10 11 15   < >  --  11 11   CREATININE mg/dL 0.56* 0.52* 0.65   < >  --  0.60 0.66   GLUCOSE mg/dL 136* 97 134*   < >  --  123* 105*   CALCIUM mg/dL 8.9 8.9 8.6   < >  --  8.9 10.2   ALT (SGPT) U/L  --   --   --   --   --  23 18   AST (SGOT) U/L  --   --   --   --   --  20 14   TROPONIN T ng/mL  --   --  <0.010  --  <0.010 <0.010  --     < > = values in this interval not displayed.     Estimated Creatinine Clearance: 66.5 mL/min (A) (by C-G formula based on SCr of 0.56 mg/dL (L)).    Microbiology Results Abnormal     Procedure Component Value - Date/Time    COVID PRE-OP / PRE-PROCEDURE SCREENING ORDER (NO ISOLATION) - Swab, Nasopharynx [[id number]]  (Normal) Collected: 06/30/22 1508    Lab Status: Final result Specimen: Swab from Nasopharynx Updated: 06/30/22 2047    Narrative:      The following orders were created for panel order COVID PRE-OP / PRE-PROCEDURE SCREENING ORDER (NO ISOLATION) - Swab, Nasopharynx.  Procedure                               Abnormality         Status                     ---------                               -----------         ------                     COVID-19,APTIMA PANTHER(...[[id number]]  Normal              Final result                 Please view results for these tests on the individual orders.    COVID-19,APTIMA PANTHER(MICHELLE), BENJAMIN/ NOEMÍ, NP/OP SWAB IN UTM/VTM/SALINE TRANSPORT MEDIA,24 HR TAT - Swab, Nasopharynx [[id number]]  (Normal) Collected: 06/30/22 1508    Lab Status: Final result Specimen: Swab from Nasopharynx Updated: 06/30/22 2047     COVID19 Not Detected    Narrative:      Fact sheet for  providers: https://www.fda.gov/media/683202/download     Fact sheet for patients: https://www.fda.gov/media/790613/download    Test performed by RT PCR.          Imaging Results (Last 24 Hours)     ** No results found for the last 24 hours. **              I have reviewed the medications:  Scheduled Meds:acetaminophen, 1,000 mg, Oral, Q8H  ALPRAZolam, 0.25 mg, Oral, BID  amLODIPine, 5 mg, Oral, Q24H  cholecalciferol, 2,000 Units, Oral, Daily With Dinner  clotrimazole-betamethasone, 1 application, Topical, Q12H  cycloSPORINE, 1 drop, Both Eyes, BID  docusate sodium, 100 mg, Oral, BID  enoxaparin, 40 mg, Subcutaneous, Q24H  ferrous sulfate, 325 mg, Oral, BID AC  fluconazole, 100 mg, Oral, Q24H  lactobacillus acidophilus, 1 capsule, Oral, Daily With Lunch  levETIRAcetam, 500 mg, Oral, BID  levothyroxine, 50 mcg, Oral, Daily  polyethylene glycol, 17 g, Oral, BID  Prucalopride Succinate, 1 mg, Oral, Daily  rosuvastatin, 20 mg, Oral, Nightly  sodium chloride, 1 g, Oral, TID With Meals  vitamin B-12, 50 mcg, Oral, Daily      Continuous Infusions:   PRN Meds:.acetaminophen  •  bisacodyl  •  HYDROcodone-acetaminophen  •  hydrOXYzine  •  melatonin  •  nitroglycerin  •  ondansetron **OR** ondansetron  •  [COMPLETED] Insert peripheral IV **AND** sodium chloride  •  zolpidem    Assessment & Plan   Assessment & Plan     Active Hospital Problems    Diagnosis  POA   • **Hyponatremia [E87.1]  Yes   • Debility [R53.81]  Yes   • History of seizure [Z87.898]  Yes   • Fall [W19.XXXA]  Yes   • Closed fracture of left distal fibula [S82.832A]  Yes   • GERD (gastroesophageal reflux disease) [K21.9]  Yes   • Arthritis [M19.90]  Yes   • Insomnia [G47.00]  Yes   • Benzodiazepine dependence (HCC) [F13.20]  Yes   • Iron deficiency [E61.1]  Yes   • Essential hypertension, benign [I10]  Yes   • Hypothyroidism [E03.9]  Yes   • Anxiety [F41.9]  Yes      Resolved Hospital Problems   No resolved problems to display.        Brief Hospital Course to  "date:  Ritu Martino is a 82 y.o. female presents to the hospital with a fall, severe hyponatremia in the setting of reported polydipsia, and left fibular fracture.    Hyponatremia:  Nephrology following.  Samsca given 7/1.  Trend sodium level.  She is on a 1 L fluid restriction and continues sodium chloride 1 g 3 times daily AC.  Sodium is improved to 130 today, discussed with Dr. Baptiste.    Anxiety with chronic benzodiazepine dependence: Continue home medication.  Stable. Patient has taken scheduled ativan 0.25mg BID \"for years;\" patient c/o worsening anxiety; psychiatry to see today    Tachycardia  - resolved    Fall with left fibula fracture:  Fall precautions.  Orthopedic surgery consulted.  Supportive care and symptom treatment.  PT. Weightbearing as tolerated in boot.    History of seizure: Continue AED.  Seizure precautions.    Insomnia: History noted, continue chronic Ambien.  Patient is aware of increased fall risk but wishes to continue this medication.    Hypothyroid: Continue Synthroid.  Stable.    Essential hypertension: Continue home medicines.  Currently normotensive.    DVT Prophylaxis: Lovenox    Disposition: Pending clinical course; discussed with patient and , hopeful DC in 1-2 days.     CODE STATUS:   Code Status and Medical Interventions:   Ordered at: 06/30/22 1724     Code Status (Patient has no pulse and is not breathing):    CPR (Attempt to Resuscitate)     Medical Interventions (Patient has pulse or is breathing):    Full     Today, I personally spent 35 minutes in direct face to face time with the patient, of which greater than 50% of the time was spent in patient education, counseling, and coordination of care as described above.      Tatyana Castro MD  07/04/22    "

## 2022-07-04 NOTE — THERAPY TREATMENT NOTE
Patient Name: Ritu Martino  : 1940    MRN: [id number]                              Today's Date: 2022       Admit Date: 2022    Visit Dx:     ICD-10-CM ICD-9-CM   1. Closed fracture of distal end of left fibula, unspecified fracture morphology, initial encounter  S82.832A 824.8   2. Hyponatremia  E87.1 276.1   3. Multiple falls  R29.6 V15.88   4. Leukocytosis, unspecified type  D72.829 288.60   5. Chest pain, unspecified type  R07.9 786.50     Patient Active Problem List   Diagnosis   • Anxiety   • Hypothyroidism   • Hyponatremia   • Iron deficiency   • Hypokalemia   • Essential hypertension, benign   • Fluent aphasia   • Hypochloremia   • Benzodiazepine dependence (HCC)   • Anemia   • Cervicalgia   • Intertrigo   • Insomnia   • Acute cystitis   • E coli infection   • Diverticulitis   • Arthritis   • Bowel dysfunction   • GERD (gastroesophageal reflux disease)   • Hernia, hiatal   • Seizures (HCC)   • Meningioma (HCC)   • Closed fracture of left distal fibula   • Debility   • History of seizure   • Fall     Past Medical History:   Diagnosis Date   • Anemia    • Anxiety    • Arthritis    • Bowel dysfunction 2021    since having surgery for diverticular infection   • Chronic constipation    • Diverticulitis 2021    w/ rupture and infection required surgery and ostomy   • Essential hypertension, benign    • GERD (gastroesophageal reflux disease)    • Hernia, hiatal    • Hypercholesterolemia    • Hypokalemia    • Hyponatremia     chronic low with occasional normal level   • Hypothyroidism     estimated time frame pt nor  could remember exactly how long has been on medication   • Insomnia    • Iron deficiency    • Seizures (HCC)      Past Surgical History:   Procedure Laterality Date   • APPENDECTOMY     • BACK SURGERY     •  SECTION     • COLON SURGERY  2021    to address ruptured and infected diverticular dz, ostomy also placed   • COLONOSCOPY      • COLOSTOMY CLOSURE  10/2021    ostomy removed   • EYE SURGERY  2 X cataract   • HEMORRHOIDECTOMY     • KNEE ARTHROPLASTY     • TONSILLECTOMY  1946      General Information     Row Name 07/04/22 1607          Physical Therapy Time and Intention    Document Type therapy note (daily note)  -RS     Mode of Treatment individual therapy;physical therapy  -RS     Row Name 07/04/22 1607          General Information    Patient Profile Reviewed yes  -RS     Existing Precautions/Restrictions fall  -RS           User Key  (r) = Recorded By, (t) = Taken By, (c) = Cosigned By    Initials Name Provider Type    RS Indira Quispe, DEMETRIUS Physical Therapist               Mobility     Row Name 07/04/22 1607          Bed Mobility    Comment, (Bed Mobility) pt sitting EOB to start session  -RS     Row Name 07/04/22 1607          Sit-Stand Transfer    Sit-Stand Glady (Transfers) verbal cues;nonverbal cues (demo/gesture);supervision  -RS     Assistive Device (Sit-Stand Transfers) walker, front-wheeled  -RS     Row Name 07/04/22 1607          Gait/Stairs (Locomotion)    Glady Level (Gait) verbal cues;nonverbal cues (demo/gesture);contact guard;supervision  -RS     Assistive Device (Gait) walker, front-wheeled  -RS     Distance in Feet (Gait) 15 ft x 2 in room  -RS     Deviations/Abnormal Patterns (Gait) maddy decreased;festinating/shuffling;gait speed decreased;stride length decreased  -RS     Row Name 07/04/22 1607          Mobility    Extremity Weight-bearing Status left lower extremity  -RS     Left Lower Extremity (Weight-bearing Status) weight-bearing as tolerated (WBAT)  -RS           User Key  (r) = Recorded By, (t) = Taken By, (c) = Cosigned By    Initials Name Provider Type    RS Indira Quispe PT Physical Therapist               Obj/Interventions    No documentation.                Goals/Plan    No documentation.                Clinical Impression     Row Name 07/04/22 1608          Pain    Pretreatment Pain  Rating 4/10  -RS     Posttreatment Pain Rating 5/10  -RS     Pain Location - Side/Orientation Left  -RS     Pain Location lower  -RS     Pain Location - extremity  -RS     Row Name 07/04/22 1608          Plan of Care Review    Outcome Evaluation Pt reports relatively well controlled L ankle pain, continues to have discomfort due to constipation. She ambulates in room with supervision up to CGA with rolling walker (15ft x 2), further ambulation distance limited per MD order for transfers only and no hallway ambulation. Discussed stair navigation with pt and daughter who reported understanding.  She demonstrated one small LOB while attempting SLS to point to her foot, recovered with CGA and cues to put L foot back down and hold onto RW. She remains appropriate for skilled PT, will address stair navigation or further distance ambulation if deemed appropriate by MD.  -RS     Row Name 07/04/22 1608          Positioning and Restraints    Pre-Treatment Position in bed  -RS     Post Treatment Position bed  -RS     In Bed call light within reach;encouraged to call for assist;with family/caregiver;sitting EOB  pt sitting EOB waiting for RN to apply cream, RN notified  -RS           User Key  (r) = Recorded By, (t) = Taken By, (c) = Cosigned By    Initials Name Provider Type    RS Indira Quispe, PT Physical Therapist               Outcome Measures     Row Name 07/04/22 1611          How much help from another person do you currently need...    Turning from your back to your side while in flat bed without using bedrails? 4  -RS     Moving from lying on back to sitting on the side of a flat bed without bedrails? 4  -RS     Moving to and from a bed to a chair (including a wheelchair)? 3  -RS     Standing up from a chair using your arms (e.g., wheelchair, bedside chair)? 4  -RS     Climbing 3-5 steps with a railing? 3  -RS     To walk in hospital room? 3  -RS     AM-PAC 6 Clicks Score (PT) 21  -RS     Highest level of mobility  6 --> Walked 10 steps or more  -     Row Name 07/04/22 1611          Functional Assessment    Outcome Measure Options AM-PAC 6 Clicks Basic Mobility (PT)  -RS           User Key  (r) = Recorded By, (t) = Taken By, (c) = Cosigned By    Initials Name Provider Type    RS Indira Quispe, DEMETRIUS Physical Therapist                             Physical Therapy Education                 Title: PT OT SLP Therapies (Done)     Topic: Physical Therapy (Done)     Point: Mobility training (Done)     Learning Progress Summary           Patient Acceptance, E, VU by RS at 7/4/2022 1611    Acceptance, E,D, DU,VU by RS at 7/3/2022 1639    Acceptance, E, VU by RS at 7/2/2022 1400    Acceptance, E,TB,D, VU,NR by  at 7/1/2022 1639   Significant Other Acceptance, E,D, DU,VU by RS at 7/3/2022 1639                   Point: Home exercise program (Done)     Learning Progress Summary           Patient Acceptance, E, VU by RS at 7/4/2022 1611    Acceptance, E,D, DU,VU by RS at 7/3/2022 1639    Acceptance, E, VU by RS at 7/2/2022 1400    Acceptance, E,TB,D, VU,NR by  at 7/1/2022 1639   Significant Other Acceptance, E,D, DU,VU by RS at 7/3/2022 1639                   Point: Body mechanics (Done)     Learning Progress Summary           Patient Acceptance, E, VU by RS at 7/4/2022 1611    Acceptance, E,D, DU,VU by RS at 7/3/2022 1639    Acceptance, E, VU by RS at 7/2/2022 1400    Acceptance, E,TB,D, VU,NR by  at 7/1/2022 1639   Significant Other Acceptance, E,D, DU,VU by RS at 7/3/2022 1639                   Point: Precautions (Done)     Learning Progress Summary           Patient Acceptance, E, VU by RS at 7/4/2022 1611    Acceptance, E,D, DU,VU by RS at 7/3/2022 1639    Acceptance, E, VU by RS at 7/2/2022 1400    Acceptance, E,TB,D, VU,NR by  at 7/1/2022 1639   Significant Other Acceptance, E,D, DU,VU by RS at 7/3/2022 1010                               User Key     Initials Effective Dates Name Provider Type Discipline     06/16/21 -   Ermelinda Brunner, PT Physical Therapist PT    RS 06/16/21 -  Indira Quispe PT Physical Therapist PT              PT Recommendation and Plan     Plan of Care Reviewed With: patient  Progress: improving  Outcome Evaluation: Pt reports relatively well controlled L ankle pain, continues to have discomfort due to constipation. She ambulates in room with supervision up to CGA with rolling walker (15ft x 2), further ambulation distance limited per MD order for transfers only and no hallway ambulation. Discussed stair navigation with pt and daughter who reported understanding.  She demonstrated one small LOB while attempting SLS to point to her foot, recovered with CGA and cues to put L foot back down and hold onto RW. She remains appropriate for skilled PT, will address stair navigation or further distance ambulation if deemed appropriate by MD.     Time Calculation:    PT Charges     Row Name 07/04/22 1612             Time Calculation    Start Time 1530  -RS      Stop Time 1544  -RS      Time Calculation (min) 14 min  -RS      PT Received On 07/04/22  -RS      PT - Next Appointment 07/05/22  -RS              Timed Charges    72302 - PT Therapeutic Activity Minutes 14  -RS              Total Minutes    Timed Charges Total Minutes 14  -RS       Total Minutes 14  -RS            User Key  (r) = Recorded By, (t) = Taken By, (c) = Cosigned By    Initials Name Provider Type    RS Indira Quispe, DEMETRIUS Physical Therapist              Therapy Charges for Today     Code Description Service Date Service Provider Modifiers Qty    14757577161  PT THERAPEUTIC ACT EA 15 MIN 7/3/2022 Indira Quispe, PT GP 2    [id number] HC PT THERAPEUTIC ACT EA 15 MIN 7/4/2022 Indira Quispe PT GP 1          PT G-Codes  Outcome Measure Options: AM-PAC 6 Clicks Basic Mobility (PT)  AM-PAC 6 Clicks Score (PT): 21  AM-PAC 6 Clicks Score (OT): 19    Indira Quispe PT  7/4/2022

## 2022-07-05 LAB
ALBUMIN SERPL-MCNC: 4.1 G/DL (ref 3.5–5.2)
ANION GAP SERPL CALCULATED.3IONS-SCNC: 12 MMOL/L (ref 5–15)
BUN SERPL-MCNC: 9 MG/DL (ref 8–23)
BUN/CREAT SERPL: 15.5 (ref 7–25)
CALCIUM SPEC-SCNC: 8.7 MG/DL (ref 8.6–10.5)
CHLORIDE SERPL-SCNC: 91 MMOL/L (ref 98–107)
CO2 SERPL-SCNC: 24 MMOL/L (ref 22–29)
CREAT SERPL-MCNC: 0.58 MG/DL (ref 0.57–1)
DEPRECATED RDW RBC AUTO: 37.5 FL (ref 37–54)
EGFRCR SERPLBLD CKD-EPI 2021: 90.5 ML/MIN/1.73
ERYTHROCYTE [DISTWIDTH] IN BLOOD BY AUTOMATED COUNT: 11.7 % (ref 12.3–15.4)
GLUCOSE SERPL-MCNC: 105 MG/DL (ref 65–99)
HCT VFR BLD AUTO: 32.3 % (ref 34–46.6)
HGB BLD-MCNC: 11.5 G/DL (ref 12–15.9)
MCH RBC QN AUTO: 32 PG (ref 26.6–33)
MCHC RBC AUTO-ENTMCNC: 35.6 G/DL (ref 31.5–35.7)
MCV RBC AUTO: 90 FL (ref 79–97)
PHOSPHATE SERPL-MCNC: 1.7 MG/DL (ref 2.5–4.5)
PLATELET # BLD AUTO: 289 10*3/MM3 (ref 140–450)
PMV BLD AUTO: 8.3 FL (ref 6–12)
POTASSIUM SERPL-SCNC: 2.9 MMOL/L (ref 3.5–5.2)
RBC # BLD AUTO: 3.59 10*6/MM3 (ref 3.77–5.28)
SODIUM SERPL-SCNC: 127 MMOL/L (ref 136–145)
WBC NRBC COR # BLD: 7.65 10*3/MM3 (ref 3.4–10.8)

## 2022-07-05 PROCEDURE — 85027 COMPLETE CBC AUTOMATED: CPT | Performed by: INTERNAL MEDICINE

## 2022-07-05 PROCEDURE — 97535 SELF CARE MNGMENT TRAINING: CPT

## 2022-07-05 PROCEDURE — 97530 THERAPEUTIC ACTIVITIES: CPT

## 2022-07-05 PROCEDURE — 25010000002 ONDANSETRON PER 1 MG: Performed by: INTERNAL MEDICINE

## 2022-07-05 PROCEDURE — 25010000002 ENOXAPARIN PER 10 MG: Performed by: INTERNAL MEDICINE

## 2022-07-05 PROCEDURE — 80069 RENAL FUNCTION PANEL: CPT | Performed by: INTERNAL MEDICINE

## 2022-07-05 PROCEDURE — 25010000002 PROCHLORPERAZINE 10 MG/2ML SOLUTION: Performed by: STUDENT IN AN ORGANIZED HEALTH CARE EDUCATION/TRAINING PROGRAM

## 2022-07-05 RX ORDER — PROCHLORPERAZINE EDISYLATE 5 MG/ML
5 INJECTION INTRAMUSCULAR; INTRAVENOUS EVERY 6 HOURS PRN
Status: DISCONTINUED | OUTPATIENT
Start: 2022-07-05 | End: 2022-07-06 | Stop reason: HOSPADM

## 2022-07-05 RX ORDER — POTASSIUM CHLORIDE 750 MG/1
40 TABLET, FILM COATED, EXTENDED RELEASE ORAL AS NEEDED
Status: DISCONTINUED | OUTPATIENT
Start: 2022-07-05 | End: 2022-07-06 | Stop reason: HOSPADM

## 2022-07-05 RX ORDER — SODIUM BICARBONATE 650 MG/1
650 TABLET ORAL 2 TIMES DAILY
Status: DISCONTINUED | OUTPATIENT
Start: 2022-07-05 | End: 2022-07-06 | Stop reason: HOSPADM

## 2022-07-05 RX ORDER — POTASSIUM CHLORIDE 1.5 G/1.77G
40 POWDER, FOR SOLUTION ORAL AS NEEDED
Status: DISCONTINUED | OUTPATIENT
Start: 2022-07-05 | End: 2022-07-06 | Stop reason: HOSPADM

## 2022-07-05 RX ADMIN — PROCHLORPERAZINE EDISYLATE 5 MG: 5 INJECTION INTRAMUSCULAR; INTRAVENOUS at 12:34

## 2022-07-05 RX ADMIN — ALPRAZOLAM 0.25 MG: 0.25 TABLET ORAL at 01:32

## 2022-07-05 RX ADMIN — CYCLOSPORINE 1 DROP: 0.5 EMULSION OPHTHALMIC at 23:28

## 2022-07-05 RX ADMIN — ROSUVASTATIN CALCIUM 20 MG: 20 TABLET, FILM COATED ORAL at 23:26

## 2022-07-05 RX ADMIN — POTASSIUM CHLORIDE 40 MEQ: 10 TABLET, EXTENDED RELEASE ORAL at 23:26

## 2022-07-05 RX ADMIN — HYDROXYZINE HYDROCHLORIDE 10 MG: 10 TABLET ORAL at 15:13

## 2022-07-05 RX ADMIN — DIBASIC SODIUM PHOSPHATE, MONOBASIC POTASSIUM PHOSPHATE AND MONOBASIC SODIUM PHOSPHATE 2 TABLET: 852; 155; 130 TABLET ORAL at 23:26

## 2022-07-05 RX ADMIN — SODIUM CHLORIDE TAB 1 GM 1 G: 1 TAB at 17:49

## 2022-07-05 RX ADMIN — SODIUM PHOSPHATE, MONOBASIC, MONOHYDRATE AND SODIUM PHOSPHATE, DIBASIC, ANHYDROUS 30 MMOL: 276; 142 INJECTION, SOLUTION INTRAVENOUS at 15:13

## 2022-07-05 RX ADMIN — LEVETIRACETAM 500 MG: 500 TABLET, FILM COATED ORAL at 09:15

## 2022-07-05 RX ADMIN — SODIUM CHLORIDE TAB 1 GM 1 G: 1 TAB at 12:39

## 2022-07-05 RX ADMIN — DIBASIC SODIUM PHOSPHATE, MONOBASIC POTASSIUM PHOSPHATE AND MONOBASIC SODIUM PHOSPHATE 2 TABLET: 852; 155; 130 TABLET ORAL at 17:49

## 2022-07-05 RX ADMIN — ALPRAZOLAM 0.25 MG: 0.25 TABLET ORAL at 19:43

## 2022-07-05 RX ADMIN — POTASSIUM CHLORIDE 40 MEQ: 10 TABLET, EXTENDED RELEASE ORAL at 12:34

## 2022-07-05 RX ADMIN — ZOLPIDEM TARTRATE 5 MG: 5 TABLET, FILM COATED ORAL at 23:26

## 2022-07-05 RX ADMIN — SODIUM BICARBONATE 650 MG: 650 TABLET ORAL at 23:26

## 2022-07-05 RX ADMIN — FERROUS SULFATE TAB 325 MG (65 MG ELEMENTAL FE) 325 MG: 325 (65 FE) TAB at 17:49

## 2022-07-05 RX ADMIN — Medication 1 CAPSULE: at 12:33

## 2022-07-05 RX ADMIN — SODIUM CHLORIDE TAB 1 GM 1 G: 1 TAB at 09:15

## 2022-07-05 RX ADMIN — ENOXAPARIN SODIUM 40 MG: 100 INJECTION SUBCUTANEOUS at 23:26

## 2022-07-05 RX ADMIN — ONDANSETRON 4 MG: 2 INJECTION INTRAMUSCULAR; INTRAVENOUS at 05:39

## 2022-07-05 RX ADMIN — ALPRAZOLAM 0.25 MG: 0.25 TABLET ORAL at 09:19

## 2022-07-05 RX ADMIN — DESVENLAFAXINE SUCCINATE 25 MG: 25 TABLET, EXTENDED RELEASE ORAL at 09:15

## 2022-07-05 RX ADMIN — CLOTRIMAZOLE AND BETAMETHASONE DIPROPIONATE 1 APPLICATION: 10; .5 CREAM TOPICAL at 23:28

## 2022-07-05 RX ADMIN — AMLODIPINE BESYLATE 5 MG: 5 TABLET ORAL at 09:15

## 2022-07-05 RX ADMIN — LEVETIRACETAM 500 MG: 500 TABLET, FILM COATED ORAL at 23:26

## 2022-07-05 RX ADMIN — Medication 2000 UNITS: at 17:49

## 2022-07-05 NOTE — CASE MANAGEMENT/SOCIAL WORK
Continued Stay Note  [facility]     Patient Name: Ritu Martino  MRN: [id number]  Today's Date: 7/5/2022    Admit Date: 6/30/2022     Discharge Plan     Row Name 07/05/22 1544       Plan    Plan Return home with spouse and [facility] following    Patient/Family in Agreement with Plan yes    Plan Comments Spoke with patient and  Nu at Bedside.  Plan remains for her to return home with [facility] following.  CCP will continue to follow.  Gregorio MONTAÑO                       Expected Discharge Date and Time     Expected Discharge Date Expected Discharge Time    Jul 5, 2022             Becky S. [place], RN

## 2022-07-05 NOTE — PROGRESS NOTES
[facility] Medicine Services  PROGRESS NOTE    Patient Name: Ritu Martino  : 1940  MRN: [id number]    Date of Admission: 2022  Primary Care Physician: Gaurav Barger MD    Subjective   Subjective     HPI:  Patient irritable this morning. Reports she was up most of the night having to use the bathroom secondary to bowel regimen that was given yesterday afternoon. Sodium down trended slightly to 127, medications adjusted by nephrology.     Review of Systems  No current fevers or chills  No current shortness of breath or cough  No current nausea, vomiting, or diarrhea  No current chest pain or palpitations    Objective   Objective     Vital Signs:   Temp:  [97.7 °F (36.5 °C)-98.5 °F (36.9 °C)] 98 °F (36.7 °C)  Heart Rate:  [] 104  Resp:  [16-20] 20  BP: (113-140)/(64-85) 136/77        Physical Exam:  Constitutional:Awake, alert, chronically ill-appearing  HENT: NCAT, mucous membranes moist, neck supple  Respiratory: Respiratory effort normal, nonlabored breathing   Cardiovascular: Regular rhythm, tachycardic rate, normal radial pulses  Gastrointestinal: Positive bowel sounds, soft, nontender, nondistended  Musculoskeletal: Elderly frail and chronically debilitated in appearance, BMI is 25, mild lower extremity edema  Psychiatric: calm affect, cooperative, irritated  Neurologic: No slurred speech or facial droop, follows commands  Skin: No rashes or jaundice, warm      Results Reviewed:  Results from last 7 days   Lab Units 22  0943 22  0720 22  1126   WBC 10*3/mm3 7.65 10.26 11.71*   HEMOGLOBIN g/dL 11.5* 11.9* 12.7   HEMATOCRIT % 32.3* 34.2 36.7   PLATELETS 10*3/mm3 289 300 329     Results from last 7 days   Lab Units 22  0943 22  0843 22  0720 22  1126 22  1914 22  1658 22  1506 22  1606   SODIUM mmol/L 127* 130* 127* 124*   < >  --  117* 123*   POTASSIUM mmol/L 2.9* 4.0 3.6 3.8   < >  --  3.7 3.9   CHLORIDE mmol/L 91*  95* 94* 97*   < >  --  83* 85*   CO2 mmol/L 24.0 21.0* 20.0* 17.3*   < >  --  23.6 25.0   BUN mg/dL 9 10 11 15   < >  --  11 11   CREATININE mg/dL 0.58 0.56* 0.52* 0.65   < >  --  0.60 0.66   GLUCOSE mg/dL 105* 136* 97 134*   < >  --  123* 105*   CALCIUM mg/dL 8.7 8.9 8.9 8.6   < >  --  8.9 10.2   ALT (SGPT) U/L  --   --   --   --   --   --  23 18   AST (SGOT) U/L  --   --   --   --   --   --  20 14   TROPONIN T ng/mL  --   --   --  <0.010  --  <0.010 <0.010  --     < > = values in this interval not displayed.     Estimated Creatinine Clearance: 64.2 mL/min (by C-G formula based on SCr of 0.58 mg/dL).    Microbiology Results Abnormal     Procedure Component Value - Date/Time    COVID PRE-OP / PRE-PROCEDURE SCREENING ORDER (NO ISOLATION) - Swab, Nasopharynx [[id number]]  (Normal) Collected: 06/30/22 1508    Lab Status: Final result Specimen: Swab from Nasopharynx Updated: 06/30/22 2047    Narrative:      The following orders were created for panel order COVID PRE-OP / PRE-PROCEDURE SCREENING ORDER (NO ISOLATION) - Swab, Nasopharynx.  Procedure                               Abnormality         Status                     ---------                               -----------         ------                     COVID-19,APTIMA PANTHER(...[[id number]]  Normal              Final result                 Please view results for these tests on the individual orders.    COVID-19,APTIMA PANTHER(MICHELLE),[facility]/ NOEMÍ, NP/OP SWAB IN UTM/VTM/SALINE TRANSPORT MEDIA,24 HR TAT - Swab, Nasopharynx [[id number]]  (Normal) Collected: 06/30/22 1508    Lab Status: Final result Specimen: Swab from Nasopharynx Updated: 06/30/22 2047     COVID19 Not Detected    Narrative:      Fact sheet for providers: https://www.fda.gov/media/865512/download     Fact sheet for patients: https://www.fda.gov/media/998455/download    Test performed by RT PCR.          Imaging Results (Last 24 Hours)     ** No results found for the last 24 hours. **              I have  reviewed the medications:  Scheduled Meds:acetaminophen, 1,000 mg, Oral, Q8H  amLODIPine, 5 mg, Oral, Q24H  bisacodyl, 10 mg, Oral, Daily  cholecalciferol, 2,000 Units, Oral, Daily With Dinner  clotrimazole-betamethasone, 1 application, Topical, Q12H  cycloSPORINE, 1 drop, Both Eyes, BID  desvenlafaxine, 25 mg, Oral, Daily  enoxaparin, 40 mg, Subcutaneous, Q24H  ferrous sulfate, 325 mg, Oral, BID AC  fluconazole, 100 mg, Oral, Q24H  lactobacillus acidophilus, 1 capsule, Oral, Daily With Lunch  levETIRAcetam, 500 mg, Oral, BID  levothyroxine, 50 mcg, Oral, Daily  phosphorus, 500 mg, Oral, 4x Daily  polyethylene glycol, 17 g, Oral, BID  Prucalopride Succinate, 1 mg, Oral, Daily  rosuvastatin, 20 mg, Oral, Nightly  senna-docusate sodium, 2 tablet, Oral, BID  sodium bicarbonate, 650 mg, Oral, BID  sodium chloride, 1 g, Oral, TID With Meals  sodium phosphate IVPB, 30 mmol, Intravenous, Once  vitamin B-12, 50 mcg, Oral, Daily      Continuous Infusions:   PRN Meds:.acetaminophen  •  ALPRAZolam  •  bisacodyl  •  HYDROcodone-acetaminophen  •  hydrOXYzine  •  melatonin  •  nitroglycerin  •  ondansetron **OR** ondansetron  •  potassium chloride  •  potassium chloride  •  prochlorperazine  •  [COMPLETED] Insert peripheral IV **AND** sodium chloride  •  zolpidem    Assessment & Plan   Assessment & Plan     Active Hospital Problems    Diagnosis  POA   • **Hyponatremia [E87.1]  Yes   • Debility [R53.81]  Yes   • History of seizure [Z87.898]  Yes   • Fall [W19.XXXA]  Yes   • Closed fracture of left distal fibula [S82.832A]  Yes   • GERD (gastroesophageal reflux disease) [K21.9]  Yes   • Arthritis [M19.90]  Yes   • Insomnia [G47.00]  Yes   • Benzodiazepine dependence (HCC) [F13.20]  Yes   • Iron deficiency [E61.1]  Yes   • Essential hypertension, benign [I10]  Yes   • Hypothyroidism [E03.9]  Yes   • Anxiety [F41.9]  Yes      Resolved Hospital Problems   No resolved problems to display.        Brief Hospital Course to date:  Ritu REYNOSO  "Salena is a 82 y.o. female presents to the hospital with a fall, severe hyponatremia in the setting of reported polydipsia, and left fibular fracture.    Hyponatremia:  Nephrology following.  Samsca given 7/1.  Trend sodium level.  She is on a 1 L fluid restriction and continues sodium chloride 1 g 3 times daily AC.  D/w Dr. Baptiste, agree this is not diabetes insipidus; labs support SIADH with fluid indiscretion at home.     Anxiety with chronic benzodiazepine dependence: Continue home medication.  Stable. Patient has taken scheduled ativan 0.25mg BID \"for years;\" patient c/o worsening anxiety; psychiatry saw and adjusted meds. She is going to follow up with psychiatry as an outpatient after discharge. Started on desvenlafaxine per psych.    Constipation/nausea  - patient c/o severe constipation yesterday and requested multiple medications to facilitate bowel movements, patient had multiple bowel movements overnight but meds likely contributed to nausea  - Low K+/low phos- will replace    Fall with left fibula fracture:  Fall precautions.  Orthopedic surgery consulted.  Supportive care and symptom treatment.  PT. Weightbearing as tolerated in boot.    History of seizure: Continue AED.  Seizure precautions.    Insomnia: History noted, continue chronic Ambien.  Patient is aware of increased fall risk but wishes to continue this medication.    Hypothyroid: Continue Synthroid.  Stable.    Essential hypertension: Continue home medicines.  Currently normotensive.    DVT Prophylaxis: Lovenox    Disposition: D/w Jj Baptiste and Ayana, plan for DC home tomorrow.      CODE STATUS:   Code Status and Medical Interventions:   Ordered at: 06/30/22 1724     Code Status (Patient has no pulse and is not breathing):    CPR (Attempt to Resuscitate)     Medical Interventions (Patient has pulse or is breathing):    Full     Today, I personally spent 35 minutes in direct face to face time with the patient, of which greater than " 50% of the time was spent in patient education, counseling, and coordination of care as described above.      Tatyana Castro MD  07/05/22

## 2022-07-05 NOTE — NURSING NOTE
" Access follow-up due to depression.  No new concerns per primary RN.  Upon entering room patient is brushing her teeth and has Timi, a family friend, present to assist her.  Patient states she had diarrhea every hour last night.  She is irritable.  When asked about anxiety and her Atarax she states \"I do not know I just need to rest right now.\"  Patient's appetite remains poor, she only ate a few bites of breakfast.  Patient is not extremely cooperative for interview or talkative at this point.  Access will follow up tomorrow.  "

## 2022-07-05 NOTE — PLAN OF CARE
Pt pleasant and agreeable to OT this afternoon. CGA for OOB mobility with the use of a walker. Increased time provided with the pt and her  discussing home safety, completion of ADL's in regards to her boot/weight bearing precautions. All questions answered. The pt is very frustrated with her functional status and requiring help at home for ADL's. HH OT/PT recommended.     Patient was not wearing a face mask during this therapy encounter. Therapist used appropriate personal protective equipment including mask and gloves. Mask used was standard procedure mask. Appropriate PPE was worn during the entire therapy session. Hand hygiene was completed before and after therapy session. Patient is not in enhanced droplet precautions.

## 2022-07-05 NOTE — PROGRESS NOTES
"RENAL/KCC:     LOS: 5 days    Patient Care Team:  Gaurav Barger MD as PCP - General (Internal Medicine)    Chief Complaint:  Hyponatremia    Subjective     Interval History:   Chart reviewed, generally states she does not feel well  Still very anxious, flat affect  No soa, CP, n/v    7/5: Remains very anxious, very flat affect, no new complaints  Was constipated yesterday but has had several bowel movements today      Objective     Vital Sign Min/Max for last 24 hours  Temp  Min: 97.6 °F (36.4 °C)  Max: 98.5 °F (36.9 °C)   BP  Min: 113/64  Max: 145/73   Pulse  Min: 82  Max: 109   Resp  Min: 16  Max: 20   SpO2  Min: 96 %  Max: 99 %   No data recorded   No data recorded     Flowsheet Rows    Flowsheet Row First Filed Value   Admission Height 152.4 cm (60\") Documented at 06/30/2022 1432   Admission Weight 54 kg (119 lb) Documented at 06/30/2022 1432          I/O this shift:  In: 418 [P.O.:418]  Out: -   I/O last 3 completed shifts:  In: 840 [P.O.:840]  Out: -     Physical Exam:  GEN: Awake, NAD  ENT: PERRL, EOMI, MMM  NECK: Supple, no JVD  CHEST: CTAB, no W/R/C  CV: RRR, no M/G/R  ABD: Soft, NT, +BS  SKIN: Warm and Dry  NEURO: CN's intact      WBC WBC   Date Value Ref Range Status   07/05/2022 7.65 3.40 - 10.80 10*3/mm3 Final   07/04/2022 10.26 3.40 - 10.80 10*3/mm3 Final   07/03/2022 11.71 (H) 3.40 - 10.80 10*3/mm3 Final      HGB Hemoglobin   Date Value Ref Range Status   07/05/2022 11.5 (L) 12.0 - 15.9 g/dL Final   07/04/2022 11.9 (L) 12.0 - 15.9 g/dL Final   07/03/2022 12.7 12.0 - 15.9 g/dL Final      HCT Hematocrit   Date Value Ref Range Status   07/05/2022 32.3 (L) 34.0 - 46.6 % Final   07/04/2022 34.2 34.0 - 46.6 % Final   07/03/2022 36.7 34.0 - 46.6 % Final      Platlets No results found for: LABPLAT   MCV MCV   Date Value Ref Range Status   07/05/2022 90.0 79.0 - 97.0 fL Final   07/04/2022 91.7 79.0 - 97.0 fL Final   07/03/2022 93.4 79.0 - 97.0 fL Final          Sodium Sodium   Date Value Ref Range Status "   07/05/2022 127 (L) 136 - 145 mmol/L Final   07/04/2022 130 (L) 136 - 145 mmol/L Final   07/04/2022 127 (L) 136 - 145 mmol/L Final   07/03/2022 124 (L) 136 - 145 mmol/L Final      Potassium Potassium   Date Value Ref Range Status   07/05/2022 2.9 (L) 3.5 - 5.2 mmol/L Final   07/04/2022 4.0 3.5 - 5.2 mmol/L Final   07/04/2022 3.6 3.5 - 5.2 mmol/L Final   07/03/2022 3.8 3.5 - 5.2 mmol/L Final     Comment:     Slight hemolysis detected by analyzer. Results may be affected.      Chloride Chloride   Date Value Ref Range Status   07/05/2022 91 (L) 98 - 107 mmol/L Final   07/04/2022 95 (L) 98 - 107 mmol/L Final   07/04/2022 94 (L) 98 - 107 mmol/L Final   07/03/2022 97 (L) 98 - 107 mmol/L Final      CO2 CO2   Date Value Ref Range Status   07/05/2022 24.0 22.0 - 29.0 mmol/L Final   07/04/2022 21.0 (L) 22.0 - 29.0 mmol/L Final   07/04/2022 20.0 (L) 22.0 - 29.0 mmol/L Final   07/03/2022 17.3 (L) 22.0 - 29.0 mmol/L Final      BUN BUN   Date Value Ref Range Status   07/05/2022 9 8 - 23 mg/dL Final   07/04/2022 10 8 - 23 mg/dL Final   07/04/2022 11 8 - 23 mg/dL Final   07/03/2022 15 8 - 23 mg/dL Final      Creatinine Creatinine   Date Value Ref Range Status   07/05/2022 0.58 0.57 - 1.00 mg/dL Final   07/04/2022 0.56 (L) 0.57 - 1.00 mg/dL Final   07/04/2022 0.52 (L) 0.57 - 1.00 mg/dL Final   07/03/2022 0.65 0.57 - 1.00 mg/dL Final      Calcium Calcium   Date Value Ref Range Status   07/05/2022 8.7 8.6 - 10.5 mg/dL Final   07/04/2022 8.9 8.6 - 10.5 mg/dL Final   07/04/2022 8.9 8.6 - 10.5 mg/dL Final   07/03/2022 8.6 8.6 - 10.5 mg/dL Final      PO4 No results found for: CAPO4   Albumin Albumin   Date Value Ref Range Status   07/05/2022 4.10 3.50 - 5.20 g/dL Final   07/04/2022 4.10 3.50 - 5.20 g/dL Final   07/03/2022 3.40 (L) 3.50 - 5.20 g/dL Final      Magnesium No results found for: MG   Uric Acid No results found for: URICACID        Results Review:     I reviewed the patient's new clinical results.    acetaminophen, 1,000 mg,  Oral, Q8H  amLODIPine, 5 mg, Oral, Q24H  bisacodyl, 10 mg, Oral, Daily  cholecalciferol, 2,000 Units, Oral, Daily With Dinner  clotrimazole-betamethasone, 1 application, Topical, Q12H  cycloSPORINE, 1 drop, Both Eyes, BID  desvenlafaxine, 25 mg, Oral, Daily  enoxaparin, 40 mg, Subcutaneous, Q24H  ferrous sulfate, 325 mg, Oral, BID AC  fluconazole, 100 mg, Oral, Q24H  lactobacillus acidophilus, 1 capsule, Oral, Daily With Lunch  levETIRAcetam, 500 mg, Oral, BID  levothyroxine, 50 mcg, Oral, Daily  phosphorus, 500 mg, Oral, 4x Daily  polyethylene glycol, 17 g, Oral, BID  Prucalopride Succinate, 1 mg, Oral, Daily  rosuvastatin, 20 mg, Oral, Nightly  senna-docusate sodium, 2 tablet, Oral, BID  sodium bicarbonate, 650 mg, Oral, BID  sodium chloride, 1 g, Oral, TID With Meals  sodium phosphate IVPB, 30 mmol, Intravenous, Once  vitamin B-12, 50 mcg, Oral, Daily           Medication Review: Reviewed    Assessment & Plan       SIADH, improved    Hyponatremia    Anxiety    Hypothyroidism    Iron deficiency    Essential hypertension, benign    Benzodiazepine dependence (HCC)    Insomnia    Arthritis    GERD (gastroesophageal reflux disease)    Closed fracture of left distal fibula    Debility    History of seizure    Fall      Plan:   Na had improved to 130, down slightly to 127 today, possibly due to worsening hypokalemia  Psychiatry notes reviewed.  I did not suggest that the Zoloft caused her hyponatremia.  This is a very chronic condition that she has had since 2014.  I merely felt that with her chronic hyponatremia, an SSRI was not a good choice for treatment of anxiety/depression as it may exacerbate chronic hyponatremia  Her acute on chronic hyponatremia was due to indiscretion with fluid intake.  She does not appear to have a primary polydipsia and there is no evidence of diabetes insipidus    Recommend to continue current salt tabs and fluid restriction   add oral sodium bicarb  Replace potassium and  "phosphorus    Sodium is near \"baseline\" and should not be causing any symptoms at current levels   Keep off Zoloft indefinitely.  Will follow.  D/w Dr. Gloria Baptiste MD   [facility]  07/05/22  13:17 EDT  "

## 2022-07-05 NOTE — THERAPY TREATMENT NOTE
Patient Name: Ritu Martino  : 1940    MRN: [id number]                              Today's Date: 2022       Admit Date: 2022    Visit Dx:     ICD-10-CM ICD-9-CM   1. Closed fracture of distal end of left fibula, unspecified fracture morphology, initial encounter  S82.832A 824.8   2. Hyponatremia  E87.1 276.1   3. Multiple falls  R29.6 V15.88   4. Leukocytosis, unspecified type  D72.829 288.60   5. Chest pain, unspecified type  R07.9 786.50     Patient Active Problem List   Diagnosis   • Anxiety   • Hypothyroidism   • Hyponatremia   • Iron deficiency   • Hypokalemia   • Essential hypertension, benign   • Fluent aphasia   • Hypochloremia   • Benzodiazepine dependence (HCC)   • Anemia   • Cervicalgia   • Intertrigo   • Insomnia   • Acute cystitis   • E coli infection   • Diverticulitis   • Arthritis   • Bowel dysfunction   • GERD (gastroesophageal reflux disease)   • Hernia, hiatal   • Seizures (HCC)   • Meningioma (HCC)   • Closed fracture of left distal fibula   • Debility   • History of seizure   • Fall     Past Medical History:   Diagnosis Date   • Anemia    • Anxiety    • Arthritis    • Bowel dysfunction 2021    since having surgery for diverticular infection   • Chronic constipation    • Diverticulitis 2021    w/ rupture and infection required surgery and ostomy   • Essential hypertension, benign    • GERD (gastroesophageal reflux disease)    • Hernia, hiatal    • Hypercholesterolemia    • Hypokalemia    • Hyponatremia     chronic low with occasional normal level   • Hypothyroidism     estimated time frame pt nor  could remember exactly how long has been on medication   • Insomnia    • Iron deficiency    • Seizures (HCC)      Past Surgical History:   Procedure Laterality Date   • APPENDECTOMY     • BACK SURGERY     •  SECTION     • COLON SURGERY  2021    to address ruptured and infected diverticular dz, ostomy also placed   • COLONOSCOPY      • COLOSTOMY CLOSURE  10/2021    ostomy removed   • EYE SURGERY  2 X cataract   • HEMORRHOIDECTOMY     • KNEE ARTHROPLASTY     • TONSILLECTOMY  1946      General Information     Row Name 07/05/22 1507          OT Time and Intention    Document Type therapy note (daily note)  -RB     Mode of Treatment individual therapy;occupational therapy  -RB     Row Name 07/05/22 1507          General Information    Patient Profile Reviewed yes  -RB     Existing Precautions/Restrictions fall  -RB     Row Name 07/05/22 1507          Cognition    Orientation Status (Cognition) oriented x 3  -RB           User Key  (r) = Recorded By, (t) = Taken By, (c) = Cosigned By    Initials Name Provider Type    RB Alessia Devries, DEREK Occupational Therapist                 Mobility/ADL's     Row Name 07/05/22 1507          Bed Mobility    Comment, (Bed Mobility) [facility]  -     Row Name 07/05/22 1507          Transfers    Transfers sit-stand transfer;stand-sit transfer;bed-chair transfer  -RB     Bed-Chair [place] (Transfers) verbal cues;supervision  -RB     Assistive Device (Bed-Chair Transfers) walker, front-wheeled  -RB     Sit-Stand [place] (Transfers) supervision;verbal cues  -RB     Stand-Sit [place] (Transfers) supervision;verbal cues  -RB     Row Name 07/05/22 1507          Sit-Stand Transfer    Assistive Device (Sit-Stand Transfers) walker, front-wheeled  -RB     Row Name 07/05/22 1507          Stand-Sit Transfer    Assistive Device (Stand-Sit Transfers) walker, front-wheeled  -     Row Name 07/05/22 1507          Functional Mobility    Functional Mobility- Ind. Level verbal cues required;supervision required  -RB     Functional Mobility- Device walker, front-wheeled  -RB     Functional Mobility- Comment mobilized to sinkside and to the door and back to the chair  -RB     Row Name 07/05/22 1507          Mobility    Left Lower Extremity (Weight-bearing Status) weight-bearing as tolerated (WBAT)  -RB           User Key   (r) = Recorded By, (t) = Taken By, (c) = Cosigned By    Initials Name Provider Type    RB Alessia Devries OT Occupational Therapist               Obj/Interventions     Row Name 07/05/22 1508          Balance    Comment, Balance No LOB with the use of a walker  -RB           User Key  (r) = Recorded By, (t) = Taken By, (c) = Cosigned By    Initials Name Provider Type    RB Alessia Devries OT Occupational Therapist               Goals/Plan    No documentation.                Clinical Impression     Row Name 07/05/22 1508          Pain Assessment    Pretreatment Pain Rating 4/10  -RB     Posttreatment Pain Rating 4/10  -RB     Pain Location - Side/Orientation Left  -RB     Pain Location lower  -RB     Pain Location - extremity  -RB     Pain Intervention(s) Repositioned  -RB     Row Name 07/05/22 1508          Plan of Care Review    Plan of Care Reviewed With patient  -RB     Progress improving  -RB     Row Name 07/05/22 1508          Therapy Assessment/Plan (OT)    Rehab Potential (OT) good, to achieve stated therapy goals  -RB     Criteria for Skilled Therapeutic Interventions Met (OT) yes;skilled treatment is necessary  -RB     Therapy Frequency (OT) 5 times/wk  -RB     Row Name 07/05/22 1508          Therapy Plan Review/Discharge Plan (OT)    Anticipated Discharge Disposition (OT) home with home health  -RB     Row Name 07/05/22 1508          Vital Signs    O2 Delivery Pre Treatment room air  -RB     O2 Delivery Intra Treatment room air  -RB     O2 Delivery Post Treatment room air  -RB     Pre Patient Position Sitting  -RB     Intra Patient Position Standing  -RB     Post Patient Position Sitting  -RB     Row Name 07/05/22 1508          Positioning and Restraints    Pre-Treatment Position sitting in chair/recliner  -RB     Post Treatment Position chair  -RB     In Chair notified nsg;reclined;sitting;call light within reach;encouraged to call for assist;exit alarm on;legs elevated  -RB           User Key  (r) =  Recorded By, (t) = Taken By, (c) = Cosigned By    Initials Name Provider Type    Alessia Herrera OT Occupational Therapist               Outcome Measures    No documentation.                 Occupational Therapy Education                 Title: PT OT SLP Therapies (Done)     Topic: Occupational Therapy (Done)     Point: ADL training (Done)     Description:   Instruct learner(s) on proper safety adaptation and remediation techniques during self care or transfers.   Instruct in proper use of assistive devices.              Learning Progress Summary           Patient Acceptance, E,TB,D, DU,VU by RB at 7/5/2022 1506    Comment: detailed education on  home safety, DME/AD/AE, weight bearing during ADL's/mobility with boot    Acceptance, E, VU by MICHAEL at 7/1/2022 1618    Comment: role of OT, plan of care, safety                   Point: Home exercise program (Done)     Description:   Instruct learner(s) on appropriate technique for monitoring, assisting and/or progressing therapeutic exercises/activities.              Learning Progress Summary           Patient Acceptance, E,TB,D, DU,VU by RB at 7/5/2022 1506    Comment: detailed education on  home safety, DME/AD/AE, weight bearing during ADL's/mobility with boot    Acceptance, E, VU by JW at 7/1/2022 1618    Comment: role of OT, plan of care, safety                   Point: Precautions (Done)     Description:   Instruct learner(s) on prescribed precautions during self-care and functional transfers.              Learning Progress Summary           Patient Acceptance, E,TB,D, DU,VU by RB at 7/5/2022 1506    Comment: detailed education on  home safety, DME/AD/AE, weight bearing during ADL's/mobility with boot    Acceptance, E, VU by JW at 7/1/2022 1618    Comment: role of OT, plan of care, safety                   Point: Body mechanics (Done)     Description:   Instruct learner(s) on proper positioning and spine alignment during self-care, functional mobility activities  and/or exercises.              Learning Progress Summary           Patient Acceptance, E,TB,D, DU,VU by RB at 7/5/2022 1506    Comment: detailed education on  home safety, DME/AD/AE, weight bearing during ADL's/mobility with boot    Renea, MARIANELA, VU by JW at 7/1/2022 1618    Comment: role of OT, plan of care, safety                               User Key     Initials Effective Dates Name Provider Type Discipline     06/16/21 -  Alessia Devries OT Occupational Therapist OT    MICAHEL 06/10/21 -  Corinne Eagle OT Occupational Therapist OT              OT Recommendation and Plan  Therapy Frequency (OT): 5 times/wk  Plan of Care Review  Plan of Care Reviewed With: patient  Progress: improving     Time Calculation:    Time Calculation- OT     Row Name 07/05/22 1506             Time Calculation- OT    OT Start Time 1407  -RB      OT Stop Time 1436  -RB      OT Time Calculation (min) 29 min  -RB      Total Timed Code Minutes- OT 29 minute(s)  -RB      OT Received On 07/05/22  -RB      OT - Next Appointment 07/06/22  -RB              Timed Charges    60280 - OT Therapeutic Activity Minutes 10  -RB      98318 - OT Self Care/Mgmt Minutes 19  -RB              Total Minutes    Timed Charges Total Minutes 29  -RB       Total Minutes 29  -RB            User Key  (r) = Recorded By, (t) = Taken By, (c) = Cosigned By    Initials Name Provider Type    RB Alessia Devries OT Occupational Therapist              Therapy Charges for Today     Code Description Service Date Service Provider Modifiers Qty    27827253330 HC OT SELF CARE/MGMT/TRAIN EA 15 MIN 7/5/2022 Alessia Devries OT GO 1    68252763029 HC OT THERAPEUTIC ACT EA 15 MIN 7/5/2022 Alessia Devries OT GO 1               Alessia Devries OT  7/5/2022

## 2022-07-06 ENCOUNTER — READMISSION MANAGEMENT (OUTPATIENT)
Dept: CALL CENTER | Facility: HOSPITAL | Age: 82
End: 2022-07-06

## 2022-07-06 VITALS
SYSTOLIC BLOOD PRESSURE: 136 MMHG | DIASTOLIC BLOOD PRESSURE: 81 MMHG | HEART RATE: 127 BPM | OXYGEN SATURATION: 99 % | BODY MASS INDEX: 23.56 KG/M2 | TEMPERATURE: 97.9 F | WEIGHT: 120 LBS | RESPIRATION RATE: 20 BRPM | HEIGHT: 60 IN

## 2022-07-06 LAB
ALBUMIN SERPL-MCNC: 4 G/DL (ref 3.5–5.2)
ANION GAP SERPL CALCULATED.3IONS-SCNC: 12.6 MMOL/L (ref 5–15)
BUN SERPL-MCNC: 5 MG/DL (ref 8–23)
BUN/CREAT SERPL: 14.7 (ref 7–25)
CALCIUM SPEC-SCNC: 8.5 MG/DL (ref 8.6–10.5)
CHLORIDE SERPL-SCNC: 93 MMOL/L (ref 98–107)
CO2 SERPL-SCNC: 22.4 MMOL/L (ref 22–29)
CREAT SERPL-MCNC: 0.34 MG/DL (ref 0.57–1)
DEPRECATED RDW RBC AUTO: 37.8 FL (ref 37–54)
EGFRCR SERPLBLD CKD-EPI 2021: 102.9 ML/MIN/1.73
ERYTHROCYTE [DISTWIDTH] IN BLOOD BY AUTOMATED COUNT: 11.7 % (ref 12.3–15.4)
GLUCOSE SERPL-MCNC: 97 MG/DL (ref 65–99)
HCT VFR BLD AUTO: 32.8 % (ref 34–46.6)
HGB BLD-MCNC: 11.7 G/DL (ref 12–15.9)
MCH RBC QN AUTO: 32.2 PG (ref 26.6–33)
MCHC RBC AUTO-ENTMCNC: 35.7 G/DL (ref 31.5–35.7)
MCV RBC AUTO: 90.4 FL (ref 79–97)
PHOSPHATE SERPL-MCNC: 2.3 MG/DL (ref 2.5–4.5)
PLATELET # BLD AUTO: 262 10*3/MM3 (ref 140–450)
PMV BLD AUTO: 8.4 FL (ref 6–12)
POTASSIUM SERPL-SCNC: 3.4 MMOL/L (ref 3.5–5.2)
POTASSIUM SERPL-SCNC: 3.4 MMOL/L (ref 3.5–5.2)
RBC # BLD AUTO: 3.63 10*6/MM3 (ref 3.77–5.28)
SODIUM SERPL-SCNC: 128 MMOL/L (ref 136–145)
WBC NRBC COR # BLD: 4.47 10*3/MM3 (ref 3.4–10.8)

## 2022-07-06 PROCEDURE — 80069 RENAL FUNCTION PANEL: CPT | Performed by: INTERNAL MEDICINE

## 2022-07-06 PROCEDURE — 85027 COMPLETE CBC AUTOMATED: CPT | Performed by: INTERNAL MEDICINE

## 2022-07-06 PROCEDURE — 97530 THERAPEUTIC ACTIVITIES: CPT

## 2022-07-06 PROCEDURE — 84132 ASSAY OF SERUM POTASSIUM: CPT | Performed by: STUDENT IN AN ORGANIZED HEALTH CARE EDUCATION/TRAINING PROGRAM

## 2022-07-06 RX ORDER — DESVENLAFAXINE 25 MG/1
25 TABLET, EXTENDED RELEASE ORAL DAILY
Qty: 30 TABLET | Refills: 0 | Status: SHIPPED | OUTPATIENT
Start: 2022-07-07 | End: 2022-07-12 | Stop reason: SDUPTHER

## 2022-07-06 RX ORDER — SODIUM BICARBONATE 650 MG/1
650 TABLET ORAL 2 TIMES DAILY
Qty: 60 TABLET | Refills: 0 | Status: SHIPPED | OUTPATIENT
Start: 2022-07-06 | End: 2022-07-12 | Stop reason: SDUPTHER

## 2022-07-06 RX ORDER — DESVENLAFAXINE 25 MG/1
25 TABLET, EXTENDED RELEASE ORAL DAILY
Qty: 30 TABLET | Refills: 0 | Status: SHIPPED | OUTPATIENT
Start: 2022-07-07 | End: 2022-07-06 | Stop reason: SDUPTHER

## 2022-07-06 RX ORDER — POTASSIUM CHLORIDE 750 MG/1
10 TABLET, FILM COATED, EXTENDED RELEASE ORAL DAILY
Status: DISCONTINUED | OUTPATIENT
Start: 2022-07-06 | End: 2022-07-06 | Stop reason: HOSPADM

## 2022-07-06 RX ORDER — AMLODIPINE BESYLATE 5 MG/1
5 TABLET ORAL
Qty: 30 TABLET | Refills: 0 | Status: SHIPPED | OUTPATIENT
Start: 2022-07-07 | End: 2022-07-06 | Stop reason: SDUPTHER

## 2022-07-06 RX ORDER — SODIUM BICARBONATE 650 MG/1
650 TABLET ORAL 2 TIMES DAILY
Qty: 60 TABLET | Refills: 0 | Status: SHIPPED | OUTPATIENT
Start: 2022-07-06 | End: 2022-07-06 | Stop reason: SDUPTHER

## 2022-07-06 RX ORDER — POTASSIUM CHLORIDE 750 MG/1
40 TABLET, FILM COATED, EXTENDED RELEASE ORAL ONCE
Status: COMPLETED | OUTPATIENT
Start: 2022-07-06 | End: 2022-07-06

## 2022-07-06 RX ORDER — FLUCONAZOLE 100 MG/1
TABLET ORAL
Qty: 8 TABLET | Refills: 0
Start: 2022-07-06 | End: 2022-07-27

## 2022-07-06 RX ORDER — AMLODIPINE BESYLATE 5 MG/1
5 TABLET ORAL
Qty: 30 TABLET | Refills: 0 | Status: SHIPPED | OUTPATIENT
Start: 2022-07-07 | End: 2022-07-12 | Stop reason: ALTCHOICE

## 2022-07-06 RX ADMIN — POTASSIUM CHLORIDE 40 MEQ: 750 TABLET, EXTENDED RELEASE ORAL at 14:14

## 2022-07-06 RX ADMIN — SODIUM CHLORIDE TAB 1 GM 1 G: 1 TAB at 14:14

## 2022-07-06 RX ADMIN — DIBASIC SODIUM PHOSPHATE, MONOBASIC POTASSIUM PHOSPHATE AND MONOBASIC SODIUM PHOSPHATE 2 TABLET: 852; 155; 130 TABLET ORAL at 09:07

## 2022-07-06 RX ADMIN — FERROUS SULFATE TAB 325 MG (65 MG ELEMENTAL FE) 325 MG: 325 (65 FE) TAB at 09:08

## 2022-07-06 RX ADMIN — POTASSIUM CHLORIDE 10 MEQ: 750 TABLET, EXTENDED RELEASE ORAL at 14:15

## 2022-07-06 RX ADMIN — CYCLOSPORINE 1 DROP: 0.5 EMULSION OPHTHALMIC at 09:07

## 2022-07-06 RX ADMIN — DESVENLAFAXINE SUCCINATE 25 MG: 25 TABLET, EXTENDED RELEASE ORAL at 09:07

## 2022-07-06 RX ADMIN — AMLODIPINE BESYLATE 5 MG: 5 TABLET ORAL at 09:08

## 2022-07-06 RX ADMIN — FLUCONAZOLE 100 MG: 100 TABLET ORAL at 09:07

## 2022-07-06 RX ADMIN — Medication 50 MCG: at 09:07

## 2022-07-06 RX ADMIN — SODIUM BICARBONATE 650 MG: 650 TABLET ORAL at 09:07

## 2022-07-06 RX ADMIN — Medication 1 CAPSULE: at 14:14

## 2022-07-06 RX ADMIN — DIBASIC SODIUM PHOSPHATE, MONOBASIC POTASSIUM PHOSPHATE AND MONOBASIC SODIUM PHOSPHATE 2 TABLET: 852; 155; 130 TABLET ORAL at 14:14

## 2022-07-06 RX ADMIN — LEVOTHYROXINE SODIUM 50 MCG: 0.05 TABLET ORAL at 05:09

## 2022-07-06 RX ADMIN — CLOTRIMAZOLE AND BETAMETHASONE DIPROPIONATE 1 APPLICATION: 10; .5 CREAM TOPICAL at 09:08

## 2022-07-06 RX ADMIN — SODIUM CHLORIDE TAB 1 GM 1 G: 1 TAB at 09:07

## 2022-07-06 RX ADMIN — HYDROXYZINE HYDROCHLORIDE 10 MG: 10 TABLET ORAL at 14:14

## 2022-07-06 RX ADMIN — HYDROXYZINE HYDROCHLORIDE 10 MG: 10 TABLET ORAL at 05:08

## 2022-07-06 RX ADMIN — ALPRAZOLAM 0.25 MG: 0.25 TABLET ORAL at 09:22

## 2022-07-06 RX ADMIN — LEVETIRACETAM 500 MG: 500 TABLET, FILM COATED ORAL at 09:08

## 2022-07-06 NOTE — PLAN OF CARE
Goal Outcome Evaluation:  Plan of Care Reviewed With: patient, spouse        Progress: improving  Outcome Evaluation: Pt seen for PT this afternoon. SPoke w pt and  at length as well as RN and CCP. Pt plans home today. She has been up in room w  and is doing well. Discussed activity w patient and importance of following MD orders for activity.  Pt is WBAT for transfers w boot on at all times. She is not to perform gait training. She may take boot off to shower. Explained to pt that she may be up on foot in order to transfer, but she should not attempt to ambulate beyond what she needs to do to perform transfers at home to ensure proper healing of fracture. Pt observed ambulating short distance in room from chair to bathroom. She does well using Rwx w minimal weight through LLE. No unsteadiness noted. Pt does have 3 steps to enter her home with handrails. Educated pt on proper technique. She will have assist of . Pt and  are eager to go home. No other questions at this time. Pt is safe to DC home from PT standpoint.

## 2022-07-06 NOTE — DISCHARGE SUMMARY
Patient Name: Ritu Martino  : 1940  MRN: [id number]    Date of Admission: 2022  Date of Discharge:  2022  Primary Care Physician: Gaurav Barger MD      Chief Complaint:   Multiple Complaints       Discharge Diagnoses     Active Hospital Problems    Diagnosis  POA   • **Hyponatremia [E87.1]  Yes   • Debility [R53.81]  Yes   • History of seizure [Z87.898]  Yes   • Fall [W19.XXXA]  Yes   • Closed fracture of left distal fibula [S82.832A]  Yes   • GERD (gastroesophageal reflux disease) [K21.9]  Yes   • Arthritis [M19.90]  Yes   • Insomnia [G47.00]  Yes   • Benzodiazepine dependence (HCC) [F13.20]  Yes   • Iron deficiency [E61.1]  Yes   • Essential hypertension, benign [I10]  Yes   • Hypothyroidism [E03.9]  Yes   • Anxiety [F41.9]  Yes      Resolved Hospital Problems   No resolved problems to display.        Hospital Course     Ms. Martino is a 82 y.o. female with a history of chronic hyponatremia, hypertension, anxiety with benzodiazepine dependence who presented to [facility] initially complaining of left lower extremity pain after mechanical fall.  Please see the admitting history and physical for further details.  She was found to have left fibula fracture and was admitted to the hospital for further evaluation and treatment.  Orthopedic surgery consulted on admission, they recommended conservative management/nonoperative management.  The patient is okay for weightbearing as tolerated for transfers with boot.  Okay to remove for showers but otherwise needs to have the boot on at all times.  On admission the patient was noted to have acute on chronic hyponatremia with sodium level of 117.  Nephrology consulted for recommendations.  The patient received a one-time dose of tolvaptan with good response, she was reinitiated on her home salt tabs and a fluid restriction of 1 L.  Her sodium improved back to baseline and is stable.  While admitted the patient had significant  anxiety for which psychiatry was consulted, they have added Pristiq and the patient is going to follow-up outpatient with psychiatry.  The patient is also going to follow-up with Dr. Baptiste of nephrology in the next 1 to 2 weeks for repeat lab work.  She has been instructed to follow-up with her PCP in 1 to 2 weeks for posthospital follow-up.    Day of Discharge     Subjective:  Resting in a chair in the room, she is worried she will continue to have anxiety at home.  Has been ambulating around the nurses station.    Review of Systems   Constitutional: Negative for fatigue and fever.   Respiratory: Negative for cough and shortness of breath.    Cardiovascular: Negative for chest pain, palpitations and leg swelling.   Gastrointestinal: Negative for abdominal pain, nausea and vomiting.   Neurological: Negative for weakness.   Psychiatric/Behavioral: The patient is nervous/anxious.        Physical Exam:  Temp:  [97.4 °F (36.3 °C)-97.9 °F (36.6 °C)] 97.9 °F (36.6 °C)  Heart Rate:  [] 127  Resp:  [18-20] 20  BP: (136-167)/(75-95) 136/81  Body mass index is 23.44 kg/m².  Physical Exam  Constitutional:       General: She is not in acute distress.     Appearance: Normal appearance. She is not ill-appearing.   Cardiovascular:      Rate and Rhythm: Normal rate and regular rhythm.      Pulses: Normal pulses.      Heart sounds: No murmur heard.     Comments: Patient very anxious while I was in the room, repeatedly asked me to order her more anxiety medications at home.  Heart rate improved when patient calmed down.  Pulmonary:      Effort: Pulmonary effort is normal. No respiratory distress.      Breath sounds: Normal breath sounds. No wheezing.   Abdominal:      General: Abdomen is flat. Bowel sounds are normal. There is no distension.      Palpations: Abdomen is soft.   Musculoskeletal:         General: No swelling or tenderness.      Right lower leg: No edema.      Left lower leg: No edema.      Comments: Left lower  extremity in boot   Skin:     General: Skin is warm and dry.   Neurological:      General: No focal deficit present.      Mental Status: She is alert and oriented to person, place, and time. Mental status is at baseline.   Psychiatric:      Comments: Irritable         Consultants     Consult Orders (all) (From admission, onward)     Start     Ordered    07/04/22 0649  Inpatient Spiritual Care Consult  Once        Provider:  (Not yet assigned)    07/04/22 0649    07/03/22 1125  Inpatient Psychiatrist Consult  Once        Specialty:  Psychiatry  Provider:  Ryder Piña III, MD    07/03/22 1125    07/03/22 1036  Inpatient Access Center Consult  Once        Provider:  (Not yet assigned)    07/03/22 1035    06/30/22 1908  Inpatient Orthopedic Surgery Consult  Once        Specialty:  Orthopedic Surgery  Provider:  Martell Chaves Jr., MD    06/30/22 1907    06/30/22 1724  Inpatient Nephrology Consult  Once        Specialty:  Nephrology  Provider:  Jn Baptiste MD    06/30/22 1723    06/30/22 1605  [facility] (on-call MD unless specified) Details  Once        Specialty:  Hospitalist  Provider:  Jessenia Gonzalez MD    06/30/22 1604              Procedures     Imaging Results (All)     Procedure Component Value Units Date/Time    XR Ankle 3+ View Left [[id number]] Collected: 06/30/22 1555     Updated: 06/30/22 1558    Narrative:      XR ANKLE 3+ VW LEFT-     INDICATIONS: Trauma     TECHNIQUE: 3 views of the left ankle     COMPARISON: None available     FINDINGS:        Obliquely oriented fracture is seen at the distal fibular metaphysis  with adjacent soft tissue swelling. No other fractures are noted. No  dislocation.       Impression:         Distal fibular fracture.     This report was finalized on 6/30/2022 3:55 PM by Dr. Homer Maynard M.D.       XR Chest 1 View [[id number]] Collected: 06/30/22 1545     Updated: 06/30/22 1558    Narrative:      XR CHEST 1 VW-     HISTORY: Female who is 82  years-old,  chest pain     TECHNIQUE: Frontal views of the chest     COMPARISON: 06/20/2022     FINDINGS: Heart size is normal. Aorta is tortuous, calcified. Pulmonary  vasculature is unremarkable. No focal pulmonary consolidation, pleural  effusion, or pneumothorax. Old granulomatous disease is apparent. No  acute osseous process.       Impression:      No focal pulmonary consolidation. Tortuous aorta. Follow-up  as clinically indicated.     This report was finalized on 6/30/2022 3:55 PM by Dr. Homer Maynard M.D.             Pertinent Labs     Results from last 7 days   Lab Units 07/06/22  0749 07/05/22  0943 07/04/22  0720 07/03/22  1126   WBC 10*3/mm3 4.47 7.65 10.26 11.71*   HEMOGLOBIN g/dL 11.7* 11.5* 11.9* 12.7   PLATELETS 10*3/mm3 262 289 300 329     Results from last 7 days   Lab Units 07/06/22  0749 07/05/22  0943 07/04/22  0843 07/04/22  0720   SODIUM mmol/L 128* 127* 130* 127*   POTASSIUM mmol/L 3.4*  3.4* 2.9* 4.0 3.6   CHLORIDE mmol/L 93* 91* 95* 94*   CO2 mmol/L 22.4 24.0 21.0* 20.0*   BUN mg/dL 5* 9 10 11   CREATININE mg/dL 0.34* 0.58 0.56* 0.52*   GLUCOSE mg/dL 97 105* 136* 97   Estimated Creatinine Clearance: 109.6 mL/min (A) (by C-G formula based on SCr of 0.34 mg/dL (L)).  Results from last 7 days   Lab Units 07/06/22  0749 07/05/22  0943 07/04/22  0720 07/03/22  1126 07/02/22  0530 06/30/22  1506   ALBUMIN g/dL 4.00 4.10 4.10 3.40*   < > 4.60   BILIRUBIN mg/dL  --   --   --   --   --  2.7*   ALK PHOS U/L  --   --   --   --   --  69   AST (SGOT) U/L  --   --   --   --   --  20   ALT (SGPT) U/L  --   --   --   --   --  23    < > = values in this interval not displayed.     Results from last 7 days   Lab Units 07/06/22  0749 07/05/22  0943 07/04/22  0843 07/04/22  0720 07/03/22  1126 07/02/22  0530   CALCIUM mg/dL 8.5* 8.7 8.9 8.9 8.6 8.5*   ALBUMIN g/dL 4.00 4.10  --  4.10 3.40* 3.70   MAGNESIUM mg/dL  --   --   --   --   --  1.8   PHOSPHORUS mg/dL 2.3* 1.7*  --  2.1* 2.3* 3.0     Results  from last 7 days   Lab Units 06/30/22  1506   LIPASE U/L 44     Results from last 7 days   Lab Units 07/03/22  1126 06/30/22  1658 06/30/22  1506   TROPONIN T ng/mL <0.010 <0.010 <0.010     Results from last 7 days   Lab Units 07/01/22  0837 06/30/22  1936   SODIUM UR mmol/L  --  77   OSMOLALITY UR mOsm/kg  --  272   URIC ACID mg/dL 1.7*  --          Invalid input(s): LDLCALC        Test Results Pending at Discharge       Discharge Details        Discharge Medications      New Medications      Instructions Start Date   amLODIPine 5 MG tablet  Commonly known as: NORVASC   5 mg, Oral, Every 24 Hours Scheduled   Start Date: July 7, 2022     Desvenlafaxine Succinate ER 25 MG tablet sustained-release 24 hour   25 mg, Oral, Daily   Start Date: July 7, 2022     fluconazole 100 MG tablet  Commonly known as: DIFLUCAN   Continue per Dr. Barger.      Phospha 250 Neutral 155-852-130 MG tablet  Generic drug: phosphorus   2 tablets, Oral, 4 Times Daily      sodium bicarbonate 650 MG tablet   650 mg, Oral, 2 Times Daily         Continue These Medications      Instructions Start Date   acetaminophen 325 MG tablet  Commonly known as: TYLENOL   650 mg, Oral, Every 6 Hours PRN      ALIGN PO   1 capsule, Oral, Daily With Lunch      ALPRAZolam 0.25 MG tablet  Commonly known as: XANAX   0.25 mg, Oral, 2 Times Daily      clotrimazole-betamethasone 1-0.05 % cream  Commonly known as: LOTRISONE   Topical, See Admin Instructions, Apply topically to the affected area twice daily      cycloSPORINE 0.05 % ophthalmic emulsion  Commonly known as: RESTASIS   1 drop, 2 Times Daily      ferrous sulfate 325 (65 FE) MG tablet   325 mg, Oral, 2 Times Daily Before Meals      hydrOXYzine 10 MG tablet  Commonly known as: ATARAX   10 mg, Oral, Every 8 Hours PRN      levETIRAcetam 500 MG tablet  Commonly known as: KEPPRA   500 mg, Oral, 2 Times Daily      levothyroxine 50 MCG tablet  Commonly known as: SYNTHROID, LEVOTHROID   50 mcg, Oral, Daily       Motegrity 1 MG tablet  Generic drug: Prucalopride Succinate   1 mg, Oral      rosuvastatin 20 MG tablet  Commonly known as: CRESTOR   20 mg, Oral, Nightly      SODIUM CHLORIDE PO   Oral, 3 Times Daily      vitamin B-12 100 MCG tablet  Commonly known as: CYANOCOBALAMIN   50 mcg, Oral, Daily      Vitamin D 50 MCG (2000 UT) capsule   Oral, Daily With Dinner      zolpidem 5 MG tablet  Commonly known as: AMBIEN   5 mg, Oral, Every Night at Bedtime         Stop These Medications    benazepril 20 MG tablet  Commonly known as: LOTENSIN     predniSONE 10 MG tablet  Commonly known as: DELTASONE     sertraline 50 MG tablet  Commonly known as: Zoloft            No Known Allergies      Discharge Disposition:  Home or Self Care    Discharge Diet:  Diet Order   Procedures   • Diet Regular; Cardiac, Daily Fluid Restriction; 1000 mL Fluid Per Day       Discharge Activity:   Activity Instructions     Activity as Tolerated            CODE STATUS:    Code Status and Medical Interventions:   Ordered at: 06/30/22 1724     Code Status (Patient has no pulse and is not breathing):    CPR (Attempt to Resuscitate)     Medical Interventions (Patient has pulse or is breathing):    Full       Future Appointments   Date Time Provider Department Center   7/26/2022  4:00 PM Gaurav Barger MD [facility] [place]   8/16/2022  2:00 PM Clemencia Curran PA [facility] [place]   9/23/2022 10:30 AM Farzad Emmanuel MD [facility] [place]     Additional Instructions for the Follow-ups that You Need to Schedule     Discharge Follow-up with PCP   As directed       Currently Documented PCP:    Gaurav Barger MD    PCP Phone Number:    [phone]     Follow Up Details: in 1 week for post hospital follow up.         Discharge Follow-up with Specified Provider: with Dr. Man.; 2 Weeks   As directed      To: with Dr. Man.    Follow Up: 2 Weeks         Discharge Follow-up with Specified Provider: with Dr. Baptiste.; 2 Weeks   As directed      To: with   Emile.    Follow Up: 2 Weeks            Contact information for follow-up providers     Krystle Kenny MD Follow up on 7/20/2022.    Specialty: Orthopedic Surgery  Contact information:  [address]  [address]  [facility] [address]  [phone]             Gaurav Barger MD .    Specialty: Internal Medicine  Why: in 1 week for post hospital follow up.  Contact information:  [address] [facility] [address]  [phone]             Johnnie Man MD Follow up in 2 week(s).    Specialty: Psychiatry  Contact information:  [address]  [address]  [place] [address]  [phone]             Jn Baptiste MD Follow up in 2 week(s).    Specialty: Nephrology  Contact information:  [address] [facility]  [address]  [facility] [address]  [phone]                   Contact information for after-discharge care     Home Medical Care     [facility] .    Service: Home Health Services  Contact information:  [address], [address]  [facility] [address] [phone]                             Additional Instructions for the Follow-ups that You Need to Schedule     Discharge Follow-up with PCP   As directed       Currently Documented PCP:    Gaurav Barger MD    PCP Phone Number:    [phone]     Follow Up Details: in 1 week for post hospital follow up.         Discharge Follow-up with Specified Provider: with Dr. Man.; 2 Weeks   As directed      To: with Dr. Man.    Follow Up: 2 Weeks         Discharge Follow-up with Specified Provider: with Dr. Baptiste.; 2 Weeks   As directed      To: with Dr. Baptiste.    Follow Up: 2 Weeks           Time Spent on Discharge:  I spent greater than 30 minutes on this discharge activity which included: face-to-face encounter with the patient, reviewing the data in the system, coordination of the care with the nursing staff as well as consultants, documentation, and entering orders.       Tatyana Castro MD  [facility]  07/06/22  17:05 EDT

## 2022-07-06 NOTE — CASE MANAGEMENT/SOCIAL WORK
Case Management Discharge Note      Final Note: DC'd home with [facility]  following    Provided Post Acute Provider List?: N/A  N/A Provider List Comment: Is current with [facility]  and will continue to use them        Home Medical Care Coordination complete.    Service Provider Selected Services Address Phone Fax Patient Preferred    [facility] [address], [address], [facility] [address] [phone] --                Selected Continued Care - Prior Encounters Includes selections from prior encounters from 4/1/2022 to 7/6/2022    Discharged on 6/14/2022 Admission date: 6/12/2022 - Discharge disposition: Home or Self Care    Home Medical Care     Service Provider Selected Services Address Phone Fax Patient Preferred    [facility] [address], [address], [facility] [address] [phone] --                Discharged on 5/11/2022 Admission date: 5/7/2022 - Discharge disposition[place] [facility] [place]     Service Provider Selected Services Address Phone Fax Patient Preferred    [facility] [address], [facility] [address] [phone] --          Home Medical Care     Service Provider Selected Services Address Phone Fax Patient Preferred    [facility] [address], [address], [facility] [address] [phone] --                    Transportation Services  Private: Car    Final Discharge Disposition Code: 06 - home with home health care

## 2022-07-06 NOTE — THERAPY TREATMENT NOTE
Patient Name: Ritu Martino  : 1940    MRN: [id number]                              Today's Date: 2022       Admit Date: 2022    Visit Dx:     ICD-10-CM ICD-9-CM   1. Closed fracture of distal end of left fibula, unspecified fracture morphology, initial encounter  S82.832A 824.8   2. Hyponatremia  E87.1 276.1   3. Multiple falls  R29.6 V15.88   4. Leukocytosis, unspecified type  D72.829 288.60   5. Chest pain, unspecified type  R07.9 786.50     Patient Active Problem List   Diagnosis   • Anxiety   • Hypothyroidism   • Hyponatremia   • Iron deficiency   • Hypokalemia   • Essential hypertension, benign   • Fluent aphasia   • Hypochloremia   • Benzodiazepine dependence (HCC)   • Anemia   • Cervicalgia   • Intertrigo   • Insomnia   • Acute cystitis   • E coli infection   • Diverticulitis   • Arthritis   • Bowel dysfunction   • GERD (gastroesophageal reflux disease)   • Hernia, hiatal   • Seizures (HCC)   • Meningioma (HCC)   • Closed fracture of left distal fibula   • Debility   • History of seizure   • Fall     Past Medical History:   Diagnosis Date   • Anemia    • Anxiety    • Arthritis    • Bowel dysfunction 2021    since having surgery for diverticular infection   • Chronic constipation    • Diverticulitis 2021    w/ rupture and infection required surgery and ostomy   • Essential hypertension, benign    • GERD (gastroesophageal reflux disease)    • Hernia, hiatal    • Hypercholesterolemia    • Hypokalemia    • Hyponatremia     chronic low with occasional normal level   • Hypothyroidism     estimated time frame pt nor  could remember exactly how long has been on medication   • Insomnia    • Iron deficiency    • Seizures (HCC)      Past Surgical History:   Procedure Laterality Date   • APPENDECTOMY     • BACK SURGERY     •  SECTION     • COLON SURGERY  2021    to address ruptured and infected diverticular dz, ostomy also placed   • COLONOSCOPY      • COLOSTOMY CLOSURE  10/2021    ostomy removed   • EYE SURGERY  2 X cataract   • HEMORRHOIDECTOMY     • KNEE ARTHROPLASTY     • TONSILLECTOMY  1946      General Information     Row Name 07/06/22 1609          Physical Therapy Time and Intention    Document Type discharge treatment;therapy note (daily note)  -EJ     Mode of Treatment physical therapy  -     Row Name 07/06/22 1609          General Information    Existing Precautions/Restrictions --  pt is WBAT for transfers w boot on at all times, she may take boot off to shower, no gait training per ortho MD  -EJ           User Key  (r) = Recorded By, (t) = Taken By, (c) = Cosigned By    Initials Name Provider Type    EJ Irish John, PT Physical Therapist               Mobility     Row Name 07/06/22 1610          Bed Mobility    Comment, (Bed Mobility) up in chair  -EJ     Row Name 07/06/22 1610          Sit-Stand Transfer    Sit-Stand [place] (Transfers) supervision;verbal cues  -EJ     Assistive Device (Sit-Stand Transfers) walker, front-wheeled  -EJ     Row Name 07/06/22 1610          Gait/Stairs (Locomotion)    [place] Level (Gait) supervision  -EJ     Assistive Device (Gait) walker, front-wheeled  -EJ     Distance in Feet (Gait) 15  -EJ     Deviations/Abnormal Patterns (Gait) maddy decreased;stride length decreased  -EJ     Comment, (Gait/Stairs) minimal weight bearing through LLE, pt able to safely ambulate short distance in room to get from chair to bathroom; discussed navigating her steps at home- pt and  verbalize understanding. Also discussed w RN and CCP as MD order states no gait training; however, pt does need to negotiate 3 steps to enter home. Call out to MD to clarify activity clearance.  -EJ           User Key  (r) = Recorded By, (t) = Taken By, (c) = Cosigned By    Initials Name Provider Type    EJ Irish John, PT Physical Therapist               Obj/Interventions    No documentation.                Goals/Plan     No documentation.                Clinical Impression     Row Name 07/06/22 1613          Pain    Pain Location - Side/Orientation Left  -EJ     Pain Location lower  -EJ     Pain Location - extremity  -EJ     Pain Intervention(s) Repositioned  -EJ     Row Name 07/06/22 1613          Plan of Care Review    Plan of Care Reviewed With patient;spouse  -EJ     Progress improving  -EJ     Outcome Evaluation Pt seen for PT this afternoon. SPoke w pt and  at length as well as RN and CCP. Pt plans home today. She has been up in room w  and is doing well. Discussed activity w patient and importance of following MD orders for activity.  Pt is WBAT for transfers w boot on at all times. She is not to perform gait training. She may take boot off to shower. Explained to pt that she may be up on foot in order to transfer, but she should not attempt to ambulate beyond what she needs to do to perform transfers at home to ensure proper healing of fracture. Pt observed ambulating short distance in room from chair to bathroom. She does well using Rwx w minimal weight through LLE. No unsteadiness noted. Pt does have 3 steps to enter her home with handrails. Educated pt on proper technique. She will have assist of . Pt and  are eager to go home. No other questions at this time. Pt is safe to DC home from PT standpoint.  -     Row Name 07/06/22 1613          Positioning and Restraints    Pre-Treatment Position sitting in chair/recliner  -EJ     Post Treatment Position bathroom  -EJ     Bathroom notified nsg;sitting;call light within reach;encouraged to call for assist;with family/caregiver  -           User Key  (r) = Recorded By, (t) = Taken By, (c) = Cosigned By    Initials Name Provider Type    Irish Saxena, PT Physical Therapist               Outcome Measures     Row Name 07/06/22 1619          How much help from another person do you currently need...    Turning from your back to your side  while in flat bed without using bedrails? 4  -EJ     Moving from lying on back to sitting on the side of a flat bed without bedrails? 4  -EJ     Moving to and from a bed to a chair (including a wheelchair)? 4  -EJ     Standing up from a chair using your arms (e.g., wheelchair, bedside chair)? 4  -EJ     Climbing 3-5 steps with a railing? 3  -EJ     To walk in hospital room? 3  -EJ     AM-PAC 6 Clicks Score (PT) 22  -EJ     Highest level of mobility 7 --> Walked 25 feet or more  -EJ           User Key  (r) = Recorded By, (t) = Taken By, (c) = Cosigned By    Initials Name Provider Type    Irish Saxena, PT Physical Therapist                             Physical Therapy Education                 Title: PT OT SLP Therapies (Done)     Topic: Physical Therapy (Done)     Point: Mobility training (Done)     Learning Progress Summary           Patient Acceptance, E,TB,D, VU,NR by  at 7/6/2022 1620    Acceptance, E, VU by  at 7/4/2022 1611    Acceptance, E,D, DU,VU by  at 7/3/2022 1639    Acceptance, E, VU by  at 7/2/2022 1400    Acceptance, E,TB,D, VU,NR by  at 7/1/2022 1639   Family Acceptance, E,TB,D, VU,NR by  at 7/6/2022 1620   Significant Other Acceptance, E,D, DU,VU by  at 7/3/2022 1639                   Point: Home exercise program (Done)     Learning Progress Summary           Patient Acceptance, E, VU by  at 7/4/2022 1611    Acceptance, E,D, DU,VU by  at 7/3/2022 1639    Acceptance, E, VU by  at 7/2/2022 1400    Acceptance, E,TB,D, VU,NR by  at 7/1/2022 1639   Significant Other Acceptance, E,D, DU,VU by  at 7/3/2022 1639                   Point: Body mechanics (Done)     Learning Progress Summary           Patient Acceptance, E, VU by  at 7/4/2022 1611    Acceptance, E,D, DU,VU by  at 7/3/2022 1639    Acceptance, E, VU by  at 7/2/2022 1400    Acceptance, E,TB,D, VU,NR by  at 7/1/2022 1639   Significant Other Acceptance, E,D, DU,VU by RS at 7/3/2022 1639                    Point: Precautions (Done)     Learning Progress Summary           Patient Acceptance, E,TB,D, VU,NR by  at 7/6/2022 1620    Acceptance, E, VU by RS at 7/4/2022 1611    Acceptance, E,D, DU,VU by RS at 7/3/2022 1639    Acceptance, E, VU by RS at 7/2/2022 1400    Acceptance, E,TB,D, VU,NR by  at 7/1/2022 1639   Family Acceptance, E,TB,D, VU,NR by  at 7/6/2022 1620   Significant Other Acceptance, E,D, DU,VU by RS at 7/3/2022 1639                               User Key     Initials Effective Dates Name Provider Type Discipline     06/16/21 -  Ermelinda Brunner, PT Physical Therapist PT     06/16/21 -  Irish John, PT Physical Therapist PT    RS 06/16/21 -  Indira Quispe, PT Physical Therapist PT              PT Recommendation and Plan     Plan of Care Reviewed With: patient, spouse  Progress: improving  Outcome Evaluation: Pt seen for PT this afternoon. SPoke w pt and  at length as well as RN and CCP. Pt plans home today. She has been up in room w  and is doing well. Discussed activity w patient and importance of following MD orders for activity.  Pt is WBAT for transfers w boot on at all times. She is not to perform gait training. She may take boot off to shower. Explained to pt that she may be up on foot in order to transfer, but she should not attempt to ambulate beyond what she needs to do to perform transfers at home to ensure proper healing of fracture. Pt observed ambulating short distance in room from chair to bathroom. She does well using Rwx w minimal weight through LLE. No unsteadiness noted. Pt does have 3 steps to enter her home with handrails. Educated pt on proper technique. She will have assist of . Pt and  are eager to go home. No other questions at this time. Pt is safe to DC home from PT standpoint.     Time Calculation:    PT Charges     Row Name 07/06/22 1627             Time Calculation    Start Time 1325  -EJ      Stop Time 1345  -EJ      Time  Calculation (min) 20 min  -EJ      PT Received On 07/06/22  -EJ            User Key  (r) = Recorded By, (t) = Taken By, (c) = Cosigned By    Initials Name Provider Type    Irish Saxena, PT Physical Therapist              Therapy Charges for Today     Code Description Service Date Service Provider Modifiers Qty    18220331506  PT THERAPEUTIC ACT EA 15 MIN 7/6/2022 Irish John, PT GP 1          PT G-Codes  Outcome Measure Options: AM-PAC 6 Clicks Basic Mobility (PT)  AM-PAC 6 Clicks Score (PT): 22  AM-PAC 6 Clicks Score (OT): 19    Irish John PT  7/6/2022

## 2022-07-06 NOTE — PLAN OF CARE
Goal Outcome Evaluation:  Plan of Care Reviewed With: patient        Progress: improving  Outcome Evaluation: Pt has no complaints of pain or discomforts. Tolerating all oral meds. Up w/ assist x's 1. Boot in place to L foot fracture. BM 7/5. VSS. D/c later this shift. Will continue to monitor.

## 2022-07-06 NOTE — OUTREACH NOTE
Prep Survey    Flowsheet Row Responses   [facility] facility patient discharged from? [place]   Is LACE score < 7 ? No   Emergency Room discharge w/ pulse ox? No   Eligibility [facility]   Date of Admission 06/30/22   Date of Discharge 07/06/22   Discharge Disposition Home or Self Care   Discharge diagnosis Hyponatremia Closed fracture of left distal fibula    Does the patient have one of the following disease processes/diagnoses(primary or secondary)? Other   Does the patient have Home health ordered? Yes   What is the Home health agency?  [facility]    Is there a DME ordered? No   Prep survey completed? Yes          GLENN TOBIAS - Registered Nurse

## 2022-07-06 NOTE — PROGRESS NOTES
"RENAL/KCC:     LOS: 6 days    Patient Care Team:  Gaurav Barger MD as PCP - General (Internal Medicine)    Chief Complaint:  Hyponatremia    Subjective     Interval History:   Chart reviewed, generally states she does not feel well  Still very anxious, flat affect  No soa, CP, n/v    7/5: Remains very anxious, very flat affect, no new complaints  Was constipated yesterday but has had several bowel movements today    7/6 patient feeling about the same. Poor po intake. Denies sob or chest pain     Objective     Vital Sign Min/Max for last 24 hours  Temp  Min: 97.4 °F (36.3 °C)  Max: 98 °F (36.7 °C)   BP  Min: 136/77  Max: 167/95   Pulse  Min: 87  Max: 105   Resp  Min: 18  Max: 20   SpO2  Min: 96 %  Max: 100 %   No data recorded   No data recorded     Flowsheet Rows    Flowsheet Row First Filed Value   Admission Height 152.4 cm (60\") Documented at 06/30/2022 1432   Admission Weight 54 kg (119 lb) Documented at 06/30/2022 1432          I/O this shift:  In: 240 [P.O.:240]  Out: 800 [Urine:800]  I/O last 3 completed shifts:  In: 898 [P.O.:898]  Out: -     Physical Exam:  GEN: Awake, NAD  ENT: PERRL, EOMI, MMM  NECK: Supple, no JVD  CHEST: CTAB, no W/R/C  CV: RRR, no M/G/R  ABD: Soft, NT, +BS  SKIN: Warm and Dry  NEURO: CN's intact      WBC WBC   Date Value Ref Range Status   07/06/2022 4.47 3.40 - 10.80 10*3/mm3 Final   07/05/2022 7.65 3.40 - 10.80 10*3/mm3 Final   07/04/2022 10.26 3.40 - 10.80 10*3/mm3 Final      HGB Hemoglobin   Date Value Ref Range Status   07/06/2022 11.7 (L) 12.0 - 15.9 g/dL Final   07/05/2022 11.5 (L) 12.0 - 15.9 g/dL Final   07/04/2022 11.9 (L) 12.0 - 15.9 g/dL Final      HCT Hematocrit   Date Value Ref Range Status   07/06/2022 32.8 (L) 34.0 - 46.6 % Final   07/05/2022 32.3 (L) 34.0 - 46.6 % Final   07/04/2022 34.2 34.0 - 46.6 % Final      Platlets No results found for: LABPLAT   MCV MCV   Date Value Ref Range Status   07/06/2022 90.4 79.0 - 97.0 fL Final   07/05/2022 90.0 79.0 - 97.0 fL Final "   07/04/2022 91.7 79.0 - 97.0 fL Final          Sodium Sodium   Date Value Ref Range Status   07/06/2022 128 (L) 136 - 145 mmol/L Final   07/05/2022 127 (L) 136 - 145 mmol/L Final   07/04/2022 130 (L) 136 - 145 mmol/L Final   07/04/2022 127 (L) 136 - 145 mmol/L Final      Potassium Potassium   Date Value Ref Range Status   07/06/2022 3.4 (L) 3.5 - 5.2 mmol/L Final   07/06/2022 3.4 (L) 3.5 - 5.2 mmol/L Final   07/05/2022 2.9 (L) 3.5 - 5.2 mmol/L Final   07/04/2022 4.0 3.5 - 5.2 mmol/L Final   07/04/2022 3.6 3.5 - 5.2 mmol/L Final      Chloride Chloride   Date Value Ref Range Status   07/06/2022 93 (L) 98 - 107 mmol/L Final   07/05/2022 91 (L) 98 - 107 mmol/L Final   07/04/2022 95 (L) 98 - 107 mmol/L Final   07/04/2022 94 (L) 98 - 107 mmol/L Final      CO2 CO2   Date Value Ref Range Status   07/06/2022 22.4 22.0 - 29.0 mmol/L Final   07/05/2022 24.0 22.0 - 29.0 mmol/L Final   07/04/2022 21.0 (L) 22.0 - 29.0 mmol/L Final   07/04/2022 20.0 (L) 22.0 - 29.0 mmol/L Final      BUN BUN   Date Value Ref Range Status   07/06/2022 5 (L) 8 - 23 mg/dL Final   07/05/2022 9 8 - 23 mg/dL Final   07/04/2022 10 8 - 23 mg/dL Final   07/04/2022 11 8 - 23 mg/dL Final      Creatinine Creatinine   Date Value Ref Range Status   07/06/2022 0.34 (L) 0.57 - 1.00 mg/dL Final   07/05/2022 0.58 0.57 - 1.00 mg/dL Final   07/04/2022 0.56 (L) 0.57 - 1.00 mg/dL Final   07/04/2022 0.52 (L) 0.57 - 1.00 mg/dL Final      Calcium Calcium   Date Value Ref Range Status   07/06/2022 8.5 (L) 8.6 - 10.5 mg/dL Final   07/05/2022 8.7 8.6 - 10.5 mg/dL Final   07/04/2022 8.9 8.6 - 10.5 mg/dL Final   07/04/2022 8.9 8.6 - 10.5 mg/dL Final      PO4 No results found for: CAPO4   Albumin Albumin   Date Value Ref Range Status   07/06/2022 4.00 3.50 - 5.20 g/dL Final   07/05/2022 4.10 3.50 - 5.20 g/dL Final   07/04/2022 4.10 3.50 - 5.20 g/dL Final      Magnesium No results found for: MG   Uric Acid No results found for: URICACID        Results Review:     I reviewed the  patient's new clinical results.    amLODIPine, 5 mg, Oral, Q24H  cholecalciferol, 2,000 Units, Oral, Daily With Dinner  clotrimazole-betamethasone, 1 application, Topical, Q12H  cycloSPORINE, 1 drop, Both Eyes, BID  desvenlafaxine, 25 mg, Oral, Daily  enoxaparin, 40 mg, Subcutaneous, Q24H  ferrous sulfate, 325 mg, Oral, BID AC  fluconazole, 100 mg, Oral, Q24H  lactobacillus acidophilus, 1 capsule, Oral, Daily With Lunch  levETIRAcetam, 500 mg, Oral, BID  levothyroxine, 50 mcg, Oral, Daily  phosphorus, 500 mg, Oral, 4x Daily  Prucalopride Succinate, 1 mg, Oral, Daily  rosuvastatin, 20 mg, Oral, Nightly  sodium bicarbonate, 650 mg, Oral, BID  sodium chloride, 1 g, Oral, TID With Meals  vitamin B-12, 50 mcg, Oral, Daily           Medication Review: Reviewed    Assessment & Plan       SIADH, improved    Hyponatremia    Anxiety    Hypothyroidism    Iron deficiency    Essential hypertension, benign    Benzodiazepine dependence (HCC)    Insomnia    Arthritis    GERD (gastroesophageal reflux disease)    Closed fracture of left distal fibula    Debility    History of seizure    Fall  hypokalemia   Hypophosphatemia     Plan:   Na 128 today which is essentially at her baseline   Continue with NaCl tablets and fluid restriction. She should continue this at home.  Replace K today po.   Replace phos as well.    OK to DC from my standpoint.   Follow up with Dr. Baptiste in clinic. Patient's  has all the information to call, make appt and get blood work.   Will follow       Ai Flores MD   [facility]  07/06/22  11:35 EDT

## 2022-07-07 ENCOUNTER — TRANSITIONAL CARE MANAGEMENT TELEPHONE ENCOUNTER (OUTPATIENT)
Dept: CALL CENTER | Facility: HOSPITAL | Age: 82
End: 2022-07-07

## 2022-07-07 RX ORDER — LEVETIRACETAM 500 MG/1
500 TABLET ORAL 2 TIMES DAILY
Qty: 180 TABLET | Refills: 0 | Status: SHIPPED | OUTPATIENT
Start: 2022-07-07 | End: 2022-10-27

## 2022-07-07 NOTE — TELEPHONE ENCOUNTER
Caller: SalenaNu    Relationship: Emergency Contact    Best call back number: [phone] (H)    Requested Prescriptions:   Requested Prescriptions     Pending Prescriptions Disp Refills   • levETIRAcetam (KEPPRA) 500 MG tablet       Sig: Take 1 tablet by mouth 2 (Two) Times a Day.   90 DAY SUPPLY REQUESTED     Pharmacy where request should be sent: [facility] - [address] [phone] [facility] [phone]      Additional details provided by patient: PATIENT'S  IS ASKING FOR A 90 DAYS SUPPLY FROM THE MAIL ORDER PHARMACY ASAP, PLEASE CALL PATIENT'S  IF THERE IS ANY PROBLEMS    Does the patient have less than a 3 day supply:  [x] Yes  [] No    Mj Rosales   07/07/22 14:45 EDT

## 2022-07-07 NOTE — OUTREACH NOTE
Call Center TCM Note    Flowsheet Row Responses   [facility] patient discharged from? [place]   Does the patient have one of the following disease processes/diagnoses(primary or secondary)? Other   TCM attempt successful? Yes   Discharge diagnosis Hyponatremia Closed fracture of left distal fibula    Is patient permission given to speak with other caregiver? Yes   Person spoke with today (if not patient) and relationship  and patient    Meds reviewed with patient/caregiver? Yes   Is the patient having any side effects they believe may be caused by any medication additions or changes? No   Does the patient have all medications ordered at discharge? Yes   Is the patient taking all medications as directed (includes completed medication regime)? Yes   Does the patient have a primary care provider?  Yes   Does the patient have an appointment with their PCP within 7 days of discharge? No   Comments regarding PCP Pt is sched with PCP Dr Barger on 07/26/2022 for reg one mth follow up. TCM APPT would need to be completed by 07/20/2022, however PCP has no appts within this time frame, if ofc wants to review sched for sooner appt and reach out to pt.   Has the patient kept scheduled appointments due by today? N/A   What is the Home health agency?  [facility]     Has home health visited the patient within 72 hours of discharge? Yes   Psychosocial issues? No   Did the patient receive a copy of their discharge instructions? Yes   Nursing interventions Reviewed instructions with patient   What is the patient's perception of their health status since discharge? Improving   Is the patient/caregiver able to teach back signs and symptoms related to disease process for when to call PCP? Yes   Is the patient/caregiver able to teach back signs and symptoms related to disease process for when to call 911? Yes   Is the patient/caregiver able to teach back the hierarchy of who to call/visit for symptoms/problems? PCP,  Specialist, Home health nurse, Urgent Care, ED, 911 Yes   If the patient is a current smoker, are they able to teach back resources for cessation? Not a smoker   TCM call completed? Yes   Wrap up additional comments D/C DX: Hyponatremia,  Closed fracture of left distal fibula, non surgical s/p fall at home. Per pt , pt managing pretty well in fracture boot, using walker. All new medications in place. Pt dtr scheduling follow up appts w/Ortho, Psych (for anxiety) & Nephrologist. Pt is sched with PCP Dr Barger on 07/26/2022 for reg one mth follow up. TCM APPT would need to be completed by 07/20/2022, however PCP has no appts within this time frame, if ofc wants to review sched for sooner appt and reach out to pt.          Marielle Robles MA    7/7/2022, 13:22 EDT

## 2022-07-08 ENCOUNTER — TELEPHONE (OUTPATIENT)
Dept: FAMILY MEDICINE CLINIC | Facility: CLINIC | Age: 82
End: 2022-07-08

## 2022-07-08 DIAGNOSIS — E03.9 HYPOTHYROIDISM, UNSPECIFIED TYPE: Primary | ICD-10-CM

## 2022-07-08 DIAGNOSIS — E87.1 HYPONATREMIA: ICD-10-CM

## 2022-07-08 DIAGNOSIS — Z13.220 LIPID SCREENING: ICD-10-CM

## 2022-07-08 DIAGNOSIS — E55.9 VITAMIN D DEFICIENCY, UNSPECIFIED: ICD-10-CM

## 2022-07-08 NOTE — TELEPHONE ENCOUNTER
PATIENT'S DAUGHTER WOULD LIKE TO KNOW IF DR RANDHAWA WOULD LIKE BLOOD WORK DONE FOR THIS PATIENT BEFORE SHE COMES IN ON 7/12/22. CALLBACK: [phone]

## 2022-07-12 ENCOUNTER — OFFICE VISIT (OUTPATIENT)
Dept: FAMILY MEDICINE CLINIC | Facility: CLINIC | Age: 82
End: 2022-07-12

## 2022-07-12 VITALS
WEIGHT: 116.2 LBS | SYSTOLIC BLOOD PRESSURE: 130 MMHG | HEART RATE: 115 BPM | DIASTOLIC BLOOD PRESSURE: 60 MMHG | TEMPERATURE: 96.9 F | BODY MASS INDEX: 22.81 KG/M2 | HEIGHT: 60 IN | OXYGEN SATURATION: 94 %

## 2022-07-12 DIAGNOSIS — E03.9 HYPOTHYROIDISM, UNSPECIFIED TYPE: ICD-10-CM

## 2022-07-12 DIAGNOSIS — E55.9 VITAMIN D DEFICIENCY, UNSPECIFIED: ICD-10-CM

## 2022-07-12 DIAGNOSIS — Z13.220 LIPID SCREENING: ICD-10-CM

## 2022-07-12 DIAGNOSIS — F41.9 ANXIETY: ICD-10-CM

## 2022-07-12 DIAGNOSIS — I10 ESSENTIAL HYPERTENSION, BENIGN: Primary | ICD-10-CM

## 2022-07-12 DIAGNOSIS — S82.832S CLOSED FRACTURE OF DISTAL END OF LEFT FIBULA, UNSPECIFIED FRACTURE MORPHOLOGY, SEQUELA: ICD-10-CM

## 2022-07-12 PROBLEM — R00.0 TACHYCARDIA: Status: ACTIVE | Noted: 2022-07-12

## 2022-07-12 LAB
25(OH)D3 SERPL-MCNC: 47.7 NG/ML (ref 30–100)
CHOLEST SERPL-MCNC: 188 MG/DL (ref 0–200)
DEPRECATED RDW RBC AUTO: 39.6 FL (ref 37–54)
ERYTHROCYTE [DISTWIDTH] IN BLOOD BY AUTOMATED COUNT: 11.8 % (ref 12.3–15.4)
HCT VFR BLD AUTO: 36.6 % (ref 34–46.6)
HDLC SERPL-MCNC: 101 MG/DL (ref 40–60)
HGB BLD-MCNC: 12.7 G/DL (ref 12–15.9)
LDLC SERPL CALC-MCNC: 75 MG/DL (ref 0–100)
LDLC/HDLC SERPL: 0.73 {RATIO}
MCH RBC QN AUTO: 32.1 PG (ref 26.6–33)
MCHC RBC AUTO-ENTMCNC: 34.7 G/DL (ref 31.5–35.7)
MCV RBC AUTO: 92.4 FL (ref 79–97)
PLATELET # BLD AUTO: 377 10*3/MM3 (ref 140–450)
PMV BLD AUTO: 9.5 FL (ref 6–12)
RBC # BLD AUTO: 3.96 10*6/MM3 (ref 3.77–5.28)
T-UPTAKE NFR SERPL: 0.95 TBI (ref 0.8–1.3)
T4 SERPL-MCNC: 9.98 MCG/DL (ref 4.5–11.7)
TRIGL SERPL-MCNC: 67 MG/DL (ref 0–150)
TSH SERPL DL<=0.05 MIU/L-ACNC: 1.86 UIU/ML (ref 0.27–4.2)
VLDLC SERPL-MCNC: 12 MG/DL (ref 5–40)
WBC NRBC COR # BLD: 5.68 10*3/MM3 (ref 3.4–10.8)

## 2022-07-12 PROCEDURE — 99214 OFFICE O/P EST MOD 30 MIN: CPT | Performed by: INTERNAL MEDICINE

## 2022-07-12 PROCEDURE — 80061 LIPID PANEL: CPT | Performed by: INTERNAL MEDICINE

## 2022-07-12 PROCEDURE — 84479 ASSAY OF THYROID (T3 OR T4): CPT | Performed by: INTERNAL MEDICINE

## 2022-07-12 PROCEDURE — 85027 COMPLETE CBC AUTOMATED: CPT | Performed by: INTERNAL MEDICINE

## 2022-07-12 PROCEDURE — 84436 ASSAY OF TOTAL THYROXINE: CPT | Performed by: INTERNAL MEDICINE

## 2022-07-12 PROCEDURE — 82306 VITAMIN D 25 HYDROXY: CPT | Performed by: INTERNAL MEDICINE

## 2022-07-12 PROCEDURE — 84443 ASSAY THYROID STIM HORMONE: CPT | Performed by: INTERNAL MEDICINE

## 2022-07-12 PROCEDURE — 36415 COLL VENOUS BLD VENIPUNCTURE: CPT | Performed by: INTERNAL MEDICINE

## 2022-07-12 RX ORDER — DILTIAZEM HYDROCHLORIDE 120 MG/1
120 CAPSULE, EXTENDED RELEASE ORAL DAILY
Qty: 90 CAPSULE | Refills: 3 | Status: SHIPPED | OUTPATIENT
Start: 2022-07-12

## 2022-07-12 RX ORDER — AMLODIPINE BESYLATE 5 MG/1
5 TABLET ORAL
Qty: 90 TABLET | Refills: 3 | Status: CANCELLED | OUTPATIENT
Start: 2022-07-12

## 2022-07-12 RX ORDER — DESVENLAFAXINE 25 MG/1
25 TABLET, EXTENDED RELEASE ORAL DAILY
Qty: 90 TABLET | Refills: 3 | Status: SHIPPED | OUTPATIENT
Start: 2022-07-12 | End: 2022-08-05 | Stop reason: ALTCHOICE

## 2022-07-12 RX ORDER — HYDROXYZINE HYDROCHLORIDE 25 MG/1
25 TABLET, FILM COATED ORAL EVERY 8 HOURS PRN
Qty: 60 TABLET | Refills: 3 | Status: SHIPPED | OUTPATIENT
Start: 2022-07-12 | End: 2022-07-27

## 2022-07-12 RX ORDER — DILTIAZEM HYDROCHLORIDE 120 MG/1
120 CAPSULE, EXTENDED RELEASE ORAL DAILY
Qty: 30 CAPSULE | Refills: 3 | Status: SHIPPED | OUTPATIENT
Start: 2022-07-12 | End: 2022-07-12 | Stop reason: SDUPTHER

## 2022-07-12 RX ORDER — HYDROXYZINE HYDROCHLORIDE 10 MG/1
10 TABLET, FILM COATED ORAL EVERY 8 HOURS PRN
Qty: 60 TABLET | Refills: 0 | Status: CANCELLED | OUTPATIENT
Start: 2022-07-12

## 2022-07-12 RX ORDER — PRUCALOPRIDE 1 MG/1
1 TABLET, FILM COATED ORAL DAILY
Qty: 90 TABLET | Refills: 3 | Status: SHIPPED | OUTPATIENT
Start: 2022-07-12 | End: 2022-09-14

## 2022-07-12 RX ORDER — ALPRAZOLAM 0.25 MG/1
0.25 TABLET ORAL 2 TIMES DAILY
Qty: 180 TABLET | Refills: 1 | Status: ON HOLD | OUTPATIENT
Start: 2022-07-12 | End: 2022-10-31 | Stop reason: SDUPTHER

## 2022-07-12 RX ORDER — SODIUM BICARBONATE 650 MG/1
650 TABLET ORAL 2 TIMES DAILY
Qty: 180 TABLET | Refills: 3 | Status: SHIPPED | OUTPATIENT
Start: 2022-07-12 | End: 2022-08-16

## 2022-07-12 NOTE — PROGRESS NOTES
"Chief Complaint  Follow-up ([employer] fractured left fibula)    Subjective        Ritu Martino presents to [facility]  History of Present Illness Current outpatient and discharge medications have been reconciled for the patient.  Reviewed by: Gaurav Barger MD patient was seen for hypertension.  Patient's blood pressures been running 130s over 80s.  Patient has had tachycardia with pulse rates in the 110s to 120s.  Patient was taken off amlodipine and put on diltiazem 120 mg daily.  Patient will monitor blood pressure and pulse at home.  Patient does have a history of closed fracture of the left tibia.  Patient is wearing a boot and doing fairly well.  Patient is having labs drawn today and results are pending at the time of dictation.  Patient will monitor blood pressure and pulse and follow-up in 6 weeks.    Dictated utilizing Dragon dictation. If there are questions or for further clarification, please contact me.      Objective   Vital Signs:  Blood Pressure 130/60 (BP Location: Right arm, Patient Position: Sitting, Cuff Size: Adult)   Pulse 115   Temperature 96.9 °F (36.1 °C) (Infrared)   Height 152.4 cm (60\")   Weight 52.7 kg (116 lb 3.2 oz)   Oxygen Saturation 94%   Body Mass Index 22.69 kg/m²   Estimated body mass index is 22.69 kg/m² as calculated from the following:    Height as of this encounter: 152.4 cm (60\").    Weight as of this encounter: 52.7 kg (116 lb 3.2 oz).    BMI is within normal parameters. No other follow-up for BMI required.      Physical Exam  Vitals and nursing note reviewed.   Constitutional:       Appearance: Normal appearance. She is well-developed.   HENT:      Head: Normocephalic and atraumatic.      Nose: Nose normal.      Mouth/Throat:      Mouth: Mucous membranes are moist.      Pharynx: Oropharynx is clear.   Eyes:      Extraocular Movements: Extraocular movements intact.      Conjunctiva/sclera: Conjunctivae normal.      Pupils: Pupils " are equal, round, and reactive to light.   Cardiovascular:      Rate and Rhythm: Normal rate and regular rhythm.      Heart sounds: Normal heart sounds. No murmur heard.    No friction rub. No gallop.   Pulmonary:      Effort: Pulmonary effort is normal. No respiratory distress.      Breath sounds: Normal breath sounds. No stridor. No wheezing, rhonchi or rales.   Chest:      Chest wall: No tenderness.   Abdominal:      General: Bowel sounds are normal.      Palpations: Abdomen is soft.   Musculoskeletal:         General: Swelling, tenderness and deformity present. Normal range of motion.      Cervical back: Normal range of motion and neck supple.   Skin:     General: Skin is warm and dry.   Neurological:      General: No focal deficit present.      Mental Status: She is alert and oriented to person, place, and time. Mental status is at baseline.      Motor: Weakness present.      Gait: Gait abnormal.   Psychiatric:         Mood and Affect: Mood normal.         Behavior: Behavior normal.         Thought Content: Thought content normal.         Judgment: Judgment normal.        Result Review : #1 DC amlodipine, diltiazem 120 mg daily #2 monitor blood pressure and pulse #3 labs                Assessment and Plan   Diagnoses and all orders for this visit:    1. Essential hypertension, benign (Primary)    2. Hypothyroidism, unspecified type  -     CBC (No Diff)  -     Comprehensive Metabolic Panel  -     Lipid Panel  -     Vitamin D 25 Hydroxy  -     Thyroid Panel With TSH    3. Vitamin D deficiency, unspecified  -     CBC (No Diff)  -     Comprehensive Metabolic Panel  -     Lipid Panel  -     Vitamin D 25 Hydroxy    4. Lipid screening  -     CBC (No Diff)  -     Comprehensive Metabolic Panel  -     Lipid Panel  -     Vitamin D 25 Hydroxy    5. Anxiety  -     ALPRAZolam (XANAX) 0.25 MG tablet; Take 1 tablet by mouth 2 (Two) Times a Day. Falling risk  Dispense: 180 tablet; Refill: 1    6. Closed fracture of distal end  of left fibula, unspecified fracture morphology, sequela    Other orders  -     Prucalopride Succinate (Motegrity) 1 MG tablet; Take 1 mg by mouth Daily.  Dispense: 90 tablet; Refill: 3  -     sodium bicarbonate 650 MG tablet; Take 1 tablet by mouth 2 (Two) Times a Day.  Dispense: 180 tablet; Refill: 3  -     Desvenlafaxine Succinate ER 25 MG tablet sustained-release 24 hour; Take 1 tablet by mouth Daily.  Dispense: 90 tablet; Refill: 3  -     Discontinue: dilTIAZem XR (DILACOR XR) 120 MG 24 hr capsule; Take 1 capsule by mouth Daily.  Dispense: 30 capsule; Refill: 3  -     hydrOXYzine (ATARAX) 25 MG tablet; Take 1 tablet by mouth Every 8 (Eight) Hours As Needed for Anxiety.  Dispense: 60 tablet; Refill: 3  -     Discontinue: dilTIAZem XR (DILACOR XR) 120 MG 24 hr capsule; Take 1 capsule by mouth Daily.  Dispense: 30 capsule; Refill: 3  -     dilTIAZem XR (DILACOR XR) 120 MG 24 hr capsule; Take 1 capsule by mouth Daily.  Dispense: 90 capsule; Refill: 3             Follow Up   Return in about 6 weeks (around 8/23/2022), or if symptoms worsen or fail to improve, for Recheck.  Patient was given instructions and counseling regarding her condition or for health maintenance advice. Please see specific information pulled into the AVS if appropriate.

## 2022-07-13 ENCOUNTER — TELEPHONE (OUTPATIENT)
Dept: FAMILY MEDICINE CLINIC | Facility: CLINIC | Age: 82
End: 2022-07-13

## 2022-07-15 ENCOUNTER — LAB (OUTPATIENT)
Dept: FAMILY MEDICINE CLINIC | Facility: CLINIC | Age: 82
End: 2022-07-15

## 2022-07-15 DIAGNOSIS — I10 ESSENTIAL HYPERTENSION, BENIGN: ICD-10-CM

## 2022-07-15 DIAGNOSIS — F41.9 ANXIETY: ICD-10-CM

## 2022-07-15 DIAGNOSIS — E55.9 VITAMIN D DEFICIENCY, UNSPECIFIED: ICD-10-CM

## 2022-07-15 DIAGNOSIS — S82.832S CLOSED FRACTURE OF DISTAL END OF LEFT FIBULA, UNSPECIFIED FRACTURE MORPHOLOGY, SEQUELA: ICD-10-CM

## 2022-07-15 DIAGNOSIS — Z13.220 LIPID SCREENING: ICD-10-CM

## 2022-07-15 DIAGNOSIS — E03.9 HYPOTHYROIDISM, UNSPECIFIED TYPE: ICD-10-CM

## 2022-07-15 LAB
ALBUMIN SERPL-MCNC: 4.2 G/DL (ref 3.5–5.2)
ALBUMIN/GLOB SERPL: 1.9 G/DL
ALP SERPL-CCNC: 65 U/L (ref 39–117)
ALT SERPL W P-5'-P-CCNC: 21 U/L (ref 1–33)
ANION GAP SERPL CALCULATED.3IONS-SCNC: 12 MMOL/L (ref 5–15)
AST SERPL-CCNC: 18 U/L (ref 1–32)
BILIRUB SERPL-MCNC: 0.5 MG/DL (ref 0–1.2)
BUN SERPL-MCNC: 7 MG/DL (ref 8–23)
BUN/CREAT SERPL: 10.4 (ref 7–25)
CALCIUM SPEC-SCNC: 9.4 MG/DL (ref 8.6–10.5)
CHLORIDE SERPL-SCNC: 94 MMOL/L (ref 98–107)
CO2 SERPL-SCNC: 25 MMOL/L (ref 22–29)
CREAT SERPL-MCNC: 0.67 MG/DL (ref 0.57–1)
EGFRCR SERPLBLD CKD-EPI 2021: 87.4 ML/MIN/1.73
GLOBULIN UR ELPH-MCNC: 2.2 GM/DL
GLUCOSE SERPL-MCNC: 90 MG/DL (ref 65–99)
POTASSIUM SERPL-SCNC: 3.9 MMOL/L (ref 3.5–5.2)
PROT SERPL-MCNC: 6.4 G/DL (ref 6–8.5)
SODIUM SERPL-SCNC: 131 MMOL/L (ref 136–145)

## 2022-07-15 PROCEDURE — 80053 COMPREHEN METABOLIC PANEL: CPT | Performed by: INTERNAL MEDICINE

## 2022-07-15 PROCEDURE — 36415 COLL VENOUS BLD VENIPUNCTURE: CPT | Performed by: INTERNAL MEDICINE

## 2022-07-27 ENCOUNTER — TELEPHONE (OUTPATIENT)
Dept: FAMILY MEDICINE CLINIC | Facility: CLINIC | Age: 82
End: 2022-07-27

## 2022-07-27 ENCOUNTER — TELEMEDICINE (OUTPATIENT)
Dept: FAMILY MEDICINE CLINIC | Facility: CLINIC | Age: 82
End: 2022-07-27

## 2022-07-27 DIAGNOSIS — G89.29 CHRONIC MIDLINE THORACIC BACK PAIN: Primary | ICD-10-CM

## 2022-07-27 DIAGNOSIS — M54.6 CHRONIC MIDLINE THORACIC BACK PAIN: Primary | ICD-10-CM

## 2022-07-27 DIAGNOSIS — M54.2 PAIN OF CERVICAL SPINE: ICD-10-CM

## 2022-07-27 PROCEDURE — 99214 OFFICE O/P EST MOD 30 MIN: CPT | Performed by: NURSE PRACTITIONER

## 2022-07-27 RX ORDER — IBUPROFEN 800 MG/1
800 TABLET ORAL EVERY 8 HOURS PRN
Qty: 90 TABLET | Refills: 0 | Status: SHIPPED | OUTPATIENT
Start: 2022-07-27 | End: 2022-08-22 | Stop reason: SDUPTHER

## 2022-07-27 RX ORDER — LIDOCAINE 50 MG/G
1 PATCH TOPICAL EVERY 24 HOURS
Qty: 30 EACH | Refills: 1 | Status: SHIPPED | OUTPATIENT
Start: 2022-07-27 | End: 2022-08-04

## 2022-07-27 NOTE — TELEPHONE ENCOUNTER
Caller: Nu Martino    Relationship to patient: Emergency Contact    Best call back number: [phone]    Patient is needing: PATIENT'S  STATES THAT A PRIOR AUTHORIZATION IS NEEDED FOR THE lidocaine (LIDODERM) 5 % AND THAT THE PHARMACY WILL BE FAXING OVER THAT FORM SOON.

## 2022-07-27 NOTE — PROGRESS NOTES
"Chief Complaint  Back and neck pain    Subjective    {Problem List  Visit Diagnosis   Encounters  Notes  Medications  Labs  Result Review Imaging  Media :23}    Ritu Martino presents to [facility]  History of Present Illness   82 yr old female, pt of Dr. Barger, new to me, presenting via video visit with complaints of increased back pain of cervical and thoracic spine, last imaging of thoracic spine in 2018 showed mild disc space narrowing and spurring, will repeat x-ray of thoracic spine and add cervical spine x-ray, states pain increased with long periods of sitting or standing, interferes with sleep, pain 8/10, has used OTC ibuprofen and Tylenol with minimal relief, denies any changes of bowel or bladder habits.  Patient agrees to trying ibuprofen 800 mg, Lidoderm patches and referral to PT, additional imaging and referrals pending x-ray results.     Objective   Vital Signs: N/A Video Visit  There were no vitals taken for this visit.  Estimated body mass index is 22.69 kg/m² as calculated from the following:    Height as of 7/12/22: 152.4 cm (60\").    Weight as of 7/12/22: 52.7 kg (116 lb 3.2 oz).    BMI is within normal parameters. No other follow-up for BMI required.      Physical Exam  Musculoskeletal:      Comments: Reports pain and tenderness of cervical and thoracic spine    Neurological:      Mental Status: She is alert and oriented to person, place, and time.   Psychiatric:         Mood and Affect: Mood normal.         Behavior: Behavior normal.     Unable to fully assess related to video visit  Result Review :{Labs  Result Review  Imaging  Med Tab  Media  Procedures  :23}  {The following data was reviewed by (Optional):35522}  {Ambulatory Labs (Optional):48624}  {Data reviewed (Optional):53230:::1}          Assessment and Plan {CC Problem List  Visit Diagnosis   ROS  Review (Popup)  Health Maintenance  Quality  BestPractice  Medications  SmartSets "  SnapShot Encounters  Media :23}  Diagnoses and all orders for this visit:    1. Chronic midline thoracic back pain (Primary)  -     XR Spine Thoracic 3 View (In Office); Future  -     Ambulatory Referral to Physical Therapy Evaluate and treat; Stretching, Strengthening  -     ibuprofen (ADVIL,MOTRIN) 800 MG tablet; Take 1 tablet by mouth Every 8 (Eight) Hours As Needed for Mild Pain .  Dispense: 90 tablet; Refill: 0  -     lidocaine (LIDODERM) 5 %; Place 1 patch on the skin as directed by provider Daily. Remove & Discard patch within 12 hours or as directed by MD  Dispense: 30 each; Refill: 1    2. Pain of cervical spine  -     XR Spine Cervical Complete 4 or 5 View (In Office); Future  -     Ambulatory Referral to Physical Therapy Evaluate and treat; Stretching, Strengthening  -     ibuprofen (ADVIL,MOTRIN) 800 MG tablet; Take 1 tablet by mouth Every 8 (Eight) Hours As Needed for Mild Pain .  Dispense: 90 tablet; Refill: 0  -     lidocaine (LIDODERM) 5 %; Place 1 patch on the skin as directed by provider Daily. Remove & Discard patch within 12 hours or as directed by MD  Dispense: 30 each; Refill: 1           I spent *** minutes caring for Ritu on this date of service. This time includes time spent by me in the following activities:reviewing tests, obtaining and/or reviewing a separately obtained history, counseling and educating the patient/family/caregiver, ordering medications, tests, or procedures and documenting information in the medical record  Follow Up {Instructions Charge Capture  Follow-up Communications :23}  Return if symptoms worsen or fail to improve.  Patient was given instructions and counseling regarding her condition or for health maintenance advice. Please see specific information pulled into the AVS if appropriate.

## 2022-07-28 NOTE — PROGRESS NOTES
"Chief Complain  Back and neck pain    Subjective        Ritu Martino presents to [facility]  History of Present Illness   82 yr old female, pt of Dr. Barger, new to me, presenting via video visit with complaints of increased back pain of cervical and thoracic spine, last imaging of thoracic spine in 2018 showed mild disc space narrowing and spurring, will repeat x-ray of thoracic spine and add cervical spine x-ray, states pain increased with long periods of sitting or standing, interferes with sleep, pain 8/10, has used OTC ibuprofen and Tylenol with minimal relief, denies any changes of bowel or bladder habits.  Patient agrees to trying ibuprofen 800 mg, Lidoderm patches and referral to PT, additional imaging and referrals pending x-ray results.       Objective   Vital Signs: N/A -video visit  There were no vitals taken for this visit.  Estimated body mass index is 22.69 kg/m² as calculated from the following:    Height as of 7/12/22: 152.4 cm (60\").    Weight as of 7/12/22: 52.7 kg (116 lb 3.2 oz).    BMI is within normal parameters. No other follow-up for BMI required.      Physical Exam  Musculoskeletal:      Comments: Reports pain and tenderness of cervical and thoracic spine   Neurological:      General: No focal deficit present.      Mental Status: She is alert and oriented to person, place, and time.   Psychiatric:         Mood and Affect: Mood normal.         Behavior: Behavior normal.     Unable to fully assess related to video visit  Result Review :                Assessment and Plan   Diagnoses and all orders for this visit:    1. Chronic midline thoracic back pain (Primary)  -     XR Spine Thoracic 3 View (In Office); Future  -     Ambulatory Referral to Physical Therapy Evaluate and treat; Stretching, Strengthening  -     ibuprofen (ADVIL,MOTRIN) 800 MG tablet; Take 1 tablet by mouth Every 8 (Eight) Hours As Needed for Mild Pain .  Dispense: 90 tablet; Refill: 0  -   "   lidocaine (LIDODERM) 5 %; Place 1 patch on the skin as directed by provider Daily. Remove & Discard patch within 12 hours or as directed by MD  Dispense: 30 each; Refill: 1    2. Pain of cervical spine  -     XR Spine Cervical Complete 4 or 5 View (In Office); Future  -     Ambulatory Referral to Physical Therapy Evaluate and treat; Stretching, Strengthening  -     ibuprofen (ADVIL,MOTRIN) 800 MG tablet; Take 1 tablet by mouth Every 8 (Eight) Hours As Needed for Mild Pain .  Dispense: 90 tablet; Refill: 0  -     lidocaine (LIDODERM) 5 %; Place 1 patch on the skin as directed by provider Daily. Remove & Discard patch within 12 hours or as directed by MD  Dispense: 30 each; Refill: 1             Follow Up   Return if symptoms worsen or fail to improve.  Patient was given instructions and counseling regarding her condition or for health maintenance advice. Please see specific information pulled into the AVS if appropriate.

## 2022-07-29 ENCOUNTER — OFFICE VISIT (OUTPATIENT)
Dept: FAMILY MEDICINE CLINIC | Facility: CLINIC | Age: 82
End: 2022-07-29

## 2022-07-29 DIAGNOSIS — G89.29 CHRONIC MIDLINE THORACIC BACK PAIN: ICD-10-CM

## 2022-07-29 DIAGNOSIS — M54.6 CHRONIC MIDLINE THORACIC BACK PAIN: ICD-10-CM

## 2022-07-29 DIAGNOSIS — M54.2 CERVICAL PAIN: Primary | ICD-10-CM

## 2022-07-29 PROCEDURE — 72070 X-RAY EXAM THORAC SPINE 2VWS: CPT | Performed by: NURSE PRACTITIONER

## 2022-07-29 PROCEDURE — 72050 X-RAY EXAM NECK SPINE 4/5VWS: CPT | Performed by: NURSE PRACTITIONER
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
## 2022-08-03 ENCOUNTER — TELEPHONE (OUTPATIENT)
Dept: FAMILY MEDICINE CLINIC | Facility: CLINIC | Age: 82
End: 2022-08-03

## 2022-08-03 NOTE — TELEPHONE ENCOUNTER
Caller: Nu Martino    Relationship: Emergency Contact    Best call back number:[phone]    What test was performed: MRI    When was the test performed: 7/29/22    Where was the test performed: OFFICE    Additional notes: PATIENT'S  CALLED WANTING SOMEONE TO CALL AND GO OVER THE RESULTS OF THE MRI FINDINGS WITH THEM.REQUESTIGN A CALLBACK.

## 2022-08-04 ENCOUNTER — OFFICE VISIT (OUTPATIENT)
Dept: FAMILY MEDICINE CLINIC | Facility: CLINIC | Age: 82
End: 2022-08-04

## 2022-08-04 VITALS
HEART RATE: 93 BPM | OXYGEN SATURATION: 99 % | WEIGHT: 119 LBS | BODY MASS INDEX: 23.36 KG/M2 | DIASTOLIC BLOOD PRESSURE: 76 MMHG | HEIGHT: 60 IN | SYSTOLIC BLOOD PRESSURE: 142 MMHG | TEMPERATURE: 96.8 F

## 2022-08-04 DIAGNOSIS — K46.9 ABDOMINAL HERNIA WITHOUT OBSTRUCTION AND WITHOUT GANGRENE, RECURRENCE NOT SPECIFIED, UNSPECIFIED HERNIA TYPE: Primary | ICD-10-CM

## 2022-08-04 DIAGNOSIS — E03.9 HYPOTHYROIDISM, UNSPECIFIED TYPE: ICD-10-CM

## 2022-08-04 DIAGNOSIS — S32.010B COMPRESSION FRACTURE OF L1 LUMBAR VERTEBRA, OPEN, INITIAL ENCOUNTER: ICD-10-CM

## 2022-08-04 PROCEDURE — 99213 OFFICE O/P EST LOW 20 MIN: CPT | Performed by: INTERNAL MEDICINE

## 2022-08-04 RX ORDER — ZOLPIDEM TARTRATE 10 MG/1
10 TABLET ORAL NIGHTLY PRN
Qty: 90 TABLET | Refills: 1 | Status: SHIPPED | OUTPATIENT
Start: 2022-08-04 | End: 2023-03-02 | Stop reason: SDUPTHER

## 2022-08-04 NOTE — TELEPHONE ENCOUNTER
Spoke with patient and spouse, explained results, she has an appointment with Dr. Barger today.  Recommended referral for bone density scan related to results of x-ray showing osteopenia and compression fracture.  She has upcoming appointment with neurosurgery and physical therapy.

## 2022-08-04 NOTE — PROGRESS NOTES
"Chief Complaint  something on stomach and xrays back doesnt look good    Subjective        Ritu Martino presents to [facility]  History of Present Illness patient was seen for abdominal hernia.  Patient did have a bruise.  Patient is being referred to general surgery for evaluation.  Patient did have compression fracture of L1 and had severe lumbar pain.  Pain was 9 out of 10 but she did not want an opiates.  Patient was scheduled for Dr. Wang for kyphoplasty.  Patient is also being scheduled for Prolia injections.    Dictated utilizing Dragon dictation. If there are questions or for further clarification, please contact me.    Objective   Vital Signs:  Blood Pressure 142/76   Pulse 93   Temperature 96.8 °F (36 °C)   Height 152.4 cm (60\")   Weight 54 kg (119 lb)   Oxygen Saturation 99%   Body Mass Index 23.24 kg/m²   Estimated body mass index is 23.24 kg/m² as calculated from the following:    Height as of this encounter: 152.4 cm (60\").    Weight as of this encounter: 54 kg (119 lb).    BMI is within normal parameters. No other follow-up for BMI required.      Physical Exam  Vitals and nursing note reviewed.   Constitutional:       Appearance: Normal appearance. She is well-developed.   HENT:      Head: Normocephalic and atraumatic.      Nose: Nose normal.      Mouth/Throat:      Mouth: Mucous membranes are moist.      Pharynx: Oropharynx is clear.   Eyes:      Extraocular Movements: Extraocular movements intact.      Conjunctiva/sclera: Conjunctivae normal.      Pupils: Pupils are equal, round, and reactive to light.   Cardiovascular:      Rate and Rhythm: Normal rate and regular rhythm.      Heart sounds: Normal heart sounds. No murmur heard.    No friction rub. No gallop.   Pulmonary:      Effort: Pulmonary effort is normal. No respiratory distress.      Breath sounds: Normal breath sounds. No stridor. No wheezing, rhonchi or rales.   Chest:      Chest wall: No " tenderness.   Abdominal:      General: Bowel sounds are normal. There is no distension.      Palpations: Abdomen is soft. There is no mass.      Tenderness: There is no abdominal tenderness. There is no right CVA tenderness, left CVA tenderness, guarding or rebound.      Hernia: A hernia is present.   Musculoskeletal:         General: Swelling, tenderness and deformity present. Normal range of motion.      Cervical back: Normal range of motion and neck supple.   Skin:     General: Skin is warm and dry.   Neurological:      General: No focal deficit present.      Mental Status: She is alert and oriented to person, place, and time. Mental status is at baseline.   Psychiatric:         Mood and Affect: Mood normal.         Behavior: Behavior normal.         Thought Content: Thought content normal.         Judgment: Judgment normal.        Result Review :                Assessment and Plan  1 refer to Dr. Wang, Prolia injections, DEXA scan #2 refer to general surgery for abdominal hernia evaluation  Diagnoses and all orders for this visit:    1. Abdominal hernia without obstruction and without gangrene, recurrence not specified, unspecified hernia type (Primary)  -     Ambulatory Referral to General Surgery    2. Hypothyroidism, unspecified type    3. Compression fracture of L1 lumbar vertebra, open, initial encounter (HCC)  -     Ambulatory Referral to Orthopedic Surgery  -     DEXA Bone Density Axial  -     denosumab (PROLIA) syringe 60 mg  -     Provider Communication  -     Provider Communication  -     Nursing communication  -     Nursing communication  -     Nursing communication  -     Ambulatory Referral to [facility] For Infusion Treatment    Other orders  -     zolpidem (AMBIEN) 10 MG tablet; Take 1 tablet by mouth At Night As Needed for Sleep.  Dispense: 90 tablet; Refill: 1             Follow Up   Return in about 2 months (around 10/4/2022), or if symptoms worsen or fail to improve, for Recheck.  Patient was given  instructions and counseling regarding her condition or for health maintenance advice. Please see specific information pulled into the AVS if appropriate.

## 2022-08-05 ENCOUNTER — TELEPHONE (OUTPATIENT)
Dept: FAMILY MEDICINE CLINIC | Facility: CLINIC | Age: 82
End: 2022-08-05

## 2022-08-05 DIAGNOSIS — E87.6 HYPOKALEMIA: Primary | ICD-10-CM

## 2022-08-05 RX ORDER — POTASSIUM CHLORIDE 750 MG/1
10 TABLET, FILM COATED, EXTENDED RELEASE ORAL 2 TIMES DAILY
Qty: 60 TABLET | Refills: 3 | Status: SHIPPED | OUTPATIENT
Start: 2022-08-05 | End: 2022-09-27 | Stop reason: SDUPTHER

## 2022-08-05 NOTE — TELEPHONE ENCOUNTER
PATIENT'S  CALLED TO RESCHEDULE LAB APPTOINTMENT FROM 8/8/22 TO 8/11 EARLY    PLEASE CALL [phone]    ATTEMPTED WARM TRANSFER

## 2022-08-05 NOTE — TELEPHONE ENCOUNTER
Caller: Nu Martino    Relationship: Emergency Contact    Best call back number: [phone]*    What medications are you currently taking:   Current Outpatient Medications on File Prior to Visit   Medication Sig Dispense Refill   • acetaminophen (TYLENOL) 325 MG tablet Take 2 tablets by mouth Every 6 (Six) Hours As Needed for Mild Pain .     • ALPRAZolam (XANAX) 0.25 MG tablet Take 1 tablet by mouth 2 (Two) Times a Day. Falling risk 180 tablet 1   • Cholecalciferol (VITAMIN D) 2000 units capsule Take  by mouth Daily With Dinner.     • clotrimazole-betamethasone (LOTRISONE) 1-0.05 % cream Apply  topically to the appropriate area as directed See Admin Instructions. Apply topically to the affected area twice daily 45 g 3   • cycloSPORINE (RESTASIS) 0.05 % ophthalmic emulsion 1 drop 2 (Two) Times a Day.     • Desvenlafaxine Succinate ER 25 MG tablet sustained-release 24 hour Take 1 tablet by mouth Daily. 90 tablet 3   • dilTIAZem XR (DILACOR XR) 120 MG 24 hr capsule Take 1 capsule by mouth Daily. 90 capsule 3   • ferrous sulfate 325 (65 FE) MG tablet Take 325 mg by mouth 2 (Two) Times a Day Before Meals.     • ibuprofen (ADVIL,MOTRIN) 800 MG tablet Take 1 tablet by mouth Every 8 (Eight) Hours As Needed for Mild Pain . 90 tablet 0   • levETIRAcetam (KEPPRA) 500 MG tablet Take 1 tablet by mouth 2 (Two) Times a Day. 180 tablet 0   • levothyroxine (SYNTHROID, LEVOTHROID) 50 MCG tablet Take 50 mcg by mouth Daily.     • potassium chloride 10 MEQ CR tablet Take 1 tablet by mouth 2 (Two) Times a Day. 60 tablet 3   • Probiotic Product (ALIGN PO) Take 1 capsule by mouth Daily With Lunch.     • Prucalopride Succinate (Motegrity) 1 MG tablet Take 1 mg by mouth Daily. 90 tablet 3   • rosuvastatin (CRESTOR) 20 MG tablet Take 20 mg by mouth Every Night.     • sodium bicarbonate 650 MG tablet Take 1 tablet by mouth 2 (Two) Times a Day. 180 tablet 3   • SODIUM CHLORIDE PO Take  by mouth 3 (Three) Times a Day.     • vitamin B-12  (CYANOCOBALAMIN) 100 MCG tablet Take 50 mcg by mouth Daily.     • zolpidem (AMBIEN) 10 MG tablet Take 1 tablet by mouth At Night As Needed for Sleep. 90 tablet 1     Current Facility-Administered Medications on File Prior to Visit   Medication Dose Route Frequency Provider Last Rate Last Admin   • [DISCONTINUED] denosumab (PROLIA) syringe 60 mg  60 mg Subcutaneous Once Gaurav Rnadhawa MD          When did you start taking these medications: HAS NOT STARTED, JUST PICKED UP FROM THE PHARMACY    Which medication are you concerned about: SERTRALINE 50 MG    Who prescribed you this medication: DR. RANDHAWA    What are your concerns: PATIENT'S  STATE THAT WHEN THE PATIENT WAS IN THE HOSPITAL SHE WAS TOLD TO STOP TAKING THE SERTRALINE, AND THE PATIENT WAS STARTED ON PREESTIQ (DESVENLAFAXINE) 25 MG. NAINA STATES THAT HE PICKED THE UP MEDICATIONS FROM THE PHARMACY TODAY AND THE PATIENT HAS BEEN PRESCRIBED SERTRALINE 50 MG, AND THE PATIENT IS CURRENTLY ON DESVENLAFAXINE 25 MG. NAINA HAS NOT GIVEN THE PATIENT ANY OF THE SERTRALINE, AS HE NEEDS TO SPEAK TO DR. RANDHAWA, OR HIS MEDICAL ASSISTANT TO GO OVER THESE MEDICATIONS. THE PATIENT STATES HE LOOKED THESE 2 MEDICATIONS UP ON THE INTERNET AND IT STATES THAT THESE 2 MEDICATIONS SHOULD NOT BE TAKEN TOGETHER, AS THERE ARE SERIOUS RISKS INVOLVED IT TAKEN TOGETHER.    How long have you had these concerns: TODAY ASAP

## 2022-08-09 ENCOUNTER — TELEPHONE (OUTPATIENT)
Dept: FAMILY MEDICINE CLINIC | Facility: CLINIC | Age: 82
End: 2022-08-09

## 2022-08-09 ENCOUNTER — HOSPITAL ENCOUNTER (OUTPATIENT)
Dept: PHYSICAL THERAPY | Facility: HOSPITAL | Age: 82
Setting detail: THERAPIES SERIES
Discharge: HOME | End: 2022-08-09

## 2022-08-09 DIAGNOSIS — M54.6 CHRONIC MIDLINE THORACIC BACK PAIN: Primary | ICD-10-CM

## 2022-08-09 DIAGNOSIS — R26.89 IMPAIRED GAIT AND MOBILITY: ICD-10-CM

## 2022-08-09 DIAGNOSIS — G89.29 CHRONIC MIDLINE THORACIC BACK PAIN: Primary | ICD-10-CM

## 2022-08-09 DIAGNOSIS — S32.010B COMPRESSION FRACTURE OF L1 LUMBAR VERTEBRA, OPEN, INITIAL ENCOUNTER: Primary | ICD-10-CM

## 2022-08-09 PROCEDURE — 97163 PT EVAL HIGH COMPLEX 45 MIN: CPT

## 2022-08-09 NOTE — THERAPY EVALUATION
Outpatient Physical Therapy Ortho Initial Evaluation  [facility]     Patient Name: Ritu Martino  : 1940  MRN: [id number]  Today's Date: 2022      Visit Date: 2022    Patient Active Problem List   Diagnosis   • Anxiety   • Hypothyroidism   • Hyponatremia   • Iron deficiency   • Hypokalemia   • Essential hypertension, benign   • Fluent aphasia   • Hypochloremia   • Benzodiazepine dependence (Formerly [facility])   • Anemia   • Cervicalgia   • Intertrigo   • Insomnia   • Acute cystitis   • E coli infection   • Diverticulitis   • Arthritis   • Bowel dysfunction   • GERD (gastroesophageal reflux disease)   • Hernia, hiatal   • Seizures (Formerly [facility])   • Meningioma (Formerly [facility])   • Closed fracture of left distal fibula   • Debility   • History of seizure   • Fall   • Tachycardia   • Abdominal hernia   • Compression fracture of L1 lumbar vertebra, open, initial encounter (Formerly [facility])        Past Medical History:   Diagnosis Date   • Anemia    • Anxiety    • Arthritis    • Bowel dysfunction 2021    since having surgery for diverticular infection   • Chronic constipation    • Diverticulitis 2021    w/ rupture and infection required surgery and ostomy   • Essential hypertension, benign    • GERD (gastroesophageal reflux disease)    • Hernia, hiatal    • Hypercholesterolemia    • Hypokalemia    • Hyponatremia     chronic low with occasional normal level   • Hypothyroidism 2018    estimated time frame pt nor  could remember exactly how long has been on medication   • Insomnia    • Iron deficiency    • Seizures (Formerly [facility])         Past Surgical History:   Procedure Laterality Date   • APPENDECTOMY     • BACK SURGERY     •  SECTION     • COLON SURGERY  2021    to address ruptured and infected diverticular dz, ostomy also placed   • COLONOSCOPY     • COLOSTOMY CLOSURE  10/2021    ostomy removed   • EYE SURGERY  2 X cataract   • HEMORRHOIDECTOMY     • KNEE ARTHROPLASTY     • TONSILLECTOMY          Visit Dx:     ICD-10-CM ICD-9-CM   1. Chronic midline thoracic back pain  M54.6 724.1    G89.29 338.29   2. Impaired gait and mobility  R26.89 781.2          Patient History     Row Name 08/09/22 1100             History    Brief Description of Current Complaint Ritu Martino is a 82 y.o. female who presents today with Chronic midline thoracic back pain, Pain of cervical spine. She has had chronic mid/upper back pain for her adult life, but about 4 months ago it started getting worse, along with a general deterioration of her health. She has had low sodium seizures causing 3 falls, low potassium, L distal fibular fracture about 2 months ago from one of the falls, cognitive decline (trouble working TV remote), 3 hospitalizations, and an abdominal hernia within this time period. She is now using a rolling walker and reports she thinks her balance is off. Imaging showed DDD of cervical spine, L1 compression fracture, and osteopenia. She is to see general surgery for hernia, neurology, spine surgeon, have a DEXA, and follow up with ortho for the ankle within the next 1-2 weeks to hopefully discharge walking boot. Ritu Martino reports difficulty/increased pain with prolonged sitting, difficulty sleeping, and highly variable symptoms limiting overall mobility. Pain relieving factors include tylenol, ibuprofen, changing positions, alternating ice and heat. PMH includes osteopenia, abdominal hernia, hypothyroidism, distal fibular fracture, cognitive decline.  -LW      Patient/Caregiver Goals Relieve pain;Return to prior level of function  -LW      Patient/Caregiver Goals Comment Wants to go out to eat, go shopping, host friends.  -LW      Occupation/sports/leisure activities Going out to eat, shopping, going to friends houses and hosting friends.  -LW              Pain     Pain Location Back  -LW      Pain at Present 8  -LW      Pain at Best 2  -LW      Pain at Worst 9  -LW      Pain Frequency  Constant/continuous  -LW      Pain Description Aching  -LW      What Performance Factors Make the Current Problem(s) WORSE? Unsure: highly variable symptoms. Prolonged sitting, sleeping  -LW      Tolerance Time- Standing --  Highly variable  -LW      Tolerance Time- Sitting --  Highly variable  -LW      Tolerance Time- Walking --  Highly variable  -LW      Is medication used to assist with sleep? Yes  Ambien, pain meds  -LW      Total hours of sleep per night 1-2 hours at a time  -LW      Difficulties with ADL's? housework, against precautions due to hernia, back.  -LW              Fall Risk Assessment    Any falls in the past year: Yes  -LW      Number of falls reported in the last 12 months 3  -LW      Factors that contributed to the fall: --  Low sodium had her passing out/seizing. No head impact.  -LW      Does patient have a fear of falling Yes (comment)  Feels balance is off.  -LW      Previous Functional Level IADLs;Leisure Activities  -LW      Level of Assistance: I  -LW      Ambulatory: I  -LW      Level of Assistance: I  -LW      Ambulatory: I  -LW              Daily Activities    Primary Language English  -LW              Safety    Are you being hurt, hit, or frightened by anyone at home or in your life? No  -LW      Are you being neglected by a caregiver No  -LW            User Key  (r) = Recorded By, (t) = Taken By, (c) = Cosigned By    Initials Name Provider Type    LW Carrol Coughlin, PT Physical Therapist                 PT Ortho     Row Name 08/09/22 1200       Posture/Observations    Posture/Observations Comments Patient with forward head, kyphotic thoracic spine, lateral pelvic tilt due to boot (L high).  -LW       Head/Neck/Trunk    Trunk Extension AROM WNL  -LW    Trunk Flexion AROM WNL  -LW    Trunk Lt Lateral Flexion AROM WNL  -LW    Trunk Rt Lateral Flexion AROM WNL  -LW    Trunk Lt Rotation AROM WNL  Pain in gluteal region  -LW    Trunk Rt Rotation AROM WNL  -LW       MMT (Manual Muscle  Testing)    General MMT Comments --  -LW       MMT Right Upper Ext    Rt Shoulder Flexion MMT, Gross Movement (5/5) normal  -LW    Rt Shoulder Extension MMT, Gross Movement (5/5) normal  -LW    Rt Shoulder ABduction MMT, Gross Movement (5/5) normal  -LW    Rt Shoulder Internal Rotation MMT, Gross Movement (5/5) normal  -LW    Rt Shoulder External Rotation MMT, Gross Movement (5/5) normal  -LW       MMT Left Upper Ext    Lt Shoulder Flexion MMT, Gross Movement (5/5) normal  -LW    Lt Shoulder Extension MMT, Gross Movement (5/5) normal  -LW    Lt Shoulder ABduction MMT, Gross Movement (5/5) normal  -LW    Lt Shoulder Internal Rotation MMT, Gross Movement (5/5) normal  -LW    Lt Shoulder External Rotation MMT, Gross Movement (5/5) normal  -LW       MMT Right Lower Ext    Rt Hip Flexion MMT, Gross Movement (4-/5) good minus  -LW    Rt Hip ABduction MMT, Gross Movement (5/5) normal  -LW    Rt Hip ADduction MMT, Gross Movement (4/5) good  -LW    Rt Knee Extension MMT, Gross Movement (5/5) normal  -LW    Rt Knee Flexion MMT, Gross Movement (5/5) normal  -LW    Rt Ankle Plantarflexion MMT, Gross Movement (5/5) normal  -LW    Rt Ankle Dorsiflexion MMT, Gross Movement (5/5) normal  -LW       MMT Left Lower Ext    Lt Hip Flexion MMT, Gross Movement (4-/5) good minus  -LW    Lt Hip ABduction MMT, Gross Movement (5/5) normal  -LW    Lt Hip ADduction MMT, Gross Movement (4/5) good  -LW    Lt Knee Extension MMT, Gross Movement (4+/5) good plus  -LW    Lt Knee Flexion MMT, Gross Movement (5/5) normal  -LW       Sensation    Sensation WNL? WNL  -LW       Gait/Stairs (Locomotion)    Comment, (Gait/Stairs) Patient relying on walker at this time, with multiple instances of running walker into wall on the left, decreased maddy and step length B.  -LW          User Key  (r) = Recorded By, (t) = Taken By, (c) = Cosigned By    Initials Name Provider Type    Carrol Farrell, PT Physical Therapist                            Therapy  Education  Education Details: Educated on anatomy/pathology, evaluation findings, POC and HEP  Given: Symptoms/condition management, Posture/body mechanics, Other (comment) (General PT and health education.)  Program: New  How Provided: Verbal, Demonstration  Provided to: Patient, Caregiver  Level of Understanding: Demonstrated, Verbalized      PT OP Goals     Row Name 08/09/22 1300          PT Short Term Goals    STG Date to Achieve 09/08/22  -     STG 1 Patient will be independent with initial HEP for postural control and strength.  -     STG 1 Progress New  -     STG 2 Patient will report back pain of no more than 6/10 with daily activities to improve capacity for social activities.  -LW     STG 2 Progress New  -     STG 3 Patient will demonstrate full, pain-free range of motion of spine for improved pain control and functional mobility.  -     STG 3 Progress New  -            Long Term Goals    LTG Date to Achieve 10/08/22  -     LTG 1 Patient will be independent with finalized HEP for indpeendent management of condition and prevention of re-injury.  -     LTG 1 Progress New  -     LTG 2 Patient will report back pain of no more than 3/10 with daily activities to improve capacity for social activities.  -     LTG 2 Progress New  -     LTG 3 Patient will demonstrate B LE strength of 5/5 to improve stability for dialy tasks.  -     LTG 3 Progress New  -     LTG 4 Patient will be independent with gait without assistive device with normalized gait pattern and no increased pain.  -     LTG 4 Progress New  -            Time Calculation    PT Goal Re-Cert Due Date 11/07/22  -           User Key  (r) = Recorded By, (t) = Taken By, (c) = Cosigned By    Initials Name Provider Type    Carrol Farrell, PT Physical Therapist                 PT Assessment/Plan     Row Name 08/09/22 1100          PT Assessment    Functional Limitations Impaired gait;Decreased safety during functional  activities;Limitation in home management;Limitations in community activities;Limitations in functional capacity and performance;Performance in leisure activities;Performance in self-care ADL  -LW     Impairments Muscle strength;Pain;Posture;Gait  -LW     Assessment Comments Ritu Martino is a 82 y.o. female referred to physical therapy for Chronic midline thoracic back pain, Pain of cervical spine. She presents with an unstable clinical presentation, along with  comorbidities of osteopenia, abdominal hernia, hypothyroidism, distal fib fracture and personal factors of cognitive decline that may impact her progress in the plan of care. Pt presents today with impaired posture, pain with R sidebending, impaired gait including what appears to be left inattention, mild B LE weakness, and 66% impaired functional mobility per the Oswestry. Her signs and symptoms are consistent with referring diagnosis. The previous impairments limit her ability to go out to eat, shop, and socialize. Pt will benefit from skilled PT to address the previous impairments and return to PLOF.  -LW     Please refer to paper survey for additional self-reported information Yes  -LW     Rehab Potential Fair  -LW     Patient/caregiver participated in establishment of treatment plan and goals Yes  -LW     Patient would benefit from skilled therapy intervention Yes  -LW            PT Plan    PT Frequency 2x/week  -LW     Predicted Duration of Therapy Intervention (PT) 8-10 visits over 4-6 weeks.  -LW     Planned CPT's? PT EVAL HIGH COMPLEXITY: 27217;PT RE-EVAL: 82308;PT THER PROC EA 15 MIN: 66724;PT THER ACT EA 15 MIN: 53126;PT MANUAL THERAPY EA 15 MIN: 11617;PT NEUROMUSC RE-EDUCATION EA 15 MIN: 46043;PT GAIT TRAINING EA 15 MIN: 07887;PT HOT OR COLD PACK TREAT MCARE;PT SELF CARE/HOME MGMT/TRAIN EA 15: 92908;PT ELECTRICAL STIM UNATTEND:   -LW     PT Plan Comments Test balance: Tinetti, add goal as indicated. Initiate HEP within tolerance: LTR,  scap retractions, chin tucks, thoracic extension and rotation. Avoid valsalva due to hernia.  -           User Key  (r) = Recorded By, (t) = Taken By, (c) = Cosigned By    Initials Name Provider Type    Carrol Farrell, PT Physical Therapist                   OP Exercises     Row Name 08/09/22 1300             Exercise 1    Exercise Name 1 Nustep  -KISHOR      Additional Comments Next session  -            User Key  (r) = Recorded By, (t) = Taken By, (c) = Cosigned By    Initials Name Provider Type    Carrol Farrell, PT Physical Therapist                              Outcome Measure Options: Modified Oswestry  Modified Oswestry  Modified Oswestry Score/Comments: 33/50=66% disability.      Time Calculation:     Start Time: 1132  Stop Time: 1228  Time Calculation (min): 56 min  Total Timed Code Minutes- PT: 0 minute(s)  Untimed Charges  PT Eval/Re-eval Minutes: 56  Total Minutes  Untimed Charges Total Minutes: 56   Total Minutes: 56     Therapy Charges for Today     Code Description Service Date Service Provider Modifiers Qty    26063497593 HC PT EVAL HIGH COMPLEXITY 4 8/9/2022 Carrol Coughlin, PT GP 1          PT G-Codes  Outcome Measure Options: Modified Oswestry  Modified Oswestry Score/Comments: 33/50=66% disability.         Carrol Coughlin PT  8/9/2022

## 2022-08-09 NOTE — TELEPHONE ENCOUNTER
Caller: Nu Martino    Relationship: Emergency Contact    Best call back number: [phone]       Any additional details:       PATIENT/SPOUSE ARE WANTING TO KNOW IF YOU WHERE GOING TO GIVE THEM A REFERRAL FOR A DR SUNG?  ALSO PATIENT/SPOUSE WANTS TO KNOW IF HE (DR SUNG) HAS ANYTHING TO DO WITH THE INFUSION.    PLEASE CALL PATIENT TO VERIFY INFO.

## 2022-08-10 ENCOUNTER — TELEPHONE (OUTPATIENT)
Dept: ORTHOPEDIC SURGERY | Facility: CLINIC | Age: 82
End: 2022-08-10

## 2022-08-10 NOTE — TELEPHONE ENCOUNTER
NTERNAL NEW PT REFERRAL FROM [facility] RANDHAWA, DX: Compression fracture of L1 lumbar vertebra, open, initial encounter, TELEMED VISIT 07/27/2022, PN 07/29/2022, PN 08/04/2022, XRAY 07/27/2022, XRAY 07/29/2022- CAN KATHY SEE SOON OR WAIT FOR [facility] TO BE SEEN?

## 2022-08-11 ENCOUNTER — TELEPHONE (OUTPATIENT)
Dept: FAMILY MEDICINE CLINIC | Facility: CLINIC | Age: 82
End: 2022-08-11

## 2022-08-11 ENCOUNTER — LAB (OUTPATIENT)
Dept: FAMILY MEDICINE CLINIC | Facility: CLINIC | Age: 82
End: 2022-08-11

## 2022-08-11 DIAGNOSIS — E87.6 HYPOKALEMIA: ICD-10-CM

## 2022-08-11 LAB
ANION GAP SERPL CALCULATED.3IONS-SCNC: 11.7 MMOL/L (ref 5–15)
BUN SERPL-MCNC: 6 MG/DL (ref 8–23)
BUN/CREAT SERPL: 10 (ref 7–25)
CALCIUM SPEC-SCNC: 9.5 MG/DL (ref 8.6–10.5)
CHLORIDE SERPL-SCNC: 88 MMOL/L (ref 98–107)
CO2 SERPL-SCNC: 26.3 MMOL/L (ref 22–29)
CREAT SERPL-MCNC: 0.6 MG/DL (ref 0.57–1)
EGFRCR SERPLBLD CKD-EPI 2021: 89.7 ML/MIN/1.73
GLUCOSE SERPL-MCNC: 105 MG/DL (ref 65–99)
MAGNESIUM SERPL-MCNC: 1.8 MG/DL (ref 1.6–2.4)
POTASSIUM SERPL-SCNC: 4 MMOL/L (ref 3.5–5.2)
SODIUM SERPL-SCNC: 126 MMOL/L (ref 136–145)

## 2022-08-11 PROCEDURE — 80048 BASIC METABOLIC PNL TOTAL CA: CPT | Performed by: INTERNAL MEDICINE

## 2022-08-11 PROCEDURE — 36415 COLL VENOUS BLD VENIPUNCTURE: CPT | Performed by: INTERNAL MEDICINE

## 2022-08-11 PROCEDURE — 83735 ASSAY OF MAGNESIUM: CPT | Performed by: INTERNAL MEDICINE

## 2022-08-11 RX ORDER — OLMESARTAN MEDOXOMIL 20 MG/1
20 TABLET ORAL DAILY
Qty: 90 TABLET | Refills: 0 | Status: SHIPPED | OUTPATIENT
Start: 2022-08-11 | End: 2022-09-07 | Stop reason: ALTCHOICE

## 2022-08-11 RX ORDER — OLMESARTAN MEDOXOMIL 20 MG/1
20 TABLET ORAL DAILY
Qty: 30 TABLET | Refills: 1 | Status: SHIPPED | OUTPATIENT
Start: 2022-08-11 | End: 2022-08-11

## 2022-08-11 NOTE — TELEPHONE ENCOUNTER
Pt dropped off old and new medication list along with bp readings for Dr. Barger to look at. Pt's  believes that since starting potassium, her bp has started running high.

## 2022-08-11 NOTE — TELEPHONE ENCOUNTER
Added 20 mg of olmesartan to treatment plan.  Need to monitor blood pressure and follow-up in 1 month.

## 2022-08-11 NOTE — TELEPHONE ENCOUNTER
Caller: ROSEY    Relationship: Home Health    Best call back number: [phone]    What orders are you requesting (i.e. lab or imaging): VERBAL ORDERS    In what timeframe would the patient need to come in: ASAP    Where will you receive your lab/imaging services: HOME    Additional notes: ASKING FOR SKILLED NURSING FOR 2 MORE WEEKS AND ADDITIONAL [facility] NURSING TO COME OUT FOR AN EVALUATION VISIT.     PLEASE CALL TO DISCUSS AND ADVISE

## 2022-08-15 ENCOUNTER — HOSPITAL ENCOUNTER (OUTPATIENT)
Dept: BONE DENSITY | Facility: HOSPITAL | Age: 82
Discharge: HOME | End: 2022-08-15
Admitting: INTERNAL MEDICINE

## 2022-08-15 PROCEDURE — 77080 DXA BONE DENSITY AXIAL: CPT

## 2022-08-16 ENCOUNTER — OFFICE VISIT (OUTPATIENT)
Dept: SURGERY | Facility: CLINIC | Age: 82
End: 2022-08-16

## 2022-08-16 VITALS — BODY MASS INDEX: 22.81 KG/M2 | HEIGHT: 60 IN | WEIGHT: 116.2 LBS

## 2022-08-16 DIAGNOSIS — K43.9 VENTRAL HERNIA WITHOUT OBSTRUCTION OR GANGRENE: Primary | ICD-10-CM

## 2022-08-16 PROCEDURE — 99203 OFFICE O/P NEW LOW 30 MIN: CPT | Performed by: STUDENT IN AN ORGANIZED HEALTH CARE EDUCATION/TRAINING PROGRAM

## 2022-08-16 RX ORDER — IBUPROFEN 800 MG/1
1 TABLET ORAL EVERY 8 HOURS PRN
COMMUNITY
Start: 2022-07-27 | End: 2022-08-16

## 2022-08-16 NOTE — PROGRESS NOTES
General Surgery History and Physical      Summary:    Ritu Martino is a 82 y.o. lady with a likely incisional hernia at her prior ileostomy site. This is causing minimal to no symptoms at this point. Discussed observation versus imaging to further delineate her anatomy. She, her  and her daughter would like to obtain a CT scan to further understand her anatomy. She also has a history of significant constipation. Discussed monitoring her bowel regimen for better symptom control.     Referring Provider: Gaurav Barger MD    Chief Complaint:    Abdominal wall hernia     History of Present Illness:    Ritu Martino is a 82 y.o. lady who presents with concerns for an abdominal wall hernia. She has minimal symptoms but her primary care physician noticed it and she wanted it further evaluated. She denies obstructing signs or symptoms. She has a long standing history of constipation. She has no abdominal pain from this. Her primary complaint is back pain.    Past Medical History:   • GERD  • HLD  • Hyponatremia  • Hypothyroidism     Past Surgical History:    • Appendectomy   • C section   • Hemorrhoidectomy   • Knee arthroscopy   • Tonsillectomy  • Sigmoidectomy with loop ileostomy   • Loop ileostomy takedown    Family History:    • Mother with brain cancer  • Brother with pancreatic cancer    Social History:    • Denies tobacco use  • Denies alcohol use    Allergies:   No Known Allergies    Medications:     Current Outpatient Medications:   •  acetaminophen (TYLENOL) 325 MG tablet, Take 2 tablets by mouth Every 6 (Six) Hours As Needed for Mild Pain ., Disp: , Rfl:   •  ALPRAZolam (XANAX) 0.25 MG tablet, Take 1 tablet by mouth 2 (Two) Times a Day. Falling risk, Disp: 180 tablet, Rfl: 1  •  Cholecalciferol (VITAMIN D) 2000 units capsule, Take  by mouth Daily With Dinner., Disp: , Rfl:   •  cycloSPORINE (RESTASIS) 0.05 % ophthalmic emulsion, 1 drop 2 (Two) Times a Day., Disp: , Rfl:   •  dilTIAZem XR  (DILACOR XR) 120 MG 24 hr capsule, Take 1 capsule by mouth Daily., Disp: 90 capsule, Rfl: 3  •  ferrous sulfate 325 (65 FE) MG tablet, Take 325 mg by mouth 2 (Two) Times a Day Before Meals., Disp: , Rfl:   •  ibuprofen (ADVIL,MOTRIN) 800 MG tablet, Take 1 tablet by mouth Every 8 (Eight) Hours As Needed for Mild Pain ., Disp: 90 tablet, Rfl: 0  •  levETIRAcetam (KEPPRA) 500 MG tablet, Take 1 tablet by mouth 2 (Two) Times a Day., Disp: 180 tablet, Rfl: 0  •  levothyroxine (SYNTHROID, LEVOTHROID) 50 MCG tablet, Take 50 mcg by mouth Daily., Disp: , Rfl:   •  olmesartan (BENICAR) 20 MG tablet, TAKE 1 TABLET BY MOUTH DAILY, Disp: 90 tablet, Rfl: 0  •  potassium chloride 10 MEQ CR tablet, Take 1 tablet by mouth 2 (Two) Times a Day., Disp: 60 tablet, Rfl: 3  •  Probiotic Product (ALIGN PO), Take 1 capsule by mouth Daily With Lunch., Disp: , Rfl:   •  Prucalopride Succinate (Motegrity) 1 MG tablet, Take 1 mg by mouth Daily., Disp: 90 tablet, Rfl: 3  •  rosuvastatin (CRESTOR) 20 MG tablet, Take 20 mg by mouth Every Night., Disp: , Rfl:   •  SODIUM CHLORIDE PO, Take  by mouth 3 (Three) Times a Day. 2 TABLETS 3 6TIMES A DAY, Disp: , Rfl:   •  vitamin B-12 (CYANOCOBALAMIN) 100 MCG tablet, Take 50 mcg by mouth Daily., Disp: , Rfl:   •  zolpidem (AMBIEN) 10 MG tablet, Take 1 tablet by mouth At Night As Needed for Sleep., Disp: 90 tablet, Rfl: 1    Radiology/Endoscopy:    • No recent abdominal imaging    Labs:    • Labs from 7/2022 reviewed    Review of Systems:   Influenza-like illness: no fever, no  cough, no  sore throat, no  body aches, no loss of sense of taste or smell, no known exposure to person with Covid-19.  Constitutional: Negative for fevers or chills  HENT: Negative for hearing loss or runny nose  Eyes: Negative for vision changes or scleral icterus  Respiratory: Negative for cough or shortness of breath  Cardiovascular: Negative for chest pain or heart palpitations  Gastrointestinal: Negative for abdominal pain,  nausea, vomiting, constipation, melena, or hematochezia  Genitourinary: Negative for hematuria or dysuria  Musculoskeletal: Negative for joint swelling or gait instability  Neurologic: Negative for tremors or seizures  Psychiatric: Negative for suicidal ideations or depression  All other systems reviewed and negative    Physical Exam:   • Constitutional: Well-developed, well-nourished, no acute distress, BMI 22, ambulating with a walker   • Eyes: Conjunctiva normal, sclera nonicteric  • ENMT: Hearing grossly normal, oral mucosa moist  • Neck: Supple, trachea midline  • Respiratory: No increased work of breathing, normal inspiratory effort  • Cardiovascular: Regular rate, no peripheral edema, no jugular venous distention  • Gastrointestinal: Soft, nontender, multiple prior surgical incisions, large bulge of the abdominal wall near prior ileostomy site  • Skin:  Warm, dry, no rash on visualized skin surfaces  • Musculoskeletal: Symmetric strength, normal gait  • Psychiatric: Alert and oriented ×3, normal affect       Nita Rivas M.D.  General and Endoscopic Surgery  [facility]    [address], [address]  [place], [place], [facility]  P: [phone]  F: [phone]

## 2022-08-17 ENCOUNTER — TELEPHONE (OUTPATIENT)
Dept: FAMILY MEDICINE CLINIC | Facility: CLINIC | Age: 82
End: 2022-08-17

## 2022-08-17 ENCOUNTER — OFFICE VISIT (OUTPATIENT)
Dept: ORTHOPEDIC SURGERY | Facility: CLINIC | Age: 82
End: 2022-08-17

## 2022-08-17 VITALS — HEIGHT: 60 IN | WEIGHT: 116 LBS | TEMPERATURE: 97.8 F | BODY MASS INDEX: 22.78 KG/M2

## 2022-08-17 DIAGNOSIS — R52 PAIN: Primary | ICD-10-CM

## 2022-08-17 DIAGNOSIS — F40.232 FEAR OF OTHER MEDICAL CARE: Primary | ICD-10-CM

## 2022-08-17 DIAGNOSIS — F41.9 ANXIETY: ICD-10-CM

## 2022-08-17 PROCEDURE — 72100 X-RAY EXAM L-S SPINE 2/3 VWS: CPT | Performed by: NURSE PRACTITIONER

## 2022-08-17 PROCEDURE — 99213 OFFICE O/P EST LOW 20 MIN: CPT | Performed by: NURSE PRACTITIONER

## 2022-08-17 RX ORDER — DESVENLAFAXINE 25 MG/1
25 TABLET, EXTENDED RELEASE ORAL DAILY
Qty: 30 TABLET | Refills: 3 | Status: SHIPPED | OUTPATIENT
Start: 2022-08-17 | End: 2022-08-17 | Stop reason: DRUGHIGH

## 2022-08-17 RX ORDER — DIAZEPAM 5 MG/1
5 TABLET ORAL ONCE AS NEEDED
Qty: 1 TABLET | Refills: 0 | Status: SHIPPED | OUTPATIENT
Start: 2022-08-17 | End: 2022-10-27

## 2022-08-17 RX ORDER — DESVENLAFAXINE SUCCINATE 50 MG/1
50 TABLET, EXTENDED RELEASE ORAL DAILY
Qty: 30 TABLET | Refills: 3 | Status: SHIPPED | OUTPATIENT
Start: 2022-08-17 | End: 2022-09-27 | Stop reason: SDUPTHER

## 2022-08-17 NOTE — TELEPHONE ENCOUNTER
Spoke with Marielle- has been on 25 mg daily for 3 weeks, it has helped but would like to increase to 50

## 2022-08-17 NOTE — PROGRESS NOTES
Patient Name: Ritu Martino   YOB: 1940  Referring Primary Care Physician: Gaurav Barger MD      Chief Complaint:    Chief Complaint   Patient presents with   • Lumbar Spine - Establish Care, Pain        HPI:  Ritu Martino is a 82 y.o. female who [facility] for evaluation of back pain.  She is new to the office and new to me.  She was referred for evaluation of L1 fracture.  Her  accompanies her today and gives a lengthy history since May 7, 2022 of multiple medical issues.  This began with a fall in her home which he believes was related to a seizure.  She did not have seizures prior but may have had more since that time and is currently on Keppra and has follow-up with neurology next week.  He says prior to that event she was completely self-sufficient and very active but since that time has been in and out of hospitals at least 4 times.  She presents today in a boot on her left foot after suffering injury from the fall.  They also report that her cognition has been in a declining state since the initial hospitalization in May.  Regards to back pain, she describes this in the parascapular region.  She has been trying lidocaine patches, heat, ice and Tylenol without significant relief.    PFSH:  See attached    ROS: As per HPI, otherwise negative    Objective:    Vitals:    08/17/22 1342   Temp: 97.8 °F (36.6 °C)     Body mass index is 22.65 kg/m².      Physical Exam  Spine Musculoskeletal Exam      IMAGING:     Indication: pain related symptoms,  Views: 2V AP&LAT lumbar  Findings: Personally reviewed and reveals normal lordosis, mild degenerative change, superior endplate compression of L1.  Comparison views: No direct lumbar films available, but chest x-ray June 2022 was reviewed and it appears there may have been some compression of the L1 body at that point although difficult to localize.  Bone scan 8/15/2022 suggest  osteopenia  Assessment:           Diagnoses and all orders for this visit:    1. Pain (Primary)  -     XR Spine Lumbar AP & Lateral             Plan:  Although she certainly has the L1 fracture, I do believe this is probably at least 2 months old when looking at the chest x-ray in June.  Discussed TLSO brace, but I believe this would be more cumbersome and she agrees.  She does not have back pain in the upper lumbar spine nor does she have pain upon deep palpation of the area.  Her pain is described more between the scapula.  She has pain upon palpation of the parascapular musculature, no midline pain.  For what sounds like exacerbation of chronic back pain, we discussed referral to physical therapy.  Her  does report that she has already had initial evaluation as prescribed by Dr. Barger.  We discussed referral to pain management, however given the report of cognitive decline, would not recommend any oral narcotics.  She was advised to discuss the use of Keppra with neurology as this may be playing a role in her cognitive state.  Bone scan revealed osteopenia, she is already on supplemental D3. We will have her follow-up in 1 month to repeat imaging to ensure she is not developing kyphosis.  She was advised to work on her posture and this can be reiterated with physical therapy.    Return in about 4 weeks (around 9/14/2022).    The diagnosis(es), natural history, pathophysiology and treatment for diagnosis(es) were discussed. Opportunity given and questions answered. Biomechanics of pertinent body areas discussed.  When appropriate, the use of ambulatory aids discussed.  EXERCISES:  Advice on benefits of, and types of regular/moderate exercise pertaining to diagnosis.  Continue HEP.  MEDICATIONS:  The risks, benefits, warnings,side effects and alternatives of medications discussed. Advised against long term use of narcotics.   PAIN CONTROL:  Cold, heat, elevation and/or liniments discussed as appropriate.    MEDICAL RECORDS reviewed from other provider(s) for past and current medical history pertinent to this visit..

## 2022-08-18 ENCOUNTER — TELEPHONE (OUTPATIENT)
Dept: FAMILY MEDICINE CLINIC | Facility: CLINIC | Age: 82
End: 2022-08-18

## 2022-08-18 NOTE — TELEPHONE ENCOUNTER
SYED WITH [employer] IS CALLING TO LET DR. RANDHAWA KNOW SHE IS NOT GOING TO PUT PT ON HER CASELOAD AT THIS TIME. SHE IS GOING TO SEE HOW THINGS PAN OUT WITH THE RECENT INCREASE IN MEDS AND SEEING THE NEUROLOGIST NEXT WEEK.

## 2022-08-19 ENCOUNTER — TELEPHONE (OUTPATIENT)
Dept: ORTHOPEDIC SURGERY | Facility: CLINIC | Age: 82
End: 2022-08-19

## 2022-08-19 ENCOUNTER — TELEPHONE (OUTPATIENT)
Dept: FAMILY MEDICINE CLINIC | Facility: CLINIC | Age: 82
End: 2022-08-19

## 2022-08-19 ENCOUNTER — DOCUMENTATION (OUTPATIENT)
Dept: FAMILY MEDICINE CLINIC | Facility: CLINIC | Age: 82
End: 2022-08-19

## 2022-08-19 NOTE — TELEPHONE ENCOUNTER
Caller: PHIL    Relationship: JIMMIE    Best call back number:     What is the best time to reach you:     Who are you requesting to speak with (clinical staff, provider,  specific staff member):     Do you know the name of the person who called:     What was the call regarding: PHIL IS CALLING IN TO INFORM DR RANDHAWA THAT THE PATIENT IS BEING DISCHARGED TODAY DUE TO INSURANCE NOT WANTING TO PAY.     Do you require a callback: IF NEEDED           5

## 2022-08-19 NOTE — PROGRESS NOTES
8/19/2022 9:28 AM    Patient is very constipated and is using Motegrity daily.  Patient has not tried MiraLAX and was advised to use it with the Motegrity.  Patient was advised to adjust the MiraLAX dose to get her going and then adjusted appropriately.  Patient was advised to keep hydrated and to exercise.

## 2022-08-19 NOTE — TELEPHONE ENCOUNTER
Caller: ROSEY    Relationship: [employer]    Best call back number: [phone]    Who are you requesting to speak with (clinical staff, provider,  specific staff member): DR DANA RANDHAWA, [place]    What was the call regarding: ROSEY WITH [facility] CALLED TO REPORT THAT PATIENT STATES THEIR CURRENT BACK PAIN IS AN 8    Do you require a callback: NO

## 2022-08-19 NOTE — TELEPHONE ENCOUNTER
Caller: Nu Martino    Relationship to patient: Emergency Contact    Best call back number: [phone]    Patient is needing: PATIENT HUSBANDS STATES THAT SHE HAS BEEN CONSTIPATED FOR 3 TO 4 DAYS NOW AND WOULD LIKE TO KNOW IF SOMETHING CAN BE CALLED IN        [id number] [place], [place] - [address] AT [facility] OF [address] &  - [phone]  - [phone] FX

## 2022-08-22 ENCOUNTER — TELEPHONE (OUTPATIENT)
Dept: FAMILY MEDICINE CLINIC | Facility: CLINIC | Age: 82
End: 2022-08-22

## 2022-08-22 DIAGNOSIS — G89.29 CHRONIC MIDLINE THORACIC BACK PAIN: ICD-10-CM

## 2022-08-22 DIAGNOSIS — M54.6 CHRONIC MIDLINE THORACIC BACK PAIN: ICD-10-CM

## 2022-08-22 DIAGNOSIS — M54.2 PAIN OF CERVICAL SPINE: ICD-10-CM

## 2022-08-22 RX ORDER — IBUPROFEN 800 MG/1
800 TABLET ORAL EVERY 8 HOURS PRN
Qty: 90 TABLET | Refills: 0 | Status: SHIPPED | OUTPATIENT
Start: 2022-08-22 | End: 2022-09-27 | Stop reason: SDUPTHER

## 2022-08-22 NOTE — TELEPHONE ENCOUNTER
Caller: Nu Martino    Relationship: Emergency Contact    Best call back number: [phone]    Requested Prescriptions:   Requested Prescriptions     Pending Prescriptions Disp Refills   • ibuprofen (ADVIL,MOTRIN) 800 MG tablet 90 tablet 0     Sig: Take 1 tablet by mouth Every 8 (Eight) Hours As Needed for Mild Pain .        Pharmacy where request should be sent: [facility] #[id number] [place], [place] - [facility]  AT [facility] &  - [phone] [facility] [phone] FX     Additional details provided by patient: PATIENT IS NEEDING A REFILL ON THIS MEDICATION    Does the patient have less than a 3 day supply:  [] Yes  [x] No    Mj Vanessa Rep   08/22/22 13:43 EDT

## 2022-08-22 NOTE — TELEPHONE ENCOUNTER
Caller: Sally Martinon    Relationship: Emergency Contact    Best call back number: [phone]    What medication are you requesting: SOMETHING STRONGER FOR PATIENT'S BACK PAIN.     What are your current symptoms: SEVERE MUSCULAR BACK PAIN    How long have you been experiencing symptoms: LAST 2 MONTHS    Have you had these symptoms before:    [x] Yes  [] No    Have you been treated for these symptoms before:   [x] Yes  [] No    If a prescription is needed, what is your preferred pharmacy and phone number: [facility] #[id number] - [place], [place] - [address]  AT [facility]) &  - [phone] [facility] [phone] FX     Additional notes: PATIENT WAS PRESCRIBED ibuprofen (ADVIL,MOTRIN) 800 MG tablet, ON  HER  OFFICE VISIT WITH MARCI AUSTIN ON 07/27/22. THIS MEDICATION IS NOT WORKING FOR HER PAIN. THE MEDICATION DOESN'T SEEM TO BE HELPING THAT MUCH. PATIENT STILL COMPLAINS OF SEVERE PAIN.     PATIENT IS ASKING FOR SOMETHING STRONGER TO BE CALLED IN IF POSSIBLE. PLEASE CALL PATIENT TO DISCUSS AND ADVISE ON THIS MATTER.

## 2022-08-23 RX ORDER — TRAMADOL HYDROCHLORIDE 50 MG/1
50 TABLET ORAL EVERY 8 HOURS PRN
Qty: 90 TABLET | Refills: 3 | Status: SHIPPED | OUTPATIENT
Start: 2022-08-23 | End: 2022-09-20 | Stop reason: ALTCHOICE

## 2022-08-23 NOTE — TELEPHONE ENCOUNTER
Called in tramadol recommend 1 tramadol and 2 Tylenol 3 times a day.  We will have to come in for a contract and be seen every 4 months.  If patient wants something different she will have to see a pain clinic.

## 2022-08-24 ENCOUNTER — OFFICE VISIT (OUTPATIENT)
Dept: NEUROLOGY | Facility: CLINIC | Age: 82
End: 2022-08-24

## 2022-08-24 VITALS
SYSTOLIC BLOOD PRESSURE: 140 MMHG | HEART RATE: 86 BPM | BODY MASS INDEX: 22.78 KG/M2 | OXYGEN SATURATION: 100 % | WEIGHT: 116 LBS | HEIGHT: 60 IN | DIASTOLIC BLOOD PRESSURE: 80 MMHG

## 2022-08-24 DIAGNOSIS — F41.9 ANXIETY: ICD-10-CM

## 2022-08-24 DIAGNOSIS — E87.1 HYPONATREMIA: ICD-10-CM

## 2022-08-24 DIAGNOSIS — F05 POSTICTAL CONFUSION: ICD-10-CM

## 2022-08-24 DIAGNOSIS — R55 SYNCOPE AND COLLAPSE: Primary | ICD-10-CM

## 2022-08-24 DIAGNOSIS — R40.4 TRANSIENT ALTERATION OF AWARENESS: ICD-10-CM

## 2022-08-24 DIAGNOSIS — R56.9 SEIZURE: ICD-10-CM

## 2022-08-24 PROCEDURE — 99214 OFFICE O/P EST MOD 30 MIN: CPT | Performed by: PHYSICIAN ASSISTANT

## 2022-08-24 RX ORDER — LACOSAMIDE 100 MG/1
100 TABLET ORAL EVERY 12 HOURS SCHEDULED
Qty: 60 TABLET | Refills: 3 | Status: CANCELLED | OUTPATIENT
Start: 2022-08-24

## 2022-08-31 ENCOUNTER — HOSPITAL ENCOUNTER (OUTPATIENT)
Dept: CT IMAGING | Facility: HOSPITAL | Age: 82
Discharge: HOME | End: 2022-08-31
Admitting: STUDENT IN AN ORGANIZED HEALTH CARE EDUCATION/TRAINING PROGRAM

## 2022-08-31 DIAGNOSIS — K43.9 VENTRAL HERNIA WITHOUT OBSTRUCTION OR GANGRENE: ICD-10-CM

## 2022-08-31 PROCEDURE — 74176 CT ABD & PELVIS W/O CONTRAST: CPT

## 2022-09-01 ENCOUNTER — TELEPHONE (OUTPATIENT)
Dept: FAMILY MEDICINE CLINIC | Facility: CLINIC | Age: 82
End: 2022-09-01

## 2022-09-01 PROBLEM — S22.080A COMPRESSION FRACTURE OF T12 VERTEBRA: Status: ACTIVE | Noted: 2022-09-01

## 2022-09-01 NOTE — TELEPHONE ENCOUNTER
Informed Nu, she saw dr raissa bright and has follow up on 9/21 was told this was an old fracture  Has PT appt next thursday

## 2022-09-02 ENCOUNTER — OFFICE (OUTPATIENT)
Dept: URBAN - METROPOLITAN AREA CLINIC 75 | Facility: CLINIC | Age: 82
End: 2022-09-02

## 2022-09-02 VITALS
HEART RATE: 109 BPM | SYSTOLIC BLOOD PRESSURE: 142 MMHG | DIASTOLIC BLOOD PRESSURE: 78 MMHG | HEIGHT: 64 IN | WEIGHT: 113 LBS | OXYGEN SATURATION: 99 %

## 2022-09-02 DIAGNOSIS — K57.92 DIVERTICULITIS OF INTESTINE, PART UNSPECIFIED, WITHOUT PERFO: ICD-10-CM

## 2022-09-02 DIAGNOSIS — K59.04 CHRONIC IDIOPATHIC CONSTIPATION: ICD-10-CM

## 2022-09-02 DIAGNOSIS — R19.4 CHANGE IN BOWEL HABIT: ICD-10-CM

## 2022-09-02 DIAGNOSIS — R10.31 RIGHT LOWER QUADRANT PAIN: ICD-10-CM

## 2022-09-02 PROCEDURE — 99214 OFFICE O/P EST MOD 30 MIN: CPT | Performed by: NURSE PRACTITIONER

## 2022-09-02 RX ORDER — PRUCALOPRIDE 2 MG/1
2 TABLET, FILM COATED ORAL
Qty: 30 | Refills: 11 | Status: COMPLETED
Start: 2022-04-25 | End: 2023-04-14

## 2022-09-06 ENCOUNTER — TELEPHONE (OUTPATIENT)
Dept: ORTHOPEDIC SURGERY | Facility: CLINIC | Age: 82
End: 2022-09-06

## 2022-09-06 ENCOUNTER — TELEPHONE (OUTPATIENT)
Dept: FAMILY MEDICINE CLINIC | Facility: CLINIC | Age: 82
End: 2022-09-06

## 2022-09-06 ENCOUNTER — TELEPHONE (OUTPATIENT)
Dept: SURGERY | Facility: CLINIC | Age: 82
End: 2022-09-06

## 2022-09-06 RX ORDER — VALSARTAN 160 MG/1
160 TABLET ORAL DAILY
Qty: 90 TABLET | Refills: 1 | Status: SHIPPED | OUTPATIENT
Start: 2022-09-06 | End: 2022-09-27 | Stop reason: SDUPTHER

## 2022-09-06 NOTE — TELEPHONE ENCOUNTER
Recommend back specialist for kyphoplasty, you want to order Prolia shots, called in valsartan to take with the diltiazem

## 2022-09-06 NOTE — TELEPHONE ENCOUNTER
Provider: ALIA  Caller: NAINA SCHILLING  Relationship to Patient: SPOUSE  Pharmacy: N/A  Phone Number: [phone]    Reason for Call: PATIENT HAD XRAY 9.1.22 AT [place] WHICH SHOWED A T-12 COMPRESSION FRACTURE, NEW SINCE PRIOR XRAY 7.29.22  PATIENT SPOUSE WOULD LIKE KATHY TO LOOK AT NEW XRAY AND PHYSICIAN'S EVALUATION  AND ADVISE IF THEY SHOULD STILL KEEP 9.21.22 APPT  PATIENT FIRST PHYSICAL THERAPY APPT IS SCHEDULED 9.8.22  PATIENT SPOUSE WANTED TO MAKE CERTAIN KATHY HAS THIS INFO PRIOR TO NEXT APPT    When was the patient last seen: 8.17.22

## 2022-09-06 NOTE — TELEPHONE ENCOUNTER
MR TONIE CALLED WITH QUESTIONS FOR DR RANDHAWA, WITH XRAY ON T12 IS THERE ANYTHING THAT CAN BE DONE? IS IT OK TO START PHYSICAL THERAPY Thursday? PROLIA SHOT WHO AND WHEN IS THAT DONE? AND REPORTS BP READINGS FOR LAST 7 DAYS STATING PT HAS NOT BEEN FEELING WELL IN THE BED TODAY WITH NO ENERGY 176/92 82 176/100 96 170/92 97 161/87 105 131/83 98 142/78 155/90

## 2022-09-07 ENCOUNTER — TELEPHONE (OUTPATIENT)
Dept: SURGERY | Facility: CLINIC | Age: 82
End: 2022-09-07

## 2022-09-07 NOTE — TELEPHONE ENCOUNTER
Called her daughter to discuss CT scan results. Voicemail left. Will discuss further when she calls back.     Nita Rivas MD

## 2022-09-07 NOTE — TELEPHONE ENCOUNTER
So she should be on benicar,ditalizem and valsartan?  And you recommend orthosurgeon? Prolia shot you put in and Mormon will set it up

## 2022-09-07 NOTE — TELEPHONE ENCOUNTER
Patient  informed stop benicar start valsartan and ditalizem monitor. Seeing orthopedicsursimon Pinedo and asking her re: prolia shot and not to do therapy yet

## 2022-09-07 NOTE — TELEPHONE ENCOUNTER
No chest diltiazem and valsartan only and yes I can set up Prolia shots.  What is she seeing the orthopedic surgeon?  And do you want Prolia shots ordered.

## 2022-09-07 NOTE — TELEPHONE ENCOUNTER
Patient returned call. She would like for you to take a look at the Abdomin/Pelvis CT done on 8/31 and call back to discuss

## 2022-09-08 ENCOUNTER — APPOINTMENT (OUTPATIENT)
Dept: PHYSICAL THERAPY | Facility: HOSPITAL | Age: 82
End: 2022-09-08

## 2022-09-12 ENCOUNTER — TELEPHONE (OUTPATIENT)
Dept: NEUROLOGY | Facility: CLINIC | Age: 82
End: 2022-09-12

## 2022-09-12 NOTE — TELEPHONE ENCOUNTER
Called patient 9/7 & 9/12/22. She is not having any worsening back pain, has not complained of back pain in the past 2 days. Will assess in office 9/14/22

## 2022-09-12 NOTE — TELEPHONE ENCOUNTER
Caller: Nu Martino    Relationship: Emergency Contact; SPOUSE    Best call back number: [phone]    What was the call regarding: PT'S  ASKS IF REF[facility] AS THIS WOULD BE MUCH CLOSER TO PT'S HOME. HE ASKS IF OFFICE KNOWS IF [facility] PT'S INSURANCE AS WELL AS IF THEY WOULD BE ABLE TO SEE PT SOONER THAN ADRIAN.    PT'S  ASKS THAT SOMEONE CALL HIM BACK WITH EXPLANATION AS TO WHAT THE NEUROPSYCH EVALUATION ENTAILS.    Do you require a callback: YES, PLEASE.    PLEASE REVIEW AND ADVISE.

## 2022-09-14 ENCOUNTER — TELEPHONE (OUTPATIENT)
Dept: ORTHOPEDIC SURGERY | Facility: CLINIC | Age: 82
End: 2022-09-14

## 2022-09-14 ENCOUNTER — OFFICE VISIT (OUTPATIENT)
Dept: ORTHOPEDIC SURGERY | Facility: CLINIC | Age: 82
End: 2022-09-14

## 2022-09-14 VITALS — HEIGHT: 60 IN | BODY MASS INDEX: 21.24 KG/M2 | WEIGHT: 108.2 LBS | TEMPERATURE: 96.4 F

## 2022-09-14 DIAGNOSIS — R52 PAIN: ICD-10-CM

## 2022-09-14 DIAGNOSIS — S22.088A OTHER CLOSED FRACTURE OF TWELFTH THORACIC VERTEBRA, INITIAL ENCOUNTER: Primary | ICD-10-CM

## 2022-09-14 DIAGNOSIS — S32.010D CLOSED COMPRESSION FRACTURE OF L1 LUMBAR VERTEBRA, WITH ROUTINE HEALING, SUBSEQUENT ENCOUNTER: ICD-10-CM

## 2022-09-14 PROCEDURE — 72100 X-RAY EXAM L-S SPINE 2/3 VWS: CPT | Performed by: NURSE PRACTITIONER

## 2022-09-14 PROCEDURE — 99213 OFFICE O/P EST LOW 20 MIN: CPT | Performed by: NURSE PRACTITIONER

## 2022-09-14 RX ORDER — PRUCALOPRIDE 2 MG/1
1 TABLET, FILM COATED ORAL DAILY
COMMUNITY
Start: 2022-09-07 | End: 2022-10-03 | Stop reason: DRUGHIGH

## 2022-09-14 RX ORDER — LACOSAMIDE 100 MG/1
100 TABLET ORAL 2 TIMES DAILY
COMMUNITY
Start: 2022-08-26 | End: 2022-09-27 | Stop reason: SDUPTHER

## 2022-09-14 RX ORDER — OLMESARTAN MEDOXOMIL 20 MG/1
20 TABLET ORAL
COMMUNITY
Start: 2022-08-11 | End: 2022-09-14

## 2022-09-14 NOTE — TELEPHONE ENCOUNTER
Hub staff attempted to follow warm transfer process and was unsuccessful     Caller: NAINA SCHILLING    Relationship to patient: SPOUSE (ON [facility])    Best call back number: [phone]    Patient is needing: RETURNING A CALL TO JOSSELYN ABOUT PT'S APPT THIS AFTERNOON, WILL BE THERE AT 3:15 FOR APPT.

## 2022-09-14 NOTE — PROGRESS NOTES
Patient Name: Ritu Martino   YOB: 1940  Referring Primary Care Physician: Gaurav Barger MD      Chief Complaint:    Chief Complaint   Patient presents with   • Lumbar Spine - Follow-up, Pain   • Thoracic Spine - Pain        HPI:  Ritu Martino is a 82 y.o. female who presents to [facility] for evaluation of new T12 vertebral fracture.  She was seen last month for L1 lumbar fracture.  She did not have exquisite pain on tenderness of the lumbar spine at that time so the plan was to proceed with physical therapy as ordered by Dr. Barger and see her back with repeat films.  She had a CT since last visit which revealed a new T12 fracture which was not evident on prior imaging.  Once again she is not exquisitely tender to palpation of the thoracic or lumbar spine, however continues to complain of her chronic low back pain.  Her  and daughter accompanying her today.  She continues to deny any lower extremity pain, bowel or bladder dysfunction or saddle anesthesia.    PFSH:  See attached    ROS: As per HPI, otherwise negative    Objective:    Vitals:    09/14/22 1542   Temp: 96.4 °F (35.8 °C)     Body mass index is 21.13 kg/m².      Physical Exam  Vitals reviewed.   Constitutional:       Appearance: She is well-developed and well-nourished.   Eyes:      General: No scleral icterus.  Skin:     General: Skin is intact.   Neurological:      Mental Status: She is alert.   Psychiatric:         Mood and Affect: Mood and affect normal.       Spine Musculoskeletal Exam    General        Constitutional: well-developed and well-nourished    Scleral icterus: no    Labored breathing: no    Psychiatric: normal mood and affect and no acute distress    Neurological: alert    Skin: intact    Gait      Assistive device: walker    Inspection      Kyphosis: thoracic kyphosis    Palpation      Thoracolumbar      Lower extremity tenderness: Mild pain in bilateral  paraspinal lumbar region.        IMAGING:     Indication: pain related symptoms,  Views: 2V AP&LAT thoracolumbar  Findings: When compared to 8/17/2022, there is a new compression deformity at the superior T12 vertebral body with about 20% loss of height.  L1 compression deformity has not changed.    Assessment:           Diagnoses and all orders for this visit:    1. Pain (Primary)  -     XR Spine Lumbar AP & Lateral             Plan:  At last visit, since she was not having exquisite low back pain, we decided against the TLSO brace as it was felt it would be more cumbersome than beneficial.  Due to the new fracture at T12, we once again discussed the risk of kyphosis and worsening compression so TLSO was recommended.  1 was brought into the room for the patient and her family to visualize.  The patient decided against a TLSO brace and verbalizes understanding of the consequences in the presence of her  and daughter who both asked multiple questions.  She is still does not have any radicular complaints or bowel or bladder dysfunction and says she has multiple ongoing medical issues, we will not burden her at this time with a thoracic MRI, but they understand if she develops sudden worsening of pain or any lower extremity symptoms, we may need 1 in the near future.  Her back pain does seem chronic in nature and she does have access to tramadol through Dr. Barger.  Apparently she has only taken 3 since last visit and does states she is somewhat leery due to her lengthy discussion of risk of falling due to concurrent use of Ambien and Xanax.  We discussed today that as long as she does not experience side effects and does not take them within close proximity to each other, she should utilize the available medication for pain control when needed but advised her  to be aware anytime she takes the tramadol.  We also discussed treatment for the osteopenia once again.  This will be at the discretion of   Charbel, but she is currently on vitamin D.  I do not see that she is on supplemental calcium but there may be a reason for that.  They state they will call Dr. Barger for follow-up as they do also want to discuss available services for home care.  They were advised to hold off on the physical therapy for now secondary to the more acute T12 fracture but we will initiate that likely at next visit.  We also discussed referral to pain management but due to multiple appointments we will hold off at this point.  For now we will see her back in 4 weeks with repeat films for comparison of the thoracolumbar region.    Return in about 4 weeks (around 10/12/2022).    The diagnosis(es), natural history, pathophysiology and treatment for diagnosis(es) were discussed. Opportunity given and questions answered. Biomechanics of pertinent body areas discussed.  When appropriate, the use of ambulatory aids discussed.  EXERCISES:  Advice on benefits of, and types of regular/moderate exercise pertaining to diagnosis.  Continue HEP, continue to walk frequently.  MEDICATIONS:   No medications prescribed today.  Once again discussed risk of polypharmacy.  PAIN CONTROL:  Cold, heat, elevation and/or OTC lidocaine patches or ointments as needed.  MEDICAL RECORDS reviewed from other provider(s) for past and current medical history pertinent to this visit..

## 2022-09-19 ENCOUNTER — TELEPHONE (OUTPATIENT)
Dept: FAMILY MEDICINE CLINIC | Facility: CLINIC | Age: 82
End: 2022-09-19

## 2022-09-19 ENCOUNTER — TELEPHONE (OUTPATIENT)
Dept: NEUROLOGY | Facility: CLINIC | Age: 82
End: 2022-09-19

## 2022-09-19 RX ORDER — HYDRALAZINE HYDROCHLORIDE 10 MG/1
TABLET, FILM COATED ORAL
Qty: 30 TABLET | Refills: 3 | Status: ON HOLD | OUTPATIENT
Start: 2022-09-19 | End: 2022-10-31 | Stop reason: SDUPTHER

## 2022-09-19 NOTE — TELEPHONE ENCOUNTER
Provider: KEVEN CHILDERS PA    Caller: ELLA     Relationship to Patient: [employer]     Phone Number: [phone]      Reason for Call: STATE PT CALLED THEM REGARDING THE COMPOUND RX.  STATED THEY HAVE NOT RECEIVED THAT SCRIPT.      PLEASE CALL& ADVISE

## 2022-09-19 NOTE — TELEPHONE ENCOUNTER
I called the patient was called and all the places of testing with phone number given to her    states that he will call me back to let me know who he wants to see with his wife as soon as she has an appointment ,Abiola MOSQUERA

## 2022-09-19 NOTE — TELEPHONE ENCOUNTER
Sent in hydralazine 10 mg 3 times a day as needed if systolic pressure greater equal to 150.  Monitor blood pressure and follow-up in 6 weeks

## 2022-09-20 ENCOUNTER — TELEPHONE (OUTPATIENT)
Dept: FAMILY MEDICINE CLINIC | Facility: CLINIC | Age: 82
End: 2022-09-20

## 2022-09-20 DIAGNOSIS — S32.010B COMPRESSION FRACTURE OF L1 LUMBAR VERTEBRA, OPEN, INITIAL ENCOUNTER: ICD-10-CM

## 2022-09-20 DIAGNOSIS — S32.010B COMPRESSION FRACTURE OF L1 LUMBAR VERTEBRA, OPEN, INITIAL ENCOUNTER: Primary | ICD-10-CM

## 2022-09-20 DIAGNOSIS — S22.080A COMPRESSION FRACTURE OF T12 VERTEBRA, INITIAL ENCOUNTER: ICD-10-CM

## 2022-09-20 DIAGNOSIS — S22.080A COMPRESSION FRACTURE OF T12 VERTEBRA, INITIAL ENCOUNTER: Primary | ICD-10-CM

## 2022-09-20 RX ORDER — HYDROCODONE BITARTRATE AND ACETAMINOPHEN 10; 325 MG/1; MG/1
1 TABLET ORAL EVERY 8 HOURS PRN
Qty: 90 TABLET | Refills: 0 | Status: ON HOLD | OUTPATIENT
Start: 2022-09-20 | End: 2022-11-02 | Stop reason: SDUPTHER

## 2022-09-20 NOTE — TELEPHONE ENCOUNTER
I have given her hydrocodone 10/325, need to stop the tramadol.  I gave her 3 a day that is my limit, strongly recommend pain clinic to help her control the pain.  She will have to come in every 4 months and do a urine drug screen and sign another contract.  I cannot go up on this medicine anymore.

## 2022-09-20 NOTE — TELEPHONE ENCOUNTER
BP REPORT PER WARNER   9/20- 145/90  9/19- 168/98  9/18- 160/94  9/17- 149/75   9/16- 149/74  9/15- 157/95  9/14- 152/78  9/13- 151/85  9/12- 168/98  9/11- 162/98    PT DAUGHTER IS REQUESTING A CALL BACK TODAY

## 2022-09-20 NOTE — TELEPHONE ENCOUNTER
Pt is in severe pain from fracture spine, the tramadol is not touching the pain. They understand the risk of her taking stronger pain medication but she cannot continue with pain like this. They will be very careful and always someone with her. The ortho said they have to come to you for pain medication

## 2022-09-20 NOTE — TELEPHONE ENCOUNTER
Tried to call patient at 9:00 and got no answer.  Left message on was seen.  Place her on hydralazine 10 mg 3 times a day as needed for systolic blood pressure above 150.  Patient needs to monitor blood pressure and follow-up in 6 weeks.

## 2022-09-22 NOTE — PROGRESS NOTES
Subjective   History of Present Illness: Ritu Martino is a 82 y.o. female is here today for follow-up on meningioma. MRI benton was done on 9/15/22 at [facility].  She is doing well today.  No new complaints.  Denies any changes in the frequency or severity of her headaches.  Denies any changes in vision.  Denies any strokelike episodes.  Denies any changes in strength or sensation.  She is with her  and daughter today.  They suspect that she has dementia and it has been progressing over the past year.  They have indicated that she would not be interested in any surgical procedures such as a craniotomy for resection of this tumor even if it were to grow.      History of Present Illness    The following portions of the patient's history were reviewed and updated as appropriate: allergies, past family history, past medical history, past social history, past surgical history and problem list.    Past Medical History:   Diagnosis Date   • Anemia    • Anxiety    • Arthritis    • Bowel dysfunction 2021    since having surgery for diverticular infection   • Chronic constipation    • Diverticulitis 2021    w/ rupture and infection required surgery and ostomy   • Essential hypertension, benign    • Fracture 2022    left ankle   • Fracture, tibia and fibula Now wearing bood   • GERD (gastroesophageal reflux disease)    • Hernia, hiatal    • Hypercholesterolemia    • Hypokalemia    • Hyponatremia     chronic low with occasional normal level   • Hypothyroidism     estimated time frame pt nor  could remember exactly how long has been on medication   • Insomnia    • Iron deficiency    • Seizures (HCC)    • Tear of meniscus of knee         Past Surgical History:   Procedure Laterality Date   • APPENDECTOMY     • BACK SURGERY     •  SECTION     • COLON SURGERY  2021    to address ruptured and infected diverticular dz, ostomy also placed   • COLONOSCOPY      • COLOSTOMY CLOSURE  10/2021    ostomy removed   • EYE SURGERY  2 X cataract   • HEMORRHOIDECTOMY     • KNEE ARTHROPLASTY     • TONSILLECTOMY  1946          Current Outpatient Medications:   •  acetaminophen (TYLENOL) 325 MG tablet, Take 2 tablets by mouth Every 6 (Six) Hours As Needed for Mild Pain ., Disp: , Rfl:   •  ALPRAZolam (XANAX) 0.25 MG tablet, Take 1 tablet by mouth 2 (Two) Times a Day. Falling risk, Disp: 180 tablet, Rfl: 1  •  Cholecalciferol (VITAMIN D) 2000 units capsule, Take  by mouth Daily With Dinner., Disp: , Rfl:   •  cycloSPORINE (RESTASIS) 0.05 % ophthalmic emulsion, 1 drop 2 (Two) Times a Day., Disp: , Rfl:   •  desvenlafaxine (Pristiq) 50 MG 24 hr tablet, Take 1 tablet by mouth Daily., Disp: 30 tablet, Rfl: 3  •  diazePAM (Valium) 5 MG tablet, Take 1 tablet by mouth 1 (One) Time As Needed for Anxiety for up to 1 dose. Take 1 tab 30 minutes before your MRI., Disp: 1 tablet, Rfl: 0  •  dilTIAZem XR (DILACOR XR) 120 MG 24 hr capsule, Take 1 capsule by mouth Daily., Disp: 90 capsule, Rfl: 3  •  ferrous sulfate 325 (65 FE) MG tablet, Take 325 mg by mouth 2 (Two) Times a Day Before Meals., Disp: , Rfl:   •  hydrALAZINE (APRESOLINE) 10 MG tablet, 1 p.o. 3 times daily as needed systolic greater or equal to 150, Disp: 30 tablet, Rfl: 3  •  HYDROcodone-acetaminophen (NORCO)  MG per tablet, Take 1 tablet by mouth Every 8 (Eight) Hours As Needed for Severe Pain (Chronic pain medicines for compression fracture of lumbar spine)., Disp: 90 tablet, Rfl: 0  •  ibuprofen (ADVIL,MOTRIN) 800 MG tablet, Take 1 tablet by mouth Every 8 (Eight) Hours As Needed for Mild Pain ., Disp: 90 tablet, Rfl: 0  •  lacosamide (VIMPAT) 100 MG tablet tablet, Take 100 mg by mouth 2 (Two) Times a Day., Disp: , Rfl:   •  levETIRAcetam (KEPPRA) 500 MG tablet, Take 1 tablet by mouth 2 (Two) Times a Day., Disp: 180 tablet, Rfl: 0  •  levothyroxine (SYNTHROID, LEVOTHROID) 50 MCG tablet, Take 50 mcg by mouth Daily., Disp: ,  Rfl:   •  Motegrity 2 MG tablet, Take 1 tablet by mouth Daily., Disp: , Rfl:   •  potassium chloride 10 MEQ CR tablet, Take 1 tablet by mouth 2 (Two) Times a Day., Disp: 60 tablet, Rfl: 3  •  Probiotic Product (ALIGN PO), Take 1 capsule by mouth Daily With Lunch., Disp: , Rfl:   •  rosuvastatin (CRESTOR) 20 MG tablet, Take 20 mg by mouth Every Night., Disp: , Rfl:   •  SODIUM CHLORIDE PO, Take  by mouth 3 (Three) Times a Day. 2 TABLETS 3 6TIMES A DAY, Disp: , Rfl:   •  valsartan (Diovan) 160 MG tablet, Take 1 tablet by mouth Daily., Disp: 90 tablet, Rfl: 1  •  zolpidem (AMBIEN) 10 MG tablet, Take 1 tablet by mouth At Night As Needed for Sleep., Disp: 90 tablet, Rfl: 1  •  vitamin B-12 (CYANOCOBALAMIN) 100 MCG tablet, Take 50 mcg by mouth Daily., Disp: , Rfl:      No Known Allergies     Social History     Socioeconomic History   • Marital status:    Tobacco Use   • Smoking status: Former Smoker     Packs/day: 0.25     Years: 15.00     Pack years: 3.75     Types: Cigarettes     Start date: 1956     Quit date: 1971     Years since quittin.7   • Smokeless tobacco: Never Used   Vaping Use   • Vaping Use: Never used   Substance and Sexual Activity   • Alcohol use: Not Currently     Alcohol/week: 8.0 standard drinks     Types: 4 Glasses of wine, 4 Shots of liquor per week     Comment: Stopped 3 months ago.   • Drug use: No   • Sexual activity: Not Currently     Partners: Male        Family History   Problem Relation Age of Onset   • Brain cancer Mother    • Cancer Mother         Brain tumor,    • Stroke Father         Age 46   • Early death Father         Stroke, age 46   • Cancer Brother         pancreatic   • Cancer Daughter         Dec'd 2000        Review of Systems   Eyes: Negative for visual disturbance.   Neurological: Negative for dizziness, seizures, speech difficulty and light-headedness.       Objective     Vitals:    22 1101   BP: 136/84   Pulse: 103   Temp: 97.7 °F (36.5 °C)  "  SpO2: 99%   Weight: 50.3 kg (110 lb 12.8 oz)   Height: 152.4 cm (60\")     Body mass index is 21.64 kg/m².      Physical Exam  Neurologic Exam  Awake, alert, oriented x3  Pupils equal round reactive to light  Extraocular muscles intact  Face symmetric  Speech is fluent and clear  No pronator drift  Motor exam  Bilateral deltoids 5/5, bilateral biceps 5/5, bilateral triceps 5/5, bilateral wrist extension 5/5 bilateral hand  5/5  Bilateral hip flexion 5/5, bilateral knee extension 5/5, bilateral DF/PF 5/5  No clonus  No Vianney's reflex  Slightly unsteady gait, able to walk using a rolling walker without difficulty  Able to detect  light touch in all 4 extremities          Assessment & Plan   Independent Review of Radiographic Studies:      I personally reviewed the images from the following studies.  MRI brain with and without contrast from September 15, 2022 and May 9, 2022  The follow-up MRI shows no growth of the left-sided 11 mm x 5 mm extra-axial lesion.  There are no new lesions identified.  There is severe atrophy and white matter disease    Medical Decision Makin-year-old female with an incidentally found left frontal convexity meningioma  -There has been no growth on the follow-up MRI.  I had a long discussion with Ms. Martino, her , and her daughter over management strategies of intracranial mass lesions.  At this time I would expect her lesion to be asymptomatic.  If there is any growth we could consider a craniotomy for resection.  They are concerned that she has an underlying dementia which has progressed over the past year.  They are unlikely to consent to any surgical procedures.  I have offered a follow-up MRI in 1 year to evaluate for any growth.  They have indicated that they would prefer to follow-up as needed if she develops any changes in the frequency or severity of her headaches or new neurologic symptoms.    Diagnoses and all orders for this visit:    1. Meningioma (HCC) " (Primary)      Return if symptoms worsen or fail to improve.  I spent 30 minutes reviewing the medical record, reviewing the MRI images, discussing the management strategies for meningiomas, discussing the risks and benefits of surgery

## 2022-09-23 ENCOUNTER — OFFICE VISIT (OUTPATIENT)
Dept: NEUROSURGERY | Facility: CLINIC | Age: 82
End: 2022-09-23

## 2022-09-23 VITALS
HEIGHT: 60 IN | SYSTOLIC BLOOD PRESSURE: 136 MMHG | OXYGEN SATURATION: 99 % | BODY MASS INDEX: 21.75 KG/M2 | DIASTOLIC BLOOD PRESSURE: 84 MMHG | WEIGHT: 110.8 LBS | TEMPERATURE: 97.7 F | HEART RATE: 103 BPM

## 2022-09-23 DIAGNOSIS — D32.9 MENINGIOMA: Primary | ICD-10-CM

## 2022-09-23 PROCEDURE — 99214 OFFICE O/P EST MOD 30 MIN: CPT | Performed by: NEUROLOGICAL SURGERY

## 2022-09-26 ENCOUNTER — TELEPHONE (OUTPATIENT)
Dept: FAMILY MEDICINE CLINIC | Facility: CLINIC | Age: 82
End: 2022-09-26

## 2022-09-26 NOTE — TELEPHONE ENCOUNTER
PT'S  CALLING TO CHECK ON A REFERRAL FOR HER PROLIA SHOT. I DIDN'T SEE THE REFERRAL IN THE CHART TO LET HIM KNOW ANYTHING.

## 2022-09-27 DIAGNOSIS — G89.29 CHRONIC MIDLINE THORACIC BACK PAIN: ICD-10-CM

## 2022-09-27 DIAGNOSIS — M54.2 PAIN OF CERVICAL SPINE: ICD-10-CM

## 2022-09-27 DIAGNOSIS — M54.6 CHRONIC MIDLINE THORACIC BACK PAIN: ICD-10-CM

## 2022-09-27 DIAGNOSIS — R56.9 SEIZURE: Primary | ICD-10-CM

## 2022-09-27 RX ORDER — VALSARTAN 160 MG/1
160 TABLET ORAL DAILY
Qty: 90 TABLET | Refills: 1 | Status: SHIPPED | OUTPATIENT
Start: 2022-09-27

## 2022-09-27 RX ORDER — IBUPROFEN 800 MG/1
TABLET ORAL
Qty: 90 TABLET | Refills: 3 | Status: SHIPPED | OUTPATIENT
Start: 2022-09-27

## 2022-09-27 RX ORDER — IBUPROFEN 800 MG/1
800 TABLET ORAL EVERY 8 HOURS PRN
Qty: 90 TABLET | Refills: 3 | Status: SHIPPED | OUTPATIENT
Start: 2022-09-27 | End: 2022-09-27

## 2022-09-27 RX ORDER — LACOSAMIDE 100 MG/1
100 TABLET ORAL 2 TIMES DAILY
Qty: 60 TABLET | Refills: 3 | Status: SHIPPED | OUTPATIENT
Start: 2022-09-27 | End: 2022-10-27

## 2022-09-27 RX ORDER — POTASSIUM CHLORIDE 750 MG/1
10 TABLET, FILM COATED, EXTENDED RELEASE ORAL 2 TIMES DAILY
Qty: 180 TABLET | Refills: 3 | Status: SHIPPED | OUTPATIENT
Start: 2022-09-27

## 2022-09-27 RX ORDER — DESVENLAFAXINE SUCCINATE 50 MG/1
50 TABLET, EXTENDED RELEASE ORAL DAILY
Qty: 90 TABLET | Refills: 3 | Status: SHIPPED | OUTPATIENT
Start: 2022-09-27 | End: 2022-11-15 | Stop reason: DRUGHIGH

## 2022-09-27 NOTE — TELEPHONE ENCOUNTER
MEDICATION REFILL REQUEST    Caller: Nu Martino    Relationship: Emergency Contact; [facility]    Best call back number: [phone]    Requested Prescriptions:   Requested Prescriptions     Pending Prescriptions Disp Refills   • lacosamide (VIMPAT) 100 MG tablet tablet 60 tablet      Sig: Take 1 tablet by mouth 2 (Two) Times a Day.      Pharmacy where request should be sent: [facility] - [address] [phone] [facility] [phone]      Additional details provided by patient: PT'S  IS ASKING IF SCRIPT FOR VIMPAT CAN BE SENT TO [facility].    Does the patient have less than a 3 day supply:  [] Yes  [x] No    Last office visit with prescribing clinician: 8/24/2022      Next office visit with prescribing clinician: 10/11/2022     PLEASE REVIEW AND ADVISE.    Mj Gutierrez Rep   09/27/22 11:34 EDT

## 2022-09-29 ENCOUNTER — TELEPHONE (OUTPATIENT)
Dept: FAMILY MEDICINE CLINIC | Facility: CLINIC | Age: 82
End: 2022-09-29

## 2022-09-29 NOTE — TELEPHONE ENCOUNTER
Caller: Nu Martino    Relationship to patient: Emergency Contact    Best call back number: [phone]     Patient is needing: PATIENTS  IS CALLING IN BECAUSE HE WOULD LIKE TO LET DR. RANDHAWA KNOW THAT THE PATIENTS 1ST PROLIA INJECTION IS SCHEDULED FOR 10/10

## 2022-10-04 ENCOUNTER — TELEPHONE (OUTPATIENT)
Dept: FAMILY MEDICINE CLINIC | Facility: CLINIC | Age: 82
End: 2022-10-04

## 2022-10-04 NOTE — TELEPHONE ENCOUNTER
Caller: Nu Martino    Relationship to patient: Emergency Contact    Best call back number: [phone]    Patient is needing: PATIENT'S  IS GOING TO BE FAXING OVER SOME PAPERWORK FOR DR. RANDHAWA REGARDING HIS WIFE    HE HAS BEEN TAKING DOWN HER BLOOD PRESSURE READINGS, ETC AND WILL BE FAXING THAT OVER TO THE OFFICE

## 2022-10-06 ENCOUNTER — TELEPHONE (OUTPATIENT)
Dept: FAMILY MEDICINE CLINIC | Facility: CLINIC | Age: 82
End: 2022-10-06

## 2022-10-10 ENCOUNTER — APPOINTMENT (OUTPATIENT)
Dept: ONCOLOGY | Facility: HOSPITAL | Age: 82
End: 2022-10-10

## 2022-10-10 NOTE — PROGRESS NOTES
CC: f/u    HPI:Ritu Martino is a  82 y.o.  right-handed female who is here for f/u.  PMH includes long h/o anxiety/depression, HTN, hypothyroidism, insomnia, and prolonged hospitalization for pelvic abscess with iloestomy and subsequent take down over a year ago.  I last saw her 6 wks ago when she was seen for f/u r/t hospitalization for AMS, hyponatremia, and UTI with possible seizure activity.  Shortly thereafter she was rehospitalized for unresponsiveness and again had issues with hyponatremia as well as recent quick tapering of her xanax which she had been on for many years.  Keppra was initiated then.  Because of worsened mood I opted to change this to vimpat but continue AED for the time being.    Since discharge she has had repeated falls, but no remark of unresponsive episodes or activity concerning for seizure. In fact she was found to have fallen last night.  Today she has R hip pain, but remains ambulatory.   thinks this occurred d/t excess need to urinate.  She broke her ankle a couple months which is being conservatively treated and has uncontrolled pain thought from compression fraction. Prolia injection have been started. She is walking mostly with a walker.  Other issues are of mood and confusion.  She has worsened anxiety.  She often paces d/t pain and being fidgety. She has been taken down to xanax .25 bid (previously 2.5 bid for many years).  She had an extra dose before this appt and  tells she seems to be less anxious.  Pt agrees but is not active participant of her encounter.  She is here with  and daughter.  She had quit drinking entirely.  She will have a tonic during their usual cocktail time and she seems to have elevated mood then.  She is not taking her friend's calls and no longer lunching out.  She is frequently disoriented and has trouble doing usual tasks like using remote.      Social history:    Family history:      Pain Scale:        ROS:  Review of  Systems   Constitutional: Negative for fatigue.   HENT: Negative for ear pain, nosebleeds and tinnitus.    Eyes: Negative for photophobia, pain and visual disturbance.   Respiratory: Negative for chest tightness, shortness of breath and wheezing.    Cardiovascular: Negative for chest pain and palpitations.   Musculoskeletal: Negative for gait problem, neck pain and neck stiffness.   Neurological: Positive for seizures and syncope. Negative for dizziness, weakness and light-headedness.   Psychiatric/Behavioral: Negative for confusion, decreased concentration and sleep disturbance. The patient is not nervous/anxious.          Reviewed ROS conducted by MA and sima      Physical Exam:  There were no vitals filed for this visit.  Orthostatic BP:    There is no height or weight on file to calculate BMI.        General: well dressed, flattened affect   HEENT: Normocephalic, conjunctiva normal, external canals normal, no nasal discharge, moist mucous membranes  Neck: No lymphadenopathy, thyroid not enlarged, no JVD  CV: Regular rate and rhythm, no murmurs negative no bruits auscultated at neck, equal pulses  Pulmonary: Normal respiratory effort, clear to auscultation bilaterally  Extremities: no edema, bruising, or skin lesions  Pysch: good eye contact, cooperative, flattened affect, fair attention, good insight  Mental: alert, conversant, AOx3, provides history, 2/3 recall.  Language fluent, names, repeats, draws clock with good contour but numbers not correctly places and upto 13, too inattentive to draw hands  CN: CN II-XII intact, PERRL, EOMi, no gaze palsy or nystagmus, intact facial sensation, no facial assymetry, intact hearing, symmetric palate elevation, tongue midline  Motor: no abnormal movements, no pronator drift, normal  bulk and tone  Sensory: normal sensation to crude touch, temperature, and vibration throughout  Reflexes: 2+ throughout, neg babinski  Coordination: no ataxia on finger-nose  Gait: uses  walker, cautious, antalgic        Results:      Lab Results   Component Value Date    GLUCOSE 105 (H) 08/11/2022    BUN 6 (L) 08/11/2022    CREATININE 0.60 08/11/2022    EGFRIFNONA 66 03/21/2019    BCR 10.0 08/11/2022    CO2 26.3 08/11/2022    CALCIUM 9.5 08/11/2022    ALBUMIN 4.20 07/15/2022    AST 18 07/15/2022    ALT 21 07/15/2022       Lab Results   Component Value Date    WBC 5.68 07/12/2022    HGB 12.7 07/12/2022    HCT 36.6 07/12/2022    MCV 92.4 07/12/2022     07/12/2022         .No results found for: RPR      Lab Results   Component Value Date    TSH 1.860 07/12/2022    T9LHVPB 9.98 07/12/2022    THYROIDAB 13 06/06/2022         No results found for: [id number]      No results found for: FOLATE      Lab Results   Component Value Date    HGBA1C 5.00 05/07/2022         Lab Results   Component Value Date    GLUCOSE 105 (H) 08/11/2022    BUN 6 (L) 08/11/2022    CREATININE 0.60 08/11/2022    EGFRIFNONA 66 03/21/2019    BCR 10.0 08/11/2022    K 4.0 08/11/2022    CO2 26.3 08/11/2022    CALCIUM 9.5 08/11/2022    ALBUMIN 4.20 07/15/2022    AST 18 07/15/2022    ALT 21 07/15/2022     EEG x2, 20-30 min, normal and then abnormal with vertex sharp waves but no ictal d/cs    MRI brain 5/22  IMPRESSION:  Evidence of moderately extensive small vessel chronic  ischemic change as described. Tiny enhancing dural based mass in the  left frontoparietal region consistent with a meningioma. Otherwise  unremarkable MRI of the brain. There is no evidence of intracranial  metastatic disease.    MRI brain 9/22 at [facility] which shows stable enhancing mass c/w meningioma and extensive deep and periventricular white matter disease    Diagnosis:  1. Confusion, possible dementia  2. Depression/anxiety  3. Falls      Impression: 83 yo F with PMH as stated above.  She is here for f/u and continues with falls, chronic pain, confusion, difficulty with ADLs, and undertreated anxiety/depression.  I suspect underlying dementia but  there are confounding elements and I prefer she have detailed neuropsych eval but her appt is not for some time.  Polypharmacy, depression, ongoing delirium all at play. No change in recent MRI.  I think it reasonable to d/c vimpat since no clear epilepsy and sodium levels in check.  I do not see b12 levels in the past.  Daughter wishes for xray of hip given fall last night    Plan:  1. Xray R hip, U/a, b12, folate levels  2. Continue supportive care, fall prevention strategies  3. Referral to psychiatry  4. D/c vimpat over next week  5. BP on low side today-instructed family to record over next week    I spent at least 30 minutes interviewing, examining, and counseling patient.  I independently reviewed documentation, laboratory and diagnostic findings, external documentation where applicable, and formulated treatment plan which was discussed with the patient.

## 2022-10-11 ENCOUNTER — OFFICE VISIT (OUTPATIENT)
Dept: NEUROLOGY | Facility: CLINIC | Age: 82
End: 2022-10-11

## 2022-10-11 VITALS
HEART RATE: 111 BPM | BODY MASS INDEX: 19.3 KG/M2 | DIASTOLIC BLOOD PRESSURE: 78 MMHG | WEIGHT: 98.3 LBS | OXYGEN SATURATION: 97 % | SYSTOLIC BLOOD PRESSURE: 98 MMHG | HEIGHT: 60 IN

## 2022-10-11 DIAGNOSIS — F41.9 ANXIETY: ICD-10-CM
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
## 2022-10-11 DIAGNOSIS — R35.89 POLYURIA: ICD-10-CM

## 2022-10-11 DIAGNOSIS — F32.89 OTHER DEPRESSION: ICD-10-CM

## 2022-10-11 DIAGNOSIS — R41.0 CONFUSION AND DISORIENTATION: ICD-10-CM

## 2022-10-11 DIAGNOSIS — R40.0 SOMNOLENCE: Primary | ICD-10-CM

## 2022-10-11 PROCEDURE — 99214 OFFICE O/P EST MOD 30 MIN: CPT | Performed by: PHYSICIAN ASSISTANT

## 2022-10-11 RX ORDER — TRAMADOL HYDROCHLORIDE 50 MG/1
TABLET ORAL
COMMUNITY
End: 2022-10-27

## 2022-10-14 ENCOUNTER — TELEPHONE (OUTPATIENT)
Dept: FAMILY MEDICINE CLINIC | Facility: CLINIC | Age: 82
End: 2022-10-14

## 2022-10-17 DIAGNOSIS — Z13.220 LIPID SCREENING: ICD-10-CM

## 2022-10-17 DIAGNOSIS — E55.9 VITAMIN D DEFICIENCY, UNSPECIFIED: ICD-10-CM

## 2022-10-17 DIAGNOSIS — E03.9 HYPOTHYROIDISM, UNSPECIFIED TYPE: Primary | ICD-10-CM

## 2022-10-19 ENCOUNTER — OFFICE VISIT (OUTPATIENT)
Dept: ORTHOPEDIC SURGERY | Facility: CLINIC | Age: 82
End: 2022-10-19

## 2022-10-19 VITALS — HEIGHT: 60 IN | BODY MASS INDEX: 21.6 KG/M2 | TEMPERATURE: 97.2 F | WEIGHT: 110 LBS | RESPIRATION RATE: 12 BRPM

## 2022-10-19 DIAGNOSIS — S32.010D CLOSED COMPRESSION FRACTURE OF L1 LUMBAR VERTEBRA, WITH ROUTINE HEALING, SUBSEQUENT ENCOUNTER: Primary | ICD-10-CM

## 2022-10-19 DIAGNOSIS — S22.080G COMPRESSION FRACTURE OF T12 VERTEBRA WITH DELAYED HEALING, SUBSEQUENT ENCOUNTER: ICD-10-CM

## 2022-10-19 DIAGNOSIS — R52 PAIN: ICD-10-CM

## 2022-10-19 PROBLEM — M85.80 OSTEOPENIA: Status: ACTIVE | Noted: 2022-10-19

## 2022-10-19 PROCEDURE — 99213 OFFICE O/P EST LOW 20 MIN: CPT | Performed by: NURSE PRACTITIONER

## 2022-10-19 PROCEDURE — 72100 X-RAY EXAM L-S SPINE 2/3 VWS: CPT | Performed by: NURSE PRACTITIONER

## 2022-10-19 PROCEDURE — 73502 X-RAY EXAM HIP UNI 2-3 VIEWS: CPT | Performed by: NURSE PRACTITIONER

## 2022-10-19 NOTE — PROGRESS NOTES
Patient Name: Ritu Martino   YOB: 1940  Referring Primary Care Physician: Gaurav Barger MD      Chief Complaint:    Chief Complaint   Patient presents with   • Lumbar Spine - Follow-up        HPI:  Ritu Martino is a 82 y.o. female who [facility] evaluation of vertebral compression fractures.  She is accompanied by her  and daughter today.  She has once again fallen since last visit.  It sounds like a mechanical fall when she got up in the middle the night to go to the restroom without notifying her .  She is complaining of new right hip pain but still able to ambulate with a walker.  She did not seek medical attention after the fall.  Back pain is unchanged but does persist.  She has had longstanding history of chronic back pain and very difficult to ascertain if this is significantly different than her chronic symptoms.  She continues to deny any radicular pain in lower extremities, neurogenic bowel or bladder dysfunction or saddle anesthesia.    PFSH:  See attached    ROS: As per HPI, otherwise negative    Objective:    Vitals:    10/19/22 1619   Resp: 12   Temp: 97.2 °F (36.2 °C)     Body mass index is 21.48 kg/m².      Physical Exam  Vitals reviewed.       Spine Musculoskeletal Exam    Gait    Assistive device: walker    Inspection    Kyphosis: thoracic kyphosis    Palpation    Thoracolumbar    Right      Muscle tone: normal    Left      Muscle tone: normal    Strength    Thoracolumbar      Right      Extensor hallucis longus: 5/5.       Tibialis anterior: 5/5.       Tibialis posterior: 5/5.       Plantar flexion: 5/5.       Hip flexion: 5/5.       Hip adduction: 5/5.        Left      Extensor hallucis longus: 5/5.       Tibialis anterior: 5/5.       Tibialis posterior: 5/5.       Plantar flexion: 5/5.       Hip flexion: 5/5.       Hip adduction: 5/5.      Sensory    Thoracolumbar    Thoracolumbar  sensation is normal.    Reflexes    Right      Quadriceps: 2/4      Achilles: 0/4     Left      Quadriceps: 2/4      Achilles: 0/4    Special Tests    Thoracolumbar      Right      GRAYSON test: positive (mildly)      SLR: no back or leg pain      Left      SLR: no back or leg pain        IMAGING:     Indication: pain related symptoms,  Views: 2V AP&LAT thoracolumbar  And right hip  Findings: Personally reviewed and reveals possibly very slight progression of compression of the T12 body, but not significant, L1 appears similar with primarily upper endplate compression to prior images in office.  Hip x-ray reveals mild bilateral arthropathy, small osteophytes of the femoral head, no fractures noted.No comparison films.     Assessment:           Diagnoses and all orders for this visit:    1. Closed compression fracture of L1 lumbar vertebra, with routine healing, subsequent encounter (Primary)  -     XR Spine Lumbar AP & Lateral    2. Pain  -     XR Hip With or Without Pelvis 2 - 3 View Right  -     XR Spine Lumbar AP & Lateral    3. Compression fracture of T12 vertebra with delayed healing, subsequent encounter  -     XR Spine Lumbar AP & Lateral             Plan:  In reviewing records, she will be starting Prolia Friday this week which hopefully will help with the osteoporosis.  Once again we discussed at length the treatment options of bracing but she refuses once again and I do feel this would likely be very cumbersome given her current situation.  We also discussed kyphoplasty, however once again there is no exquisite pain upon palpation of the lower thoracic or upper lumbar spine and she has no significant lifestyle limitations.  I would be concerned that kyphoplasty would place the vertebrae above and below at greater risk.  Her  and daughter also feel she would not tolerate any invasive procedures.  We again discussed getting a thoracic MRI, however she does not have any radicular complaints, loss of  nerve function or saddle anesthesia to raise concern for retropulsion.  She has been evaluated by Dr. De Oliveira and plans on having lumbar epidural.  I agree this should help with her chronic pain.  She was advised to initiate a walking program as we are still holding off on physical therapy until the bone has more time to heal.  Walking may also help reduce further bone demineralization.  Walking is also perfect exercise for chronic low back pain.  Now that it is getting colder, she was advised she could start walking at the mall as long as she was accompanied by someone.  She has a neuropsych eval next month, apparently there has been concern for her neurocognitive decline.  I did discuss with her  and daughter that I do not feel to be safe for her to be left alone at home for any length of time due to risk of falls and reported memory issues.  We will have her follow back up in 6 weeks and hopefully at that time can initiate physical therapy.      Return in about 6 weeks (around 11/30/2022).

## 2022-10-21 ENCOUNTER — INFUSION (OUTPATIENT)
Dept: ONCOLOGY | Facility: HOSPITAL | Age: 82
End: 2022-10-21

## 2022-10-21 ENCOUNTER — TELEPHONE (OUTPATIENT)
Dept: NEUROLOGY | Facility: CLINIC | Age: 82
End: 2022-10-21

## 2022-10-21 ENCOUNTER — TELEPHONE (OUTPATIENT)
Dept: NEUROLOGY | Facility: OTHER | Age: 82
End: 2022-10-21

## 2022-10-21 ENCOUNTER — LAB (OUTPATIENT)
Dept: OTHER | Facility: HOSPITAL | Age: 82
End: 2022-10-21

## 2022-10-21 VITALS — BODY MASS INDEX: 21.48 KG/M2 | TEMPERATURE: 97.1 F | HEIGHT: 60 IN | RESPIRATION RATE: 18 BRPM

## 2022-10-21 DIAGNOSIS — M85.80 OSTEOPENIA, UNSPECIFIED LOCATION: Primary | ICD-10-CM

## 2022-10-21 DIAGNOSIS — M85.80 OSTEOPENIA, UNSPECIFIED LOCATION: ICD-10-CM

## 2022-10-21 DIAGNOSIS — S22.080A COMPRESSION FRACTURE OF T12 VERTEBRA, INITIAL ENCOUNTER: ICD-10-CM

## 2022-10-21 DIAGNOSIS — E83.42 HYPOMAGNESEMIA: Primary | ICD-10-CM

## 2022-10-21 LAB
25(OH)D3 SERPL-MCNC: 84.1 NG/ML (ref 30–100)
ALBUMIN SERPL-MCNC: 4.1 G/DL (ref 3.5–5.2)
ALBUMIN/GLOB SERPL: 1.6 G/DL
ALP SERPL-CCNC: 143 U/L (ref 39–117)
ALT SERPL W P-5'-P-CCNC: 17 U/L (ref 1–33)
ANION GAP SERPL CALCULATED.3IONS-SCNC: 8.9 MMOL/L (ref 5–15)
AST SERPL-CCNC: 18 U/L (ref 1–32)
BILIRUB SERPL-MCNC: 0.3 MG/DL (ref 0–1.2)
BUN SERPL-MCNC: 10 MG/DL (ref 8–23)
BUN/CREAT SERPL: 20.4 (ref 7–25)
CALCIUM SPEC-SCNC: 9.7 MG/DL (ref 8.6–10.5)
CHLORIDE SERPL-SCNC: 93 MMOL/L (ref 98–107)
CO2 SERPL-SCNC: 27.1 MMOL/L (ref 22–29)
CREAT SERPL-MCNC: 0.49 MG/DL (ref 0.57–1)
EGFRCR SERPLBLD CKD-EPI 2021: 94.2 ML/MIN/1.73
GLOBULIN UR ELPH-MCNC: 2.6 GM/DL
GLUCOSE SERPL-MCNC: 102 MG/DL (ref 65–99)
MAGNESIUM SERPL-MCNC: 1.4 MG/DL (ref 1.6–2.4)
PHOSPHATE SERPL-MCNC: 3.2 MG/DL (ref 2.5–4.5)
POTASSIUM SERPL-SCNC: 3.6 MMOL/L (ref 3.5–5.2)
PROT SERPL-MCNC: 6.7 G/DL (ref 6–8.5)
SODIUM SERPL-SCNC: 129 MMOL/L (ref 136–145)

## 2022-10-21 PROCEDURE — 25010000002 DENOSUMAB 60 MG/ML SOLUTION PREFILLED SYRINGE: Performed by: INTERNAL MEDICINE

## 2022-10-21 PROCEDURE — 83735 ASSAY OF MAGNESIUM: CPT | Performed by: INTERNAL MEDICINE

## 2022-10-21 PROCEDURE — 80053 COMPREHEN METABOLIC PANEL: CPT | Performed by: INTERNAL MEDICINE

## 2022-10-21 PROCEDURE — 82306 VITAMIN D 25 HYDROXY: CPT | Performed by: INTERNAL MEDICINE

## 2022-10-21 PROCEDURE — 96372 THER/PROPH/DIAG INJ SC/IM: CPT

## 2022-10-21 PROCEDURE — 84100 ASSAY OF PHOSPHORUS: CPT | Performed by: INTERNAL MEDICINE

## 2022-10-21 RX ADMIN — DENOSUMAB 60 MG: 60 INJECTION SUBCUTANEOUS at 14:38

## 2022-10-21 NOTE — TELEPHONE ENCOUNTER
Pharmacy Name: [facility]     Pharmacy representative name: JONI    Pharmacy representative phone number: [phone]    What medication are you calling in regards to:   NEUROPATHIC PAIN CREAM    What question does the pharmacy have: PT'S  IS ON HIS WAY TO THE PHARM NOW AND PHARM IS NEEDING A VERBIAL OK TO MAKE THE CHANGE THE PT IS REQUESTING.    Who is the provider that prescribed the medication: KEVEN CHILDERS.    JONI IS ASKING IF AARON CHAN COULD POSSIBLY ASSIST WITH THIS REQUEST SINCE RA IS OUT OF THE OFFICE.

## 2022-10-21 NOTE — NURSING NOTE
Called pt's referred provider (Dr. Barger) to notify pt's magnesium is 1.4 today. Patient presents for her first Prolia injection. Per MD, okay to proceed with prolia injection today. Notified patient MD to call in magnesium supplements. Discussed magnesium rich foods.

## 2022-10-21 NOTE — NURSING NOTE
Arrived  for prolia injection. Indication and side effects reviewed. Denies recent dental work. Labs and medications verified. Prolia administered in right arm without incidence. Instructed to call prescribing MD for any concerns or questions and instructed on how to schedule future appts.  Pt vu and discharged in stable condition.

## 2022-10-21 NOTE — TELEPHONE ENCOUNTER
Pharmacy Name:  [facility]    Pharmacy representative name: ELLA    Pharmacy representative phone number: [phone]    What medication are you calling in regards to: COMPOUNDED PAIN CREAM FOR THE PATIENT    What question does the pharmacy have: SHE STATES THEY JUST NEED A REFILL, I DO NOT SEE ANY WAY TO REQUEST. SHE ALSO STATES BECAUSE OF THE CLASS OF THE CONTROLLED DRUG A [facility] CAN NOT HAVE REFILLS ATTACHED ONLY FOR CURRENT MONTH.    Who is the provider that prescribed the medication: RA CASH PA    Additional notes: PLEASE REVIEW AND ADVISE

## 2022-10-24 ENCOUNTER — TELEPHONE (OUTPATIENT)
Dept: FAMILY MEDICINE CLINIC | Facility: CLINIC | Age: 82
End: 2022-10-24

## 2022-10-24 NOTE — TELEPHONE ENCOUNTER
I spoke with pt's spouse and no refill is need @ this time.  He said he will contact our office when a refill is needed for the pt.

## 2022-10-24 NOTE — TELEPHONE ENCOUNTER
Caller: ShereenMarielle OTTO    Relationship to patient: Emergency Contact    Best call back number:[phone]    Chief complaint: PATIENTS BACK PAIN HAS MOVED TO HER RIGHT HIP AND LEG, THEY WOULD LIKE TO KNOW WHAT DR. RANDHAWA ADVISES. I DID ADVISE THAT SHE TAKE HER MOTHER TO THE ER, SHE STATED THAT SHE WOULD    Patient directed to call 911 or go to their nearest emergency room.     Patient verbalized understanding: [x] Yes  [] No  If no, why?    Additional notes:

## 2022-10-25 NOTE — TELEPHONE ENCOUNTER
Spoke with Marielle states pt was feeling better last night so they didn't take her to ER. If she gets worse they will have an in home service come

## 2022-10-27 ENCOUNTER — OFFICE VISIT (OUTPATIENT)
Dept: FAMILY MEDICINE CLINIC | Facility: CLINIC | Age: 82
End: 2022-10-27

## 2022-10-27 ENCOUNTER — CLINICAL SUPPORT (OUTPATIENT)
Dept: FAMILY MEDICINE CLINIC | Facility: CLINIC | Age: 82
End: 2022-10-27

## 2022-10-27 ENCOUNTER — APPOINTMENT (OUTPATIENT)
Dept: CT IMAGING | Facility: HOSPITAL | Age: 82
End: 2022-10-27
Payer: MEDICARE

## 2022-10-27 ENCOUNTER — HOSPITAL ENCOUNTER (OUTPATIENT)
Facility: HOSPITAL | Age: 82
Setting detail: OBSERVATION
Discharge: NURSING, REHAB OR LONG-TERM CARE | End: 2022-11-02
Attending: EMERGENCY MEDICINE | Admitting: ANESTHESIOLOGY
Payer: MEDICARE

## 2022-10-27 ENCOUNTER — TELEPHONE (OUTPATIENT)
Dept: NEUROLOGY | Facility: CLINIC | Age: 82
End: 2022-10-27

## 2022-10-27 ENCOUNTER — LAB (OUTPATIENT)
Dept: FAMILY MEDICINE CLINIC | Facility: CLINIC | Age: 82
End: 2022-10-27

## 2022-10-27 VITALS
BODY MASS INDEX: 21.68 KG/M2 | HEART RATE: 104 BPM | WEIGHT: 110.4 LBS | TEMPERATURE: 98.6 F | SYSTOLIC BLOOD PRESSURE: 164 MMHG | HEIGHT: 60 IN | OXYGEN SATURATION: 99 % | DIASTOLIC BLOOD PRESSURE: 80 MMHG

## 2022-10-27 DIAGNOSIS — M51.34 DDD (DEGENERATIVE DISC DISEASE), THORACIC: Primary | ICD-10-CM

## 2022-10-27 DIAGNOSIS — D64.9 ANEMIA, UNSPECIFIED TYPE: Primary | ICD-10-CM

## 2022-10-27 DIAGNOSIS — R40.0 SOMNOLENCE: ICD-10-CM

## 2022-10-27 DIAGNOSIS — S22.080A COMPRESSION FRACTURE OF T12 VERTEBRA, INITIAL ENCOUNTER: ICD-10-CM

## 2022-10-27 DIAGNOSIS — F41.9 ANXIETY: ICD-10-CM

## 2022-10-27 DIAGNOSIS — S32.040A CLOSED COMPRESSION FRACTURE OF L4 VERTEBRA, INITIAL ENCOUNTER: ICD-10-CM

## 2022-10-27 DIAGNOSIS — R41.0 CONFUSION AND DISORIENTATION: ICD-10-CM

## 2022-10-27 DIAGNOSIS — E03.9 HYPOTHYROIDISM, UNSPECIFIED TYPE: ICD-10-CM

## 2022-10-27 DIAGNOSIS — S32.020A CLOSED COMPRESSION FRACTURE OF L2 VERTEBRA, INITIAL ENCOUNTER: ICD-10-CM

## 2022-10-27 DIAGNOSIS — Z13.220 LIPID SCREENING: ICD-10-CM

## 2022-10-27 DIAGNOSIS — Z23 FLU VACCINE NEED: ICD-10-CM

## 2022-10-27 DIAGNOSIS — Z23 FLU VACCINE NEED: Primary | ICD-10-CM

## 2022-10-27 DIAGNOSIS — S32.591A CLOSED FRACTURE OF RIGHT INFERIOR PUBIC RAMUS, INITIAL ENCOUNTER: ICD-10-CM

## 2022-10-27 DIAGNOSIS — S32.010B COMPRESSION FRACTURE OF L1 LUMBAR VERTEBRA, OPEN, INITIAL ENCOUNTER: ICD-10-CM

## 2022-10-27 DIAGNOSIS — M54.16 LUMBAR RADICULOPATHY: ICD-10-CM

## 2022-10-27 DIAGNOSIS — R35.89 POLYURIA: ICD-10-CM

## 2022-10-27 DIAGNOSIS — N39.0 ACUTE UTI: Primary | ICD-10-CM

## 2022-10-27 DIAGNOSIS — G89.29 CHRONIC BILATERAL LOW BACK PAIN WITH RIGHT-SIDED SCIATICA: ICD-10-CM

## 2022-10-27 DIAGNOSIS — G89.29 CHRONIC BILATERAL THORACIC BACK PAIN: ICD-10-CM

## 2022-10-27 DIAGNOSIS — E55.9 VITAMIN D DEFICIENCY, UNSPECIFIED: ICD-10-CM

## 2022-10-27 DIAGNOSIS — M54.6 CHRONIC BILATERAL THORACIC BACK PAIN: ICD-10-CM

## 2022-10-27 DIAGNOSIS — M54.41 CHRONIC BILATERAL LOW BACK PAIN WITH RIGHT-SIDED SCIATICA: ICD-10-CM

## 2022-10-27 LAB
25(OH)D3 SERPL-MCNC: 79.1 NG/ML (ref 30–100)
ALBUMIN SERPL-MCNC: 4.6 G/DL (ref 3.5–5.2)
ALBUMIN SERPL-MCNC: 4.6 G/DL (ref 3.5–5.2)
ALBUMIN/GLOB SERPL: 1.8 G/DL
ALBUMIN/GLOB SERPL: 1.8 G/DL
ALP SERPL-CCNC: 174 U/L (ref 39–117)
ALP SERPL-CCNC: 178 U/L (ref 39–117)
ALT SERPL W P-5'-P-CCNC: 20 U/L (ref 1–33)
ALT SERPL W P-5'-P-CCNC: 22 U/L (ref 1–33)
ANION GAP SERPL CALCULATED.3IONS-SCNC: 10.6 MMOL/L (ref 5–15)
ANION GAP SERPL CALCULATED.3IONS-SCNC: 12.4 MMOL/L (ref 5–15)
AST SERPL-CCNC: 19 U/L (ref 1–32)
AST SERPL-CCNC: 19 U/L (ref 1–32)
BASOPHILS # BLD AUTO: 0.01 10*3/MM3 (ref 0–0.2)
BASOPHILS NFR BLD AUTO: 0.1 % (ref 0–1.5)
BILIRUB SERPL-MCNC: 0.5 MG/DL (ref 0–1.2)
BILIRUB SERPL-MCNC: 0.6 MG/DL (ref 0–1.2)
BILIRUB UR QL STRIP: NEGATIVE
BILIRUB UR QL STRIP: NEGATIVE
BUN SERPL-MCNC: 8 MG/DL (ref 8–23)
BUN SERPL-MCNC: 9 MG/DL (ref 8–23)
BUN/CREAT SERPL: 16.3 (ref 7–25)
BUN/CREAT SERPL: 22.5 (ref 7–25)
CALCIUM SPEC-SCNC: 8.4 MG/DL (ref 8.6–10.5)
CALCIUM SPEC-SCNC: 8.8 MG/DL (ref 8.6–10.5)
CHLORIDE SERPL-SCNC: 87 MMOL/L (ref 98–107)
CHLORIDE SERPL-SCNC: 89 MMOL/L (ref 98–107)
CHOLEST SERPL-MCNC: 189 MG/DL (ref 0–200)
CLARITY UR: CLEAR
CLARITY UR: CLEAR
CO2 SERPL-SCNC: 23.6 MMOL/L (ref 22–29)
CO2 SERPL-SCNC: 24.4 MMOL/L (ref 22–29)
COLOR UR: YELLOW
COLOR UR: YELLOW
CREAT SERPL-MCNC: 0.4 MG/DL (ref 0.57–1)
CREAT SERPL-MCNC: 0.49 MG/DL (ref 0.57–1)
DEPRECATED RDW RBC AUTO: 37.8 FL (ref 37–54)
DEPRECATED RDW RBC AUTO: 41.4 FL (ref 37–54)
EGFRCR SERPLBLD CKD-EPI 2021: 94.2 ML/MIN/1.73
EGFRCR SERPLBLD CKD-EPI 2021: 99 ML/MIN/1.73
EOSINOPHIL # BLD AUTO: 0 10*3/MM3 (ref 0–0.4)
EOSINOPHIL NFR BLD AUTO: 0 % (ref 0.3–6.2)
ERYTHROCYTE [DISTWIDTH] IN BLOOD BY AUTOMATED COUNT: 12 % (ref 12.3–15.4)
ERYTHROCYTE [DISTWIDTH] IN BLOOD BY AUTOMATED COUNT: 12.4 % (ref 12.3–15.4)
FOLATE SERPL-MCNC: 6.18 NG/ML (ref 4.78–24.2)
GLOBULIN UR ELPH-MCNC: 2.5 GM/DL
GLOBULIN UR ELPH-MCNC: 2.6 GM/DL
GLUCOSE SERPL-MCNC: 116 MG/DL (ref 65–99)
GLUCOSE SERPL-MCNC: 117 MG/DL (ref 65–99)
GLUCOSE UR STRIP-MCNC: NEGATIVE MG/DL
GLUCOSE UR STRIP-MCNC: NEGATIVE MG/DL
HCT VFR BLD AUTO: 29.4 % (ref 34–46.6)
HCT VFR BLD AUTO: 30.7 % (ref 34–46.6)
HDLC SERPL-MCNC: 109 MG/DL (ref 40–60)
HGB BLD-MCNC: 10.3 G/DL (ref 12–15.9)
HGB BLD-MCNC: 10.6 G/DL (ref 12–15.9)
HGB UR QL STRIP.AUTO: ABNORMAL
HGB UR QL STRIP.AUTO: NEGATIVE
IMM GRANULOCYTES # BLD AUTO: 0.1 10*3/MM3 (ref 0–0.05)
IMM GRANULOCYTES NFR BLD AUTO: 0.5 % (ref 0–0.5)
KETONES UR QL STRIP: NEGATIVE
KETONES UR QL STRIP: NEGATIVE
LDLC SERPL CALC-MCNC: 66 MG/DL (ref 0–100)
LDLC/HDLC SERPL: 0.59 {RATIO}
LEUKOCYTE ESTERASE UR QL STRIP.AUTO: ABNORMAL
LEUKOCYTE ESTERASE UR QL STRIP.AUTO: NEGATIVE
LYMPHOCYTES # BLD AUTO: 0.6 10*3/MM3 (ref 0.7–3.1)
LYMPHOCYTES NFR BLD AUTO: 3.2 % (ref 19.6–45.3)
MCH RBC QN AUTO: 30.7 PG (ref 26.6–33)
MCH RBC QN AUTO: 31.1 PG (ref 26.6–33)
MCHC RBC AUTO-ENTMCNC: 33.6 G/DL (ref 31.5–35.7)
MCHC RBC AUTO-ENTMCNC: 36.1 G/DL (ref 31.5–35.7)
MCV RBC AUTO: 86.2 FL (ref 79–97)
MCV RBC AUTO: 91.6 FL (ref 79–97)
MONOCYTES # BLD AUTO: 1.05 10*3/MM3 (ref 0.1–0.9)
MONOCYTES NFR BLD AUTO: 5.6 % (ref 5–12)
NEUTROPHILS NFR BLD AUTO: 17.04 10*3/MM3 (ref 1.7–7)
NEUTROPHILS NFR BLD AUTO: 90.6 % (ref 42.7–76)
NITRITE UR QL STRIP: NEGATIVE
NITRITE UR QL STRIP: NEGATIVE
NRBC BLD AUTO-RTO: 0 /100 WBC (ref 0–0.2)
PH UR STRIP.AUTO: 7.5 [PH] (ref 4.6–8)
PH UR STRIP.AUTO: >=9 [PH] (ref 5–8)
PLATELET # BLD AUTO: 533 10*3/MM3 (ref 140–450)
PLATELET # BLD AUTO: 557 10*3/MM3 (ref 140–450)
PMV BLD AUTO: 8.2 FL (ref 6–12)
PMV BLD AUTO: 8.3 FL (ref 6–12)
POTASSIUM SERPL-SCNC: 3.5 MMOL/L (ref 3.5–5.2)
POTASSIUM SERPL-SCNC: 3.9 MMOL/L (ref 3.5–5.2)
PROT SERPL-MCNC: 7.1 G/DL (ref 6–8.5)
PROT SERPL-MCNC: 7.2 G/DL (ref 6–8.5)
PROT UR QL STRIP: ABNORMAL
PROT UR QL STRIP: NEGATIVE
RBC # BLD AUTO: 3.35 10*6/MM3 (ref 3.77–5.28)
RBC # BLD AUTO: 3.41 10*6/MM3 (ref 3.77–5.28)
SODIUM SERPL-SCNC: 123 MMOL/L (ref 136–145)
SODIUM SERPL-SCNC: 124 MMOL/L (ref 136–145)
SP GR UR STRIP: 1.01 (ref 1–1.03)
SP GR UR STRIP: 1.02 (ref 1–1.03)
T-UPTAKE NFR SERPL: 0.99 TBI (ref 0.8–1.3)
T4 SERPL-MCNC: 10 MCG/DL (ref 4.5–11.7)
TRIGL SERPL-MCNC: 81 MG/DL (ref 0–150)
TSH SERPL DL<=0.05 MIU/L-ACNC: 1.32 UIU/ML (ref 0.27–4.2)
UROBILINOGEN UR QL STRIP: ABNORMAL
UROBILINOGEN UR QL STRIP: ABNORMAL
VIT B12 BLD-MCNC: >2000 PG/ML (ref 211–946)
VLDLC SERPL-MCNC: 14 MG/DL (ref 5–40)
WBC NRBC COR # BLD: 18.8 10*3/MM3 (ref 3.4–10.8)
WBC NRBC COR # BLD: 9.23 10*3/MM3 (ref 3.4–10.8)

## 2022-10-27 PROCEDURE — 87186 SC STD MICRODIL/AGAR DIL: CPT | Performed by: PHYSICIAN ASSISTANT

## 2022-10-27 PROCEDURE — 80061 LIPID PANEL: CPT | Performed by: INTERNAL MEDICINE

## 2022-10-27 PROCEDURE — 87086 URINE CULTURE/COLONY COUNT: CPT | Performed by: PHYSICIAN ASSISTANT

## 2022-10-27 PROCEDURE — 96375 TX/PRO/DX INJ NEW DRUG ADDON: CPT

## 2022-10-27 PROCEDURE — 25010000002 FENTANYL CITRATE (PF) 50 MCG/ML SOLUTION: Performed by: EMERGENCY MEDICINE

## 2022-10-27 PROCEDURE — 36415 COLL VENOUS BLD VENIPUNCTURE: CPT | Performed by: INTERNAL MEDICINE

## 2022-10-27 PROCEDURE — 84436 ASSAY OF TOTAL THYROXINE: CPT | Performed by: INTERNAL MEDICINE

## 2022-10-27 PROCEDURE — 99285 EMERGENCY DEPT VISIT HI MDM: CPT

## 2022-10-27 PROCEDURE — 82306 VITAMIN D 25 HYDROXY: CPT | Performed by: INTERNAL MEDICINE

## 2022-10-27 PROCEDURE — 81003 URINALYSIS AUTO W/O SCOPE: CPT | Performed by: INTERNAL MEDICINE

## 2022-10-27 PROCEDURE — 84479 ASSAY OF THYROID (T3 OR T4): CPT | Performed by: INTERNAL MEDICINE

## 2022-10-27 PROCEDURE — 85025 COMPLETE CBC W/AUTO DIFF WBC: CPT | Performed by: PHYSICIAN ASSISTANT

## 2022-10-27 PROCEDURE — 82746 ASSAY OF FOLIC ACID SERUM: CPT | Performed by: PHYSICIAN ASSISTANT

## 2022-10-27 PROCEDURE — 25010000002 HYDROMORPHONE PER 4 MG: Performed by: EMERGENCY MEDICINE

## 2022-10-27 PROCEDURE — G0008 ADMIN INFLUENZA VIRUS VAC: HCPCS | Performed by: INTERNAL MEDICINE

## 2022-10-27 PROCEDURE — 90662 IIV NO PRSV INCREASED AG IM: CPT | Performed by: INTERNAL MEDICINE

## 2022-10-27 PROCEDURE — 85027 COMPLETE CBC AUTOMATED: CPT | Performed by: INTERNAL MEDICINE

## 2022-10-27 PROCEDURE — 74176 CT ABD & PELVIS W/O CONTRAST: CPT

## 2022-10-27 PROCEDURE — 80053 COMPREHEN METABOLIC PANEL: CPT | Performed by: PHYSICIAN ASSISTANT

## 2022-10-27 PROCEDURE — 80053 COMPREHEN METABOLIC PANEL: CPT | Performed by: INTERNAL MEDICINE

## 2022-10-27 PROCEDURE — 82607 VITAMIN B-12: CPT | Performed by: PHYSICIAN ASSISTANT

## 2022-10-27 PROCEDURE — 99213 OFFICE O/P EST LOW 20 MIN: CPT | Performed by: NURSE PRACTITIONER

## 2022-10-27 PROCEDURE — 25010000002 ONDANSETRON PER 1 MG: Performed by: EMERGENCY MEDICINE

## 2022-10-27 PROCEDURE — 72131 CT LUMBAR SPINE W/O DYE: CPT

## 2022-10-27 PROCEDURE — 84443 ASSAY THYROID STIM HORMONE: CPT | Performed by: INTERNAL MEDICINE

## 2022-10-27 PROCEDURE — 81001 URINALYSIS AUTO W/SCOPE: CPT | Performed by: PHYSICIAN ASSISTANT

## 2022-10-27 PROCEDURE — 36415 COLL VENOUS BLD VENIPUNCTURE: CPT

## 2022-10-27 RX ORDER — SODIUM CHLORIDE 0.9 % (FLUSH) 0.9 %
10 SYRINGE (ML) INJECTION AS NEEDED
Status: DISCONTINUED | OUTPATIENT
Start: 2022-10-27 | End: 2022-10-28

## 2022-10-27 RX ORDER — FENTANYL CITRATE 50 UG/ML
25 INJECTION, SOLUTION INTRAMUSCULAR; INTRAVENOUS ONCE
Status: COMPLETED | OUTPATIENT
Start: 2022-10-27 | End: 2022-10-27

## 2022-10-27 RX ORDER — HYDROMORPHONE HYDROCHLORIDE 1 MG/ML
0.5 INJECTION, SOLUTION INTRAMUSCULAR; INTRAVENOUS; SUBCUTANEOUS ONCE
Status: COMPLETED | OUTPATIENT
Start: 2022-10-27 | End: 2022-10-27

## 2022-10-27 RX ORDER — DENOSUMAB 60 MG/ML
1 INJECTION SUBCUTANEOUS
COMMUNITY
Start: 2022-10-21 | End: 2022-11-02 | Stop reason: HOSPADM

## 2022-10-27 RX ORDER — PREDNISONE 10 MG/1
TABLET ORAL
COMMUNITY
Start: 2022-10-26 | End: 2022-11-02 | Stop reason: HOSPADM

## 2022-10-27 RX ORDER — ONDANSETRON 2 MG/ML
4 INJECTION INTRAMUSCULAR; INTRAVENOUS ONCE
Status: COMPLETED | OUTPATIENT
Start: 2022-10-27 | End: 2022-10-27

## 2022-10-27 RX ADMIN — ONDANSETRON 4 MG: 2 INJECTION INTRAMUSCULAR; INTRAVENOUS at 22:54

## 2022-10-27 RX ADMIN — SODIUM CHLORIDE 500 ML: 9 INJECTION, SOLUTION INTRAVENOUS at 22:56

## 2022-10-27 RX ADMIN — HYDROMORPHONE HYDROCHLORIDE 0.5 MG: 1 INJECTION, SOLUTION INTRAMUSCULAR; INTRAVENOUS; SUBCUTANEOUS at 22:55

## 2022-10-27 RX ADMIN — FENTANYL CITRATE 25 MCG: 50 INJECTION INTRAMUSCULAR; INTRAVENOUS at 23:35

## 2022-10-27 NOTE — ED TRIAGE NOTES
Pt to ED from home via [facility] with c/o back pain since Saturday.  Pt reports seeing PCP today and was advised to come to the ED with worsening pain.  Pt denies loss of bladder or bowel control, reports tingling to bilateral legs.  Pt reports having an epidural scheduled for this coming Wednesday. Pt was due to take pain medication at 6pm, but reports she wanted to wait.    Pt wearing mask, staff wearing appropriate PPE.

## 2022-10-27 NOTE — PROGRESS NOTES
"Chief Complaint  Hip Pain (Right x several days ) and Anal Itching    Subjective        Ritu Martino presents to [facility]  History of Present Illness   82-year-old female presenting with complaints of low back, hip and right groin pain, she has been seeing pain management for multilevel DDD thoracic spine and chronic compression involving endplate of L1, she is scheduled for an epidural injection next Wednesday, she has also been evaluated by orthopedics related to compression fracture of T12, currently has ibuprofen 800 mg and hydrocodone 10/325 1 tab every 8 hours, spouse reports patient has only been taking once a day as they were afraid to give more often related to her Xanax and Ambien, educated patient and spouse can take the hydrocodone up to every 8 hours and to space the Xanax and Ambien 1 to 2 hours from pain medicine.  I offered steroid for pain relief, patient reports had 1 yesterday and did not help,  I recommended she contact pain management today and see if they can get injection moved sooner, otherwise recommended ED if pain not relieved or gets worse.      Objective   Vital Signs:  /80 (BP Location: Left arm, Patient Position: Sitting, Cuff Size: Adult)   Pulse 104   Temp 98.6 °F (37 °C) (Infrared)   Ht 152.4 cm (60\")   Wt 50.1 kg (110 lb 6.4 oz)   SpO2 99%   BMI 21.56 kg/m²   Estimated body mass index is 21.56 kg/m² as calculated from the following:    Height as of this encounter: 152.4 cm (60\").    Weight as of this encounter: 50.1 kg (110 lb 6.4 oz).    BMI is within normal parameters. No other follow-up for BMI required.      Physical Exam  Cardiovascular:      Rate and Rhythm: Normal rate.      Pulses: Normal pulses.      Heart sounds: Normal heart sounds.   Pulmonary:      Effort: Pulmonary effort is normal.      Breath sounds: Normal breath sounds.   Musculoskeletal:      Lumbar back: Tenderness present.      Right hip: Tenderness present. "   Neurological:      General: No focal deficit present.      Mental Status: She is alert and oriented to person, place, and time.   Psychiatric:         Mood and Affect: Mood normal.         Behavior: Behavior normal.        Result Review :                Assessment and Plan   Diagnoses and all orders for this visit:    1. DDD (degenerative disc disease), thoracic (Primary)  Comments:  Continue hydrocodone and ibuprofen as prescribed.  Contact pain management to inquire if can get injection sooner.  Continue ice/heat therapy and topical pain c    2. Chronic bilateral thoracic back pain    3. Chronic bilateral low back pain with right-sided sciatica             Follow Up   Return if symptoms worsen or fail to improve.  Patient was given instructions and counseling regarding her condition or for health maintenance advice. Please see specific information pulled into the AVS if appropriate.     Mask and gloves worn

## 2022-10-28 ENCOUNTER — TELEPHONE (OUTPATIENT)
Dept: FAMILY MEDICINE CLINIC | Facility: CLINIC | Age: 82
End: 2022-10-28

## 2022-10-28 DIAGNOSIS — M85.80 OSTEOPENIA, UNSPECIFIED LOCATION: Primary | ICD-10-CM
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
## 2022-10-28 DIAGNOSIS — S22.080A COMPRESSION FRACTURE OF T12 VERTEBRA, INITIAL ENCOUNTER: ICD-10-CM

## 2022-10-28 PROBLEM — N39.0 ACUTE UTI: Status: ACTIVE | Noted: 2022-10-28

## 2022-10-28 PROBLEM — M54.9 BACK PAIN: Status: ACTIVE | Noted: 2022-10-28

## 2022-10-28 PROBLEM — S32.591A CLOSED FRACTURE OF RIGHT INFERIOR PUBIC RAMUS: Status: ACTIVE | Noted: 2022-10-28

## 2022-10-28 PROBLEM — S32.020A COMPRESSION FRACTURE OF L2 LUMBAR VERTEBRA: Status: ACTIVE | Noted: 2022-10-28

## 2022-10-28 LAB
ANION GAP SERPL CALCULATED.3IONS-SCNC: 10 MMOL/L (ref 5–15)
BACTERIA UR QL AUTO: ABNORMAL /HPF
BUN SERPL-MCNC: 7 MG/DL (ref 8–23)
BUN/CREAT SERPL: 16.7 (ref 7–25)
CALCIUM SPEC-SCNC: 8.1 MG/DL (ref 8.6–10.5)
CHLORIDE SERPL-SCNC: 94 MMOL/L (ref 98–107)
CO2 SERPL-SCNC: 24 MMOL/L (ref 22–29)
CREAT SERPL-MCNC: 0.42 MG/DL (ref 0.57–1)
D-LACTATE SERPL-SCNC: 1.3 MMOL/L (ref 0.5–2)
DEPRECATED RDW RBC AUTO: 40.6 FL (ref 37–54)
EGFRCR SERPLBLD CKD-EPI 2021: 97.8 ML/MIN/1.73
ERYTHROCYTE [DISTWIDTH] IN BLOOD BY AUTOMATED COUNT: 12.4 % (ref 12.3–15.4)
GLUCOSE SERPL-MCNC: 97 MG/DL (ref 65–99)
HCT VFR BLD AUTO: 29.4 % (ref 34–46.6)
HGB BLD-MCNC: 10 G/DL (ref 12–15.9)
HYALINE CASTS UR QL AUTO: ABNORMAL /LPF
MAGNESIUM SERPL-MCNC: 2 MG/DL (ref 1.6–2.4)
MCH RBC QN AUTO: 30.8 PG (ref 26.6–33)
MCHC RBC AUTO-ENTMCNC: 34 G/DL (ref 31.5–35.7)
MCV RBC AUTO: 90.5 FL (ref 79–97)
PLATELET # BLD AUTO: 458 10*3/MM3 (ref 140–450)
PMV BLD AUTO: 8 FL (ref 6–12)
POTASSIUM SERPL-SCNC: 3.1 MMOL/L (ref 3.5–5.2)
RBC # BLD AUTO: 3.25 10*6/MM3 (ref 3.77–5.28)
RBC # UR STRIP: ABNORMAL /HPF
REF LAB TEST METHOD: ABNORMAL
SODIUM SERPL-SCNC: 128 MMOL/L (ref 136–145)
SQUAMOUS #/AREA URNS HPF: ABNORMAL /HPF
WBC # UR STRIP: ABNORMAL /HPF
WBC NRBC COR # BLD: 12.24 10*3/MM3 (ref 3.4–10.8)

## 2022-10-28 PROCEDURE — 25010000002 HYDROMORPHONE PER 4 MG: Performed by: EMERGENCY MEDICINE

## 2022-10-28 PROCEDURE — 99214 OFFICE O/P EST MOD 30 MIN: CPT | Performed by: NURSE PRACTITIONER

## 2022-10-28 PROCEDURE — 97530 THERAPEUTIC ACTIVITIES: CPT

## 2022-10-28 PROCEDURE — G0378 HOSPITAL OBSERVATION PER HR: HCPCS

## 2022-10-28 PROCEDURE — 25010000002 MORPHINE PER 10 MG: Performed by: NURSE PRACTITIONER

## 2022-10-28 PROCEDURE — 87040 BLOOD CULTURE FOR BACTERIA: CPT | Performed by: PHYSICIAN ASSISTANT

## 2022-10-28 PROCEDURE — 96375 TX/PRO/DX INJ NEW DRUG ADDON: CPT

## 2022-10-28 PROCEDURE — 63710000001 ONDANSETRON PER 8 MG: Performed by: NURSE PRACTITIONER

## 2022-10-28 PROCEDURE — 97162 PT EVAL MOD COMPLEX 30 MIN: CPT

## 2022-10-28 PROCEDURE — 85027 COMPLETE CBC AUTOMATED: CPT | Performed by: NURSE PRACTITIONER

## 2022-10-28 PROCEDURE — 96361 HYDRATE IV INFUSION ADD-ON: CPT

## 2022-10-28 PROCEDURE — 83735 ASSAY OF MAGNESIUM: CPT | Performed by: HOSPITALIST

## 2022-10-28 PROCEDURE — 96376 TX/PRO/DX INJ SAME DRUG ADON: CPT

## 2022-10-28 PROCEDURE — 25010000002 CEFTRIAXONE PER 250 MG: Performed by: PHYSICIAN ASSISTANT

## 2022-10-28 PROCEDURE — 83605 ASSAY OF LACTIC ACID: CPT | Performed by: PHYSICIAN ASSISTANT

## 2022-10-28 PROCEDURE — 80048 BASIC METABOLIC PNL TOTAL CA: CPT | Performed by: NURSE PRACTITIONER

## 2022-10-28 RX ORDER — ACETAMINOPHEN 160 MG/5ML
650 SOLUTION ORAL EVERY 4 HOURS PRN
Status: DISCONTINUED | OUTPATIENT
Start: 2022-10-28 | End: 2022-10-28

## 2022-10-28 RX ORDER — BISACODYL 10 MG
10 SUPPOSITORY, RECTAL RECTAL DAILY PRN
Status: DISCONTINUED | OUTPATIENT
Start: 2022-10-28 | End: 2022-11-02 | Stop reason: HOSPADM

## 2022-10-28 RX ORDER — ZOLPIDEM TARTRATE 5 MG/1
5 TABLET ORAL NIGHTLY PRN
Status: DISCONTINUED | OUTPATIENT
Start: 2022-10-28 | End: 2022-10-28

## 2022-10-28 RX ORDER — L.ACID,PARA/B.BIFIDUM/S.THERM 8B CELL
1 CAPSULE ORAL DAILY
Status: DISCONTINUED | OUTPATIENT
Start: 2022-10-28 | End: 2022-11-02 | Stop reason: HOSPADM

## 2022-10-28 RX ORDER — ACETAMINOPHEN 325 MG/1
650 TABLET ORAL EVERY 4 HOURS PRN
Status: DISCONTINUED | OUTPATIENT
Start: 2022-10-28 | End: 2022-11-02 | Stop reason: HOSPADM

## 2022-10-28 RX ORDER — NITROGLYCERIN 0.4 MG/1
0.4 TABLET SUBLINGUAL
Status: DISCONTINUED | OUTPATIENT
Start: 2022-10-28 | End: 2022-11-02 | Stop reason: HOSPADM

## 2022-10-28 RX ORDER — ROSUVASTATIN CALCIUM 20 MG/1
20 TABLET, COATED ORAL NIGHTLY
Status: DISCONTINUED | OUTPATIENT
Start: 2022-10-28 | End: 2022-11-02 | Stop reason: HOSPADM

## 2022-10-28 RX ORDER — CYCLOSPORINE 0.5 MG/ML
1 EMULSION OPHTHALMIC 2 TIMES DAILY
Status: DISCONTINUED | OUTPATIENT
Start: 2022-10-28 | End: 2022-11-02 | Stop reason: HOSPADM

## 2022-10-28 RX ORDER — OXYCODONE AND ACETAMINOPHEN 7.5; 325 MG/1; MG/1
1 TABLET ORAL EVERY 6 HOURS PRN
Status: DISCONTINUED | OUTPATIENT
Start: 2022-10-28 | End: 2022-10-29

## 2022-10-28 RX ORDER — HYDRALAZINE HYDROCHLORIDE 10 MG/1
10 TABLET, FILM COATED ORAL EVERY 8 HOURS SCHEDULED
Status: DISCONTINUED | OUTPATIENT
Start: 2022-10-28 | End: 2022-11-02 | Stop reason: HOSPADM

## 2022-10-28 RX ORDER — CALCIUM CARBONATE 200(500)MG
2 TABLET,CHEWABLE ORAL 2 TIMES DAILY PRN
Status: DISCONTINUED | OUTPATIENT
Start: 2022-10-28 | End: 2022-10-28

## 2022-10-28 RX ORDER — DESVENLAFAXINE SUCCINATE 50 MG/1
50 TABLET, EXTENDED RELEASE ORAL DAILY
Status: DISCONTINUED | OUTPATIENT
Start: 2022-10-28 | End: 2022-11-02 | Stop reason: HOSPADM

## 2022-10-28 RX ORDER — POTASSIUM CHLORIDE 750 MG/1
10 TABLET, FILM COATED, EXTENDED RELEASE ORAL 2 TIMES DAILY
Status: DISCONTINUED | OUTPATIENT
Start: 2022-10-28 | End: 2022-11-02 | Stop reason: HOSPADM

## 2022-10-28 RX ORDER — SODIUM CHLORIDE 1000 MG
2 TABLET, SOLUBLE MISCELLANEOUS
Status: DISCONTINUED | OUTPATIENT
Start: 2022-10-28 | End: 2022-11-02 | Stop reason: HOSPADM

## 2022-10-28 RX ORDER — ONDANSETRON 4 MG/1
4 TABLET, FILM COATED ORAL EVERY 6 HOURS PRN
Status: DISCONTINUED | OUTPATIENT
Start: 2022-10-28 | End: 2022-11-02 | Stop reason: HOSPADM

## 2022-10-28 RX ORDER — MORPHINE SULFATE 2 MG/ML
2 INJECTION, SOLUTION INTRAMUSCULAR; INTRAVENOUS
Status: DISPENSED | OUTPATIENT
Start: 2022-10-28 | End: 2022-10-28

## 2022-10-28 RX ORDER — SODIUM CHLORIDE 9 MG/ML
75 INJECTION, SOLUTION INTRAVENOUS CONTINUOUS
Status: DISCONTINUED | OUTPATIENT
Start: 2022-10-28 | End: 2022-10-28

## 2022-10-28 RX ORDER — ONDANSETRON 2 MG/ML
4 INJECTION INTRAMUSCULAR; INTRAVENOUS EVERY 6 HOURS PRN
Status: DISCONTINUED | OUTPATIENT
Start: 2022-10-28 | End: 2022-11-02 | Stop reason: HOSPADM

## 2022-10-28 RX ORDER — ZOLPIDEM TARTRATE 5 MG/1
5 TABLET ORAL NIGHTLY PRN
Status: DISCONTINUED | OUTPATIENT
Start: 2022-10-28 | End: 2022-11-02 | Stop reason: HOSPADM

## 2022-10-28 RX ORDER — ALPRAZOLAM 0.25 MG/1
0.25 TABLET ORAL 2 TIMES DAILY
Status: DISCONTINUED | OUTPATIENT
Start: 2022-10-28 | End: 2022-11-01

## 2022-10-28 RX ORDER — ACETAMINOPHEN 650 MG/1
650 SUPPOSITORY RECTAL EVERY 4 HOURS PRN
Status: DISCONTINUED | OUTPATIENT
Start: 2022-10-28 | End: 2022-10-28

## 2022-10-28 RX ORDER — PHENAZOPYRIDINE HYDROCHLORIDE 100 MG/1
100 TABLET, FILM COATED ORAL
Status: DISCONTINUED | OUTPATIENT
Start: 2022-10-28 | End: 2022-11-02 | Stop reason: HOSPADM

## 2022-10-28 RX ORDER — LEVOTHYROXINE SODIUM 0.05 MG/1
50 TABLET ORAL
Status: DISCONTINUED | OUTPATIENT
Start: 2022-10-28 | End: 2022-11-02 | Stop reason: HOSPADM

## 2022-10-28 RX ORDER — SODIUM CHLORIDE 0.9 % (FLUSH) 0.9 %
10 SYRINGE (ML) INJECTION EVERY 12 HOURS SCHEDULED
Status: DISCONTINUED | OUTPATIENT
Start: 2022-10-28 | End: 2022-10-28

## 2022-10-28 RX ORDER — AMOXICILLIN 250 MG
2 CAPSULE ORAL 2 TIMES DAILY
Status: DISCONTINUED | OUTPATIENT
Start: 2022-10-28 | End: 2022-11-02 | Stop reason: HOSPADM

## 2022-10-28 RX ORDER — POTASSIUM CHLORIDE 1.5 G/1.77G
40 POWDER, FOR SOLUTION ORAL AS NEEDED
Status: DISCONTINUED | OUTPATIENT
Start: 2022-10-28 | End: 2022-11-02 | Stop reason: HOSPADM

## 2022-10-28 RX ORDER — SODIUM CHLORIDE 0.9 % (FLUSH) 0.9 %
10 SYRINGE (ML) INJECTION AS NEEDED
Status: DISCONTINUED | OUTPATIENT
Start: 2022-10-28 | End: 2022-10-28

## 2022-10-28 RX ORDER — DILTIAZEM HYDROCHLORIDE 120 MG/1
120 CAPSULE, COATED, EXTENDED RELEASE ORAL
Status: DISCONTINUED | OUTPATIENT
Start: 2022-10-28 | End: 2022-11-02 | Stop reason: HOSPADM

## 2022-10-28 RX ORDER — POTASSIUM CHLORIDE 750 MG/1
40 TABLET, FILM COATED, EXTENDED RELEASE ORAL AS NEEDED
Status: DISCONTINUED | OUTPATIENT
Start: 2022-10-28 | End: 2022-11-02 | Stop reason: HOSPADM

## 2022-10-28 RX ORDER — BISACODYL 5 MG/1
5 TABLET, DELAYED RELEASE ORAL DAILY PRN
Status: DISCONTINUED | OUTPATIENT
Start: 2022-10-28 | End: 2022-11-02 | Stop reason: HOSPADM

## 2022-10-28 RX ORDER — POTASSIUM CHLORIDE 7.45 MG/ML
10 INJECTION INTRAVENOUS
Status: DISCONTINUED | OUTPATIENT
Start: 2022-10-28 | End: 2022-11-02 | Stop reason: HOSPADM

## 2022-10-28 RX ORDER — HYDROMORPHONE HYDROCHLORIDE 1 MG/ML
0.5 INJECTION, SOLUTION INTRAMUSCULAR; INTRAVENOUS; SUBCUTANEOUS ONCE
Status: COMPLETED | OUTPATIENT
Start: 2022-10-28 | End: 2022-10-28

## 2022-10-28 RX ORDER — POLYETHYLENE GLYCOL 3350 17 G/17G
17 POWDER, FOR SOLUTION ORAL DAILY PRN
Status: DISCONTINUED | OUTPATIENT
Start: 2022-10-28 | End: 2022-11-02 | Stop reason: HOSPADM

## 2022-10-28 RX ORDER — HYDROCODONE BITARTRATE AND ACETAMINOPHEN 5; 325 MG/1; MG/1
1 TABLET ORAL EVERY 6 HOURS PRN
Status: DISCONTINUED | OUTPATIENT
Start: 2022-10-28 | End: 2022-10-28

## 2022-10-28 RX ORDER — LIDOCAINE 50 MG/G
2 PATCH TOPICAL
Status: DISCONTINUED | OUTPATIENT
Start: 2022-10-28 | End: 2022-11-02 | Stop reason: HOSPADM

## 2022-10-28 RX ADMIN — ZOLPIDEM TARTRATE 5 MG: 5 TABLET ORAL at 22:36

## 2022-10-28 RX ADMIN — PHENAZOPYRIDINE 100 MG: 100 TABLET ORAL at 11:29

## 2022-10-28 RX ADMIN — Medication 10 ML: at 02:58

## 2022-10-28 RX ADMIN — SODIUM CHLORIDE TAB 1 GM 2 G: 1 TAB at 17:44

## 2022-10-28 RX ADMIN — SODIUM CHLORIDE TAB 1 GM 2 G: 1 TAB at 11:29

## 2022-10-28 RX ADMIN — MORPHINE SULFATE 2 MG: 2 INJECTION, SOLUTION INTRAMUSCULAR; INTRAVENOUS at 14:57

## 2022-10-28 RX ADMIN — LEVOTHYROXINE SODIUM 50 MCG: 0.05 TABLET ORAL at 06:48

## 2022-10-28 RX ADMIN — DESVENLAFAXINE SUCCINATE 50 MG: 50 TABLET, EXTENDED RELEASE ORAL at 08:49

## 2022-10-28 RX ADMIN — OXYCODONE HYDROCHLORIDE AND ACETAMINOPHEN 1 TABLET: 7.5; 325 TABLET ORAL at 11:29

## 2022-10-28 RX ADMIN — HYDRALAZINE HYDROCHLORIDE 10 MG: 10 TABLET, FILM COATED ORAL at 08:49

## 2022-10-28 RX ADMIN — SODIUM CHLORIDE TAB 1 GM 2 G: 1 TAB at 08:49

## 2022-10-28 RX ADMIN — DOCUSATE SODIUM 50MG AND SENNOSIDES 8.6MG 2 TABLET: 8.6; 5 TABLET, FILM COATED ORAL at 20:33

## 2022-10-28 RX ADMIN — ONDANSETRON HYDROCHLORIDE 4 MG: 4 TABLET, FILM COATED ORAL at 20:32

## 2022-10-28 RX ADMIN — POTASSIUM CHLORIDE 10 MEQ: 750 TABLET, EXTENDED RELEASE ORAL at 08:49

## 2022-10-28 RX ADMIN — CEFTRIAXONE SODIUM 1 G: 1 INJECTION, POWDER, FOR SOLUTION INTRAMUSCULAR; INTRAVENOUS at 01:24

## 2022-10-28 RX ADMIN — POTASSIUM CHLORIDE 10 MEQ: 750 TABLET, EXTENDED RELEASE ORAL at 20:34

## 2022-10-28 RX ADMIN — PHENAZOPYRIDINE 100 MG: 100 TABLET ORAL at 17:44

## 2022-10-28 RX ADMIN — PHENAZOPYRIDINE 100 MG: 100 TABLET ORAL at 08:49

## 2022-10-28 RX ADMIN — HYDRALAZINE HYDROCHLORIDE 10 MG: 10 TABLET, FILM COATED ORAL at 22:36

## 2022-10-28 RX ADMIN — HYDROMORPHONE HYDROCHLORIDE 0.5 MG: 1 INJECTION, SOLUTION INTRAMUSCULAR; INTRAVENOUS; SUBCUTANEOUS at 01:07

## 2022-10-28 RX ADMIN — Medication 1 CAPSULE: at 08:49

## 2022-10-28 RX ADMIN — SODIUM CHLORIDE 75 ML/HR: 9 INJECTION, SOLUTION INTRAVENOUS at 03:58

## 2022-10-28 RX ADMIN — HYDROCODONE BITARTRATE AND ACETAMINOPHEN 1 TABLET: 5; 325 TABLET ORAL at 03:52

## 2022-10-28 RX ADMIN — OXYCODONE HYDROCHLORIDE AND ACETAMINOPHEN 1 TABLET: 7.5; 325 TABLET ORAL at 20:33

## 2022-10-28 RX ADMIN — Medication 10 ML: at 01:13

## 2022-10-28 RX ADMIN — Medication 10 ML: at 04:02

## 2022-10-28 RX ADMIN — POTASSIUM CHLORIDE 40 MEQ: 750 TABLET, EXTENDED RELEASE ORAL at 14:57

## 2022-10-28 RX ADMIN — ROSUVASTATIN CALCIUM 20 MG: 20 TABLET, FILM COATED ORAL at 20:33

## 2022-10-28 RX ADMIN — DILTIAZEM HYDROCHLORIDE 120 MG: 120 CAPSULE, COATED, EXTENDED RELEASE ORAL at 08:49

## 2022-10-28 RX ADMIN — MORPHINE SULFATE 2 MG: 2 INJECTION, SOLUTION INTRAMUSCULAR; INTRAVENOUS at 06:37

## 2022-10-28 RX ADMIN — LIDOCAINE 5% 2 PATCH: 700 PATCH TOPICAL at 08:54

## 2022-10-28 RX ADMIN — ALPRAZOLAM 0.25 MG: 0.25 TABLET ORAL at 20:33

## 2022-10-28 RX ADMIN — HYDRALAZINE HYDROCHLORIDE 10 MG: 10 TABLET, FILM COATED ORAL at 14:29

## 2022-10-28 RX ADMIN — DOCUSATE SODIUM 50MG AND SENNOSIDES 8.6MG 2 TABLET: 8.6; 5 TABLET, FILM COATED ORAL at 11:29

## 2022-10-28 RX ADMIN — MORPHINE SULFATE 2 MG: 2 INJECTION, SOLUTION INTRAMUSCULAR; INTRAVENOUS at 02:53

## 2022-10-28 RX ADMIN — CYCLOSPORINE 1 DROP: 0.5 EMULSION OPHTHALMIC at 20:33

## 2022-10-28 RX ADMIN — ALPRAZOLAM 0.25 MG: 0.25 TABLET ORAL at 08:49

## 2022-10-28 NOTE — CONSULTS
[facility] CONSULT [facility]    Patient name: Ritu Martino  Referring Provider: FIONA Henning  Reason for Consultation: Acute on chronic compression fractures    Patient Care Team:  Gaurav Barger MD as PCP - General (Internal Medicine)  Uche Garcia MD as Consulting Physician (Endocrinology)    Chief complaint: back pain, hip pain, leg pain    Subjective .     History of present illness:    Patient is a 82 y.o. female who presents to the ED with complaints of back, right hip and right leg pain.  Patient's daughter is present at bedside who helps with history as patient has some dementia.  She states her mother fell about a week and 1/2 to 2 weeks ago and was doing fine up until the last couple of days.  Patient reports her pain is more evident in her right hip and leg and that her back pain is tolerable.  She denies any new weakness, numbness, tingling or loss of bowel and bladder control.  CT spine demonstrates several chronic lumbar compression fractures as well as a chronic T12 compression fracture.  Patient has attempted to use a brace in the past though states it was too big and bulky.  She is still able to ambulate using a walker and lives at home.  Per family, they would not want any surgical intervention and would prefer the conservative route.    Review of Systems  Review of Systems   Constitutional: Positive for activity change. Negative for appetite change.   Genitourinary: Negative for difficulty urinating.   Musculoskeletal: Positive for back pain and gait problem.   Neurological: Negative for dizziness, speech difficulty and weakness.   Psychiatric/Behavioral: Positive for confusion (At times with history of dementia).       History  PAST MEDICAL HISTORY  Past Medical History:   Diagnosis Date   • Anemia 2000   • Anxiety    • Arthritis    • Bowel dysfunction 08/2021    since having surgery for diverticular infection   • Chronic constipation    • Diverticulitis 08/2021    w/  rupture and infection required surgery and ostomy   • Essential hypertension, benign    • Fracture 2022    left ankle   • Fracture, tibia and fibula Now wearing bood   • GERD (gastroesophageal reflux disease)    • Hernia, hiatal    • Hypercholesterolemia    • Hypokalemia    • Hyponatremia     chronic low with occasional normal level   • Hypothyroidism     estimated time frame pt nor  could remember exactly how long has been on medication   • Insomnia    • Iron deficiency    • Seizures (HCC)    • Tear of meniscus of knee    • 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       PAST SURGICAL HISTORY  Past Surgical History:   Procedure Laterality Date   • APPENDECTOMY     • BACK SURGERY     •  SECTION     • COLON SURGERY  2021    to address ruptured and infected diverticular dz, ostomy also placed   • COLONOSCOPY     • COLOSTOMY CLOSURE  10/2021    ostomy removed   • EYE SURGERY  2 X cataract   • HEMORRHOIDECTOMY     • KNEE ARTHROPLASTY     • TONSILLECTOMY         FAMILY HISTORY  Family History   Problem Relation Age of Onset   • Brain cancer Mother    • Cancer Mother         Brain tumor,    • Stroke Father         Age 46   • Early death Father         Stroke, age 46   • Cancer Brother         pancreatic   • Cancer Daughter         Dec'd 2000       SOCIAL HISTORY  Social History     Tobacco Use   • Smoking status: Former     Packs/day: 0.25     Years: 15.00     Pack years: 3.75     Types: Cigarettes     Start date: 1956     Quit date: 1971     Years since quittin.8   • Smokeless tobacco: Never   Vaping Use   • Vaping Use: Never used   Substance Use Topics   • Alcohol use: Not Currently     Alcohol/week: 8.0 standard drinks     Types: 4 Glasses of wine, 4 Shots of liquor per week     Comment: Stopped 3 months ago.   • Drug use: No       retired      Allergies:  Patient has no known allergies.    MEDICATIONS:  Medications Prior to Admission   Medication  Sig Dispense Refill Last Dose   • acetaminophen (TYLENOL) 325 MG tablet Take 2 tablets by mouth Every 6 (Six) Hours As Needed for Mild Pain .      • ALPRAZolam (XANAX) 0.25 MG tablet Take 1 tablet by mouth 2 (Two) Times a Day. Falling risk 180 tablet 1    • Cholecalciferol (VITAMIN D) 2000 units capsule Take  by mouth Daily With Dinner.      • cycloSPORINE (RESTASIS) 0.05 % ophthalmic emulsion 1 drop 2 (Two) Times a Day.      • desvenlafaxine (Pristiq) 50 MG 24 hr tablet Take 1 tablet by mouth Daily. 90 tablet 3    • dilTIAZem XR (DILACOR XR) 120 MG 24 hr capsule Take 1 capsule by mouth Daily. 90 capsule 3    • ferrous sulfate 325 (65 FE) MG tablet Take 325 mg by mouth 2 (Two) Times a Day Before Meals.      • hydrALAZINE (APRESOLINE) 10 MG tablet 1 p.o. 3 times daily as needed systolic greater or equal to 150 (Patient taking differently: 3 (Three) Times a Day.) 30 tablet 3    • HYDROcodone-acetaminophen (NORCO)  MG per tablet Take 1 tablet by mouth Every 8 (Eight) Hours As Needed for Severe Pain (Chronic pain medicines for compression fracture of lumbar spine). 90 tablet 0    • ibuprofen (ADVIL,MOTRIN) 800 MG tablet TAKE 1 TABLET BY MOUTH EVERY 8 HOURS AS NEEDED FOR MILD PAIN 90 tablet 3    • levothyroxine (SYNTHROID, LEVOTHROID) 50 MCG tablet Take 50 mcg by mouth Daily.      • Magnesium 400 MG capsule 1 p.o. twice daily 60 capsule 3    • potassium chloride 10 MEQ CR tablet Take 1 tablet by mouth 2 (Two) Times a Day. 180 tablet 3    • predniSONE (DELTASONE) 10 MG tablet       • Probiotic Product (ALIGN PO) Take 1 capsule by mouth Daily With Lunch.      • Prucalopride Succinate 1 MG tablet Take 1 mg by mouth Daily. 60 tablet 3    • rosuvastatin (CRESTOR) 20 MG tablet Take 20 mg by mouth Every Night.      • SODIUM CHLORIDE PO Take  by mouth 3 (Three) Times a Day. 2 TABLETS 3 6TIMES A DAY      • valsartan (Diovan) 160 MG tablet Take 1 tablet by mouth Daily. 90 tablet 1    • vitamin B-12 (CYANOCOBALAMIN) 100 MCG  tablet Take 50 mcg by mouth Daily.      • zolpidem (AMBIEN) 10 MG tablet Take 1 tablet by mouth At Night As Needed for Sleep. 90 tablet 1    • denosumab (Prolia) 60 MG/ML solution prefilled syringe syringe 1 mL.          Objective     Results Review:  LABS:    Admission on 10/27/2022   Component Date Value Ref Range Status   • Glucose 10/27/2022 116 (H)  65 - 99 mg/dL Final   • BUN 10/27/2022 9  8 - 23 mg/dL Final   • Creatinine 10/27/2022 0.40 (L)  0.57 - 1.00 mg/dL Final   • Sodium 10/27/2022 123 (L)  136 - 145 mmol/L Final   • Potassium 10/27/2022 3.9  3.5 - 5.2 mmol/L Final    Slight hemolysis detected by analyzer. Results may be affected.   • Chloride 10/27/2022 87 (L)  98 - 107 mmol/L Final   • CO2 10/27/2022 23.6  22.0 - 29.0 mmol/L Final   • Calcium 10/27/2022 8.8  8.6 - 10.5 mg/dL Final   • Total Protein 10/27/2022 7.1  6.0 - 8.5 g/dL Final   • Albumin 10/27/2022 4.60  3.50 - 5.20 g/dL Final   • ALT (SGPT) 10/27/2022 22  1 - 33 U/L Final   • AST (SGOT) 10/27/2022 19  1 - 32 U/L Final   • Alkaline Phosphatase 10/27/2022 178 (H)  39 - 117 U/L Final   • Total Bilirubin 10/27/2022 0.6  0.0 - 1.2 mg/dL Final   • Globulin 10/27/2022 2.5  gm/dL Final   • A/G Ratio 10/27/2022 1.8  g/dL Final   • BUN/Creatinine Ratio 10/27/2022 22.5  7.0 - 25.0 Final   • Anion Gap 10/27/2022 12.4  5.0 - 15.0 mmol/L Final   • eGFR 10/27/2022 99.0  >60.0 mL/min/1.73 Final    National Kidney Foundation and American Society of Nephrology (ASN) Task Force recommended calculation based on the Chronic Kidney Disease Epidemiology Collaboration (CKD-EPI) equation refit without adjustment for race.   • WBC 10/27/2022 18.80 (H)  3.40 - 10.80 10*3/mm3 Final   • RBC 10/27/2022 3.41 (L)  3.77 - 5.28 10*6/mm3 Final   • Hemoglobin 10/27/2022 10.6 (L)  12.0 - 15.9 g/dL Final   • Hematocrit 10/27/2022 29.4 (L)  34.0 - 46.6 % Final   • MCV 10/27/2022 86.2  79.0 - 97.0 fL Final   • MCH 10/27/2022 31.1  26.6 - 33.0 pg Final   • MCHC 10/27/2022 36.1 (H)   31.5 - 35.7 g/dL Final   • RDW 10/27/2022 12.0 (L)  12.3 - 15.4 % Final   • RDW-SD 10/27/2022 37.8  37.0 - 54.0 fl Final   • MPV 10/27/2022 8.2  6.0 - 12.0 fL Final   • Platelets 10/27/2022 533 (H)  140 - 450 10*3/mm3 Final   • Neutrophil % 10/27/2022 90.6 (H)  42.7 - 76.0 % Final   • Lymphocyte % 10/27/2022 3.2 (L)  19.6 - 45.3 % Final   • Monocyte % 10/27/2022 5.6  5.0 - 12.0 % Final   • Eosinophil % 10/27/2022 0.0 (L)  0.3 - 6.2 % Final   • Basophil % 10/27/2022 0.1  0.0 - 1.5 % Final   • Immature Grans % 10/27/2022 0.5  0.0 - 0.5 % Final   • Neutrophils, Absolute 10/27/2022 17.04 (H)  1.70 - 7.00 10*3/mm3 Final   • Lymphocytes, Absolute 10/27/2022 0.60 (L)  0.70 - 3.10 10*3/mm3 Final   • Monocytes, Absolute 10/27/2022 1.05 (H)  0.10 - 0.90 10*3/mm3 Final   • Eosinophils, Absolute 10/27/2022 0.00  0.00 - 0.40 10*3/mm3 Final   • Basophils, Absolute 10/27/2022 0.01  0.00 - 0.20 10*3/mm3 Final   • Immature Grans, Absolute 10/27/2022 0.10 (H)  0.00 - 0.05 10*3/mm3 Final   • nRBC 10/27/2022 0.0  0.0 - 0.2 /100 WBC Final   • Color, UA 10/27/2022 Yellow  Yellow, Straw Final   • Appearance, UA 10/27/2022 Clear  Clear Final   • pH, UA 10/27/2022 >=9.0 (H)  5.0 - 8.0 Final   • Specific Gravity, UA 10/27/2022 1.009  1.005 - 1.030 Final   • Glucose, UA 10/27/2022 Negative  Negative Final   • Ketones, UA 10/27/2022 Negative  Negative Final   • Bilirubin, UA 10/27/2022 Negative  Negative Final   • Blood, UA 10/27/2022 Negative  Negative Final   • Protein, UA 10/27/2022 Negative  Negative Final   • Leuk Esterase, UA 10/27/2022 Large (3+) (A)  Negative Final   • Nitrite, UA 10/27/2022 Negative  Negative Final   • Urobilinogen, UA 10/27/2022 0.2 E.U./dL  0.2 - 1.0 E.U./dL Final   • RBC, UA 10/27/2022 None Seen  None Seen, 0-2 /HPF Final   • WBC, UA 10/27/2022 13-20 (A)  None Seen, 0-2 /HPF Final   • Bacteria, UA 10/27/2022 None Seen  None Seen /HPF Final   • Squamous Epithelial Cells, UA 10/27/2022 None Seen  None Seen, 0-2 /HPF  Final   • Hyaline Casts, UA 10/27/2022 None Seen  None Seen /LPF Final   • Methodology 10/27/2022 Manual Light Microscopy   Final   • Lactate 10/28/2022 1.3  0.5 - 2.0 mmol/L Final   • Glucose 10/28/2022 97  65 - 99 mg/dL Final   • BUN 10/28/2022 7 (L)  8 - 23 mg/dL Final   • Creatinine 10/28/2022 0.42 (L)  0.57 - 1.00 mg/dL Final   • Sodium 10/28/2022 128 (L)  136 - 145 mmol/L Final   • Potassium 10/28/2022 3.1 (L)  3.5 - 5.2 mmol/L Final   • Chloride 10/28/2022 94 (L)  98 - 107 mmol/L Final   • CO2 10/28/2022 24.0  22.0 - 29.0 mmol/L Final   • Calcium 10/28/2022 8.1 (L)  8.6 - 10.5 mg/dL Final   • BUN/Creatinine Ratio 10/28/2022 16.7  7.0 - 25.0 Final   • Anion Gap 10/28/2022 10.0  5.0 - 15.0 mmol/L Final   • eGFR 10/28/2022 97.8  >60.0 mL/min/1.73 Final    National Kidney Foundation and American Society of Nephrology (ASN) Task Force recommended calculation based on the Chronic Kidney Disease Epidemiology Collaboration (CKD-EPI) equation refit without adjustment for race.   • WBC 10/28/2022 12.24 (H)  3.40 - 10.80 10*3/mm3 Final   • RBC 10/28/2022 3.25 (L)  3.77 - 5.28 10*6/mm3 Final   • Hemoglobin 10/28/2022 10.0 (L)  12.0 - 15.9 g/dL Final   • Hematocrit 10/28/2022 29.4 (L)  34.0 - 46.6 % Final   • MCV 10/28/2022 90.5  79.0 - 97.0 fL Final   • MCH 10/28/2022 30.8  26.6 - 33.0 pg Final   • MCHC 10/28/2022 34.0  31.5 - 35.7 g/dL Final   • RDW 10/28/2022 12.4  12.3 - 15.4 % Final   • RDW-SD 10/28/2022 40.6  37.0 - 54.0 fl Final   • MPV 10/28/2022 8.0  6.0 - 12.0 fL Final   • Platelets 10/28/2022 458 (H)  140 - 450 10*3/mm3 Final       DIAGNOSTICS:  CT lumbar spine: Acute on chronic T12, L2 and L4 compression fractures.  Several chronic lumbar compression fractures.      Results Review:   I reviewed the patient's new clinical results.  I personally viewed and interpreted the patient's CT scan with Dr. Pierre.    Vital Signs   Temp:  [97.6 °F (36.4 °C)-98.6 °F (37 °C)] 97.6 °F (36.4 °C)  Heart Rate:  []  72  Resp:  [18] 18  BP: (144-181)/() 175/81    Physical Exam:  Physical Exam  Vitals and nursing note reviewed. Exam conducted with a chaperone present.   HENT:      Head: Normocephalic and atraumatic.   Eyes:      Extraocular Movements: Extraocular movements intact.      Pupils: Pupils are equal, round, and reactive to light.   Pulmonary:      Effort: Pulmonary effort is normal. No respiratory distress.   Musculoskeletal:         General: Tenderness present.   Neurological:      Mental Status: She is alert and oriented to person, place, and time. Mental status is at baseline.      Cranial Nerves: Cranial nerves 2-12 are intact.      Motor: Motor strength is normal.   Psychiatric:         Mood and Affect: Mood normal.         Behavior: Behavior normal.       Neurologic Exam     Mental Status   Oriented to person, place, and time.     Cranial Nerves   Cranial nerves II through XII intact.     CN III, IV, VI   Pupils are equal, round, and reactive to light.    Motor Exam   Muscle bulk: normal  Overall muscle tone: normal    Strength   Strength 5/5 throughout.     Sensory Exam   Light touch normal.       Assessment & Plan       Back pain    Hypothyroidism    Hyponatremia    Essential hypertension, benign    Hernia, hiatal    Seizures (HCC)    Acute UTI      PLAN:    · patient is not a surgical candidate and family would not want to proceed with any surgical intervention. Continue conservative measures with pain control and brace (I have contacted [facility] to fit her for something more appropriate). She has an appointment on Wednesday with pain management, however family requesting they see while she is here and they have been consulted. PT. Follow-up with neurosurgery as needed.    I discussed the patient's findings and my recommendations with Dr. Pierre, Dr. Geronimo, patient, family and nursing staff    Angela Obrien, APRN  10/28/22  09:39 EDT    \"Dictated utilizing Dragon dictation\".

## 2022-10-28 NOTE — PLAN OF CARE
Goal Outcome Evaluation:  Patient complains of pain, upon entering room to assess patient, find her lifting and lower RLE and screaming in pain with each movement.  Educated patient multiple times on how movement causes this pain.  Repositioned patient and pain immediately relieved.  Within seconds, patient raising and lowering RLE again or lifting her upper body and screaming in pain.  Patient is able to answer orientation questions but presents as either confused about situation or severe short-term memory loss.  Will continue to educate and watch patient with pain and activity

## 2022-10-28 NOTE — DISCHARGE PLACEMENT REQUEST
Ritu Schilling (82 y.o. Female)     Date of Birth   1940    Social Security Number       Address   [address] [place] [address]    Home Phone   [phone]    MRN   [id number]       Congregation   None    Marital Status                               Admission Date   10/27/22    Admission Type   Emergency    Admitting Provider   Stephen Geronimo MD    Attending Provider   Stephen Geronimo MD    Department, Room/Bed   [address], [address]/1       Discharge Date       Discharge Disposition       Discharge Destination                               Attending Provider: Stephen Geronimo MD    Allergies: No Known Allergies    Isolation: None   Infection: None   Code Status: CPR    Ht: 152.4 cm (60\")   Wt: 50.1 kg (110 lb 6.4 oz)    Admission Cmt: None   Principal Problem: Back pain [M54.9]                 Active Insurance as of 10/27/2022     Primary Coverage     Payor Plan Insurance Group Employer/Plan Group    [facility] MEDICARE REPLACEMENT [facility] MEDICARE REPLACEMENT [id number]     Payor Plan Address Payor Plan Phone Number Payor Plan Fax Number Effective Dates    [address] [address]   1/1/2021 - None Entered    [facility] [id number]       Subscriber Name Subscriber Birth Date Member ID       RITU SCHILLING 1940 [id number]                 Emergency Contacts      (Rel.) Home Phone Work Phone Mobile Phone    Nu Schilling (Spouse) [phone] -- [phone]    ReganMarielle escobar (Daughter) [phone] -- [phone]

## 2022-10-28 NOTE — CONSULTS
Orthopaedic & Hand Surgery  Orthopedic consult Note  Dr. Lars Knutson  [phone]    CC: Groin pain    HPI:  Patient is a 82 y.o. female who presents with groin pain    Pain is characterized as follows:    • Onset: About a week ago  • Location: In the groin, right slightly more than left  • Quality: Sharp and achy   • Severity: occasionally a 10/10 but normally less  • Modifying factors: Better with rest, worse with attempted ambulation  • Context: Notes that she had a fall, landing on her back/sitting down suddenly. This was as she attempted to get up in the middle of the night to go to the bathroom. Her daughter is present with her and notes that her  normally helps her to the bathroom. She was found to have vertebral compression fractures. She has been treating conservatively with plan for epidural on Wednesday. Due to increasing pain, she presented to the emergency department. Cross-sectional imaging revealed pubic ramus fractures and I was consulted for evaluation and management.    She denies numbness or paresthesias in the feet.    MEDICAL HISTORY  Past Medical History:   Diagnosis Date   • Anemia 2000   • Anxiety    • Arthritis    • Bowel dysfunction 08/2021    since having surgery for diverticular infection   • Chronic constipation    • Diverticulitis 08/2021    w/ rupture and infection required surgery and ostomy   • Essential hypertension, benign    • Fracture 06/2022    left ankle   • Fracture, tibia and fibula Now wearing bood   • GERD (gastroesophageal reflux disease)    • Hernia, hiatal    • Hypercholesterolemia    • Hypokalemia    • Hyponatremia 2014    chronic low with occasional normal level   • Hypothyroidism 2018    estimated time frame pt nor  could remember exactly how long has been on medication   • Insomnia    • Iron deficiency    • Seizures (HCC) 2022   • Tear of meniscus of knee 2000   • 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 Authorizing Provider   acetaminophen (TYLENOL) 325 MG tablet Take 2 tablets by mouth Every 6 (Six) Hours As Needed for Mild Pain . 22  Yes Santiago Swift MD   ALPRAZolam (XANAX) 0.25 MG tablet Take 1 tablet by mouth 2 (Two) Times a Day. Falling risk 22  Yes Gaurav Barger MD   Cholecalciferol (VITAMIN D) 2000 units capsule Take  by mouth Daily With Dinner.   Yes Meet Cook MD   cycloSPORINE (RESTASIS) 0.05 % ophthalmic emulsion 1 drop 2 (Two) Times a Day.   Yes Meet Cook MD   desvenlafaxine (Pristiq) 50 MG 24 hr tablet Take 1 tablet by mouth Daily. 22  Yes Gaurav Barger MD   dilTIAZem XR (DILACOR XR) 120 MG 24 hr capsule Take 1 capsule by mouth Daily. 22  Yes Gaurav Barger MD   ferrous sulfate 325 (65 FE) MG tablet Take 325 mg by mouth 2 (Two) Times a Day Before Meals.   Yes Meet Cook MD   hydrALAZINE (APRESOLINE) 10 MG tablet 1 p.o. 3 times daily as needed systolic greater or equal to 150  Patient taking differently: 3 (Three) Times a Day. 22  Yes Gaurav Barger MD   HYDROcodone-acetaminophen (NORCO)  MG per tablet Take 1 tablet by mouth Every 8 (Eight) Hours As Needed for Severe Pain (Chronic pain medicines for compression fracture of lumbar spine). 22  Yes Gaurav Barger MD   ibuprofen (ADVIL,MOTRIN) 800 MG tablet TAKE 1 TABLET BY MOUTH EVERY 8 HOURS AS NEEDED FOR MILD PAIN 22  Yes Gaurav Barger MD   levothyroxine (SYNTHROID, LEVOTHROID) 50 MCG tablet Take 50 mcg by mouth  Daily.   Yes Meet Cook MD   Magnesium 400 MG capsule 1 p.o. twice daily 10/21/22  Yes Gaurav Barger MD   potassium chloride 10 MEQ CR tablet Take 1 tablet by mouth 2 (Two) Times a Day. 22  Yes Gaurav Barger MD   predniSONE (DELTASONE) 10 MG tablet  10/26/22  Yes Meet Cook MD   Probiotic Product (ALIGN PO) Take 1 capsule by mouth Daily With Lunch.   Yes Meet Cook MD   Prucalopride Succinate 1 MG tablet Take 1 mg by mouth Daily. 10/3/22  Yes Gaurav Barger MD   rosuvastatin (CRESTOR) 20 MG tablet Take 20 mg by mouth Every Night.   Yes Meet Cook MD   SODIUM CHLORIDE PO Take  by mouth 3 (Three) Times a Day. 2 TABLETS 3 6TIMES A DAY   Yes Meet Cook MD   valsartan (Diovan) 160 MG tablet Take 1 tablet by mouth Daily. 22  Yes Gaurav Barger MD   vitamin B-12 (CYANOCOBALAMIN) 100 MCG tablet Take 50 mcg by mouth Daily.   Yes Meet Cook MD   zolpidem (AMBIEN) 10 MG tablet Take 1 tablet by mouth At Night As Needed for Sleep. 22  Yes Gaurav Barger MD   denosumab (Prolia) 60 MG/ML solution prefilled syringe syringe 1 mL. 10/21/22   Meet Cook MD   ·   · No Known Allergies  Most Recent Immunizations   Administered Date(s) Administered   • COVID-19 (PFIZER) PURPLE CAP 10/27/2021   • Covid-19 (Pfizer) Gray Cap 2022   • FLUAD TRI 65YR+ 2017   • Flublok 18+yrs 10/16/2019   • Fluzone High Dose =>65 Years ([facility] ONLY) 2021   • Fluzone High-Dose 65+yrs 10/27/2022   • Influenza Quad Vaccine (Inpatient) 2020   • Pneumococcal Polysaccharide (PPSV23) 2018   • Shingrix 2019   • TD Preservative Free 2019   ·   Social History     Tobacco Use   • Smoking status: Former     Packs/day: 0.25     Years: 15.00     Pack years: 3.75     Types: Cigarettes     Start date: 1956     Quit date: 1971     Years since quittin.8   • Smokeless tobacco: Never   Substance Use Topics   • Alcohol  use: Not Currently     Alcohol/week: 8.0 standard drinks     Types: 4 Glasses of wine, 4 Shots of liquor per week     Comment: Stopped 3 months ago.   ·    Social History     Substance and Sexual Activity   Drug Use No   ·     REVIEW OF SYSTEMS:  · General: negative for fevers or chills  · Head: negative for headache  · Respiratory: negative for shortness of breath.   · Cardiovascular: negative for chest pain.   · Gastrointestinal: negative for nausea or vomiting.   · Neurological: negative for numbness or paresthesias  · Psychiatric/Behavioral: negative for memory loss.   · All other systems reviewed and are negative    VITALS: /81   Pulse 72   Temp 97.6 °F (36.4 °C) (Oral)   Resp 18   LMP  (LMP Unknown)   SpO2 100%  There is no height or weight on file to calculate BMI.    PHYSICAL EXAM:   · CONSTITUTIONAL: No acute distress  · EXTREMITY: Right Lower Extremity and Left Lower Extremity  · INSPECTION: Skin warm and well-perfused, no rashes, lesions or scars in the area examined  · Palpation: Tender palpation over the pubic symphysis as well as over the ischial tuberosity bilaterally. Not tender along the sacroiliac joints or central sacrum. Not tender with compression of the iliac crests.  · Vascular: 2+ DP pulse bilaterally  · Sensation: Intact to light touch in saphenous, sural, tibial, superficial and deep peroneal nerves bilaterally.  · Motor: Fires EHL, FHL, tib ant and gastrosoleus bilaterally.  · Range of Motion / Stability: Pelvis stable to rock.    RADIOLOGY REVIEW:   Patient: NUBIA SCHILLING  Time Out: 00:08  Exam(s): CT ABDOMEN + PELVIS Without Contrast      EXAM:    CT Abdomen and Pelvis Without Intravenous Contrast     CLINICAL HISTORY:     Reason for exam: Abdominal pain and left hip pain.     TECHNIQUE:    Axial computed tomography images of the abdomen and pelvis without   intravenous contrast.  This CT exam was performed according to the   principle of ALARA (As Low As Reasonably  Achievable) by using one or more   of the following dose reduction techniques: automated exposure control,   adjustment of the mA and or kV according to patient size, and or use of   iterative reconstruction technique.     COMPARISON:    No relevant prior studies available.     FINDINGS:    Lung bases:  Unremarkable.  No mass.  No consolidation.      ABDOMEN:    Liver:  Unremarkable.    Gallbladder and bile ducts:  Unremarkable.  No calcified stones.  No   ductal dilation.    Pancreas:  Unremarkable.  No ductal dilation.    Spleen:  Unremarkable.  No splenomegaly.    Adrenals:  Unremarkable.  No mass.    Kidneys and ureters:  Unremarkable.  No obstructing stones.  No   hydronephrosis.    Stomach and bowel:  Right upper quadrant ventral hernia contains the   cecum.  Distal ileal anastomosis.  No proximal bowel obstruction.  No   mucosal thickening.      PELVIS:    Appendix:  No findings to suggest acute appendicitis.    Bladder:  Unremarkable.  No stones.    Reproductive:  Unremarkable as visualized.      ABDOMEN and PELVIS:    Intraperitoneal space:  Unremarkable.  No free air.  No significant   fluid collection.    Bones joints:  Moderately displaced right inferior pubic ramus fracture.    Acute on chronic T12, L2 and L4 compression fractures.    Soft tissues:  See above.    Vasculature:  Mild atherosclerosis.  No abdominal aortic aneurysm.    Lymph nodes:  Unremarkable.  No enlarged lymph nodes.     IMPRESSION:       1.  Mildly displaced right inferior pubic ramus fracture.  Acute on   chronic T12, L2 and L4 compression fractures.  2.  Right ventral abdominal wall hernia contains the cecum.  No proximal   obstruction.     IMPRESSION:        Electronically signed by Joey Villa MD on 10-28-22 at 0008    I have independently reviewed the images and agree with radiologist assessment as noted above with the exception that there appears to be an ulcer at the superior pubic ramus fracture close to the pubic  symphysis.    LABS:   Results for the past 24 hours:   Recent Results (from the past 24 hour(s))   CBC (No Diff)    Collection Time: 10/27/22 11:13 AM    Specimen: Arm, Left; Blood   Result Value Ref Range    WBC 9.23 3.40 - 10.80 10*3/mm3    RBC 3.35 (L) 3.77 - 5.28 10*6/mm3    Hemoglobin 10.3 (L) 12.0 - 15.9 g/dL    Hematocrit 30.7 (L) 34.0 - 46.6 %    MCV 91.6 79.0 - 97.0 fL    MCH 30.7 26.6 - 33.0 pg    MCHC 33.6 31.5 - 35.7 g/dL    RDW 12.4 12.3 - 15.4 %    RDW-SD 41.4 37.0 - 54.0 fl    MPV 8.3 6.0 - 12.0 fL    Platelets 557 (H) 140 - 450 10*3/mm3   Comprehensive Metabolic Panel    Collection Time: 10/27/22 11:13 AM    Specimen: Arm, Left; Blood   Result Value Ref Range    Glucose 117 (H) 65 - 99 mg/dL    BUN 8 8 - 23 mg/dL    Creatinine 0.49 (L) 0.57 - 1.00 mg/dL    Sodium 124 (L) 136 - 145 mmol/L    Potassium 3.5 3.5 - 5.2 mmol/L    Chloride 89 (L) 98 - 107 mmol/L    CO2 24.4 22.0 - 29.0 mmol/L    Calcium 8.4 (L) 8.6 - 10.5 mg/dL    Total Protein 7.2 6.0 - 8.5 g/dL    Albumin 4.60 3.50 - 5.20 g/dL    ALT (SGPT) 20 1 - 33 U/L    AST (SGOT) 19 1 - 32 U/L    Alkaline Phosphatase 174 (H) 39 - 117 U/L    Total Bilirubin 0.5 0.0 - 1.2 mg/dL    Globulin 2.6 gm/dL    A/G Ratio 1.8 g/dL    BUN/Creatinine Ratio 16.3 7.0 - 25.0    Anion Gap 10.6 5.0 - 15.0 mmol/L    eGFR 94.2 >60.0 mL/min/1.73   Lipid Panel    Collection Time: 10/27/22 11:13 AM    Specimen: Arm, Left; Blood   Result Value Ref Range    Total Cholesterol 189 0 - 200 mg/dL    Triglycerides 81 0 - 150 mg/dL    HDL Cholesterol 109 (H) 40 - 60 mg/dL    LDL Cholesterol  66 0 - 100 mg/dL    VLDL Cholesterol 14 5 - 40 mg/dL    LDL/HDL Ratio 0.59    Vitamin D 25 Hydroxy    Collection Time: 10/27/22 11:13 AM    Specimen: Arm, Left; Blood   Result Value Ref Range    25 Hydroxy, Vitamin D 79.1 30.0 - 100.0 ng/ml   Thyroid Panel With TSH    Collection Time: 10/27/22 11:13 AM    Specimen: Arm, Left; Blood   Result Value Ref Range    TSH 1.320 0.270 - 4.200 uIU/mL    T  Uptake 0.99 0.80 - 1.30 TBI    T4, Total 10.00 4.50 - 11.70 mcg/dL   Comprehensive Metabolic Panel    Collection Time: 10/27/22 10:54 PM    Specimen: Blood   Result Value Ref Range    Glucose 116 (H) 65 - 99 mg/dL    BUN 9 8 - 23 mg/dL    Creatinine 0.40 (L) 0.57 - 1.00 mg/dL    Sodium 123 (L) 136 - 145 mmol/L    Potassium 3.9 3.5 - 5.2 mmol/L    Chloride 87 (L) 98 - 107 mmol/L    CO2 23.6 22.0 - 29.0 mmol/L    Calcium 8.8 8.6 - 10.5 mg/dL    Total Protein 7.1 6.0 - 8.5 g/dL    Albumin 4.60 3.50 - 5.20 g/dL    ALT (SGPT) 22 1 - 33 U/L    AST (SGOT) 19 1 - 32 U/L    Alkaline Phosphatase 178 (H) 39 - 117 U/L    Total Bilirubin 0.6 0.0 - 1.2 mg/dL    Globulin 2.5 gm/dL    A/G Ratio 1.8 g/dL    BUN/Creatinine Ratio 22.5 7.0 - 25.0    Anion Gap 12.4 5.0 - 15.0 mmol/L    eGFR 99.0 >60.0 mL/min/1.73   CBC Auto Differential    Collection Time: 10/27/22 10:54 PM    Specimen: Blood   Result Value Ref Range    WBC 18.80 (H) 3.40 - 10.80 10*3/mm3    RBC 3.41 (L) 3.77 - 5.28 10*6/mm3    Hemoglobin 10.6 (L) 12.0 - 15.9 g/dL    Hematocrit 29.4 (L) 34.0 - 46.6 %    MCV 86.2 79.0 - 97.0 fL    MCH 31.1 26.6 - 33.0 pg    MCHC 36.1 (H) 31.5 - 35.7 g/dL    RDW 12.0 (L) 12.3 - 15.4 %    RDW-SD 37.8 37.0 - 54.0 fl    MPV 8.2 6.0 - 12.0 fL    Platelets 533 (H) 140 - 450 10*3/mm3    Neutrophil % 90.6 (H) 42.7 - 76.0 %    Lymphocyte % 3.2 (L) 19.6 - 45.3 %    Monocyte % 5.6 5.0 - 12.0 %    Eosinophil % 0.0 (L) 0.3 - 6.2 %    Basophil % 0.1 0.0 - 1.5 %    Immature Grans % 0.5 0.0 - 0.5 %    Neutrophils, Absolute 17.04 (H) 1.70 - 7.00 10*3/mm3    Lymphocytes, Absolute 0.60 (L) 0.70 - 3.10 10*3/mm3    Monocytes, Absolute 1.05 (H) 0.10 - 0.90 10*3/mm3    Eosinophils, Absolute 0.00 0.00 - 0.40 10*3/mm3    Basophils, Absolute 0.01 0.00 - 0.20 10*3/mm3    Immature Grans, Absolute 0.10 (H) 0.00 - 0.05 10*3/mm3    nRBC 0.0 0.0 - 0.2 /100 WBC   Urinalysis With Microscopic If Indicated (No Culture) - Urine, Clean Catch    Collection Time: 10/27/22  10:59 PM    Specimen: Urine, Clean Catch   Result Value Ref Range    Color, UA Yellow Yellow, Straw    Appearance, UA Clear Clear    pH, UA >=9.0 (H) 5.0 - 8.0    Specific Gravity, UA 1.009 1.005 - 1.030    Glucose, UA Negative Negative    Ketones, UA Negative Negative    Bilirubin, UA Negative Negative    Blood, UA Negative Negative    Protein, UA Negative Negative    Leuk Esterase, UA Large (3+) (A) Negative    Nitrite, UA Negative Negative    Urobilinogen, UA 0.2 E.U./dL 0.2 - 1.0 E.U./dL   Urinalysis, Microscopic Only - Urine, Clean Catch    Collection Time: 10/27/22 10:59 PM    Specimen: Urine, Clean Catch   Result Value Ref Range    RBC, UA None Seen None Seen, 0-2 /HPF    WBC, UA 13-20 (A) None Seen, 0-2 /HPF    Bacteria, UA None Seen None Seen /HPF    Squamous Epithelial Cells, UA None Seen None Seen, 0-2 /HPF    Hyaline Casts, UA None Seen None Seen /LPF    Methodology Manual Light Microscopy    Lactic Acid, Plasma    Collection Time: 10/28/22  1:19 AM    Specimen: Blood   Result Value Ref Range    Lactate 1.3 0.5 - 2.0 mmol/L   Basic Metabolic Panel    Collection Time: 10/28/22  6:02 AM    Specimen: Blood   Result Value Ref Range    Glucose 97 65 - 99 mg/dL    BUN 7 (L) 8 - 23 mg/dL    Creatinine 0.42 (L) 0.57 - 1.00 mg/dL    Sodium 128 (L) 136 - 145 mmol/L    Potassium 3.1 (L) 3.5 - 5.2 mmol/L    Chloride 94 (L) 98 - 107 mmol/L    CO2 24.0 22.0 - 29.0 mmol/L    Calcium 8.1 (L) 8.6 - 10.5 mg/dL    BUN/Creatinine Ratio 16.7 7.0 - 25.0    Anion Gap 10.0 5.0 - 15.0 mmol/L    eGFR 97.8 >60.0 mL/min/1.73   CBC (No Diff)    Collection Time: 10/28/22  6:02 AM    Specimen: Blood   Result Value Ref Range    WBC 12.24 (H) 3.40 - 10.80 10*3/mm3    RBC 3.25 (L) 3.77 - 5.28 10*6/mm3    Hemoglobin 10.0 (L) 12.0 - 15.9 g/dL    Hematocrit 29.4 (L) 34.0 - 46.6 %    MCV 90.5 79.0 - 97.0 fL    MCH 30.8 26.6 - 33.0 pg    MCHC 34.0 31.5 - 35.7 g/dL    RDW 12.4 12.3 - 15.4 %    RDW-SD 40.6 37.0 - 54.0 fl    MPV 8.0 6.0 - 12.0  fL    Platelets 458 (H) 140 - 450 10*3/mm3       IMPRESSION:  Patient is a 82 y.o. female with right superior and inferior pubic ramus fractures    I discussed the nature of the condition with the patient and her daughter including relevant anatomy, natural history and conservative and surgical treatment options. Conservative treatment would entail weightbearing as tolerated and mobilization with physical therapy. Surgical management would not be indicated for her condition. With respect to vertebral compression fractures, I deferred to the neurosurgical/spine team for evaluation. Following a thorough conversation, questions were solicited and answered to her satisfaction. She voiced understanding of the conversation and stated desire to proceed with conservative care as outlined above.    PLAN:   · Admited to: Stephen Geronimo MD  · Diet: Per Primary Team  · Weight Bearing:  · Defer to spine team whether there is inherent stability in the lumbar spine before mobilizing. From an orthopedic perspective:  · Right Lower Extremity: Weight Bearing As Tolerated  · Left Lower Extremity: Weight Bearing As Tolerated  · Labs: None additional needed  · Imaging: None additional needed  · Surgery: No surgery indicated for this injury  · Physical therapy for mobilization and determination of discharge  · Disposition: Acute, per primary. Orthopedics to sign off at this time. Please have her follow-up with us in 3 weeks in the office.    Lars Knutson MD, PhD  Orthopaedic & Hand Surgery  [facility]  [phone] - [facility] [facility]  [phone] - [place] Office

## 2022-10-28 NOTE — ED PROVIDER NOTES
MD ATTESTATION NOTE    The [facility] and I have discussed this patient's history, physical exam, and treatment plan.  I have reviewed the documentation and personally had a face to face interaction with the patient. I affirm the documentation and agree with the treatment and plan.  The attached note describes my personal findings.    I provided a substantive portion of the care of this patient. I personally performed the physical exam, in its entirety.    Ritu Martino is a 82 y.o. female who presents to the ED c/o lower back pain for the last 6 days.  She reports she previously had a fall with T12 injury.  Family states that they have her scheduled for an epidural next week however her pain has gotten worse over the last 6 days.  She reports it is severe today.  She cannot get comfortable.  The pain radiates down both of her legs.  She went to her primary care doctor today and was directed here for further management.  She has no bowel or bladder incontinence.      On exam:  GENERAL: Awake, alert, moderate pain distress  SKIN: Warm, dry  HENT: Normocephalic, atraumatic  EYES: no scleral icterus  CV: regular rhythm, regular rate  RESPIRATORY: normal effort, lungs clear  ABDOMEN: soft, nontender, nondistended  MUSCULOSKELETAL: no deformity, equal pedal pulses.  Normal sensation and motor.  NEURO: alert, moves all extremities, follows commands    Labs  Recent Results (from the past 24 hour(s))   Urinalysis without microscopic (no culture) - Urine, Clean Catch    Collection Time: 10/27/22  9:57 AM    Specimen: Urine, Clean Catch   Result Value Ref Range    Color, UA Yellow Yellow, Straw    Appearance, UA Clear Clear    pH, UA 7.5 4.6 - 8.0    Specific Gravity, UA 1.020 1.001 - 1.035    Glucose, UA Negative Negative    Ketones, UA Negative Negative    Bilirubin, UA Negative Negative    Blood, UA Trace (A) Negative    Protein, UA 30 mg/dL (1+) (A) Negative    Leuk Esterase, UA Negative Negative    Nitrite, UA Negative  Negative    Urobilinogen, UA 0.2 E.U./dL 0.2 - 1.0 E.U./dL   Vitamin B12    Collection Time: 10/27/22  9:57 AM    Specimen: Arm, Left; Blood   Result Value Ref Range    Vitamin B-12 >2,000 (H) 211 - 946 pg/mL   Folate    Collection Time: 10/27/22  9:57 AM    Specimen: Arm, Left; Blood   Result Value Ref Range    Folate 6.18 4.78 - 24.20 ng/mL   CBC (No Diff)    Collection Time: 10/27/22 11:13 AM    Specimen: Arm, Left; Blood   Result Value Ref Range    WBC 9.23 3.40 - 10.80 10*3/mm3    RBC 3.35 (L) 3.77 - 5.28 10*6/mm3    Hemoglobin 10.3 (L) 12.0 - 15.9 g/dL    Hematocrit 30.7 (L) 34.0 - 46.6 %    MCV 91.6 79.0 - 97.0 fL    MCH 30.7 26.6 - 33.0 pg    MCHC 33.6 31.5 - 35.7 g/dL    RDW 12.4 12.3 - 15.4 %    RDW-SD 41.4 37.0 - 54.0 fl    MPV 8.3 6.0 - 12.0 fL    Platelets 557 (H) 140 - 450 10*3/mm3   Comprehensive Metabolic Panel    Collection Time: 10/27/22 11:13 AM    Specimen: Arm, Left; Blood   Result Value Ref Range    Glucose 117 (H) 65 - 99 mg/dL    BUN 8 8 - 23 mg/dL    Creatinine 0.49 (L) 0.57 - 1.00 mg/dL    Sodium 124 (L) 136 - 145 mmol/L    Potassium 3.5 3.5 - 5.2 mmol/L    Chloride 89 (L) 98 - 107 mmol/L    CO2 24.4 22.0 - 29.0 mmol/L    Calcium 8.4 (L) 8.6 - 10.5 mg/dL    Total Protein 7.2 6.0 - 8.5 g/dL    Albumin 4.60 3.50 - 5.20 g/dL    ALT (SGPT) 20 1 - 33 U/L    AST (SGOT) 19 1 - 32 U/L    Alkaline Phosphatase 174 (H) 39 - 117 U/L    Total Bilirubin 0.5 0.0 - 1.2 mg/dL    Globulin 2.6 gm/dL    A/G Ratio 1.8 g/dL    BUN/Creatinine Ratio 16.3 7.0 - 25.0    Anion Gap 10.6 5.0 - 15.0 mmol/L    eGFR 94.2 >60.0 mL/min/1.73   Lipid Panel    Collection Time: 10/27/22 11:13 AM    Specimen: Arm, Left; Blood   Result Value Ref Range    Total Cholesterol 189 0 - 200 mg/dL    Triglycerides 81 0 - 150 mg/dL    HDL Cholesterol 109 (H) 40 - 60 mg/dL    LDL Cholesterol  66 0 - 100 mg/dL    VLDL Cholesterol 14 5 - 40 mg/dL    LDL/HDL Ratio 0.59    Vitamin D 25 Hydroxy    Collection Time: 10/27/22 11:13 AM    Specimen:  Arm, Left; Blood   Result Value Ref Range    25 Hydroxy, Vitamin D 79.1 30.0 - 100.0 ng/ml   Thyroid Panel With TSH    Collection Time: 10/27/22 11:13 AM    Specimen: Arm, Left; Blood   Result Value Ref Range    TSH 1.320 0.270 - 4.200 uIU/mL    T Uptake 0.99 0.80 - 1.30 TBI    T4, Total 10.00 4.50 - 11.70 mcg/dL   Comprehensive Metabolic Panel    Collection Time: 10/27/22 10:54 PM    Specimen: Blood   Result Value Ref Range    Glucose 116 (H) 65 - 99 mg/dL    BUN 9 8 - 23 mg/dL    Creatinine 0.40 (L) 0.57 - 1.00 mg/dL    Sodium 123 (L) 136 - 145 mmol/L    Potassium 3.9 3.5 - 5.2 mmol/L    Chloride 87 (L) 98 - 107 mmol/L    CO2 23.6 22.0 - 29.0 mmol/L    Calcium 8.8 8.6 - 10.5 mg/dL    Total Protein 7.1 6.0 - 8.5 g/dL    Albumin 4.60 3.50 - 5.20 g/dL    ALT (SGPT) 22 1 - 33 U/L    AST (SGOT) 19 1 - 32 U/L    Alkaline Phosphatase 178 (H) 39 - 117 U/L    Total Bilirubin 0.6 0.0 - 1.2 mg/dL    Globulin 2.5 gm/dL    A/G Ratio 1.8 g/dL    BUN/Creatinine Ratio 22.5 7.0 - 25.0    Anion Gap 12.4 5.0 - 15.0 mmol/L    eGFR 99.0 >60.0 mL/min/1.73   CBC Auto Differential    Collection Time: 10/27/22 10:54 PM    Specimen: Blood   Result Value Ref Range    WBC 18.80 (H) 3.40 - 10.80 10*3/mm3    RBC 3.41 (L) 3.77 - 5.28 10*6/mm3    Hemoglobin 10.6 (L) 12.0 - 15.9 g/dL    Hematocrit 29.4 (L) 34.0 - 46.6 %    MCV 86.2 79.0 - 97.0 fL    MCH 31.1 26.6 - 33.0 pg    MCHC 36.1 (H) 31.5 - 35.7 g/dL    RDW 12.0 (L) 12.3 - 15.4 %    RDW-SD 37.8 37.0 - 54.0 fl    MPV 8.2 6.0 - 12.0 fL    Platelets 533 (H) 140 - 450 10*3/mm3    Neutrophil % 90.6 (H) 42.7 - 76.0 %    Lymphocyte % 3.2 (L) 19.6 - 45.3 %    Monocyte % 5.6 5.0 - 12.0 %    Eosinophil % 0.0 (L) 0.3 - 6.2 %    Basophil % 0.1 0.0 - 1.5 %    Immature Grans % 0.5 0.0 - 0.5 %    Neutrophils, Absolute 17.04 (H) 1.70 - 7.00 10*3/mm3    Lymphocytes, Absolute 0.60 (L) 0.70 - 3.10 10*3/mm3    Monocytes, Absolute 1.05 (H) 0.10 - 0.90 10*3/mm3    Eosinophils, Absolute 0.00 0.00 - 0.40 10*3/mm3     Basophils, Absolute 0.01 0.00 - 0.20 10*3/mm3    Immature Grans, Absolute 0.10 (H) 0.00 - 0.05 10*3/mm3    nRBC 0.0 0.0 - 0.2 /100 WBC   Urinalysis With Microscopic If Indicated (No Culture) - Urine, Clean Catch    Collection Time: 10/27/22 10:59 PM    Specimen: Urine, Clean Catch   Result Value Ref Range    Color, UA Yellow Yellow, Straw    Appearance, UA Clear Clear    pH, UA >=9.0 (H) 5.0 - 8.0    Specific Gravity, UA 1.009 1.005 - 1.030    Glucose, UA Negative Negative    Ketones, UA Negative Negative    Bilirubin, UA Negative Negative    Blood, UA Negative Negative    Protein, UA Negative Negative    Leuk Esterase, UA Large (3+) (A) Negative    Nitrite, UA Negative Negative    Urobilinogen, UA 0.2 E.U./dL 0.2 - 1.0 E.U./dL   Urinalysis, Microscopic Only - Urine, Clean Catch    Collection Time: 10/27/22 10:59 PM    Specimen: Urine, Clean Catch   Result Value Ref Range    RBC, UA None Seen None Seen, 0-2 /HPF    WBC, UA 13-20 (A) None Seen, 0-2 /HPF    Bacteria, UA None Seen None Seen /HPF    Squamous Epithelial Cells, UA None Seen None Seen, 0-2 /HPF    Hyaline Casts, UA None Seen None Seen /LPF    Methodology Manual Light Microscopy    Lactic Acid, Plasma    Collection Time: 10/28/22  1:19 AM    Specimen: Blood   Result Value Ref Range    Lactate 1.3 0.5 - 2.0 mmol/L       Radiology  CT Abdomen Pelvis Without Contrast    Result Date: 10/28/2022  Patient: NUBIA SCHILLING  Time Out: 00:08 Exam(s): CT ABDOMEN + PELVIS Without Contrast EXAM:   CT Abdomen and Pelvis Without Intravenous Contrast CLINICAL HISTORY:    Reason for exam: Abdominal pain and left hip pain. TECHNIQUE:   Axial computed tomography images of the abdomen and pelvis without intravenous contrast.  This CT exam was performed according to the principle of ALARA (As Low As Reasonably Achievable) by using one or more of the following dose reduction techniques: automated exposure control, adjustment of the mA and or kV according to patient size,  and or use of iterative reconstruction technique. COMPARISON:   No relevant prior studies available. FINDINGS:   Lung bases:  Unremarkable.  No mass.  No consolidation.  ABDOMEN:   Liver:  Unremarkable.   Gallbladder and bile ducts:  Unremarkable.  No calcified stones.  No ductal dilation.   Pancreas:  Unremarkable.  No ductal dilation.   Spleen:  Unremarkable.  No splenomegaly.   Adrenals:  Unremarkable.  No mass.   Kidneys and ureters:  Unremarkable.  No obstructing stones.  No hydronephrosis.   Stomach and bowel:  Right upper quadrant ventral hernia contains the cecum.  Distal ileal anastomosis.  No proximal bowel obstruction.  No mucosal thickening.  PELVIS:   Appendix:  No findings to suggest acute appendicitis.   Bladder:  Unremarkable.  No stones.   Reproductive:  Unremarkable as visualized.  ABDOMEN and PELVIS:   Intraperitoneal space:  Unremarkable.  No free air.  No significant fluid collection.   Bones joints:  Moderately displaced right inferior pubic ramus fracture.   Acute on chronic T12, L2 and L4 compression fractures.   Soft tissues:  See above.   Vasculature:  Mild atherosclerosis.  No abdominal aortic aneurysm.   Lymph nodes:  Unremarkable.  No enlarged lymph nodes. IMPRESSION:     1.  Mildly displaced right inferior pubic ramus fracture.  Acute on chronic T12, L2 and L4 compression fractures. 2.  Right ventral abdominal wall hernia contains the cecum.  No proximal obstruction.     Electronically signed by Joey Villa MD on 10-28-22 at 0008    CT Lumbar Spine Without Contrast    Result Date: 10/28/2022  Patient: NUBIA SCHILLING  Time Out: 00:09 Exam(s): CT L SPINE EXAM:   CT Lumbar Spine Without Intravenous Contrast CLINICAL HISTORY:    Reason for exam: Lower back pain. TECHNIQUE:   Axial computed tomography images of the lumbar spine without intravenous contrast.  CTDI is 33.4 mGy and DLP is 1102.4 mGy-cm.  This CT exam was performed according to the principle of ALARA (As Low As Reasonably  Achievable) by using one or more of the following dose reduction techniques: automated exposure control, adjustment of the mA and or kV according to patient size, and or use of iterative reconstruction technique. COMPARISON:   No relevant prior studies available. FINDINGS:   Vertebrae:  Acute on chronic T12, L2 and L4 compression fractures.  Several chronic lumbar compression fractures.   Discs spinal canal neural foramina:  No acute findings.  No spinal canal stenosis.   Soft tissues: Moderate atherosclerosis. IMPRESSION:       Acute on chronic T12, L2 and L4 compression fractures.  Consider follow- up MRI lumbar spine.     Electronically signed by Joey Villa MD on 10-28-22 at 0009      Medical Decision Making:  ED Course as of 10/28/22 0228   Thu Oct 27, 2022   2235 BP: 166/90 [RC]   2236 Temp: 97.9 °F (36.6 °C) [RC]   2236 Heart Rate: 96 [RC]   2236 Resp: 18 [RC]   2236 SpO2: 98 %  RA [RC]   Fri Oct 28, 2022   0031 Leukocytes, UA(!): Large (3+) [RC]   0031 WBC(!): 18.80  Patient does states she received a steroid shot and has been on 2 doses of steroids which may account for some of this elevated white blood cell count.  However, given her dysuria and +3 leukocytes in the urine, again we will go ahead and cover for infectious process. [RC]   0031 RBC(!): 3.41 [RC]   0031 Hemoglobin(!): 10.6 [RC]   0031 Hematocrit(!): 29.4 [RC]   0031 Platelets(!): 533 [RC]   0031 WBC noted in all 3+ leuks in her urine.  We will place a lactic acid, another 500 cc of saline in order Rocephin [RC]   0033 Patient has right displaced right inferior pubic ramus fracture, acute on chronic compression fractures at T12, L2, L4.  There is also a ventral wall hernia containing cecum but does not appear to be obstructing [RC]   0120 Discussed the patient's case with FIONA Del Cid with A.  To admit to Dr. Castro's care [RC]      ED Course User Index  [RC] Román Doss III, PA       Plan to check chemistries, blood  counts, urinalysis.  We will CT her lumbar spine to rule out any new fracture.  She will certainly require admission today given her severe pain and decreased mobility.  Plan to provide her IV pain medication to help control her symptoms.    Procedures:  Procedures      PPE: The patient wore a mask and I wore an N95 mask throughout the entire patient encounter.      The patient has started, but not completed, their COVID-19 vaccination series.    Diagnosis  Final diagnoses:   Acute UTI   Compression fracture of T12 vertebra, initial encounter ([facility])   Closed compression fracture of L2 vertebra, initial encounter ([facility])   Closed compression fracture of L4 vertebra, initial encounter ([facility])   Closed fracture of right inferior pubic ramus, initial encounter ([facility])       Note Disclaimer: At [facility], we believe that sharing information builds trust and better relationships. You are receiving this note because you recently visited [facility]. It is possible you will see health information before a provider has talked with you about it. This kind of information can be easy to misunderstand. To help you fully understand what it means for your health, we urge you to discuss this note with your provider.     Nikhil Mazariegos MD  10/28/22 0228

## 2022-10-28 NOTE — CONSULTS
Pt seen/examined.  Sitting comfortably in bed.  Significant other @ bedside.      Pt reports admitted for terrible back/right leg pain that started approximately 10 days ago.  Has had some degree of back pain for several months but intensified recently & became unbearable.  She is scheduled to have an epidural at the [facility] on 11/2.  She expresses desire to have that procedure done here instead.  Noted to have UTI & has been started on rocephin.      /81   Pulse 72   Temp 36.4 °C (97.6 °F) (Oral)   Resp 18   LMP  (LMP Unknown)   SpO2 100%     CT lumbar spine 10/27/22: IMPRESSION:         Acute on chronic T12, L2 and L4 compression fractures.     A: chronic low back pain, lumbar radiculopathy  P:  Will need to wait 2-3 days prior to epidural steroid placement d/t just starting abx.  Will plan/schedule lumbar epidural steroid injection for Monday @ the [facility] management [facility].  Pt desires sedation & was advised about npo status/guidelines.

## 2022-10-28 NOTE — H&P
Patient Name:  Ritu Martino  YOB: 1940  MRN:  [id number]  Admit Date:  10/27/2022  Patient Care Team:  Gaurav Barger MD as PCP - General (Internal Medicine)  Uche Garcia MD as Consulting Physician (Endocrinology)      Subjective   History Present Illness     Chief Complaint   Patient presents with   • Back Pain       Ms. Martino is a 82 y.o. former smoker with a history of hypertension, hypothyroidism, hiatal hernia, seizure disorder, and hyponatremia that presents to [facility] complaining of back pain.  She reports progressively worsening back pain for the past 6 days.  She states she was previously diagnosed with a T12 compression fracture due to a fall and was scheduled to have an epidural next week.  She reports she was seen by her PCP yesterday and encouraged to come to the ED due to the worsening pain.  She describes the pain as constant, severe, and aching in nautre.  She states the pain radiates into bilateral legs but is worse on the right.  Movement exacerbates her pain and her home pain medication is no longer alleviating.  She denies falling again.  She reports dysuria,  denies fever, chills, and loss of bowel and bladder control.  A CT performed in the ED revealed acute on chronic compression fractures of T12, L2, and L4 and a mildly displaced right inferior pubic ramus fracture.      History of Present Illness  Review of Systems   Constitutional: Negative for chills and fever.   HENT: Negative for congestion and sore throat.    Eyes: Negative for photophobia and visual disturbance.   Respiratory: Negative for cough and shortness of breath.    Cardiovascular: Negative for chest pain, palpitations and leg swelling.   Gastrointestinal: Negative for abdominal pain, nausea and vomiting.   Endocrine: Negative for polydipsia, polyphagia and polyuria.   Genitourinary: Positive for dysuria. Negative for decreased urine volume, hematuria and urgency.    Musculoskeletal: Positive for arthralgias, back pain, gait problem and myalgias.   Skin: Negative for rash and wound.   Neurological: Positive for weakness (generalized). Negative for dizziness, light-headedness, numbness and headaches.        Personal History     Past Medical History:   Diagnosis Date   • Anemia    • Anxiety    • Arthritis    • Bowel dysfunction 2021    since having surgery for diverticular infection   • Chronic constipation    • Diverticulitis 2021    w/ rupture and infection required surgery and ostomy   • Essential hypertension, benign    • Fracture 2022    left ankle   • Fracture, tibia and fibula Now wearing bood   • GERD (gastroesophageal reflux disease)    • Hernia, hiatal    • Hypercholesterolemia    • Hypokalemia    • Hyponatremia     chronic low with occasional normal level   • Hypothyroidism     estimated time frame pt nor  could remember exactly how long has been on medication   • Insomnia    • Iron deficiency    • Seizures (HCC)    • Tear of meniscus of knee    • 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     Past Surgical History:   Procedure Laterality Date   • APPENDECTOMY     • BACK SURGERY     •  SECTION     • COLON SURGERY  2021    to address ruptured and infected diverticular dz, ostomy also placed   • COLONOSCOPY     • COLOSTOMY CLOSURE  10/2021    ostomy removed   • EYE SURGERY  2 X cataract   • HEMORRHOIDECTOMY     • KNEE ARTHROPLASTY     • TONSILLECTOMY       Family History   Problem Relation Age of Onset   • Brain cancer Mother    • Cancer Mother         Brain tumor,    • Stroke Father         Age 46   • Early death Father         Stroke, age 46   • Cancer Brother         pancreatic   • Cancer Daughter         Dec'd 2000     Social History     Tobacco Use   • Smoking status: Former     Packs/day: 0.25     Years: 15.00     Pack years: 3.75     Types: Cigarettes     Start date: 1956     Quit date: 1971      Years since quittin.8   • Smokeless tobacco: Never   Vaping Use   • Vaping Use: Never used   Substance Use Topics   • Alcohol use: Not Currently     Alcohol/week: 8.0 standard drinks     Types: 4 Glasses of wine, 4 Shots of liquor per week     Comment: Stopped 3 months ago.   • Drug use: No     (Not in a hospital admission)    Allergies:  No Known Allergies    Objective    Objective     Vital Signs  Temp:  [97.9 °F (36.6 °C)-98.6 °F (37 °C)] 97.9 °F (36.6 °C)  Heart Rate:  [] 90  Resp:  [18] 18  BP: (144-166)/(77-93) 162/77  SpO2:  [98 %-100 %] 100 %  on   ;      There is no height or weight on file to calculate BMI.    Physical Exam  Vitals and nursing note reviewed.   Constitutional:       Appearance: Normal appearance.   HENT:      Head: Normocephalic and atraumatic.      Nose: Nose normal.      Mouth/Throat:      Mouth: Mucous membranes are moist.      Pharynx: Oropharynx is clear.   Eyes:      Extraocular Movements: Extraocular movements intact.      Conjunctiva/sclera: Conjunctivae normal.   Cardiovascular:      Rate and Rhythm: Normal rate and regular rhythm.      Pulses: Normal pulses.      Heart sounds: Normal heart sounds.   Pulmonary:      Effort: Pulmonary effort is normal.      Breath sounds: Normal breath sounds.   Abdominal:      General: Bowel sounds are normal.      Palpations: Abdomen is soft.   Musculoskeletal:         General: No tenderness.      Cervical back: Normal range of motion and neck supple.      Right lower leg: No edema.      Left lower leg: No edema.   Skin:     General: Skin is warm and dry.   Neurological:      General: No focal deficit present.      Mental Status: She is alert and oriented to person, place, and time.   Psychiatric:         Mood and Affect: Mood normal.         Behavior: Behavior normal.         Results Review:  I reviewed the patient's new clinical results.  I reviewed the patient's new imaging results and agree with the interpretation.  I reviewed the  patient's other test results and agree with the interpretation  I personally viewed and interpreted the patient's EKG/Telemetry data  Discussed with ED provider.    Lab Results (last 24 hours)     Procedure Component Value Units Date/Time    Urinalysis without microscopic (no culture) - Urine, Clean Catch [[id number]]  (Abnormal) Collected: 10/27/22 0957    Specimen: Urine, Clean Catch Updated: 10/27/22 1111     Color, UA Yellow     Appearance, UA Clear     pH, UA 7.5     Specific Gravity, UA 1.020     Glucose, UA Negative     Ketones, UA Negative     Bilirubin, UA Negative     Blood, UA Trace     Protein, UA 30 mg/dL (1+)     Leuk Esterase, UA Negative     Nitrite, UA Negative     Urobilinogen, UA 0.2 E.U./dL    Urine Culture - Urine, Urine, Clean Catch [[id number]] Collected: 10/27/22 0957    Specimen: Urine, Clean Catch Updated: 10/27/22 0957    Vitamin B12 [[id number]]  (Abnormal) Collected: 10/27/22 0957    Specimen: Blood from Arm, Left Updated: 10/27/22 1343     Vitamin B-12 >2,000 pg/mL     Narrative:      Results may be falsely increased if patient taking Biotin.      Folate [[id number]]  (Normal) Collected: 10/27/22 0957    Specimen: Blood from Arm, Left Updated: 10/27/22 1343     Folate 6.18 ng/mL     Narrative:      Results may be falsely increased if patient taking Biotin.      CBC (No Diff) [[id number]]  (Abnormal) Collected: 10/27/22 1113    Specimen: Blood from Arm, Left Updated: 10/27/22 1303     WBC 9.23 10*3/mm3      RBC 3.35 10*6/mm3      Hemoglobin 10.3 g/dL      Hematocrit 30.7 %      MCV 91.6 fL      MCH 30.7 pg      MCHC 33.6 g/dL      RDW 12.4 %      RDW-SD 41.4 fl      MPV 8.3 fL      Platelets 557 10*3/mm3     Comprehensive Metabolic Panel [[id number]]  (Abnormal) Collected: 10/27/22 1113    Specimen: Blood from Arm, Left Updated: 10/27/22 1320     Glucose 117 mg/dL      BUN 8 mg/dL      Creatinine 0.49 mg/dL      Sodium 124 mmol/L      Potassium 3.5 mmol/L      Chloride 89 mmol/L      CO2 24.4  mmol/L      Calcium 8.4 mg/dL      Total Protein 7.2 g/dL      Albumin 4.60 g/dL      ALT (SGPT) 20 U/L      AST (SGOT) 19 U/L      Alkaline Phosphatase 174 U/L      Total Bilirubin 0.5 mg/dL      Globulin 2.6 gm/dL      A/G Ratio 1.8 g/dL      BUN/Creatinine Ratio 16.3     Anion Gap 10.6 mmol/L      eGFR 94.2 mL/min/1.73      Comment: National Kidney Foundation and American Society of Nephrology (ASN) Task Force recommended calculation based on the Chronic Kidney Disease Epidemiology Collaboration (CKD-EPI) equation refit without adjustment for race.       Narrative:      GFR Normal >60  Chronic Kidney Disease <60  Kidney Failure <15    The GFR formula is only valid for adults with stable renal function between ages 18 and 70.    Lipid Panel [[id number]]  (Abnormal) Collected: 10/27/22 1113    Specimen: Blood from Arm, Left Updated: 10/27/22 1320     Total Cholesterol 189 mg/dL      Triglycerides 81 mg/dL      HDL Cholesterol 109 mg/dL      LDL Cholesterol  66 mg/dL      VLDL Cholesterol 14 mg/dL      LDL/HDL Ratio 0.59    Narrative:      Cholesterol Reference Ranges  (U.S. Department of Health and Human Services ATP III Classifications)    Desirable          <200 mg/dL  Borderline High    200-239 mg/dL  High Risk          >240 mg/dL      Triglyceride Reference Ranges  (U.S. Department of Health and Human Services ATP III Classifications)    Normal           <150 mg/dL  Borderline High  150-199 mg/dL  High             200-499 mg/dL  Very High        >500 mg/dL    HDL Reference Ranges  (U.S. Department of Health and Human Services ATP III Classifications)    Low     <40 mg/dl (major risk factor for CHD)  High    >60 mg/dl ('negative' risk factor for CHD)        LDL Reference Ranges  (U.S. Department of Health and Human Services ATP III Classifications)    Optimal          <100 mg/dL  Near Optimal     100-129 mg/dL  Borderline High  130-159 mg/dL  High             160-189 mg/dL  Very High        >189 mg/dL    Vitamin  D 25 Hydroxy [[id number]]  (Normal) Collected: 10/27/22 1113    Specimen: Blood from Arm, Left Updated: 10/27/22 1337     25 Hydroxy, Vitamin D 79.1 ng/ml     Narrative:      Reference Range for Total Vitamin D 25(OH)     Deficiency <20.0 ng/mL   Insufficiency 21-29 ng/mL   Sufficiency  ng/mL  Toxicity >100 ng/ml    Results may be falsely increased if patient taking Biotin.      Thyroid Panel With TSH [[id number]]  (Normal) Collected: 10/27/22 1113    Specimen: Blood from Arm, Left Updated: 10/27/22 1330     TSH 1.320 uIU/mL      T Uptake 0.99 TBI      T4, Total 10.00 mcg/dL      Comment: T4 results may be falsely increased if patient taking Biotin.       CBC & Differential [[id number]]  (Abnormal) Collected: 10/27/22 2254    Specimen: Blood Updated: 10/27/22 2333    Narrative:      The following orders were created for panel order CBC & Differential.  Procedure                               Abnormality         Status                     ---------                               -----------         ------                     CBC Auto Differential[[id number]]        Abnormal            Final result                 Please view results for these tests on the individual orders.    Comprehensive Metabolic Panel [[id number]]  (Abnormal) Collected: 10/27/22 2254    Specimen: Blood Updated: 10/27/22 2336     Glucose 116 mg/dL      BUN 9 mg/dL      Creatinine 0.40 mg/dL      Sodium 123 mmol/L      Potassium 3.9 mmol/L      Comment: Slight hemolysis detected by analyzer. Results may be affected.        Chloride 87 mmol/L      CO2 23.6 mmol/L      Calcium 8.8 mg/dL      Total Protein 7.1 g/dL      Albumin 4.60 g/dL      ALT (SGPT) 22 U/L      AST (SGOT) 19 U/L      Alkaline Phosphatase 178 U/L      Total Bilirubin 0.6 mg/dL      Globulin 2.5 gm/dL      A/G Ratio 1.8 g/dL      BUN/Creatinine Ratio 22.5     Anion Gap 12.4 mmol/L      eGFR 99.0 mL/min/1.73      Comment: National Kidney Foundation and American Society of Nephrology  (ASN) Task Force recommended calculation based on the Chronic Kidney Disease Epidemiology Collaboration (CKD-EPI) equation refit without adjustment for race.       Narrative:      GFR Normal >60  Chronic Kidney Disease <60  Kidney Failure <15    The GFR formula is only valid for adults with stable renal function between ages 18 and 70.    CBC Auto Differential [[id number]]  (Abnormal) Collected: 10/27/22 2254    Specimen: Blood Updated: 10/27/22 2333     WBC 18.80 10*3/mm3      RBC 3.41 10*6/mm3      Hemoglobin 10.6 g/dL      Hematocrit 29.4 %      MCV 86.2 fL      MCH 31.1 pg      MCHC 36.1 g/dL      RDW 12.0 %      RDW-SD 37.8 fl      MPV 8.2 fL      Platelets 533 10*3/mm3      Neutrophil % 90.6 %      Lymphocyte % 3.2 %      Monocyte % 5.6 %      Eosinophil % 0.0 %      Basophil % 0.1 %      Immature Grans % 0.5 %      Neutrophils, Absolute 17.04 10*3/mm3      Lymphocytes, Absolute 0.60 10*3/mm3      Monocytes, Absolute 1.05 10*3/mm3      Eosinophils, Absolute 0.00 10*3/mm3      Basophils, Absolute 0.01 10*3/mm3      Immature Grans, Absolute 0.10 10*3/mm3      nRBC 0.0 /100 WBC     Urinalysis With Microscopic If Indicated (No Culture) - Urine, Clean Catch [[id number]]  (Abnormal) Collected: 10/27/22 2259    Specimen: Urine, Clean Catch Updated: 10/27/22 2334     Color, UA Yellow     Appearance, UA Clear     pH, UA >=9.0     Specific Gravity, UA 1.009     Glucose, UA Negative     Ketones, UA Negative     Bilirubin, UA Negative     Blood, UA Negative     Protein, UA Negative     Leuk Esterase, UA Large (3+)     Nitrite, UA Negative     Urobilinogen, UA 0.2 E.U./dL    Urinalysis, Microscopic Only - Urine, Clean Catch [[id number]]  (Abnormal) Collected: 10/27/22 2259    Specimen: Urine, Clean Catch Updated: 10/28/22 0109     RBC, UA None Seen /HPF      WBC, UA 13-20 /HPF      Bacteria, UA None Seen /HPF      Squamous Epithelial Cells, UA None Seen /HPF      Hyaline Casts, UA None Seen /LPF      Methodology Manual Light  Microscopy    Urine Culture - Urine, Urine, Clean Catch [[id number]] Collected: 10/27/22 2259    Specimen: Urine, Clean Catch Updated: 10/28/22 0144    Blood Culture - Blood, Arm, Left [[id number]] Collected: 10/28/22 0119    Specimen: Blood from Arm, Left Updated: 10/28/22 0130    Lactic Acid, Plasma [[id number]]  (Normal) Collected: 10/28/22 0119    Specimen: Blood Updated: 10/28/22 0151     Lactate 1.3 mmol/L     Blood Culture - Blood, Arm, Right [[id number]] Collected: 10/28/22 0123    Specimen: Blood from Arm, Right Updated: 10/28/22 0136          Imaging Results (Last 24 Hours)     Procedure Component Value Units Date/Time    CT Lumbar Spine Without Contrast [[id number]] Collected: 10/28/22 0010     Updated: 10/28/22 0010    Narrative:        Patient: NUBIA SCHILLING  Time Out: 00:09  Exam(s): CT L SPINE     EXAM:    CT Lumbar Spine Without Intravenous Contrast    CLINICAL HISTORY:     Reason for exam: Lower back pain.    TECHNIQUE:    Axial computed tomography images of the lumbar spine without   intravenous contrast.  CTDI is 33.4 mGy and DLP is 1102.4 mGy-cm.  This   CT exam was performed according to the principle of ALARA (As Low As   Reasonably Achievable) by using one or more of the following dose   reduction techniques: automated exposure control, adjustment of the mA   and or kV according to patient size, and or use of iterative   reconstruction technique.    COMPARISON:    No relevant prior studies available.    FINDINGS:    Vertebrae:  Acute on chronic T12, L2 and L4 compression fractures.    Several chronic lumbar compression fractures.    Discs spinal canal neural foramina:  No acute findings.  No spinal   canal stenosis.    Soft tissues: Moderate atherosclerosis.    IMPRESSION:         Acute on chronic T12, L2 and L4 compression fractures.  Consider follow-  up MRI lumbar spine.      Impression:          Electronically signed by Joey Villa MD on 10-28-22 at 0009    CT Abdomen Pelvis Without  Contrast [[id number]] Collected: 10/28/22 0009     Updated: 10/28/22 0009    Narrative:        Patient: NUBIA SCHILLING  Time Out: 00:08  Exam(s): CT ABDOMEN + PELVIS Without Contrast     EXAM:    CT Abdomen and Pelvis Without Intravenous Contrast    CLINICAL HISTORY:     Reason for exam: Abdominal pain and left hip pain.    TECHNIQUE:    Axial computed tomography images of the abdomen and pelvis without   intravenous contrast.  This CT exam was performed according to the   principle of ALARA (As Low As Reasonably Achievable) by using one or more   of the following dose reduction techniques: automated exposure control,   adjustment of the mA and or kV according to patient size, and or use of   iterative reconstruction technique.    COMPARISON:    No relevant prior studies available.    FINDINGS:    Lung bases:  Unremarkable.  No mass.  No consolidation.     ABDOMEN:    Liver:  Unremarkable.    Gallbladder and bile ducts:  Unremarkable.  No calcified stones.  No   ductal dilation.    Pancreas:  Unremarkable.  No ductal dilation.    Spleen:  Unremarkable.  No splenomegaly.    Adrenals:  Unremarkable.  No mass.    Kidneys and ureters:  Unremarkable.  No obstructing stones.  No   hydronephrosis.    Stomach and bowel:  Right upper quadrant ventral hernia contains the   cecum.  Distal ileal anastomosis.  No proximal bowel obstruction.  No   mucosal thickening.     PELVIS:    Appendix:  No findings to suggest acute appendicitis.    Bladder:  Unremarkable.  No stones.    Reproductive:  Unremarkable as visualized.     ABDOMEN and PELVIS:    Intraperitoneal space:  Unremarkable.  No free air.  No significant   fluid collection.    Bones joints:  Moderately displaced right inferior pubic ramus fracture.    Acute on chronic T12, L2 and L4 compression fractures.    Soft tissues:  See above.    Vasculature:  Mild atherosclerosis.  No abdominal aortic aneurysm.    Lymph nodes:  Unremarkable.  No enlarged lymph  nodes.    IMPRESSION:       1.  Mildly displaced right inferior pubic ramus fracture.  Acute on   chronic T12, L2 and L4 compression fractures.  2.  Right ventral abdominal wall hernia contains the cecum.  No proximal   obstruction.      Impression:          Electronically signed by Joey Villa MD on 10-28-22 at 0008              No orders to display        Assessment/Plan     Active Hospital Problems    Diagnosis  POA   • **Back pain [M54.9]  Unknown   • Acute UTI [N39.0]  Yes   • Hernia, hiatal [K44.9]  Yes   • Seizures (HCC) [R56.9]  Yes   • Essential hypertension, benign [I10]  Yes   • Hypothyroidism [E03.9]  Yes   • Hyponatremia [E87.1]  Yes     chronic low with occasional normal level         Back Pain/Lumbar Compression Fractures  -Admit to a telemetry unit for monitoring  -Neurosurgery consult  -Neurovascular checks  -Pelvic fracture on CT, consult orthopedic surgery  -PRN analgesia  -PT/OT consults    Acute UTI  -Urinalysis is not overly convincing for UTI, but patient reports dysuria  -Leukocytosis, may be related to recent steroid use, lactate ok  -Will continue Rocephin 1 gm daily for now  -Urine and blood cultures pending  -Add pyridium    Hiatal Hernia  -CT A/P shows right ventral abdominal wall hernia containing the cecum without obstruction  -She denies abdominal pain, monitor for now    Hyponatremia  -Chronic. Baseline Na around 128  -Continue home salt tablets  -Initiate fluid restriction of 1L  -Repeat lab work in AM    Hypertension  -Blood pressures stable. Continue home regimen  -Monitor    Seizures  -History of meningioma  -She was told she no longer requires seizure medications    Hypothyroidism  -Continue Levothyroxine      -I discussed the patients findings and my recommendations with patient.    VTE Prophylaxis - SCDs.  Code Status - Full code.       FIONA Abreu  [facility]  10/28/22  02:49 EDT

## 2022-10-28 NOTE — ED NOTES
Nursing report ED to floor  Ritu Martino  82 y.o.  female    HPI :   Chief Complaint   Patient presents with    Back Pain       Admitting doctor:   Ttayana Castro MD    Admitting diagnosis:   The primary encounter diagnosis was Acute UTI. Diagnoses of Compression fracture of T12 vertebra, initial encounter (HCC), Closed compression fracture of L2 vertebra, initial encounter (formerly [facility]), Closed compression fracture of L4 vertebra, initial encounter (formerly [facility]), and Closed fracture of right inferior pubic ramus, initial encounter (formerly [facility]) were also pertinent to this visit.    Code status:   Current Code Status       Date Active Code Status Order ID Comments User Context       10/28/2022 0107 CPR (Attempt to Resuscitate) [id number]  Marianne Henning APRN ED        Question Answer    Code Status (Patient has no pulse and is not breathing) CPR (Attempt to Resuscitate)    Medical Interventions (Patient has pulse or is breathing) Full Support                    Allergies:   Patient has no known allergies.    Isolation:   No active isolations    Intake and Output  No intake or output data in the 24 hours ending 10/28/22 0205    Weight:   There were no vitals filed for this visit.    Most recent vitals:   Vitals:    10/27/22 1946 10/27/22 2227 10/27/22 2256   BP: 166/90 144/93 162/77   Pulse: 96 90 90   Resp: 18     Temp: 97.9 °F (36.6 °C)     TempSrc: Tympanic     SpO2: 98% 99% 100%       Active LDAs/IV Access:   Lines, Drains & Airways       Active LDAs       Name Placement date Placement time Site Days    Peripheral IV 10/27/22 2239 Right Antecubital 10/27/22  2239  Antecubital  less than 1    External Urinary Catheter 10/27/22  2241  --  less than 1                    Labs (abnormal labs have a star):   Labs Reviewed   COMPREHENSIVE METABOLIC PANEL - Abnormal; Notable for the following components:       Result Value    Glucose 116 (*)     Creatinine 0.40 (*)     Sodium 123 (*)     Chloride 87 (*)     Alkaline Phosphatase  178 (*)     All other components within normal limits    Narrative:     GFR Normal >60  Chronic Kidney Disease <60  Kidney Failure <15    The GFR formula is only valid for adults with stable renal function between ages 18 and 70.   CBC WITH AUTO DIFFERENTIAL - Abnormal; Notable for the following components:    WBC 18.80 (*)     RBC 3.41 (*)     Hemoglobin 10.6 (*)     Hematocrit 29.4 (*)     MCHC 36.1 (*)     RDW 12.0 (*)     Platelets 533 (*)     Neutrophil % 90.6 (*)     Lymphocyte % 3.2 (*)     Eosinophil % 0.0 (*)     Neutrophils, Absolute 17.04 (*)     Lymphocytes, Absolute 0.60 (*)     Monocytes, Absolute 1.05 (*)     Immature Grans, Absolute 0.10 (*)     All other components within normal limits   URINALYSIS W/ MICROSCOPIC IF INDICATED (NO CULTURE) - Abnormal; Notable for the following components:    pH, UA >=9.0 (*)     Leuk Esterase, UA Large (3+) (*)     All other components within normal limits   URINALYSIS, MICROSCOPIC ONLY - Abnormal; Notable for the following components:    WBC, UA 13-20 (*)     All other components within normal limits   LACTIC ACID, PLASMA - Normal   BLOOD CULTURE   BLOOD CULTURE   URINE CULTURE   BASIC METABOLIC PANEL   CBC (NO DIFF)   CBC AND DIFFERENTIAL    Narrative:     The following orders were created for panel order CBC & Differential.  Procedure                               Abnormality         Status                     ---------                               -----------         ------                     CBC Auto Differential[[id number]]        Abnormal            Final result                 Please view results for these tests on the individual orders.       EKG:   No orders to display       Meds given in ED:   Medications   sodium chloride 0.9 % flush 10 mL (has no administration in time range)   sodium chloride 0.9 % flush 10 mL (has no administration in time range)   sodium chloride 0.9 % flush 10 mL (10 mL Intravenous Given 10/28/22 0113)   nitroglycerin (NITROSTAT) SL  tablet 0.4 mg (has no administration in time range)   acetaminophen (TYLENOL) tablet 650 mg (has no administration in time range)     Or   acetaminophen (TYLENOL) 160 MG/5ML solution 650 mg (has no administration in time range)     Or   acetaminophen (TYLENOL) suppository 650 mg (has no administration in time range)   ondansetron (ZOFRAN) tablet 4 mg (has no administration in time range)     Or   ondansetron (ZOFRAN) injection 4 mg (has no administration in time range)   calcium carbonate (TUMS) chewable tablet 500 mg (200 mg elemental) (has no administration in time range)   morphine injection 2 mg (has no administration in time range)   HYDROcodone-acetaminophen (NORCO) 5-325 MG per tablet 1 tablet (has no administration in time range)   sodium chloride 0.9 % infusion (has no administration in time range)   ondansetron (ZOFRAN) injection 4 mg (4 mg Intravenous Given 10/27/22 2254)   HYDROmorphone (DILAUDID) injection 0.5 mg (0.5 mg Intravenous Given 10/27/22 2255)   sodium chloride 0.9 % bolus 500 mL (500 mL Intravenous New Bag 10/27/22 2256)   fentaNYL citrate (PF) (SUBLIMAZE) injection 25 mcg (25 mcg Intravenous Given 10/27/22 2335)   HYDROmorphone (DILAUDID) injection 0.5 mg (0.5 mg Intravenous Given 10/28/22 0107)   cefTRIAXone (ROCEPHIN) 1 g in sodium chloride 0.9 % 100 mL IVPB-VTB (1 g Intravenous New Bag 10/28/22 0124)       Imaging results:  CT Abdomen Pelvis Without Contrast    Result Date: 10/28/2022  Electronically signed by Joey Villa MD on 10-28-22 at 0008    CT Lumbar Spine Without Contrast    Result Date: 10/28/2022  Electronically signed by Joey Villa MD on 10-28-22 at 0009     Ambulatory status:   - bedrest    Social issues:   Social History     Socioeconomic History    Marital status:    Tobacco Use    Smoking status: Former     Packs/day: 0.25     Years: 15.00     Pack years: 3.75     Types: Cigarettes     Start date: 1/1/1956     Quit date: 1/1/1971     Years since quitting:  51.8    Smokeless tobacco: Never   Vaping Use    Vaping Use: Never used   Substance and Sexual Activity    Alcohol use: Not Currently     Alcohol/week: 8.0 standard drinks     Types: 4 Glasses of wine, 4 Shots of liquor per week     Comment: Stopped 3 months ago.    Drug use: No    Sexual activity: Not Currently     Partners: Male       NIH Stroke Scale:         Riya Cerna RN  10/28/22 02:05 EDT

## 2022-10-28 NOTE — ED NOTES
Attempted to call nurse to verify receipt of report, nurse unavailable. Will call back in 10 minutes.

## 2022-10-28 NOTE — THERAPY EVALUATION
Patient Name: Ritu Martino  : 1940    MRN: [id number]                              Today's Date: 10/28/2022       Admit Date: 10/27/2022    Visit Dx:     ICD-10-CM ICD-9-CM   1. Acute UTI  N39.0 599.0   2. Compression fracture of T12 vertebra, initial encounter ([facility])  S22.080A 805.2   3. Closed compression fracture of L2 vertebra, initial encounter ([facility])  S32.020A 805.4   4. Closed compression fracture of L4 vertebra, initial encounter ([facility])  S32.040A 805.4   5. Closed fracture of right inferior pubic ramus, initial encounter ([facility])  S32.591A 808.2     Patient Active Problem List   Diagnosis   • Anxiety   • Hypothyroidism   • Hyponatremia   • Iron deficiency   • Hypokalemia   • Essential hypertension, benign   • Fluent aphasia   • Hypochloremia   • Benzodiazepine dependence ([facility])   • Anemia   • Cervicalgia   • Intertrigo   • Insomnia   • Acute cystitis   • E coli infection   • Diverticulitis   • Arthritis   • Bowel dysfunction   • GERD (gastroesophageal reflux disease)   • Hernia, hiatal   • Seizures ([facility])   • Meningioma ([facility])   • Closed fracture of left distal fibula   • Debility   • History of seizure   • Fall   • Tachycardia   • Abdominal hernia   • Compression fracture of L1 lumbar vertebra, open, initial encounter ([facility])   • Compression fracture of T12 vertebra ([facility])   • Osteopenia   • Acute UTI   • Back pain   • Compression fracture of L2 and L4 lumbar vertebra ([facility])   • Closed fracture of right inferior pubic ramus ([facility])     Past Medical History:   Diagnosis Date   • Anemia    • Anxiety    • Arthritis    • Bowel dysfunction 2021    since having surgery for diverticular infection   • Chronic constipation    • Diverticulitis 2021    w/ rupture and infection required surgery and ostomy   • Essential hypertension, benign    • Fracture 2022    left ankle   • Fracture, tibia and fibula Now wearing bood   • GERD (gastroesophageal reflux disease)    • Hernia, hiatal    • Hypercholesterolemia     • Hypokalemia    • Hyponatremia     chronic low with occasional normal level   • Hypothyroidism     estimated time frame pt nor  could remember exactly how long has been on medication   • Insomnia    • Iron deficiency    • Seizures (HCC)    • Tear of meniscus of knee    • 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     Past Surgical History:   Procedure Laterality Date   • APPENDECTOMY     • BACK SURGERY     •  SECTION     • COLON SURGERY  2021    to address ruptured and infected diverticular dz, ostomy also placed   • COLONOSCOPY     • COLOSTOMY CLOSURE  10/2021    ostomy removed   • EYE SURGERY  2 X cataract   • HEMORRHOIDECTOMY     • KNEE ARTHROPLASTY     • TONSILLECTOMY        General Information     Row Name 10/28/22 155          Physical Therapy Time and Intention    Document Type evaluation  -CF     Mode of Treatment individual therapy;physical therapy  -CF     Row Name 10/28/22 155          General Information    Patient Profile Reviewed yes  -CF     Prior Level of Function independent:;transfer;all household mobility;bed mobility  Has been using a walker last 6 months  -CF     Existing Precautions/Restrictions fall  abdominal binder for back brace  -CF     Barriers to Rehab none identified  -CF     Row Name 10/28/22 155          Living Environment    People in Home spouse  -CF     Row Name 10/28/22 155          Cognition    Orientation Status (Cognition) oriented x 3  -CF     Row Name 10/28/22 155          Safety Issues, Functional Mobility    Safety Issues Affecting Function (Mobility) insight into deficits/self-awareness;safety precautions follow-through/compliance  -CF     Impairments Affecting Function (Mobility) balance;endurance/activity tolerance;pain;strength  -CF           User Key  (r) = Recorded By, (t) = Taken By, (c) = Cosigned By    Initials Name Provider Type    CF Tram Redman PT Physical Therapist               Mobility     Row Name  10/28/22 1553          Bed Mobility    Bed Mobility supine-sit;sit-supine  -CF     Supine-Sit [place] (Bed Mobility) standby assist;verbal cues  -CF     Sit-Supine [place] (Bed Mobility) standby assist;verbal cues  -CF     Assistive Device (Bed Mobility) head of bed elevated  -CF     Comment, (Bed Mobility) Extra time to complete  -     Row Name 10/28/22 1553          Sit-Stand Transfer    Sit-Stand [place] (Transfers) contact guard  -CF     Assistive Device (Sit-Stand Transfers) walker, front-wheeled  -CF     Row Name 10/28/22 1553          Gait/Stairs (Locomotion)    [place] Level (Gait) contact guard  -CF     Assistive Device (Gait) walker, front-wheeled  -CF     Distance in Feet (Gait) 25'  -     Deviations/Abnormal Patterns (Gait) maddy decreased;gait speed decreased;antalgic;stride length decreased  -     Right Sided Gait Deviations weight shift ability decreased  -CF     Comment, (Gait/Stairs) Reports pain in R groin with weightbearing  -     Row Name 10/28/22 1553          Mobility    Extremity Weight-bearing Status right lower extremity  -CF     Right Lower Extremity (Weight-bearing Status) weight-bearing as tolerated (WBAT)  -           User Key  (r) = Recorded By, (t) = Taken By, (c) = Cosigned By    Initials Name Provider Type     Tram Redman PT Physical Therapist               Obj/Interventions     Row Name 10/28/22 1557          Range of Motion Comprehensive    General Range of Motion bilateral lower extremity ROM WFL  -     Row Name 10/28/22 1557          Strength Comprehensive (MMT)    Comment, General Manual Muscle Testing (MMT) Assessment generalized weakness, not assessed due to R sided pain from fracture  -     Row Name 10/28/22 1557          Balance    Balance Assessment sitting static balance;sitting dynamic balance;standing static balance;standing dynamic balance  -     Static Sitting Balance standby assist  -CF     Dynamic Sitting Balance standby  assist  -CF     Position, Sitting Balance sitting edge of bed  -CF           User Key  (r) = Recorded By, (t) = Taken By, (c) = Cosigned By    Initials Name Provider Type    CF Tram Redman, PT Physical Therapist               Goals/Plan     Row Name 10/28/22 1608          Bed Mobility Goal 1 (PT)    Activity/Assistive Device (Bed Mobility Goal 1, PT) bed mobility activities, all  -CF     [place] Level/Cues Needed (Bed Mobility Goal 1, PT) modified independence  -CF     Time Frame (Bed Mobility Goal 1, PT) 2 weeks  -CF     Row Name 10/28/22 1608          Transfer Goal 1 (PT)    Activity/Assistive Device (Transfer Goal 1, PT) sit-to-stand/stand-to-sit;bed-to-chair/chair-to-bed;walker, rolling  -CF     [place] Level/Cues Needed (Transfer Goal 1, PT) modified independence  -CF     Time Frame (Transfer Goal 1, PT) 2 weeks  -CF     Row Name 10/28/22 1608          Gait Training Goal 1 (PT)    Activity/Assistive Device (Gait Training Goal 1, PT) gait (walking locomotion);walker, rolling  -CF     Distance (Gait Training Goal 1, PT) 80'  -CF     Time Frame (Gait Training Goal 1, PT) 2 weeks  -CF     Row Name 10/28/22 160          Therapy Assessment/Plan (PT)    Planned Therapy Interventions (PT) balance training;bed mobility training;gait training;postural re-education;transfer training;ROM (range of motion);strengthening;patient/family education  -CF           User Key  (r) = Recorded By, (t) = Taken By, (c) = Cosigned By    Initials Name Provider Type    CF Tram Redman, PT Physical Therapist               Clinical Impression     Row Name 10/28/22 1602          Pain    Pretreatment Pain Rating 5/10  -CF     Posttreatment Pain Rating 5/10  -CF     Pain Location - Side/Orientation Right  -CF     Pain Location - groin  -CF     Pain Intervention(s) Repositioned;Rest;Ambulation/increased activity  -CF     Row Name 10/28/22 1602          Plan of Care Review    Plan of Care Reviewed With patient;spouse  -CF      Outcome Evaluation Pt is an 83 yo male admitted with back pain. Imaging reveals acute on chronic T12, L2, L4 compression fractures and R pubic ramus fracture. Per ortho pt is wbat BLE, per AMAN no surgery planned and to wear back brace. Abdominal binder present in room, RN states this is the brace to be used for back support. She typically is independent with use of a walker for functional mobility. Today, primarily limited by pain. She required sba for bed mobility, cga for sit<>stand and cga to ambulate 25' in the room using a walker. Encouraged pt to utilize BSC for bathroom needs (notified RN) in order to increase activity levels. OK to ambulate to the door 2-3 times per day with nursing assist and walker, discussed not over doing activity as she heals as she is really motivated to return to her baseline. DC recs pending progress: hopeful to return home with family assist and HH.  -CF     Row Name 10/28/22 1602          Therapy Assessment/Plan (PT)    Rehab Potential (PT) good, to achieve stated therapy goals  -CF     Criteria for Skilled Interventions Met (PT) yes  -CF     Therapy Frequency (PT) 6 times/wk  -CF     Row Name 10/28/22 1602          Vital Signs    O2 Delivery Pre Treatment room air  -CF     O2 Delivery Intra Treatment room air  -CF     O2 Delivery Post Treatment room air  -CF     Row Name 10/28/22 1602          Positioning and Restraints    Pre-Treatment Position in bed  -CF     Post Treatment Position bed  declined chair  -CF     In Bed notified nsg;call light within reach;encouraged to call for assist;exit alarm on;fowlers  -CF           User Key  (r) = Recorded By, (t) = Taken By, (c) = Cosigned By    Initials Name Provider Type    CF Tram Redman, PT Physical Therapist               Outcome Measures     Row Name 10/28/22 1604          How much help from another person do you currently need...    Turning from your back to your side while in flat bed without using bedrails? 3  -CF      Moving from lying on back to sitting on the side of a flat bed without bedrails? 3  -CF     Moving to and from a bed to a chair (including a wheelchair)? 3  -CF     Standing up from a chair using your arms (e.g., wheelchair, bedside chair)? 3  -CF     Climbing 3-5 steps with a railing? 3  -CF     To walk in hospital room? 3  -CF     AM-PAC 6 Clicks Score (PT) 18  -CF     Highest level of mobility 6 --> Walked 10 steps or more  -CF     Row Name 10/28/22 1609          Functional Assessment    Outcome Measure Options AM-PAC 6 Clicks Basic Mobility (PT)  -CF           User Key  (r) = Recorded By, (t) = Taken By, (c) = Cosigned By    Initials Name Provider Type    CF Tram Redman, DEMETRIUS Physical Therapist                             Physical Therapy Education     Title: PT OT SLP Therapies (In Progress)     Topic: Physical Therapy (Done)     Point: Mobility training (Done)     Learning Progress Summary           Patient Acceptance, E, VU by CF at 10/28/2022 1609                   Point: Home exercise program (Done)     Learning Progress Summary           Patient Acceptance, E, VU by CF at 10/28/2022 1609                   Point: Body mechanics (Done)     Learning Progress Summary           Patient Acceptance, E, VU by CF at 10/28/2022 1609                   Point: Precautions (Done)     Learning Progress Summary           Patient Acceptance, E, VU by CF at 10/28/2022 1609                               User Key     Initials Effective Dates Name Provider Type Discipline    CF 06/16/21 -  Tram Redman, DEMETRIUS Physical Therapist PT              PT Recommendation and Plan  Planned Therapy Interventions (PT): balance training, bed mobility training, gait training, postural re-education, transfer training, ROM (range of motion), strengthening, patient/family education  Plan of Care Reviewed With: patient, spouse  Outcome Evaluation: Pt is an 83 yo male admitted with back pain. Imaging reveals acute on chronic T12, L2, L4  compression fractures and R pubic ramus fracture. Per ortho pt is wbat BLE, per AMAN no surgery planned and to wear back brace. Abdominal binder present in room, RN states this is the brace to be used for back support. She typically is independent with use of a walker for functional mobility. Today, primarily limited by pain. She required sba for bed mobility, cga for sit<>stand and cga to ambulate 25' in the room using a walker. Encouraged pt to utilize BSC for bathroom needs (notified RN) in order to increase activity levels. OK to ambulate to the door 2-3 times per day with nursing assist and walker, discussed not over doing activity as she heals as she is really motivated to return to her baseline. DC recs pending progress: hopeful to return home with family assist and HH.     Time Calculation:    PT Charges     Row Name 10/28/22 1551             Time Calculation    Start Time 1325  -CF      Stop Time 1346  -CF      Time Calculation (min) 21 min  -CF      PT Received On 10/28/22  -CF      PT - Next Appointment 10/29/22  -CF      PT Goal Re-Cert Due Date 11/11/22  -CF         Time Calculation- PT    Total Timed Code Minutes- PT 13 minute(s)  -CF         Timed Charges    42788 - PT Therapeutic Activity Minutes 13  -CF         Total Minutes    Timed Charges Total Minutes 13  -CF       Total Minutes 13  -CF            User Key  (r) = Recorded By, (t) = Taken By, (c) = Cosigned By    Initials Name Provider Type    CF Tram Redman, PT Physical Therapist              Therapy Charges for Today     Code Description Service Date Service Provider Modifiers Qty    57147840886 HC PT EVAL MOD COMPLEXITY 3 10/28/2022 Tram Redman, PT GP 1    [id number]  PT THERAPEUTIC ACT EA 15 MIN 10/28/2022 Tram Redman, PT GP 1          PT G-Codes  Outcome Measure Options: AM-PAC 6 Clicks Basic Mobility (PT)  AM-PAC 6 Clicks Score (PT): 18    Tram Redman PT  10/28/2022

## 2022-10-28 NOTE — CASE MANAGEMENT/SOCIAL WORK
Discharge Planning Assessment  [facility]     Patient Name: Ritu Martino  MRN: [id number]  Today's Date: 10/28/2022    Admit Date: 10/27/2022    Plan: Home with assist of spouse and children.  Referral to [facility].   Discharge Needs Assessment     Row Name 10/28/22 1150       Living Environment    People in Home spouse    Current Living Arrangements condominium    Primary Care Provided by spouse/significant other;child(huseyin)    Provides Primary Care For no one, unable/limited ability to care for self    Family Caregiver if Needed spouse;child(huseyin), adult    Quality of Family Relationships helpful;involved;supportive    Able to Return to Prior Arrangements yes       Transition Planning    Patient/Family Anticipates Transition to home with help/services    Patient/Family Anticipated Services at Transition home health care    Transportation Anticipated family or friend will provide       Discharge Needs Assessment    Equipment Currently Used at Home walker, rolling;wheelchair;bath bench    Concerns to be Addressed discharge planning    Outpatient/Agency/Support Group Needs homecare agency    Provided Post Acute Provider List? Yes    Post Acute Provider List Home Health    Provided Post Acute Provider Quality & Resource List? Yes    Post Acute Provider Quality and Resource List Home Health    Delivered To Support Person;Patient    Method of Delivery In person               Discharge Plan     Row Name 10/28/22 1152       Plan    Plan Home with assist of spouse and children.  Referral to [facility].    Plan Comments S/W pt and her dtr Marielle Chaney at bedside.  Facesheet info confirmed.  PT lives in a condo with her spouse Nu who assists pt at home as needed. Marielle Chaney lives 10 minutes away and can assist as needed as well.   Pt's edroom and bath are on the main floor of the condo.  Home DME includes a walker, w/c and bath bench.  Nu provides transport as needed.  Pt has used HH in the past and went to  [facility] for rehab, but was only there a couple of hours. Pt is in agreement w/ HH for PT and has no preference of HH agency.  CCP discussed HH options.  Referral called to [facility] - accepted.  Await back brace from [facility] and PT eval.  CCP spoke w/ K. Camille JAIMES who confirms she has called [facility] about a brace. Pvt pay caregiver list given to pt/ dtr per their request............Bailey DE LEON/ NORIS              Continued Care and Services - Admitted Since 10/27/2022     Home Medical Care     Service Provider Request Status Selected Services Address Phone Fax Patient Preferred    [facility]  Selected Home Health Services [address] [address] [phone] --                 Demographic Summary     Row Name 10/28/22 1149       General Information    Admission Type observation    Arrived From home    Required Notices Provided Observation Status Notice    Referral Source admission list    Reason for Consult discharge planning    Preferred Language English               Functional Status     Row Name 10/28/22 1149       Functional Status    Usual Activity Tolerance moderate    Current Activity Tolerance moderate       Functional Status, IADL    Medications assistive person    Meal Preparation assistive person    Housekeeping assistive person    Laundry assistive person    Shopping assistive person       Mental Status Summary    Recent Changes in Mental Status/Cognitive Functioning no changes       Employment/    Employment Status retired                         Bailey Harris RN

## 2022-10-28 NOTE — TELEPHONE ENCOUNTER
Caller: GENO    Relationship: [facility]    Best call back number: [phone]    What orders are you requesting (i.e. lab or imaging): VERBAL    Additional notes: GENO WITH [facility] IS REQUESTING VERBAL ORDERS FOR PHYSICAL THERAPY EVALUATION, FOR PATIENT. PLEASE CALL AND ADVISE IF NECESSARY.

## 2022-10-28 NOTE — NURSING NOTE
Patient's MEWs 11.  Notified Dr. Stephen Geronimo and charge nurse.  Charge said RRT not necessary if assessment ok and MD ok with it.

## 2022-10-28 NOTE — PLAN OF CARE
Goal Outcome Evaluation:  Plan of Care Reviewed With: patient, spouse           Outcome Evaluation: Pt is an 83 yo male admitted with back pain. Imaging reveals acute on chronic T12, L2, L4 compression fractures and R pubic ramus fracture. Per ortho pt is wbat BLE, per AMAN no surgery planned and to wear back brace. Abdominal binder present in room, RN states this is the brace to be used for back support. She typically is independent with use of a walker for functional mobility. Today, primarily limited by pain. She required sba for bed mobility, cga for sit<>stand and cga to ambulate 25' in the room using a walker. Encouraged pt to utilize BSC for bathroom needs (notified RN) in order to increase activity levels. OK to ambulate to the door 2-3 times per day with nursing assist and walker, discussed not over doing activity as she heals as she is really motivated to return to her baseline. DC recs pending progress: hopeful to return home with family assist and HH.

## 2022-10-28 NOTE — ED PROVIDER NOTES
EMERGENCY DEPARTMENT ENCOUNTER    Room Number:  S511/1  Date of encounter:  10/28/2022  PCP: Gaurav Barger MD  Historian: Patient      HPI:  Chief Complaint: Lower back pain  A complete HPI/ROS/PMH/PSH/SH/FH are unobtainable due to: Nothing    Context: Ritu Martino is a 82 y.o. female who presents from home via EMS to the ED c/o lower back pain is been going for the past 6 days and gradually getting worse.  Patient reports radicular/tingling symptoms in bilateral legs.  She denies bowel or bladder incontinence.  She has an epidural scheduled for this Wednesday.  She was seen by her PCP today and told to report to the emergency department due to worsening symptoms.  She is here for further evaluation.    PAST MEDICAL HISTORY  Active Ambulatory Problems     Diagnosis Date Noted   • Anxiety 12/15/2021   • Hypothyroidism 12/15/2021   • Hyponatremia 2014   • Iron deficiency 04/19/2022   • Hypokalemia 04/19/2022   • Essential hypertension, benign 04/19/2022   • Fluent aphasia 05/07/2022   • Hypochloremia 05/07/2022   • Benzodiazepine dependence (HCC) 05/07/2022   • Anemia 06/12/2022   • Cervicalgia 06/12/2022   • Intertrigo 06/12/2022   • Insomnia 06/12/2022   • Acute cystitis 06/13/2022   • E coli infection 06/14/2022   • Diverticulitis 06/28/2022   • Arthritis 06/28/2022   • Bowel dysfunction 08/2021   • GERD (gastroesophageal reflux disease) 06/28/2022   • Hernia, hiatal 06/28/2022   • Seizures (Formerly [facility]) 06/28/2022   • Meningioma (Formerly [facility]) 06/28/2022   • Closed fracture of left distal fibula 06/30/2022   • Debility 07/01/2022   • History of seizure 07/01/2022   • Fall 07/01/2022   • Tachycardia 07/12/2022   • Abdominal hernia 08/04/2022   • Compression fracture of L1 lumbar vertebra, open, initial encounter ([facility]) 08/04/2022   • Compression fracture of T12 vertebra ([facility]) 09/01/2022   • Osteopenia 10/19/2022     Resolved Ambulatory Problems     Diagnosis Date Noted   • Essential hypertension 02/04/2021   •  Hyperlipidemia 2021   • Transient alteration of awareness 2022     Past Medical History:   Diagnosis Date   • Chronic constipation    • Fracture 2022   • Fracture, tibia and fibula Now wearing bood   • Hypercholesterolemia    • Tear of meniscus of knee    • 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         PAST SURGICAL HISTORY  Past Surgical History:   Procedure Laterality Date   • APPENDECTOMY     • BACK SURGERY     •  SECTION     • COLON SURGERY  2021    to address ruptured and infected diverticular dz, ostomy also placed   • COLONOSCOPY     • COLOSTOMY CLOSURE  10/2021    ostomy removed   • EYE SURGERY  2 X cataract   • HEMORRHOIDECTOMY     • KNEE ARTHROPLASTY     • TONSILLECTOMY           FAMILY HISTORY  Family History   Problem Relation Age of Onset   • Brain cancer Mother    • Cancer Mother         Brain tumor,    • Stroke Father         Age 46   • Early death Father         Stroke, age 46   • Cancer Brother         pancreatic   • Cancer Daughter         Dec'd 2000         SOCIAL HISTORY  Social History     Socioeconomic History   • Marital status:    Tobacco Use   • Smoking status: Former     Packs/day: 0.25     Years: 15.00     Pack years: 3.75     Types: Cigarettes     Start date: 1956     Quit date: 1971     Years since quittin.8   • Smokeless tobacco: Never   Vaping Use   • Vaping Use: Never used   Substance and Sexual Activity   • Alcohol use: Not Currently     Alcohol/week: 8.0 standard drinks     Types: 4 Glasses of wine, 4 Shots of liquor per week     Comment: Stopped 3 months ago.   • Drug use: No   • Sexual activity: Not Currently     Partners: Male         ALLERGIES  Patient has no known allergies.        REVIEW OF SYSTEMS  Review of Systems   HENT: Negative.    Respiratory: Negative.    Cardiovascular: Negative for chest pain and palpitations.   Gastrointestinal: Negative for abdominal pain, blood in stool and nausea.    Genitourinary: Positive for dysuria.   Musculoskeletal:        Lower back pain  Right hip pain   Skin: Negative.    Neurological: Negative.    Psychiatric/Behavioral: Negative.         All systems reviewed and negative except for those discussed in HPI.       PHYSICAL EXAM    I have reviewed the triage vital signs and nursing notes.    ED Triage Vitals [10/27/22 1946]   Temp Heart Rate Resp BP SpO2   97.9 °F (36.6 °C) 96 18 166/90 98 %      Temp src Heart Rate Source Patient Position BP Location FiO2 (%)   Tympanic -- -- -- --       Physical Exam  GENERAL: Appears to be in severe discomfort, does not appear toxic  HENT: nares patent  EYES: no scleral icterus  CV: regular rhythm, regular rate, palpable pedal pulses bilaterally  RESPIRATORY: normal effort, lungs CTA B  ABDOMEN: Questionable suprapubic tenderness, no guarding, no rebound  MUSCULOSKELETAL: Severe tenderness T12-S1/increased pain with slight movement/no obvious deformity appreciated.  Right hip: TTP with slight internal and external rotation-patient is not shortened and rotated  NEURO: alert, moves all extremities, follows commands  SKIN: warm, dry        LAB RESULTS  Recent Results (from the past 24 hour(s))   Urinalysis without microscopic (no culture) - Urine, Clean Catch    Collection Time: 10/27/22  9:57 AM    Specimen: Urine, Clean Catch   Result Value Ref Range    Color, UA Yellow Yellow, Straw    Appearance, UA Clear Clear    pH, UA 7.5 4.6 - 8.0    Specific Gravity, UA 1.020 1.001 - 1.035    Glucose, UA Negative Negative    Ketones, UA Negative Negative    Bilirubin, UA Negative Negative    Blood, UA Trace (A) Negative    Protein, UA 30 mg/dL (1+) (A) Negative    Leuk Esterase, UA Negative Negative    Nitrite, UA Negative Negative    Urobilinogen, UA 0.2 E.U./dL 0.2 - 1.0 E.U./dL   Vitamin B12    Collection Time: 10/27/22  9:57 AM    Specimen: Arm, Left; Blood   Result Value Ref Range    Vitamin B-12 >2,000 (H) 211 - 946 pg/mL   Folate     Collection Time: 10/27/22  9:57 AM    Specimen: Arm, Left; Blood   Result Value Ref Range    Folate 6.18 4.78 - 24.20 ng/mL   CBC (No Diff)    Collection Time: 10/27/22 11:13 AM    Specimen: Arm, Left; Blood   Result Value Ref Range    WBC 9.23 3.40 - 10.80 10*3/mm3    RBC 3.35 (L) 3.77 - 5.28 10*6/mm3    Hemoglobin 10.3 (L) 12.0 - 15.9 g/dL    Hematocrit 30.7 (L) 34.0 - 46.6 %    MCV 91.6 79.0 - 97.0 fL    MCH 30.7 26.6 - 33.0 pg    MCHC 33.6 31.5 - 35.7 g/dL    RDW 12.4 12.3 - 15.4 %    RDW-SD 41.4 37.0 - 54.0 fl    MPV 8.3 6.0 - 12.0 fL    Platelets 557 (H) 140 - 450 10*3/mm3   Comprehensive Metabolic Panel    Collection Time: 10/27/22 11:13 AM    Specimen: Arm, Left; Blood   Result Value Ref Range    Glucose 117 (H) 65 - 99 mg/dL    BUN 8 8 - 23 mg/dL    Creatinine 0.49 (L) 0.57 - 1.00 mg/dL    Sodium 124 (L) 136 - 145 mmol/L    Potassium 3.5 3.5 - 5.2 mmol/L    Chloride 89 (L) 98 - 107 mmol/L    CO2 24.4 22.0 - 29.0 mmol/L    Calcium 8.4 (L) 8.6 - 10.5 mg/dL    Total Protein 7.2 6.0 - 8.5 g/dL    Albumin 4.60 3.50 - 5.20 g/dL    ALT (SGPT) 20 1 - 33 U/L    AST (SGOT) 19 1 - 32 U/L    Alkaline Phosphatase 174 (H) 39 - 117 U/L    Total Bilirubin 0.5 0.0 - 1.2 mg/dL    Globulin 2.6 gm/dL    A/G Ratio 1.8 g/dL    BUN/Creatinine Ratio 16.3 7.0 - 25.0    Anion Gap 10.6 5.0 - 15.0 mmol/L    eGFR 94.2 >60.0 mL/min/1.73   Lipid Panel    Collection Time: 10/27/22 11:13 AM    Specimen: Arm, Left; Blood   Result Value Ref Range    Total Cholesterol 189 0 - 200 mg/dL    Triglycerides 81 0 - 150 mg/dL    HDL Cholesterol 109 (H) 40 - 60 mg/dL    LDL Cholesterol  66 0 - 100 mg/dL    VLDL Cholesterol 14 5 - 40 mg/dL    LDL/HDL Ratio 0.59    Vitamin D 25 Hydroxy    Collection Time: 10/27/22 11:13 AM    Specimen: Arm, Left; Blood   Result Value Ref Range    25 Hydroxy, Vitamin D 79.1 30.0 - 100.0 ng/ml   Thyroid Panel With TSH    Collection Time: 10/27/22 11:13 AM    Specimen: Arm, Left; Blood   Result Value Ref Range    TSH  1.320 0.270 - 4.200 uIU/mL    T Uptake 0.99 0.80 - 1.30 TBI    T4, Total 10.00 4.50 - 11.70 mcg/dL   Comprehensive Metabolic Panel    Collection Time: 10/27/22 10:54 PM    Specimen: Blood   Result Value Ref Range    Glucose 116 (H) 65 - 99 mg/dL    BUN 9 8 - 23 mg/dL    Creatinine 0.40 (L) 0.57 - 1.00 mg/dL    Sodium 123 (L) 136 - 145 mmol/L    Potassium 3.9 3.5 - 5.2 mmol/L    Chloride 87 (L) 98 - 107 mmol/L    CO2 23.6 22.0 - 29.0 mmol/L    Calcium 8.8 8.6 - 10.5 mg/dL    Total Protein 7.1 6.0 - 8.5 g/dL    Albumin 4.60 3.50 - 5.20 g/dL    ALT (SGPT) 22 1 - 33 U/L    AST (SGOT) 19 1 - 32 U/L    Alkaline Phosphatase 178 (H) 39 - 117 U/L    Total Bilirubin 0.6 0.0 - 1.2 mg/dL    Globulin 2.5 gm/dL    A/G Ratio 1.8 g/dL    BUN/Creatinine Ratio 22.5 7.0 - 25.0    Anion Gap 12.4 5.0 - 15.0 mmol/L    eGFR 99.0 >60.0 mL/min/1.73   CBC Auto Differential    Collection Time: 10/27/22 10:54 PM    Specimen: Blood   Result Value Ref Range    WBC 18.80 (H) 3.40 - 10.80 10*3/mm3    RBC 3.41 (L) 3.77 - 5.28 10*6/mm3    Hemoglobin 10.6 (L) 12.0 - 15.9 g/dL    Hematocrit 29.4 (L) 34.0 - 46.6 %    MCV 86.2 79.0 - 97.0 fL    MCH 31.1 26.6 - 33.0 pg    MCHC 36.1 (H) 31.5 - 35.7 g/dL    RDW 12.0 (L) 12.3 - 15.4 %    RDW-SD 37.8 37.0 - 54.0 fl    MPV 8.2 6.0 - 12.0 fL    Platelets 533 (H) 140 - 450 10*3/mm3    Neutrophil % 90.6 (H) 42.7 - 76.0 %    Lymphocyte % 3.2 (L) 19.6 - 45.3 %    Monocyte % 5.6 5.0 - 12.0 %    Eosinophil % 0.0 (L) 0.3 - 6.2 %    Basophil % 0.1 0.0 - 1.5 %    Immature Grans % 0.5 0.0 - 0.5 %    Neutrophils, Absolute 17.04 (H) 1.70 - 7.00 10*3/mm3    Lymphocytes, Absolute 0.60 (L) 0.70 - 3.10 10*3/mm3    Monocytes, Absolute 1.05 (H) 0.10 - 0.90 10*3/mm3    Eosinophils, Absolute 0.00 0.00 - 0.40 10*3/mm3    Basophils, Absolute 0.01 0.00 - 0.20 10*3/mm3    Immature Grans, Absolute 0.10 (H) 0.00 - 0.05 10*3/mm3    nRBC 0.0 0.0 - 0.2 /100 WBC   Urinalysis With Microscopic If Indicated (No Culture) - Urine, Clean Catch     Collection Time: 10/27/22 10:59 PM    Specimen: Urine, Clean Catch   Result Value Ref Range    Color, UA Yellow Yellow, Straw    Appearance, UA Clear Clear    pH, UA >=9.0 (H) 5.0 - 8.0    Specific Gravity, UA 1.009 1.005 - 1.030    Glucose, UA Negative Negative    Ketones, UA Negative Negative    Bilirubin, UA Negative Negative    Blood, UA Negative Negative    Protein, UA Negative Negative    Leuk Esterase, UA Large (3+) (A) Negative    Nitrite, UA Negative Negative    Urobilinogen, UA 0.2 E.U./dL 0.2 - 1.0 E.U./dL   Urinalysis, Microscopic Only - Urine, Clean Catch    Collection Time: 10/27/22 10:59 PM    Specimen: Urine, Clean Catch   Result Value Ref Range    RBC, UA None Seen None Seen, 0-2 /HPF    WBC, UA 13-20 (A) None Seen, 0-2 /HPF    Bacteria, UA None Seen None Seen /HPF    Squamous Epithelial Cells, UA None Seen None Seen, 0-2 /HPF    Hyaline Casts, UA None Seen None Seen /LPF    Methodology Manual Light Microscopy    Lactic Acid, Plasma    Collection Time: 10/28/22  1:19 AM    Specimen: Blood   Result Value Ref Range    Lactate 1.3 0.5 - 2.0 mmol/L       Ordered the above labs and independently reviewed the results.        RADIOLOGY  CT Abdomen Pelvis Without Contrast    Result Date: 10/28/2022  Patient: NUBIA SCHILLING  Time Out: 00:08 Exam(s): CT ABDOMEN + PELVIS Without Contrast EXAM:   CT Abdomen and Pelvis Without Intravenous Contrast CLINICAL HISTORY:    Reason for exam: Abdominal pain and left hip pain. TECHNIQUE:   Axial computed tomography images of the abdomen and pelvis without intravenous contrast.  This CT exam was performed according to the principle of ALARA (As Low As Reasonably Achievable) by using one or more of the following dose reduction techniques: automated exposure control, adjustment of the mA and or kV according to patient size, and or use of iterative reconstruction technique. COMPARISON:   No relevant prior studies available. FINDINGS:   Lung bases:  Unremarkable.  No  mass.  No consolidation.  ABDOMEN:   Liver:  Unremarkable.   Gallbladder and bile ducts:  Unremarkable.  No calcified stones.  No ductal dilation.   Pancreas:  Unremarkable.  No ductal dilation.   Spleen:  Unremarkable.  No splenomegaly.   Adrenals:  Unremarkable.  No mass.   Kidneys and ureters:  Unremarkable.  No obstructing stones.  No hydronephrosis.   Stomach and bowel:  Right upper quadrant ventral hernia contains the cecum.  Distal ileal anastomosis.  No proximal bowel obstruction.  No mucosal thickening.  PELVIS:   Appendix:  No findings to suggest acute appendicitis.   Bladder:  Unremarkable.  No stones.   Reproductive:  Unremarkable as visualized.  ABDOMEN and PELVIS:   Intraperitoneal space:  Unremarkable.  No free air.  No significant fluid collection.   Bones joints:  Moderately displaced right inferior pubic ramus fracture.   Acute on chronic T12, L2 and L4 compression fractures.   Soft tissues:  See above.   Vasculature:  Mild atherosclerosis.  No abdominal aortic aneurysm.   Lymph nodes:  Unremarkable.  No enlarged lymph nodes. IMPRESSION:     1.  Mildly displaced right inferior pubic ramus fracture.  Acute on chronic T12, L2 and L4 compression fractures. 2.  Right ventral abdominal wall hernia contains the cecum.  No proximal obstruction.     Electronically signed by Joey Villa MD on 10-28-22 at 0008    CT Lumbar Spine Without Contrast    Result Date: 10/28/2022  Patient: NUBIA SCHILLING  Time Out: 00:09 Exam(s): CT L SPINE EXAM:   CT Lumbar Spine Without Intravenous Contrast CLINICAL HISTORY:    Reason for exam: Lower back pain. TECHNIQUE:   Axial computed tomography images of the lumbar spine without intravenous contrast.  CTDI is 33.4 mGy and DLP is 1102.4 mGy-cm.  This CT exam was performed according to the principle of ALARA (As Low As Reasonably Achievable) by using one or more of the following dose reduction techniques: automated exposure control, adjustment of the mA and or kV  according to patient size, and or use of iterative reconstruction technique. COMPARISON:   No relevant prior studies available. FINDINGS:   Vertebrae:  Acute on chronic T12, L2 and L4 compression fractures.  Several chronic lumbar compression fractures.   Discs spinal canal neural foramina:  No acute findings.  No spinal canal stenosis.   Soft tissues: Moderate atherosclerosis. IMPRESSION:       Acute on chronic T12, L2 and L4 compression fractures.  Consider follow- up MRI lumbar spine.     Electronically signed by Joey Villa MD on 10-28-22 at 0009      I ordered the above noted radiological studies. Reviewed by me and discussed with radiologist.  See dictation for official radiology interpretation.      PROCEDURES    Procedures      MEDICATIONS GIVEN IN ER    Medications   sodium chloride 0.9 % flush 10 mL (has no administration in time range)   sodium chloride 0.9 % flush 10 mL (10 mL Intravenous Given 10/28/22 0402)   sodium chloride 0.9 % flush 10 mL (10 mL Intravenous Given 10/28/22 0258)   nitroglycerin (NITROSTAT) SL tablet 0.4 mg (has no administration in time range)   acetaminophen (TYLENOL) tablet 650 mg (has no administration in time range)     Or   acetaminophen (TYLENOL) 160 MG/5ML solution 650 mg (has no administration in time range)     Or   acetaminophen (TYLENOL) suppository 650 mg (has no administration in time range)   ondansetron (ZOFRAN) tablet 4 mg (has no administration in time range)     Or   ondansetron (ZOFRAN) injection 4 mg (has no administration in time range)   calcium carbonate (TUMS) chewable tablet 500 mg (200 mg elemental) (has no administration in time range)   morphine injection 2 mg (2 mg Intravenous Given 10/28/22 0253)   sodium chloride 0.9 % infusion (75 mL/hr Intravenous New Bag 10/28/22 0358)   phenazopyridine (PYRIDIUM) tablet 100 mg (has no administration in time range)   oxyCODONE-acetaminophen (PERCOCET) 7.5-325 MG per tablet 1 tablet (has no administration in  time range)   lidocaine (LIDODERM) 5 % 2 patch (has no administration in time range)   ALPRAZolam (XANAX) tablet 0.25 mg (has no administration in time range)   cycloSPORINE (RESTASIS) 0.05 % ophthalmic emulsion 1 drop (has no administration in time range)   desvenlafaxine (PRISTIQ) 24 hr tablet 50 mg (has no administration in time range)   dilTIAZem CD (CARDIZEM CD) 24 hr capsule 120 mg (has no administration in time range)   hydrALAZINE (APRESOLINE) tablet 10 mg (has no administration in time range)   levothyroxine (SYNTHROID, LEVOTHROID) tablet 50 mcg (has no administration in time range)   potassium chloride (K-DUR,KLOR-CON) ER tablet 10 mEq (has no administration in time range)   lactobacillus acidophilus (RISAQUAD) capsule 1 capsule (has no administration in time range)   rosuvastatin (CRESTOR) tablet 20 mg (has no administration in time range)   sodium chloride tablet 2 g (has no administration in time range)   zolpidem (AMBIEN) tablet 5 mg (has no administration in time range)   cefTRIAXone (ROCEPHIN) 1 g in sodium chloride 0.9 % 100 mL IVPB-VTB (has no administration in time range)   ondansetron (ZOFRAN) injection 4 mg (4 mg Intravenous Given 10/27/22 2254)   HYDROmorphone (DILAUDID) injection 0.5 mg (0.5 mg Intravenous Given 10/27/22 2255)   sodium chloride 0.9 % bolus 500 mL (500 mL Intravenous New Bag 10/27/22 2256)   fentaNYL citrate (PF) (SUBLIMAZE) injection 25 mcg (25 mcg Intravenous Given 10/27/22 2335)   HYDROmorphone (DILAUDID) injection 0.5 mg (0.5 mg Intravenous Given 10/28/22 0107)   cefTRIAXone (ROCEPHIN) 1 g in sodium chloride 0.9 % 100 mL IVPB-VTB (1 g Intravenous New Bag 10/28/22 0124)         PROGRESS, DATA ANALYSIS, CONSULTS, AND MEDICAL DECISION MAKING    All labs have been independently reviewed by me.  All radiology studies have been reviewed by me and discussed with radiologist dictating the report.   EKG's independently viewed and interpreted by me.  Discussion below represents my  analysis of pertinent findings related to patient's condition, differential diagnosis, treatment plan and final disposition.    DDx: Compression fracture, hip fracture, sciatica, incarcerated hernia, ureteral stone, acute UTI.  Will obtain CT abdomen pelvis, CT lumbar spine to further evaluate.      ED Course as of 10/28/22 0616   Thu Oct 27, 2022   2235 BP: 166/90 [RC]   2236 Temp: 97.9 °F (36.6 °C) [RC]   2236 Heart Rate: 96 [RC]   2236 Resp: 18 [RC]   2236 SpO2: 98 %  RA [RC]   Fri Oct 28, 2022   0031 Leukocytes, UA(!): Large (3+) [RC]   0031 WBC(!): 18.80  Patient does states she received a steroid shot and has been on 2 doses of steroids which may account for some of this elevated white blood cell count.  However, given her dysuria and +3 leukocytes in the urine, again we will go ahead and cover for infectious process. [RC]   0031 RBC(!): 3.41 [RC]   0031 Hemoglobin(!): 10.6 [RC]   0031 Hematocrit(!): 29.4 [RC]   0031 Platelets(!): 533 [RC]   0031 WBC noted in all 3+ leuks in her urine.  We will place a lactic acid, another 500 cc of saline in order Rocephin [RC]   0033 Patient has right displaced right inferior pubic ramus fracture, acute on chronic compression fractures at T12, L2, L4.  There is also a ventral wall hernia containing cecum but does not appear to be obstructing [RC]   0120 Discussed the patient's case with FIONA Del Cid with [facility].  To admit to Dr. Castro's care [RC]      ED Course User Index  [RC] Román Doss III, PA           PPE: The patient wore a surgical mask throughout the entire patient encounter. I wore an N95.    AS OF 06:16 EDT VITALS:    BP - 162/77  HR - 90  TEMP - 97.9 °F (36.6 °C) (Tympanic)  O2 SATS - 100%        DIAGNOSIS  Final diagnoses:   Acute UTI   Compression fracture of T12 vertebra, initial encounter ([facility])   Closed compression fracture of L2 vertebra, initial encounter ([facility])   Closed compression fracture of L4 vertebra, initial encounter ([facility])    Closed fracture of right inferior pubic ramus, initial encounter ([facility])         DISPOSITION  ADMISSION    Discussed treatment plan and reason for admission with pt/family and admitting physician.  Pt/family voiced understanding of the plan for admission for further testing/treatment as needed.              Román Doss III, PA  10/28/22 0616

## 2022-10-28 NOTE — PLAN OF CARE
Pt alert and oriented, pain in bilateral legs and hips, worse when being positioned. PRN pain medication administered per MAR. No falls. Bed rest maintained.      Problem: Adult Inpatient Plan of Care  Goal: Plan of Care Review  Outcome: Ongoing, Progressing  Flowsheets (Taken 10/28/2022 0926)  Progress: no change  Plan of Care Reviewed With: patient  Goal: Patient-Specific Goal (Individualized)  Outcome: Ongoing, Progressing  Goal: Absence of Hospital-Acquired Illness or Injury  Outcome: Ongoing, Progressing  Intervention: Identify and Manage Fall Risk  Description: Perform standard risk assessment on admission using a validated tool or comprehensive approach appropriate to the patient; reassess fall risk frequently, with change in status or transfer to another level of care.  Communicate fall injury risk to interprofessional healthcare team.  Determine need for increased observation, equipment and environmental modification, such as low bed, signage and supportive, nonskid footwear.  Adjust safety measures to individual developmental age, stage and identified risk factors.  Reinforce the importance of safety and physical activity with patient and family.  Perform regular intentional rounding to assess need for position change, pain assessment and personal needs, including assistance with toileting.  Recent Flowsheet Documentation  Taken 10/28/2022 0637 by Ngoc Negrete, RN  Safety Promotion/Fall Prevention:   safety round/check completed   activity supervised   assistive device/personal items within reach   clutter free environment maintained   fall prevention program maintained   gait belt   lighting adjusted   nonskid shoes/slippers when out of bed   room organization consistent  Taken 10/28/2022 0350 by Ngoc Negrete, RN  Safety Promotion/Fall Prevention:   safety round/check completed   activity supervised   assistive device/personal items within reach   clutter free environment maintained   fall  prevention program maintained   gait belt   lighting adjusted   nonskid shoes/slippers when out of bed   room organization consistent  Intervention: Prevent Skin Injury  Description: Perform a screening for skin injury risk, such as pressure or moisture associated skin damage on admission and at regular intervals throughout hospital stay.  Keep all areas of skin (especially folds) clean and dry.  Maintain adequate skin hydration.  Relieve and redistribute pressure and protect bony prominences; implement measures based on patient-specific risk factors.  Match turning and repositioning schedule to clinical condition.  Encourage weight shift frequently; assist with reposition if unable to complete independently.  Float heels off bed; avoid pressure on the Achilles tendon.  Keep skin free from extended contact with medical devices.  Encourage functional activity and mobility, as early as tolerated.  Use aids (e.g., slide boards, mechanical lift) during transfer.  Recent Flowsheet Documentation  Taken 10/28/2022 0637 by Ngoc Negrete RN  Body Position: position changed independently  Taken 10/28/2022 0350 by Ngoc Negrete RN  Body Position: position changed independently  Skin Protection: adhesive use limited  Intervention: Prevent and Manage VTE (Venous Thromboembolism) Risk  Description: Assess for VTE (venous thromboembolism) risk.  Encourage and assist with early ambulation.  Initiate and maintain compression or other therapy, as indicated, based on identified risk in accordance with organizational protocol and provider order.  Encourage both active and passive leg exercises while in bed, if unable to ambulate.  Recent Flowsheet Documentation  Taken 10/28/2022 0637 by Ngoc Negrete RN  Activity Management: activity adjusted per tolerance  Taken 10/28/2022 0350 by Ngoc Negrete RN  Activity Management: bedrest  VTE Prevention/Management: (SCD pump ordered) other (see comments)  Range of Motion: active  ROM (range of motion) encouraged  Intervention: Prevent Infection  Description: Maintain skin and mucous membrane integrity; promote hand, oral and pulmonary hygiene.  Optimize fluid balance, nutrition, sleep and glycemic control to maximize infection resistance.  Identify potential sources of infection early to prevent or mitigate progression of infection (e.g., wound, lines, devices).  Evaluate ongoing need for invasive devices; remove promptly when no longer indicated.  Recent Flowsheet Documentation  Taken 10/28/2022 0637 by Ngoc Negrete RN  Infection Prevention:   hand hygiene promoted   rest/sleep promoted   single patient room provided   visitors restricted/screened  Taken 10/28/2022 0350 by Ngoc Negrete RN  Infection Prevention:   hand hygiene promoted   rest/sleep promoted   single patient room provided   visitors restricted/screened  Goal: Optimal Comfort and Wellbeing  Outcome: Ongoing, Progressing  Intervention: Monitor Pain and Promote Comfort  Description: Assess pain level, treatment efficacy and patient response at regular intervals using a consistent pain scale.  Consider the presence and impact of preexisting chronic pain.  Encourage patient and caregiver involvement in pain assessment, interventions and safety measures.  Recent Flowsheet Documentation  Taken 10/28/2022 0637 by Ngoc Negrete RN  Pain Management Interventions: see MAR  Taken 10/28/2022 0350 by Ngoc Negrete RN  Pain Management Interventions:   position adjusted   pillow support provided   diversional activity provided   care clustered   breathing exercises   quiet environment facilitated  Intervention: Provide Person-Centered Care  Description: Use a family-focused approach to care.  Develop trust and rapport by proactively providing information, encouraging questions, addressing concerns and offering reassurance.  Acknowledge emotional response to hospitalization.  Recognize and utilize personal coping  strategies.  Honor spiritual and cultural preferences.  Recent Flowsheet Documentation  Taken 10/28/2022 0350 by Ngoc Negrete, RN  Trust Relationship/Rapport:   care explained   choices provided   emotional support provided   empathic listening provided   questions encouraged   questions answered   reassurance provided   thoughts/feelings acknowledged  Goal: Readiness for Transition of Care  Outcome: Ongoing, Progressing  Intervention: Mutually Develop Transition Plan  Description: Identify available resources for support (e.g., family, friends, community).  Identify and address barriers to ongoing treatment and home management (e.g., environmental, financial).  Provide opportunities to practice self-management skills.  Assess and monitor emotional readiness for transition.  Establish or reconnect linkage with outpatient providers or community-based services.  Recent Flowsheet Documentation  Taken 10/28/2022 0906 by Ngoc Negrete, RN  Equipment Currently Used at Home:   walker, rolling   grab bar  Taken 10/28/2022 0413 by Ngoc Negrete, RN  Equipment Currently Used at Home:   walker, rolling   grab bar  Transportation Anticipated: family or friend will provide  Patient/Family Anticipated Services at Transition: none  Patient/Family Anticipates Transition to: home with family

## 2022-10-29 LAB
ANION GAP SERPL CALCULATED.3IONS-SCNC: 10.4 MMOL/L (ref 5–15)
BACTERIA SPEC AEROBE CULT: ABNORMAL
BACTERIA SPEC AEROBE CULT: NORMAL
BASOPHILS # BLD AUTO: 0.03 10*3/MM3 (ref 0–0.2)
BASOPHILS NFR BLD AUTO: 0.2 % (ref 0–1.5)
BUN SERPL-MCNC: 6 MG/DL (ref 8–23)
BUN/CREAT SERPL: 15 (ref 7–25)
CALCIUM SPEC-SCNC: 8.2 MG/DL (ref 8.6–10.5)
CHLORIDE SERPL-SCNC: 96 MMOL/L (ref 98–107)
CO2 SERPL-SCNC: 23.6 MMOL/L (ref 22–29)
CREAT SERPL-MCNC: 0.4 MG/DL (ref 0.57–1)
DEPRECATED RDW RBC AUTO: 40.6 FL (ref 37–54)
EGFRCR SERPLBLD CKD-EPI 2021: 99 ML/MIN/1.73
EOSINOPHIL # BLD AUTO: 0.04 10*3/MM3 (ref 0–0.4)
EOSINOPHIL NFR BLD AUTO: 0.3 % (ref 0.3–6.2)
ERYTHROCYTE [DISTWIDTH] IN BLOOD BY AUTOMATED COUNT: 12.2 % (ref 12.3–15.4)
GLUCOSE SERPL-MCNC: 96 MG/DL (ref 65–99)
HCT VFR BLD AUTO: 32.9 % (ref 34–46.6)
HGB BLD-MCNC: 11.4 G/DL (ref 12–15.9)
IMM GRANULOCYTES # BLD AUTO: 0.06 10*3/MM3 (ref 0–0.05)
IMM GRANULOCYTES NFR BLD AUTO: 0.4 % (ref 0–0.5)
LYMPHOCYTES # BLD AUTO: 0.55 10*3/MM3 (ref 0.7–3.1)
LYMPHOCYTES NFR BLD AUTO: 4.1 % (ref 19.6–45.3)
MCH RBC QN AUTO: 31.1 PG (ref 26.6–33)
MCHC RBC AUTO-ENTMCNC: 34.7 G/DL (ref 31.5–35.7)
MCV RBC AUTO: 89.6 FL (ref 79–97)
MONOCYTES # BLD AUTO: 1.07 10*3/MM3 (ref 0.1–0.9)
MONOCYTES NFR BLD AUTO: 8 % (ref 5–12)
NEUTROPHILS NFR BLD AUTO: 11.7 10*3/MM3 (ref 1.7–7)
NEUTROPHILS NFR BLD AUTO: 87 % (ref 42.7–76)
NRBC BLD AUTO-RTO: 0 /100 WBC (ref 0–0.2)
PLATELET # BLD AUTO: 532 10*3/MM3 (ref 140–450)
PMV BLD AUTO: 7.9 FL (ref 6–12)
POTASSIUM SERPL-SCNC: 4.3 MMOL/L (ref 3.5–5.2)
RBC # BLD AUTO: 3.67 10*6/MM3 (ref 3.77–5.28)
SODIUM SERPL-SCNC: 130 MMOL/L (ref 136–145)
WBC NRBC COR # BLD: 13.45 10*3/MM3 (ref 3.4–10.8)

## 2022-10-29 PROCEDURE — G0378 HOSPITAL OBSERVATION PER HR: HCPCS

## 2022-10-29 PROCEDURE — 97530 THERAPEUTIC ACTIVITIES: CPT

## 2022-10-29 PROCEDURE — 80048 BASIC METABOLIC PNL TOTAL CA: CPT | Performed by: HOSPITALIST

## 2022-10-29 PROCEDURE — 97166 OT EVAL MOD COMPLEX 45 MIN: CPT

## 2022-10-29 PROCEDURE — 85025 COMPLETE CBC W/AUTO DIFF WBC: CPT | Performed by: HOSPITALIST

## 2022-10-29 PROCEDURE — 25010000002 CEFTRIAXONE PER 250 MG: Performed by: NURSE PRACTITIONER

## 2022-10-29 RX ORDER — OXYCODONE AND ACETAMINOPHEN 7.5; 325 MG/1; MG/1
1 TABLET ORAL EVERY 4 HOURS PRN
Status: DISCONTINUED | OUTPATIENT
Start: 2022-10-29 | End: 2022-10-31

## 2022-10-29 RX ORDER — OXYCODONE AND ACETAMINOPHEN 7.5; 325 MG/1; MG/1
1 TABLET ORAL EVERY 4 HOURS PRN
Status: DISCONTINUED | OUTPATIENT
Start: 2022-10-29 | End: 2022-11-01

## 2022-10-29 RX ADMIN — DILTIAZEM HYDROCHLORIDE 120 MG: 120 CAPSULE, COATED, EXTENDED RELEASE ORAL at 09:21

## 2022-10-29 RX ADMIN — OXYCODONE HYDROCHLORIDE AND ACETAMINOPHEN 1 TABLET: 7.5; 325 TABLET ORAL at 02:25

## 2022-10-29 RX ADMIN — OXYCODONE HYDROCHLORIDE AND ACETAMINOPHEN 1 TABLET: 7.5; 325 TABLET ORAL at 09:19

## 2022-10-29 RX ADMIN — Medication 1 CAPSULE: at 09:20

## 2022-10-29 RX ADMIN — SODIUM CHLORIDE TAB 1 GM 2 G: 1 TAB at 09:20

## 2022-10-29 RX ADMIN — PHENAZOPYRIDINE 100 MG: 100 TABLET ORAL at 09:20

## 2022-10-29 RX ADMIN — POTASSIUM CHLORIDE 10 MEQ: 750 TABLET, EXTENDED RELEASE ORAL at 21:46

## 2022-10-29 RX ADMIN — ZOLPIDEM TARTRATE 5 MG: 5 TABLET ORAL at 22:27

## 2022-10-29 RX ADMIN — OXYCODONE HYDROCHLORIDE AND ACETAMINOPHEN 1 TABLET: 7.5; 325 TABLET ORAL at 17:30

## 2022-10-29 RX ADMIN — POTASSIUM CHLORIDE 10 MEQ: 750 TABLET, EXTENDED RELEASE ORAL at 09:19

## 2022-10-29 RX ADMIN — PHENAZOPYRIDINE 100 MG: 100 TABLET ORAL at 11:26

## 2022-10-29 RX ADMIN — SODIUM CHLORIDE TAB 1 GM 2 G: 1 TAB at 17:30

## 2022-10-29 RX ADMIN — CEFTRIAXONE SODIUM 1 G: 1 INJECTION, POWDER, FOR SOLUTION INTRAMUSCULAR; INTRAVENOUS at 01:33

## 2022-10-29 RX ADMIN — OXYCODONE HYDROCHLORIDE AND ACETAMINOPHEN 1 TABLET: 7.5; 325 TABLET ORAL at 21:46

## 2022-10-29 RX ADMIN — HYDRALAZINE HYDROCHLORIDE 10 MG: 10 TABLET, FILM COATED ORAL at 21:46

## 2022-10-29 RX ADMIN — ALPRAZOLAM 0.25 MG: 0.25 TABLET ORAL at 21:46

## 2022-10-29 RX ADMIN — DESVENLAFAXINE SUCCINATE 50 MG: 50 TABLET, EXTENDED RELEASE ORAL at 09:20

## 2022-10-29 RX ADMIN — SODIUM CHLORIDE TAB 1 GM 2 G: 1 TAB at 11:26

## 2022-10-29 RX ADMIN — CYCLOSPORINE 1 DROP: 0.5 EMULSION OPHTHALMIC at 09:19

## 2022-10-29 RX ADMIN — DOCUSATE SODIUM 50MG AND SENNOSIDES 8.6MG 2 TABLET: 8.6; 5 TABLET, FILM COATED ORAL at 09:19

## 2022-10-29 RX ADMIN — HYDRALAZINE HYDROCHLORIDE 10 MG: 10 TABLET, FILM COATED ORAL at 09:20

## 2022-10-29 RX ADMIN — ALPRAZOLAM 0.25 MG: 0.25 TABLET ORAL at 09:19

## 2022-10-29 RX ADMIN — HYDRALAZINE HYDROCHLORIDE 10 MG: 10 TABLET, FILM COATED ORAL at 13:24

## 2022-10-29 RX ADMIN — LIDOCAINE 5% 1 PATCH: 700 PATCH TOPICAL at 09:21

## 2022-10-29 RX ADMIN — OXYCODONE HYDROCHLORIDE AND ACETAMINOPHEN 1 TABLET: 7.5; 325 TABLET ORAL at 13:23

## 2022-10-29 RX ADMIN — ROSUVASTATIN CALCIUM 20 MG: 20 TABLET, FILM COATED ORAL at 21:46

## 2022-10-29 RX ADMIN — LEVOTHYROXINE SODIUM 50 MCG: 0.05 TABLET ORAL at 09:20

## 2022-10-29 RX ADMIN — CYCLOSPORINE 1 DROP: 0.5 EMULSION OPHTHALMIC at 21:46

## 2022-10-29 RX ADMIN — PHENAZOPYRIDINE 100 MG: 100 TABLET ORAL at 17:30

## 2022-10-29 NOTE — THERAPY TREATMENT NOTE
Patient Name: Ritu Martino  : 1940    MRN: [id number]                              Today's Date: 10/29/2022       Admit Date: 10/27/2022    Visit Dx:     ICD-10-CM ICD-9-CM   1. Acute UTI  N39.0 599.0   2. Compression fracture of T12 vertebra, initial encounter ([facility])  S22.080A 805.2   3. Closed compression fracture of L2 vertebra, initial encounter ([facility])  S32.020A 805.4   4. Closed compression fracture of L4 vertebra, initial encounter ([facility])  S32.040A 805.4   5. Closed fracture of right inferior pubic ramus, initial encounter ([facility])  S32.591A 808.2     Patient Active Problem List   Diagnosis   • Anxiety   • Hypothyroidism   • Hyponatremia   • Iron deficiency   • Hypokalemia   • Essential hypertension, benign   • Fluent aphasia   • Hypochloremia   • Benzodiazepine dependence ([facility])   • Anemia   • Cervicalgia   • Intertrigo   • Insomnia   • Acute cystitis   • E coli infection   • Diverticulitis   • Arthritis   • Bowel dysfunction   • GERD (gastroesophageal reflux disease)   • Hernia, hiatal   • Seizures ([facility])   • Meningioma ([facility])   • Closed fracture of left distal fibula   • Debility   • History of seizure   • Fall   • Tachycardia   • Abdominal hernia   • Compression fracture of L1 lumbar vertebra, open, initial encounter ([facility])   • Compression fracture of T12 vertebra ([facility])   • Osteopenia   • Acute UTI   • Back pain   • Compression fracture of L2 and L4 lumbar vertebra ([facility])   • Closed fracture of right inferior pubic ramus ([facility])     Past Medical History:   Diagnosis Date   • Anemia    • Anxiety    • Arthritis    • Bowel dysfunction 2021    since having surgery for diverticular infection   • Chronic constipation    • Diverticulitis 2021    w/ rupture and infection required surgery and ostomy   • Essential hypertension, benign    • Fracture 2022    left ankle   • Fracture, tibia and fibula Now wearing bood   • GERD (gastroesophageal reflux disease)    • Hernia, hiatal    • Hypercholesterolemia     • Hypokalemia    • Hyponatremia     chronic low with occasional normal level   • Hypothyroidism     estimated time frame pt nor  could remember exactly how long has been on medication   • Insomnia    • Iron deficiency    • Seizures ([facility])    • Tear of meniscus of knee    • 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     Past Surgical History:   Procedure Laterality Date   • APPENDECTOMY     • BACK SURGERY     •  SECTION     • COLON SURGERY  2021    to address ruptured and infected diverticular dz, ostomy also placed   • COLONOSCOPY     • COLOSTOMY CLOSURE  10/2021    ostomy removed   • EYE SURGERY  2 X cataract   • HEMORRHOIDECTOMY     • KNEE ARTHROPLASTY     • TONSILLECTOMY        General Information     Row Name 10/29/22 1506          Physical Therapy Time and Intention    Document Type therapy note (daily note)  -     Mode of Treatment physical therapy  -[facility] Name 10/29/22 1506          General Information    Existing Precautions/Restrictions brace worn when out of bed;fall  -     Barriers to Rehab previous functional deficit  -[facility] Name 10/29/22 1508          Cognition    Orientation Status (Cognition) oriented to;person;place;situation  -[facility] Name 10/29/22 1506          Safety Issues, Functional Mobility    Impairments Affecting Function (Mobility) balance;endurance/activity tolerance;strength;postural/trunk control  -     Comment, Safety Issues/Impairments (Mobility) gt belt and non skid socks donned; abd binder  -           User Key  (r) = Recorded By, (t) = Taken By, (c) = Cosigned By    Initials Name Provider Type     Yaritza Trimble PTA Physical Therapist Assistant               Mobility     Row Name 10/29/22 1505          Bed Mobility    Supine-Sit [place] (Bed Mobility) verbal cues;minimum assist (75% patient effort)  -     Sit-Supine [place] (Bed Mobility) verbal cues;minimum assist (75% patient effort)  -      Comment, (Bed Mobility) pt educ for use of gt belt as leg  w/ pt able to move R LE over EOB today. assist for BLE to bed by pt request  -PH     Row Name 10/29/22 1504          Bed-Chair Transfer    Comment, (Bed-Chair Transfer) pt declined chair  -PH     Row Name 10/29/22 1504          Sit-Stand Transfer    Sit-Stand [place] (Transfers) verbal cues;nonverbal cues (demo/gesture);contact guard  -PH     Row Name 10/29/22 1506          Gait/Stairs (Locomotion)    [place] Level (Gait) contact guard  -PH     Assistive Device (Gait) walker, front-wheeled  -PH     Distance in Feet (Gait) 60'  -PH     Deviations/Abnormal Patterns (Gait) maddy decreased;gait speed decreased;stride length decreased;antalgic  -PH     Right Sided Gait Deviations weight shift ability decreased  -PH     [place] Level (Stairs) unable to assess  -PH     Comment, (Gait/Stairs) educ for sequencing w/ fww; pt initially R step to pattern although improved to shorted step through pattern; heavy use of B UE for support; 3 brief rests taken of approx 15-20 sec  -PH     Row Name 10/29/22 1507          Mobility    Extremity Weight-bearing Status right lower extremity  -PH     Right Lower Extremity (Weight-bearing Status) weight-bearing as tolerated (WBAT)  -PH           User Key  (r) = Recorded By, (t) = Taken By, (c) = Cosigned By    Initials Name Provider Type     Yaritza Trimble PTA Physical Therapist Assistant               Obj/Interventions     Row Name 10/29/22 1502          Balance    Balance Assessment sitting static balance;standing static balance  -PH     Static Sitting Balance standby assist  -PH     Static Standing Balance contact guard  -PH     Position/Device Used, Standing Balance walker, front-wheeled  -PH     Comment, Balance cues given for upright posture  -PH           User Key  (r) = Recorded By, (t) = Taken By, (c) = Cosigned By    Initials Name Provider Type     Yaritza Trimble PTA Physical  Therapist Assistant               Goals/Plan    No documentation.                Clinical Impression     Row Name 10/29/22 1508          Pain    Posttreatment Pain Rating 9/10  -PH     Pain Location - Side/Orientation Right  -PH     Pain Location - groin  -PH     Pain Intervention(s) Repositioned;Rest  -PH     Row Name 10/29/22 1508          Plan of Care Review    Plan of Care Reviewed With patient;daughter  -PH     Progress improving  -PH     Outcome Evaluation Pt in bed w/ dtr in room at beg of PT session. Pt sat up to EOB w/ improved min A after educ for use of gt belt as R leg . Pt stood then amb 60' req CGA and use of fww. Pt fairly steady w/ no LOB. Pt improved to shortened step through pattern over course of gait. Pt returned to bed req min A for B LE by pt request. PT will continue to follow to improved endurance, strength, and safety and quality of mobility.  -PH     Row Name 10/29/22 1508          Positioning and Restraints    Pre-Treatment Position in bed  -PH     Post Treatment Position bed  -PH     In Bed fowlers;call light within reach;encouraged to call for assist;exit alarm on  -PH           User Key  (r) = Recorded By, (t) = Taken By, (c) = Cosigned By    Initials Name Provider Type    PH Yaritza Trimble PTA Physical Therapist Assistant               Outcome Measures     Row Name 10/29/22 2920          How much help from another person do you currently need...    Turning from your back to your side while in flat bed without using bedrails? 3  -PH     Moving from lying on back to sitting on the side of a flat bed without bedrails? 3  -PH     Moving to and from a bed to a chair (including a wheelchair)? 3  -PH     Standing up from a chair using your arms (e.g., wheelchair, bedside chair)? 3  -PH     Climbing 3-5 steps with a railing? 2  -PH     To walk in hospital room? 3  -PH     AM-PAC 6 Clicks Score (PT) 17  -PH     Highest level of mobility 5 --> Static standing  -PH     Row Name  10/29/22 1511 10/29/22 1003       Functional Assessment    Outcome Measure Options AM-PAC 6 Clicks Basic Mobility (PT)  -PH AM-PAC 6 Clicks Daily Activity (OT);Modified [place]  -PAN          User Key  (r) = Recorded By, (t) = Taken By, (c) = Cosigned By    Initials Name Provider Type    LE Carrol Robertson, OTR Occupational Therapist     Yaritza Trimble PTA Physical Therapist Assistant                             Physical Therapy Education     Title: PT OT SLP Therapies (Done)     Topic: Physical Therapy (Done)     Point: Mobility training (Done)     Learning Progress Summary           Patient Acceptance, E,D, VU,DU,NR by  at 10/29/2022 1511    Acceptance, E, VU by  at 10/28/2022 1609                   Point: Home exercise program (Done)     Learning Progress Summary           Patient Acceptance, E, VU by  at 10/28/2022 1609                   Point: Body mechanics (Done)     Learning Progress Summary           Patient Acceptance, E,D, VU,DU,NR by  at 10/29/2022 1511    Acceptance, E, VU by  at 10/28/2022 1609                   Point: Precautions (Done)     Learning Progress Summary           Patient Acceptance, E,D, VU,DU,NR by  at 10/29/2022 1511    Acceptance, E, VU by  at 10/28/2022 1609                               User Key     Initials Effective Dates Name Provider Type Discipline     06/16/21 -  Yaritza Trimble PTA Physical Therapist Assistant PT     06/16/21 -  Tram Redman PT Physical Therapist PT              PT Recommendation and Plan     Plan of Care Reviewed With: patient, daughter  Progress: improving  Outcome Evaluation: Pt in bed w/ dtr in room at beg of PT session. Pt sat up to EOB w/ improved min A after educ for use of gt belt as R leg . Pt stood then amb 60' req CGA and use of fww. Pt fairly steady w/ no LOB. Pt improved to shortened step through pattern over course of gait. Pt returned to bed req min A for B LE by pt request. PT will continue to follow  to improved endurance, strength, and safety and quality of mobility.     Time Calculation:    PT Charges     Row Name 10/29/22 1512             Time Calculation    Start Time 1410  -PH      Stop Time 1425  -PH      Time Calculation (min) 15 min  -PH      PT Received On 10/29/22  -PH      PT - Next Appointment 10/31/22  -PH         Timed Charges    61684 - PT Therapeutic Activity Minutes 15  -PH         Total Minutes    Timed Charges Total Minutes 15  -PH       Total Minutes 15  -PH            User Key  (r) = Recorded By, (t) = Taken By, (c) = Cosigned By    Initials Name Provider Type    PH Yaritza Trimble PTA Physical Therapist Assistant              Therapy Charges for Today     Code Description Service Date Service Provider Modifiers Qty    [id number] HC PT THERAPEUTIC ACT EA 15 MIN 10/29/2022 Yaritza Trimble PTA GP 1          PT G-Codes  Outcome Measure Options: AM-PAC 6 Clicks Basic Mobility (PT)  AM-PAC 6 Clicks Score (PT): 17  AM-PAC 6 Clicks Score (OT): 12    Yaritza Trimble PTA  10/29/2022

## 2022-10-29 NOTE — PLAN OF CARE
Goal Outcome Evaluation:  Plan of Care Reviewed With: patient, daughter        Progress: improving  Outcome Evaluation: Pt in bed w/ dtr in room at beg of PT session. Pt sat up to EOB w/ improved min A after educ for use of gt belt as R leg . Pt stood then amb 60' req CGA and use of fww. Pt fairly steady w/ no LOB. Pt improved to shortened step through pattern over course of gait. Pt returned to bed req min A for B LE by pt request. PT will continue to follow to improved endurance, strength, and safety and quality of mobility.    Patient was intermittently wearing a face mask during this therapy encounter. Therapist used appropriate personal protective equipment including mask and gloves.  Mask used was standard procedure mask. Appropriate PPE was worn during the entire therapy session. Hand hygiene was completed before and after therapy session. Patient is not in enhanced droplet precautions.

## 2022-10-29 NOTE — THERAPY EVALUATION
Patient Name: Ritu Martino  : 1940    MRN: [id number]                              Today's Date: 10/29/2022       Admit Date: 10/27/2022    Visit Dx:     ICD-10-CM ICD-9-CM   1. Acute UTI  N39.0 599.0   2. Compression fracture of T12 vertebra, initial encounter ([facility])  S22.080A 805.2   3. Closed compression fracture of L2 vertebra, initial encounter ([facility])  S32.020A 805.4   4. Closed compression fracture of L4 vertebra, initial encounter ([facility])  S32.040A 805.4   5. Closed fracture of right inferior pubic ramus, initial encounter ([facility])  S32.591A 808.2     Patient Active Problem List   Diagnosis   • Anxiety   • Hypothyroidism   • Hyponatremia   • Iron deficiency   • Hypokalemia   • Essential hypertension, benign   • Fluent aphasia   • Hypochloremia   • Benzodiazepine dependence ([facility])   • Anemia   • Cervicalgia   • Intertrigo   • Insomnia   • Acute cystitis   • E coli infection   • Diverticulitis   • Arthritis   • Bowel dysfunction   • GERD (gastroesophageal reflux disease)   • Hernia, hiatal   • Seizures ([facility])   • Meningioma ([facility])   • Closed fracture of left distal fibula   • Debility   • History of seizure   • Fall   • Tachycardia   • Abdominal hernia   • Compression fracture of L1 lumbar vertebra, open, initial encounter ([facility])   • Compression fracture of T12 vertebra ([facility])   • Osteopenia   • Acute UTI   • Back pain   • Compression fracture of L2 and L4 lumbar vertebra ([facility])   • Closed fracture of right inferior pubic ramus ([facility])     Past Medical History:   Diagnosis Date   • Anemia    • Anxiety    • Arthritis    • Bowel dysfunction 2021    since having surgery for diverticular infection   • Chronic constipation    • Diverticulitis 2021    w/ rupture and infection required surgery and ostomy   • Essential hypertension, benign    • Fracture 2022    left ankle   • Fracture, tibia and fibula Now wearing bood   • GERD (gastroesophageal reflux disease)    • Hernia, hiatal    • Hypercholesterolemia     • Hypokalemia    • Hyponatremia     chronic low with occasional normal level   • Hypothyroidism     estimated time frame pt nor  could remember exactly how long has been on medication   • Insomnia    • Iron deficiency    • Seizures (HCC)    • Tear of meniscus of knee    • 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     Past Surgical History:   Procedure Laterality Date   • APPENDECTOMY     • BACK SURGERY     •  SECTION     • COLON SURGERY  2021    to address ruptured and infected diverticular dz, ostomy also placed   • COLONOSCOPY     • COLOSTOMY CLOSURE  10/2021    ostomy removed   • EYE SURGERY  2 X cataract   • HEMORRHOIDECTOMY     • KNEE ARTHROPLASTY     • TONSILLECTOMY        General Information     Row Name 10/29/22 0943          OT Time and Intention    Document Type evaluation;therapy note (daily note)  -LE     Mode of Treatment occupational therapy;individual therapy  -     Row Name 10/29/22 0943          General Information    Patient Profile Reviewed yes  -LE     Prior Level of Function mod assist:;transfer;ADL's  spouse reports assisting pt with dressing, bathing and walking with walker past months.  -LE     Existing Precautions/Restrictions fall;brace worn when out of bed;spinal  night RN states [place] brought 3 braces to try and was decided to use abdominal binder as brace.  Night RN states [facility] staff stated pt should be up and moving.    Ortho MD notes state WBAT B LE.  -LE     Barriers to Rehab previous functional deficit  -LE     Row Name 10/29/22 0943          Living Environment    People in Home spouse  -LE     Row Name 10/29/22 0943          Cognition    Orientation Status (Cognition) oriented to;person;place;situation  -     Row Name 10/29/22 0943          Safety Issues, Functional Mobility    Safety Issues Affecting Function (Mobility) insight into deficits/self-awareness;judgment  -LE     Impairments Affecting Function (Mobility)  endurance/activity tolerance  -LE     Comment, Safety Issues/Impairments (Mobility) non skid socks, gait belt, abdominal binder  -LE           User Key  (r) = Recorded By, (t) = Taken By, (c) = Cosigned By    Initials Name Provider Type    Carrol Solorio OTR Occupational Therapist                 Mobility/ADL's     Row Name 10/29/22 0946          Bed Mobility    Bed Mobility supine-sit;sit-supine  -LE     Supine-Sit [place] (Bed Mobility) moderate assist (50% patient effort);verbal cues;nonverbal cues (demo/gesture)  -LE     Bed Mobility, Safety Issues decreased use of legs for bridging/pushing;impaired trunk control for bed mobility  -LE     Assistive Device (Bed Mobility) bed rails;head of bed elevated  -LE     Comment, (Bed Mobility) ed log roll.  increase time and encouragement  -LE     Row Name 10/29/22 0946          Transfers    Transfers bed-chair transfer;stand-sit transfer;toilet transfer;sit-stand transfer  -LE     Comment, (Transfers) VC for hand placement, body mechanics. increase time  -LE     Row Name 10/29/22 0946          Bed-Chair Transfer    Bed-Chair [place] (Transfers) minimum assist (75% patient effort);nonverbal cues (demo/gesture);verbal cues  -LE     Assistive Device (Bed-Chair Transfers) walker, front-wheeled  -LE     Row Name 10/29/22 0946          Sit-Stand Transfer    Sit-Stand [place] (Transfers) verbal cues;nonverbal cues (demo/gesture);minimum assist (75% patient effort)  -LE     Assistive Device (Sit-Stand Transfers) walker, front-wheeled  -LE     Row Name 10/29/22 0946          Stand-Sit Transfer    Stand-Sit [place] (Transfers) nonverbal cues (demo/gesture);verbal cues;minimum assist (75% patient effort)  -LE     Assistive Device (Stand-Sit Transfers) walker, 4-wheeled  -LE     Row Name 10/29/22 0946          Toilet Transfer    Comment, (Toilet Transfer) ed pt and RN (present) pt could use BSC with nursing and recommmend do so a few times a day to increase  activity tolerance and mobilty.  -     Row Name 10/29/22 0946          Functional Mobility    Functional Mobility- Comment few steps to chair with walker.  -     Row Name 10/29/22 0946          Activities of Daily Living    BADL Assessment/Intervention toileting;upper body dressing;lower body dressing;feeding  -     Row Name 10/29/22 0946          Toileting Assessment/Training    [place] Level (Toileting) dependent (less than 25% patient effort)  -LE     Comment, (Toileting) on purwick. recommend BSC with nursing.  -     Row Name 10/29/22 0946          Upper Body Dressing Assessment/Training    [place] Level (Upper Body Dressing) dependent (less than 25% patient effort)  leilani brace.  -LE     Position (Upper Body Dressing) edge of bed sitting  -LE     Comment, (Upper Body Dressing) OT donns abdominal binder.  Spouse observes and ed on how to leilani.  -     Row Name 10/29/22 0946          Lower Body Dressing Assessment/Training    Comment, (Lower Body Dressing) OT ed spinal precautions.  Spouse has been assisting with LBD and OT ed benefit of this to reduce back strain.  -LE           User Key  (r) = Recorded By, (t) = Taken By, (c) = Cosigned By    Initials Name Provider Type    Carrol Solorio, OTR Occupational Therapist               Obj/Interventions     Row Name 10/29/22 0955          Range of Motion Comprehensive    Comment, General Range of Motion moving  B UE functionally  -     Row Name 10/29/22 0955          Balance    Balance Assessment sitting static balance;standing static balance  -LE     Static Sitting Balance standby assist  -LE     Static Standing Balance contact guard  -LE     Position/Device Used, Standing Balance walker, rolling  -LE     Comment, Balance cues and ed stand upright to reduce back strain  -LE           User Key  (r) = Recorded By, (t) = Taken By, (c) = Cosigned By    Initials Name Provider Type    Carrol Solorio, OTR Occupational Therapist               Goals/Plan      Row Name 10/29/22 1001          Transfer Goal 1 (OT)    Activity/Assistive Device (Transfer Goal 1, OT) sit-to-stand/stand-to-sit;bed-to-chair/chair-to-bed;toilet;commode, 3-in-1  with binder and walker  -LE     [place] Level/Cues Needed (Transfer Goal 1, OT) standby assist  -LE     Time Frame (Transfer Goal 1, OT) 2 weeks  -LE     Progress/Outcome (Transfer Goal 1, OT) goal ongoing  -LE     Row Name 10/29/22 1001          Toileting Goal 1 (OT)    Activity/Device (Toileting Goal 1, OT) toileting skills, all  -LE     [place] Level/Cues Needed (Toileting Goal 1, OT) set-up required;supervision required  -LE     Time Frame (Toileting Goal 1, OT) 2 weeks  -LE     Progress/Outcome (Toileting Goal 1, OT) goal ongoing  -LE     Row Name 10/29/22 1001          Problem Specific Goal 1 (OT)    Problem Specific Goal 1 (OT) CGA ambulation to/from bathroom with walker  -LE     Time Frame (Problem Specific Goal 1, OT) 2 weeks  -LE     Progress/Outcome (Problem Specific Goal 1, OT) goal ongoing  -LE     Row Name 10/29/22 1001          Therapy Assessment/Plan (OT)    Planned Therapy Interventions (OT) activity tolerance training;adaptive equipment training;BADL retraining;transfer/mobility retraining;patient/caregiver education/training;functional balance retraining  -LE           User Key  (r) = Recorded By, (t) = Taken By, (c) = Cosigned By    Initials Name Provider Type    Carrol Solorio OTR Occupational Therapist               Clinical Impression     Row Name 10/29/22 9389          Pain Assessment    Pre/Posttreatment Pain Comment does not rate but calls out when moving to EOB.  at rest no grimace or crying out in pain.  -LE     Pain Intervention(s) Rest;Repositioned;Emotional support  -LE     Row Name 10/29/22 5693          Plan of Care Review    Plan of Care Reviewed With patient;spouse  -LE     Outcome Evaluation Pt direct admit from PCP due to increasing back pain. Pt with compression fractures and had  epidural planned for next week.  Pt lives at home with spouse who assist with ADL and mobility at walker. Pt seen by OT and is mod A to move to sit EOB and min A to stand and pivot to chair.  Encourage pt to use BSC with nurses and up to chair for meals.  Will cont to follow for skilled OT to increase safety and independence for plan to return home with spouse assist.  -     Row Name 10/29/22 0958          Therapy Assessment/Plan (OT)    Rehab Potential (OT) fair, will monitor progress closely  -LE     Criteria for Skilled Therapeutic Interventions Met (OT) meets criteria;yes  -LE     Therapy Frequency (OT) 5 times/wk  -LE     Row Name 10/29/22 0958          Therapy Plan Review/Discharge Plan (OT)    Equipment Needs Upon Discharge (OT) --  own walker, 3in1 in (ed spouse can use 3in1 over toilet to have support of arms), TTB.  -LE     Anticipated Discharge Disposition (OT) home with assist;home with home health  -     Row Name 10/29/22 0958          Vital Signs    O2 Delivery Pre Treatment room air  -LE     Pre Patient Position Supine  -LE     Intra Patient Position Standing  -LE     Post Patient Position Sitting  -LE     Row Name 10/29/22 0958          Positioning and Restraints    Pre-Treatment Position in bed  -LE     Post Treatment Position chair  -LE     In Chair notified nsg;reclined;call light within reach;encouraged to call for assist;exit alarm on;with family/caregiver  RN present for most of session  -LE           User Key  (r) = Recorded By, (t) = Taken By, (c) = Cosigned By    Initials Name Provider Type    Carrol Solorio OTR Occupational Therapist               Outcome Measures     Row Name 10/29/22 100          How much help from another is currently needed...    Putting on and taking off regular lower body clothing? 2  -LE     Bathing (including washing, rinsing, and drying) 2  -LE     Toileting (which includes using toilet bed pan or urinal) 1  -LE     Putting on and taking off regular upper  body clothing 2  -LE     Taking care of personal grooming (such as brushing teeth) 2  -LE     Eating meals 3  -LE     AM-PAC 6 Clicks Score (OT) 12  -LE     Row Name 10/29/22 1003          Functional Assessment    Outcome Measure Options AM-PAC 6 Clicks Daily Activity (OT);Modified [place]  -LE           User Key  (r) = Recorded By, (t) = Taken By, (c) = Cosigned By    Initials Name Provider Type    Carrol Solorio OTR Occupational Therapist                Occupational Therapy Education     Title: PT OT SLP Therapies (Done)     Topic: Occupational Therapy (Done)     Point: ADL training (Done)     Description:   Instruct learner(s) on proper safety adaptation and remediation techniques during self care or transfers.   Instruct in proper use of assistive devices.              Learning Progress Summary           Patient Acceptance, E, Bed IU by LE at 10/29/2022 1003   Family Acceptance, E, Bed IU by LE at 10/29/2022 1003                   Point: Home exercise program (Done)     Description:   Instruct learner(s) on appropriate technique for monitoring, assisting and/or progressing therapeutic exercises/activities.              Learning Progress Summary           Patient Acceptance, E, Bed IU by LE at 10/29/2022 1003   Family Acceptance, E, Bed IU by LE at 10/29/2022 1003                   Point: Precautions (Done)     Description:   Instruct learner(s) on prescribed precautions during self-care and functional transfers.              Learning Progress Summary           Patient Acceptance, E, Bed IU by LE at 10/29/2022 1003   Family Acceptance, E, Bed IU by LE at 10/29/2022 1003                   Point: Body mechanics (Done)     Description:   Instruct learner(s) on proper positioning and spine alignment during self-care, functional mobility activities and/or exercises.              Learning Progress Summary           Patient Acceptance, E, Bed IU by LE at 10/29/2022 1003   Family Acceptance, E, Bed IU by LE at  10/29/2022 1003                               User Key     Initials Effective Dates Name Provider Type Discipline    LE 06/16/21 -  Carrol Robertson OTR Occupational Therapist OT              OT Recommendation and Plan  Planned Therapy Interventions (OT): activity tolerance training, adaptive equipment training, BADL retraining, transfer/mobility retraining, patient/caregiver education/training, functional balance retraining  Therapy Frequency (OT): 5 times/wk  Plan of Care Review  Plan of Care Reviewed With: patient, spouse  Outcome Evaluation: Pt direct admit from PCP due to increasing back pain. Pt with compression fractures and had epidural planned for next week.  Pt lives at home with spouse who assist with ADL and mobility at walker. Pt seen by OT and is mod A to move to sit EOB and min A to stand and pivot to chair.  Encourage pt to use BSC with nurses and up to chair for meals.  Will cont to follow for skilled OT to increase safety and independence for plan to return home with spouse assist.     Time Calculation:    Time Calculation- OT     Row Name 10/29/22 1003             Time Calculation- OT    OT Start Time 0913  -LE      OT Stop Time 0931  -LE      OT Time Calculation (min) 18 min  -LE      Total Timed Code Minutes- OT 8 minute(s)  -LE      OT Received On 10/29/22  -LE      OT - Next Appointment 10/31/22  -LE      OT Goal Re-Cert Due Date 11/12/22  -LE         Timed Charges    25828 - OT Therapeutic Activity Minutes 8  -LE         Untimed Charges    OT Eval/Re-eval Minutes 10  -LE         Total Minutes    Timed Charges Total Minutes 8  -LE      Untimed Charges Total Minutes 10  -LE       Total Minutes 18  -LE            User Key  (r) = Recorded By, (t) = Taken By, (c) = Cosigned By    Initials Name Provider Type    LE Carrol Robertson OTR Occupational Therapist              Therapy Charges for Today     Code Description Service Date Service Provider Modifiers Qty    00623617030 HC OT EVAL MOD COMPLEXITY 2  10/29/2022 Carrol Robertson OTR GO 1    [id number]  OT THERAPEUTIC ACT EA 15 MIN 10/29/2022 Carrol Robertson OTR GO 1               GATO Benson  10/29/2022

## 2022-10-29 NOTE — PLAN OF CARE
Goal Outcome Evaluation:  Plan of Care Reviewed With: patient, spouse           Outcome Evaluation: Pt direct admit from PCP due to increasing back pain. Pt with compression fractures and had epidural planned for next week.  Pt lives at home with spouse who assist with ADL and mobility at walker. Pt seen by OT and is mod A to move to sit EOB and min A to stand and pivot to chair.  Encourage pt to use BSC with nurses and up to chair for meals.  Will cont to follow for skilled OT to increase safety and independence for plan to return home with spouse assist.

## 2022-10-29 NOTE — PROGRESS NOTES
Name: Ritu Martino ADMIT: 10/27/2022   : 1940  PCP: Gaurav Barger MD    MRN: [id number] LOS: 0 days   AGE/SEX: 82 y.o. female  ROOM: [facility]     Subjective   Subjective   She continues with significant pain in her back primarily when she attempts any movement.  Dysuria has improved.    Review of Systems     Objective   Objective   Vital Signs  Temp:  [97.5 °F (36.4 °C)-98.2 °F (36.8 °C)] 98.2 °F (36.8 °C)  Heart Rate:  [77-92] 78  Resp:  [14-18] 18  BP: (129-165)/(63-81) 165/81  SpO2:  [97 %-98 %] 97 %  on   ;   Device (Oxygen Therapy): room air  There is no height or weight on file to calculate BMI.  Physical Exam  Vitals and nursing note reviewed.   Constitutional:       General: She is not in acute distress.     Appearance: Normal appearance.   Neck:      Vascular: No JVD.      Trachea: No tracheal deviation.   Cardiovascular:      Rate and Rhythm: Normal rate and regular rhythm.      Heart sounds: Normal heart sounds.   Pulmonary:      Effort: Pulmonary effort is normal. No respiratory distress.      Breath sounds: Normal breath sounds.   Abdominal:      General: Bowel sounds are normal.      Palpations: Abdomen is soft.      Tenderness: There is no abdominal tenderness.   Skin:     General: Skin is warm and dry.   Neurological:      General: No focal deficit present.      Mental Status: She is alert and oriented to person, place, and time.   Psychiatric:         Behavior: Behavior normal. Behavior is cooperative.       Results Review     I reviewed the patient's new clinical results.  Results from last 7 days   Lab Units 10/29/22  1054 10/28/22  0602 10/27/22  2254 10/27/22  1113   WBC 10*3/mm3 13.45* 12.24* 18.80* 9.23   HEMOGLOBIN g/dL 11.4* 10.0* 10.6* 10.3*   PLATELETS 10*3/mm3 532* 458* 533* 557*     Results from last 7 days   Lab Units 10/28/22  0602 10/27/22  2254 10/27/22  1113   SODIUM mmol/L 128* 123* 124*   POTASSIUM mmol/L 3.1* 3.9 3.5   CHLORIDE mmol/L 94* 87* 89*   CO2 mmol/L  24.0 23.6 24.4   BUN mg/dL 7* 9 8   CREATININE mg/dL 0.42* 0.40* 0.49*   GLUCOSE mg/dL 97 116* 117*   EGFR mL/min/1.73 97.8 99.0 94.2     Results from last 7 days   Lab Units 10/27/22  2254 10/27/22  1113   ALBUMIN g/dL 4.60 4.60   BILIRUBIN mg/dL 0.6 0.5   ALK PHOS U/L 178* 174*   AST (SGOT) U/L 19 19   ALT (SGPT) U/L 22 20     Results from last 7 days   Lab Units 10/28/22  0602 10/27/22  2254 10/27/22  1113   CALCIUM mg/dL 8.1* 8.8 8.4*   ALBUMIN g/dL  --  4.60 4.60   MAGNESIUM mg/dL 2.0  --   --      Results from last 7 days   Lab Units 10/28/22  0119   LACTATE mmol/L 1.3     No results found for: HGBA1C, POCGLU    CT Abdomen Pelvis Without Contrast    Result Date: 10/28/2022  Electronically signed by Joey Villa MD on 10-28-22 at 0008    CT Lumbar Spine Without Contrast    Result Date: 10/28/2022  Electronically signed by Joey Villa MD on 10-28-22 at 0009    Scheduled Medications  ALPRAZolam, 0.25 mg, Oral, BID  cefTRIAXone, 1 g, Intravenous, Q24H  cycloSPORINE, 1 drop, Both Eyes, BID  desvenlafaxine, 50 mg, Oral, Daily  dilTIAZem CD, 120 mg, Oral, Q24H  hydrALAZINE, 10 mg, Oral, Q8H  lactobacillus acidophilus, 1 capsule, Oral, Daily  levothyroxine, 50 mcg, Oral, Q AM  lidocaine, 2 patch, Transdermal, Q24H  phenazopyridine, 100 mg, Oral, TID With Meals  potassium chloride, 10 mEq, Oral, BID  rosuvastatin, 20 mg, Oral, Nightly  senna-docusate sodium, 2 tablet, Oral, BID  sodium chloride, 2 g, Oral, TID With Meals    Infusions   Diet  Diet Regular; Daily Fluid Restriction; 1000 mL Fluid Per Day  NPO Diet NPO Type: Sips with Meds       Assessment/Plan     Active Hospital Problems    Diagnosis  POA   • **Closed fracture of right inferior pubic ramus (HCC) [S32.591A]  Yes   • Acute UTI [N39.0]  Yes   • Back pain [M54.9]  Yes   • Compression fracture of L2 and L4 lumbar vertebra (HCC) [S32.020A]  Yes   • Compression fracture of T12 vertebra (HCC) [S22.080A]  Yes   • Hernia, hiatal [K44.9]  Yes   • Seizures  ([facility]) [R56.9]  Yes   • Essential hypertension, benign [I10]  Yes   • Hyponatremia [E87.1]  Yes      Resolved Hospital Problems   No resolved problems to display.       82 y.o. female admitted with Closed fracture of right inferior pubic ramus ([facility]).    Patient still in too much pain to consider discharge and coming back for outpatient epidural.  Will increase availability of the oxycodone.  Continue to use back brace when ambulating.  Note cultures are negative but given improvement in symptoms and planned procedure on Monday will complete 3 doses of IV Rocephin.    Discussed with family at bedside at length.  She may require SNU facility at discharge if she does not get significant relief of symptoms on Monday with epidural.  Note she has pelvic fractures that would not be expected to have any improvement at all obviously with the epidural.  Family will go ahead and decide about SNU facility in anticipation of going that route.    Greater than 35 minutes spent today in total with patient with more than 50% at bedside and discussion with patient/family.      · SCDs for DVT prophylaxis.  · Full code.  · Discussed with patient, family and nursing staff.  · Anticipate discharge home with  vs SNU facility in 2-3 days.      Stephen Geronimo MD  [facility]  10/29/22  11:57 EDT

## 2022-10-29 NOTE — PLAN OF CARE
Goal Outcome Evaluation:  Plan of Care Reviewed With: patient, caregiver        Progress: no change  Outcome Evaluation: Pt c/o extreme pain overnight. Several bouts of hysterical crying, pt inconsolable at times. Pt refusing to ambulate, purewick in place. Awaiting epidural on Monday. Will continue to monitor patient and await further orders.

## 2022-10-29 NOTE — PLAN OF CARE
Goal Outcome Evaluation:      Pt progressing on care plan goals.  Pain continues to be an issue for patient. Pain medication dose/frequency adjusted per .  Family has been with pt for majority of day.  Pt rested some today, moved to the chair, and was ambulatory to the bed.  No distress/needs at this time.

## 2022-10-30 PROCEDURE — G0378 HOSPITAL OBSERVATION PER HR: HCPCS

## 2022-10-30 PROCEDURE — 25010000002 CEFTRIAXONE PER 250 MG: Performed by: NURSE PRACTITIONER

## 2022-10-30 RX ADMIN — ALPRAZOLAM 0.25 MG: 0.25 TABLET ORAL at 21:21

## 2022-10-30 RX ADMIN — POTASSIUM CHLORIDE 10 MEQ: 750 TABLET, EXTENDED RELEASE ORAL at 08:54

## 2022-10-30 RX ADMIN — DESVENLAFAXINE SUCCINATE 50 MG: 50 TABLET, EXTENDED RELEASE ORAL at 08:55

## 2022-10-30 RX ADMIN — DILTIAZEM HYDROCHLORIDE 120 MG: 120 CAPSULE, COATED, EXTENDED RELEASE ORAL at 08:55

## 2022-10-30 RX ADMIN — SODIUM CHLORIDE TAB 1 GM 2 G: 1 TAB at 08:53

## 2022-10-30 RX ADMIN — POTASSIUM CHLORIDE 10 MEQ: 750 TABLET, EXTENDED RELEASE ORAL at 21:21

## 2022-10-30 RX ADMIN — OXYCODONE HYDROCHLORIDE AND ACETAMINOPHEN 1 TABLET: 7.5; 325 TABLET ORAL at 17:42

## 2022-10-30 RX ADMIN — HYDRALAZINE HYDROCHLORIDE 10 MG: 10 TABLET, FILM COATED ORAL at 06:19

## 2022-10-30 RX ADMIN — OXYCODONE HYDROCHLORIDE AND ACETAMINOPHEN 1 TABLET: 7.5; 325 TABLET ORAL at 11:24

## 2022-10-30 RX ADMIN — OXYCODONE HYDROCHLORIDE AND ACETAMINOPHEN 1 TABLET: 7.5; 325 TABLET ORAL at 21:22

## 2022-10-30 RX ADMIN — CYCLOSPORINE 1 DROP: 0.5 EMULSION OPHTHALMIC at 08:55

## 2022-10-30 RX ADMIN — ROSUVASTATIN CALCIUM 20 MG: 20 TABLET, FILM COATED ORAL at 21:21

## 2022-10-30 RX ADMIN — SODIUM CHLORIDE TAB 1 GM 2 G: 1 TAB at 11:24

## 2022-10-30 RX ADMIN — ALPRAZOLAM 0.25 MG: 0.25 TABLET ORAL at 08:54

## 2022-10-30 RX ADMIN — OXYCODONE HYDROCHLORIDE AND ACETAMINOPHEN 1 TABLET: 7.5; 325 TABLET ORAL at 06:19

## 2022-10-30 RX ADMIN — HYDRALAZINE HYDROCHLORIDE 10 MG: 10 TABLET, FILM COATED ORAL at 21:21

## 2022-10-30 RX ADMIN — ZOLPIDEM TARTRATE 5 MG: 5 TABLET ORAL at 22:03

## 2022-10-30 RX ADMIN — CEFTRIAXONE SODIUM 1 G: 1 INJECTION, POWDER, FOR SOLUTION INTRAMUSCULAR; INTRAVENOUS at 01:32

## 2022-10-30 RX ADMIN — SODIUM CHLORIDE TAB 1 GM 2 G: 1 TAB at 17:42

## 2022-10-30 RX ADMIN — LIDOCAINE 5% 2 PATCH: 700 PATCH TOPICAL at 08:55

## 2022-10-30 RX ADMIN — PHENAZOPYRIDINE 100 MG: 100 TABLET ORAL at 11:24

## 2022-10-30 RX ADMIN — PHENAZOPYRIDINE 100 MG: 100 TABLET ORAL at 08:55

## 2022-10-30 RX ADMIN — CYCLOSPORINE 1 DROP: 0.5 EMULSION OPHTHALMIC at 21:22

## 2022-10-30 RX ADMIN — PHENAZOPYRIDINE 100 MG: 100 TABLET ORAL at 17:42

## 2022-10-30 RX ADMIN — LEVOTHYROXINE SODIUM 50 MCG: 0.05 TABLET ORAL at 06:19

## 2022-10-30 RX ADMIN — Medication 1 CAPSULE: at 08:55

## 2022-10-30 RX ADMIN — HYDRALAZINE HYDROCHLORIDE 10 MG: 10 TABLET, FILM COATED ORAL at 14:24

## 2022-10-30 RX ADMIN — DOCUSATE SODIUM 50MG AND SENNOSIDES 8.6MG 2 TABLET: 8.6; 5 TABLET, FILM COATED ORAL at 08:54

## 2022-10-30 NOTE — PROGRESS NOTES
Name: Ritu Martino ADMIT: 10/27/2022   : 1940  PCP: Gaurav Barger MD    MRN: [id number] LOS: 0 days   AGE/SEX: 82 y.o. female  ROOM: [facility]     Subjective   Subjective   Seems a little confused this morning.  Feels like she needs to void but cannot.  Has external catheter in place with very little urine output.  Not having back or pelvis pain as long as she is lying in bed.    Review of Systems     Objective   Objective   Vital Signs  Temp:  [97.8 °F (36.6 °C)-98.4 °F (36.9 °C)] 98.4 °F (36.9 °C)  Heart Rate:  [72-91] 72  Resp:  [16-18] 16  BP: (127-155)/(63-83) 143/64  SpO2:  [98 %-99 %] 99 %  on   ;   Device (Oxygen Therapy): room air  There is no height or weight on file to calculate BMI.  Physical Exam  Vitals and nursing note reviewed.   Constitutional:       General: She is not in acute distress.     Appearance: Normal appearance.   Neck:      Vascular: No JVD.      Trachea: No tracheal deviation.   Cardiovascular:      Rate and Rhythm: Normal rate and regular rhythm.      Heart sounds: Normal heart sounds.   Pulmonary:      Effort: Pulmonary effort is normal. No respiratory distress.      Breath sounds: Normal breath sounds.   Abdominal:      General: Bowel sounds are normal.      Palpations: Abdomen is soft.      Tenderness: There is no abdominal tenderness.   Skin:     General: Skin is warm and dry.   Neurological:      General: No focal deficit present.      Mental Status: She is alert and oriented to person, place, and time.   Psychiatric:         Behavior: Behavior normal. Behavior is cooperative.       Results Review     I reviewed the patient's new clinical results.  Results from last 7 days   Lab Units 10/29/22  1054 10/28/22  0602 10/27/22  2254 10/27/22  1113   WBC 10*3/mm3 13.45* 12.24* 18.80* 9.23   HEMOGLOBIN g/dL 11.4* 10.0* 10.6* 10.3*   PLATELETS 10*3/mm3 532* 458* 533* 557*     Results from last 7 days   Lab Units 10/29/22  1303 10/28/22  0602 10/27/22  2254 10/27/22  1113    SODIUM mmol/L 130* 128* 123* 124*   POTASSIUM mmol/L 4.3 3.1* 3.9 3.5   CHLORIDE mmol/L 96* 94* 87* 89*   CO2 mmol/L 23.6 24.0 23.6 24.4   BUN mg/dL 6* 7* 9 8   CREATININE mg/dL 0.40* 0.42* 0.40* 0.49*   GLUCOSE mg/dL 96 97 116* 117*   EGFR mL/min/1.73 99.0 97.8 99.0 94.2     Results from last 7 days   Lab Units 10/27/22  2254 10/27/22  1113   ALBUMIN g/dL 4.60 4.60   BILIRUBIN mg/dL 0.6 0.5   ALK PHOS U/L 178* 174*   AST (SGOT) U/L 19 19   ALT (SGPT) U/L 22 20     Results from last 7 days   Lab Units 10/29/22  1303 10/28/22  0602 10/27/22  2254 10/27/22  1113   CALCIUM mg/dL 8.2* 8.1* 8.8 8.4*   ALBUMIN g/dL  --   --  4.60 4.60   MAGNESIUM mg/dL  --  2.0  --   --      Results from last 7 days   Lab Units 10/28/22  0119   LACTATE mmol/L 1.3     No results found for: HGBA1C, POCGLU    No radiology results for the last day  Scheduled Medications  ALPRAZolam, 0.25 mg, Oral, BID  cefTRIAXone, 1 g, Intravenous, Q24H  cycloSPORINE, 1 drop, Both Eyes, BID  desvenlafaxine, 50 mg, Oral, Daily  dilTIAZem CD, 120 mg, Oral, Q24H  hydrALAZINE, 10 mg, Oral, Q8H  lactobacillus acidophilus, 1 capsule, Oral, Daily  levothyroxine, 50 mcg, Oral, Q AM  lidocaine, 2 patch, Transdermal, Q24H  phenazopyridine, 100 mg, Oral, TID With Meals  potassium chloride, 10 mEq, Oral, BID  rosuvastatin, 20 mg, Oral, Nightly  senna-docusate sodium, 2 tablet, Oral, BID  sodium chloride, 2 g, Oral, TID With Meals    Infusions   Diet  Diet Regular; Daily Fluid Restriction; 1000 mL Fluid Per Day  NPO Diet NPO Type: Sips with Meds       Assessment/Plan     Active Hospital Problems    Diagnosis  POA   • **Closed fracture of right inferior pubic ramus (HCC) [S32.591A]  Yes   • Acute UTI [N39.0]  Yes   • Back pain [M54.9]  Yes   • Compression fracture of L2 and L4 lumbar vertebra (HCC) [S32.020A]  Yes   • Compression fracture of T12 vertebra (HCC) [S22.080A]  Yes   • Hernia, hiatal [K44.9]  Yes   • Seizures (HCC) [R56.9]  Yes   • Essential hypertension,  benign [I10]  Yes   • Hyponatremia [E87.1]  Yes      Resolved Hospital Problems   No resolved problems to display.       82 y.o. female admitted with Closed fracture of right inferior pubic ramus (HCC).    Discussed with RN about bladder scanning her.  Need to rule out urinary retention given her immobility and narcotic use.  Continue current pain control efforts for today.  Activity as tolerated with the therapist.  She should be getting WINTER tomorrow which might help with the back pain but of course will not help with the pelvic fracture pain.  Will have to see how she does afterwards but likely will need a SNU at discharge.      · SCDs for DVT prophylaxis.  · Full code.  · Discussed with patient and nursing staff.  · Anticipate discharge home with HH vs SNU facility in 1-2 days.      Stephen Geronimo MD  [facility]  10/30/22  10:07 EDT

## 2022-10-30 NOTE — PLAN OF CARE
Goal Outcome Evaluation:  Plan of Care Reviewed With: patient        Progress: no change  Outcome Evaluation: pain well controlled over night. Anticipating epidural for pain Monday am. Will continue to monitor and await further orders.

## 2022-10-30 NOTE — PLAN OF CARE
Goal Outcome Evaluation:     Pt cooperative w/care this am.  assisted with medication education and patient care.  Alert to self. No distress noted at this time. Pt in her room in bed with a sitter at bedside. The room a/c is broken; maintenance is aware and working on the issue.  Pt takes meds in applesauce some crushed some whole.  Pt is scheduled for an epidural tomorrow.  Pt continues to make progress on care plan goals.

## 2022-10-31 ENCOUNTER — APPOINTMENT (OUTPATIENT)
Dept: GENERAL RADIOLOGY | Facility: HOSPITAL | Age: 82
End: 2022-10-31
Payer: MEDICARE

## 2022-10-31 ENCOUNTER — TELEPHONE (OUTPATIENT)
Dept: FAMILY MEDICINE CLINIC | Facility: CLINIC | Age: 82
End: 2022-10-31

## 2022-10-31 ENCOUNTER — ANESTHESIA (OUTPATIENT)
Dept: PAIN MEDICINE | Facility: HOSPITAL | Age: 82
End: 2022-10-31
Payer: MEDICARE

## 2022-10-31 ENCOUNTER — APPOINTMENT (OUTPATIENT)
Dept: PAIN MEDICINE | Facility: HOSPITAL | Age: 82
End: 2022-10-31
Payer: MEDICARE

## 2022-10-31 ENCOUNTER — ANESTHESIA EVENT (OUTPATIENT)
Dept: PAIN MEDICINE | Facility: HOSPITAL | Age: 82
End: 2022-10-31
Payer: MEDICARE

## 2022-10-31 DIAGNOSIS — F41.9 ANXIETY: ICD-10-CM

## 2022-10-31 LAB
ANION GAP SERPL CALCULATED.3IONS-SCNC: 15 MMOL/L (ref 5–15)
BUN SERPL-MCNC: 10 MG/DL (ref 8–23)
BUN/CREAT SERPL: 20.4 (ref 7–25)
CALCIUM SPEC-SCNC: 7.8 MG/DL (ref 8.6–10.5)
CHLORIDE SERPL-SCNC: 93 MMOL/L (ref 98–107)
CO2 SERPL-SCNC: 22 MMOL/L (ref 22–29)
CREAT SERPL-MCNC: 0.49 MG/DL (ref 0.57–1)
DEPRECATED RDW RBC AUTO: 40.6 FL (ref 37–54)
EGFRCR SERPLBLD CKD-EPI 2021: 94.2 ML/MIN/1.73
ERYTHROCYTE [DISTWIDTH] IN BLOOD BY AUTOMATED COUNT: 12.2 % (ref 12.3–15.4)
GLUCOSE BLDC GLUCOMTR-MCNC: 111 MG/DL (ref 70–130)
GLUCOSE SERPL-MCNC: 93 MG/DL (ref 65–99)
HCT VFR BLD AUTO: 28.9 % (ref 34–46.6)
HCT VFR BLD AUTO: 29 % (ref 34–46.6)
HEMOCCULT STL QL: POSITIVE
HGB BLD-MCNC: 9.6 G/DL (ref 12–15.9)
HGB BLD-MCNC: 9.8 G/DL (ref 12–15.9)
MCH RBC QN AUTO: 30.6 PG (ref 26.6–33)
MCHC RBC AUTO-ENTMCNC: 33.1 G/DL (ref 31.5–35.7)
MCV RBC AUTO: 92.4 FL (ref 79–97)
PLATELET # BLD AUTO: 369 10*3/MM3 (ref 140–450)
PMV BLD AUTO: 8.9 FL (ref 6–12)
POTASSIUM SERPL-SCNC: 4.6 MMOL/L (ref 3.5–5.2)
RBC # BLD AUTO: 3.14 10*6/MM3 (ref 3.77–5.28)
SODIUM SERPL-SCNC: 130 MMOL/L (ref 136–145)
WBC NRBC COR # BLD: 5.68 10*3/MM3 (ref 3.4–10.8)

## 2022-10-31 PROCEDURE — 0 IOPAMIDOL 41 % SOLUTION: Performed by: ANESTHESIOLOGY

## 2022-10-31 PROCEDURE — 25010000002 FENTANYL CITRATE (PF) 50 MCG/ML SOLUTION: Performed by: ANESTHESIOLOGY

## 2022-10-31 PROCEDURE — G0378 HOSPITAL OBSERVATION PER HR: HCPCS

## 2022-10-31 PROCEDURE — 25010000002 METHYLPREDNISOLONE PER 80 MG: Performed by: ANESTHESIOLOGY

## 2022-10-31 PROCEDURE — 97530 THERAPEUTIC ACTIVITIES: CPT

## 2022-10-31 PROCEDURE — 96366 THER/PROPH/DIAG IV INF ADDON: CPT

## 2022-10-31 PROCEDURE — 25010000002 CEFTRIAXONE PER 250 MG: Performed by: NURSE PRACTITIONER

## 2022-10-31 PROCEDURE — 82272 OCCULT BLD FECES 1-3 TESTS: CPT | Performed by: HOSPITALIST

## 2022-10-31 PROCEDURE — 85018 HEMOGLOBIN: CPT | Performed by: HOSPITALIST

## 2022-10-31 PROCEDURE — 85014 HEMATOCRIT: CPT | Performed by: HOSPITALIST

## 2022-10-31 PROCEDURE — 82962 GLUCOSE BLOOD TEST: CPT

## 2022-10-31 PROCEDURE — 96365 THER/PROPH/DIAG IV INF INIT: CPT

## 2022-10-31 PROCEDURE — 85027 COMPLETE CBC AUTOMATED: CPT | Performed by: HOSPITALIST

## 2022-10-31 PROCEDURE — 80048 BASIC METABOLIC PNL TOTAL CA: CPT | Performed by: HOSPITALIST

## 2022-10-31 PROCEDURE — 77003 FLUOROGUIDE FOR SPINE INJECT: CPT

## 2022-10-31 RX ORDER — METHYLPREDNISOLONE ACETATE 80 MG/ML
80 INJECTION, SUSPENSION INTRA-ARTICULAR; INTRALESIONAL; INTRAMUSCULAR; SOFT TISSUE ONCE
Status: COMPLETED | OUTPATIENT
Start: 2022-10-31 | End: 2022-10-31

## 2022-10-31 RX ORDER — OXYCODONE AND ACETAMINOPHEN 7.5; 325 MG/1; MG/1
2 TABLET ORAL EVERY 4 HOURS PRN
Status: DISCONTINUED | OUTPATIENT
Start: 2022-10-31 | End: 2022-11-01

## 2022-10-31 RX ORDER — LIDOCAINE HYDROCHLORIDE 10 MG/ML
1 INJECTION, SOLUTION INFILTRATION; PERINEURAL ONCE
Status: DISCONTINUED | OUTPATIENT
Start: 2022-10-31 | End: 2022-11-02 | Stop reason: HOSPADM

## 2022-10-31 RX ORDER — ALPRAZOLAM 0.25 MG/1
0.25 TABLET ORAL 2 TIMES DAILY
Qty: 180 TABLET | Refills: 1 | Status: SHIPPED | OUTPATIENT
Start: 2022-10-31 | End: 2022-11-02 | Stop reason: SDUPTHER

## 2022-10-31 RX ORDER — FENTANYL CITRATE 50 UG/ML
50 INJECTION, SOLUTION INTRAMUSCULAR; INTRAVENOUS AS NEEDED
Status: DISCONTINUED | OUTPATIENT
Start: 2022-10-31 | End: 2022-10-31

## 2022-10-31 RX ORDER — FERROUS SULFATE 325(65) MG
325 TABLET ORAL
Qty: 60 TABLET | Refills: 3 | Status: SHIPPED | OUTPATIENT
Start: 2022-10-31 | End: 2022-12-07

## 2022-10-31 RX ORDER — HYDRALAZINE HYDROCHLORIDE 10 MG/1
10 TABLET, FILM COATED ORAL 3 TIMES DAILY
Qty: 90 TABLET | Refills: 3 | Status: SHIPPED | OUTPATIENT
Start: 2022-10-31 | End: 2023-01-10

## 2022-10-31 RX ADMIN — FENTANYL CITRATE 25 MCG: 50 INJECTION INTRAMUSCULAR; INTRAVENOUS at 07:51

## 2022-10-31 RX ADMIN — DOCUSATE SODIUM 50MG AND SENNOSIDES 8.6MG 2 TABLET: 8.6; 5 TABLET, FILM COATED ORAL at 09:01

## 2022-10-31 RX ADMIN — POTASSIUM CHLORIDE 10 MEQ: 750 TABLET, EXTENDED RELEASE ORAL at 20:52

## 2022-10-31 RX ADMIN — SODIUM CHLORIDE TAB 1 GM 2 G: 1 TAB at 09:03

## 2022-10-31 RX ADMIN — ALPRAZOLAM 0.25 MG: 0.25 TABLET ORAL at 09:01

## 2022-10-31 RX ADMIN — OXYCODONE HYDROCHLORIDE AND ACETAMINOPHEN 1 TABLET: 7.5; 325 TABLET ORAL at 17:14

## 2022-10-31 RX ADMIN — SODIUM CHLORIDE TAB 1 GM 2 G: 1 TAB at 11:42

## 2022-10-31 RX ADMIN — POTASSIUM CHLORIDE 10 MEQ: 750 TABLET, EXTENDED RELEASE ORAL at 09:01

## 2022-10-31 RX ADMIN — HYDRALAZINE HYDROCHLORIDE 10 MG: 10 TABLET, FILM COATED ORAL at 13:29

## 2022-10-31 RX ADMIN — OXYCODONE HYDROCHLORIDE AND ACETAMINOPHEN 1 TABLET: 7.5; 325 TABLET ORAL at 03:54

## 2022-10-31 RX ADMIN — OXYCODONE HYDROCHLORIDE AND ACETAMINOPHEN 1 TABLET: 7.5; 325 TABLET ORAL at 13:29

## 2022-10-31 RX ADMIN — PHENAZOPYRIDINE 100 MG: 100 TABLET ORAL at 17:08

## 2022-10-31 RX ADMIN — OXYCODONE HYDROCHLORIDE AND ACETAMINOPHEN 2 TABLET: 7.5; 325 TABLET ORAL at 21:15

## 2022-10-31 RX ADMIN — CYCLOSPORINE 1 DROP: 0.5 EMULSION OPHTHALMIC at 09:03

## 2022-10-31 RX ADMIN — LIDOCAINE 5% 2 PATCH: 700 PATCH TOPICAL at 09:03

## 2022-10-31 RX ADMIN — METHYLPREDNISOLONE ACETATE 80 MG: 80 INJECTION, SUSPENSION INTRA-ARTICULAR; INTRALESIONAL; INTRAMUSCULAR; SOFT TISSUE at 07:57

## 2022-10-31 RX ADMIN — CEFTRIAXONE SODIUM 1 G: 1 INJECTION, POWDER, FOR SOLUTION INTRAMUSCULAR; INTRAVENOUS at 02:30

## 2022-10-31 RX ADMIN — PHENAZOPYRIDINE 100 MG: 100 TABLET ORAL at 09:25

## 2022-10-31 RX ADMIN — PHENAZOPYRIDINE 100 MG: 100 TABLET ORAL at 11:42

## 2022-10-31 RX ADMIN — SODIUM CHLORIDE TAB 1 GM 2 G: 1 TAB at 17:08

## 2022-10-31 RX ADMIN — ZOLPIDEM TARTRATE 5 MG: 5 TABLET ORAL at 22:29

## 2022-10-31 RX ADMIN — ROSUVASTATIN CALCIUM 20 MG: 20 TABLET, FILM COATED ORAL at 21:06

## 2022-10-31 RX ADMIN — IOPAMIDOL 10 ML: 408 INJECTION, SOLUTION INTRATHECAL at 07:57

## 2022-10-31 RX ADMIN — ALPRAZOLAM 0.25 MG: 0.25 TABLET ORAL at 20:52

## 2022-10-31 RX ADMIN — DESVENLAFAXINE SUCCINATE 50 MG: 50 TABLET, EXTENDED RELEASE ORAL at 09:01

## 2022-10-31 RX ADMIN — OXYCODONE HYDROCHLORIDE AND ACETAMINOPHEN 1 TABLET: 7.5; 325 TABLET ORAL at 09:01

## 2022-10-31 RX ADMIN — DILTIAZEM HYDROCHLORIDE 120 MG: 120 CAPSULE, COATED, EXTENDED RELEASE ORAL at 09:02

## 2022-10-31 RX ADMIN — HYDRALAZINE HYDROCHLORIDE 10 MG: 10 TABLET, FILM COATED ORAL at 21:06

## 2022-10-31 RX ADMIN — Medication 1 CAPSULE: at 09:01

## 2022-10-31 NOTE — ANESTHESIA PROCEDURE NOTES
PAIN Epidural block    Pre-sedation assessment completed: 10/31/2022 7:51 AM    Patient reassessed immediately prior to procedure    Patient location during procedure: pain clinic  Start Time: 10/31/2022 7:51 AM  Stop Time: 10/31/2022 8:04 AM  Indication:at surgeon's request and procedure for pain  Performed By  Anesthesiologist: Martell Sanchez MD  Preanesthetic Checklist  Completed: patient identified, IV checked, site marked, risks and benefits discussed, surgical consent, monitors and equipment checked, pre-op evaluation and timeout performed  Additional Notes  Dx:  Post-Op Diagnosis Codes:     * Lumbar radiculopathy (M54.16)  80 mg depomedrol in epid    Plan : return to clinic as needed  Prep:  Pt Position:prone (prone)  Sterile Tech:cap, gloves, mask and sterile barrier  Prep:chlorhexidine gluconate and isopropyl alcohol  Monitoring:blood pressure monitoring, EKG and continuous pulse oximetry  Procedure:Sedation: yes (fent 25mcg)     Approach:midline  Guidance: fluoroscopy and c arm pa and lat and loss of resistance  Location:lumbar  Level:L5-S1 (interlaminar)  Needle Type:Shira  Needle Gauge:20  Aspiration:negative  Medications:  Preservative Free Saline:3mL  Isovue:2mL  Comments:1cc 1% lidoDepomedrol:80 mg  Post Assessment:  Post-procedure: bandaid.  Pt Tolerance:patient tolerated the procedure well with no apparent complications  Complications:no

## 2022-10-31 NOTE — THERAPY TREATMENT NOTE
Patient Name: Ritu Martino  : 1940    MRN: [id number]                              Today's Date: 10/31/2022       Admit Date: 10/27/2022    Visit Dx:     ICD-10-CM ICD-9-CM   1. Acute UTI  N39.0 599.0   2. Compression fracture of T12 vertebra, initial encounter ([facility])  S22.080A 805.2   3. Closed compression fracture of L2 vertebra, initial encounter ([facility])  S32.020A 805.4   4. Closed compression fracture of L4 vertebra, initial encounter ([facility])  S32.040A 805.4   5. Closed fracture of right inferior pubic ramus, initial encounter ([facility])  S32.591A 808.2   6. Lumbar radiculopathy  M54.16 724.4     Patient Active Problem List   Diagnosis   • Anxiety   • Hypothyroidism   • Hyponatremia   • Iron deficiency   • Hypokalemia   • Essential hypertension, benign   • Fluent aphasia   • Hypochloremia   • Benzodiazepine dependence ([facility])   • Anemia   • Cervicalgia   • Intertrigo   • Insomnia   • Acute cystitis   • E coli infection   • Diverticulitis   • Arthritis   • Bowel dysfunction   • GERD (gastroesophageal reflux disease)   • Hernia, hiatal   • Seizures ([facility])   • Meningioma ([facility])   • Closed fracture of left distal fibula   • Debility   • History of seizure   • Fall   • Tachycardia   • Abdominal hernia   • Compression fracture of L1 lumbar vertebra, open, initial encounter ([facility])   • Compression fracture of T12 vertebra ([facility])   • Osteopenia   • Acute UTI   • Back pain   • Compression fracture of L2 and L4 lumbar vertebra ([facility])   • Closed fracture of right inferior pubic ramus ([facility])     Past Medical History:   Diagnosis Date   • Anemia    • Anxiety    • Arthritis    • Bowel dysfunction 2021    since having surgery for diverticular infection   • Chronic constipation    • Diverticulitis 2021    w/ rupture and infection required surgery and ostomy   • Essential hypertension, benign    • Fracture 2022    left ankle   • Fracture, tibia and fibula Now wearing bood   • GERD (gastroesophageal reflux disease)    •  Hernia, hiatal    • Hypercholesterolemia    • Hypokalemia    • Hyponatremia     chronic low with occasional normal level   • Hypothyroidism     estimated time frame pt nor  could remember exactly how long has been on medication   • Insomnia    • Iron deficiency    • Seizures (HCC)    • Tear of meniscus of knee    • 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     Past Surgical History:   Procedure Laterality Date   • APPENDECTOMY     • BACK SURGERY     •  SECTION     • COLON SURGERY  2021    to address ruptured and infected diverticular dz, ostomy also placed   • COLONOSCOPY     • COLOSTOMY CLOSURE  10/2021    ostomy removed   • EYE SURGERY  2 X cataract   • HEMORRHOIDECTOMY     • KNEE ARTHROPLASTY     • TONSILLECTOMY        General Information     Row Name 10/31/22 1430          Physical Therapy Time and Intention    Document Type therapy note (daily note)  -SV     Mode of Treatment individual therapy;physical therapy  -SV     Row Name 10/31/22 1430          General Information    Patient Profile Reviewed yes  -SV           User Key  (r) = Recorded By, (t) = Taken By, (c) = Cosigned By    Initials Name Provider Type    SV Jacqueline Reid, PT Physical Therapist               Mobility     Row Name 10/31/22 1502          Bed Mobility    Supine-Sit [place] (Bed Mobility) contact guard;minimum assist (75% patient effort)  -SV     Sit-Supine [place] (Bed Mobility) contact guard  -SV     Assistive Device (Bed Mobility) bed rails;head of bed elevated  -SV     Comment, (Bed Mobility) use of hospital bed , improved to cga/sba after 2 reps  -SV     Row Name 10/31/22 1502          Sit-Stand Transfer    Sit-Stand [place] (Transfers) contact guard  -SV     Assistive Device (Sit-Stand Transfers) walker, front-wheeled  -SV     Comment, (Sit-Stand Transfer) vc and demostration for STS with lesser weight bearing RLE, increase use of BUE  -SV     Row Name 10/31/22 1509           Gait/Stairs (Locomotion)    [place] Level (Gait) contact guard  -SV     Assistive Device (Gait) walker, front-wheeled  -SV     Distance in Feet (Gait) 4' forward and back for 3 reps seated rest , sidestepping 3' right and left  -SV           User Key  (r) = Recorded By, (t) = Taken By, (c) = Cosigned By    Initials Name Provider Type    SV Jacqueline Reid, PT Physical Therapist               Obj/Interventions     Row Name 10/31/22 1505          Motor Skills    Therapeutic Exercise --  vc and demostration for spinal protection techniques, use of rwx  -SV           User Key  (r) = Recorded By, (t) = Taken By, (c) = Cosigned By    Initials Name Provider Type    Jacqueline Oliver, PT Physical Therapist               Goals/Plan    No documentation.                Clinical Impression     Row Name 10/31/22 1506          Pain    Pretreatment Pain Rating 5/10  -SV     Posttreatment Pain Rating 5/10  -SV     Pain Location - groin;back  -SV     Pain Intervention(s) Ambulation/increased activity;Repositioned  -SV     Row Name 10/31/22 1506          Plan of Care Review    Plan of Care Reviewed With patient;caregiver  -SV     Row Name 10/31/22 1506          Vital Signs    O2 Delivery Pre Treatment room air  -SV     O2 Delivery Intra Treatment room air  -SV     O2 Delivery Post Treatment room air  -SV     Pre Patient Position Supine  -SV     Intra Patient Position Standing  -SV     Post Patient Position Supine  -SV     Row Name 10/31/22 1506          Positioning and Restraints    Pre-Treatment Position in bed  -SV     Post Treatment Position bed  -SV     In Bed notified nsg;call light within reach;[place]  -SV           User Key  (r) = Recorded By, (t) = Taken By, (c) = Cosigned By    Initials Name Provider Type     Jacqueline Reid, PT Physical Therapist               Outcome Measures     Row Name 10/31/22 1507          How much help from another person do you currently need...    Turning from your back to your side  while in flat bed without using bedrails? 3  -SV     Moving from lying on back to sitting on the side of a flat bed without bedrails? 3  -SV     Moving to and from a bed to a chair (including a wheelchair)? 3  -SV     Standing up from a chair using your arms (e.g., wheelchair, bedside chair)? 3  -SV     Climbing 3-5 steps with a railing? 1  -SV     To walk in hospital room? 3  -SV     AM-PAC 6 Clicks Score (PT) 16  -SV     Highest level of mobility 5 --> Static standing  -SV           User Key  (r) = Recorded By, (t) = Taken By, (c) = Cosigned By    Initials Name Provider Type    SV Jacqueline Reid, PT Physical Therapist                             Physical Therapy Education     Title: PT OT SLP Therapies (Done)     Topic: Physical Therapy (Done)     Point: Mobility training (Done)     Learning Progress Summary           Patient Acceptance, E, VU by AK at 10/31/2022 0156    Acceptance, E,D, VU,DU,NR by  at 10/29/2022 1511    Acceptance, E, VU by  at 10/28/2022 1609                   Point: Home exercise program (Done)     Learning Progress Summary           Patient Acceptance, E, VU by AK at 10/31/2022 0156    Acceptance, E, VU by  at 10/28/2022 1609                   Point: Body mechanics (Done)     Learning Progress Summary           Patient Acceptance, E, VU by AK at 10/31/2022 0156    Acceptance, E,D, VU,DU,NR by  at 10/29/2022 1511    Acceptance, E, VU by  at 10/28/2022 1609                   Point: Precautions (Done)     Learning Progress Summary           Patient Acceptance, E, VU by AK at 10/31/2022 0156    Acceptance, E,D, VU,DU,NR by  at 10/29/2022 1511    Acceptance, E, VU by  at 10/28/2022 1609                               User Key     Initials Effective Dates Name Provider Type Discipline    AK 03/17/17 -  Wanda Swann, RN Registered Nurse Nurse    PH 06/16/21 -  Yaritza Trimble PTA Physical Therapist Assistant PT    CF 06/16/21 -  Tram Redman, PT Physical Therapist PT               PT Recommendation and Plan     Plan of Care Reviewed With: patient, caregiver     Time Calculation:    PT Charges     Row Name 10/31/22 1512 10/31/22 0855          Time Calculation    Start Time 1430  -SV --     Stop Time 1500  -SV --     Time Calculation (min) 30 min  -SV --     PT Received On 10/31/22  -SV --     PT - Next Appointment 11/01/22  -SV 11/01/22 -SV           User Key  (r) = Recorded By, (t) = Taken By, (c) = Cosigned By    Initials Name Provider Type     Jacqueline Reid, PT Physical Therapist              Therapy Charges for Today     Code Description Service Date Service Provider Modifiers Qty    38450946295 HC PT THERAPEUTIC ACT EA 15 MIN 10/31/2022 Jacqueline Reid, PT GP 2          PT G-Codes  Outcome Measure Options: AM-PAC 6 Clicks Basic Mobility (PT)  AM-PAC 6 Clicks Score (PT): 16  AM-PAC 6 Clicks Score (OT): 12    Jacqueline Reid PT  10/31/2022

## 2022-10-31 NOTE — PROGRESS NOTES
Name: Ritu Martino ADMIT: 10/27/2022   : 1940  PCP: Gaurav Barger MD    MRN: [id number] LOS: 0 days   AGE/SEX: 82 y.o. female  ROOM: [facility]     Subjective   Subjective   Seems a little confused this morning.  Feels like she needs to void but cannot.  Has external catheter in place with very little urine output.  Not having back or pelvis pain as long as she is lying in bed.    Review of Systems     Objective   Objective   Vital Signs  Temp:  [97.3 °F (36.3 °C)-98.1 °F (36.7 °C)] 97.3 °F (36.3 °C)  Heart Rate:  [] 90  Resp:  [14-18] 18  BP: (136-152)/(63-83) 146/83  SpO2:  [96 %-100 %] 100 %  on   ;   Device (Oxygen Therapy): room air  There is no height or weight on file to calculate BMI.  Physical Exam  Vitals and nursing note reviewed.   Constitutional:       Comments: Uncomfortable in appearance   Neurological:      Mental Status: She is alert. Mental status is at baseline.       Results Review     I reviewed the patient's new clinical results.  Results from last 7 days   Lab Units 10/31/22  0601 10/29/22  1054 10/28/22  0602 10/27/22  2254   WBC 10*3/mm3 5.68 13.45* 12.24* 18.80*   HEMOGLOBIN g/dL 9.6* 11.4* 10.0* 10.6*   PLATELETS 10*3/mm3 369 532* 458* 533*     Results from last 7 days   Lab Units 10/31/22  0601 10/29/22  1303 10/28/22  0602 10/27/22  2254   SODIUM mmol/L 130* 130* 128* 123*   POTASSIUM mmol/L 4.6 4.3 3.1* 3.9   CHLORIDE mmol/L 93* 96* 94* 87*   CO2 mmol/L 22.0 23.6 24.0 23.6   BUN mg/dL 10 6* 7* 9   CREATININE mg/dL 0.49* 0.40* 0.42* 0.40*   GLUCOSE mg/dL 93 96 97 116*   EGFR mL/min/1.73 94.2 99.0 97.8 99.0     Results from last 7 days   Lab Units 10/27/22  2254 10/27/22  1113   ALBUMIN g/dL 4.60 4.60   BILIRUBIN mg/dL 0.6 0.5   ALK PHOS U/L 178* 174*   AST (SGOT) U/L 19 19   ALT (SGPT) U/L 22 20     Results from last 7 days   Lab Units 10/31/22  0601 10/29/22  1303 10/28/22  0602 10/27/22  2254 10/27/22  1113   CALCIUM mg/dL 7.8* 8.2* 8.1* 8.8 8.4*   ALBUMIN g/dL   --   --   --  4.60 4.60   MAGNESIUM mg/dL  --   --  2.0  --   --      Results from last 7 days   Lab Units 10/28/22  0119   LACTATE mmol/L 1.3     Glucose   Date/Time Value Ref Range Status   10/31/2022 1125 111 70 - 130 mg/dL Final     Comment:     Meter: [id number] : [id number] Derik [place]       No radiology results for the last day  Scheduled Medications  ALPRAZolam, 0.25 mg, Oral, BID  cefTRIAXone, 1 g, Intravenous, Q24H  cycloSPORINE, 1 drop, Both Eyes, BID  desvenlafaxine, 50 mg, Oral, Daily  dilTIAZem CD, 120 mg, Oral, Q24H  hydrALAZINE, 10 mg, Oral, Q8H  lactobacillus acidophilus, 1 capsule, Oral, Daily  levothyroxine, 50 mcg, Oral, Q AM  lidocaine, 2 patch, Transdermal, Q24H  lidocaine, 1 mL, Intradermal, Once  phenazopyridine, 100 mg, Oral, TID With Meals  potassium chloride, 10 mEq, Oral, BID  rosuvastatin, 20 mg, Oral, Nightly  senna-docusate sodium, 2 tablet, Oral, BID  sodium chloride, 2 g, Oral, TID With Meals    Infusions   Diet  Diet Regular       Assessment/Plan     Active Hospital Problems    Diagnosis  POA   • **Closed fracture of right inferior pubic ramus (HCC) [S32.591A]  Yes   • Acute UTI [N39.0]  Yes   • Back pain [M54.9]  Yes   • Compression fracture of L2 and L4 lumbar vertebra (HCC) [S32.020A]  Yes   • Compression fracture of T12 vertebra (HCC) [S22.080A]  Yes   • Hernia, hiatal [K44.9]  Yes   • Seizures (HCC) [R56.9]  Yes   • Essential hypertension, benign [I10]  Yes   • Hyponatremia [E87.1]  Yes      Resolved Hospital Problems   No resolved problems to display.       82 y.o. female admitted with Closed fracture of right inferior pubic ramus (HCC).    Patient had WINTER this morning but still with significant pain.  A lot of it seems to be related to the pelvic fractures.  Will have physical therapy reevaluate her but it seems that she will need SNU at discharge.  Continue Percocet for pain.  Patient having very dark stools but  states this is normal for her given that she  takes iron at home.  Her hemoglobin is down compared to yesterday but overall stable since admission.  Will Hemoccult stool and check again this evening and in the morning.  If no evidence of GI bleeding and placement can be found she could be discharged as early as tomorrow.      · SCDs for DVT prophylaxis.  · Full code.  · Discussed with patient, spouse, nursing staff and CCP.  · Anticipate discharge to SNU facility in 1-2 days.      Stephen Geronimo MD  [facility]  10/31/22  13:46 EDT

## 2022-10-31 NOTE — PLAN OF CARE
Goal Outcome Evaluation:      Pt still experiencing pain in hip/back.  Lumbar epidural performed today.  Physical therapy has been working with pt to determine abilities in preparation for d/c and placement.  Pt progressing toward care plan goals. Pt did walk into bathroom to have a BM, occult stool sent and positive.  Pt v/s stable.  Pt does get anxious, worried, and frustrated at times with d/c and possible placement.  , sitters, and family friend at bedside providing support.

## 2022-10-31 NOTE — CASE MANAGEMENT/SOCIAL WORK
Continued Stay Note  [facility]     Patient Name: Ritu Martino  MRN: [id number]  Today's Date: 10/31/2022    Admit Date: 10/27/2022    Plan: Plan is skilled bed at [facility] PENDING precert.   Discharge Plan     Row Name 10/31/22 1555       Plan    Plan Plan is skilled bed at [facility] PENDING precert.    Plan Comments S/W [facility]/ [facility] - pt is accepted PENDING precert.  S/W pt and her spouse Pretty at bedside who are in agreement w/ [facility].  [facility] will have the facilatiy initiate precert. .......Bailey DE LEON/ [facility]               Discharge Codes    No documentation.               Expected Discharge Date and Time     Expected Discharge Date Expected Discharge Time    Oct 29, 2022             Bailey Harris RN

## 2022-10-31 NOTE — PLAN OF CARE
Goal Outcome Evaluation:  Plan of Care Reviewed With: patient, caregiver            Pt s/p epidural injection. Rn requests PT return to see pt. Pt appeared very anxious today and reported inability and severe pain during all mobility. VC and demonstration provided for spinal protection as well as reduced weight bearing RLE for comfort. Several demonstrations provided and pt practiced bed mobility using bed rails and hob elevated to roll to the left , partial sidelying to sit , STS with rwx with proper hand placement and amb with rwx. Pt cautioned on slowing down with moving her body and limbs for protection and pain reduction today. Pt appears improved and appeared to tolerate this activity well today.  Anticipate need for hospital bed use for some time due to multiple spinal fx as well as inf/sup right pelvic fx.

## 2022-10-31 NOTE — SIGNIFICANT NOTE
Pt off the floor to pain management. Once back on the floor RN request hold PT today due to possible numbness BLE post epidural injection at L4-5. PT will check on pt tomorrow.

## 2022-10-31 NOTE — DISCHARGE INSTRUCTIONS
Lumbar Epidural Steroid Injection Instructions  Plan includes:  1.  Lumbar epidural steroid injections, up to 3, spaced 4 weeks apart.  If pain control is acceptable after 1 or 2 injections, it was discussed with the patient that they may return for the subsequent injections if and when their pain returns.  The risks were discussed with the patient including failure of relief, worsening pain, Headache (post dural puncture headache), bleeding (epidural hematoma) and infection (epidural abscess or skin infection).  2.  Physical therapy exercises at home as prescribed by physical therapy or from the pain clinic handout (given to the patient).  Continuation of these exercises every day, or multiple times per week, even when the patient has good pain relief, was stressed to the patient as a preventative measure to decrease the frequency and severity of future pain episodes.  3.  Continue pain medicines as already prescribed.  If patient not currently taking any, it is recommended to begin Acetaminophen 1000 mg po q 8 hours.  If other medicines containing Acetaminophen are currently prescribed, maintain daily dose at 3000 mg.    4.  If they can tolerate NSAIDS, it is recommended to take Ibuprofen 600 mg po q 6 hours for 7 days during pain exacerbations.  Alternatively, they may substitute an NSAID of their choice (e.g. Aleve).  This may be taken at the same time as Acetaminophen.  5.  Heat and ice to the affected area as tolerated for pain control.  It was discussed that heating pads can cause burns.  6.  Daily low impact exercise such as walking or water exercise was recommended to maintain overall health and aid in weight control.   7.  Follow up as needed for subsequent injections.  8.  Patient was counseled to abstain from tobacco products. What can I expect after the procedure?  Follow these instructions at home:  Injection site care  You may remove the bandage (dressing) after 24 hours.  Check your injection site  every day for signs of infection. Check for:  Redness, swelling, or pain.  Fluid or blood.  Warmth.  Pus or a bad smell.  Managing pain, stiffness, and swelling  For 24 hours after the procedure:  Avoid using heat on the injection site.  Do not take baths, swim, or use a hot tub. You may take a shower.   If directed, put ice on the injection site. To do this:        Put ice in a plastic bag.  Place a towel between your skin and the bag.  Leave the ice on for 20 minutes, 2-3 times a day.     Activity  NO DRIVING FOR THE REST OF THE DAY IF receiving a sedative during your procedure.  Return to your normal activities the next day as tolerated.  General instructions  The injection site may feel sore.  Take over-the-counter medications as directed on packaging and prescription medicines only as told by your health care provider who prescribes these.  Keep all follow-up visits as told by your health care provider.   Contact a health care provider if:  You have any of these signs of infection:  Redness, swelling, or pain around your injection site.  Fluid or blood coming from your injection site.  Warmth coming from your injection site.  Pus or a bad smell coming from your injection site.  A fever.  You continue to have pain and soreness around the injection site, even after taking over-the-counter pain medicine.  You have severe, sudden, or lasting nausea or vomiting.  Get help right away if:  You have severe pain at the injection site that is not relieved by medicines.  You develop a severe headache or a stiff neck.  You become sensitive to light.  You have any new numbness or weakness in your legs or arms.  You lose control of your bladder or bowel movements.  You have trouble breathing.  Summary  An epidural steroid block is an injection of steroid medication and numbing medicine that is given into the epidural space.  This injection helps relieve pain caused by an irritated or swollen nerve root. What can I expect  after the procedure?  Follow these instructions at home:  Injection site care  You may remove the bandage (dressing) after 24 hours.  Check your injection site every day for signs of infection. Check for:  Redness, swelling, or pain.  Fluid or blood.  Warmth.  Pus or a bad smell.  Managing pain, stiffness, and swelling  For 24 hours after the procedure:  Avoid using heat on the injection site.  Do not take baths, swim, or use a hot tub. You may take a shower.   If directed, put ice on the injection site. To do this:        Put ice in a plastic bag.  Place a towel between your skin and the bag.  Leave the ice on for 20 minutes, 2-3 times a day.     Activity  NO DRIVING FOR THE REST OF THE DAY IF receiving a sedative during your procedure.  Return to your normal activities the next day as tolerated.  General instructions  The injection site may feel sore.  Take over-the-counter medications as directed on packaging and prescription medicines only as told by your health care provider who prescribes these.  Keep all follow-up visits as told by your health care provider.   Contact a health care provider if:  You have any of these signs of infection:  Redness, swelling, or pain around your injection site.  Fluid or blood coming from your injection site.  Warmth coming from your injection site.  Pus or a bad smell coming from your injection site.  A fever.  You continue to have pain and soreness around the injection site, even after taking over-the-counter pain medicine.  You have severe, sudden, or lasting nausea or vomiting.  Get help right away if:  You have severe pain at the injection site that is not relieved by medicines.  You develop a severe headache or a stiff neck.  You become sensitive to light.  You have any new numbness or weakness in your legs or arms.  You lose control of your bladder or bowel movements.  You have trouble breathing.  Summary  An epidural steroid block is an injection of steroid medication  and numbing medicine that is given into the epidural space.  This injection helps relieve pain caused by an irritated or swollen nerve root.

## 2022-10-31 NOTE — H&P
INTERVAL HISTORY:    The patient presents for her first Lumbar epidural steroid injection today.  They have received N/A % improvement since their last injection with a pain level of 7 /10 at its worst recently.    Conservative measures tried in the interim. Daily activities are still affected by the pain.    Radiology reports and/or previous notes have been reviewed and are consistent with their diagnosis of Post-Op Diagnosis Codes:     * Lumbar radiculopathy [M54.16]    Alert and oriented  MP - 2  Lungs - clear  CV - rrr    Diagnosis:  Post-Op Diagnosis Codes:     * Lumbar radiculopathy [M54.16]      Plan:  Lumbar epidural steroid injection under fluoroscopic guidance        Target : L4-5    I have encouraged them to continue:  1.  Physical therapy exercises at home as prescribed by physical therapy or from the pain clinic handout (given to the patient).  Continuation of these exercises every day, or multiple times per week, even when the patient has good pain relief, was stressed to the patient as a preventative measure to decrease the frequency and severity of future pain episodes.  2.  Continue pain medicines as already prescribed.  If patient not currently taking any, it is recommended to begin Acetaminophen 1000 mg po q 8 hours.  If other medicines containing Acetaminophen are currently prescribed, maintain daily dose at 3000mg.    3.  If they can tolerate NSAIDS, it is recommended to take Ibuprofen 600 mg po q 6 hours for 7 days during pain exacerbations.   Alternatively, they may substitute an NSAID of their choice (e.g. Aleve)  4.  Heat and ice to the affected area as tolerated for pain control.  It was discussed that heating pads can cause burns.  5.  Low impact exercise such as walking or water exercise was recommended to maintain overall health and aid in weight control.   6.  Follow up as needed for subsequent injections.  7.  Patient was counseled to abstain from tobacco products.    Time : 18   min

## 2022-11-01 LAB
ANION GAP SERPL CALCULATED.3IONS-SCNC: 8 MMOL/L (ref 5–15)
BUN SERPL-MCNC: 7 MG/DL (ref 8–23)
BUN/CREAT SERPL: 17.1 (ref 7–25)
CALCIUM SPEC-SCNC: 8.2 MG/DL (ref 8.6–10.5)
CHLORIDE SERPL-SCNC: 96 MMOL/L (ref 98–107)
CO2 SERPL-SCNC: 23 MMOL/L (ref 22–29)
CREAT SERPL-MCNC: 0.41 MG/DL (ref 0.57–1)
DEPRECATED RDW RBC AUTO: 40.4 FL (ref 37–54)
EGFRCR SERPLBLD CKD-EPI 2021: 98.4 ML/MIN/1.73
ERYTHROCYTE [DISTWIDTH] IN BLOOD BY AUTOMATED COUNT: 11.8 % (ref 12.3–15.4)
FERRITIN SERPL-MCNC: 653 NG/ML (ref 13–150)
GLUCOSE SERPL-MCNC: 109 MG/DL (ref 65–99)
HCT VFR BLD AUTO: 29.8 % (ref 34–46.6)
HGB BLD-MCNC: 9.8 G/DL (ref 12–15.9)
IRON 24H UR-MRATE: 60 MCG/DL (ref 37–145)
IRON SATN MFR SERPL: 21 % (ref 20–50)
MCH RBC QN AUTO: 30.8 PG (ref 26.6–33)
MCHC RBC AUTO-ENTMCNC: 32.9 G/DL (ref 31.5–35.7)
MCV RBC AUTO: 93.7 FL (ref 79–97)
PLATELET # BLD AUTO: 416 10*3/MM3 (ref 140–450)
PMV BLD AUTO: 8.1 FL (ref 6–12)
POTASSIUM SERPL-SCNC: 3.9 MMOL/L (ref 3.5–5.2)
RBC # BLD AUTO: 3.18 10*6/MM3 (ref 3.77–5.28)
SODIUM SERPL-SCNC: 127 MMOL/L (ref 136–145)
TIBC SERPL-MCNC: 282 MCG/DL (ref 298–536)
TRANSFERRIN SERPL-MCNC: 189 MG/DL (ref 200–360)
WBC NRBC COR # BLD: 6.61 10*3/MM3 (ref 3.4–10.8)

## 2022-11-01 PROCEDURE — G0378 HOSPITAL OBSERVATION PER HR: HCPCS

## 2022-11-01 PROCEDURE — 80048 BASIC METABOLIC PNL TOTAL CA: CPT | Performed by: HOSPITALIST

## 2022-11-01 PROCEDURE — 97535 SELF CARE MNGMENT TRAINING: CPT

## 2022-11-01 PROCEDURE — 82728 ASSAY OF FERRITIN: CPT | Performed by: INTERNAL MEDICINE

## 2022-11-01 PROCEDURE — 25010000002 HYDROMORPHONE PER 4 MG: Performed by: INTERNAL MEDICINE

## 2022-11-01 PROCEDURE — 97530 THERAPEUTIC ACTIVITIES: CPT

## 2022-11-01 PROCEDURE — 85027 COMPLETE CBC AUTOMATED: CPT | Performed by: HOSPITALIST

## 2022-11-01 PROCEDURE — 96366 THER/PROPH/DIAG IV INF ADDON: CPT

## 2022-11-01 PROCEDURE — 84466 ASSAY OF TRANSFERRIN: CPT | Performed by: INTERNAL MEDICINE

## 2022-11-01 PROCEDURE — 83540 ASSAY OF IRON: CPT | Performed by: INTERNAL MEDICINE

## 2022-11-01 PROCEDURE — 97116 GAIT TRAINING THERAPY: CPT

## 2022-11-01 PROCEDURE — 96376 TX/PRO/DX INJ SAME DRUG ADON: CPT

## 2022-11-01 PROCEDURE — 25010000002 CEFTRIAXONE PER 250 MG: Performed by: NURSE PRACTITIONER

## 2022-11-01 RX ORDER — OXYCODONE AND ACETAMINOPHEN 10; 325 MG/1; MG/1
1 TABLET ORAL EVERY 4 HOURS PRN
Status: DISCONTINUED | OUTPATIENT
Start: 2022-11-01 | End: 2022-11-02 | Stop reason: HOSPADM

## 2022-11-01 RX ORDER — HYDROMORPHONE HYDROCHLORIDE 1 MG/ML
0.5 INJECTION, SOLUTION INTRAMUSCULAR; INTRAVENOUS; SUBCUTANEOUS
Status: DISCONTINUED | OUTPATIENT
Start: 2022-11-01 | End: 2022-11-02 | Stop reason: HOSPADM

## 2022-11-01 RX ORDER — HYDROCODONE BITARTRATE AND ACETAMINOPHEN 10; 325 MG/1; MG/1
2 TABLET ORAL EVERY 4 HOURS PRN
Status: DISCONTINUED | OUTPATIENT
Start: 2022-11-01 | End: 2022-11-02 | Stop reason: HOSPADM

## 2022-11-01 RX ORDER — ALPRAZOLAM 0.25 MG/1
0.25 TABLET ORAL 2 TIMES DAILY PRN
Status: DISCONTINUED | OUTPATIENT
Start: 2022-11-01 | End: 2022-11-02 | Stop reason: HOSPADM

## 2022-11-01 RX ADMIN — HYDROCODONE BITARTRATE AND ACETAMINOPHEN 2 TABLET: 10; 325 TABLET ORAL at 13:12

## 2022-11-01 RX ADMIN — OXYCODONE HYDROCHLORIDE AND ACETAMINOPHEN 2 TABLET: 7.5; 325 TABLET ORAL at 05:53

## 2022-11-01 RX ADMIN — DESVENLAFAXINE SUCCINATE 50 MG: 50 TABLET, EXTENDED RELEASE ORAL at 08:57

## 2022-11-01 RX ADMIN — LEVOTHYROXINE SODIUM 50 MCG: 0.05 TABLET ORAL at 05:53

## 2022-11-01 RX ADMIN — HYDRALAZINE HYDROCHLORIDE 10 MG: 10 TABLET, FILM COATED ORAL at 05:53

## 2022-11-01 RX ADMIN — HYDRALAZINE HYDROCHLORIDE 10 MG: 10 TABLET, FILM COATED ORAL at 13:12

## 2022-11-01 RX ADMIN — HYDROMORPHONE HYDROCHLORIDE 0.5 MG: 1 INJECTION, SOLUTION INTRAMUSCULAR; INTRAVENOUS; SUBCUTANEOUS at 08:57

## 2022-11-01 RX ADMIN — PHENAZOPYRIDINE 100 MG: 100 TABLET ORAL at 17:34

## 2022-11-01 RX ADMIN — PHENAZOPYRIDINE 100 MG: 100 TABLET ORAL at 13:12

## 2022-11-01 RX ADMIN — POTASSIUM CHLORIDE 10 MEQ: 750 TABLET, EXTENDED RELEASE ORAL at 21:11

## 2022-11-01 RX ADMIN — SODIUM CHLORIDE TAB 1 GM 2 G: 1 TAB at 17:34

## 2022-11-01 RX ADMIN — CYCLOSPORINE 1 DROP: 0.5 EMULSION OPHTHALMIC at 08:56

## 2022-11-01 RX ADMIN — HYDROCODONE BITARTRATE AND ACETAMINOPHEN 2 TABLET: 10; 325 TABLET ORAL at 17:34

## 2022-11-01 RX ADMIN — SODIUM CHLORIDE TAB 1 GM 2 G: 1 TAB at 13:11

## 2022-11-01 RX ADMIN — CYCLOSPORINE 1 DROP: 0.5 EMULSION OPHTHALMIC at 21:11

## 2022-11-01 RX ADMIN — HYDROMORPHONE HYDROCHLORIDE 0.5 MG: 1 INJECTION, SOLUTION INTRAMUSCULAR; INTRAVENOUS; SUBCUTANEOUS at 21:20

## 2022-11-01 RX ADMIN — POTASSIUM CHLORIDE 10 MEQ: 750 TABLET, EXTENDED RELEASE ORAL at 08:57

## 2022-11-01 RX ADMIN — CEFTRIAXONE SODIUM 1 G: 1 INJECTION, POWDER, FOR SOLUTION INTRAMUSCULAR; INTRAVENOUS at 01:25

## 2022-11-01 RX ADMIN — SODIUM CHLORIDE TAB 1 GM 2 G: 1 TAB at 08:56

## 2022-11-01 RX ADMIN — DILTIAZEM HYDROCHLORIDE 120 MG: 120 CAPSULE, COATED, EXTENDED RELEASE ORAL at 08:57

## 2022-11-01 RX ADMIN — HYDRALAZINE HYDROCHLORIDE 10 MG: 10 TABLET, FILM COATED ORAL at 21:10

## 2022-11-01 RX ADMIN — ALPRAZOLAM 0.25 MG: 0.25 TABLET ORAL at 21:20

## 2022-11-01 RX ADMIN — ALPRAZOLAM 0.25 MG: 0.25 TABLET ORAL at 08:56

## 2022-11-01 RX ADMIN — ZOLPIDEM TARTRATE 5 MG: 5 TABLET ORAL at 22:46

## 2022-11-01 RX ADMIN — Medication 1 CAPSULE: at 08:56

## 2022-11-01 RX ADMIN — ROSUVASTATIN CALCIUM 20 MG: 20 TABLET, FILM COATED ORAL at 21:10

## 2022-11-01 RX ADMIN — LIDOCAINE 5% 2 PATCH: 700 PATCH TOPICAL at 08:56

## 2022-11-01 RX ADMIN — PHENAZOPYRIDINE 100 MG: 100 TABLET ORAL at 08:56

## 2022-11-01 NOTE — THERAPY TREATMENT NOTE
Patient Name: Ritu Martino  : 1940    MRN: [id number]                              Today's Date: 2022       Admit Date: 10/27/2022    Visit Dx:     ICD-10-CM ICD-9-CM   1. Acute UTI  N39.0 599.0   2. Compression fracture of T12 vertebra, initial encounter ([facility])  S22.080A 805.2   3. Closed compression fracture of L2 vertebra, initial encounter ([facility])  S32.020A 805.4   4. Closed compression fracture of L4 vertebra, initial encounter ([facility])  S32.040A 805.4   5. Closed fracture of right inferior pubic ramus, initial encounter ([facility])  S32.591A 808.2   6. Lumbar radiculopathy  M54.16 724.4     Patient Active Problem List   Diagnosis   • Anxiety   • Hypothyroidism   • Hyponatremia   • Iron deficiency   • Hypokalemia   • Essential hypertension, benign   • Fluent aphasia   • Hypochloremia   • Benzodiazepine dependence ([facility])   • Anemia   • Cervicalgia   • Intertrigo   • Insomnia   • Acute cystitis   • E coli infection   • Diverticulitis   • Arthritis   • Bowel dysfunction   • GERD (gastroesophageal reflux disease)   • Hernia, hiatal   • Seizures ([facility])   • Meningioma ([facility])   • Closed fracture of left distal fibula   • Debility   • History of seizure   • Fall   • Tachycardia   • Abdominal hernia   • Compression fracture of L1 lumbar vertebra, open, initial encounter ([facility])   • Compression fracture of T12 vertebra ([facility])   • Osteopenia   • Acute UTI   • Back pain   • Compression fracture of L2 and L4 lumbar vertebra ([facility])   • Closed fracture of right inferior pubic ramus ([facility])     Past Medical History:   Diagnosis Date   • Anemia    • Anxiety    • Arthritis    • Bowel dysfunction 2021    since having surgery for diverticular infection   • Chronic constipation    • Diverticulitis 2021    w/ rupture and infection required surgery and ostomy   • Essential hypertension, benign    • Fracture 2022    left ankle   • Fracture, tibia and fibula Now wearing bood   • GERD (gastroesophageal reflux disease)    •  Hernia, hiatal    • Hypercholesterolemia    • Hypokalemia    • Hyponatremia     chronic low with occasional normal level   • Hypothyroidism     estimated time frame pt nor  could remember exactly how long has been on medication   • Insomnia    • Iron deficiency    • Seizures ([facility])    • Tear of meniscus of knee    • 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     Past Surgical History:   Procedure Laterality Date   • APPENDECTOMY     • BACK SURGERY     •  SECTION     • COLON SURGERY  2021    to address ruptured and infected diverticular dz, ostomy also placed   • COLONOSCOPY     • COLOSTOMY CLOSURE  10/2021    ostomy removed   • EYE SURGERY  2 X cataract   • HEMORRHOIDECTOMY     • KNEE ARTHROPLASTY     • TONSILLECTOMY        General Information     Row Name 22 1322          Physical Therapy Time and Intention    Document Type therapy note (daily note)  -CF     Mode of Treatment physical therapy;individual therapy  -CF     Row Name 22 132          General Information    Patient Profile Reviewed yes  -CF     Existing Precautions/Restrictions fall;spinal;brace worn when out of bed  abdominal binder as brace  -CF     Row Name 22 132          Cognition    Orientation Status (Cognition) oriented x 3  -CF     Row Name 22 132          Safety Issues, Functional Mobility    Impairments Affecting Function (Mobility) balance;endurance/activity tolerance;pain;strength  -CF           User Key  (r) = Recorded By, (t) = Taken By, (c) = Cosigned By    Initials Name Provider Type    CF Tram Redman, DEMETRIUS Physical Therapist               Mobility     Row Name 22 132          Bed Mobility    Bed Mobility supine-sit;sit-supine  -CF     Supine-Sit [place] (Bed Mobility) contact guard;verbal cues  -CF     Sit-Supine [place] (Bed Mobility) contact guard;verbal cues  -CF     Comment, (Bed Mobility) extra time, cues for log roll  -     Row Name  11/01/22 1322          Sit-Stand Transfer    Sit-Stand [place] (Transfers) contact guard;verbal cues;minimum assist (75% patient effort)  -CF     Assistive Device (Sit-Stand Transfers) walker, front-wheeled  -CF     Comment, (Sit-Stand Transfer) from EOB cga, min A  from low commode  -CF     Row Name 11/01/22 1322          Gait/Stairs (Locomotion)    Assistive Device (Gait) walker, front-wheeled  -CF     Distance in Feet (Gait) 30'  -CF     Deviations/Abnormal Patterns (Gait) maddy decreased;gait speed decreased;antalgic;stride length decreased  -CF     Bilateral Gait Deviations forward flexed posture  -CF     Row Name 11/01/22 1322          Mobility    Extremity Weight-bearing Status right lower extremity  -CF     Right Lower Extremity (Weight-bearing Status) weight-bearing as tolerated (WBAT)  -CF           User Key  (r) = Recorded By, (t) = Taken By, (c) = Cosigned By    Initials Name Provider Type    CF Tram Redman, PT Physical Therapist               Obj/Interventions    No documentation.                Goals/Plan    No documentation.                Clinical Impression     Row Name 11/01/22 1325          Pain    Pretreatment Pain Rating 5/10  -CF     Posttreatment Pain Rating 5/10  -CF     Pain Location - Side/Orientation Right  -CF     Pain Location - back;hip  -CF     Pain Intervention(s) Repositioned;Rest;Ambulation/increased activity  -CF     Row Name 11/01/22 1325          Plan of Care Review    Plan of Care Reviewed With patient;caregiver  -CF     Outcome Evaluation Pt seen for PT session this AM. She ambulated to the bathroom and completed toileting task with assist. She stood extended time with cga for balance/safety. PT will continue to follow and progress as able.  -CF     Row Name 11/01/22 1325          Vital Signs    O2 Delivery Pre Treatment room air  -CF     O2 Delivery Intra Treatment room air  -CF     O2 Delivery Post Treatment room air  -CF     Row Name 11/01/22 1326           Positioning and Restraints    Pre-Treatment Position in bed  -CF     Post Treatment Position bed  -CF     In Bed notified nsg;call light within reach;encouraged to call for assist;exit alarm on;fowlers;with family/caregiver  -CF           User Key  (r) = Recorded By, (t) = Taken By, (c) = Cosigned By    Initials Name Provider Type    CF Tram Redman, PT Physical Therapist               Outcome Measures     Row Name 11/01/22 1327 11/01/22 0815       How much help from another person do you currently need...    Turning from your back to your side while in flat bed without using bedrails? 3  -CF 3  -CW    Moving from lying on back to sitting on the side of a flat bed without bedrails? 3  -CF 3  -CW    Moving to and from a bed to a chair (including a wheelchair)? 3  -CF 3  -CW    Standing up from a chair using your arms (e.g., wheelchair, bedside chair)? 3  -CF 3  -CW    Climbing 3-5 steps with a railing? 2  -CF 1  -CW    To walk in hospital room? 3  -CF 3  -CW    AM-PAC 6 Clicks Score (PT) 17  -CF 16  -CW    Highest level of mobility 5 --> Static standing  -CF 5 --> Static standing  -CW    Row Name 11/01/22 1250          Functional Assessment    Outcome Measure Options AM-PAC 6 Clicks Daily Activity (OT)  -           User Key  (r) = Recorded By, (t) = Taken By, (c) = Cosigned By    Initials Name Provider Type    SM Alejandra Moncada OT Occupational Therapist    Tram Costa, PT Physical Therapist    CW Stacey Nava, RN Registered Nurse                             Physical Therapy Education     Title: PT OT SLP Therapies (Done)     Topic: Physical Therapy (Done)     Point: Mobility training (Done)     Learning Progress Summary           Patient Acceptance, E, VU,NR by CF at 11/1/2022 1327    Acceptance, E, VU by AK at 10/31/2022 0156    Acceptance, E,D, VU,DU,NR by  at 10/29/2022 1511    Acceptance, E, VU by CF at 10/28/2022 1609                   Point: Home exercise program (Done)     Learning  Progress Summary           Patient Acceptance, E, VU by AK at 10/31/2022 0156    Acceptance, E, VU by CF at 10/28/2022 1609                   Point: Body mechanics (Done)     Learning Progress Summary           Patient Acceptance, E, VU by AK at 10/31/2022 0156    Acceptance, E,D, VU,DU,NR by  at 10/29/2022 1511    Acceptance, E, VU by CF at 10/28/2022 1609                   Point: Precautions (Done)     Learning Progress Summary           Patient Acceptance, E, VU by AK at 10/31/2022 0156    Acceptance, E,D, VU,DU,NR by  at 10/29/2022 1511    Acceptance, E, VU by CF at 10/28/2022 1609                               User Key     Initials Effective Dates Name Provider Type Discipline    AK 03/17/17 -  Wanda Swann, RN Registered Nurse Nurse     06/16/21 -  Yaritza Trimble PTA Physical Therapist Assistant PT     06/16/21 -  Tram Redman PT Physical Therapist PT              PT Recommendation and Plan  Planned Therapy Interventions (PT): balance training, bed mobility training, gait training, postural re-education, transfer training, ROM (range of motion), strengthening, patient/family education  Plan of Care Reviewed With: patient, caregiver  Outcome Evaluation: Pt seen for PT session this AM. She ambulated to the bathroom and completed toileting task with assist. She stood extended time with cga for balance/safety. PT will continue to follow and progress as able.     Time Calculation:    PT Charges     Row Name 11/01/22 1321             Time Calculation    Start Time 0957  -CF      Stop Time 1021  -      Time Calculation (min) 24 min  -CF      PT Received On 11/01/22  -CF      PT - Next Appointment 11/02/22  -            User Key  (r) = Recorded By, (t) = Taken By, (c) = Cosigned By    Initials Name Provider Type     Tram Redman, PT Physical Therapist              Therapy Charges for Today     Code Description Service Date Service Provider Modifiers Qty    59528147877  PT THERAPEUTIC  ACT EA 15 MIN 11/1/2022 Tram Redman, PT GP 1    [id number] HC GAIT TRAINING EA 15 MIN 11/1/2022 Tram Redman, PT GP 1          PT G-Codes  Outcome Measure Options: AM-PAC 6 Clicks Daily Activity (OT)  AM-PAC 6 Clicks Score (PT): 17  AM-PAC 6 Clicks Score (OT): 15    Tram Redman, PT  11/1/2022

## 2022-11-01 NOTE — TELEPHONE ENCOUNTER
Caller: Naina Martino    Relationship: Emergency Contact    Best call back number: [phone](HOME) [phone] (CELL)    What is the best time to reach you: ANY    Who are you requesting to speak with (clinical staff, provider,  specific staff member): CLINICAL    Do you know the name of the person who called: NAINA    What was the call regarding: PRESCRIPTION WAS SENT 1 MG INSTEAD 2 MG     Do you require a callback: YES

## 2022-11-01 NOTE — PLAN OF CARE
Goal Outcome Evaluation:  Plan of Care Reviewed With: patient, caregiver           Outcome Evaluation: Pt seen for PT session this AM. She ambulated to the bathroom and completed toileting task with assist. She stood extended time with cga for balance/safety. PT will continue to follow and progress as able.

## 2022-11-01 NOTE — PLAN OF CARE
Goal Outcome Evaluation:  Plan of Care Reviewed With: patient, caregiver           Outcome Evaluation: Pt able to increase mobility in room today to sink to work on brushing teeth in standing, pt reporting increased pain in back and limited tolerance for activity today. Returned to chair and recommended pt sit up for upcoming lunch tray. OT continues to recommend SNF [facility] [place].    OT wore appropriate PPE and completed hand hygiene.

## 2022-11-01 NOTE — THERAPY TREATMENT NOTE
Patient Name: Ritu Martino  : 1940    MRN: [id number]                              Today's Date: 2022       Admit Date: 10/27/2022    Visit Dx:     ICD-10-CM ICD-9-CM   1. Acute UTI  N39.0 599.0   2. Compression fracture of T12 vertebra, initial encounter ([facility])  S22.080A 805.2   3. Closed compression fracture of L2 vertebra, initial encounter ([facility])  S32.020A 805.4   4. Closed compression fracture of L4 vertebra, initial encounter ([facility])  S32.040A 805.4   5. Closed fracture of right inferior pubic ramus, initial encounter ([facility])  S32.591A 808.2   6. Lumbar radiculopathy  M54.16 724.4     Patient Active Problem List   Diagnosis   • Anxiety   • Hypothyroidism   • Hyponatremia   • Iron deficiency   • Hypokalemia   • Essential hypertension, benign   • Fluent aphasia   • Hypochloremia   • Benzodiazepine dependence ([facility])   • Anemia   • Cervicalgia   • Intertrigo   • Insomnia   • Acute cystitis   • E coli infection   • Diverticulitis   • Arthritis   • Bowel dysfunction   • GERD (gastroesophageal reflux disease)   • Hernia, hiatal   • Seizures ([facility])   • Meningioma ([facility])   • Closed fracture of left distal fibula   • Debility   • History of seizure   • Fall   • Tachycardia   • Abdominal hernia   • Compression fracture of L1 lumbar vertebra, open, initial encounter ([facility])   • Compression fracture of T12 vertebra ([facility])   • Osteopenia   • Acute UTI   • Back pain   • Compression fracture of L2 and L4 lumbar vertebra ([facility])   • Closed fracture of right inferior pubic ramus ([facility])     Past Medical History:   Diagnosis Date   • Anemia    • Anxiety    • Arthritis    • Bowel dysfunction 2021    since having surgery for diverticular infection   • Chronic constipation    • Diverticulitis 2021    w/ rupture and infection required surgery and ostomy   • Essential hypertension, benign    • Fracture 2022    left ankle   • Fracture, tibia and fibula Now wearing bood   • GERD (gastroesophageal reflux disease)    •  Hernia, hiatal    • Hypercholesterolemia    • Hypokalemia    • Hyponatremia     chronic low with occasional normal level   • Hypothyroidism     estimated time frame pt nor  could remember exactly how long has been on medication   • Insomnia    • Iron deficiency    • Seizures (HCC)    • Tear of meniscus of knee    • 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     Past Surgical History:   Procedure Laterality Date   • APPENDECTOMY     • BACK SURGERY     •  SECTION     • COLON SURGERY  2021    to address ruptured and infected diverticular dz, ostomy also placed   • COLONOSCOPY     • COLOSTOMY CLOSURE  10/2021    ostomy removed   • EYE SURGERY  2 X cataract   • HEMORRHOIDECTOMY     • KNEE ARTHROPLASTY     • TONSILLECTOMY        General Information     Row Name 22 1244          OT Time and Intention    Document Type therapy note (daily note)  -     Mode of Treatment occupational therapy;individual therapy  -     Row Name 22 1244          General Information    Patient Profile Reviewed yes  -     Existing Precautions/Restrictions fall;spinal;brace worn when out of bed  WBAT BLE, per pt adbominal binder when up.  -     Row Name 22 1244          Cognition    Orientation Status (Cognition) oriented x 3  -     Row Name 22 1244          Safety Issues, Functional Mobility    Impairments Affecting Function (Mobility) balance;endurance/activity tolerance;pain;strength  -           User Key  (r) = Recorded By, (t) = Taken By, (c) = Cosigned By    Initials Name Provider Type     Alejandra Moncada, DEREK Occupational Therapist                 Mobility/ADL's     Row Name 22 1245          Bed Mobility    Supine-Sit [place] (Bed Mobility) minimum assist (75% patient effort)  -     Assistive Device (Bed Mobility) bed rails;head of bed elevated  -     Comment, (Bed Mobility) vc's for logroll technique  -     Row Name 22 1243           Sit-Stand Transfer    Sit-Stand [place] (Transfers) contact guard;verbal cues  -     Assistive Device (Sit-Stand Transfers) walker, front-wheeled  -     Row Name 11/01/22 1245          Functional Mobility    Functional Mobility- Ind. Level contact guard assist  -     Functional Mobility- Device walker, front-wheeled  -     Functional Mobility-Distance (Feet) --  20  -SM     Functional Mobility- Comment slow pace, to sink in room for ADLs.  -     Row Name 11/01/22 1245          Activities of Daily Living    BADL Assessment/Intervention grooming  -[facility] Name 11/01/22 1245          Grooming Assessment/Training    [place] Level (Grooming) grooming skills;oral care regimen;contact guard assist  -     Position (Grooming) sink side;supported standing  5 minutes  -           User Key  (r) = Recorded By, (t) = Taken By, (c) = Cosigned By    Initials Name Provider Type    Alejandra Zuleta OT Occupational Therapist               Obj/Interventions     [facility] Name 11/01/22 1247          Balance    Static Sitting Balance standby assist  -     Position, Sitting Balance unsupported  -     Static Standing Balance contact guard  -     Position/Device Used, Standing Balance supported;walker, rolling  -           User Key  (r) = Recorded By, (t) = Taken By, (c) = Cosigned By    Initials Name Provider Type    Alejandra Zuleta OT Occupational Therapist               Goals/Plan    No documentation.                Clinical Impression     Row Name 11/01/22 1247          Pain Assessment    Pain Location - Side/Orientation Right  -     Pain Location - back;hip  -     Pre/Posttreatment Pain Comment pt reports \"it's through the roof\"  -     Pain Intervention(s) Repositioned;Ambulation/increased activity  pt reports she recieved pain meds prior to OT  -     Row Name 11/01/22 1247          Plan of Care Review    Plan of Care Reviewed With patient;caregiver  -     Outcome Evaluation Pt able to  increase mobility in room today to sink to work on brushing teeth in standing, pt reporting increased pain in back and limited tolerance for activity today. Returned to chair and recommended pt sit up for upcoming lunch tray. OT continues to recommend SNF at [place].  -     Row Name 11/01/22 1247          Therapy Plan Review/Discharge Plan (OT)    Anticipated Discharge Disposition (OT) skilled nursing facility  -     Row Name 11/01/22 1247          Positioning and Restraints    Pre-Treatment Position in bed  -     Post Treatment Position chair  -SM     In Chair reclined;call light within reach;encouraged to call for assist;exit alarm on;notified nsg;with family/caregiver  -           User Key  (r) = Recorded By, (t) = Taken By, (c) = Cosigned By    Initials Name Provider Type    Alejandra Zuleta, DEREK Occupational Therapist               Outcome Measures     Row Name 11/01/22 1250          How much help from another is currently needed...    Putting on and taking off regular lower body clothing? 2  -SM     Bathing (including washing, rinsing, and drying) 2  -SM     Toileting (which includes using toilet bed pan or urinal) 2  -SM     Putting on and taking off regular upper body clothing 3  -SM     Taking care of personal grooming (such as brushing teeth) 3  -SM     Eating meals 3  -SM     AM-PAC 6 Clicks Score (OT) 15  -     Row Name 11/01/22 0815          How much help from another person do you currently need...    Turning from your back to your side while in flat bed without using bedrails? 3  -CW     Moving from lying on back to sitting on the side of a flat bed without bedrails? 3  -CW     Moving to and from a bed to a chair (including a wheelchair)? 3  -CW     Standing up from a chair using your arms (e.g., wheelchair, bedside chair)? 3  -CW     Climbing 3-5 steps with a railing? 1  -CW     To walk in hospital room? 3  -CW     AM-PAC 6 Clicks Score (PT) 16  -CW     Highest level of mobility 5 --> Static  standing  -CW     Row Name 11/01/22 1250          Functional Assessment    Outcome Measure Options AM-PAC 6 Clicks Daily Activity (OT)  -SM           User Key  (r) = Recorded By, (t) = Taken By, (c) = Cosigned By    Initials Name Provider Type    Alejandra Zuleta OT Occupational Therapist    Stacey Wilson, RN Registered Nurse                Occupational Therapy Education     Title: PT OT SLP Therapies (Done)     Topic: Occupational Therapy (Done)     Point: ADL training (Done)     Description:   Instruct learner(s) on proper safety adaptation and remediation techniques during self care or transfers.   Instruct in proper use of assistive devices.              Learning Progress Summary           Patient Acceptance, E, VU by AK at 10/31/2022 0156    Acceptance, E, Bed IU by LE at 10/29/2022 1003   Family Acceptance, E, Bed IU by LE at 10/29/2022 1003                   Point: Home exercise program (Done)     Description:   Instruct learner(s) on appropriate technique for monitoring, assisting and/or progressing therapeutic exercises/activities.              Learning Progress Summary           Patient Acceptance, E, VU by AK at 10/31/2022 0156    Acceptance, E, Bed IU by LE at 10/29/2022 1003   Family Acceptance, E, Bed IU by LE at 10/29/2022 1003                   Point: Precautions (Done)     Description:   Instruct learner(s) on prescribed precautions during self-care and functional transfers.              Learning Progress Summary           Patient Acceptance, E, VU by AK at 10/31/2022 0156    Acceptance, E, Bed IU by LE at 10/29/2022 1003   Family Acceptance, E, Bed IU by LE at 10/29/2022 1003                   Point: Body mechanics (Done)     Description:   Instruct learner(s) on proper positioning and spine alignment during self-care, functional mobility activities and/or exercises.              Learning Progress Summary           Patient Acceptance, E, VU by AK at 10/31/2022 0156    Acceptance, E, Bed  IU by PAN at 10/29/2022 1003   Family Acceptance, E, Bed IU by PAN at 10/29/2022 1003                               User Key     Initials Effective Dates Name Provider Type Discipline    PAN 06/16/21 -  Carrol Robertson OTR Occupational Therapist OT    AK 03/17/17 -  Wanda Swann, RN Registered Nurse Nurse              OT Recommendation and Plan     Plan of Care Review  Plan of Care Reviewed With: patient, caregiver  Outcome Evaluation: Pt able to increase mobility in room today to sink to work on brushing teeth in standing, pt reporting increased pain in back and limited tolerance for activity today. Returned to chair and recommended pt sit up for upcoming lunch tray. OT continues to recommend SNF at [place].     Time Calculation:    Time Calculation- OT     Row Name 11/01/22 1251             Time Calculation- OT    OT Start Time 1041  -      OT Stop Time 1058  -      OT Time Calculation (min) 17 min  -SM      Total Timed Code Minutes- OT 17 minute(s)  -SM      OT Received On 11/01/22  -      OT - Next Appointment 11/02/22  -         Timed Charges    10005 - OT Self Care/Mgmt Minutes 17  -SM         Total Minutes    Timed Charges Total Minutes 17  -SM       Total Minutes 17  -SM            User Key  (r) = Recorded By, (t) = Taken By, (c) = Cosigned By    Initials Name Provider Type     Alejandra Moncada OT Occupational Therapist              Therapy Charges for Today     Code Description Service Date Service Provider Modifiers Qty    69095155323 HC OT SELF CARE/MGMT/TRAIN EA 15 MIN 11/1/2022 Alejandra Moncada OT GO 1               Alejandra Moncada OT  11/1/2022

## 2022-11-01 NOTE — CASE MANAGEMENT/SOCIAL WORK
Continued Stay Note  [facility]     Patient Name: Ritu Martino  MRN: [id number]  Today's Date: 11/1/2022    Admit Date: 10/27/2022    Plan: Plan is skilled bed at [facility] PENDING precert.   Discharge Plan     Row Name 11/01/22 1714       Plan    Plan Plan is skilled bed at [facility] PENDING precert.    Plan Comments [facility] - precert is pending at this time, but anticipate receiving it tomorrow.  Bed will be available tomorrow once precert received. ........Bailey DE LEON/ NORIS               Discharge Codes    No documentation.               Expected Discharge Date and Time     Expected Discharge Date Expected Discharge Time    Nov 2, 2022             Bailey Harris RN

## 2022-11-01 NOTE — PROGRESS NOTES
Name: Ritu Martino ADMIT: 10/27/2022   : 1940  PCP: Gaurav Barger MD    MRN: [id number] LOS: 0 days   AGE/SEX: 82 y.o. female  [facility]   Subjective   Chief Complaint   Patient presents with   • Back Pain   • Extremity Pain     RLE      Called this morning about severe pain. Increased medications. Came to see patient and the medication is helping her pain. It was right lower quadrant/pelvis. On exam pain was more lateral/right hip area. It is improved now. No NV. Reports loose stool and  reports color was black. She has been taking iron supplements. They were following with GI and were not sure if she would want to have endoscopy or not. They were agreeable to discussing with GI.    Objective   Vital Signs  Temp:  [98.4 °F (36.9 °C)-98.5 °F (36.9 °C)] 98.5 °F (36.9 °C)  Heart Rate:  [87-96] 94  Resp:  [16-18] 16  BP: (143-147)/(77-89) 146/77  SpO2:  [94 %-100 %] 100 %  on   ;   Device (Oxygen Therapy): room air  There is no height or weight on file to calculate BMI.    Physical Exam  Vitals and nursing note reviewed.   Constitutional:       Appearance: She is ill-appearing. She is not diaphoretic.      Comments: Uncomfortable in appearance   HENT:      Head: Normocephalic and atraumatic.   Eyes:      General:         Right eye: No discharge.         Left eye: No discharge.      Conjunctiva/sclera: Conjunctivae normal.   Cardiovascular:      Rate and Rhythm: Normal rate and regular rhythm.      Pulses: Normal pulses.   Pulmonary:      Effort: Pulmonary effort is normal.      Breath sounds: No wheezing.   Abdominal:      General: There is no distension.      Palpations: Abdomen is soft.      Tenderness: There is abdominal tenderness. There is no guarding or rebound.      Hernia: A hernia is present.      Comments: Right pelvis/hip   Musculoskeletal:         General: Tenderness present. No swelling.      Cervical back: Neck supple. No tenderness.   Skin:     General: Skin is warm and dry.    Neurological:      Mental Status: She is alert. Mental status is at baseline.   Psychiatric:         Mood and Affect: Mood normal.         Behavior: Behavior normal.         Results Review:       I reviewed the patient's new clinical results.     I reviewed imaging, agree with interpretation.     I reviewed telemetry/EKG results, sinus     I reviewed prior records.    Results from last 7 days   Lab Units 11/01/22  0641 10/31/22  2024 10/31/22  0601 10/29/22  1054 10/28/22  0602   WBC 10*3/mm3 6.61  --  5.68 13.45* 12.24*   HEMOGLOBIN g/dL 9.8* 9.8* 9.6* 11.4* 10.0*   PLATELETS 10*3/mm3 416  --  369 532* 458*     Results from last 7 days   Lab Units 11/01/22  0641 10/31/22  0601 10/29/22  1303 10/28/22  0602   SODIUM mmol/L 127* 130* 130* 128*   POTASSIUM mmol/L 3.9 4.6 4.3 3.1*   CHLORIDE mmol/L 96* 93* 96* 94*   CO2 mmol/L 23.0 22.0 23.6 24.0   BUN mg/dL 7* 10 6* 7*   CREATININE mg/dL 0.41* 0.49* 0.40* 0.42*   GLUCOSE mg/dL 109* 93 96 97   Estimated Creatinine Clearance: 83.7 mL/min (A) (by C-G formula based on SCr of 0.41 mg/dL (L)).  Results from last 7 days   Lab Units 11/01/22  0641 10/31/22  0601 10/29/22  1303 10/28/22  0602 10/27/22  2254 10/27/22  1113   CALCIUM mg/dL 8.2* 7.8* 8.2* 8.1* 8.8 8.4*   ALBUMIN g/dL  --   --   --   --  4.60 4.60   MAGNESIUM mg/dL  --   --   --  2.0  --   --           cefTRIAXone, 1 g, Intravenous, Q24H  cycloSPORINE, 1 drop, Both Eyes, BID  desvenlafaxine, 50 mg, Oral, Daily  dilTIAZem CD, 120 mg, Oral, Q24H  hydrALAZINE, 10 mg, Oral, Q8H  lactobacillus acidophilus, 1 capsule, Oral, Daily  levothyroxine, 50 mcg, Oral, Q AM  lidocaine, 2 patch, Transdermal, Q24H  lidocaine, 1 mL, Intradermal, Once  phenazopyridine, 100 mg, Oral, TID With Meals  potassium chloride, 10 mEq, Oral, BID  rosuvastatin, 20 mg, Oral, Nightly  senna-docusate sodium, 2 tablet, Oral, BID  sodium chloride, 2 g, Oral, TID With Meals       Diet Regular; Daily Fluid Restriction; 1500 mL Fluid Per  Day    Assessment & Plan      Active Hospital Problems    Diagnosis  POA   • **Closed fracture of right inferior pubic ramus (HCC) [S32.591A]  Yes   • Acute UTI [N39.0]  Yes   • Back pain [M54.9]  Yes   • Compression fracture of L2 and L4 lumbar vertebra (HCC) [S32.020A]  Yes   • Compression fracture of T12 vertebra (HCC) [S22.080A]  Yes   • Hernia, hiatal [K44.9]  Yes   • Seizures (HCC) [R56.9]  Yes   • Essential hypertension, benign [I10]  Yes   • Hyponatremia [E87.1]  Yes      Resolved Hospital Problems   No resolved problems to display.       · Right Pelvic Fractures/Vertebral Compression Fractures: sp WINTER. Increased pain control. Pain had improved with the medication. Will monitor. Orthopedic surgery and neurosurgery evaluated.  · Dark Stool: FOBT was positive. Checked iron studies which are ok. Will place consult for GI to review.  · Ventral Hernial: Chronic. Containing cecum and present since at least august.  · Hypothyroidism: Synthroid  · UTI: UCx negative. Completing rocephin course for acute cystitis.  · Seizure Hx: Follows with neurology outpatient. Potential dementia workup as outpatient as well.  · Hyponatremia: Chronic issue. On salt tabs. Somewhat worse today than yesterday. Resume fluid restriction.  · PPx: SCD  · Disposition: SNF/family are hopeful for discharge tomorrow, will monitor    Geoff Garcia MD  [facility]  11/01/22  15:50 EDT    Dictated portions using Dragon dictation software.    During the entire encounter, I was wearing recommended PPE including face mask and eye protection. Hand sanitization was performed prior to entering room and upon exit.

## 2022-11-02 VITALS
TEMPERATURE: 97.4 F | RESPIRATION RATE: 18 BRPM | OXYGEN SATURATION: 95 % | SYSTOLIC BLOOD PRESSURE: 181 MMHG | HEART RATE: 110 BPM | DIASTOLIC BLOOD PRESSURE: 87 MMHG

## 2022-11-02 LAB
ANION GAP SERPL CALCULATED.3IONS-SCNC: 9.4 MMOL/L (ref 5–15)
BACTERIA SPEC AEROBE CULT: NORMAL
BACTERIA SPEC AEROBE CULT: NORMAL
BUN SERPL-MCNC: 6 MG/DL (ref 8–23)
BUN/CREAT SERPL: 14 (ref 7–25)
CALCIUM SPEC-SCNC: 8.9 MG/DL (ref 8.6–10.5)
CHLORIDE SERPL-SCNC: 96 MMOL/L (ref 98–107)
CO2 SERPL-SCNC: 25.6 MMOL/L (ref 22–29)
CREAT SERPL-MCNC: 0.43 MG/DL (ref 0.57–1)
DEPRECATED RDW RBC AUTO: 40.5 FL (ref 37–54)
EGFRCR SERPLBLD CKD-EPI 2021: 97.2 ML/MIN/1.73
ERYTHROCYTE [DISTWIDTH] IN BLOOD BY AUTOMATED COUNT: 12 % (ref 12.3–15.4)
GLUCOSE SERPL-MCNC: 105 MG/DL (ref 65–99)
HCT VFR BLD AUTO: 29.1 % (ref 34–46.6)
HGB BLD-MCNC: 9.8 G/DL (ref 12–15.9)
MCH RBC QN AUTO: 31.3 PG (ref 26.6–33)
MCHC RBC AUTO-ENTMCNC: 33.7 G/DL (ref 31.5–35.7)
MCV RBC AUTO: 93 FL (ref 79–97)
PLATELET # BLD AUTO: 376 10*3/MM3 (ref 140–450)
PMV BLD AUTO: 8 FL (ref 6–12)
POTASSIUM SERPL-SCNC: 3.6 MMOL/L (ref 3.5–5.2)
RBC # BLD AUTO: 3.13 10*6/MM3 (ref 3.77–5.28)
SODIUM SERPL-SCNC: 131 MMOL/L (ref 136–145)
WBC NRBC COR # BLD: 9.89 10*3/MM3 (ref 3.4–10.8)

## 2022-11-02 PROCEDURE — 85027 COMPLETE CBC AUTOMATED: CPT | Performed by: INTERNAL MEDICINE

## 2022-11-02 PROCEDURE — 96366 THER/PROPH/DIAG IV INF ADDON: CPT

## 2022-11-02 PROCEDURE — G0378 HOSPITAL OBSERVATION PER HR: HCPCS

## 2022-11-02 PROCEDURE — 25010000002 CEFTRIAXONE PER 250 MG: Performed by: NURSE PRACTITIONER

## 2022-11-02 PROCEDURE — 80048 BASIC METABOLIC PNL TOTAL CA: CPT | Performed by: INTERNAL MEDICINE

## 2022-11-02 PROCEDURE — 99204 OFFICE O/P NEW MOD 45 MIN: CPT | Performed by: INTERNAL MEDICINE

## 2022-11-02 RX ORDER — LIDOCAINE 50 MG/G
2 PATCH TOPICAL EVERY 24 HOURS
Qty: 15 EACH | Refills: 0 | Status: SHIPPED | OUTPATIENT
Start: 2022-11-02 | End: 2022-11-04

## 2022-11-02 RX ORDER — HYDROCODONE BITARTRATE AND ACETAMINOPHEN 10; 325 MG/1; MG/1
1-2 TABLET ORAL EVERY 6 HOURS PRN
Qty: 24 TABLET | Refills: 0 | Status: SHIPPED | OUTPATIENT
Start: 2022-11-02 | End: 2022-11-04 | Stop reason: SDUPTHER

## 2022-11-02 RX ORDER — AMOXICILLIN 250 MG
2 CAPSULE ORAL 2 TIMES DAILY PRN
Qty: 60 TABLET | Refills: 0 | Status: SHIPPED | OUTPATIENT
Start: 2022-11-02 | End: 2023-02-07

## 2022-11-02 RX ORDER — FLUCONAZOLE 150 MG/1
150 TABLET ORAL ONCE
Status: COMPLETED | OUTPATIENT
Start: 2022-11-02 | End: 2022-11-02

## 2022-11-02 RX ORDER — METAXALONE 800 MG/1
800 TABLET ORAL 3 TIMES DAILY PRN
Status: DISCONTINUED | OUTPATIENT
Start: 2022-11-02 | End: 2022-11-02 | Stop reason: HOSPADM

## 2022-11-02 RX ORDER — METAXALONE 800 MG/1
800 TABLET ORAL 3 TIMES DAILY PRN
Qty: 20 TABLET | Refills: 0 | Status: SHIPPED | OUTPATIENT
Start: 2022-11-02 | End: 2022-11-04 | Stop reason: SDUPTHER

## 2022-11-02 RX ORDER — ALPRAZOLAM 0.25 MG/1
0.25 TABLET ORAL 2 TIMES DAILY PRN
Qty: 6 TABLET | Refills: 0 | Status: SHIPPED | OUTPATIENT
Start: 2022-11-02 | End: 2022-11-14 | Stop reason: SDUPTHER

## 2022-11-02 RX ADMIN — CYCLOSPORINE 1 DROP: 0.5 EMULSION OPHTHALMIC at 08:39

## 2022-11-02 RX ADMIN — DESVENLAFAXINE SUCCINATE 50 MG: 50 TABLET, EXTENDED RELEASE ORAL at 08:39

## 2022-11-02 RX ADMIN — Medication 1 CAPSULE: at 08:39

## 2022-11-02 RX ADMIN — CEFTRIAXONE SODIUM 1 G: 1 INJECTION, POWDER, FOR SOLUTION INTRAMUSCULAR; INTRAVENOUS at 02:06

## 2022-11-02 RX ADMIN — SODIUM CHLORIDE TAB 1 GM 2 G: 1 TAB at 13:09

## 2022-11-02 RX ADMIN — LEVOTHYROXINE SODIUM 50 MCG: 0.05 TABLET ORAL at 06:52

## 2022-11-02 RX ADMIN — HYDROCODONE BITARTRATE AND ACETAMINOPHEN 2 TABLET: 10; 325 TABLET ORAL at 08:39

## 2022-11-02 RX ADMIN — LIDOCAINE 5% 2 PATCH: 700 PATCH TOPICAL at 08:39

## 2022-11-02 RX ADMIN — HYDROCODONE BITARTRATE AND ACETAMINOPHEN 2 TABLET: 10; 325 TABLET ORAL at 13:09

## 2022-11-02 RX ADMIN — POTASSIUM CHLORIDE 10 MEQ: 750 TABLET, EXTENDED RELEASE ORAL at 08:40

## 2022-11-02 RX ADMIN — HYDRALAZINE HYDROCHLORIDE 10 MG: 10 TABLET, FILM COATED ORAL at 06:52

## 2022-11-02 RX ADMIN — PHENAZOPYRIDINE 100 MG: 100 TABLET ORAL at 08:39

## 2022-11-02 RX ADMIN — ALPRAZOLAM 0.25 MG: 0.25 TABLET ORAL at 08:39

## 2022-11-02 RX ADMIN — HYDRALAZINE HYDROCHLORIDE 10 MG: 10 TABLET, FILM COATED ORAL at 14:04

## 2022-11-02 RX ADMIN — FLUCONAZOLE 150 MG: 150 TABLET ORAL at 14:04

## 2022-11-02 RX ADMIN — PHENAZOPYRIDINE 100 MG: 100 TABLET ORAL at 13:09

## 2022-11-02 RX ADMIN — SODIUM CHLORIDE TAB 1 GM 2 G: 1 TAB at 08:39

## 2022-11-02 RX ADMIN — DILTIAZEM HYDROCHLORIDE 120 MG: 120 CAPSULE, COATED, EXTENDED RELEASE ORAL at 08:39

## 2022-11-02 NOTE — CASE MANAGEMENT/SOCIAL WORK
Case Management Discharge Note      Final Note: DC to skilled bed at [facility] ..........Bailey DE LEON/ CCP    Provided Post Acute Provider List?: Yes  Post Acute Provider List: Home Health  Provided Post Acute Provider Quality & Resource List?: Yes  Post Acute Provider Quality and Resource List: Home Health  Delivered To: Support Person, Patient  Method of Delivery: In person    Selected Continued Care - Discharged on 11/2/2022 Admission date: 10/27/2022 - Discharge disposition: Skilled Nursing Facility (DC - External)    Destination Coordination complete.    Service Provider Selected Services Address Phone Fax Patient Preferred    [facility] [address] , [facility] [id number] [phone] --          Durable Medical Equipment    No services have been selected for the patient.              Dialysis/Infusion    No services have been selected for the patient.              Home Medical Care     Service Provider Selected Services Address Phone Fax Patient Preferred    [facility] [address] [facility] [address] [address] [phone] --          Therapy    No services have been selected for the patient.              Community Resources    No services have been selected for the patient.              Community & DME    No services have been selected for the patient.                  Transportation Services  W/C Van: Other ([place]aynab)    Final Discharge Disposition Code: 03 - skilled nursing facility (SNF) ([facility])

## 2022-11-02 NOTE — CONSULTS
[facility]  Initial Inpatient Consult Note    Referring Provider: Dr Garcia    Reason for Consultation: Abdominal pain, heme positive stool    Subjective     History of present illness:    82 y.o. female admitted with closed fracture of the right inferior pubic ramus.  GI consultation was requested by the hospitalist service due to heme positive stool.  Patient has been having lower abdominal pain felt to be related to her pelvic fracture.  There was some report about dark stool but the patient was noted to be on iron. Her  states stool has appeared this way for \"months\" For this reason Hemoccult was obtained which was positive.  Her hemoglobin has been stable over the last 48 hours.  Her iron indices are normal.  BUN is nromal.  CT scan A/P this admission shows R ventral hernia containing part of cecum.  Patient is actively followed by Dr Thacker with [facility], just recently seen by her APC in last few weeks.      Past Medical History:  Past Medical History:   Diagnosis Date   • Anemia    • Anxiety    • Arthritis    • Bowel dysfunction 2021    since having surgery for diverticular infection   • Chronic constipation    • Diverticulitis 2021    w/ rupture and infection required surgery and ostomy   • Essential hypertension, benign    • Fracture 2022    left ankle   • Fracture, tibia and fibula Now wearing bood   • GERD (gastroesophageal reflux disease)    • Hernia, hiatal    • Hypercholesterolemia    • Hypokalemia    • Hyponatremia     chronic low with occasional normal level   • Hypothyroidism     estimated time frame pt nor  could remember exactly how long has been on medication   • Insomnia    • Iron deficiency    • Seizures (HCC)    • Tear of meniscus of knee    • 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     Past Surgical History:  Past Surgical History:   Procedure Laterality Date   • APPENDECTOMY     • BACK SURGERY     •  SECTION     •  COLON SURGERY  2021    to address ruptured and infected diverticular dz, ostomy also placed   • COLONOSCOPY     • COLOSTOMY CLOSURE  10/2021    ostomy removed   • EYE SURGERY  2 X cataract   • HEMORRHOIDECTOMY     • KNEE ARTHROPLASTY     • TONSILLECTOMY        Social History:   Social History     Tobacco Use   • Smoking status: Former     Packs/day: 0.25     Years: 15.00     Pack years: 3.75     Types: Cigarettes     Start date: 1956     Quit date: 1971     Years since quittin.8   • Smokeless tobacco: Never   Substance Use Topics   • Alcohol use: Not Currently     Alcohol/week: 8.0 standard drinks     Types: 4 Glasses of wine, 4 Shots of liquor per week     Comment: Stopped 3 months ago.      Family History:  Family History   Problem Relation Age of Onset   • Brain cancer Mother    • Cancer Mother         Brain tumor,    • Stroke Father         Age 46   • Early death Father         Stroke, age 46   • Cancer Brother         pancreatic   • Cancer Daughter         Dec'd 2000       Home Meds:  Medications Prior to Admission   Medication Sig Dispense Refill Last Dose   • acetaminophen (TYLENOL) 325 MG tablet Take 2 tablets by mouth Every 6 (Six) Hours As Needed for Mild Pain .      • Cholecalciferol (VITAMIN D) 2000 units capsule Take  by mouth Daily With Dinner.      • cycloSPORINE (RESTASIS) 0.05 % ophthalmic emulsion 1 drop 2 (Two) Times a Day.      • desvenlafaxine (Pristiq) 50 MG 24 hr tablet Take 1 tablet by mouth Daily. 90 tablet 3    • dilTIAZem XR (DILACOR XR) 120 MG 24 hr capsule Take 1 capsule by mouth Daily. 90 capsule 3    • HYDROcodone-acetaminophen (NORCO)  MG per tablet Take 1 tablet by mouth Every 8 (Eight) Hours As Needed for Severe Pain (Chronic pain medicines for compression fracture of lumbar spine). 90 tablet 0    • ibuprofen (ADVIL,MOTRIN) 800 MG tablet TAKE 1 TABLET BY MOUTH EVERY 8 HOURS AS NEEDED FOR MILD PAIN 90 tablet 3    • levothyroxine (SYNTHROID,  LEVOTHROID) 50 MCG tablet Take 50 mcg by mouth Daily.      • Magnesium 400 MG capsule 1 p.o. twice daily 60 capsule 3    • potassium chloride 10 MEQ CR tablet Take 1 tablet by mouth 2 (Two) Times a Day. 180 tablet 3    • predniSONE (DELTASONE) 10 MG tablet       • Probiotic Product (ALIGN PO) Take 1 capsule by mouth Daily With Lunch.      • rosuvastatin (CRESTOR) 20 MG tablet Take 20 mg by mouth Every Night.      • SODIUM CHLORIDE PO Take  by mouth 3 (Three) Times a Day. 2 TABLETS 3 6TIMES A DAY      • valsartan (Diovan) 160 MG tablet Take 1 tablet by mouth Daily. 90 tablet 1    • vitamin B-12 (CYANOCOBALAMIN) 100 MCG tablet Take 50 mcg by mouth Daily.      • zolpidem (AMBIEN) 10 MG tablet Take 1 tablet by mouth At Night As Needed for Sleep. 90 tablet 1    • denosumab (Prolia) 60 MG/ML solution prefilled syringe syringe 1 mL.        Current Meds:   cycloSPORINE, 1 drop, Both Eyes, BID  desvenlafaxine, 50 mg, Oral, Daily  dilTIAZem CD, 120 mg, Oral, Q24H  hydrALAZINE, 10 mg, Oral, Q8H  lactobacillus acidophilus, 1 capsule, Oral, Daily  levothyroxine, 50 mcg, Oral, Q AM  lidocaine, 2 patch, Transdermal, Q24H  lidocaine, 1 mL, Intradermal, Once  phenazopyridine, 100 mg, Oral, TID With Meals  potassium chloride, 10 mEq, Oral, BID  rosuvastatin, 20 mg, Oral, Nightly  senna-docusate sodium, 2 tablet, Oral, BID  sodium chloride, 2 g, Oral, TID With Meals      Allergies:  No Known Allergies  Review of Systems  All systems were reviewed and negative except for:  Musculoskeletal: positive for  bone pain     Objective     Vital Signs  Temp:  [97.4 °F (36.3 °C)-98 °F (36.7 °C)] 97.4 °F (36.3 °C)  Heart Rate:  [67-93] 93  Resp:  [16-20] 18  BP: (127-175)/(72-90) 175/90  Physical Exam:  General Appearance:     Alert, cooperative, in no acute distress   Head:    Normocephalic, without obvious abnormality, atraumatic   Eyes:            Lids and lashes normal, conjunctivae and sclerae normal, no icterus   Throat:   No oral lesions,  no thrush, oral mucosa moist   Neck:   No adenopathy, supple, trachea midline, no thyromegaly, no carotid bruit, no JVD   Lungs:     Clear to auscultation,respirations regular, even and            unlabored    Heart:    Regular rhythm and normal rate, normal S1 and S2, no        murmur, no gallop, no rub, no click   Chest Wall:    No abnormalities observed   Abdomen:     Normal bowel sounds, no masses, no organomegaly, soft     nontender, nondistended, no guarding, no rebound                 tenderness   Rectal:     Deferred   Extremities:   no edema, no cyanosis, no redness   Skin:   No bleeding, bruising or rash   Lymph nodes:   No palpable adenopathy   Psychiatric:  Judgement and insight: normal   Orientation to person place and time: normal   Mood and affect: normal   Results Review:   I reviewed the patient's new clinical results.    Results from last 7 days   Lab Units 11/01/22  0641 10/31/22  2024 10/31/22  0601 10/29/22  1054   WBC 10*3/mm3 6.61  --  5.68 13.45*   HEMOGLOBIN g/dL 9.8* 9.8* 9.6* 11.4*   HEMATOCRIT % 29.8* 28.9* 29.0* 32.9*   PLATELETS 10*3/mm3 416  --  369 532*     Results from last 7 days   Lab Units 11/01/22  0641 10/31/22  0601 10/29/22  1303 10/28/22  0602 10/27/22  2254 10/27/22  1113   SODIUM mmol/L 127* 130* 130*   < > 123* 124*   POTASSIUM mmol/L 3.9 4.6 4.3   < > 3.9 3.5   CHLORIDE mmol/L 96* 93* 96*   < > 87* 89*   CO2 mmol/L 23.0 22.0 23.6   < > 23.6 24.4   BUN mg/dL 7* 10 6*   < > 9 8   CREATININE mg/dL 0.41* 0.49* 0.40*   < > 0.40* 0.49*   CALCIUM mg/dL 8.2* 7.8* 8.2*   < > 8.8 8.4*   BILIRUBIN mg/dL  --   --   --   --  0.6 0.5   ALK PHOS U/L  --   --   --   --  178* 174*   ALT (SGPT) U/L  --   --   --   --  22 20   AST (SGOT) U/L  --   --   --   --  19 19   GLUCOSE mg/dL 109* 93 96   < > 116* 117*    < > = values in this interval not displayed.         Lab Results   Lab Value Date/Time    LIPASE 44 06/30/2022 1506    LIPASE 59 02/04/2021 1255    LIPASE 28 05/14/2018 2008        Radiology:  [facility] Spine   Final Result      CT Lumbar Spine Without Contrast   Final Result         Electronically signed by Joey Villa MD on 10-28-22 at 0009      CT Abdomen Pelvis Without Contrast   Final Result         Electronically signed by Joey Villa MD on 10-28-22 at 0008          Assessment & Plan   Assessment:   1.  Anemia  2.  Heme positive stool  3.  Pelvic fracture    Plan:   83yo female with pelvic fracture. She has dark stool on chronic iron replacement therapy. She is heme positive but Hb stable last 48hrs.   Giver her acute orthopedic issues and chronicity of her anemia, would not recommend urgent inpatient endoscopic evaluation.  I would recommend she have close f/u with her primary GI MD upon discharge to discuss possible outpt endoscopy once recovered from her acute issues.  Patient and  are in agreement with this plan.  GI will see again as needed.            Wiliam Mahmood M.D.  [facility]  [address] [place] [address]  Office: [phone]

## 2022-11-02 NOTE — DISCHARGE SUMMARY
Date of Admission: 10/27/2022  Date of Discharge:  11/2/2022  Primary Care Physician: Gaurav Barger MD     Discharge Diagnosis:  Active Hospital Problems    Diagnosis  POA   • **Closed fracture of right inferior pubic ramus ([facility]) [S32.591A]  Yes   • Acute UTI [N39.0]  Yes   • Back pain [M54.9]  Yes   • Compression fracture of L2 and L4 lumbar vertebra (HCC) [S32.020A]  Yes   • Compression fracture of T12 vertebra ([facility]) [S22.080A]  Yes   • Hernia, hiatal [K44.9]  Yes   • Seizures (HCC) [R56.9]  Yes   • Essential hypertension, benign [I10]  Yes   • Hyponatremia [E87.1]  Yes      Resolved Hospital Problems   No resolved problems to display.       Presenting Problem/History of Present Illness:  Acute UTI [N39.0]  Compression fracture of T12 vertebra, initial encounter ([facility]) [S22.080A]  Closed fracture of right inferior pubic ramus, initial encounter ([facility]) [S32.591A]  Closed compression fracture of L4 vertebra, initial encounter ([facility]) [S32.040A]  Closed compression fracture of L2 vertebra, initial encounter ([facility]) [S32.020A]     Ms. Martino is a 82 y.o. former smoker with a history of hypertension, hypothyroidism, hiatal hernia, seizure disorder, and hyponatremia that presents to [facility] complaining of back pain.  She reports progressively worsening back pain for the past 6 days.  She states she was previously diagnosed with a T12 compression fracture due to a fall and was scheduled to have an epidural next week.  She reports she was seen by her PCP yesterday and encouraged to come to the ED due to the worsening pain.  She describes the pain as constant, severe, and aching in nautre.  She states the pain radiates into bilateral legs but is worse on the right.  Movement exacerbates her pain and her home pain medication is no longer alleviating.  She denies falling again.  She reports dysuria,  denies fever, chills, and loss of bowel and bladder control.  A CT performed in the ED revealed acute on  chronic compression fractures of T12, L2, and L4 and a mildly displaced right inferior pubic ramus fracture.    Hospital Course:  The patient is a 82 y.o. female who presented with acute on chronic compression fractures at T12 L2 and L4 and acute right pubic fractures.  She was admitted and underwent evaluation by orthopedic surgery and neurosurgery services.  Nonoperative management is being pursued.  She underwent epidural and has had increased medications to control her pain.  She is going to discharge to rehab and will need follow-up with orthopedic surgery in clinic.    She has history of dark stool and FOBT was positive.  Iron studies were okay and her hemoglobin was stable.  Gastroenterology was consulted and they are recommending that she follow-up with her home GI doctor to consider outpatient endoscopy.    She has a chronic ventral hernia containing her cecum which has been present since at least August.  She needs to continue to follow-up with surgery about this in the outpatient setting.    She completed an antibiotic course for acute cystitis.  Urine culture was negative but she did receive antibiotic prior to her transfer here and obtaining that sample.    She has chronic hyponatremia and is on chronic salt tabs.  She is continuing with fluid restriction.    Exam Today:  Constitutional:       Appearance: She is ill-appearing. She is not diaphoretic.      Comments: Uncomfortable in appearance   HENT:      Head: Normocephalic and atraumatic.   Eyes:      General:         Right eye: No discharge.         Left eye: No discharge.      Conjunctiva/sclera: Conjunctivae normal.   Cardiovascular:      Rate and Rhythm: Normal rate and regular rhythm.      Pulses: Normal pulses.   Pulmonary:      Effort: Pulmonary effort is normal.      Breath sounds: No wheezing.   Abdominal:      General: There is no distension.      Palpations: Abdomen is soft.      Tenderness: There is abdominal tenderness. There is no  guarding or rebound.      Hernia: A hernia is present.      Comments: Right pelvis/hip   Musculoskeletal:         General: Tenderness present. No swelling.      Cervical back: Neck supple. No tenderness.   Skin:     General: Skin is warm and dry.   Neurological:      Mental Status: She is alert. Mental status is at baseline.   Psychiatric:         Mood and Affect: Mood  anxious.         Behavior: Behavior normal.     Results:  CT Abdomen/Pelvis/Lumbar Spine  1.  Mildly displaced right inferior pubic ramus fracture.  Acute on   chronic T12, L2 and L4 compression fractures.  2.  Right ventral abdominal wall hernia contains the cecum.  No proximal   Obstruction.    Procedures Performed:         Consults:   Consults     Date and Time Order Name Status Description    11/1/2022  3:51 PM Inpatient Gastroenterology Consult Completed     10/28/2022  1:07 AM Inpatient Neurosurgery Consult Completed     10/28/2022  1:07 AM Inpatient Orthopedic Surgery Consult Completed            Discharge Disposition:  Skilled Nursing Facility (DC - External)    Discharge Medications:     Discharge Medications      New Medications      Instructions Start Date   lidocaine 5 %  Commonly known as: LIDODERM   2 patches, Transdermal, Every 24 Hours, Remove & Discard patch within 12 hours or as directed by MD      metaxalone 800 MG tablet  Commonly known as: SKELAXIN   800 mg, Oral, 3 Times Daily PRN      sennosides-docusate 8.6-50 MG per tablet  Commonly known as: PERICOLACE   2 tablets, Oral, 2 Times Daily PRN         Changes to Medications      Instructions Start Date   ALPRAZolam 0.25 MG tablet  Commonly known as: XANAX  What changed:   · when to take this  · reasons to take this   0.25 mg, Oral, 2 Times Daily PRN, Falling risk      hydrALAZINE 10 MG tablet  Commonly known as: APRESOLINE  What changed:   · how much to take  · how to take this  · when to take this  · additional instructions   10 mg, Oral, 3 Times Daily       HYDROcodone-acetaminophen  MG per tablet  Commonly known as: NORCO  What changed:   · how much to take  · when to take this  · reasons to take this   1-2 tablets, Oral, Every 6 Hours PRN      Prucalopride Succinate 2 MG tablet  What changed:   · medication strength  · how much to take  · when to take this  · reasons to take this   2 mg, Oral, Daily PRN         Continue These Medications      Instructions Start Date   acetaminophen 325 MG tablet  Commonly known as: TYLENOL   650 mg, Oral, Every 6 Hours PRN      ALIGN PO   1 capsule, Oral, Daily With Lunch      cycloSPORINE 0.05 % ophthalmic emulsion  Commonly known as: RESTASIS   1 drop, 2 Times Daily      desvenlafaxine 50 MG 24 hr tablet  Commonly known as: Pristiq   50 mg, Oral, Daily      dilTIAZem  MG 24 hr capsule  Commonly known as: DILACOR XR   120 mg, Oral, Daily      ferrous sulfate 325 (65 FE) MG tablet   325 mg, Oral, 2 Times Daily Before Meals      ibuprofen 800 MG tablet  Commonly known as: ADVIL,MOTRIN   TAKE 1 TABLET BY MOUTH EVERY 8 HOURS AS NEEDED FOR MILD PAIN      levothyroxine 50 MCG tablet  Commonly known as: SYNTHROID, LEVOTHROID   50 mcg, Oral, Daily      Magnesium 400 MG capsule   1 p.o. twice daily      potassium chloride 10 MEQ CR tablet   10 mEq, Oral, 2 Times Daily      rosuvastatin 20 MG tablet  Commonly known as: CRESTOR   20 mg, Oral, Nightly      SODIUM CHLORIDE PO   Oral, 3 Times Daily, 2 TABLETS 3 6TIMES A DAY      valsartan 160 MG tablet  Commonly known as: Diovan   160 mg, Oral, Daily      vitamin B-12 100 MCG tablet  Commonly known as: CYANOCOBALAMIN   50 mcg, Oral, Daily      Vitamin D 50 MCG (2000 UT) capsule   Oral, Daily With Dinner      zolpidem 10 MG tablet  Commonly known as: AMBIEN   10 mg, Oral, Nightly PRN         Stop These Medications    predniSONE 10 MG tablet  Commonly known as: DELTASONE     Prolia 60 MG/ML solution prefilled syringe syringe  Generic drug: denosumab            Discharge Diet:   Diet  Instructions     Diet: Regular      Discharge Diet: Regular    Fluid Restriction per day: 1500 mL Fluid          Activity at Discharge:   Activity Instructions     Other Activity Instructions      Activity Instructions: follow orthopedic instructions          Follow-up Appointments:   Contact information for follow-up providers     Lars Knutson MD. Schedule an appointment as soon as possible for a visit in 3 week(s).    Specialty: Orthopedic Surgery  Contact information:  [address]  [facility] [address]  [phone]             Gaurav Barger MD Follow up.    Specialty: Internal Medicine  Contact information:  [address] [facility] [address]  [phone]                   Contact information for after-discharge care     Home Medical Care     [facility] .    Service: Home Health Services  Contact information:  [address]  [facility] [address] [phone]                             Test Results Pending at Discharge:       Geoff Garcia MD  11/02/22  11:44 EDT    Time Spent on Discharge Activities: >30 minutes    Dictated portions using Dragon dictation software.  During the entire encounter, I was wearing recommended PPE including face mask and eye protection. Hand sanitization was performed prior to entering room and upon exit.

## 2022-11-04 ENCOUNTER — OFFICE VISIT (OUTPATIENT)
Dept: FAMILY MEDICINE CLINIC | Facility: CLINIC | Age: 82
End: 2022-11-04

## 2022-11-04 VITALS
BODY MASS INDEX: 21.6 KG/M2 | RESPIRATION RATE: 18 BRPM | WEIGHT: 110 LBS | SYSTOLIC BLOOD PRESSURE: 150 MMHG | HEIGHT: 60 IN | TEMPERATURE: 97.5 F | HEART RATE: 94 BPM | DIASTOLIC BLOOD PRESSURE: 80 MMHG | OXYGEN SATURATION: 98 %

## 2022-11-04 DIAGNOSIS — S22.000A COMPRESSION FRACTURE OF BODY OF THORACIC VERTEBRA: ICD-10-CM

## 2022-11-04 DIAGNOSIS — E87.1 CHRONIC HYPONATREMIA: ICD-10-CM

## 2022-11-04 DIAGNOSIS — D72.829 LEUKOCYTOSIS, UNSPECIFIED TYPE: ICD-10-CM

## 2022-11-04 DIAGNOSIS — D75.839 THROMBOCYTOSIS: ICD-10-CM

## 2022-11-04 DIAGNOSIS — S32.010B COMPRESSION FRACTURE OF L1 LUMBAR VERTEBRA, OPEN, INITIAL ENCOUNTER: ICD-10-CM

## 2022-11-04 DIAGNOSIS — N30.00 ACUTE CYSTITIS WITHOUT HEMATURIA: ICD-10-CM

## 2022-11-04 DIAGNOSIS — R19.5 FECAL OCCULT BLOOD TEST POSITIVE: ICD-10-CM

## 2022-11-04 DIAGNOSIS — S32.020A COMPRESSION FRACTURE OF L2 VERTEBRA, INITIAL ENCOUNTER: ICD-10-CM

## 2022-11-04 DIAGNOSIS — Z09 HOSPITAL DISCHARGE FOLLOW-UP: Primary | ICD-10-CM

## 2022-11-04 LAB
ALBUMIN SERPL-MCNC: 4.7 G/DL (ref 3.5–5.2)
ALBUMIN/GLOB SERPL: 2 G/DL
ALP SERPL-CCNC: 196 U/L (ref 39–117)
ALT SERPL W P-5'-P-CCNC: 19 U/L (ref 1–33)
ANION GAP SERPL CALCULATED.3IONS-SCNC: 11.9 MMOL/L (ref 5–15)
AST SERPL-CCNC: 20 U/L (ref 1–32)
BACTERIA UR QL AUTO: NORMAL /HPF
BILIRUB SERPL-MCNC: 0.5 MG/DL (ref 0–1.2)
BILIRUB UR QL STRIP: NEGATIVE
BUN SERPL-MCNC: 12 MG/DL (ref 8–23)
BUN/CREAT SERPL: 25.5 (ref 7–25)
CALCIUM SPEC-SCNC: 9 MG/DL (ref 8.6–10.5)
CHLORIDE SERPL-SCNC: 95 MMOL/L (ref 98–107)
CLARITY UR: CLEAR
CO2 SERPL-SCNC: 23.1 MMOL/L (ref 22–29)
COLOR UR: YELLOW
CREAT SERPL-MCNC: 0.47 MG/DL (ref 0.57–1)
EGFRCR SERPLBLD CKD-EPI 2021: 95.2 ML/MIN/1.73
ERYTHROCYTE [DISTWIDTH] IN BLOOD BY AUTOMATED COUNT: 13.6 % (ref 12.3–15.4)
GLOBULIN UR ELPH-MCNC: 2.4 GM/DL
GLUCOSE SERPL-MCNC: 91 MG/DL (ref 65–99)
GLUCOSE UR STRIP-MCNC: NEGATIVE MG/DL
HCT VFR BLD AUTO: 28.6 % (ref 34–46.6)
HGB BLD-MCNC: 9.9 G/DL (ref 12–15.9)
HGB UR QL STRIP.AUTO: NEGATIVE
HYALINE CASTS UR QL AUTO: NORMAL /LPF
KETONES UR QL STRIP: NEGATIVE
LEUKOCYTE ESTERASE UR QL STRIP.AUTO: NEGATIVE
LYMPHOCYTES # BLD AUTO: 1 10*3/MM3 (ref 0.7–3.1)
LYMPHOCYTES NFR BLD AUTO: 9.1 % (ref 19.6–45.3)
MCH RBC QN AUTO: 31.8 PG (ref 26.6–33)
MCHC RBC AUTO-ENTMCNC: 34.6 G/DL (ref 31.5–35.7)
MCV RBC AUTO: 92 FL (ref 79–97)
MONOCYTES # BLD AUTO: 0.7 10*3/MM3 (ref 0.1–0.9)
MONOCYTES NFR BLD AUTO: 6.4 % (ref 5–12)
NEUTROPHILS NFR BLD AUTO: 84.5 % (ref 42.7–76)
NEUTROPHILS NFR BLD AUTO: 9.3 10*3/MM3 (ref 1.7–7)
NITRITE UR QL STRIP: NEGATIVE
PH UR STRIP.AUTO: 6 [PH] (ref 4.6–8)
PLATELET # BLD AUTO: 473 10*3/MM3 (ref 140–450)
PMV BLD AUTO: 6 FL (ref 6–12)
POTASSIUM SERPL-SCNC: 4.1 MMOL/L (ref 3.5–5.2)
PROT SERPL-MCNC: 7.1 G/DL (ref 6–8.5)
PROT UR QL STRIP: NEGATIVE
RBC # BLD AUTO: 3.11 10*6/MM3 (ref 3.77–5.28)
RBC # UR STRIP: NORMAL /HPF
REF LAB TEST METHOD: NORMAL
SODIUM SERPL-SCNC: 130 MMOL/L (ref 136–145)
SP GR UR STRIP: 1.02 (ref 1–1.03)
SQUAMOUS #/AREA URNS HPF: NORMAL /HPF
UROBILINOGEN UR QL STRIP: NORMAL
WBC # UR STRIP: NORMAL /HPF
WBC NRBC COR # BLD: 11 10*3/MM3 (ref 3.4–10.8)

## 2022-11-04 PROCEDURE — 99496 TRANSJ CARE MGMT HIGH F2F 7D: CPT

## 2022-11-04 PROCEDURE — 1111F DSCHRG MED/CURRENT MED MERGE: CPT

## 2022-11-04 PROCEDURE — 81001 URINALYSIS AUTO W/SCOPE: CPT

## 2022-11-04 PROCEDURE — 36415 COLL VENOUS BLD VENIPUNCTURE: CPT

## 2022-11-04 PROCEDURE — 80053 COMPREHEN METABOLIC PANEL: CPT

## 2022-11-04 PROCEDURE — 85025 COMPLETE CBC W/AUTO DIFF WBC: CPT

## 2022-11-04 RX ORDER — METAXALONE 800 MG/1
800 TABLET ORAL 3 TIMES DAILY PRN
Qty: 20 TABLET | Refills: 0 | Status: SHIPPED | OUTPATIENT
Start: 2022-11-04 | End: 2023-01-11

## 2022-11-04 RX ORDER — HYDROCODONE BITARTRATE AND ACETAMINOPHEN 10; 325 MG/1; MG/1
1-2 TABLET ORAL EVERY 6 HOURS PRN
Qty: 24 TABLET | Refills: 0 | Status: SHIPPED | OUTPATIENT
Start: 2022-11-04 | End: 2022-12-07 | Stop reason: ALTCHOICE

## 2022-11-04 NOTE — PATIENT INSTRUCTIONS
Please call LAURIE Love follow up about positive blood in stool.  If you cannot get through to office, please contact our office so we can place an urgent referral.    Continue to take iron department daily along with calcium and vitamin D.  Do not take iron and calcium supplement together. Space out by at least 2 hours.    Patient agrees with plan of care and understands instructions. Call if worsening symptoms or any problems or concerns.

## 2022-11-04 NOTE — PROGRESS NOTES
Transitional Care Follow Up Visit  Subjective     Ritumarielena Martino is a 82 y.o. female who presents for a transitional care management visit.    Within 48 business hours after discharge our office contacted her via telephone to coordinate her care and needs.      I reviewed and discussed the details of that call along with the discharge summary, hospital problems, inpatient lab results, inpatient diagnostic studies, and consultation reports with Ritu.     Current outpatient and discharge medications have been reconciled for the patient.  Reviewed by: FIONA Alicia      Date of TCM Phone Call 7/6/2022   [facility]   Date of Admission 6/30/2022   Date of Discharge 7/6/2022   Discharge Disposition Home or Self Care     Risk for Readmission (LACE) Score: 8 (11/2/2022  6:00 AM)      History of Present Illness   Course During Hospital Stay: Patient admitted to hospital on 10/27/2022 through 11/2/2022 for acute UTI, compression fracture T12, closed fracture of inferior pubic ramus, closed compression fracture of L4 and L2.  Patient underwent epidural and discharged to rehab.  Patient has follow-up with orthopedic surgery on 11/30/2022 with FIONA Mike.  Patient's FOBT positive- patient also recommended to follow-up with GI Dr. For outpatient endoscopy for GI bleeding with Dr. Thacker with [facility], has been turned over to LAURIE Thurman.  Patient currently taking iron supplement twice daily.  With acute cystitis, patient completed course of antibiotic while at hospital.  Chronic hyponatremia noted in taking chronic salt tabs.  With history of chronic ventral hernias containing her cecum need surgery.  Pt here with . Pt saw Dr. Rivas in August stated patient does not need surgery at this time for chronic hernias. Continue to monitor.  Pt has prolia injection 10/21. Patient had low calcium at last appointment. Pt may restart CA supplement.      The following portions of the patient's history were  reviewed and updated as appropriate: allergies, current medications, past family history, past medical history, past social history, past surgical history and problem list.    Review of Systems    Objective   Physical Exam  Constitutional:       General: She is awake.      Appearance: Normal appearance.   HENT:      Head: Normocephalic and atraumatic.      Nose: Nose normal.   Eyes:      Extraocular Movements: Extraocular movements intact.      Conjunctiva/sclera: Conjunctivae normal.      Pupils: Pupils are equal, round, and reactive to light.   Cardiovascular:      Rate and Rhythm: Normal rate and regular rhythm.      Pulses: Normal pulses.      Heart sounds: Normal heart sounds.   Pulmonary:      Effort: Pulmonary effort is normal.      Breath sounds: Normal breath sounds and air entry.   Musculoskeletal:      Cervical back: Full passive range of motion without pain, normal range of motion and neck supple.   Skin:     General: Skin is warm and dry.   Neurological:      General: No focal deficit present.      Mental Status: She is alert and oriented to person, place, and time. Mental status is at baseline.   Psychiatric:         Attention and Perception: Attention normal.         Behavior: Behavior normal. Behavior is cooperative.         Assessment & Plan   Diagnoses and all orders for this visit:    1. Hospital discharge follow-up (Primary)  -     CBC & Differential  -     Comprehensive Metabolic Panel    2. Chronic hyponatremia  -     Comprehensive Metabolic Panel    3. Fecal occult blood test positive  -     CBC & Differential    4. Compression fracture of body of thoracic vertebra ([facility])    5. Compression fracture of L2 vertebra, initial encounter ([facility])    6. Acute cystitis without hematuria  -     Urinalysis With Microscopic - Urine, Clean Catch    Other orders  -     metaxalone (SKELAXIN) 800 MG tablet; Take 1 tablet by mouth 3 (Three) Times a Day As Needed for Muscle Spasms.  Dispense: 20 tablet; Refill:  0    Please call LAURIE Love follow up about positive blood in stool.  If you cannot get through to office, please contact our office so we can place an urgent referral.    Continue to take iron department daily along with calcium and vitamin D.  Do not take iron and calcium supplement together. Space out by at least 2 hours.    Patient agrees with plan of care and understands instructions. Call if worsening symptoms or any problems or concerns.

## 2022-11-11 ENCOUNTER — TELEPHONE (OUTPATIENT)
Dept: ORTHOPEDIC SURGERY | Facility: CLINIC | Age: 82
End: 2022-11-11

## 2022-11-11 NOTE — TELEPHONE ENCOUNTER
Patient called and pt now has a pelvic fracture. Patient has now started Home health PT. Patient is wanting to know if it is ok to proceed with PT. Patient doesn't want to hinder the process of heeling with the back as well as make the back hurt more.  Please review and advise.

## 2022-11-14 ENCOUNTER — TELEPHONE (OUTPATIENT)
Dept: FAMILY MEDICINE CLINIC | Facility: CLINIC | Age: 82
End: 2022-11-14

## 2022-11-14 DIAGNOSIS — F41.9 ANXIETY: ICD-10-CM

## 2022-11-14 RX ORDER — ALPRAZOLAM 0.25 MG/1
0.25 TABLET ORAL 2 TIMES DAILY PRN
Qty: 6 TABLET | Refills: 0 | Status: SHIPPED | OUTPATIENT
Start: 2022-11-14 | End: 2022-11-15 | Stop reason: SDUPTHER

## 2022-11-14 NOTE — TELEPHONE ENCOUNTER
Caller: Marielle Regan    Relationship: Emergency Contact    Best call back number: [phone]    What medications are you currently taking:   Current Outpatient Medications on File Prior to Visit   Medication Sig Dispense Refill   • acetaminophen (TYLENOL) 325 MG tablet Take 2 tablets by mouth Every 6 (Six) Hours As Needed for Mild Pain .     • ALPRAZolam (XANAX) 0.25 MG tablet Take 1 tablet by mouth 2 (Two) Times a Day As Needed for Anxiety. Falling risk 6 tablet 0   • Cholecalciferol (VITAMIN D) 2000 units capsule Take  by mouth Daily With Dinner.     • cycloSPORINE (RESTASIS) 0.05 % ophthalmic emulsion 1 drop 2 (Two) Times a Day.     • desvenlafaxine (Pristiq) 50 MG 24 hr tablet Take 1 tablet by mouth Daily. 90 tablet 3   • dilTIAZem XR (DILACOR XR) 120 MG 24 hr capsule Take 1 capsule by mouth Daily. 90 capsule 3   • ferrous sulfate 325 (65 FE) MG tablet Take 1 tablet by mouth 2 (Two) Times a Day Before Meals. 60 tablet 3   • hydrALAZINE (APRESOLINE) 10 MG tablet Take 1 tablet by mouth 3 (Three) Times a Day. 90 tablet 3   • HYDROcodone-acetaminophen (NORCO)  MG per tablet Take 1-2 tablets by mouth Every 6 (Six) Hours As Needed for Severe Pain or Moderate Pain. 24 tablet 0   • ibuprofen (ADVIL,MOTRIN) 800 MG tablet TAKE 1 TABLET BY MOUTH EVERY 8 HOURS AS NEEDED FOR MILD PAIN 90 tablet 3   • levothyroxine (SYNTHROID, LEVOTHROID) 50 MCG tablet Take 50 mcg by mouth Daily.     • Magnesium 400 MG capsule 1 p.o. twice daily 60 capsule 3   • metaxalone (SKELAXIN) 800 MG tablet Take 1 tablet by mouth 3 (Three) Times a Day As Needed for Muscle Spasms. 20 tablet 0   • potassium chloride 10 MEQ CR tablet Take 1 tablet by mouth 2 (Two) Times a Day. 180 tablet 3   • Probiotic Product (ALIGN PO) Take 1 capsule by mouth Daily With Lunch.     • Prucalopride Succinate 2 MG tablet Take 1 tablet by mouth Daily As Needed (The patient). 90 tablet 1   • rosuvastatin (CRESTOR) 20 MG tablet Take 20 mg by mouth Every Night.      • sennosides-docusate (PERICOLACE) 8.6-50 MG per tablet Take 2 tablets by mouth 2 (Two) Times a Day As Needed for Constipation. 60 tablet 0   • SODIUM CHLORIDE PO Take  by mouth 3 (Three) Times a Day. 2 TABLETS 3 6TIMES A DAY     • valsartan (Diovan) 160 MG tablet Take 1 tablet by mouth Daily. 90 tablet 1   • vitamin B-12 (CYANOCOBALAMIN) 100 MCG tablet Take 50 mcg by mouth Daily.     • zolpidem (AMBIEN) 10 MG tablet Take 1 tablet by mouth At Night As Needed for Sleep. 90 tablet 1     No current facility-administered medications on file prior to visit.          Which medication are you concerned about: ALPRAZolam (XANAX) 0.25 MG tablet, desvenlafaxine (Pristiq) 50 MG 24 hr tablet    What are your concerns: PATIENTS DAUGHTER STATED THAT THE PATIENT HAS BEEN HAVING VERY SEVERE ANXIETY, AND THAT SHE WORRIES THIS IS INTERFERING WITH HER RECOVERY OF A FRACTURED PELVIS.    PATIENTS DAUGHTER IS REQUESTING TO KNOW WHAT MIGHT HELP THE PATIENT WITH THIS, AND IF HER DOSES OF MEDICATIONS ALPRAZolam (XANAX) 0.25 MG tablet OR desvenlafaxine (Pristiq) 50 MG 24 hr tablet MIGHT NEEDS TO BE ALTERED.    PATIENTS DAUGHTER STATED THAT THEY UNDERSTAND THE RISKS OF ALTERING THE DOSES, AND THAT THEY ARE WILLING TO CHANGE THE DOSE IF IT WILL HELP THE PATIENT THIS ANXIETY.    How long have you had these concerns: PAST WEEK

## 2022-11-15 ENCOUNTER — TELEPHONE (OUTPATIENT)
Dept: FAMILY MEDICINE CLINIC | Facility: CLINIC | Age: 82
End: 2022-11-15

## 2022-11-15 DIAGNOSIS — F41.9 ANXIETY: ICD-10-CM

## 2022-11-15 RX ORDER — DESVENLAFAXINE 100 MG/1
100 TABLET, EXTENDED RELEASE ORAL DAILY
Qty: 30 TABLET | Refills: 3 | Status: SHIPPED | OUTPATIENT
Start: 2022-11-15 | End: 2023-01-05 | Stop reason: SDUPTHER

## 2022-11-15 RX ORDER — ALPRAZOLAM 0.25 MG/1
0.25 TABLET ORAL 3 TIMES DAILY PRN
Qty: 30 TABLET | Refills: 1 | Status: SHIPPED | OUTPATIENT
Start: 2022-11-15 | End: 2022-11-16 | Stop reason: SDUPTHER

## 2022-11-15 NOTE — TELEPHONE ENCOUNTER
PATIENT'S DAUGHTER CALLED TO CHECK ON THIS MESSAGE, SHE STATES HER MOM IS HAVING VERY BAD ANXIETY AND SHE NEEDS TO KNOW WHAT TO DO.    PLEASE ADVISE    CALLBACK: [phone]

## 2022-11-15 NOTE — TELEPHONE ENCOUNTER
Increase Pristiq to 100 mg daily, will try Xanax 0.5 mg 3 times a day for a week or so.  Patient will have to take fall precautions on the Xanax.  Need to let us know how she is doing in a week.

## 2022-11-16 ENCOUNTER — TELEPHONE (OUTPATIENT)
Dept: FAMILY MEDICINE CLINIC | Facility: CLINIC | Age: 82
End: 2022-11-16

## 2022-11-16 DIAGNOSIS — E87.1 CHRONIC HYPONATREMIA: ICD-10-CM

## 2022-11-16 DIAGNOSIS — M85.80 OSTEOPENIA, UNSPECIFIED LOCATION: ICD-10-CM

## 2022-11-16 DIAGNOSIS — F41.9 ANXIETY: ICD-10-CM

## 2022-11-16 DIAGNOSIS — S32.010B COMPRESSION FRACTURE OF L1 LUMBAR VERTEBRA, OPEN, INITIAL ENCOUNTER: Primary | ICD-10-CM

## 2022-11-16 RX ORDER — ALPRAZOLAM 0.25 MG/1
0.25 TABLET ORAL 3 TIMES DAILY PRN
Qty: 30 TABLET | Refills: 1 | Status: SHIPPED | OUTPATIENT
Start: 2022-11-16 | End: 2022-11-16 | Stop reason: DRUGHIGH

## 2022-11-16 RX ORDER — ALPRAZOLAM 0.5 MG/1
0.5 TABLET ORAL 3 TIMES DAILY PRN
Qty: 30 TABLET | Refills: 3 | Status: SHIPPED | OUTPATIENT
Start: 2022-11-16 | End: 2022-12-13 | Stop reason: SDUPTHER

## 2022-11-16 NOTE — TELEPHONE ENCOUNTER
Patient informed on xanax .25 tid only as needed can cause falls also insurance rejected and suggest you take diazepam/lorazepam or clonazepam. Let me know if I need to start PA

## 2022-11-16 NOTE — TELEPHONE ENCOUNTER
WHEN THE MEDICATION WAS CHANGED, AND NEW SCRIPT WAS SENT TO THE PHARMACY THE PHARMACY ONLY FILLED 6 PILLS AT .25MG UP TO 2 TIMES DAILY.     AND IT SHOULD BE 1/2 MG UP TO 3 TIMES DAILY. PLEASE ADVISE.

## 2022-11-16 NOTE — TELEPHONE ENCOUNTER
Palma yesterday is 3 a day and gave her #30, only 1 to use it when absolutely necessary.  It could cause her to fall and break a hip.  The computer confirms it was sent to the pharmacy.

## 2022-11-17 ENCOUNTER — OUTSIDE FACILITY SERVICE (OUTPATIENT)
Dept: FAMILY MEDICINE CLINIC | Facility: CLINIC | Age: 82
End: 2022-11-17
Payer: MEDICARE

## 2022-11-17 PROCEDURE — G0180 MD CERTIFICATION HHA PATIENT: HCPCS | Performed by: INTERNAL MEDICINE

## 2022-11-18 ENCOUNTER — TELEPHONE (OUTPATIENT)
Dept: FAMILY MEDICINE CLINIC | Facility: CLINIC | Age: 82
End: 2022-11-18

## 2022-11-18 NOTE — TELEPHONE ENCOUNTER
Caller: ANASTASIIA    Relationship: POLI    Best call back number: [phone]    What orders are you requesting (i.e. lab or imaging): VERBAL ORDER    Additional notes: PATIENT WAS UNABLE TO BE SEEN THIS WEEK BY JORGE ALBERTOIST. PATIENT IS NEEDING A VERBAL ORDER TO MOVE HER OCCUPATIONAL THERAPY EVALUATION TO NEXT WEEK.

## 2022-11-30 ENCOUNTER — OFFICE VISIT (OUTPATIENT)
Dept: ORTHOPEDIC SURGERY | Facility: CLINIC | Age: 82
End: 2022-11-30

## 2022-11-30 VITALS — BODY MASS INDEX: 20.07 KG/M2 | WEIGHT: 102.2 LBS | HEIGHT: 60 IN | TEMPERATURE: 97.6 F

## 2022-11-30 DIAGNOSIS — R52 PAIN: ICD-10-CM

## 2022-11-30 DIAGNOSIS — S32.010D CLOSED COMPRESSION FRACTURE OF L1 LUMBAR VERTEBRA, WITH ROUTINE HEALING, SUBSEQUENT ENCOUNTER: Primary | ICD-10-CM

## 2022-11-30 PROCEDURE — 99213 OFFICE O/P EST LOW 20 MIN: CPT | Performed by: NURSE PRACTITIONER

## 2022-11-30 PROCEDURE — 72100 X-RAY EXAM L-S SPINE 2/3 VWS: CPT | Performed by: NURSE PRACTITIONER

## 2022-11-30 NOTE — PROGRESS NOTES
Patient Name: Ritu Martino   YOB: 1940  Referring Primary Care Physician: Gaurav Barger MD      Chief Complaint:    Chief Complaint   Patient presents with   • Lumbar Spine - Follow-up, Pain        HPI:  Ritu Martino is a 82 y.o. female who [facility] follow[facility]up of vertebral compression fractures.  This was a planned follow-up but since last visit she was in the emergency room and admitted for 4 days for acute pelvic pain.  She was found to have pubic rami fracture.  She was treated with epidural injection L5-S1 while in the hospital which offered her great relief.  She has since followed up with pain management as scheduled from last visit and received another injection which she cannot recall the name of but says it was done in a different location and did not offer her much relief.  She does state however that the pain is not as significant as it was when she initially went to the hospital.  She denies any recent falls or trauma.  She is accompanied by her  and daughter.    PFSH:  See attached    ROS: As per HPI, otherwise negative    Objective:    Vitals:    11/30/22 1608   Temp: 97.6 °F (36.4 °C)     Body mass index is 19.96 kg/m².      Physical Exam  Constitutional:       Appearance: She is well-developed and well-nourished.   Eyes:      General: No scleral icterus.  Skin:     General: Skin is intact.   Neurological:      Mental Status: She is alert.      Gait: Gait is intact.       Spine Musculoskeletal Exam    Gait    Gait is normal.    Assistive device: walker    Palpation    Thoracolumbar    Tenderness: none    Sensory    Thoracolumbar    Thoracolumbar sensation is normal.    General      Constitutional: well-developed and well-nourished    Scleral icterus: no    Labored breathing: no    Psychiatric: moderate distress    Neurological: alert and oriented x3    Skin: intact        IMAGING:     Indication: pain related  symptoms,  Views: AP 2 view pelvis  Findings: Personally reviewed and reveals fracture of the right superior and inferior pubic rami  Comparison views: CT abdomen pelvis 10/27/2022 revealed mildly displaced right inferior pubic ramus fracture    Assessment:           Diagnoses and all orders for this visit:    1. Closed compression fracture of L1 lumbar vertebra, with routine healing, subsequent encounter (Primary)    2. Pain  -     XR Spine Lumbar AP & Lateral             Plan:  At this point, she is established with pain management and the vertebral fractures have not shown any further compression.  She does not have any exquisite tenderness upon palpation of the vertebral bodies.  We will extend follow-up to as needed only.  She and her family do state that she has several medical appointments which seem to cause her some anxiety.  We did discuss with the pelvic fracture, the best treatment is to offload her weight while walking.  She does utilize a walker and advised to utilize it to support her weight.  She currently utilizes it more of a stabilizer.  She is getting physical therapy in the home right now, I did advise her  to make sure therapy notes that she has the pelvic fracture.  He does have a handout of exercises she has been doing in I did advise them not to do some of the ones that cause hip rotation for at least another 4 weeks while the bone heals.  She did become anxious in the office today.  She seems to have a difficult time following the discussion.  She does have upcoming appoint for a neuropsych evaluation.  She is hoping to get something that we will help her anxiety as I do feel the anxiety contributes to her pain complaints and becomes a vicious cycle.  I will plan on seeing her back as needed but she or her family can call at any point for questions or concerns.    Return if symptoms worsen or fail to improve.

## 2022-12-01 DIAGNOSIS — M85.80 OSTEOPENIA, UNSPECIFIED LOCATION: ICD-10-CM

## 2022-12-01 DIAGNOSIS — S22.080A COMPRESSION FRACTURE OF T12 VERTEBRA, INITIAL ENCOUNTER: ICD-10-CM

## 2022-12-01 DIAGNOSIS — D64.9 ANEMIA, UNSPECIFIED TYPE: ICD-10-CM

## 2022-12-07 ENCOUNTER — OFFICE VISIT (OUTPATIENT)
Dept: FAMILY MEDICINE CLINIC | Facility: CLINIC | Age: 82
End: 2022-12-07

## 2022-12-07 VITALS
HEART RATE: 99 BPM | WEIGHT: 105.4 LBS | HEIGHT: 60 IN | DIASTOLIC BLOOD PRESSURE: 84 MMHG | SYSTOLIC BLOOD PRESSURE: 138 MMHG | BODY MASS INDEX: 20.69 KG/M2 | TEMPERATURE: 97.8 F | OXYGEN SATURATION: 99 %

## 2022-12-07 DIAGNOSIS — E87.1 HYPONATREMIA: ICD-10-CM

## 2022-12-07 DIAGNOSIS — S22.080A COMPRESSION FRACTURE OF T12 VERTEBRA, INITIAL ENCOUNTER: ICD-10-CM

## 2022-12-07 DIAGNOSIS — R79.89 HIGH SERUM FERRITIN: Primary | ICD-10-CM

## 2022-12-07 DIAGNOSIS — E03.9 HYPOTHYROIDISM, UNSPECIFIED TYPE: ICD-10-CM

## 2022-12-07 PROCEDURE — 1159F MED LIST DOCD IN RCRD: CPT | Performed by: INTERNAL MEDICINE

## 2022-12-07 PROCEDURE — 1170F FXNL STATUS ASSESSED: CPT | Performed by: INTERNAL MEDICINE

## 2022-12-07 PROCEDURE — G0439 PPPS, SUBSEQ VISIT: HCPCS | Performed by: INTERNAL MEDICINE

## 2022-12-07 RX ORDER — CLOTRIMAZOLE AND BETAMETHASONE DIPROPIONATE 10; .64 MG/G; MG/G
1 CREAM TOPICAL 2 TIMES DAILY
Qty: 45 G | Refills: 0 | Status: SHIPPED | OUTPATIENT
Start: 2022-12-07

## 2022-12-07 RX ORDER — OMEPRAZOLE 40 MG/1
40 CAPSULE, DELAYED RELEASE ORAL DAILY
Qty: 30 CAPSULE | Refills: 3 | Status: SHIPPED | OUTPATIENT
Start: 2022-12-07 | End: 2023-03-20 | Stop reason: SDUPTHER

## 2022-12-07 RX ORDER — OXYCODONE AND ACETAMINOPHEN 7.5; 325 MG/1; MG/1
TABLET ORAL
COMMUNITY
Start: 2022-11-23 | End: 2023-01-11

## 2022-12-07 RX ORDER — CLONIDINE HYDROCHLORIDE 0.1 MG/1
TABLET ORAL
Qty: 30 TABLET | Refills: 3 | Status: SHIPPED | OUTPATIENT
Start: 2022-12-07

## 2022-12-07 NOTE — PROGRESS NOTES
The ABCs of the Annual Wellness Visit  Subsequent Medicare Wellness Visit patient was seen for a Medicare wellness exam.    Subjective      Ritu Martino is a 82 y.o. female who presents for a Subsequent Medicare Wellness Visit.    The following portions of the patient's history were reviewed and   updated as appropriate: past family history, past medical history, past social history, past surgical history and problem list.    Compared to one year ago, the patient feels her physical   health is worse.    Compared to one year ago, the patient feels her mental   health is worse.    Recent Hospitalizations:  This patient has had a [facility] admission record on file within the last 365 days.    Current Medical Providers:  Patient Care Team:  Gaurav Barger MD as PCP - General (Internal Medicine)  Uche Garcia MD as Consulting Physician (Endocrinology)    Outpatient Medications Prior to Visit   Medication Sig Dispense Refill   • acetaminophen (TYLENOL) 325 MG tablet Take 2 tablets by mouth Every 6 (Six) Hours As Needed for Mild Pain .     • ALPRAZolam (Xanax) 0.5 MG tablet Take 1 tablet by mouth 3 (Three) Times a Day As Needed for Anxiety (Fall precautions). 30 tablet 3   • Cholecalciferol (VITAMIN D) 2000 units capsule Take  by mouth Daily With Dinner.     • cycloSPORINE (RESTASIS) 0.05 % ophthalmic emulsion 1 drop 2 (Two) Times a Day.     • desvenlafaxine (Pristiq) 100 MG 24 hr tablet Take 1 tablet by mouth Daily. 30 tablet 3   • dilTIAZem XR (DILACOR XR) 120 MG 24 hr capsule Take 1 capsule by mouth Daily. 90 capsule 3   • hydrALAZINE (APRESOLINE) 10 MG tablet Take 1 tablet by mouth 3 (Three) Times a Day. 90 tablet 3   • ibuprofen (ADVIL,MOTRIN) 800 MG tablet TAKE 1 TABLET BY MOUTH EVERY 8 HOURS AS NEEDED FOR MILD PAIN 90 tablet 3   • levothyroxine (SYNTHROID, LEVOTHROID) 50 MCG tablet Take 50 mcg by mouth Daily.     • Magnesium 400 MG capsule 1 p.o. twice daily 60 capsule 3   • metaxalone  (SKELAXIN) 800 MG tablet Take 1 tablet by mouth 3 (Three) Times a Day As Needed for Muscle Spasms. 20 tablet 0   • potassium chloride 10 MEQ CR tablet Take 1 tablet by mouth 2 (Two) Times a Day. 180 tablet 3   • Probiotic Product (ALIGN PO) Take 1 capsule by mouth Daily With Lunch.     • Prucalopride Succinate 2 MG tablet Take 1 tablet by mouth Daily As Needed (The patient). 90 tablet 1   • rosuvastatin (CRESTOR) 20 MG tablet Take 20 mg by mouth Every Night.     • sennosides-docusate (PERICOLACE) 8.6-50 MG per tablet Take 2 tablets by mouth 2 (Two) Times a Day As Needed for Constipation. 60 tablet 0   • SODIUM CHLORIDE PO Take  by mouth 3 (Three) Times a Day. 2 TABLETS 3 6TIMES A DAY     • valsartan (Diovan) 160 MG tablet Take 1 tablet by mouth Daily. 90 tablet 1   • vitamin B-12 (CYANOCOBALAMIN) 100 MCG tablet Take 50 mcg by mouth Daily.     • zolpidem (AMBIEN) 10 MG tablet Take 1 tablet by mouth At Night As Needed for Sleep. 90 tablet 1   • ferrous sulfate 325 (65 FE) MG tablet Take 1 tablet by mouth 2 (Two) Times a Day Before Meals. 60 tablet 3   • HYDROcodone-acetaminophen (NORCO)  MG per tablet Take 1-2 tablets by mouth Every 6 (Six) Hours As Needed for Severe Pain or Moderate Pain. 24 tablet 0   • oxyCODONE-acetaminophen (PERCOCET) 7.5-325 MG per tablet TAKE 1 TABLET BY MOUTH EVERY 6 HOURS FOR 7 DAYS AS NEEDED       No facility-administered medications prior to visit.       Opioid medication/s are on active medication list.  and I have evaluated her active treatment plan and pain score trends (see table).  There were no vitals filed for this visit.  I have reviewed the chart for potential of high risk medication and harmful drug interactions in the elderly.            Aspirin is not on active medication list.  Aspirin use is not indicated based on review of current medical condition/s. Risk of harm outweighs potential benefits.  .    Patient Active Problem List   Diagnosis   • Anxiety   • Hypothyroidism  "  • Hyponatremia   • Iron deficiency   • Hypokalemia   • Essential hypertension, benign   • Fluent aphasia   • Hypochloremia   • Benzodiazepine dependence ([facility])   • Anemia   • Cervicalgia   • Intertrigo   • Insomnia   • Acute cystitis   • E coli infection   • Diverticulitis   • Arthritis   • Bowel dysfunction   • GERD (gastroesophageal reflux disease)   • Hernia, hiatal   • Seizures ([facility])   • Meningioma ([facility])   • Closed fracture of left distal fibula   • Debility   • History of seizure   • Fall   • Tachycardia   • Abdominal hernia   • Compression fracture of L1 lumbar vertebra, open, initial encounter ([facility])   • Compression fracture of T12 vertebra (H[facility])   • Osteopenia   • Acute UTI   • Back pain   • Compression fracture of L2 and L4 lumbar vertebra ([facility])   • Closed fracture of right inferior pubic ramus ([facility])     Advance Care Planning  Advance Directive is not on file.  ACP discussion was held with the patient during this visit. Patient has an advance directive (not in EMR), copy requested.     Objective    Vitals:    22 1440   BP: 138/84   Pulse: 99   Temp: 97.8 °F (36.6 °C)   SpO2: 99%   Weight: 47.8 kg (105 lb 6.4 oz)   Height: 152.4 cm (60\")     Estimated body mass index is 20.58 kg/m² as calculated from the following:    Height as of this encounter: 152.4 cm (60\").    Weight as of this encounter: 47.8 kg (105 lb 6.4 oz).    BMI is within normal parameters. No other follow-up for BMI required.      Does the patient have evidence of cognitive impairment?   Yes: NA    Lab Results   Component Value Date    TRIG 81 10/27/2022     (H) 10/27/2022    LDL 66 10/27/2022    VLDL 14 10/27/2022          HEALTH RISK ASSESSMENT    Smoking Status:  Social History     Tobacco Use   Smoking Status Former   • Packs/day: 0.25   • Years: 15.00   • Pack years: 3.75   • Types: Cigarettes   • Start date: 1956   • Quit date: 1971   • Years since quittin.9   Smokeless Tobacco Never     Alcohol " Consumption:  Social History     Substance and Sexual Activity   Alcohol Use Not Currently   • Alcohol/week: 8.0 standard drinks   • Types: 4 Glasses of wine, 4 Shots of liquor per week    Comment: Stopped 3 months ago.     Fall Risk Screen:    GEO Fall Risk Assessment has not been completed.    Depression Screening:  PHQ-2/PHQ-9 Depression Screening 12/7/2022   Little Interest or Pleasure in Doing Things 0-->not at all   Feeling Down, Depressed or Hopeless 0-->not at all   Trouble Falling or Staying Asleep, or Sleeping Too Much -   Feeling Tired or Having Little Energy -   Poor Appetite or Overeating -   Feeling Bad about Yourself - or that You are a Failure or Have Let Yourself or Your Family Down -   Trouble Concentrating on Things, Such as Reading the Newspaper or Watching Television -   Moving or Speaking So Slowly that Other People Could Have Noticed? Or the Opposite - Being So Fidgety -   Thoughts that You Would be Better Off Dead or of Hurting Yourself in Some Way -   PHQ-9: Brief Depression Severity Measure Score 0   If You Checked Off Any Problems, How Difficult Have These Problems Made It For You to Do Your Work, Take Care of Things at Home, or Get Along with Other People? -       Health Habits and Functional and Cognitive Screening:  Functional & Cognitive Status 12/7/2022   Do you have difficulty preparing food and eating? No   Do you have difficulty bathing yourself, getting dressed or grooming yourself? Yes   Do you have difficulty using the toilet? Yes   Do you have difficulty moving around from place to place? Yes   Do you have trouble with steps or getting out of a bed or a chair? Yes   Current Diet Well Balanced Diet   Dental Exam Not up to date   Eye Exam Not up to date   Exercise (times per week) 0 times per week   Current Exercises Include No Regular Exercise   Do you need help using the phone?  Yes   Are you deaf or do you have serious difficulty hearing?  No   Do you need help with  transportation? Yes   Do you need help shopping? Yes   Do you need help preparing meals?  Yes   Do you need help with housework?  Yes   Do you need help with laundry? Yes   Do you need help taking your medications? Yes   Do you need help managing money? Yes   Do you ever drive or ride in a car without wearing a seat belt? No   Have you felt unusual stress, anger or loneliness in the last month? Yes   Who do you live with? Spouse   If you need help, do you have trouble finding someone available to you? No   Have you been bothered in the last four weeks by sexual problems? No   Do you have difficulty concentrating, remembering or making decisions? Yes       Age-appropriate Screening Schedule:  Refer to the list below for future screening recommendations based on patient's age, sex and/or medical conditions. Orders for these recommended tests are listed in the plan section. The patient has been provided with a written plan.    Health Maintenance   Topic Date Due   • ZOSTER VACCINE (2 of 2) 08/04/2023 (Originally 9/3/2019)   • LIPID PANEL  10/27/2023   • DXA SCAN  08/15/2024   • TDAP/TD VACCINES (2 - Tdap) 05/13/2029   • INFLUENZA VACCINE  Completed                CMS Preventative Services Quick Reference  Risk Factors Identified During Encounter:    Dementia/Memory   Depression/Dysphoria  Hearing Problem    The above risks/problems have been discussed with the patient.  Pertinent information has been shared with the patient in the After Visit Summary.    Diagnoses and all orders for this visit:    1. High serum ferritin (Primary)    2. Compression fracture of T12 vertebra, initial encounter ([facility])    3. Hypothyroidism, unspecified type    4. Hyponatremia  -     Basic Metabolic Panel    Other orders  -     cloNIDine (Catapres) 0.1 MG tablet; 1 po bid prn sys greater or equal to 150  Dispense: 30 tablet; Refill: 3  -     clotrimazole-betamethasone (Lotrisone) 1-0.05 % cream; Apply 1 application topically to the  appropriate area as directed 2 (Two) Times a Day.  Dispense: 45 g; Refill: 0  -     omeprazole (priLOSEC) 40 MG capsule; Take 1 capsule by mouth Daily.  Dispense: 30 capsule; Refill: 3        Follow Up: Wellness exam  Next Medicare Wellness visit to be scheduled in 1 year.      An After Visit Summary and PPPS were made available to the patient.

## 2022-12-07 NOTE — PROGRESS NOTES
"The ABCs of the Annual Wellness Visit  Subsequent Medicare Wellness Visit    Subjective    {Wrapup  Review (Popup)  Advance Care Planning  Labs  CC  Problem List  Visit Diagnosis  Medications  Result Review  Imaging  [facility]  [facility]  [facility]  SnapShot  Encounters  Notes  Media  Procedures :23}  Ritu Martino is a 82 y.o. female who presents for a Subsequent Medicare Wellness Visit.    The following portions of the patient's history were reviewed and   updated as appropriate: {history reviewed:20406::\"allergies\",\"current medications\",\"past family history\",\"past medical history\",\"past social history\",\"past surgical history\",\"problem list\"}.    Compared to one year ago, the patient feels her physical   health is {better worse same:01974}.    Compared to one year ago, the patient feels her mental   health is {better worse same:21209}.    Recent Hospitalizations:  This patient has had a [facility] admission record on file within the last 365 days.    Current Medical Providers:  Patient Care Team:  Gaurav Barger MD as PCP - General (Internal Medicine)  Uche Garcia MD as Consulting Physician (Endocrinology)    Outpatient Medications Prior to Visit   Medication Sig Dispense Refill   • acetaminophen (TYLENOL) 325 MG tablet Take 2 tablets by mouth Every 6 (Six) Hours As Needed for Mild Pain .     • ALPRAZolam (Xanax) 0.5 MG tablet Take 1 tablet by mouth 3 (Three) Times a Day As Needed for Anxiety (Fall precautions). 30 tablet 3   • Cholecalciferol (VITAMIN D) 2000 units capsule Take  by mouth Daily With Dinner.     • cycloSPORINE (RESTASIS) 0.05 % ophthalmic emulsion 1 drop 2 (Two) Times a Day.     • desvenlafaxine (Pristiq) 100 MG 24 hr tablet Take 1 tablet by mouth Daily. 30 tablet 3   • dilTIAZem XR (DILACOR XR) 120 MG 24 hr capsule Take 1 capsule by mouth Daily. 90 capsule 3   • ferrous sulfate 325 (65 FE) MG tablet Take 1 tablet by mouth 2 (Two) Times " a Day Before Meals. 60 tablet 3   • hydrALAZINE (APRESOLINE) 10 MG tablet Take 1 tablet by mouth 3 (Three) Times a Day. 90 tablet 3   • HYDROcodone-acetaminophen (NORCO)  MG per tablet Take 1-2 tablets by mouth Every 6 (Six) Hours As Needed for Severe Pain or Moderate Pain. 24 tablet 0   • ibuprofen (ADVIL,MOTRIN) 800 MG tablet TAKE 1 TABLET BY MOUTH EVERY 8 HOURS AS NEEDED FOR MILD PAIN 90 tablet 3   • levothyroxine (SYNTHROID, LEVOTHROID) 50 MCG tablet Take 50 mcg by mouth Daily.     • Magnesium 400 MG capsule 1 p.o. twice daily 60 capsule 3   • metaxalone (SKELAXIN) 800 MG tablet Take 1 tablet by mouth 3 (Three) Times a Day As Needed for Muscle Spasms. 20 tablet 0   • potassium chloride 10 MEQ CR tablet Take 1 tablet by mouth 2 (Two) Times a Day. 180 tablet 3   • Probiotic Product (ALIGN PO) Take 1 capsule by mouth Daily With Lunch.     • Prucalopride Succinate 2 MG tablet Take 1 tablet by mouth Daily As Needed (The patient). 90 tablet 1   • rosuvastatin (CRESTOR) 20 MG tablet Take 20 mg by mouth Every Night.     • sennosides-docusate (PERICOLACE) 8.6-50 MG per tablet Take 2 tablets by mouth 2 (Two) Times a Day As Needed for Constipation. 60 tablet 0   • SODIUM CHLORIDE PO Take  by mouth 3 (Three) Times a Day. 2 TABLETS 3 6TIMES A DAY     • valsartan (Diovan) 160 MG tablet Take 1 tablet by mouth Daily. 90 tablet 1   • vitamin B-12 (CYANOCOBALAMIN) 100 MCG tablet Take 50 mcg by mouth Daily.     • zolpidem (AMBIEN) 10 MG tablet Take 1 tablet by mouth At Night As Needed for Sleep. 90 tablet 1   • oxyCODONE-acetaminophen (PERCOCET) 7.5-325 MG per tablet TAKE 1 TABLET BY MOUTH EVERY 6 HOURS FOR 7 DAYS AS NEEDED       No facility-administered medications prior to visit.       Opioid medication/s are on active medication list.  and I have evaluated her active treatment plan and pain score trends (see table).  There were no vitals filed for this visit.  I have reviewed the chart for potential of high risk  "medication and harmful drug interactions in the elderly.            Aspirin is not on active medication list.  {ASPIRIN NOT ON MEDICATION LIST INDICATED/NOT INDICATED:89970}.    Patient Active Problem List   Diagnosis   • Anxiety   • Hypothyroidism   • Hyponatremia   • Iron deficiency   • Hypokalemia   • Essential hypertension, benign   • Fluent aphasia   • Hypochloremia   • Benzodiazepine dependence (HCC)   • Anemia   • Cervicalgia   • Intertrigo   • Insomnia   • Acute cystitis   • E coli infection   • Diverticulitis   • Arthritis   • Bowel dysfunction   • GERD (gastroesophageal reflux disease)   • Hernia, hiatal   • Seizures ([facility])   • Meningioma ([facility])   • Closed fracture of left distal fibula   • Debility   • History of seizure   • Fall   • Tachycardia   • Abdominal hernia   • Compression fracture of L1 lumbar vertebra, open, initial encounter ([facility])   • Compression fracture of T12 vertebra ([facility])   • Osteopenia   • Acute UTI   • Back pain   • Compression fracture of L2 and L4 lumbar vertebra ([facility])   • Closed fracture of right inferior pubic ramus ([facility])     Advance Care Planning  Advance Directive is not on file.  {ACP Discussion, Advance Directive not in EMR:24718}     Objective    Vitals:    12/07/22 1440   BP: 138/84   Pulse: 99   Temp: 97.8 °F (36.6 °C)   SpO2: 99%   Weight: 47.8 kg (105 lb 6.4 oz)   Height: 152.4 cm (60\")     Estimated body mass index is 20.58 kg/m² as calculated from the following:    Height as of this encounter: 152.4 cm (60\").    Weight as of this encounter: 47.8 kg (105 lb 6.4 oz).    BMI is within normal parameters. No other follow-up for BMI required.      Does the patient have evidence of cognitive impairment?   {Yes/No:11459}    Lab Results   Component Value Date    TRIG 81 10/27/2022     (H) 10/27/2022    LDL 66 10/27/2022    VLDL 14 10/27/2022          HEALTH RISK ASSESSMENT    Smoking Status:  Social History     Tobacco Use   Smoking Status Former   • Packs/day: 0.25   • " Years: 15.00   • Pack years: 3.75   • Types: Cigarettes   • Start date: 1956   • Quit date: 1971   • Years since quittin.9   Smokeless Tobacco Never     Alcohol Consumption:  Social History     Substance and Sexual Activity   Alcohol Use Not Currently   • Alcohol/week: 8.0 standard drinks   • Types: 4 Glasses of wine, 4 Shots of liquor per week    Comment: Stopped 3 months ago.     Fall Risk Screen:    GEO Fall Risk Assessment has not been completed.    Depression Screening:  PHQ-2/PHQ-9 Depression Screening 2022   Little Interest or Pleasure in Doing Things 0-->not at all   Feeling Down, Depressed or Hopeless 0-->not at all   Trouble Falling or Staying Asleep, or Sleeping Too Much -   Feeling Tired or Having Little Energy -   Poor Appetite or Overeating -   Feeling Bad about Yourself - or that You are a Failure or Have Let Yourself or Your Family Down -   Trouble Concentrating on Things, Such as Reading the Newspaper or Watching Television -   Moving or Speaking So Slowly that Other People Could Have Noticed? Or the Opposite - Being So Fidgety -   Thoughts that You Would be Better Off Dead or of Hurting Yourself in Some Way -   PHQ-9: Brief Depression Severity Measure Score 0   If You Checked Off Any Problems, How Difficult Have These Problems Made It For You to Do Your Work, Take Care of Things at Home, or Get Along with Other People? -       Health Habits and Functional and Cognitive Screening:  Functional & Cognitive Status 2022   Do you have difficulty preparing food and eating? No   Do you have difficulty bathing yourself, getting dressed or grooming yourself? Yes   Do you have difficulty using the toilet? Yes   Do you have difficulty moving around from place to place? Yes   Do you have trouble with steps or getting out of a bed or a chair? Yes   Current Diet Well Balanced Diet   Dental Exam Not up to date   Eye Exam Not up to date   Exercise (times per week) 0 times per week    Current Exercises Include No Regular Exercise   Do you need help using the phone?  Yes   Are you deaf or do you have serious difficulty hearing?  No   Do you need help with transportation? Yes   Do you need help shopping? Yes   Do you need help preparing meals?  Yes   Do you need help with housework?  Yes   Do you need help with laundry? Yes   Do you need help taking your medications? Yes   Do you need help managing money? Yes   Do you ever drive or ride in a car without wearing a seat belt? No   Have you felt unusual stress, anger or loneliness in the last month? Yes   Who do you live with? Spouse   If you need help, do you have trouble finding someone available to you? No   Have you been bothered in the last four weeks by sexual problems? No   Do you have difficulty concentrating, remembering or making decisions? Yes       Age-appropriate Screening Schedule:  Refer to the list below for future screening recommendations based on patient's age, sex and/or medical conditions. Orders for these recommended tests are listed in the plan section. The patient has been provided with a written plan.    Health Maintenance   Topic Date Due   • ZOSTER VACCINE (2 of 2) 08/04/2023 (Originally 9/3/2019)   • LIPID PANEL  10/27/2023   • DXA SCAN  08/15/2024   • TDAP/TD VACCINES (2 - Tdap) 05/13/2029   • INFLUENZA VACCINE  Completed                CMS Preventative Services Quick Reference  Risk Factors Identified During Encounter:    {Medicare Wellness Risk Factors:91326}    The above risks/problems have been discussed with the patient.  Pertinent information has been shared with the patient in the After Visit Summary.    There are no diagnoses linked to this encounter.    Follow Up:   Next Medicare Wellness visit to be scheduled in 1 year.      An After Visit Summary and PPPS were made available to the patient.

## 2022-12-07 NOTE — PROGRESS NOTES
The ABCs of the Annual Wellness Visit  Subsequent Medicare Wellness Visit patient was seen for a Medicare wellness exam.    Subjective      Ritu Martino is a 82 y.o. female who presents for a Subsequent Medicare Wellness Visit.    The following portions of the patient's history were reviewed and   updated as appropriate: past family history, past medical history, past social history, past surgical history and problem list.    Compared to one year ago, the patient feels her physical   health is worse.    Compared to one year ago, the patient feels her mental   health is worse.    Recent Hospitalizations:  This patient has had a [facility] admission record on file within the last 365 days.    Current Medical Providers:  Patient Care Team:  Gaurav Barger MD as PCP - General (Internal Medicine)  Uche Garcia MD as Consulting Physician (Endocrinology)    Outpatient Medications Prior to Visit   Medication Sig Dispense Refill   • acetaminophen (TYLENOL) 325 MG tablet Take 2 tablets by mouth Every 6 (Six) Hours As Needed for Mild Pain .     • ALPRAZolam (Xanax) 0.5 MG tablet Take 1 tablet by mouth 3 (Three) Times a Day As Needed for Anxiety (Fall precautions). 30 tablet 3   • Cholecalciferol (VITAMIN D) 2000 units capsule Take  by mouth Daily With Dinner.     • cycloSPORINE (RESTASIS) 0.05 % ophthalmic emulsion 1 drop 2 (Two) Times a Day.     • desvenlafaxine (Pristiq) 100 MG 24 hr tablet Take 1 tablet by mouth Daily. 30 tablet 3   • dilTIAZem XR (DILACOR XR) 120 MG 24 hr capsule Take 1 capsule by mouth Daily. 90 capsule 3   • hydrALAZINE (APRESOLINE) 10 MG tablet Take 1 tablet by mouth 3 (Three) Times a Day. 90 tablet 3   • ibuprofen (ADVIL,MOTRIN) 800 MG tablet TAKE 1 TABLET BY MOUTH EVERY 8 HOURS AS NEEDED FOR MILD PAIN 90 tablet 3   • levothyroxine (SYNTHROID, LEVOTHROID) 50 MCG tablet Take 50 mcg by mouth Daily.     • Magnesium 400 MG capsule 1 p.o. twice daily 60 capsule 3   • metaxalone  (SKELAXIN) 800 MG tablet Take 1 tablet by mouth 3 (Three) Times a Day As Needed for Muscle Spasms. 20 tablet 0   • potassium chloride 10 MEQ CR tablet Take 1 tablet by mouth 2 (Two) Times a Day. 180 tablet 3   • Probiotic Product (ALIGN PO) Take 1 capsule by mouth Daily With Lunch.     • Prucalopride Succinate 2 MG tablet Take 1 tablet by mouth Daily As Needed (The patient). 90 tablet 1   • rosuvastatin (CRESTOR) 20 MG tablet Take 20 mg by mouth Every Night.     • sennosides-docusate (PERICOLACE) 8.6-50 MG per tablet Take 2 tablets by mouth 2 (Two) Times a Day As Needed for Constipation. 60 tablet 0   • SODIUM CHLORIDE PO Take  by mouth 3 (Three) Times a Day. 2 TABLETS 3 6TIMES A DAY     • valsartan (Diovan) 160 MG tablet Take 1 tablet by mouth Daily. 90 tablet 1   • vitamin B-12 (CYANOCOBALAMIN) 100 MCG tablet Take 50 mcg by mouth Daily.     • zolpidem (AMBIEN) 10 MG tablet Take 1 tablet by mouth At Night As Needed for Sleep. 90 tablet 1   • ferrous sulfate 325 (65 FE) MG tablet Take 1 tablet by mouth 2 (Two) Times a Day Before Meals. 60 tablet 3   • HYDROcodone-acetaminophen (NORCO)  MG per tablet Take 1-2 tablets by mouth Every 6 (Six) Hours As Needed for Severe Pain or Moderate Pain. 24 tablet 0   • oxyCODONE-acetaminophen (PERCOCET) 7.5-325 MG per tablet TAKE 1 TABLET BY MOUTH EVERY 6 HOURS FOR 7 DAYS AS NEEDED       No facility-administered medications prior to visit.       Opioid medication/s are on active medication list.  and I have evaluated her active treatment plan and pain score trends (see table).  There were no vitals filed for this visit.  I have reviewed the chart for potential of high risk medication and harmful drug interactions in the elderly.            Aspirin is not on active medication list.  Aspirin use is not indicated based on review of current medical condition/s. Risk of harm outweighs potential benefits.  .    Patient Active Problem List   Diagnosis   • Anxiety   • Hypothyroidism  "  • Hyponatremia   • Iron deficiency   • Hypokalemia   • Essential hypertension, benign   • Fluent aphasia   • Hypochloremia   • Benzodiazepine dependence ([facility])   • Anemia   • Cervicalgia   • Intertrigo   • Insomnia   • Acute cystitis   • E coli infection   • Diverticulitis   • Arthritis   • Bowel dysfunction   • GERD (gastroesophageal reflux disease)   • Hernia, hiatal   • Seizures ([facility])   • Meningioma ([facility])   • Closed fracture of left distal fibula   • Debility   • History of seizure   • Fall   • Tachycardia   • Abdominal hernia   • Compression fracture of L1 lumbar vertebra, open, initial encounter ([facility])   • Compression fracture of T12 vertebra (HCC)   • Osteopenia   • Acute UTI   • Back pain   • Compression fracture of L2 and L4 lumbar vertebra ([facility])   • Closed fracture of right inferior pubic ramus ([facility])     Advance Care Planning  Advance Directive is not on file.  ACP discussion was held with the patient during this visit. Patient has an advance directive (not in EMR), copy requested.     Objective    Vitals:    22 1440   BP: 138/84   Pulse: 99   Temp: 97.8 °F (36.6 °C)   SpO2: 99%   Weight: 47.8 kg (105 lb 6.4 oz)   Height: 152.4 cm (60\")     Estimated body mass index is 20.58 kg/m² as calculated from the following:    Height as of this encounter: 152.4 cm (60\").    Weight as of this encounter: 47.8 kg (105 lb 6.4 oz).    BMI is within normal parameters. No other follow-up for BMI required.      Does the patient have evidence of cognitive impairment?   Yes: NA    Lab Results   Component Value Date    TRIG 81 10/27/2022     (H) 10/27/2022    LDL 66 10/27/2022    VLDL 14 10/27/2022          HEALTH RISK ASSESSMENT    Smoking Status:  Social History     Tobacco Use   Smoking Status Former   • Packs/day: 0.25   • Years: 15.00   • Pack years: 3.75   • Types: Cigarettes   • Start date: 1956   • Quit date: 1971   • Years since quittin.9   Smokeless Tobacco Never     Alcohol " Consumption:  Social History     Substance and Sexual Activity   Alcohol Use Not Currently   • Alcohol/week: 8.0 standard drinks   • Types: 4 Glasses of wine, 4 Shots of liquor per week    Comment: Stopped 3 months ago.     Fall Risk Screen:    GEO Fall Risk Assessment has not been completed.    Depression Screening:  PHQ-2/PHQ-9 Depression Screening 12/7/2022   Little Interest or Pleasure in Doing Things 0-->not at all   Feeling Down, Depressed or Hopeless 0-->not at all   Trouble Falling or Staying Asleep, or Sleeping Too Much -   Feeling Tired or Having Little Energy -   Poor Appetite or Overeating -   Feeling Bad about Yourself - or that You are a Failure or Have Let Yourself or Your Family Down -   Trouble Concentrating on Things, Such as Reading the Newspaper or Watching Television -   Moving or Speaking So Slowly that Other People Could Have Noticed? Or the Opposite - Being So Fidgety -   Thoughts that You Would be Better Off Dead or of Hurting Yourself in Some Way -   PHQ-9: Brief Depression Severity Measure Score 0   If You Checked Off Any Problems, How Difficult Have These Problems Made It For You to Do Your Work, Take Care of Things at Home, or Get Along with Other People? -       Health Habits and Functional and Cognitive Screening:  Functional & Cognitive Status 12/7/2022   Do you have difficulty preparing food and eating? No   Do you have difficulty bathing yourself, getting dressed or grooming yourself? Yes   Do you have difficulty using the toilet? Yes   Do you have difficulty moving around from place to place? Yes   Do you have trouble with steps or getting out of a bed or a chair? Yes   Current Diet Well Balanced Diet   Dental Exam Not up to date   Eye Exam Not up to date   Exercise (times per week) 0 times per week   Current Exercises Include No Regular Exercise   Do you need help using the phone?  Yes   Are you deaf or do you have serious difficulty hearing?  No   Do you need help with  transportation? Yes   Do you need help shopping? Yes   Do you need help preparing meals?  Yes   Do you need help with housework?  Yes   Do you need help with laundry? Yes   Do you need help taking your medications? Yes   Do you need help managing money? Yes   Do you ever drive or ride in a car without wearing a seat belt? No   Have you felt unusual stress, anger or loneliness in the last month? Yes   Who do you live with? Spouse   If you need help, do you have trouble finding someone available to you? No   Have you been bothered in the last four weeks by sexual problems? No   Do you have difficulty concentrating, remembering or making decisions? Yes       Age-appropriate Screening Schedule:  Refer to the list below for future screening recommendations based on patient's age, sex and/or medical conditions. Orders for these recommended tests are listed in the plan section. The patient has been provided with a written plan.    Health Maintenance   Topic Date Due   • ZOSTER VACCINE (2 of 2) 08/04/2023 (Originally 9/3/2019)   • LIPID PANEL  10/27/2023   • DXA SCAN  08/15/2024   • TDAP/TD VACCINES (2 - Tdap) 05/13/2029   • INFLUENZA VACCINE  Completed                CMS Preventative Services Quick Reference  Risk Factors Identified During Encounter:    Dementia/Memory   Depression/Dysphoria  Hearing Problem    The above risks/problems have been discussed with the patient.  Pertinent information has been shared with the patient in the After Visit Summary.    Diagnoses and all orders for this visit:    1. High serum ferritin (Primary)    2. Compression fracture of T12 vertebra, initial encounter ([facility])    3. Hypothyroidism, unspecified type    4. Hyponatremia  -     Basic Metabolic Panel    Other orders  -     cloNIDine (Catapres) 0.1 MG tablet; 1 po bid prn sys greater or equal to 150  Dispense: 30 tablet; Refill: 3  -     clotrimazole-betamethasone (Lotrisone) 1-0.05 % cream; Apply 1 application topically to the  appropriate area as directed 2 (Two) Times a Day.  Dispense: 45 g; Refill: 0  -     omeprazole (priLOSEC) 40 MG capsule; Take 1 capsule by mouth Daily.  Dispense: 30 capsule; Refill: 3        Follow Up: Wellness exam  Next Medicare Wellness visit to be scheduled in 1 year.      An After Visit Summary and PPPS were made available to the patient.

## 2022-12-07 NOTE — PATIENT INSTRUCTIONS
Medicare Wellness  Personal Prevention Plan of Service     Date of Office Visit:    Encounter Provider:  Gaurav Barger MD  Place of Service:  [facility]  Patient Name: Ritu Martino  :  1940    As part of the Medicare Wellness portion of your visit today, we are providing you with this personalized preventive plan of services (PPPS). This plan is based upon recommendations of the [place] Preventive Services Task Force (USPSTF) and the Advisory Committee on Immunization Practices (ACIP).    This lists the preventive care services that should be considered, and provides dates of when you are due. Items listed as completed are up-to-date and do not require any further intervention.    Health Maintenance   Topic Date Due    Pneumococcal Vaccine 65+ (2 - PCV) 2019    ANNUAL WELLNESS VISIT  Never done    ZOSTER VACCINE (2 of 2) 2023 (Originally 9/3/2019)    LIPID PANEL  10/27/2023    DXA SCAN  08/15/2024    TDAP/TD VACCINES (2 - Tdap) 2029    COVID-19 Vaccine  Completed    INFLUENZA VACCINE  Completed       No orders of the defined types were placed in this encounter.      No follow-ups on file.

## 2022-12-08 ENCOUNTER — TELEPHONE (OUTPATIENT)
Dept: FAMILY MEDICINE CLINIC | Facility: CLINIC | Age: 82
End: 2022-12-08

## 2022-12-08 ENCOUNTER — DOCUMENTATION (OUTPATIENT)
Dept: FAMILY MEDICINE CLINIC | Facility: CLINIC | Age: 82
End: 2022-12-08

## 2022-12-08 NOTE — TELEPHONE ENCOUNTER
Caller: Marielle Regan    Relationship: Emergency Contact    Best call back number: [phone]      What is the best time to reach you: ANYTIME    Who are you requesting to speak with (clinical staff, provider,  specific staff member): MA       What was the call regarding: PATIENTS DAUGHTER IS CALLING IN (ON VERBAL) SHE STATES SHE THINKS THAT THERE WERE FEW THINGS THAT NEEDED TO BE ADDRESSED YESTERDAY AT APPT BUT SHE THINKS THEY WERE MISSED.    ONE WAS SODIUM LEVELS    WANTS TO FIND OUT WHY THE REFERRAL TO HEMATOLOGIST.        Do you require a callback: YES

## 2022-12-08 NOTE — PROGRESS NOTES
12/8/2022 9:38 AM    Called her daughter Marielle and discuss that she was being sent to hematology for elevated ferritin level.  Also discussed her sodium was 124 and level was redrawn yesterday.  Results are pending but if she has hyponatremia urine and serum osmole's with a BMP will be reordered.

## 2022-12-09 ENCOUNTER — TELEPHONE (OUTPATIENT)
Dept: FAMILY MEDICINE CLINIC | Facility: CLINIC | Age: 82
End: 2022-12-09

## 2022-12-09 NOTE — TELEPHONE ENCOUNTER
Caller: Nu Martino    Relationship: Emergency Contact    Best call back number: [phone]    What orders are you requesting (i.e. lab or imaging): LABS    Where will you receive your lab/imaging services: [facility] - FAX ANY LAB ORDERS -[phone]

## 2022-12-11 LAB
BUN SERPL-MCNC: 8 MG/DL (ref 8–27)
BUN/CREAT SERPL: 15 (ref 12–28)
CALCIUM SERPL-MCNC: 8.8 MG/DL (ref 8.7–10.3)
CHLORIDE SERPL-SCNC: 84 MMOL/L (ref 96–106)
CO2 SERPL-SCNC: 21 MMOL/L (ref 20–29)
CREAT SERPL-MCNC: 0.53 MG/DL (ref 0.57–1)
EGFRCR SERPLBLD CKD-EPI 2021: 92 ML/MIN/1.73
GLUCOSE SERPL-MCNC: 102 MG/DL (ref 70–99)
POTASSIUM SERPL-SCNC: 5 MMOL/L (ref 3.5–5.2)
SODIUM SERPL-SCNC: 120 MMOL/L (ref 134–144)

## 2022-12-12 DIAGNOSIS — E87.1 HYPONATREMIA: Primary | ICD-10-CM

## 2022-12-13 DIAGNOSIS — E87.1 HYPONATREMIA: Primary | ICD-10-CM

## 2022-12-13 DIAGNOSIS — E22.2 SIADH (SYNDROME OF INAPPROPRIATE ADH PRODUCTION): ICD-10-CM

## 2022-12-13 RX ORDER — ALPRAZOLAM 0.5 MG/1
0.5 TABLET ORAL 3 TIMES DAILY PRN
Qty: 90 TABLET | Refills: 3 | Status: ON HOLD | OUTPATIENT
Start: 2022-12-13 | End: 2022-12-23 | Stop reason: SDUPTHER

## 2022-12-15 ENCOUNTER — TELEPHONE (OUTPATIENT)
Dept: FAMILY MEDICINE CLINIC | Facility: CLINIC | Age: 82
End: 2022-12-15

## 2022-12-15 ENCOUNTER — DOCUMENTATION (OUTPATIENT)
Dept: FAMILY MEDICINE CLINIC | Facility: CLINIC | Age: 82
End: 2022-12-15

## 2022-12-15 ENCOUNTER — LAB (OUTPATIENT)
Dept: LAB | Facility: HOSPITAL | Age: 82
End: 2022-12-15

## 2022-12-15 DIAGNOSIS — E87.1 HYPONATREMIA: ICD-10-CM

## 2022-12-15 DIAGNOSIS — E22.2 SIADH (SYNDROME OF INAPPROPRIATE ADH PRODUCTION): Primary | ICD-10-CM

## 2022-12-15 LAB
25(OH)D3 SERPL-MCNC: 68.4 NG/ML (ref 30–100)
ALBUMIN SERPL-MCNC: 4.8 G/DL (ref 3.5–5.2)
ALBUMIN/GLOB SERPL: 2.7 G/DL
ALP SERPL-CCNC: 128 U/L (ref 39–117)
ALT SERPL W P-5'-P-CCNC: 20 U/L (ref 1–33)
ANION GAP SERPL CALCULATED.3IONS-SCNC: 9.6 MMOL/L (ref 5–15)
AST SERPL-CCNC: 10 U/L (ref 1–32)
BILIRUB SERPL-MCNC: 0.5 MG/DL (ref 0–1.2)
BUN SERPL-MCNC: 7 MG/DL (ref 8–23)
BUN/CREAT SERPL: 13.2 (ref 7–25)
CALCIUM SPEC-SCNC: 9 MG/DL (ref 8.6–10.5)
CHLORIDE SERPL-SCNC: 91 MMOL/L (ref 98–107)
CO2 SERPL-SCNC: 23.4 MMOL/L (ref 22–29)
CREAT SERPL-MCNC: 0.53 MG/DL (ref 0.57–1)
EGFRCR SERPLBLD CKD-EPI 2021: 92.5 ML/MIN/1.73
FERRITIN SERPL-MCNC: 525 NG/ML (ref 13–150)
FOLATE SERPL-MCNC: 8.37 NG/ML (ref 4.78–24.2)
GLOBULIN UR ELPH-MCNC: 1.8 GM/DL
GLUCOSE SERPL-MCNC: 86 MG/DL (ref 65–99)
HCT VFR BLD AUTO: 32.4 % (ref 34–46.6)
HGB BLD-MCNC: 11 G/DL (ref 12–15.9)
IRON 24H UR-MRATE: 65 MCG/DL (ref 37–145)
IRON SATN MFR SERPL: 17 % (ref 20–50)
MAGNESIUM SERPL-MCNC: 1.9 MG/DL (ref 1.6–2.4)
PHOSPHATE SERPL-MCNC: 2.6 MG/DL (ref 2.5–4.5)
POTASSIUM SERPL-SCNC: 3.6 MMOL/L (ref 3.5–5.2)
PROT SERPL-MCNC: 6.6 G/DL (ref 6–8.5)
RETICS # AUTO: 0.06 10*6/MM3 (ref 0.02–0.13)
RETICS/RBC NFR AUTO: 1.79 % (ref 0.7–1.9)
SODIUM SERPL-SCNC: 124 MMOL/L (ref 136–145)
TIBC SERPL-MCNC: 374 MCG/DL (ref 298–536)
TRANSFERRIN SERPL-MCNC: 251 MG/DL (ref 200–360)
VIT B12 BLD-MCNC: >2000 PG/ML (ref 211–946)

## 2022-12-15 PROCEDURE — 82728 ASSAY OF FERRITIN: CPT | Performed by: INTERNAL MEDICINE

## 2022-12-15 PROCEDURE — 84100 ASSAY OF PHOSPHORUS: CPT | Performed by: INTERNAL MEDICINE

## 2022-12-15 PROCEDURE — 36415 COLL VENOUS BLD VENIPUNCTURE: CPT

## 2022-12-15 PROCEDURE — 83540 ASSAY OF IRON: CPT | Performed by: INTERNAL MEDICINE

## 2022-12-15 PROCEDURE — 80053 COMPREHEN METABOLIC PANEL: CPT | Performed by: INTERNAL MEDICINE

## 2022-12-15 PROCEDURE — 85045 AUTOMATED RETICULOCYTE COUNT: CPT | Performed by: INTERNAL MEDICINE

## 2022-12-15 PROCEDURE — 83735 ASSAY OF MAGNESIUM: CPT | Performed by: INTERNAL MEDICINE

## 2022-12-15 PROCEDURE — 84466 ASSAY OF TRANSFERRIN: CPT | Performed by: INTERNAL MEDICINE

## 2022-12-15 PROCEDURE — 85018 HEMOGLOBIN: CPT | Performed by: INTERNAL MEDICINE

## 2022-12-15 PROCEDURE — 82306 VITAMIN D 25 HYDROXY: CPT | Performed by: INTERNAL MEDICINE

## 2022-12-15 PROCEDURE — 82607 VITAMIN B-12: CPT | Performed by: INTERNAL MEDICINE

## 2022-12-15 PROCEDURE — 82746 ASSAY OF FOLIC ACID SERUM: CPT | Performed by: INTERNAL MEDICINE

## 2022-12-15 PROCEDURE — 85014 HEMATOCRIT: CPT | Performed by: INTERNAL MEDICINE

## 2022-12-15 NOTE — PROGRESS NOTES
12/15/2022 10:35 AM    Called patient's daughter at [phone] and got her answering machine.  Left message to try to call back.

## 2022-12-15 NOTE — TELEPHONE ENCOUNTER
Caller: ReganMarielle OTOT    Relationship: Emergency Contact    Best call back number:[phone]     What was the call regarding: PATIENTS DAUGHTER IS CALLING REGARDING THE EXISTING REFERRAL TO A ENDOCRINOLOGIST.     PATIENT S DAUGHTER STATED THAT PATIENT SEEN A ENDOCRINOLOGIST 6 MONTHS AGO AND WAS TOLD THAT THEY COULDN'T DO ANYTHING FOR PATIENTS SODIUM LEVEL.     PATIENTS DAUGHTER WOULD LIKE TO KNOW WHY PATIENT IS BEING REFERRED TO ENDOCRINOLOGIST AGAIN SINCE PATIENT HAS ALREADY WENT THIS ROUTE BEFORE.     PATIENTS DAUGHTER STATED THAT THIS IS AN URGENT MATTER DUE TO PATIENTS SODIUM LEVELS BEING CRITICALLY LOW AND CONTINUING TO GET LOWER.     PLEASE CALL PATIENTS DAUGHTER AS SOON AS POSSIBLE.

## 2022-12-20 NOTE — PROGRESS NOTES
REASON FOR CONSULTATION: Elevated ferritin  Provide an opinion on any further workup or treatment                             REQUESTING PHYSICIAN: Gaurav Barger MD    RECORDS OBTAINED:  Records of the patients history including those obtained from the referring provider were reviewed and summarized in detail.    HISTORY OF PRESENT ILLNESS:  The patient is a 82 y.o. year old female who is here for an opinion about the above issue.    History of Present Illness   The patient is an 82-year-old female seen for a number of issues as listed in the below medical history and evaluated 12/8/2022 for [facility] visit.      Records indicate an admission 10/27 through 11/2/2022 for acute UTI, T12 compression fracture, fracture inferior pubic ramus, closed compression fracture L4 and L2 with patient undergoing epidural therapy and discharged to rehab.  She was seen by orthopedic surgery November 30 as well as recommended to be seen by GI for outpatient endoscopy.  She was placed on oral iron supplement twice daily and for an associated acute cystitis was placed antibiotic therapy as well as notably experiencing chronic hyponatremia with a history of taking chronic salt tabs.  A review of studies during the hospitalization had included laboratory studies 11/1/2022 with iron of 60, saturation of 21, transferrin 189 and TIBC of 282, ferritin of 653, CBC with H&H of 9.8 and 29.1, white count of 9890 and platelet count of 376,000 and similar CBC parameters 11/4/2022    Her more recent studies include a vitamin D level of 45.7 12/1/2022, repeat BMP 12/10 with BUN/creatinine of 18 and 0.53 and sodium of 120 and recheck 12/15 with being creatinine of 7 and 0.53 sodium 124, H&H of 11 and 32.4, ferritin of 525, folate 8.37, B12 greater than 2000, reticulocyte percent 1.79 and repeat studies 12/15/2022 with iron saturation of 17%, ferritin of 525.    The patient is now seen at [facility] practice for her elevated ferritin.  We have discussed  additionally her general fatigue and her ongoing hyponatremia which may have contributed to her falls and hospitalization.  She is to be seen by nephrology 2022.    Past Medical History:   Diagnosis Date   • Anemia    • Anxiety    • Arthritis    • Bowel dysfunction 2021    since having surgery for diverticular infection   • Chronic constipation    • Diverticulitis 2021    w/ rupture and infection required surgery and ostomy   • Essential hypertension, benign    • Fracture 2022    left ankle   • Fracture, tibia and fibula Now wearing bood   • GERD (gastroesophageal reflux disease)    • Hernia, hiatal    • Hypercholesterolemia    • Hypertension    • Hypokalemia    • Hyponatremia     chronic low with occasional normal level   • Hypothyroidism     estimated time frame pt nor  could remember exactly how long has been on medication   • Insomnia    • Iron deficiency    • Seizures (HCC)    • Tear of meniscus of knee    • 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        Past Surgical History:   Procedure Laterality Date   • APPENDECTOMY     • BACK SURGERY     • BACK SURGERY      Discectomy   •  SECTION      x2 lower midline incision   • COLON SURGERY  2021    to address ruptured and infected diverticular dz, ostomy also placed   • COLONOSCOPY     • COLOSTOMY CLOSURE  10/2021    ostomy removed   • EYE SURGERY  2 X cataract   • HEMORRHOIDECTOMY     • KNEE ARTHROPLASTY     • TONSILLECTOMY          Current Outpatient Medications on File Prior to Visit   Medication Sig Dispense Refill   • ALPRAZolam (Xanax) 0.5 MG tablet Take 1 tablet by mouth 3 (Three) Times a Day As Needed for Anxiety (Fall precautions). 90 tablet 3   • acetaminophen (TYLENOL) 325 MG tablet Take 2 tablets by mouth Every 6 (Six) Hours As Needed for Mild Pain .     • Cholecalciferol (VITAMIN D) 2000 units capsule Take  by mouth Daily With Dinner.     • cloNIDine (Catapres) 0.1 MG tablet 1 po bid  prn sys greater or equal to 150 30 tablet 3   • clotrimazole-betamethasone (Lotrisone) 1-0.05 % cream Apply 1 application topically to the appropriate area as directed 2 (Two) Times a Day. 45 g 0   • cycloSPORINE (RESTASIS) 0.05 % ophthalmic emulsion 1 drop 2 (Two) Times a Day.     • desvenlafaxine (Pristiq) 100 MG 24 hr tablet Take 1 tablet by mouth Daily. 30 tablet 3   • dilTIAZem XR (DILACOR XR) 120 MG 24 hr capsule Take 1 capsule by mouth Daily. 90 capsule 3   • hydrALAZINE (APRESOLINE) 10 MG tablet Take 1 tablet by mouth 3 (Three) Times a Day. 90 tablet 3   • ibuprofen (ADVIL,MOTRIN) 800 MG tablet TAKE 1 TABLET BY MOUTH EVERY 8 HOURS AS NEEDED FOR MILD PAIN 90 tablet 3   • levothyroxine (SYNTHROID, LEVOTHROID) 50 MCG tablet Take 50 mcg by mouth Daily.     • Magnesium 400 MG capsule 1 p.o. twice daily 60 capsule 3   • metaxalone (SKELAXIN) 800 MG tablet Take 1 tablet by mouth 3 (Three) Times a Day As Needed for Muscle Spasms. 20 tablet 0   • omeprazole (priLOSEC) 40 MG capsule Take 1 capsule by mouth Daily. 30 capsule 3   • oxyCODONE-acetaminophen (PERCOCET) 7.5-325 MG per tablet TAKE 1 TABLET BY MOUTH EVERY 6 HOURS FOR 7 DAYS AS NEEDED     • potassium chloride 10 MEQ CR tablet Take 1 tablet by mouth 2 (Two) Times a Day. 180 tablet 3   • Probiotic Product (ALIGN PO) Take 1 capsule by mouth Daily With Lunch.     • Prucalopride Succinate 2 MG tablet Take 1 tablet by mouth Daily As Needed (The patient). 90 tablet 1   • rosuvastatin (CRESTOR) 20 MG tablet Take 20 mg by mouth Every Night.     • sennosides-docusate (PERICOLACE) 8.6-50 MG per tablet Take 2 tablets by mouth 2 (Two) Times a Day As Needed for Constipation. 60 tablet 0   • SODIUM CHLORIDE PO Take  by mouth 3 (Three) Times a Day. 2 TABLETS 3 6TIMES A DAY     • valsartan (Diovan) 160 MG tablet Take 1 tablet by mouth Daily. 90 tablet 1   • vitamin B-12 (CYANOCOBALAMIN) 100 MCG tablet Take 50 mcg by mouth Daily.     • zolpidem (AMBIEN) 10 MG tablet Take 1  tablet by mouth At Night As Needed for Sleep. 90 tablet 1     No current facility-administered medications on file prior to visit.        ALLERGIES:  No Known Allergies     Social History     Socioeconomic History   • Marital status:    Tobacco Use   • Smoking status: Former     Packs/day: 0.25     Years: 15.00     Pack years: 3.75     Types: Cigarettes     Start date: 1956     Quit date: 1971     Years since quittin.0   • Smokeless tobacco: Never   Vaping Use   • Vaping Use: Never used   Substance and Sexual Activity   • Alcohol use: Not Currently     Alcohol/week: 8.0 standard drinks     Types: 4 Glasses of wine, 4 Shots of liquor per week     Comment: Stopped 3 months ago.   • Drug use: No   • Sexual activity: Not Currently     Partners: Male        Family History   Problem Relation Age of Onset   • Brain cancer Mother    • Cancer Mother         Brain tumor,    • Stroke Father         Age 46   • Early death Father         Stroke, age 46   • Cancer Brother         pancreatic   • Cancer Daughter         Dec'd 2000        Review of Systems   Constitutional: Positive for activity change, appetite change, fatigue and unexpected weight change (15 to 20 pound weight loss the last 3 months).   HENT: Negative.    Eyes: Negative.    Respiratory: Negative.    Cardiovascular: Negative.    Gastrointestinal: Negative.         Reduced appetite   Genitourinary: Negative.    Musculoskeletal: Positive for arthralgias.   Neurological: Positive for weakness.   Psychiatric/Behavioral: Positive for decreased concentration.        Objective     There were no vitals filed for this visit.  No flowsheet data found.    Physical Exam  Constitutional:       Appearance: Normal appearance.      Comments: Weak appearing  female periodically drowsy   HENT:      Head: Normocephalic and atraumatic.      Nose: Nose normal.      Mouth/Throat:      Mouth: Mucous membranes are moist.      Pharynx: Oropharynx is clear.    Eyes:      Extraocular Movements: Extraocular movements intact.      Conjunctiva/sclera: Conjunctivae normal.      Pupils: Pupils are equal, round, and reactive to light.   Cardiovascular:      Rate and Rhythm: Normal rate and regular rhythm.      Pulses: Normal pulses.      Heart sounds: Normal heart sounds.   Pulmonary:      Effort: Pulmonary effort is normal.      Breath sounds: Normal breath sounds.   Abdominal:      General: Bowel sounds are normal.      Palpations: Abdomen is soft.   Musculoskeletal:         General: Normal range of motion.      Cervical back: Normal range of motion and neck supple.   Skin:     General: Skin is warm and dry.   Neurological:      General: No focal deficit present.      Mental Status: She is oriented to person, place, and time.      Motor: Weakness present.   Psychiatric:         Mood and Affect: Mood normal.         Behavior: Behavior normal.           RECENT LABS:  Hematology WBC   Date Value Ref Range Status   11/04/2022 11.00 (H) 3.40 - 10.80 10*3/mm3 Final   09/04/2021 4.20 (L) 4.5 - 11.0 10*3/uL Final     RBC   Date Value Ref Range Status   11/04/2022 3.11 (L) 3.77 - 5.28 10*6/mm3 Final   09/04/2021 3.74 (L) 4.0 - 5.2 10*6/uL Final     Hemoglobin   Date Value Ref Range Status   12/15/2022 11.0 (L) 12.0 - 15.9 g/dL Final   09/04/2021 11.0 (L) 12.0 - 16.0 g/dL Final     Hematocrit   Date Value Ref Range Status   12/15/2022 32.4 (L) 34.0 - 46.6 % Final   09/04/2021 35.0 (L) 36.0 - 46.0 % Final     Platelets   Date Value Ref Range Status   11/04/2022 473 (H) 140 - 450 10*3/mm3 Final   09/04/2021 359 140 - 440 10*3/uL Final          Assessment & Plan       82-year-old female seen with her  and daughter for assessment of elevated ferritin level seen at various points in and around recent hospitalization in late October through early November.  At this point she had suffered several fractures after a fall and was hospitalized, seen by orthopedics and also by GI for  potential blood loss anemia.  She is placed on oral iron and several studies during her hospitalizations did demonstrate marginal iron saturation but elevated ferritin levels as well as her anemia.  Importantly she was also noted, again, to be hyponatremic and this continues to be a chronic problem for her with salt tablets taken over the last many years.  Her studies, on occasion, suggest SIADH and we do plan to review that here in office today.       Patient's ferritin studies, however, are consistent with an acute phase reactant process, anemia of chronic illness and are not likely to be related to an iron overload process.  In fact the patient is now exhibiting a degree of iron deficiency and is asked to restart her oral iron as her hemoglobin hematocrit are improving posthospitalization.  We have also discussed her hyponatremia which appears to be an ongoing serious concern.  After discussion with the patient and her family plan:    *Return to laboratory for CMP, ferritin, folate, iron profile, serum osmolality, urine osmolality, B12 level    *The patient serum ferritin is found later in the day to have reduced to 380.6.    *This note will be sent to the patient's nephrology group further assessment 12/22/2022.    *No additional recommendations concerning this patient.  Thank you very much for allow us to see her in consultation and please let me know if questions concerning this case.

## 2022-12-21 ENCOUNTER — CONSULT (OUTPATIENT)
Dept: ONCOLOGY | Facility: CLINIC | Age: 82
End: 2022-12-21

## 2022-12-21 ENCOUNTER — LAB (OUTPATIENT)
Dept: LAB | Facility: HOSPITAL | Age: 82
DRG: 644 | End: 2022-12-21
Payer: MEDICARE

## 2022-12-21 VITALS
RESPIRATION RATE: 18 BRPM | HEIGHT: 60 IN | TEMPERATURE: 96.9 F | WEIGHT: 106.2 LBS | BODY MASS INDEX: 20.85 KG/M2 | DIASTOLIC BLOOD PRESSURE: 70 MMHG | SYSTOLIC BLOOD PRESSURE: 106 MMHG | OXYGEN SATURATION: 97 % | HEART RATE: 80 BPM

## 2022-12-21 DIAGNOSIS — E87.1 HYPONATREMIA: Primary | ICD-10-CM

## 2022-12-21 DIAGNOSIS — E61.1 IRON DEFICIENCY: ICD-10-CM

## 2022-12-21 DIAGNOSIS — E87.1 CHRONIC HYPONATREMIA: Primary | ICD-10-CM

## 2022-12-21 LAB
ALBUMIN SERPL-MCNC: 4.1 G/DL (ref 3.5–5.2)
ALBUMIN/GLOB SERPL: 1.9 G/DL (ref 1.1–2.4)
ALP SERPL-CCNC: 96 U/L (ref 38–116)
ALT SERPL W P-5'-P-CCNC: 18 U/L (ref 0–33)
ANION GAP SERPL CALCULATED.3IONS-SCNC: 10.2 MMOL/L (ref 5–15)
AST SERPL-CCNC: 13 U/L (ref 0–32)
BASOPHILS # BLD AUTO: 0.01 10*3/MM3 (ref 0–0.2)
BASOPHILS NFR BLD AUTO: 0.2 % (ref 0–1.5)
BILIRUB SERPL-MCNC: 0.4 MG/DL (ref 0.2–1.2)
BUN SERPL-MCNC: 7 MG/DL (ref 6–20)
BUN/CREAT SERPL: 15.6 (ref 7.3–30)
CALCIUM SPEC-SCNC: 9 MG/DL (ref 8.5–10.2)
CHLORIDE SERPL-SCNC: 87 MMOL/L (ref 98–107)
CO2 SERPL-SCNC: 24.8 MMOL/L (ref 22–29)
CREAT SERPL-MCNC: 0.45 MG/DL (ref 0.6–1.1)
DEPRECATED RDW RBC AUTO: 40.3 FL (ref 37–54)
EGFRCR SERPLBLD CKD-EPI 2021: 96.2 ML/MIN/1.73
EOSINOPHIL # BLD AUTO: 0.02 10*3/MM3 (ref 0–0.4)
EOSINOPHIL NFR BLD AUTO: 0.4 % (ref 0.3–6.2)
ERYTHROCYTE [DISTWIDTH] IN BLOOD BY AUTOMATED COUNT: 12.4 % (ref 12.3–15.4)
FERRITIN SERPL-MCNC: 380.6 NG/ML (ref 13–150)
FOLATE SERPL-MCNC: 7.46 NG/ML (ref 4.78–24.2)
GLOBULIN UR ELPH-MCNC: 2.2 GM/DL (ref 1.8–3.5)
GLUCOSE SERPL-MCNC: 108 MG/DL (ref 74–124)
HCT VFR BLD AUTO: 30.4 % (ref 34–46.6)
HGB BLD-MCNC: 10.9 G/DL (ref 12–15.9)
IMM GRANULOCYTES # BLD AUTO: 0.05 10*3/MM3 (ref 0–0.05)
IMM GRANULOCYTES NFR BLD AUTO: 1.1 % (ref 0–0.5)
IRON 24H UR-MRATE: 53 MCG/DL (ref 37–145)
IRON SATN MFR SERPL: 16 % (ref 14–48)
LYMPHOCYTES # BLD AUTO: 0.47 10*3/MM3 (ref 0.7–3.1)
LYMPHOCYTES NFR BLD AUTO: 10.4 % (ref 19.6–45.3)
MCH RBC QN AUTO: 32.2 PG (ref 26.6–33)
MCHC RBC AUTO-ENTMCNC: 35.9 G/DL (ref 31.5–35.7)
MCV RBC AUTO: 89.9 FL (ref 79–97)
MONOCYTES # BLD AUTO: 0.49 10*3/MM3 (ref 0.1–0.9)
MONOCYTES NFR BLD AUTO: 10.9 % (ref 5–12)
NEUTROPHILS NFR BLD AUTO: 3.47 10*3/MM3 (ref 1.7–7)
NEUTROPHILS NFR BLD AUTO: 77 % (ref 42.7–76)
NRBC BLD AUTO-RTO: 0 /100 WBC (ref 0–0.2)
OSMOLALITY SERPL: 255 MOSM/KG (ref 280–301)
OSMOLALITY UR: 371 MOSM/KG
PLATELET # BLD AUTO: 291 10*3/MM3 (ref 140–450)
PMV BLD AUTO: 8 FL (ref 6–12)
POTASSIUM SERPL-SCNC: 5 MMOL/L (ref 3.5–4.7)
PROT SERPL-MCNC: 6.3 G/DL (ref 6.3–8)
RBC # BLD AUTO: 3.38 10*6/MM3 (ref 3.77–5.28)
SODIUM SERPL-SCNC: 122 MMOL/L (ref 134–145)
TIBC SERPL-MCNC: 332 MCG/DL (ref 249–505)
TRANSFERRIN SERPL-MCNC: 237 MG/DL (ref 200–360)
VIT B12 BLD-MCNC: >2000 PG/ML (ref 211–946)
WBC NRBC COR # BLD: 4.51 10*3/MM3 (ref 3.4–10.8)

## 2022-12-21 PROCEDURE — 36415 COLL VENOUS BLD VENIPUNCTURE: CPT

## 2022-12-21 PROCEDURE — 83930 ASSAY OF BLOOD OSMOLALITY: CPT | Performed by: INTERNAL MEDICINE

## 2022-12-21 PROCEDURE — 82728 ASSAY OF FERRITIN: CPT | Performed by: INTERNAL MEDICINE

## 2022-12-21 PROCEDURE — 82607 VITAMIN B-12: CPT | Performed by: INTERNAL MEDICINE

## 2022-12-21 PROCEDURE — 83540 ASSAY OF IRON: CPT | Performed by: INTERNAL MEDICINE

## 2022-12-21 PROCEDURE — 99204 OFFICE O/P NEW MOD 45 MIN: CPT | Performed by: INTERNAL MEDICINE

## 2022-12-21 PROCEDURE — 80053 COMPREHEN METABOLIC PANEL: CPT | Performed by: INTERNAL MEDICINE

## 2022-12-21 PROCEDURE — 85025 COMPLETE CBC W/AUTO DIFF WBC: CPT

## 2022-12-21 PROCEDURE — 82746 ASSAY OF FOLIC ACID SERUM: CPT | Performed by: INTERNAL MEDICINE

## 2022-12-21 PROCEDURE — 83935 ASSAY OF URINE OSMOLALITY: CPT | Performed by: INTERNAL MEDICINE

## 2022-12-21 PROCEDURE — 84466 ASSAY OF TRANSFERRIN: CPT | Performed by: INTERNAL MEDICINE

## 2022-12-22 ENCOUNTER — HOSPITAL ENCOUNTER (INPATIENT)
Facility: HOSPITAL | Age: 82
LOS: 1 days | Discharge: HOME HEALTH | DRG: 644 | End: 2022-12-23
Attending: EMERGENCY MEDICINE | Admitting: STUDENT IN AN ORGANIZED HEALTH CARE EDUCATION/TRAINING PROGRAM
Payer: MEDICARE

## 2022-12-22 DIAGNOSIS — Z86.79 HISTORY OF HYPERTENSION: ICD-10-CM

## 2022-12-22 DIAGNOSIS — F41.9 ANXIETY: ICD-10-CM

## 2022-12-22 DIAGNOSIS — Z87.898 HISTORY OF SEIZURES: ICD-10-CM

## 2022-12-22 DIAGNOSIS — M80.00XA AGE-RELATED OSTEOPOROSIS WITH CURRENT PATHOLOGICAL FRACTURE, INITIAL ENCOUNTER: Primary | ICD-10-CM

## 2022-12-22 DIAGNOSIS — E87.1 HYPONATREMIA: Primary | ICD-10-CM

## 2022-12-22 LAB
ALBUMIN SERPL-MCNC: 4.3 G/DL (ref 3.5–5.2)
ALBUMIN/GLOB SERPL: 2.4 G/DL
ALP SERPL-CCNC: 96 U/L (ref 39–117)
ALT SERPL W P-5'-P-CCNC: 20 U/L (ref 1–33)
ANION GAP SERPL CALCULATED.3IONS-SCNC: 9.4 MMOL/L (ref 5–15)
AST SERPL-CCNC: 12 U/L (ref 1–32)
BACTERIA UR QL AUTO: ABNORMAL /HPF
BASOPHILS # BLD AUTO: 0.01 10*3/MM3 (ref 0–0.2)
BASOPHILS NFR BLD AUTO: 0.2 % (ref 0–1.5)
BILIRUB SERPL-MCNC: 0.4 MG/DL (ref 0–1.2)
BILIRUB UR QL STRIP: NEGATIVE
BUN SERPL-MCNC: 7 MG/DL (ref 8–23)
BUN/CREAT SERPL: 12.5 (ref 7–25)
CALCIUM SPEC-SCNC: 8.6 MG/DL (ref 8.6–10.5)
CHLORIDE SERPL-SCNC: 88 MMOL/L (ref 98–107)
CLARITY UR: CLEAR
CO2 SERPL-SCNC: 24.6 MMOL/L (ref 22–29)
COLOR UR: YELLOW
CREAT SERPL-MCNC: 0.56 MG/DL (ref 0.57–1)
DEPRECATED RDW RBC AUTO: 43.3 FL (ref 37–54)
EGFRCR SERPLBLD CKD-EPI 2021: 91.3 ML/MIN/1.73
EOSINOPHIL # BLD AUTO: 0.02 10*3/MM3 (ref 0–0.4)
EOSINOPHIL NFR BLD AUTO: 0.4 % (ref 0.3–6.2)
ERYTHROCYTE [DISTWIDTH] IN BLOOD BY AUTOMATED COUNT: 12.4 % (ref 12.3–15.4)
GLOBULIN UR ELPH-MCNC: 1.8 GM/DL
GLUCOSE SERPL-MCNC: 109 MG/DL (ref 65–99)
GLUCOSE UR STRIP-MCNC: NEGATIVE MG/DL
HCT VFR BLD AUTO: 31.6 % (ref 34–46.6)
HGB BLD-MCNC: 10.5 G/DL (ref 12–15.9)
HGB UR QL STRIP.AUTO: NEGATIVE
HYALINE CASTS UR QL AUTO: ABNORMAL /LPF
IMM GRANULOCYTES # BLD AUTO: 0.01 10*3/MM3 (ref 0–0.05)
IMM GRANULOCYTES NFR BLD AUTO: 0.2 % (ref 0–0.5)
KETONES UR QL STRIP: NEGATIVE
LEUKOCYTE ESTERASE UR QL STRIP.AUTO: ABNORMAL
LYMPHOCYTES # BLD AUTO: 0.33 10*3/MM3 (ref 0.7–3.1)
LYMPHOCYTES NFR BLD AUTO: 6.9 % (ref 19.6–45.3)
MAGNESIUM SERPL-MCNC: 2.1 MG/DL (ref 1.6–2.4)
MCH RBC QN AUTO: 31.4 PG (ref 26.6–33)
MCHC RBC AUTO-ENTMCNC: 33.2 G/DL (ref 31.5–35.7)
MCV RBC AUTO: 94.6 FL (ref 79–97)
MONOCYTES # BLD AUTO: 0.54 10*3/MM3 (ref 0.1–0.9)
MONOCYTES NFR BLD AUTO: 11.3 % (ref 5–12)
NEUTROPHILS NFR BLD AUTO: 3.86 10*3/MM3 (ref 1.7–7)
NEUTROPHILS NFR BLD AUTO: 81 % (ref 42.7–76)
NITRITE UR QL STRIP: NEGATIVE
NRBC BLD AUTO-RTO: 0 /100 WBC (ref 0–0.2)
OSMOLALITY UR: 437 MOSM/KG
PH UR STRIP.AUTO: 6 [PH] (ref 5–8)
PHOSPHATE SERPL-MCNC: 2.5 MG/DL (ref 2.5–4.5)
PLATELET # BLD AUTO: 207 10*3/MM3 (ref 140–450)
PMV BLD AUTO: 8 FL (ref 6–12)
POTASSIUM SERPL-SCNC: 3.8 MMOL/L (ref 3.5–5.2)
PROT SERPL-MCNC: 6.1 G/DL (ref 6–8.5)
PROT UR QL STRIP: NEGATIVE
RBC # BLD AUTO: 3.34 10*6/MM3 (ref 3.77–5.28)
RBC # UR STRIP: ABNORMAL /HPF
REF LAB TEST METHOD: ABNORMAL
SODIUM SERPL-SCNC: 122 MMOL/L (ref 136–145)
SODIUM UR-SCNC: 57 MMOL/L
SP GR UR STRIP: 1.01 (ref 1–1.03)
SQUAMOUS #/AREA URNS HPF: ABNORMAL /HPF
UROBILINOGEN UR QL STRIP: ABNORMAL
WBC # UR STRIP: ABNORMAL /HPF
WBC NRBC COR # BLD: 4.77 10*3/MM3 (ref 3.4–10.8)

## 2022-12-22 PROCEDURE — 85025 COMPLETE CBC W/AUTO DIFF WBC: CPT | Performed by: EMERGENCY MEDICINE

## 2022-12-22 PROCEDURE — 99284 EMERGENCY DEPT VISIT MOD MDM: CPT

## 2022-12-22 PROCEDURE — 84300 ASSAY OF URINE SODIUM: CPT | Performed by: EMERGENCY MEDICINE

## 2022-12-22 PROCEDURE — 80053 COMPREHEN METABOLIC PANEL: CPT | Performed by: EMERGENCY MEDICINE

## 2022-12-22 PROCEDURE — 25010000002 FUROSEMIDE PER 20 MG: Performed by: INTERNAL MEDICINE

## 2022-12-22 PROCEDURE — 81001 URINALYSIS AUTO W/SCOPE: CPT | Performed by: EMERGENCY MEDICINE

## 2022-12-22 PROCEDURE — 84100 ASSAY OF PHOSPHORUS: CPT | Performed by: EMERGENCY MEDICINE

## 2022-12-22 PROCEDURE — 83935 ASSAY OF URINE OSMOLALITY: CPT | Performed by: EMERGENCY MEDICINE

## 2022-12-22 PROCEDURE — 83735 ASSAY OF MAGNESIUM: CPT | Performed by: EMERGENCY MEDICINE

## 2022-12-22 RX ORDER — SODIUM CHLORIDE 1000 MG
2 TABLET, SOLUBLE MISCELLANEOUS
Status: DISCONTINUED | OUTPATIENT
Start: 2022-12-22 | End: 2022-12-23 | Stop reason: HOSPADM

## 2022-12-22 RX ORDER — L.ACID,PARA/B.BIFIDUM/S.THERM 8B CELL
1 CAPSULE ORAL
Status: DISCONTINUED | OUTPATIENT
Start: 2022-12-23 | End: 2022-12-23 | Stop reason: HOSPADM

## 2022-12-22 RX ORDER — DILTIAZEM HYDROCHLORIDE 120 MG/1
120 CAPSULE, COATED, EXTENDED RELEASE ORAL
Status: DISCONTINUED | OUTPATIENT
Start: 2022-12-23 | End: 2022-12-23 | Stop reason: HOSPADM

## 2022-12-22 RX ORDER — DESVENLAFAXINE SUCCINATE 50 MG/1
100 TABLET, EXTENDED RELEASE ORAL DAILY
Status: DISCONTINUED | OUTPATIENT
Start: 2022-12-23 | End: 2022-12-23 | Stop reason: HOSPADM

## 2022-12-22 RX ORDER — ZOLPIDEM TARTRATE 5 MG/1
10 TABLET ORAL NIGHTLY PRN
Status: DISCONTINUED | OUTPATIENT
Start: 2022-12-22 | End: 2022-12-23 | Stop reason: HOSPADM

## 2022-12-22 RX ORDER — VALSARTAN 160 MG/1
160 TABLET ORAL DAILY
Status: DISCONTINUED | OUTPATIENT
Start: 2022-12-23 | End: 2022-12-23 | Stop reason: HOSPADM

## 2022-12-22 RX ORDER — NITROGLYCERIN 0.4 MG/1
0.4 TABLET SUBLINGUAL
Status: DISCONTINUED | OUTPATIENT
Start: 2022-12-22 | End: 2022-12-23 | Stop reason: HOSPADM

## 2022-12-22 RX ORDER — AMOXICILLIN 250 MG
2 CAPSULE ORAL 2 TIMES DAILY PRN
Status: DISCONTINUED | OUTPATIENT
Start: 2022-12-22 | End: 2022-12-23 | Stop reason: HOSPADM

## 2022-12-22 RX ORDER — PANTOPRAZOLE SODIUM 40 MG/1
40 TABLET, DELAYED RELEASE ORAL EVERY MORNING
Status: DISCONTINUED | OUTPATIENT
Start: 2022-12-23 | End: 2022-12-23 | Stop reason: HOSPADM

## 2022-12-22 RX ORDER — ACETAMINOPHEN 325 MG/1
650 TABLET ORAL EVERY 4 HOURS PRN
Status: DISCONTINUED | OUTPATIENT
Start: 2022-12-22 | End: 2022-12-23 | Stop reason: HOSPADM

## 2022-12-22 RX ORDER — FUROSEMIDE 10 MG/ML
40 INJECTION INTRAMUSCULAR; INTRAVENOUS ONCE
Status: COMPLETED | OUTPATIENT
Start: 2022-12-22 | End: 2022-12-22

## 2022-12-22 RX ORDER — ROSUVASTATIN CALCIUM 20 MG/1
20 TABLET, COATED ORAL NIGHTLY
Status: DISCONTINUED | OUTPATIENT
Start: 2022-12-22 | End: 2022-12-23 | Stop reason: HOSPADM

## 2022-12-22 RX ORDER — LEVOTHYROXINE SODIUM 0.05 MG/1
50 TABLET ORAL
Status: DISCONTINUED | OUTPATIENT
Start: 2022-12-23 | End: 2022-12-23 | Stop reason: HOSPADM

## 2022-12-22 RX ORDER — ALPRAZOLAM 0.25 MG/1
0.5 TABLET ORAL ONCE
Status: COMPLETED | OUTPATIENT
Start: 2022-12-22 | End: 2022-12-22

## 2022-12-22 RX ORDER — TRAMADOL HYDROCHLORIDE 50 MG/1
50 TABLET ORAL 2 TIMES DAILY PRN
COMMUNITY
End: 2023-02-07

## 2022-12-22 RX ORDER — UBIDECARENONE 75 MG
50 CAPSULE ORAL DAILY
Status: DISCONTINUED | OUTPATIENT
Start: 2022-12-23 | End: 2022-12-23 | Stop reason: HOSPADM

## 2022-12-22 RX ORDER — ONDANSETRON 2 MG/ML
4 INJECTION INTRAMUSCULAR; INTRAVENOUS EVERY 6 HOURS PRN
Status: DISCONTINUED | OUTPATIENT
Start: 2022-12-22 | End: 2022-12-23 | Stop reason: HOSPADM

## 2022-12-22 RX ORDER — OXYCODONE AND ACETAMINOPHEN 7.5; 325 MG/1; MG/1
1 TABLET ORAL EVERY 6 HOURS PRN
Status: DISCONTINUED | OUTPATIENT
Start: 2022-12-22 | End: 2022-12-23 | Stop reason: HOSPADM

## 2022-12-22 RX ORDER — HYDRALAZINE HYDROCHLORIDE 10 MG/1
10 TABLET, FILM COATED ORAL 3 TIMES DAILY
Status: DISCONTINUED | OUTPATIENT
Start: 2022-12-22 | End: 2022-12-23 | Stop reason: HOSPADM

## 2022-12-22 RX ORDER — CHOLECALCIFEROL (VITAMIN D3) 125 MCG
10 CAPSULE ORAL NIGHTLY PRN
Status: DISCONTINUED | OUTPATIENT
Start: 2022-12-22 | End: 2022-12-23 | Stop reason: HOSPADM

## 2022-12-22 RX ORDER — CHOLECALCIFEROL (VITAMIN D3) 125 MCG
10 CAPSULE ORAL
COMMUNITY

## 2022-12-22 RX ORDER — TRAMADOL HYDROCHLORIDE 50 MG/1
50 TABLET ORAL 2 TIMES DAILY PRN
Status: DISCONTINUED | OUTPATIENT
Start: 2022-12-22 | End: 2022-12-23 | Stop reason: HOSPADM

## 2022-12-22 RX ORDER — MELATONIN
2000
Status: DISCONTINUED | OUTPATIENT
Start: 2022-12-22 | End: 2022-12-23 | Stop reason: HOSPADM

## 2022-12-22 RX ORDER — FUROSEMIDE 20 MG/1
20 TABLET ORAL
Status: DISCONTINUED | OUTPATIENT
Start: 2022-12-23 | End: 2022-12-23 | Stop reason: HOSPADM

## 2022-12-22 RX ORDER — UREA 10 %
3 LOTION (ML) TOPICAL NIGHTLY PRN
Status: DISCONTINUED | OUTPATIENT
Start: 2022-12-22 | End: 2022-12-22 | Stop reason: SDUPTHER

## 2022-12-22 RX ORDER — ALPRAZOLAM 0.5 MG/1
0.5 TABLET ORAL 3 TIMES DAILY PRN
Status: DISCONTINUED | OUTPATIENT
Start: 2022-12-22 | End: 2022-12-22 | Stop reason: SDUPTHER

## 2022-12-22 RX ORDER — ONDANSETRON 4 MG/1
4 TABLET, FILM COATED ORAL EVERY 6 HOURS PRN
Status: DISCONTINUED | OUTPATIENT
Start: 2022-12-22 | End: 2022-12-23 | Stop reason: HOSPADM

## 2022-12-22 RX ORDER — CYCLOSPORINE 0.5 MG/ML
1 EMULSION OPHTHALMIC 2 TIMES DAILY
Status: DISCONTINUED | OUTPATIENT
Start: 2022-12-22 | End: 2022-12-23 | Stop reason: HOSPADM

## 2022-12-22 RX ORDER — METAXALONE 800 MG/1
800 TABLET ORAL 3 TIMES DAILY PRN
Status: DISCONTINUED | OUTPATIENT
Start: 2022-12-22 | End: 2022-12-23 | Stop reason: HOSPADM

## 2022-12-22 RX ORDER — ALPRAZOLAM 0.5 MG/1
0.5 TABLET ORAL 3 TIMES DAILY PRN
Status: DISCONTINUED | OUTPATIENT
Start: 2022-12-22 | End: 2022-12-23

## 2022-12-22 RX ORDER — POTASSIUM CHLORIDE 750 MG/1
10 TABLET, FILM COATED, EXTENDED RELEASE ORAL 2 TIMES DAILY
Status: DISCONTINUED | OUTPATIENT
Start: 2022-12-22 | End: 2022-12-23 | Stop reason: HOSPADM

## 2022-12-22 RX ADMIN — POTASSIUM CHLORIDE 10 MEQ: 750 TABLET, EXTENDED RELEASE ORAL at 22:13

## 2022-12-22 RX ADMIN — Medication 3 MG: at 21:03

## 2022-12-22 RX ADMIN — ALPRAZOLAM 0.5 MG: 0.25 TABLET ORAL at 12:47

## 2022-12-22 RX ADMIN — CYCLOSPORINE 1 DROP: 0.5 EMULSION OPHTHALMIC at 22:40

## 2022-12-22 RX ADMIN — ZOLPIDEM TARTRATE 10 MG: 5 TABLET ORAL at 22:14

## 2022-12-22 RX ADMIN — VITAMIN D, TAB 1000IU (100/BT) 2000 UNITS: 25 TAB at 22:39

## 2022-12-22 RX ADMIN — HYDRALAZINE HYDROCHLORIDE 10 MG: 10 TABLET, FILM COATED ORAL at 22:40

## 2022-12-22 RX ADMIN — SODIUM CHLORIDE TAB 1 GM 2 G: 1 TAB at 17:34

## 2022-12-22 RX ADMIN — ROSUVASTATIN CALCIUM 20 MG: 20 TABLET, FILM COATED ORAL at 22:42

## 2022-12-22 RX ADMIN — FUROSEMIDE 40 MG: 10 INJECTION, SOLUTION INTRAMUSCULAR; INTRAVENOUS at 17:34

## 2022-12-22 RX ADMIN — ALPRAZOLAM 0.5 MG: 0.5 TABLET ORAL at 18:02

## 2022-12-22 NOTE — ED PROVIDER NOTES
EMERGENCY DEPARTMENT ENCOUNTER    Room Number:  21/21  Date of encounter:  12/22/2022  PCP: Gaurav Barger MD  Historian: Patient,       HPI:  Chief Complaint: Hyponatremia  A complete HPI/ROS/PMH/PSH/SH/FH are unobtainable due to: None    Context: Ritu Martino is a 82 y.o. female who presents to the ED via private vehicle for evaluation of persistent hyponatremia on recent blood work.  Has been following up with oncology and they state that they are actually in the nephrology office today and were advised to come to the ER for admission.  Patient has had chronic hyponatremic issues, worse recently despite taking salt tablets.  On water restriction with less than 1 L of water a day.  Has not had any new recent fevers, chills, vomiting, diarrhea.  Slightly decreased appetite and p.o. intake.   states that she seems a bit more confused than usual.  Has not had any abdominal pains.       MEDICAL RECORD REVIEW    Sodium yesterday was 122, 7 days ago it was 124, 12 days ago it was 120, 3 weeks ago is 124    PAST MEDICAL HISTORY  Active Ambulatory Problems     Diagnosis Date Noted   • Anxiety 12/15/2021   • Hypothyroidism 12/15/2021   • Hyponatremia 2014   • Iron deficiency 04/19/2022   • Hypokalemia 04/19/2022   • Essential hypertension, benign 04/19/2022   • Fluent aphasia 05/07/2022   • Hypochloremia 05/07/2022   • Benzodiazepine dependence (HCC) 05/07/2022   • Anemia 06/12/2022   • Cervicalgia 06/12/2022   • Intertrigo 06/12/2022   • Insomnia 06/12/2022   • Acute cystitis 06/13/2022   • E coli infection 06/14/2022   • Diverticulitis 06/28/2022   • Arthritis 06/28/2022   • Bowel dysfunction 08/2021   • GERD (gastroesophageal reflux disease) 06/28/2022   • Hernia, hiatal 06/28/2022   • Seizures (HCC) 06/28/2022   • Meningioma ([facility]) 06/28/2022   • Closed fracture of left distal fibula 06/30/2022   • Debility 07/01/2022   • History of seizure 07/01/2022   • Fall 07/01/2022   • Tachycardia 07/12/2022    • Abdominal hernia 2022   • Compression fracture of L1 lumbar vertebra, open, initial encounter ([facility]) 2022   • Compression fracture of T12 vertebra ([facility]) 2022   • Osteopenia 10/19/2022   • Acute UTI 10/28/2022   • Back pain 10/28/2022   • Compression fracture of L2 and L4 lumbar vertebra ([facility]) 10/28/2022   • Closed fracture of right inferior pubic ramus ([facility]) 10/28/2022     Resolved Ambulatory Problems     Diagnosis Date Noted   • Essential hypertension 2021   • Hyperlipidemia 2021   • Transient alteration of awareness 2022     Past Medical History:   Diagnosis Date   • Chronic constipation    • Fracture 2022   • Fracture, tibia and fibula Now wearing bood   • Hypercholesterolemia    • Hypertension    • Tear of meniscus of knee    • 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         PAST SURGICAL HISTORY  Past Surgical History:   Procedure Laterality Date   • APPENDECTOMY     • BACK SURGERY     • BACK SURGERY      Discectomy   •  SECTION      x2 lower midline incision   • COLON SURGERY  2021    to address ruptured and infected diverticular dz, ostomy also placed   • COLONOSCOPY     • COLOSTOMY CLOSURE  10/2021    ostomy removed   • EYE SURGERY  2 X cataract   • HEMORRHOIDECTOMY     • KNEE ARTHROPLASTY     • TONSILLECTOMY           FAMILY HISTORY  Family History   Problem Relation Age of Onset   • Brain cancer Mother    • Cancer Mother         Brain tumor,    • Stroke Father         Age 46   • Early death Father         Stroke, age 46   • Cancer Brother         pancreatic   • Cancer Daughter         Dec'd 2000         SOCIAL HISTORY  Social History     Socioeconomic History   • Marital status:    Tobacco Use   • Smoking status: Former     Packs/day: 0.25     Years: 15.00     Pack years: 3.75     Types: Cigarettes     Start date: 1956     Quit date: 1971     Years since quittin.0   • Smokeless tobacco: Never   Vaping Use   •  Vaping Use: Never used   Substance and Sexual Activity   • Alcohol use: Not Currently     Alcohol/week: 8.0 standard drinks     Types: 4 Glasses of wine, 4 Shots of liquor per week     Comment: Stopped 3 months ago.   • Drug use: No   • Sexual activity: Not Currently     Partners: Male         ALLERGIES  Patient has no known allergies.        REVIEW OF SYSTEMS  Review of Systems     All systems reviewed and negative except for those discussed in HPI.       PHYSICAL EXAM    I have reviewed the triage vital signs and nursing notes.    ED Triage Vitals   Temp Heart Rate Resp BP SpO2   12/22/22 1151 12/22/22 1151 12/22/22 1151 12/22/22 1152 12/22/22 1151   97.8 °F (36.6 °C) 93 18 128/70 99 %      Temp src Heart Rate Source Patient Position BP Location FiO2 (%)   12/22/22 1151 -- -- -- --   Tympanic           Physical Exam  General: No acute distress, nontoxic  HEENT: Mucous membranes moist, atraumatic, EOMI  Neck: Full ROM  Pulm: Symmetric chest rise, nonlabored, lungs CTAB  Cardiovascular: Regular rate and rhythm, intact distal pulses  GI: Soft, nontender, nondistended, no rebound, no guarding, bowel sounds present  MSK: Full ROM, no deformity  Skin: Warm, dry  Neuro: Awake, alert, oriented x 4, GCS 15, moving all extremities, no focal deficits  Psych: Calm, cooperative      N95, protective eye goggles, and gloves used during this encounter. Patient in surgical mask.      LAB RESULTS  Recent Results (from the past 24 hour(s))   CBC Auto Differential    Collection Time: 12/21/22  2:04 PM    Specimen: Blood   Result Value Ref Range    WBC 4.51 3.40 - 10.80 10*3/mm3    RBC 3.38 (L) 3.77 - 5.28 10*6/mm3    Hemoglobin 10.9 (L) 12.0 - 15.9 g/dL    Hematocrit 30.4 (L) 34.0 - 46.6 %    MCV 89.9 79.0 - 97.0 fL    MCH 32.2 26.6 - 33.0 pg    MCHC 35.9 (H) 31.5 - 35.7 g/dL    RDW 12.4 12.3 - 15.4 %    RDW-SD 40.3 37.0 - 54.0 fl    MPV 8.0 6.0 - 12.0 fL    Platelets 291 140 - 450 10*3/mm3    Neutrophil % 77.0 (H) 42.7 - 76.0 %     Lymphocyte % 10.4 (L) 19.6 - 45.3 %    Monocyte % 10.9 5.0 - 12.0 %    Eosinophil % 0.4 0.3 - 6.2 %    Basophil % 0.2 0.0 - 1.5 %    Immature Grans % 1.1 (H) 0.0 - 0.5 %    Neutrophils, Absolute 3.47 1.70 - 7.00 10*3/mm3    Lymphocytes, Absolute 0.47 (L) 0.70 - 3.10 10*3/mm3    Monocytes, Absolute 0.49 0.10 - 0.90 10*3/mm3    Eosinophils, Absolute 0.02 0.00 - 0.40 10*3/mm3    Basophils, Absolute 0.01 0.00 - 0.20 10*3/mm3    Immature Grans, Absolute 0.05 0.00 - 0.05 10*3/mm3    nRBC 0.0 0.0 - 0.2 /100 WBC   Comprehensive Metabolic Panel    Collection Time: 12/21/22  3:07 PM    Specimen: Blood   Result Value Ref Range    Glucose 108 74 - 124 mg/dL    BUN 7 6 - 20 mg/dL    Creatinine 0.45 (L) 0.60 - 1.10 mg/dL    Sodium 122 (C) 134 - 145 mmol/L    Potassium 5.0 (H) 3.5 - 4.7 mmol/L    Chloride 87 (L) 98 - 107 mmol/L    CO2 24.8 22.0 - 29.0 mmol/L    Calcium 9.0 8.5 - 10.2 mg/dL    Total Protein 6.3 6.3 - 8.0 g/dL    Albumin 4.10 3.50 - 5.20 g/dL    ALT (SGPT) 18 0 - 33 U/L    AST (SGOT) 13 0 - 32 U/L    Alkaline Phosphatase 96 38 - 116 U/L    Total Bilirubin 0.4 0.2 - 1.2 mg/dL    Globulin 2.2 1.8 - 3.5 gm/dL    A/G Ratio 1.9 1.1 - 2.4 g/dL    BUN/Creatinine Ratio 15.6 7.3 - 30.0    Anion Gap 10.2 5.0 - 15.0 mmol/L    eGFR 96.2 >60.0 mL/min/1.73   Ferritin    Collection Time: 12/21/22  3:07 PM    Specimen: Blood   Result Value Ref Range    Ferritin 380.60 (H) 13.00 - 150.00 ng/mL   Iron Profile    Collection Time: 12/21/22  3:07 PM    Specimen: Blood   Result Value Ref Range    Iron 53 37 - 145 mcg/dL    Iron Saturation 16 14 - 48 %    Transferrin 237 200 - 360 mg/dL    TIBC 332 249 - 505 mcg/dL   Osmolality, Urine - Urine, Clean Catch    Collection Time: 12/21/22  3:07 PM    Specimen: Urine, Clean Catch   Result Value Ref Range    Osmolality, Urine 371 mOsm/kg   Osmolality, Serum    Collection Time: 12/21/22  3:07 PM    Specimen: Blood   Result Value Ref Range    Osmolality 255 (L) 280 - 301 mOsm/kg   Vitamin B12     Collection Time: 12/21/22  3:07 PM    Specimen: Blood   Result Value Ref Range    Vitamin B-12 >2,000 (H) 211 - 946 pg/mL   Folate    Collection Time: 12/21/22  3:07 PM    Specimen: Blood   Result Value Ref Range    Folate 7.46 4.78 - 24.20 ng/mL   Comprehensive Metabolic Panel    Collection Time: 12/22/22 12:26 PM    Specimen: Blood   Result Value Ref Range    Glucose 109 (H) 65 - 99 mg/dL    BUN 7 (L) 8 - 23 mg/dL    Creatinine 0.56 (L) 0.57 - 1.00 mg/dL    Sodium 122 (L) 136 - 145 mmol/L    Potassium 3.8 3.5 - 5.2 mmol/L    Chloride 88 (L) 98 - 107 mmol/L    CO2 24.6 22.0 - 29.0 mmol/L    Calcium 8.6 8.6 - 10.5 mg/dL    Total Protein 6.1 6.0 - 8.5 g/dL    Albumin 4.30 3.50 - 5.20 g/dL    ALT (SGPT) 20 1 - 33 U/L    AST (SGOT) 12 1 - 32 U/L    Alkaline Phosphatase 96 39 - 117 U/L    Total Bilirubin 0.4 0.0 - 1.2 mg/dL    Globulin 1.8 gm/dL    A/G Ratio 2.4 g/dL    BUN/Creatinine Ratio 12.5 7.0 - 25.0    Anion Gap 9.4 5.0 - 15.0 mmol/L    eGFR 91.3 >60.0 mL/min/1.73   Magnesium    Collection Time: 12/22/22 12:26 PM    Specimen: Blood   Result Value Ref Range    Magnesium 2.1 1.6 - 2.4 mg/dL   Phosphorus    Collection Time: 12/22/22 12:26 PM    Specimen: Blood   Result Value Ref Range    Phosphorus 2.5 2.5 - 4.5 mg/dL       Ordered the above labs and independently reviewed the results.        RADIOLOGY  No Radiology Exams Resulted Within Past 24 Hours      PROCEDURES    Procedures      MEDICATIONS GIVEN IN ER    Medications   ALPRAZolam (XANAX) tablet 0.5 mg (0.5 mg Oral Given 12/22/22 1247)         PROGRESS, DATA ANALYSIS, CONSULTS, AND MEDICAL DECISION MAKING    All labs have been independently reviewed by me.  All radiology studies have been reviewed by me and discussed with radiologist dictating the report.   EKG's independently viewed and interpreted by me.  Discussion below represents my analysis of pertinent findings related to patient's condition, differential diagnosis, treatment plan and final  disposition.    Initial concern for hyponatremia, dehydration, renal failure, electrolyte abnormalities otherwise, among others.  Plan for labs, urinalysis, nephrology consult, and hospitalization.    ED Course as of 12/22/22 1321   Thu Dec 22, 2022   1241 I spoke with Dr. Flores, Nephrology, discussed patient's clinical course and findings today, treatment modalities, and they will consult. [DC]   1306 Glucose(!): 109 [DC]   1306 BUN(!): 7 [DC]   1306 Creatinine(!): 0.56 [DC]   1306 Sodium(!): 122 [DC]   1306 Potassium: 3.8 [DC]   1306 ALT (SGPT): 20 [DC]   1306 AST (SGOT): 12 [DC]   1306 Alkaline Phosphatase: 96 [DC]   1306 Total Bilirubin: 0.4 [DC]   1319 Discussed with Dr. Gonzalez, [facility], discussed patient's clinical course and findings today, treatment modalities, nephrology consult, and need for hospitalization. [DC]      ED Course User Index  [DC] Ramos Mueller MD     Patient family been fully updated on the need for hospitalization today, no other acute emergent findings other than hyponatremia at this time.  Per nephrology recommendations, will hold off on any treatment until they see results from testing in the ER today.    AS OF 13:21 EST VITALS:    BP - 142/86  HR - 93  TEMP - 97.8 °F (36.6 °C) (Tympanic)  02 SATS - 99%        DIAGNOSIS  Final diagnoses:   Hyponatremia   History of seizures   History of hypertension   Anxiety         DISPOSITION  HOSPITALIZATION    Discussed treatment plan and reason for hospitalization with pt/family and hospitalizing physician.  Pt/family voiced understanding of the plan for hospitalization for further testing/treatment as needed.                  Ramos Mueller MD  12/22/22 3424

## 2022-12-22 NOTE — ED TRIAGE NOTES
Patient to er from pcp office with c/o abnormal sodium level. Patient has mask on in triage along with staff.

## 2022-12-22 NOTE — CONSULTS
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 She was advised to come to ED.   Na is 122 today  She  Has been treated before with samsca. She was DC with NaCl and lasix, but currently off loop diuretics.   Her Na had been around 127-130. But has been trending down and it was 120 a couple of weeks ago.     She reports some confusion, anxiety and off balance     Review of Systems   Review of Systems - History obtained from chart review and the patient  General ROS: feeling poorly.   Psychological ROS: + anxiety   Ophthalmic ROS: negative  ENT ROS: negative  Allergy and Immunology RfeeOS: negative  Hematological and Lymphatic ROS: negative  Endocrine ROS: negative  Respiratory ROS: no cough, shortness of breath, or wheezing  Cardiovascular ROS: no chest pain or dyspnea on exertion  Gastrointestinal ROS: no abdominal pain, change in bowel habits, or black or bloody stools  Genito-Urinary ROS: no dysuria, trouble voiding, or hematuria  Musculoskeletal ROS: negative  Neurological ROS: off balance, selma confusion   Dermatological ROS: negative    Past Medical History:   Diagnosis Date   • Anemia 2000   • Anxiety    • Arthritis    • Bowel dysfunction 08/2021    since having surgery for diverticular infection   • Chronic constipation    • Diverticulitis 08/2021    w/ rupture and infection required surgery and ostomy   • Essential hypertension, benign    • Fracture 06/2022    left ankle   • Fracture, tibia and fibula Now wearing bood   • GERD (gastroesophageal  reflux disease)    • Hernia, hiatal    • Hypercholesterolemia    • Hypertension    • Hypokalemia    • Hyponatremia     chronic low with occasional normal level   • Hypothyroidism     estimated time frame pt nor  could remember exactly how long has been on medication   • Insomnia    • Iron deficiency    • Seizures (HCC)    • Tear of meniscus of knee    • 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   ,   Past Surgical History:   Procedure Laterality Date   • APPENDECTOMY     • BACK SURGERY     • BACK SURGERY      Discectomy   •  SECTION      x2 lower midline incision   • COLON SURGERY  2021    to address ruptured and infected diverticular dz, ostomy also placed   • COLONOSCOPY     • COLOSTOMY CLOSURE  10/2021    ostomy removed   • EYE SURGERY  2 X cataract   • HEMORRHOIDECTOMY     • KNEE ARTHROPLASTY     • TONSILLECTOMY     ,   Family History   Problem Relation Age of Onset   • Brain cancer Mother    • Cancer Mother         Brain tumor,    • Stroke Father         Age 46   • Early death Father         Stroke, age 46   • Cancer Brother         pancreatic   • Cancer Daughter         Dec'd 2000   ,   Social History     Socioeconomic History   • Marital status:    Tobacco Use   • Smoking status: Former     Packs/day: 0.25     Years: 15.00     Pack years: 3.75     Types: Cigarettes     Start date: 1956     Quit date: 1971     Years since quittin.0   • Smokeless tobacco: Never   Vaping Use   • Vaping Use: Never used   Substance and Sexual Activity   • Alcohol use: Not Currently     Alcohol/week: 8.0 standard drinks     Types: 4 Glasses of wine, 4 Shots of liquor per week     Comment: Stopped 3 months ago.   • Drug use: No   • Sexual activity: Not Currently     Partners: Male     E-cigarette/Vaping   • E-cigarette/Vaping Use Never User    • Passive Exposure No    • Counseling Given No      E-cigarette/Vaping Substances   • Nicotine No    • THC No    • CBD  No    • Flavoring No      E-cigarette/Vaping Devices   • Disposable No    • Pre-filled or Refillable Cartridge No    • Refillable Tank No    • Pre-filled Pod No        , (Not in a hospital admission)  , Scheduled Meds:  , Continuous Infusions:  No current facility-administered medications for this encounter.  , PRN Meds:   and Allergies:  Patient has no known allergies.    Objective     Vital Signs  Temp:  [97.8 °F (36.6 °C)] 97.8 °F (36.6 °C)  Heart Rate:  [93] 93  Resp:  [18] 18  BP: (128)/(70) 128/70    No intake/output data recorded.  No intake/output data recorded.    Physical Exam  Physical Examination: General appearance - very mid distress, mainly anxious  Mental status - alert, oriented to person, place, and time  Eyes - pupils equal and reactive, extraocular eye movements intact  Mouth - mucous membranes moist, pharynx normal without lesions  Chest - clear to auscultation, no wheezes, rales or rhonchi, symmetric air entry  Heart - normal rate, regular rhythm, normal S1, S2, no murmurs, rubs, clicks or gallops  Abdomen - soft, nontender, nondistended, no masses or organomegaly  Neurological - alert, oriented, normal speech, no focal findings or movement disorder noted  Extremities - peripheral pulses normal, no pedal edema, no clubbing or cyanosis    Results Review:    I reviewed the patient's new clinical results.    WBC WBC   Date Value Ref Range Status   12/21/2022 4.51 3.40 - 10.80 10*3/mm3 Final      HGB Hemoglobin   Date Value Ref Range Status   12/21/2022 10.9 (L) 12.0 - 15.9 g/dL Final      HCT Hematocrit   Date Value Ref Range Status   12/21/2022 30.4 (L) 34.0 - 46.6 % Final      Platlets No results found for: LABPLAT   MCV MCV   Date Value Ref Range Status   12/21/2022 89.9 79.0 - 97.0 fL Final          Sodium Sodium   Date Value Ref Range Status   12/21/2022 122 (C) 134 - 145 mmol/L Final      Potassium Potassium   Date Value Ref Range Status   12/21/2022 5.0 (H) 3.5 - 4.7 mmol/L Final       Chloride Chloride   Date Value Ref Range Status   12/21/2022 87 (L) 98 - 107 mmol/L Final      CO2 CO2   Date Value Ref Range Status   12/21/2022 24.8 22.0 - 29.0 mmol/L Final      BUN BUN   Date Value Ref Range Status   12/21/2022 7 6 - 20 mg/dL Final      Creatinine Creatinine   Date Value Ref Range Status   12/21/2022 0.45 (L) 0.60 - 1.10 mg/dL Final      Calcium Calcium   Date Value Ref Range Status   12/21/2022 9.0 8.5 - 10.2 mg/dL Final      PO4 No results found for: CAPO4   Albumin Albumin   Date Value Ref Range Status   12/21/2022 4.10 3.50 - 5.20 g/dL Final      Magnesium Magnesium   Date Value Ref Range Status   12/22/2022 2.1 1.6 - 2.4 mg/dL Final      Uric Acid No results found for: URICACID         Assessment & Plan       * No active hospital problems. *      Assessment & Plan  1. Hyponatremia - symptomatic   2. Hypochloremia   3. HTN   4. Anxiety     Na stable, but she is slightly symptomatic  Will check urine Na and osmo before any fluids are given, she might not need fluids, and might just need lasix and NaCl.   She has had sasmca before with rapid correction, will avoid for now.   Continue with fluid restriction for now  Serial Na check once plan of care is more clear  Will follow.     I discussed the patients findings and my recommendations with patient and family    Ai Flores MD  12/22/22  13:04 EST

## 2022-12-22 NOTE — PLAN OF CARE
Goal Outcome Evaluation:  Plan of Care Reviewed With: patient        Progress: no change  Outcome Evaluation: Pt AOx4. On RA. NSR on monitor. Pt is extremely anxious (baseline--but per  she is a little more than usual). Admission completed to the best of my ability. Family at beside and a sitter (home) to calm pt down. On 1000ml fluid restriction. Diet order in place. No c/o pain. Nursing will continue to monitor     Verbal order for pts home dose of xanax placed per Dr. Gonzalez. Per Dr. Gonzalez she would place the rest of night meds when she gets a chance

## 2022-12-23 ENCOUNTER — READMISSION MANAGEMENT (OUTPATIENT)
Dept: CALL CENTER | Facility: HOSPITAL | Age: 82
End: 2022-12-23

## 2022-12-23 VITALS
RESPIRATION RATE: 18 BRPM | TEMPERATURE: 97.7 F | WEIGHT: 98.2 LBS | HEART RATE: 89 BPM | SYSTOLIC BLOOD PRESSURE: 123 MMHG | DIASTOLIC BLOOD PRESSURE: 75 MMHG | BODY MASS INDEX: 19.28 KG/M2 | OXYGEN SATURATION: 98 % | HEIGHT: 60 IN

## 2022-12-23 LAB
ANION GAP SERPL CALCULATED.3IONS-SCNC: 8 MMOL/L (ref 5–15)
BUN SERPL-MCNC: 11 MG/DL (ref 8–23)
BUN/CREAT SERPL: 20.8 (ref 7–25)
CALCIUM SPEC-SCNC: 8.7 MG/DL (ref 8.6–10.5)
CHLORIDE SERPL-SCNC: 95 MMOL/L (ref 98–107)
CO2 SERPL-SCNC: 25 MMOL/L (ref 22–29)
CREAT SERPL-MCNC: 0.53 MG/DL (ref 0.57–1)
DEPRECATED RDW RBC AUTO: 40.1 FL (ref 37–54)
EGFRCR SERPLBLD CKD-EPI 2021: 92.5 ML/MIN/1.73
ERYTHROCYTE [DISTWIDTH] IN BLOOD BY AUTOMATED COUNT: 12.1 % (ref 12.3–15.4)
GLUCOSE SERPL-MCNC: 95 MG/DL (ref 65–99)
HCT VFR BLD AUTO: 31.5 % (ref 34–46.6)
HGB BLD-MCNC: 10.9 G/DL (ref 12–15.9)
MCH RBC QN AUTO: 31.2 PG (ref 26.6–33)
MCHC RBC AUTO-ENTMCNC: 34.6 G/DL (ref 31.5–35.7)
MCV RBC AUTO: 90.3 FL (ref 79–97)
PLATELET # BLD AUTO: 233 10*3/MM3 (ref 140–450)
PMV BLD AUTO: 8.2 FL (ref 6–12)
POTASSIUM SERPL-SCNC: 3.6 MMOL/L (ref 3.5–5.2)
RBC # BLD AUTO: 3.49 10*6/MM3 (ref 3.77–5.28)
SODIUM SERPL-SCNC: 128 MMOL/L (ref 136–145)
WBC NRBC COR # BLD: 3.13 10*3/MM3 (ref 3.4–10.8)

## 2022-12-23 PROCEDURE — 85027 COMPLETE CBC AUTOMATED: CPT | Performed by: INTERNAL MEDICINE

## 2022-12-23 PROCEDURE — 97530 THERAPEUTIC ACTIVITIES: CPT

## 2022-12-23 PROCEDURE — 80048 BASIC METABOLIC PNL TOTAL CA: CPT | Performed by: INTERNAL MEDICINE

## 2022-12-23 PROCEDURE — 97161 PT EVAL LOW COMPLEX 20 MIN: CPT

## 2022-12-23 RX ORDER — ALPRAZOLAM 0.5 MG/1
0.5 TABLET ORAL 3 TIMES DAILY PRN
Qty: 90 TABLET | Refills: 3 | Status: SHIPPED | OUTPATIENT
Start: 2022-12-23 | End: 2022-12-28 | Stop reason: SDUPTHER

## 2022-12-23 RX ORDER — FUROSEMIDE 20 MG/1
20 TABLET ORAL 2 TIMES DAILY
Qty: 60 TABLET | Refills: 0 | Status: SHIPPED | OUTPATIENT
Start: 2022-12-23 | End: 2023-03-02 | Stop reason: SDUPTHER

## 2022-12-23 RX ORDER — ALPRAZOLAM 1 MG/1
1 TABLET ORAL 3 TIMES DAILY PRN
Status: DISCONTINUED | OUTPATIENT
Start: 2022-12-23 | End: 2022-12-23 | Stop reason: HOSPADM

## 2022-12-23 RX ORDER — HYDROXYZINE HYDROCHLORIDE 25 MG/1
25 TABLET, FILM COATED ORAL 3 TIMES DAILY PRN
Status: DISCONTINUED | OUTPATIENT
Start: 2022-12-23 | End: 2022-12-23 | Stop reason: HOSPADM

## 2022-12-23 RX ADMIN — POTASSIUM CHLORIDE 10 MEQ: 750 TABLET, EXTENDED RELEASE ORAL at 08:09

## 2022-12-23 RX ADMIN — HYDRALAZINE HYDROCHLORIDE 10 MG: 10 TABLET, FILM COATED ORAL at 08:09

## 2022-12-23 RX ADMIN — ALPRAZOLAM 1 MG: 1 TABLET ORAL at 10:16

## 2022-12-23 RX ADMIN — VALSARTAN 160 MG: 160 TABLET, FILM COATED ORAL at 08:09

## 2022-12-23 RX ADMIN — SODIUM CHLORIDE TAB 1 GM 2 G: 1 TAB at 08:09

## 2022-12-23 RX ADMIN — PANTOPRAZOLE SODIUM 40 MG: 40 TABLET, DELAYED RELEASE ORAL at 06:20

## 2022-12-23 RX ADMIN — Medication 50 MCG: at 08:08

## 2022-12-23 RX ADMIN — CYCLOSPORINE 1 DROP: 0.5 EMULSION OPHTHALMIC at 08:09

## 2022-12-23 RX ADMIN — HYDROXYZINE HYDROCHLORIDE 25 MG: 25 TABLET ORAL at 12:15

## 2022-12-23 RX ADMIN — SODIUM CHLORIDE TAB 1 GM 2 G: 1 TAB at 12:14

## 2022-12-23 RX ADMIN — DESVENLAFAXINE SUCCINATE 100 MG: 50 TABLET, EXTENDED RELEASE ORAL at 08:09

## 2022-12-23 RX ADMIN — Medication 1 CAPSULE: at 13:22

## 2022-12-23 RX ADMIN — FUROSEMIDE 20 MG: 20 TABLET ORAL at 08:08

## 2022-12-23 RX ADMIN — LEVOTHYROXINE SODIUM 50 MCG: 0.05 TABLET ORAL at 06:20

## 2022-12-23 RX ADMIN — ALPRAZOLAM 0.5 MG: 0.5 TABLET ORAL at 02:32

## 2022-12-23 RX ADMIN — DILTIAZEM HYDROCHLORIDE 120 MG: 120 CAPSULE, COATED, EXTENDED RELEASE ORAL at 08:09

## 2022-12-23 NOTE — OUTREACH NOTE
Prep Survey    Flowsheet Row Responses   [facility] facility patient discharged from? [facility]   Is LACE score < 7 ? Yes   Eligibility [facility]   Date of Admission 12/22/22   Date of Discharge 12/23/22   Discharge Disposition Home-Health Care Sv   Discharge diagnosis hyponatremia (chronic problem d/t SIADH), mild weakness & confusion   Does the patient have one of the following disease processes/diagnoses(primary or secondary)? Other   Does the patient have Home health ordered? Yes   What is the Home health agency?  [facility]    Is there a DME ordered? No   Prep survey completed? Yes          CLIFFORD MATUTE - Registered Nurse

## 2022-12-23 NOTE — DISCHARGE SUMMARY
Patient Name: Ritu aMrtino  : 1940  MRN: [id number]    Date of Admission: 2022  Date of Discharge:  2022  Primary Care Physician: Gaurav Barger MD      Chief Complaint:   Abnormal Lab      Discharge Diagnoses     Active Hospital Problems    Diagnosis  POA   • **Hyponatremia [E87.1]  Yes   • GERD (gastroesophageal reflux disease) [K21.9]  Yes   • Benzodiazepine dependence (HCC) [F13.20]  Yes   • Essential hypertension, benign [I10]  Yes   • Hypothyroidism [E03.9]  Yes      Resolved Hospital Problems   No resolved problems to display.        Brief Admitting HPI     82 year old female who presented to the emergency room due to abnormal labs; she was seen by nephrology today and her sodium was low; she has been following a fluid restriction at home; she has been a little confused and very anxious; she has been taking salt tablets;     Hospital Course     Pt admitted after presenting to renal clinic with sodium of 122 and with mild weakness and confusion.  This is a chronic problem for her secondary to SIADH.  Baseline sodium is around 128.  Patient has been off of her Lasix at home.  She was seen in consultation with nephrology, and her sodium was improved on the day after admission.  They recommended continuing NaCl tablets and Lasix at discharge.  Patient will follow up with nephrology in early January.  In the meantime she can have labs done with her PCP next week.  She was evaluated by physical therapy who recommended that she can be discharged home.  No additional needs at this time and she is stable for discharge.    Discharge Plan     Hyponatremia secondary to SIADH, chronic  -Continue sodium chloride 2 g 3 times daily, Lasix 20 mg twice daily  -Follow-up labs in 1 week with PCP (around 22)  -Follow-up with nephrology in early 2023    Day of Discharge     Subjective:  Has some anxiety this morning.  Otherwise without complaints.    Physical Exam:  Temp:  [94.2 °F  (34.6 °C)-98.3 °F (36.8 °C)] 94.2 °F (34.6 °C)  Heart Rate:  [73-90] 88  Resp:  [18-20] 20  BP: (108-152)/(58-86) 126/77  Body mass index is 19.18 kg/m².  Physical Exam  Constitutional:       General: She is not in acute distress.     Appearance: Normal appearance. She is not ill-appearing.   Cardiovascular:      Rate and Rhythm: Normal rate and regular rhythm.      Heart sounds: No murmur heard.    No gallop.   Pulmonary:      Effort: Pulmonary effort is normal. No respiratory distress.      Breath sounds: Normal breath sounds. No wheezing.   Abdominal:      General: Abdomen is flat. There is no distension.      Palpations: Abdomen is soft.      Tenderness: There is no abdominal tenderness.   Musculoskeletal:         General: No swelling or deformity. Normal range of motion.   Skin:     General: Skin is warm.      Coloration: Skin is not jaundiced.      Findings: No bruising.   Neurological:      General: No focal deficit present.      Mental Status: She is alert and oriented to person, place, and time.   Psychiatric:         Mood and Affect: Mood normal.         Behavior: Behavior normal.         Consultants     Consult Orders (all) (From admission, onward)     Start     Ordered    12/22/22 1540  Inpatient Nutrition Consult  Once        Provider:  (Not yet assigned)    12/22/22 1541    12/22/22 1307  [facility] (on-call MD unless specified) Details  Once        Specialty:  Hospitalist  Provider:  Jessenia Gonzalez MD    12/22/22 1306    12/22/22 1231  Nephrology (on -call MD unless specified)  STAT        Specialty:  Nephrology  Provider:  Ai Flores MD    12/22/22 1230              Procedures     * Surgery not found *      Imaging Results (All)     None            Pertinent Labs     Results from last 7 days   Lab Units 12/23/22  0647 12/22/22  1226 12/21/22  1404   WBC 10*3/mm3 3.13* 4.77 4.51   HEMOGLOBIN g/dL 10.9* 10.5* 10.9*   PLATELETS 10*3/mm3 233 207 291     Results from last 7 days   Lab Units  12/23/22  0647 12/22/22  1226 12/21/22  1507   SODIUM mmol/L 128* 122* 122*   POTASSIUM mmol/L 3.6 3.8 5.0*   CHLORIDE mmol/L 95* 88* 87*   CO2 mmol/L 25.0 24.6 24.8   BUN mg/dL 11 7* 7   CREATININE mg/dL 0.53* 0.56* 0.45*   GLUCOSE mg/dL 95 109* 108   EGFR mL/min/1.73 92.5 91.3 96.2     Results from last 7 days   Lab Units 12/22/22  1226 12/21/22  1507   ALBUMIN g/dL 4.30 4.10   BILIRUBIN mg/dL 0.4 0.4   ALK PHOS U/L 96 96   AST (SGOT) U/L 12 13   ALT (SGPT) U/L 20 18     Results from last 7 days   Lab Units 12/23/22  0647 12/22/22  1226 12/21/22  1507   CALCIUM mg/dL 8.7 8.6 9.0   ALBUMIN g/dL  --  4.30 4.10   MAGNESIUM mg/dL  --  2.1  --    PHOSPHORUS mg/dL  --  2.5  --          Results from last 7 days   Lab Units 12/22/22  1247   SODIUM UR mmol/L 57   OSMOLALITY UR mOsm/kg 437         Invalid input(s): LDLCALC          Test Results Pending at Discharge       Discharge Details        Discharge Medications      New Medications      Instructions Start Date   furosemide 20 MG tablet  Commonly known as: LASIX   20 mg, Oral, 2 Times Daily         Continue These Medications      Instructions Start Date   acetaminophen 325 MG tablet  Commonly known as: TYLENOL   650 mg, Oral, Every 6 Hours PRN      ALIGN PO   1 capsule, Oral, Daily With Lunch      ALPRAZolam 0.5 MG tablet  Commonly known as: Xanax   0.5 mg, Oral, 3 Times Daily PRN      cloNIDine 0.1 MG tablet  Commonly known as: Catapres   1 po bid prn sys greater or equal to 150      clotrimazole-betamethasone 1-0.05 % cream  Commonly known as: Lotrisone   1 application, Topical, 2 Times Daily      cycloSPORINE 0.05 % ophthalmic emulsion  Commonly known as: RESTASIS   1 drop, 2 Times Daily      desvenlafaxine 100 MG 24 hr tablet  Commonly known as: Pristiq   100 mg, Oral, Daily      dilTIAZem  MG 24 hr capsule  Commonly known as: DILACOR XR   120 mg, Oral, Daily      hydrALAZINE 10 MG tablet  Commonly known as: APRESOLINE   10 mg, Oral, 3 Times Daily       ibuprofen 800 MG tablet  Commonly known as: ADVIL,MOTRIN   TAKE 1 TABLET BY MOUTH EVERY 8 HOURS AS NEEDED FOR MILD PAIN      levothyroxine 50 MCG tablet  Commonly known as: SYNTHROID, LEVOTHROID   50 mcg, Oral, Daily      Magnesium 400 MG capsule   1 p.o. twice daily      melatonin 5 MG tablet tablet   10 mg, Oral      metaxalone 800 MG tablet  Commonly known as: SKELAXIN   800 mg, Oral, 3 Times Daily PRN      omeprazole 40 MG capsule  Commonly known as: priLOSEC   40 mg, Oral, Daily      oxyCODONE-acetaminophen 7.5-325 MG per tablet  Commonly known as: PERCOCET   TAKE 1 TABLET BY MOUTH EVERY 6 HOURS FOR 7 DAYS AS NEEDED      potassium chloride 10 MEQ CR tablet   10 mEq, Oral, 2 Times Daily      Prucalopride Succinate 2 MG tablet   2 mg, Oral, Daily PRN      rosuvastatin 20 MG tablet  Commonly known as: CRESTOR   20 mg, Oral, Nightly      sennosides-docusate 8.6-50 MG per tablet  Commonly known as: PERICOLACE   2 tablets, Oral, 2 Times Daily PRN      SODIUM CHLORIDE PO   Oral, 3 Times Daily, 2 TABLETS 3 6TIMES A DAY      traMADol 50 MG tablet  Commonly known as: ULTRAM   50 mg, Oral, 2 Times Daily PRN      valsartan 160 MG tablet  Commonly known as: Diovan   160 mg, Oral, Daily      vitamin B-12 100 MCG tablet  Commonly known as: CYANOCOBALAMIN   50 mcg, Oral, Daily      Vitamin D 50 MCG (2000 UT) capsule   Oral, Daily With Dinner      zolpidem 10 MG tablet  Commonly known as: AMBIEN   10 mg, Oral, Nightly PRN             No Known Allergies    Discharge Disposition:  Home or Self Care      Discharge Diet:  Diet Order   Procedures   • Diet: Regular/House Diet, Fluid Restriction (240 mL/tray) Diets; 1000 mL/day; Texture: Regular Texture (IDDSI 7); Fluid Consistency: Thin (IDDSI 0)       Discharge Activity:   Activity Instructions     Activity as Tolerated            CODE STATUS:    Code Status and Medical Interventions:   Ordered at: 12/23/22 1023     Medical Intervention Limits:    NO intubation (DNI)     Code  Status (Patient has no pulse and is not breathing):    No CPR (Do Not Attempt to Resuscitate)     Medical Interventions (Patient has pulse or is breathing):    Limited Support     Release to patient:    Routine Release       Future Appointments   Date Time Provider Department Center   2/7/2023  4:45 PM Gaurav Barger MD [facility]   4/24/2023  2:00 PM REFERRED INJECTION CHAIR [facility]     Additional Instructions for the Follow-ups that You Need to Schedule     Basic Metabolic Panel    Dec 28, 2022 (Approximate)      Release to patient: Routine Release            Follow-up Information     Gaurav Barger MD .    Specialty: Internal Medicine  Contact information:  [address] [place], [place] [id number] [phone]                         Additional Instructions for the Follow-ups that You Need to Schedule     Basic Metabolic Panel    Dec 28, 2022 (Approximate)      Release to patient: Routine Release           Time Spent on Discharge:  Greater than 30 minutes      Chalino Grant MD  [facility]  12/23/22  13:56 EST

## 2022-12-23 NOTE — DISCHARGE PLACEMENT REQUEST
Ritu Schilling (82 y.o. Female)     Date of Birth   1940    Social Security Number       Address   [address] [place] [address]    Home Phone   [phone]    MRN   [id number]       Scientology   None    Marital Status                               Admission Date   12/22/22    Admission Type   Emergency    Admitting Provider   Jessenia Gonzalez MD    Attending Provider   Chalino Grant MD    Department, Room/Bed   [address], E651/1       Discharge Date       Discharge Disposition       Discharge Destination                               Attending Provider: Chalino Grant MD    Allergies: No Known Allergies    Isolation: None   Infection: None   Code Status: No CPR    Ht: 152.4 cm (60\")   Wt: 44.5 kg (98 lb 3.2 oz)    Admission Cmt: None   Principal Problem: Hyponatremia [E87.1]                 Active Insurance as of 12/22/2022     Primary Coverage     Payor Plan Insurance Group Employer/Plan Group    [facility] MEDICARE REPLACEMENT [facility] MEDICARE REPLACEMENT [id number]     Payor Plan Address Payor Plan Phone Number Payor Plan Fax Number Effective Dates    [address] [address]   1/1/2021 - None Entered    [facility] [id number]       Subscriber Name Subscriber Birth Date Member ID       RITU SCHILLING 1940 [id number]                 Emergency Contacts      (Rel.) Home Phone Work Phone Mobile Phone    Nu Schilling (Spouse) [phone] -- [phone]    Marielle Regan (Daughter) [phone] -- [phone]            {Outbreak/Travel/Exposure Documentation......;  Question Available Choices Patient Response   COVID-19 Outbreak Screen:  Do you currently have a new onset of the following symptoms?        Fever/Chills, Cough, Shortness of air, Loss of taste or smell, No, Unknown  No (12/22/22 1525)   COVID-19 Outbreak Screen: In the last 14 days, have you had contact with anyone who is ill, has show any of the  symptoms listed above and/or has been diagnosis with the 2019 Novel Coronavirus? This includes any immediate household members but excludes any patients with whom you have been in contact within your normal work duties wearing proper PPE, if you are a healthcare worker.  Yes, No, Unknown              No (12/22/22 1525)   COVID-19 Outbreak Screen: Who was notified? Free text (not recorded)   Ebola Screening Outbreak Screen: Have you traveled to the [place] or [place] within the past 21 days?  Yes, No, Unknown No (12/22/22 1525)   Ebola Screening Outbreak Screen: Do you have ANY of the following symptoms: Fever/Chills, Vomiting, Diarrhea, Fatigue, Headache, Muscle pain, Unexplained bleeding, Abdominal (stomach) pain, No, Unknown (not recorded)   Ebola Screening Outbreak Screen: Name of Person notified Free text (not recorded)   Travel Screen: Have you traveled in the last month? If so, to what country have you traveled? If [place] what state? Yes, No, Unknown  List of all countries  List of all States No (12/22/22 1151)  (not recorded)  (not recorded)   Infection Risk: Do you currently have the following symptoms?  (If cough is selected, the Tuberculosis Screen is performed.) Cough, Fever, Rash, No No (12/22/22 1151)   Tuberculosis Screen: Do you have any of the following Tuberculosis Risks?  · Have you lived or spent time with anyone who had or may have TB?  · Have you lived in or visited any of the following areas for more than one month: [place], [place], [place], [place] or [place], the [place] or [place]?  · Do you have HIV/AIDS?  · Have you lived in or worked in a nursing home, homeless shelter, correctional facility, or substance abuse treatment facility?   · No    If Yes do you have any of the following symptoms? Yes responses display to the right    If Yes, symptoms listed are:  Cough greater than or equal to 3 weeks, Loss of appetite, Unexplained weight loss, Night sweats, Bloody  sputum or hemoptysis, Hoarseness, Fever, Fatigue, Chest pain, No (not recorded)  (not recorded)   Exposure Screen: Have you been exposed to any of these contagious diseases in the last month? Measles, Chickenpox, Meningitis, Pertussis, Whooping Cough, No No (12/22/22 8231)

## 2022-12-23 NOTE — CASE MANAGEMENT/SOCIAL WORK
Discharge Planning Assessment  [facility]     Patient Name: Ritu Martino  MRN: [id number]  Today's Date: 12/23/2022    Admit Date: 12/22/2022    Plan[facility]   Discharge Needs Assessment     Row Name 12/23/22 1223       Living Environment    People in Home spouse    Current Living Arrangements condominium    Primary Care Provided by self    Provides Primary Care For no one, unable/limited ability to care for self    Family Caregiver if Needed spouse    Quality of Family Relationships helpful;involved;supportive       Resource/Environmental Concerns    Resource/Environmental Concerns none       Transition Planning    Patient/Family Anticipates Transition to home with family    Patient/Family Anticipated Services at Transition none    Transportation Anticipated family or friend will provide       Discharge Needs Assessment    Readmission Within the Last 30 Days no previous admission in last 30 days    Current Outpatient/Agency/Support Group other (see comments)      Equipment Currently Used at Home walker, rolling;wheelchair;shower chair    Concerns to be Addressed no discharge needs identified    Discharge Facility/Level of Care Needs home with home health               Discharge Plan     Row Name 12/23/22 1226       Plan    Plan Home with spouse and continued [employer]     Patient/Family in Agreement with Plan yes    Plan Comments CCP spoke with patient's , Nu, via room phone. CCP role explained. The patient lives with her spouse. She has a walker, wheelchair and shower chair. She has private caregivers that come daily to assist patient. She has been to [facility] [place] for rehab. Spouse states she is current with OT through [facility] . CCP spoke with Kayla/JamesonMarkafonis who will follow. Spouse can transport at discharge. No needs identified. CCP noted PT signed off. Ange NOYOLA RN CCP              Continued Care and Services - Admitted Since  12/22/2022     Home Medical Care     Service Provider Request Status Selected Services Address Phone Fax Patient Preferred    [facility] Accepted N/A [id number] [facility] [[email or web address] [address], [facility] [address] [phone] --            Selected Continued Care - Prior Encounters Includes continued care and service providers with selected services from prior encounters from 9/23/2022 to 12/23/2022    Discharged on 11/2/2022 Admission date: 10/27/2022 - Discharge disposition: Skilled Nursing Facility (DC - External)    Destination     Service Provider Selected Services Address Phone Fax Patient Preferred    [facility] [address] , [facility] [address] [phone] --          Home Medical Care     Service Provider Selected Services Address Phone Fax Patient Preferred    [facility] [address] [facility] [[email or web address]] [address] [id number] [phone] --                       Demographic Summary     Row Name 12/23/22 1222       General Information    Arrived From home    Preferred Language English               Functional Status     Row Name 12/23/22 1222       Functional Status    Usual Activity Tolerance good    Current Activity Tolerance good       Functional Status, IADL    Medications independent;assistive person    Meal Preparation assistive person    Housekeeping assistive person    Laundry assistive person    Shopping assistive person               Psychosocial    No documentation.                Abuse/Neglect    No documentation.                Legal    No documentation.                Substance Abuse    No documentation.                Patient Forms    No documentation.                   Ange Devries RN

## 2022-12-23 NOTE — PROGRESS NOTES
LOS: 1 day   Patient Care Team:  Gaurav Barger MD as PCP - General (Internal Medicine)  Uche Garcia MD as Consulting Physician (Endocrinology)  Ashu Aguirre MD as Consulting Physician (Hematology and Oncology)  Rina Lomax APRN as Referring Physician (Family Medicine)    Chief Complaint: hyponatremia       History of Present Illness  Patient is a 81 yo female with chronic hyponatremia from [facility], who presented this am to our clinic and found to have Na of 122 from yesterday's labs with mild symptoms. She was advised to come to ED.   Na is 122 today  She  Has been treated before with samsca. She was DC with NaCl and lasix, but currently off loop diuretics.   Her Na had been around 127-130. But has been trending down and it was 120 a couple of weeks ago.      She reports some confusion, anxiety and off balance     Subjective  No acute events overnight.    at bedside.   Patient reports she does not feel any better than when she came in.    reports she is quite close to her baseline and that currently main symptoms is her anxiety for which they will be following with psychiatry soon. He feels she is good to go home.       History taken from: patient chart family    Objective     Vital Sign Min/Max for last 24 hours  Temp  Min: 97.5 °F (36.4 °C)  Max: 98.3 °F (36.8 °C)   BP  Min: 108/58  Max: 152/86   Pulse  Min: 73  Max: 93   Resp  Min: 18  Max: 20   SpO2  Min: 93 %  Max: 100 %   No data recorded   Weight  Min: 44.5 kg (98 lb 3.2 oz)  Max: 48.2 kg (106 lb 3.2 oz)     Flowsheet Rows    Flowsheet Row First Filed Value   Admission Height 152.4 cm (60\") Documented at 12/22/2022 1518   Admission Weight 48.2 kg (106 lb 3.2 oz) Documented at 12/22/2022 1518          I/O this shift:  In: 220 [P.O.:220]  Out: -   I/O last 3 completed shifts:  In: 60 [P.O.:60]  Out: 200 [Urine:200]    Objective  Physical Examination:   General appearance - awake and alert. She is mild emotional distress.    Mental status - alert, oriented to person, place, and time. However, she is tearful and feels there is no improvement at all. She is more anxious today than she was yesterday in ED.   Eyes - pupils equal and reactive, extraocular eye movements intact  Mouth - mucous membranes moist, pharynx normal without lesions  Chest - clear to auscultation, no wheezes, rales or rhonchi, symmetric air entry  Heart - normal rate, regular rhythm, normal S1, S2, no murmurs, rubs, clicks or gallops  Abdomen - soft, nontender, nondistended, no masses or organomegaly  Neurological - alert, oriented, normal speech, no focal findings or movement disorder noted  Musculoskeletal - no joint tenderness, deformity or swelling  Results Review:     I reviewed the patient's new clinical results.    WBC WBC   Date Value Ref Range Status   12/23/2022 3.13 (L) 3.40 - 10.80 10*3/mm3 Final   12/22/2022 4.77 3.40 - 10.80 10*3/mm3 Final   12/21/2022 4.51 3.40 - 10.80 10*3/mm3 Final      HGB Hemoglobin   Date Value Ref Range Status   12/23/2022 10.9 (L) 12.0 - 15.9 g/dL Final   12/22/2022 10.5 (L) 12.0 - 15.9 g/dL Final   12/21/2022 10.9 (L) 12.0 - 15.9 g/dL Final      HCT Hematocrit   Date Value Ref Range Status   12/23/2022 31.5 (L) 34.0 - 46.6 % Final   12/22/2022 31.6 (L) 34.0 - 46.6 % Final   12/21/2022 30.4 (L) 34.0 - 46.6 % Final      Platlets No results found for: LABPLAT   MCV MCV   Date Value Ref Range Status   12/23/2022 90.3 79.0 - 97.0 fL Final   12/22/2022 94.6 79.0 - 97.0 fL Final   12/21/2022 89.9 79.0 - 97.0 fL Final          Sodium Sodium   Date Value Ref Range Status   12/23/2022 128 (L) 136 - 145 mmol/L Final   12/22/2022 122 (L) 136 - 145 mmol/L Final   12/21/2022 122 (C) 134 - 145 mmol/L Final      Potassium Potassium   Date Value Ref Range Status   12/23/2022 3.6 3.5 - 5.2 mmol/L Final   12/22/2022 3.8 3.5 - 5.2 mmol/L Final   12/21/2022 5.0 (H) 3.5 - 4.7 mmol/L Final      Chloride Chloride   Date Value Ref Range Status    12/23/2022 95 (L) 98 - 107 mmol/L Final   12/22/2022 88 (L) 98 - 107 mmol/L Final   12/21/2022 87 (L) 98 - 107 mmol/L Final      CO2 CO2   Date Value Ref Range Status   12/23/2022 25.0 22.0 - 29.0 mmol/L Final   12/22/2022 24.6 22.0 - 29.0 mmol/L Final   12/21/2022 24.8 22.0 - 29.0 mmol/L Final      BUN BUN   Date Value Ref Range Status   12/23/2022 11 8 - 23 mg/dL Final   12/22/2022 7 (L) 8 - 23 mg/dL Final   12/21/2022 7 6 - 20 mg/dL Final      Creatinine Creatinine   Date Value Ref Range Status   12/23/2022 0.53 (L) 0.57 - 1.00 mg/dL Final   12/22/2022 0.56 (L) 0.57 - 1.00 mg/dL Final   12/21/2022 0.45 (L) 0.60 - 1.10 mg/dL Final      Calcium Calcium   Date Value Ref Range Status   12/23/2022 8.7 8.6 - 10.5 mg/dL Final   12/22/2022 8.6 8.6 - 10.5 mg/dL Final   12/21/2022 9.0 8.5 - 10.2 mg/dL Final      PO4 No results found for: CAPO4   Albumin Albumin   Date Value Ref Range Status   12/22/2022 4.30 3.50 - 5.20 g/dL Final   12/21/2022 4.10 3.50 - 5.20 g/dL Final      Magnesium Magnesium   Date Value Ref Range Status   12/22/2022 2.1 1.6 - 2.4 mg/dL Final      Uric Acid No results found for: URICACID     Medication Review:     Current Facility-Administered Medications:   •  acetaminophen (TYLENOL) tablet 650 mg, 650 mg, Oral, Q4H PRN, Jessenia Gonzalez MD  •  ALPRAZolam (XANAX) tablet 0.5 mg, 0.5 mg, Oral, TID PRN, Jessenia Gonzalez MD, 0.5 mg at 12/23/22 0232  •  cholecalciferol (VITAMIN D3) tablet 2,000 Units, 2,000 Units, Oral, Daily With Dinner, Jessenia Gonzalez MD, 2,000 Units at 12/22/22 2239  •  cycloSPORINE (RESTASIS) 0.05 % ophthalmic emulsion 1 drop, 1 drop, Both Eyes, BID, Jessenia Gonzalez MD, 1 drop at 12/23/22 0809  •  desvenlafaxine (PRISTIQ) 24 hr tablet 100 mg, 100 mg, Oral, Daily, Jessenia Gonzalez MD, 100 mg at 12/23/22 0809  •  dilTIAZem CD (CARDIZEM CD) 24 hr capsule 120 mg, 120 mg, Oral, Q24H, Jessenia Gonzalez MD, 120 mg at 12/23/22 0809  •  furosemide (LASIX)  tablet 20 mg, 20 mg, Oral, BID, Dex Luna MD, 20 mg at 12/23/22 0808  •  hydrALAZINE (APRESOLINE) tablet 10 mg, 10 mg, Oral, TID, Jessenia Gonzalez MD, 10 mg at 12/23/22 0809  •  lactobacillus acidophilus (RISAQUAD) capsule 1 capsule, 1 capsule, Oral, Daily With Lunch, Jessenia Gonzalez MD  •  levothyroxine (SYNTHROID, LEVOTHROID) tablet 50 mcg, 50 mcg, Oral, Q AM, Jessenia Gonzalez MD, 50 mcg at 12/23/22 0620  •  melatonin tablet 10 mg, 10 mg, Oral, Nightly PRN, Jessenia Gonzalez MD  •  metaxalone (SKELAXIN) tablet 800 mg, 800 mg, Oral, TID PRN, Jessenia Gonzalez MD  •  nitroglycerin (NITROSTAT) SL tablet 0.4 mg, 0.4 mg, Sublingual, Q5 Min PRN, Jessenia Gonzalez MD  •  ondansetron (ZOFRAN) tablet 4 mg, 4 mg, Oral, Q6H PRN **OR** ondansetron (ZOFRAN) injection 4 mg, 4 mg, Intravenous, Q6H PRN, eJssenia Gonzalez MD  •  oxyCODONE-acetaminophen (PERCOCET) 7.5-325 MG per tablet 1 tablet, 1 tablet, Oral, Q6H PRN, Jessenia Gonzalez MD  •  pantoprazole (PROTONIX) EC tablet 40 mg, 40 mg, Oral, QAM, Jessenia Gonzalez MD, 40 mg at 12/23/22 0620  •  potassium chloride (K-DUR,KLOR-CON) ER tablet 10 mEq, 10 mEq, Oral, BID, Jessenia Gonzalez MD, 10 mEq at 12/23/22 0809  •  rosuvastatin (CRESTOR) tablet 20 mg, 20 mg, Oral, Nightly, Jessenia Gonzalez MD, 20 mg at 12/22/22 2242  •  sennosides-docusate (PERICOLACE) 8.6-50 MG per tablet 2 tablet, 2 tablet, Oral, BID PRN, Jessenia Gonzalez MD  •  sodium chloride tablet 2 g, 2 g, Oral, TID With Meals, Dex Luna MD, 2 g at 12/23/22 0809  •  traMADol (ULTRAM) tablet 50 mg, 50 mg, Oral, BID PRN, Jessenia Gonzalez MD  •  valsartan (DIOVAN) tablet 160 mg, 160 mg, Oral, Daily, Jessenia Gonzalez MD, 160 mg at 12/23/22 0809  •  vitamin B-12 (CYANOCOBALAMIN) tablet 50 mcg, 50 mcg, Oral, Daily, Jessenia Gonzalez MD, 50 mcg at 12/23/22 0808  •  zolpidem (AMBIEN) tablet 10 mg, 10 mg, Oral,  Nightly Lisa YOUNGER Renate Andrea, MD, 10 mg at 12/22/22 5206      Assessment & Plan       Hyponatremia      Assessment & Plan  1. Hyponatremia - symptomatic   2. Hypochloremia   3. HTN  4. Anxiety     Na much better today and close to her baseline   Continue NaCl tablets and lasix.    seems to want her to go home  From renal standpoint she is ok for DC.   Would DC with current diuretic and NaCl dosing.   Currently our office is closed until Tuesday. Will call patient/ with an appt with me early - mid January.   She can have labs done with PCP next week.   Discussed at length with  and patient.   Will follow     Ai Flores MD  12/23/22  09:23 EST

## 2022-12-23 NOTE — PLAN OF CARE
Goal Outcome Evaluation:  Plan of Care Reviewed With: patient           Outcome Evaluation: Pt is an 83 yo female admitted with abnormal sodium levels. Pt very anxious at baseline, but reports heightened anxiety here. She lives with her supportive spouse, uses a walker for functional mobility and typically is independent. Hx of compression fractures noted. Today, she completes bed mobility independently, transfers and ambulated 180' with supervision using her walker. She appears to be functioning at her baseline level, no further acute PT needs identified.PT signing off. Anticipate d/c home with spouse. Recommend mobility with [facility] staff or family supervision as needed.

## 2022-12-23 NOTE — THERAPY DISCHARGE NOTE
Patient Name: Ritu Martino  : 1940    MRN: [id number]                              Today's Date: 2022       Admit Date: 2022    Visit Dx:     ICD-10-CM ICD-9-CM   1. Hyponatremia  E87.1 276.1   2. History of seizures  Z87.898 V13.89   3. History of hypertension  Z86.79 V12.59   4. Anxiety  F41.9 300.00     Patient Active Problem List   Diagnosis   • Anxiety   • Hypothyroidism   • Hyponatremia   • Iron deficiency   • Hypokalemia   • Essential hypertension, benign   • Fluent aphasia   • Hypochloremia   • Benzodiazepine dependence ([facility])   • Anemia   • Cervicalgia   • Intertrigo   • Insomnia   • Acute cystitis   • E coli infection   • Diverticulitis   • Arthritis   • Bowel dysfunction   • GERD (gastroesophageal reflux disease)   • Hernia, hiatal   • Seizures ([facility])   • Meningioma ([facility])   • Closed fracture of left distal fibula   • Debility   • History of seizure   • Fall   • Tachycardia   • Abdominal hernia   • Compression fracture of L1 lumbar vertebra, open, initial encounter ([facility])   • Compression fracture of T12 vertebra ([facility])   • Osteopenia   • Acute UTI   • Back pain   • Compression fracture of L2 and L4 lumbar vertebra ([facility])   • Closed fracture of right inferior pubic ramus ([facility])     Past Medical History:   Diagnosis Date   • Anemia    • Anxiety    • Arthritis    • Bowel dysfunction 2021    since having surgery for diverticular infection   • Chronic constipation    • Depression    • Diverticulitis 2021    w/ rupture and infection required surgery and ostomy   • Essential hypertension, benign    • Fracture 2022    left ankle   • Fracture, tibia and fibula Now wearing bood   • GERD (gastroesophageal reflux disease)    • Hernia, hiatal    • Hypercholesterolemia    • Hypertension    • Hypokalemia    • Hyponatremia     chronic low with occasional normal level   • Hypothyroidism     estimated time frame pt nor  could remember exactly how long has been on medication   •  Insomnia    • Iron deficiency    • Seizures ([facility])    • Tear of meniscus of knee    • 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     Past Surgical History:   Procedure Laterality Date   • APPENDECTOMY     • BACK SURGERY     • BACK SURGERY      Discectomy   •  SECTION      x2 lower midline incision   • COLON SURGERY  2021    to address ruptured and infected diverticular dz, ostomy also placed   • COLONOSCOPY     • COLOSTOMY CLOSURE  10/2021    ostomy removed   • EYE SURGERY  2 X cataract   • HEMORRHOIDECTOMY     • KNEE ARTHROPLASTY     • TONSILLECTOMY  194      General Information     Row Name 22 1054          Physical Therapy Time and Intention    Document Type discharge evaluation/summary  -CW     Mode of Treatment individual therapy;physical therapy  -CW     Row Name 22 1054          General Information    Patient Profile Reviewed yes  -CW     Prior Level of Function independent:;transfer;all household mobility;bed mobility  uses walker  -CW     Existing Precautions/Restrictions fall  -CW     Barriers to Rehab medically complex  -CW     Row Name 22 1054          Living Environment    People in Home spouse  -CW     Row Name 22 1054          Cognition    Orientation Status (Cognition) oriented x 4  -CW           User Key  (r) = Recorded By, (t) = Taken By, (c) = Cosigned By    Initials Name Provider Type    CW Tram Gotti, DEMETRIUS Physical Therapist               Mobility     Row Name 22 1055          Bed Mobility    Bed Mobility supine-sit;sit-supine  -CW     Supine-Sit [place] (Bed Mobility) independent  -CW     Sit-Supine [place] (Bed Mobility) independent  -CW     Row Name 22 1055          Sit-Stand Transfer    Sit-Stand [place] (Transfers) supervision  -CW     Assistive Device (Sit-Stand Transfers) walker, front-wheeled  -CW     Row Name 22 1055          Gait/Stairs (Locomotion)    [place] Level (Gait) supervision  -CW      Assistive Device (Gait) walker, front-wheeled  -CW     Distance in Feet (Gait) 180'  -CW     Comment, (Gait/Stairs) No overt loss of balance  -CW           User Key  (r) = Recorded By, (t) = Taken By, (c) = Cosigned By    Initials Name Provider Type    Tram Rogers PT Physical Therapist               Obj/Interventions     Row Name 12/23/22 1056          Range of Motion Comprehensive    General Range of Motion bilateral lower extremity ROM WFL  -CW     Row Name 12/23/22 1056          Strength Comprehensive (MMT)    General Manual Muscle Testing (MMT) Assessment no strength deficits identified  -CW     Row Name 12/23/22 1056          Balance    Balance Assessment sitting static balance;sitting dynamic balance;standing static balance;standing dynamic balance  -CW     Static Sitting Balance independent  -CW     Dynamic Sitting Balance independent  -CW     Position, Sitting Balance sitting edge of bed  -CW     Static Standing Balance independent  -CW     Dynamic Standing Balance supervision  -CW     Position/Device Used, Standing Balance supported;walker, front-wheeled  -CW     Row Name 12/23/22 1056          Sensory Assessment (Somatosensory)    Sensory Assessment (Somatosensory) sensation intact  -CW           User Key  (r) = Recorded By, (t) = Taken By, (c) = Cosigned By    Initials Name Provider Type    Tram Rogers PT Physical Therapist               Goals/Plan    No documentation.                Clinical Impression     Row Name 12/23/22 1058          Pain    Pretreatment Pain Rating 0/10 - no pain  -CW     Posttreatment Pain Rating 0/10 - no pain  -CW     Row Name 12/23/22 1058          Plan of Care Review    Plan of Care Reviewed With patient  -CW     Outcome Evaluation Pt is an 81 yo female admitted with abnormal sodium levels. Pt very anxious at baseline, but reports heightened anxiety here. She lives with her supportive spouse, uses a walker for functional mobility and typically is  independent. Hx of compression fractures noted. Today, she completes bed mobility independently, transfers and ambulated 180' with supervision using her walker. She appears to be functioning at her baseline level, no further acute PT needs identified.PT signing off. Anticipate d/c home with spouse.  -CW     Row Name 12/23/22 1058          Therapy Assessment/Plan (PT)    Criteria for Skilled Interventions Met (PT) no;no problems identified which require skilled intervention  -CW     Therapy Frequency (PT) evaluation only  -CW     Row Name 12/23/22 1058          Vital Signs    O2 Delivery Pre Treatment room air  -CW     O2 Delivery Intra Treatment room air  -CW     O2 Delivery Post Treatment room air  -CW     Row Name 12/23/22 1058          Positioning and Restraints    Pre-Treatment Position in bed  -CW     Post Treatment Position bed  declined chair  -CW     In Bed notified nsg;call light within reach;encouraged to call for assist;fowlers  no alarm at arrival  -CW           User Key  (r) = Recorded By, (t) = Taken By, (c) = Cosigned By    Initials Name Provider Type    Tram Rogers, PT Physical Therapist               Outcome Measures     Row Name 12/23/22 1104 12/23/22 0818       How much help from another person do you currently need...    Turning from your back to your side while in flat bed without using bedrails? 4  -CW 4  -PT    Moving from lying on back to sitting on the side of a flat bed without bedrails? 4  -CW 4  -PT    Moving to and from a bed to a chair (including a wheelchair)? 4  -CW 4  -PT    Standing up from a chair using your arms (e.g., wheelchair, bedside chair)? 4  -CW 3  -PT    Climbing 3-5 steps with a railing? 3  -CW 3  -PT    To walk in hospital room? 4  -CW 3  -PT    AM-PAC 6 Clicks Score (PT) 23  -CW 21  -PT    Highest level of mobility 7 --> Walked 25 feet or more  -CW 6 --> Walked 10 steps or more  -PT    Row Name 12/23/22 1107          Functional Assessment    Outcome Measure  Options AM-PAC 6 Clicks Basic Mobility (PT)  -CW           User Key  (r) = Recorded By, (t) = Taken By, (c) = Cosigned By    Initials Name Provider Type    PT Stephanie Cooley, RN Registered Nurse    Tram Rogers PT Physical Therapist              Physical Therapy Education     Title: PT OT SLP Therapies (In Progress)     Topic: Physical Therapy (In Progress)     Point: Mobility training (Done)     Learning Progress Summary           Patient Acceptance, E, BRISEYDA,DU by  at 12/23/2022 1104                   Point: Home exercise program (Not Started)     Learner Progress:  Not documented in this visit.          Point: Body mechanics (Not Started)     Learner Progress:  Not documented in this visit.          Point: Precautions (Not Started)     Learner Progress:  Not documented in this visit.                      User Key     Initials Effective Dates Name Provider Type Discipline     12/13/22 -  Tram Gotti PT Physical Therapist PT              PT Recommendation and Plan     Plan of Care Reviewed With: patient  Outcome Evaluation: Pt is an 81 yo female admitted with abnormal sodium levels. Pt very anxious at baseline, but reports heightened anxiety here. She lives with her supportive spouse, uses a walker for functional mobility and typically is independent. Hx of compression fractures noted. Today, she completes bed mobility independently, transfers and ambulated 180' with supervision using her walker. She appears to be functioning at her baseline level, no further acute PT needs identified.PT signing off. Anticipate d/c home with spouse.     Time Calculation:    PT Charges     Row Name 12/23/22 1054             Time Calculation    Start Time 0956  -CW      Stop Time 1010  -CW      Time Calculation (min) 14 min  -CW      PT Received On 12/23/22  -         Time Calculation- PT    Total Timed Code Minutes- PT 9 minute(s)  -CW         Timed Charges    58989 - PT Therapeutic Activity Minutes 9  -CW          Total Minutes    Timed Charges Total Minutes 9  -CW       Total Minutes 9  -CW            User Key  (r) = Recorded By, (t) = Taken By, (c) = Cosigned By    Initials Name Provider Type    Tram Rogers, PT Physical Therapist              Therapy Charges for Today     Code Description Service Date Service Provider Modifiers Qty    [id number]  PT EVAL LOW COMPLEXITY 3 12/23/2022 Tram Gotti, PT GP 1    [id number]  PT THERAPEUTIC ACT EA 15 MIN 12/23/2022 Tram Gotti, PT GP 1          PT G-Codes  Outcome Measure Options: AM-PAC 6 Clicks Basic Mobility (PT)  AM-PAC 6 Clicks Score (PT): 23    PT Discharge Summary  Anticipated Discharge Disposition (PT): home with assist    Tram Gotti PT  12/23/2022

## 2022-12-23 NOTE — PLAN OF CARE
Goal Outcome Evaluation:         Patient resting in bed , anxious , VSS, Room air, SR on tele, C/O insomnia. Medicated per Mar , Nurse will continue to monitor

## 2022-12-23 NOTE — H&P
HISTORY AND PHYSICAL   [facility]        Date of Admission: 2022  Patient Identification:  Name: Ritu Martino  Age: 82 y.o.  Sex: female  :  1940  MRN: [id number]                     Primary Care Physician: Gaurav Barger MD    Chief Complaint:  82 year old female who presented to the emergency room due to abnormal labs; she was seen by nephrology today and her sodium was low; she has been following a fluid restriction at home; she has been a little confused and very anxious; she has been taking salt tablets;     History of Present Illness:   As above    Past Medical History:  Past Medical History:   Diagnosis Date   • Anemia    • Anxiety    • Arthritis    • Bowel dysfunction 2021    since having surgery for diverticular infection   • Chronic constipation    • Depression    • Diverticulitis 2021    w/ rupture and infection required surgery and ostomy   • Essential hypertension, benign    • Fracture 2022    left ankle   • Fracture, tibia and fibula Now wearing bood   • GERD (gastroesophageal reflux disease)    • Hernia, hiatal    • Hypercholesterolemia    • Hypertension    • Hypokalemia    • Hyponatremia     chronic low with occasional normal level   • Hypothyroidism     estimated time frame pt nor  could remember exactly how long has been on medication   • Insomnia    • Iron deficiency    • Seizures (HCC)    • Tear of meniscus of knee    • 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     Past Surgical History:  Past Surgical History:   Procedure Laterality Date   • APPENDECTOMY     • BACK SURGERY     • BACK SURGERY      Discectomy   •  SECTION      x2 lower midline incision   • COLON SURGERY  2021    to address ruptured and infected diverticular dz, ostomy also placed   • COLONOSCOPY     • COLOSTOMY CLOSURE  10/2021    ostomy removed   • EYE SURGERY  2 X cataract   • HEMORRHOIDECTOMY     • KNEE ARTHROPLASTY     • TONSILLECTOMY   1946      Home Meds:  Medications Prior to Admission   Medication Sig Dispense Refill Last Dose   • acetaminophen (TYLENOL) 325 MG tablet Take 2 tablets by mouth Every 6 (Six) Hours As Needed for Mild Pain .   Past Month   • ALPRAZolam (Xanax) 0.5 MG tablet Take 1 tablet by mouth 3 (Three) Times a Day As Needed for Anxiety (Fall precautions). 90 tablet 3 12/22/2022   • Cholecalciferol (VITAMIN D) 2000 units capsule Take  by mouth Daily With Dinner.   12/21/2022   • cloNIDine (Catapres) 0.1 MG tablet 1 po bid prn sys greater or equal to 150 30 tablet 3 Past Month   • clotrimazole-betamethasone (Lotrisone) 1-0.05 % cream Apply 1 application topically to the appropriate area as directed 2 (Two) Times a Day. 45 g 0 Past Month   • cycloSPORINE (RESTASIS) 0.05 % ophthalmic emulsion 1 drop 2 (Two) Times a Day.   Past Month   • desvenlafaxine (Pristiq) 100 MG 24 hr tablet Take 1 tablet by mouth Daily. 30 tablet 3 12/22/2022   • dilTIAZem XR (DILACOR XR) 120 MG 24 hr capsule Take 1 capsule by mouth Daily. 90 capsule 3 12/22/2022   • hydrALAZINE (APRESOLINE) 10 MG tablet Take 1 tablet by mouth 3 (Three) Times a Day. 90 tablet 3 12/21/2022   • ibuprofen (ADVIL,MOTRIN) 800 MG tablet TAKE 1 TABLET BY MOUTH EVERY 8 HOURS AS NEEDED FOR MILD PAIN 90 tablet 3 Past Week   • levothyroxine (SYNTHROID, LEVOTHROID) 50 MCG tablet Take 50 mcg by mouth Daily.   12/22/2022   • Magnesium 400 MG capsule 1 p.o. twice daily 60 capsule 3 12/21/2022   • melatonin 5 MG tablet tablet Take 10 mg by mouth.   12/21/2022   • metaxalone (SKELAXIN) 800 MG tablet Take 1 tablet by mouth 3 (Three) Times a Day As Needed for Muscle Spasms. 20 tablet 0 Past Week   • omeprazole (priLOSEC) 40 MG capsule Take 1 capsule by mouth Daily. 30 capsule 3 12/21/2022   • oxyCODONE-acetaminophen (PERCOCET) 7.5-325 MG per tablet TAKE 1 TABLET BY MOUTH EVERY 6 HOURS FOR 7 DAYS AS NEEDED   12/21/2022   • potassium chloride 10 MEQ CR tablet Take 1 tablet by mouth 2 (Two) Times  a Day. 180 tablet 3 12/21/2022   • Probiotic Product (ALIGN PO) Take 1 capsule by mouth Daily With Lunch.   12/21/2022   • Prucalopride Succinate 2 MG tablet Take 1 tablet by mouth Daily As Needed (The patient). 90 tablet 1 Past Month   • rosuvastatin (CRESTOR) 20 MG tablet Take 20 mg by mouth Every Night.   12/21/2022   • sennosides-docusate (PERICOLACE) 8.6-50 MG per tablet Take 2 tablets by mouth 2 (Two) Times a Day As Needed for Constipation. 60 tablet 0 Past Week   • SODIUM CHLORIDE PO Take  by mouth 3 (Three) Times a Day. 2 TABLETS 3 6TIMES A DAY   12/21/2022   • traMADol (ULTRAM) 50 MG tablet Take 50 mg by mouth 2 (Two) Times a Day As Needed for Moderate Pain.   Past Week   • valsartan (Diovan) 160 MG tablet Take 1 tablet by mouth Daily. 90 tablet 1 12/22/2022   • vitamin B-12 (CYANOCOBALAMIN) 100 MCG tablet Take 50 mcg by mouth Daily.   12/22/2022   • zolpidem (AMBIEN) 10 MG tablet Take 1 tablet by mouth At Night As Needed for Sleep. 90 tablet 1 12/21/2022       Allergies:  No Known Allergies  Immunizations:  Immunization History   Administered Date(s) Administered   • COVID-19 (PFIZER) BIVALENT BOOSTER 12+YRS 11/14/2022   • COVID-19 (PFIZER) PURPLE CAP 02/10/2021, 03/03/2021, 10/27/2021   • Covid-19 (Pfizer) Gray Cap 05/24/2022   • FLUAD TRI 65YR+ 09/16/2017   • Flublok 18+yrs 10/16/2019   • Fluzone High Dose =>65 Years ([employer] ONLY) 09/14/2016, 09/16/2021   • Fluzone High-Dose 65+yrs 09/16/2021, 10/27/2022   • Influenza Quad Vaccine (Inpatient) 09/22/2020   • Pneumococcal Polysaccharide (PPSV23) 01/18/2018   • Shingrix 07/09/2019   • TD Preservative Free 05/13/2019     Social History:   Social History     Social History Narrative   • Not on file     Social History     Socioeconomic History   • Marital status:    Tobacco Use   • Smoking status: Former     Packs/day: 0.25     Years: 15.00     Pack years: 3.75     Types: Cigarettes     Start date: 1/1/1956     Quit date: 1/1/1971     Years since  quittin.0   • Smokeless tobacco: Never   Vaping Use   • Vaping Use: Never used   Substance and Sexual Activity   • Alcohol use: Not Currently     Alcohol/week: 8.0 standard drinks     Types: 4 Glasses of wine, 4 Shots of liquor per week     Comment: Stopped 3 months ago.   • Drug use: No   • Sexual activity: Not Currently     Partners: Male       Family History:  Family History   Problem Relation Age of Onset   • Brain cancer Mother    • Cancer Mother         Brain tumor,    • Stroke Father         Age 46   • Early death Father         Stroke, age 46   • Cancer Brother         pancreatic   • Cancer Daughter         Dec'd 2000        Review of Systems  See history of present illness and past medical history.  Patient denies headache, dizziness, syncope, falls, trauma, change in vision, change in hearing, change in taste, changes in weight, changes in appetite, focal weakness, numbness, or paresthesia.  Patient denies chest pain, palpitations, dyspnea, orthopnea, PND, cough, sinus pressure, rhinorrhea, epistaxis, hemoptysis, nausea, vomiting,hematemesis, diarrhea, constipation or hematchezia.  Denies cold or heat intolerance, polydipsia, polyuria, polyphagia. Denies hematuria, pyuria, dysuria, hesitancy, frequency or urgency. Denies consumption of raw and under cooked meats foods or change in water source.  Denies fever, chills, sweats, night sweats.  Denies missing any routine medications. Remainder of ROS is negative.    Objective:  T Max 24 hrs: Temp (24hrs), Av.6 °F (36.4 °C), Min:97.5 °F (36.4 °C), Max:97.8 °F (36.6 °C)    Vitals Ranges:   Temp:  [97.5 °F (36.4 °C)-97.8 °F (36.6 °C)] 97.5 °F (36.4 °C)  Heart Rate:  [73-93] 90  Resp:  [18] 18  BP: (109-152)/(70-86) 109/72      Exam:  /72 (BP Location: Left arm, Patient Position: Lying)   Pulse 90   Temp 97.5 °F (36.4 °C) (Oral)   Resp 18   Ht 152.4 cm (60\")   Wt 48.2 kg (106 lb 3.2 oz)   LMP  (LMP Unknown)   SpO2 93%   BMI 20.74 kg/m²      General Appearance:    Alert, cooperative, no distress, appears stated age   Head:    Normocephalic, without obvious abnormality, atraumatic   Eyes:    PERRL, conjunctivae/corneas clear, EOM's intact, both eyes   Ears:    Normal external ear canals, both ears   Nose:   Nares normal, septum midline, mucosa normal, no drainage    or sinus tenderness   Throat:   Lips, mucosa, and tongue normal   Neck:   Supple, symmetrical, trachea midline, no adenopathy;     thyroid:  no enlargement/tenderness/nodules; no carotid    bruit or JVD   Back:     Symmetric, no curvature, ROM normal, no CVA tenderness   Lungs:     Decreased breath sounds bilaterally, respirations unlabored   Chest Wall:    No tenderness or deformity    Heart:    Regular rate and rhythm, S1 and S2 normal, no murmur, rub   or gallop   Abdomen:     Soft, nontender, bowel sounds active all four quadrants,     no masses, no hepatomegaly, no splenomegaly   Extremities:   Extremities normal, atraumatic, no cyanosis or edema   Pulses:   2+ and symmetric all extremities   Skin:   Skin color, texture, turgor normal, no rashes or lesions               .    Data Review:  Labs in chart were reviewed.  WBC   Date Value Ref Range Status   12/22/2022 4.77 3.40 - 10.80 10*3/mm3 Final     Hemoglobin   Date Value Ref Range Status   12/22/2022 10.5 (L) 12.0 - 15.9 g/dL Final     Hematocrit   Date Value Ref Range Status   12/22/2022 31.6 (L) 34.0 - 46.6 % Final     Platelets   Date Value Ref Range Status   12/22/2022 207 140 - 450 10*3/mm3 Final     Sodium   Date Value Ref Range Status   12/22/2022 122 (L) 136 - 145 mmol/L Final     Potassium   Date Value Ref Range Status   12/22/2022 3.8 3.5 - 5.2 mmol/L Final     Chloride   Date Value Ref Range Status   12/22/2022 88 (L) 98 - 107 mmol/L Final     CO2   Date Value Ref Range Status   12/22/2022 24.6 22.0 - 29.0 mmol/L Final     BUN   Date Value Ref Range Status   12/22/2022 7 (L) 8 - 23 mg/dL Final     Creatinine   Date  Value Ref Range Status   12/22/2022 0.56 (L) 0.57 - 1.00 mg/dL Final     Glucose   Date Value Ref Range Status   12/22/2022 109 (H) 65 - 99 mg/dL Final     Calcium   Date Value Ref Range Status   12/22/2022 8.6 8.6 - 10.5 mg/dL Final     Magnesium   Date Value Ref Range Status   12/22/2022 2.1 1.6 - 2.4 mg/dL Final     Phosphorus   Date Value Ref Range Status   12/22/2022 2.5 2.5 - 4.5 mg/dL Final     AST (SGOT)   Date Value Ref Range Status   12/22/2022 12 1 - 32 U/L Final     ALT (SGPT)   Date Value Ref Range Status   12/22/2022 20 1 - 33 U/L Final     Alkaline Phosphatase   Date Value Ref Range Status   12/22/2022 96 39 - 117 U/L Final                Imaging Results (All)     None            Assessment:  Active Hospital Problems    Diagnosis  POA   • **Hyponatremia [E87.1]  Yes      Resolved Hospital Problems   No resolved problems to display.   anemia   Hyperglycemia  Hypothyroidism  anxiety    Plan:  Will continue lasix and salt tablets per dr shay  Monitor on telemetry  Trend sodium  Reorder home meds  D.w patient, nurse and ED Provider    Jessenia Gonzalez MD  12/22/2022  20:59 EST

## 2022-12-23 NOTE — PLAN OF CARE
Goal Outcome Evaluation:  Plan of Care Reviewed With: patient        Progress: improving  Outcome Evaluation: Pt AOx4. Very anxious (baseline). On RA, NSR on monitor. Up ad chencho w/ family in the room. Na 128 this am--nephro adding lasix to home meds and thinks pt is ok to d/c home. D/c orders in place. Pt not in any dsitress. Nursing will continue to monitor.

## 2022-12-26 ENCOUNTER — TRANSITIONAL CARE MANAGEMENT TELEPHONE ENCOUNTER (OUTPATIENT)
Dept: CALL CENTER | Facility: HOSPITAL | Age: 82
End: 2022-12-26

## 2022-12-26 NOTE — OUTREACH NOTE
Call Center TCM Note    Flowsheet Row Responses   [facility] patient discharged from? [place]   Does the patient have one of the following disease processes/diagnoses(primary or secondary)? Other   TCM attempt successful? 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 Yes   Is the patient having any side effects they believe may be caused by any medication additions or changes? No   Does the patient have all medications ordered at discharge? Yes   Is the patient taking all medications as directed (includes completed medication regime)? Yes   Does the patient have an appointment with their PCP within 7 days of discharge? Yes  [1/4/23 at 11:00 AM]   What is the Home health agency?  [facility]    Has home health visited the patient within 72 hours of discharge? Yes   Psychosocial issues? No   Did the patient receive a copy of their discharge instructions? Yes   Nursing interventions Reviewed instructions with patient   What is the patient's perception of their health status since discharge? Improving   Is the patient/caregiver able to teach back signs and symptoms related to disease process for when to call PCP? Yes   Is the patient/caregiver able to teach back signs and symptoms related to disease process for when to call 911? Yes   Is the patient/caregiver able to teach back the hierarchy of who to call/visit for symptoms/problems? PCP, Specialist, Home health nurse, Urgent Care, ED, 911 Yes   If the patient is a current smoker, are they able to teach back resources for cessation? Not a smoker   TCM call completed? Yes   Wrap up additional comments Spouse states pt is not in a lot of pain today, but feels pt is depressed/anxiety. Spouse is requesting HH to get lab work for BMP(sodium recheck)/routine fasting labs  pt normally gets,  RN will send a message to PCP office about needed labs. Spouse verified PCP hospital fu appt on 1/4/23.   Call end time 1104   Would this patient benefit from a Referral to [facility] Social Work? No   Is the patient interested in additional calls from an ambulatory ?  NOTE:  applies to high risk patients requiring additional follow-up. No          Mayra Starr RN    12/26/2022, 11:32 EST

## 2022-12-26 NOTE — CASE MANAGEMENT/SOCIAL WORK
Case Management Discharge Note      Final Note: Home w/ [facility], family transport         Selected Continued Care - Discharged on 12/23/2022 Admission date: 12/22/2022 - Discharge disposition: Home or Self Care    Destination    No services have been selected for the patient.              Durable Medical Equipment    No services have been selected for the patient.              Dialysis/Infusion    No services have been selected for the patient.              Home Medical Care Coordination complete.    Service Provider Selected Services Address Phone Fax Patient Preferred    [facility] ,  [facility] Services [address] [address]  [facility] [address] [address] [phone] --          Therapy    No services have been selected for the patient.              Community Resources    No services have been selected for the patient.              [facility]    No services have been selected for the patient.                Selected Continued Care - Prior Encounters Includes continued care and service providers with selected services from prior encounters from 9/23/2022 to 12/23/2022    Discharged on 11/2/2022 Admission date: 10/27/2022 - Discharge disposition: Skilled Nursing Facility (DC - External)    Destination     Service Provider Selected Services Address Phone Fax Patient Preferred    [facility] [address] , [facility] [id number] [phone] --          Home Medical Care     Service Provider Selected Services Address Phone Fax Patient Preferred    [facility] [address] [facility] [address] [id number] [phone] --                         Final Discharge Disposition Code: 06 - home with home health care

## 2022-12-27 ENCOUNTER — TELEPHONE (OUTPATIENT)
Dept: FAMILY MEDICINE CLINIC | Facility: CLINIC | Age: 82
End: 2022-12-27

## 2022-12-27 NOTE — TELEPHONE ENCOUNTER
Caller: Nu Martino    Relationship: Emergency Contact    Best call back number: [phone]    What orders are you requesting (i.e. lab or imaging): LABS    In what timeframe would the patient need to come in: DONE THRU [facility] AT HOME    Where will you receive your lab/imaging services: HOME    Additional notes: PATIENT'S SPOUSE (ON BH VERBAL) SAYS THAT PATIENT WAS RECENTLY IN THE HOSPITAL AND HAS LOW SODIUM.  HE REQUESTS AN ORDER FOR LAB TEST TO CHECK SODIUM LEVEL BE SENT TO [facility] AT FAX:  [phone] SO THAT THE RESULTS WILL BE BACK BEFORE HER OFFICE APPOINTMENT ON 1/4/23.    PLEASE ADVISE.

## 2022-12-28 RX ORDER — ALPRAZOLAM 0.5 MG/1
0.5 TABLET ORAL 3 TIMES DAILY PRN
Qty: 90 TABLET | Refills: 3 | Status: SHIPPED | OUTPATIENT
Start: 2022-12-28

## 2022-12-30 ENCOUNTER — TELEPHONE (OUTPATIENT)
Dept: FAMILY MEDICINE CLINIC | Facility: CLINIC | Age: 82
End: 2022-12-30
Payer: MEDICARE

## 2022-12-30 NOTE — TELEPHONE ENCOUNTER
Caller: Naina Martino    Relationship: Emergency Contact    Best call back number: [phone]    What was the call regarding: THIS CALL IS IN REGARDS TO THE PATIENT'S LONG TERM CARE INSURANCE CLAIM. PATIENT'S  STATES THAT IF HE GETS A REQUEST FOR A CLAIM FROM THE INSURANCE COMPANY. NAINA (THE PATIENT'S ) CAN HELP PROVIDE INFORMATION FOR YOUR RESPONSE, PLEASE LET HIM KNOW.     Do you require a callback: YES

## 2023-01-03 NOTE — TELEPHONE ENCOUNTER
When we get form from insurance he has given lakesha/inessa helpful info on how to fill out and if questions to call him

## 2023-01-04 ENCOUNTER — TELEPHONE (OUTPATIENT)
Dept: NEUROLOGY | Facility: CLINIC | Age: 83
End: 2023-01-04
Payer: MEDICARE

## 2023-01-04 ENCOUNTER — OFFICE VISIT (OUTPATIENT)
Dept: FAMILY MEDICINE CLINIC | Facility: CLINIC | Age: 83
End: 2023-01-04
Payer: MEDICARE

## 2023-01-04 VITALS
HEART RATE: 106 BPM | OXYGEN SATURATION: 97 % | HEIGHT: 60 IN | DIASTOLIC BLOOD PRESSURE: 54 MMHG | BODY MASS INDEX: 20.38 KG/M2 | SYSTOLIC BLOOD PRESSURE: 100 MMHG | TEMPERATURE: 97.8 F | WEIGHT: 103.8 LBS

## 2023-01-04 DIAGNOSIS — Z09 HOSPITAL DISCHARGE FOLLOW-UP: Primary | ICD-10-CM

## 2023-01-04 DIAGNOSIS — I10 ESSENTIAL HYPERTENSION, BENIGN: ICD-10-CM

## 2023-01-04 DIAGNOSIS — D50.0 IRON DEFICIENCY ANEMIA DUE TO CHRONIC BLOOD LOSS: ICD-10-CM

## 2023-01-04 DIAGNOSIS — E87.1 HYPONATREMIA: ICD-10-CM

## 2023-01-04 PROCEDURE — 99495 TRANSJ CARE MGMT MOD F2F 14D: CPT | Performed by: NURSE PRACTITIONER

## 2023-01-04 PROCEDURE — 1111F DSCHRG MED/CURRENT MED MERGE: CPT | Performed by: NURSE PRACTITIONER

## 2023-01-04 RX ORDER — FERROUS SULFATE 325(65) MG
TABLET ORAL
COMMUNITY
Start: 2023-01-01 | End: 2023-02-07

## 2023-01-04 NOTE — TELEPHONE ENCOUNTER
Provider: RA CHILDERS  Caller: NAINA SCHILLNIG  Relationship to Patient:   Pharmacy: N/A  Phone Number: [phone]  Reason for Call: PATIENT HAD NEURO PSYCH EVAL COMPLETED ON 12/12/2022.    DOES THE PATIENT NEED A F/U APPT?    WHAT IS RA'S THOUGHTS ON THE RESULTS OF EVAL?    THEY RECEIVED A CALL FROM [facility] BUT DID NOT SCHEDULE WITH THEM, THEY ARE REQUESTING A PSYCHIARTRIST WITH  IN THE EAST END    PLEASE CALL AND ADVISE     When was the patient last seen: 10/11/2022

## 2023-01-04 NOTE — TELEPHONE ENCOUNTER
Patient is scheduled for 01/11/2023 at 2:30pm for the fu the  states he has a copy of the report and will bring it ,bob MOSQUERA

## 2023-01-04 NOTE — PROGRESS NOTES
Transitional Care Follow Up Visit  Subjective     Ritu ANGELES Martino is a 82 y.o. female who presents for a transitional care management visit.    Within 48 business hours after discharge our office contacted her via telephone to coordinate her care and needs.      I reviewed and discussed the details of that call along with the discharge summary, hospital problems, inpatient lab results, inpatient diagnostic studies, and consultation reports with Ritu.     Current outpatient and discharge medications have been reconciled for the patient.  Reviewed by: FIONA Cheema      Date of TCM Phone Call 12/23/2022   [facility]   Date of Admission 12/22/2022   Date of Discharge 12/23/2022   Discharge Disposition Home-Health Care [facility]     Risk for Readmission (LACE) No data recorded      History of Present Illness  Patient presents office today for hospital discharge follow-up.  Patient was sent to the hospital for low sodium level of 122 accompanied by mild weakness and confusion.  She has a chronic condition for SIADH.  Her baseline sodium level is around 128.  Patient is to follow-up with nephrology. She has an appt in January.  Blood pressure is 154 today.  Patient to monitor blood pressure at home.             Course During Hospital Stay:  12/22/2022-12/23/2022     The following portions of the patient's history were reviewed and updated as appropriate: allergies, current medications, past family history, past medical history, past social history, past surgical history and problem list.    Review of Systems   Constitutional: Negative for activity change, fatigue and fever.   HENT: Negative for congestion, dental problem, nosebleeds and postnasal drip.    Eyes: Negative for visual disturbance.   Respiratory: Negative for cough, shortness of breath and wheezing.    Cardiovascular: Negative for chest pain, palpitations and leg swelling.   Gastrointestinal: Negative for abdominal pain, anal bleeding and  blood in stool.   Endocrine: Negative for cold intolerance and heat intolerance.   Genitourinary: Negative for dysuria.   Musculoskeletal: Negative for gait problem.   Skin: Negative for pallor and rash.   Allergic/Immunologic: Negative for environmental allergies, food allergies and immunocompromised state.   Neurological: Negative for dizziness.   Hematological: Does not bruise/bleed easily.   Psychiatric/Behavioral: Negative for behavioral problems and confusion. The patient is not nervous/anxious.        Objective   Physical Exam  Constitutional:       General: She is not in acute distress.     Appearance: Normal appearance.   HENT:      Head: Normocephalic.   Eyes:      Pupils: Pupils are equal, round, and reactive to light.   Cardiovascular:      Rate and Rhythm: Normal rate.      Pulses: Normal pulses.      Heart sounds: Normal heart sounds.   Pulmonary:      Effort: Pulmonary effort is normal.      Breath sounds: Normal breath sounds.   Musculoskeletal:         General: Normal range of motion.      Cervical back: Normal range of motion and neck supple.   Skin:     General: Skin is warm.   Neurological:      General: No focal deficit present.      Mental Status: She is alert and oriented to person, place, and time.   Psychiatric:         Mood and Affect: Mood normal.         Behavior: Behavior normal.         Thought Content: Thought content normal.         Judgment: Judgment normal.         Assessment & Plan   Problems Addressed this Visit        Cardiac and Vasculature    Essential hypertension, benign     Hypertension is unchanged.  Continue current treatment regimen.  Blood pressure will be reassessed at the next regular appointment.         Relevant Orders    CBC & Differential (Completed)    Basic metabolic panel (Completed)       Genitourinary and Reproductive     Hyponatremia    Relevant Orders    CBC & Differential (Completed)    Basic metabolic panel (Completed)       Health Encounters    Hospital  discharge follow-up - Primary    Relevant Orders    CBC & Differential (Completed)    Basic metabolic panel (Completed)       Hematology and Neoplasia    Anemia    Relevant Medications    FeroSul 325 (65 Fe) MG tablet    Other Relevant Orders    CBC & Differential (Completed)    Basic metabolic panel (Completed)   Diagnoses       Codes Comments    Hospital discharge follow-up    -  Primary ICD-10-CM: Z09  ICD-9-CM: V67.59     Hyponatremia     ICD-10-CM: E87.1  ICD-9-CM: 276.1     Essential hypertension, benign     ICD-10-CM: I10  ICD-9-CM: 401.1     Iron deficiency anemia due to chronic blood loss     ICD-10-CM: D50.0  ICD-9-CM: 280.0

## 2023-01-05 ENCOUNTER — TELEPHONE (OUTPATIENT)
Dept: FAMILY MEDICINE CLINIC | Facility: CLINIC | Age: 83
End: 2023-01-05
Payer: MEDICARE

## 2023-01-05 LAB
BASOPHILS # BLD AUTO: 0 X10E3/UL (ref 0–0.2)
BASOPHILS NFR BLD AUTO: 0 %
BUN SERPL-MCNC: 10 MG/DL (ref 8–27)
BUN/CREAT SERPL: 18 (ref 12–28)
CALCIUM SERPL-MCNC: 8.8 MG/DL (ref 8.7–10.3)
CHLORIDE SERPL-SCNC: 98 MMOL/L (ref 96–106)
CO2 SERPL-SCNC: 24 MMOL/L (ref 20–29)
CREAT SERPL-MCNC: 0.57 MG/DL (ref 0.57–1)
EGFRCR SERPLBLD CKD-EPI 2021: 91 ML/MIN/1.73
EOSINOPHIL # BLD AUTO: 0.1 X10E3/UL (ref 0–0.4)
EOSINOPHIL NFR BLD AUTO: 2 %
ERYTHROCYTE [DISTWIDTH] IN BLOOD BY AUTOMATED COUNT: 12 % (ref 11.7–15.4)
GLUCOSE SERPL-MCNC: 94 MG/DL (ref 70–99)
HCT VFR BLD AUTO: 33.5 % (ref 34–46.6)
HGB BLD-MCNC: 11.1 G/DL (ref 11.1–15.9)
IMM GRANULOCYTES # BLD AUTO: 0 X10E3/UL (ref 0–0.1)
IMM GRANULOCYTES NFR BLD AUTO: 0 %
LYMPHOCYTES # BLD AUTO: 0.6 X10E3/UL (ref 0.7–3.1)
LYMPHOCYTES NFR BLD AUTO: 11 %
MCH RBC QN AUTO: 30.9 PG (ref 26.6–33)
MCHC RBC AUTO-ENTMCNC: 33.1 G/DL (ref 31.5–35.7)
MCV RBC AUTO: 93 FL (ref 79–97)
MONOCYTES # BLD AUTO: 0.6 X10E3/UL (ref 0.1–0.9)
MONOCYTES NFR BLD AUTO: 10 %
NEUTROPHILS # BLD AUTO: 4 X10E3/UL (ref 1.4–7)
NEUTROPHILS NFR BLD AUTO: 77 %
PLATELET # BLD AUTO: 334 X10E3/UL (ref 150–450)
POTASSIUM SERPL-SCNC: 4.3 MMOL/L (ref 3.5–5.2)
RBC # BLD AUTO: 3.59 X10E6/UL (ref 3.77–5.28)
SODIUM SERPL-SCNC: 137 MMOL/L (ref 134–144)
WBC # BLD AUTO: 5.3 X10E3/UL (ref 3.4–10.8)

## 2023-01-05 RX ORDER — DESVENLAFAXINE 100 MG/1
100 TABLET, EXTENDED RELEASE ORAL DAILY
Qty: 90 TABLET | Refills: 3 | Status: SHIPPED | OUTPATIENT
Start: 2023-01-05

## 2023-01-10 ENCOUNTER — NURSE TRIAGE (OUTPATIENT)
Dept: CALL CENTER | Facility: HOSPITAL | Age: 83
End: 2023-01-10
Payer: MEDICARE

## 2023-01-10 ENCOUNTER — OUTSIDE FACILITY SERVICE (OUTPATIENT)
Dept: FAMILY MEDICINE CLINIC | Facility: CLINIC | Age: 83
End: 2023-01-10
Payer: MEDICARE

## 2023-01-10 ENCOUNTER — DOCUMENTATION (OUTPATIENT)
Dept: FAMILY MEDICINE CLINIC | Facility: CLINIC | Age: 83
End: 2023-01-10
Payer: MEDICARE

## 2023-01-10 PROCEDURE — OUTSIDEPOS PR OUTSIDE POS PLACEHOLDER

## 2023-01-10 RX ORDER — HYDRALAZINE HYDROCHLORIDE 10 MG/1
10 TABLET, FILM COATED ORAL 3 TIMES DAILY
Qty: 90 TABLET | Refills: 3 | Status: SHIPPED | OUTPATIENT
Start: 2023-01-10 | End: 2023-01-11

## 2023-01-10 NOTE — PROGRESS NOTES
1/10/2023 5:30 PM    Patient called and stated that her systolic blood pressure was 109/60.  Patient states she was not dizzy.  Patient was informed to take hydralazine 10 mg p.o. 3 times daily as needed systolic greater or equal to 150.  She was also informed to continue valsartan 160 mg daily with diltiazem 120 mg daily.  Patient is to monitor blood pressure and call back in 2 weeks with blood pressure readings.

## 2023-01-10 NOTE — TELEPHONE ENCOUNTER
Caller has been on BP meds for a long time and her BP has been well controlled.  She had lasix added recently and for the last several days has felt very tired.  She is 106/63 and is usually 120's/80.  She has spoken with someone in the office already and is waiting on a call back.  Discussed parameters of possibly holding her med that is due tonight if BP remains low and call office first thing in the am if the office does not call her back this evening.  Questions answered.  Reason for Disposition  • [1] Systolic BP  AND [2] taking blood pressure medications AND [3] dizzy, lightheaded or weak    Additional Information  • Negative: Started suddenly after an allergic medicine, an allergic food, or bee sting  • Negative: Shock suspected (e.g., cold/pale/clammy skin, too weak to stand, low BP, rapid pulse)  • Negative: Difficult to awaken or acting confused (e.g., disoriented, slurred speech)  • Negative: Fainted  • Negative: [1] Systolic BP < 90 AND [2] dizzy, lightheaded, or weak  • Negative: Chest pain  • Negative: Bleeding (e.g., vomiting blood, rectal bleeding or tarry stools, severe vaginal bleeding)(Exception: fainted from sight of small amount of blood; small cut or abrasion)  • Negative: Extra heart beats or heart is beating fast  (i.e., \"palpitations\")  • Negative: Sounds like a life-threatening emergency to the triager  • Negative: [1] Systolic BP < 80 AND [2] NOT dizzy, lightheaded or weak  • Negative: Abdominal pain  • Negative: Fever > 100.4 F (38.0 C)  • Negative: Major surgery in the past month  • Negative: [1] Drinking very little AND [2] dehydration suspected (e.g., no urine > 12 hours, very dry mouth, very lightheaded)  • Negative: [1] Fall in systolic BP > 20 mm Hg from normal AND [2] dizzy, lightheaded, or weak  • Negative: Patient sounds very sick or weak to the triager  • Negative: [1] Systolic BP < 90 AND [2] NOT dizzy, lightheaded or weak  • Negative: [1] Systolic BP  AND [2]  taking blood pressure medications AND [3] NOT dizzy, lightheaded or weak    Answer Assessment - Initial Assessment Questions  1. BLOOD PRESSURE: \"What is the blood pressure?\" \"Did you take at least two measurements 5 minutes apart?\"      106/63 yes  2. ONSET: \"When did you take your blood pressure?\"      Just a bit ago  3. HOW: \"How did you obtain the blood pressure?\" (e.g., visiting nurse, automatic home BP monitor)      Home monitor  4. HISTORY: \"Do you have a history of low blood pressure?\" \"What is your blood pressure normally?\"      Yes on several meds  5. MEDICATIONS: \"Are you taking any medications for blood pressure?\" If yes: \"Have they been changed recently?\"      Yes, added lasix  6. PULSE RATE: \"Do you know what your pulse rate is?\"       Is ok  7. OTHER SYMPTOMS: \"Have you been sick recently?\" \"Have you had a recent injury?\"      tired  8. PREGNANCY: \"Is there any chance you are pregnant?\" \"When was your last menstrual period?\"      no    Protocols used: LOW BLOOD PRESSURE-ADULT-AH

## 2023-01-11 ENCOUNTER — OFFICE VISIT (OUTPATIENT)
Dept: NEUROLOGY | Facility: CLINIC | Age: 83
End: 2023-01-11
Payer: MEDICARE

## 2023-01-11 VITALS
DIASTOLIC BLOOD PRESSURE: 60 MMHG | SYSTOLIC BLOOD PRESSURE: 110 MMHG | WEIGHT: 106.4 LBS | OXYGEN SATURATION: 96 % | HEIGHT: 60 IN | HEART RATE: 85 BPM | BODY MASS INDEX: 20.89 KG/M2

## 2023-01-11 DIAGNOSIS — E87.1 HYPONATREMIA: ICD-10-CM

## 2023-01-11 DIAGNOSIS — R56.9 SEIZURE: ICD-10-CM

## 2023-01-11 DIAGNOSIS — F32.A ANXIETY AND DEPRESSION: Primary | ICD-10-CM

## 2023-01-11 DIAGNOSIS — F41.9 ANXIETY AND DEPRESSION: Primary | ICD-10-CM

## 2023-01-11 DIAGNOSIS — G31.84 MCI (MILD COGNITIVE IMPAIRMENT): ICD-10-CM

## 2023-01-11 PROCEDURE — 99214 OFFICE O/P EST MOD 30 MIN: CPT | Performed by: PHYSICIAN ASSISTANT

## 2023-01-11 NOTE — PROGRESS NOTES
CC: Follow-up    HPI:  Ritu Martino is a  82 y.o.  right-handed female with past medical history of anxiety depression, hypertension, hypothyroidism, insomnia, prolonged hospitalization for pelvic abscess with ileostomy and subsequent takedown over a year ago.  She was hospitalized this fall with thought of seizures.  Other issues have included hyponatremia, confusion, falls and weakness, pelvic and vertebral compression fractures and pain, multiple hospitalizations for these said problems.  She presents today for follow-up with .  She is doing quite well.  Her pain is improved.  She is more socially engaged.   says she is reading the newspaper she is ambulating better she has had her neuropsychological evaluation which is consistent with mild neurocognitive disorder with Co. contributing anxiety and depression and regular psychologist was recommended.  Her sodium level seems to be more stable off the hydralazine and on to Lasix      Social history:    Family history:      Pain Scale:        ROS:  Review of Systems   Constitutional: Negative for activity change, fatigue and unexpected weight change.   HENT: Negative for ear pain, nosebleeds and tinnitus.    Eyes: Negative for photophobia, pain and visual disturbance.   Respiratory: Negative for chest tightness, shortness of breath, wheezing and stridor.    Cardiovascular: Negative for chest pain and palpitations.   Musculoskeletal: Negative for gait problem, neck pain and neck stiffness.   Neurological: Positive for syncope. Negative for dizziness, weakness and light-headedness.   Psychiatric/Behavioral: Negative for confusion, decreased concentration and dysphoric mood. The patient is not nervous/anxious.          Reviewed ROS conducted by MA and sima        Physical Exam:  There were no vitals filed for this visit.   Orthostatic BP:    There is no height or weight on file to calculate BMI.        General: pt well appearing, no distress  HEENT:  Normocephalic, conjunctiva normal, external canals normal, no nasal discharge, moist mucous membranes  Neck: No lymphadenopathy, thyroid not enlarged, no JVD  CV: Regular rate and rhythm, no murmurs negative no bruits auscultated at neck, equal pulses  Pulmonary: Normal respiratory effort, clear to auscultation bilaterally  Extremities: no edema, bruising, or skin lesions  Pysch: good eye contact, cooperative, full affect, euthymic mood, good attention, good insight  Mental: alert, conversant, AOx3, provides history, 3/3 recall.  Language fluent, names, repeats.  CN: CN II-XII intact, PERRL, EOMi, no gaze palsy or nystagmus, intact facial sensation, no facial assymetry, intact hearing, symmetric palate elevation, tongue midline, good SCM strength  Motor: no abnormal movements, no pronator drift, normal  bulk and tone, 5/5 strength b/l UE & LE  Sensory: normal sensation to crude touch, temperature, and vibration throughout  Reflexes: 2+ throughout, neg babinski  Coordination: no ataxia on finger-nose, heel-shin  Gait: normal gait, no ataxia      Results:      Lab Results   Component Value Date    GLUCOSE 94 01/04/2023    BUN 10 01/04/2023    CREATININE 0.57 01/04/2023    EGFRIFNONA 66 03/21/2019    BCR 18 01/04/2023    CO2 24 01/04/2023    CALCIUM 8.8 01/04/2023    PROTENTOTREF 7.1 12/01/2022    ALBUMIN 4.30 12/22/2022    LABIL2 2.2 12/01/2022    AST 12 12/22/2022    ALT 20 12/22/2022       Lab Results   Component Value Date    WBC 5.3 01/04/2023    HGB 11.1 01/04/2023    HCT 33.5 (L) 01/04/2023    MCV 93 01/04/2023     01/04/2023         .No results found for: RPR      Lab Results   Component Value Date    TSH 1.320 10/27/2022    S5TYDMQ 10.00 10/27/2022    THYROIDAB 13 06/06/2022         Lab Results   Component Value Date    YDRSNKUH24 >2,000 (H) 12/21/2022         Lab Results   Component Value Date    FOLATE 7.46 12/21/2022         Lab Results   Component Value Date    HGBA1C 5.00 05/07/2022         Lab  Results   Component Value Date    GLUCOSE 94 01/04/2023    BUN 10 01/04/2023    CREATININE 0.57 01/04/2023    EGFRIFNONA 66 03/21/2019    BCR 18 01/04/2023    K 4.3 01/04/2023    CO2 24 01/04/2023    CALCIUM 8.8 01/04/2023    PROTENTOTREF 7.1 12/01/2022    ALBUMIN 4.30 12/22/2022    LABIL2 2.2 12/01/2022    AST 12 12/22/2022    ALT 20 12/22/2022         Diagnosis:  1.  MCI  2.  Anxiety depression  3.  Hyponatremia    Impression: 82-year-old female with past medical history of anxiety depression, hypertension, hypothyroidism, insomnia, frequent and repeated hospitalizations over the last 12 months for UTIs, confusion, thought of seizure, orthopedic fractures, chronic hyponatremia in the setting of severe anxiety and depression.  She is following up today after her neuropsychological evaluation.  Clinically she is improved and doing better at home.  She is requiring less opioids for pain.  There is also a caregiver coming in the home.  I encouraged regular behavioral therapy sessions with an established psychologist/ psychiatrist.  She needs follow-up of her medical problems to ensure stability.  Continued vascular risk factor reduction, encouraged exercise regularly    Follow-up in 4 months    I spent at least 30 minutes interviewing, examining, and counseling patient.  I independently reviewed documentation, laboratory and diagnostic findings, external documentation where applicable, and formulated treatment plan which was discussed with the patient.      Plan:

## 2023-01-11 NOTE — LETTER
January 11, 2023     Gaurav Barger MD  [address] [facility] [address]    Patient: Ritu Martino   YOB: 1940   Date of Visit: 1/11/2023       Dear Dr. Charbel MD:    Thank you for referring Ritu Martino to me for evaluation. Below are the relevant portions of my assessment and plan of care.    If you have questions, please do not hesitate to call me. I look forward to following Ritu along with you.         Sincerely,        LAURIE Aguilar        CC: No Recipients  Clemencia Curran PA  01/25/23 7337  Signed  CC: Follow-up    HPI:  Ritu Martino is a  82 y.o.  right-handed female with past medical history of anxiety depression, hypertension, hypothyroidism, insomnia, prolonged hospitalization for pelvic abscess with ileostomy and subsequent takedown over a year ago.  She was hospitalized this fall with thought of seizures.  Other issues have included hyponatremia, confusion, falls and weakness, pelvic and vertebral compression fractures and pain, multiple hospitalizations for these said problems.  She presents today for follow-up with .  She is doing quite well.  Her pain is improved.  She is more socially engaged.   says she is reading the newspaper she is ambulating better she has had her neuropsychological evaluation which is consistent with mild neurocognitive disorder with Co. contributing anxiety and depression and regular psychologist was recommended.  Her sodium level seems to be more stable off the hydralazine and on to Lasix      Social history:    Family history:      Pain Scale:        ROS:  Review of Systems   Constitutional: Negative for activity change, fatigue and unexpected weight change.   HENT: Negative for ear pain, nosebleeds and tinnitus.    Eyes: Negative for photophobia, pain and visual disturbance.   Respiratory: Negative for chest tightness, shortness of breath, wheezing and stridor.    Cardiovascular: Negative for chest pain and  palpitations.   Musculoskeletal: Negative for gait problem, neck pain and neck stiffness.   Neurological: Positive for syncope. Negative for dizziness, weakness and light-headedness.   Psychiatric/Behavioral: Negative for confusion, decreased concentration and dysphoric mood. The patient is not nervous/anxious.          Reviewed ROS conducted by MA and sima        Physical Exam:  There were no vitals filed for this visit.   Orthostatic BP:    There is no height or weight on file to calculate BMI.        General: pt well appearing, no distress  HEENT: Normocephalic, conjunctiva normal, external canals normal, no nasal discharge, moist mucous membranes  Neck: No lymphadenopathy, thyroid not enlarged, no JVD  CV: Regular rate and rhythm, no murmurs negative no bruits auscultated at neck, equal pulses  Pulmonary: Normal respiratory effort, clear to auscultation bilaterally  Extremities: no edema, bruising, or skin lesions  Pysch: good eye contact, cooperative, full affect, euthymic mood, good attention, good insight  Mental: alert, conversant, AOx3, provides history, 3/3 recall.  Language fluent, names, repeats.  CN: CN II-XII intact, PERRL, EOMi, no gaze palsy or nystagmus, intact facial sensation, no facial assymetry, intact hearing, symmetric palate elevation, tongue midline, good SCM strength  Motor: no abnormal movements, no pronator drift, normal  bulk and tone, 5/5 strength b/l UE & LE  Sensory: normal sensation to crude touch, temperature, and vibration throughout  Reflexes: 2+ throughout, neg babinski  Coordination: no ataxia on finger-nose, heel-shin  Gait: normal gait, no ataxia      Results:      Lab Results   Component Value Date    GLUCOSE 94 01/04/2023    BUN 10 01/04/2023    CREATININE 0.57 01/04/2023    EGFRIFNONA 66 03/21/2019    BCR 18 01/04/2023    CO2 24 01/04/2023    CALCIUM 8.8 01/04/2023    PROTENTOTREF 7.1 12/01/2022    ALBUMIN 4.30 12/22/2022    LABIL2 2.2 12/01/2022    AST 12 12/22/2022     ALT 20 12/22/2022       Lab Results   Component Value Date    WBC 5.3 01/04/2023    HGB 11.1 01/04/2023    HCT 33.5 (L) 01/04/2023    MCV 93 01/04/2023     01/04/2023         .No results found for: RPR      Lab Results   Component Value Date    TSH 1.320 10/27/2022    S0ADAFO 10.00 10/27/2022    THYROIDAB 13 06/06/2022         Lab Results   Component Value Date    HCESDTTA93 >2,000 (H) 12/21/2022         Lab Results   Component Value Date    FOLATE 7.46 12/21/2022         Lab Results   Component Value Date    HGBA1C 5.00 05/07/2022         Lab Results   Component Value Date    GLUCOSE 94 01/04/2023    BUN 10 01/04/2023    CREATININE 0.57 01/04/2023    EGFRIFNONA 66 03/21/2019    BCR 18 01/04/2023    K 4.3 01/04/2023    CO2 24 01/04/2023    CALCIUM 8.8 01/04/2023    PROTENTOTREF 7.1 12/01/2022    ALBUMIN 4.30 12/22/2022    LABIL2 2.2 12/01/2022    AST 12 12/22/2022    ALT 20 12/22/2022         Diagnosis:  1.  MCI  2.  Anxiety depression  3.  Hyponatremia    Impression: 82-year-old female with past medical history of anxiety depression, hypertension, hypothyroidism, insomnia, frequent and repeated hospitalizations over the last 12 months for UTIs, confusion, thought of seizure, orthopedic fractures, chronic hyponatremia in the setting of severe anxiety and depression.  She is following up today after her neuropsychological evaluation.  Clinically she is improved and doing better at home.  She is requiring less opioids for pain.  There is also a caregiver coming in the home.  I encouraged regular behavioral therapy sessions with an established psychologist/ psychiatrist.  She needs follow-up of her medical problems to ensure stability.  Continued vascular risk factor reduction, encouraged exercise regularly    Follow-up in 4 months    I spent at least 30 minutes interviewing, examining, and counseling patient.  I independently reviewed documentation, laboratory and diagnostic findings, external documentation  where applicable, and formulated treatment plan which was discussed with the patient.      Plan:

## 2023-01-12 ENCOUNTER — TELEPHONE (OUTPATIENT)
Dept: ORTHOPEDIC SURGERY | Facility: CLINIC | Age: 83
End: 2023-01-12
Payer: MEDICARE

## 2023-01-12 DIAGNOSIS — S32.591D: Primary | ICD-10-CM

## 2023-01-12 NOTE — TELEPHONE ENCOUNTER
Caller: Nu Martino    Relationship: Emergency Contact    Best call back number:     What is the best time to reach you: ANY    Who are you requesting to speak with (clinical staff, provider,  specific staff member): CLINICAL    What was the call regarding: PATIENT WANTS TO TRY AND GET XRAY DONE OF PUBIC BONE AND ALSO WANTS TO KNOW IF ITS OK TO ENGAGE IN PHYSICAL ACTIVITY    Do you require a callback: YES

## 2023-01-16 ENCOUNTER — HOSPITAL ENCOUNTER (OUTPATIENT)
Dept: GENERAL RADIOLOGY | Facility: HOSPITAL | Age: 83
Discharge: HOME | End: 2023-01-16
Admitting: NURSE PRACTITIONER
Payer: MEDICARE

## 2023-01-16 PROCEDURE — 72170 X-RAY EXAM OF PELVIS: CPT

## 2023-01-17 ENCOUNTER — TELEPHONE (OUTPATIENT)
Dept: FAMILY MEDICINE CLINIC | Facility: CLINIC | Age: 83
End: 2023-01-17
Payer: MEDICARE

## 2023-01-17 NOTE — TELEPHONE ENCOUNTER
Strongly recommend she go to physical therapy to increase her ability to walk, can do it at home with [facility], need to call in order so they can do it.

## 2023-01-17 NOTE — TELEPHONE ENCOUNTER
Called to report patient multiple falls from Sunday, Monday and today pt has reported she is losing her balance no injuries and got up by herself all three times; I asked if patient is experiencing any pain today she stated no pain.

## 2023-01-18 ENCOUNTER — TELEPHONE (OUTPATIENT)
Dept: FAMILY MEDICINE CLINIC | Facility: CLINIC | Age: 83
End: 2023-01-18
Payer: MEDICARE

## 2023-01-18 DIAGNOSIS — R29.898 WEAKNESS OF LOWER EXTREMITY, UNSPECIFIED LATERALITY: Primary | ICD-10-CM

## 2023-01-18 NOTE — TELEPHONE ENCOUNTER
Provider: DANA RANDHAWA    Caller: NAINA SCHILLING    Relationship to Patient: SPOUSE    Phone Number: [phone]    Reason for Call: PATIENT WANTED TO LET [facility] KNOW THEY WILL RESTART PT WITH [facility]

## 2023-01-18 NOTE — TELEPHONE ENCOUNTER
Caller: MAT    Relationship: Home Health    Best call back number:[phone]      Do you require a callback: YES-PATIENT HAS HAD 3 FALLS IN PAST 3 DAYS WITH NO INJURY. PATIENT RECEIVED MESSAGE TO RESTART P.T. PLEASE CALL WITH VERBAL ORDERS.     ALSO MAT INSTRUCTED PATIENT  TO TAKE BLOOD PRESSURE BEFORE TAKING BLOOD PRESSURE MEDICATIONS AND 1-2 HOURS AFTER TAKING THEM.     PATIENT WAS TOLD TO WEIGH EVERY MORNING DUE TO BEING ON A DIURETIC.     PATIENT'S  WAS VERY OBJECTIONABLE TO EVERYTHING SHE SUGGESTED FOR HER TO DO.

## 2023-01-23 ENCOUNTER — OFFICE (OUTPATIENT)
Dept: URBAN - METROPOLITAN AREA CLINIC 76 | Facility: CLINIC | Age: 83
End: 2023-01-23

## 2023-01-23 VITALS
SYSTOLIC BLOOD PRESSURE: 106 MMHG | DIASTOLIC BLOOD PRESSURE: 74 MMHG | WEIGHT: 106 LBS | HEIGHT: 64 IN | HEART RATE: 65 BPM

## 2023-01-23 DIAGNOSIS — K59.04 CHRONIC IDIOPATHIC CONSTIPATION: ICD-10-CM

## 2023-01-23 DIAGNOSIS — D50.9 IRON DEFICIENCY ANEMIA, UNSPECIFIED: ICD-10-CM

## 2023-01-23 DIAGNOSIS — Z90.49 ACQUIRED ABSENCE OF OTHER SPECIFIED PARTS OF DIGESTIVE TRACT: ICD-10-CM

## 2023-01-23 DIAGNOSIS — K57.30 DIVERTICULOSIS OF LARGE INTESTINE WITHOUT PERFORATION OR ABS: ICD-10-CM

## 2023-01-23 PROCEDURE — 99214 OFFICE O/P EST MOD 30 MIN: CPT | Performed by: NURSE PRACTITIONER

## 2023-01-24 ENCOUNTER — TELEPHONE (OUTPATIENT)
Dept: ORTHOPEDIC SURGERY | Facility: CLINIC | Age: 83
End: 2023-01-24
Payer: MEDICARE

## 2023-01-24 NOTE — TELEPHONE ENCOUNTER
Pt spouse returning your phone call regarding tomorrows appt. And stated Pt Would like to know XR results and if ok to start PT again

## 2023-01-24 NOTE — TELEPHONE ENCOUNTER
Spoke to patient regarding her pelvic films.  Again she is reminded that the pelvic fracture is not something I generally treat.  We did discuss minimal weightbearing with the use of her walker at last visit.  These generally tend to heal on their own.  It does appear per x-ray that she has started healing, there is no further displacement.  She says today she is pain-free in the pelvis and even her back pain has improved greatly.  She had been to physical therapy but due to the pelvic fracture they had discontinued therapy.  It appears in reviewing her records that her PCP has entered an order to restart therapy.  I do agree that since she has not having the pelvic pain that she could certainly progress with therapy now.  Otherwise we will plan on seeing her back on an as-needed basis.  She did seem quite happy that her pain symptoms are currently well controlled.

## 2023-01-30 ENCOUNTER — TELEPHONE (OUTPATIENT)
Dept: FAMILY MEDICINE CLINIC | Facility: CLINIC | Age: 83
End: 2023-01-30
Payer: MEDICARE

## 2023-01-30 NOTE — TELEPHONE ENCOUNTER
Caller: Nu Martino    Relationship: Emergency Contact    Best call back number: [phone]    What orders are you requesting (i.e. lab or imaging): FASTING LABS    In what timeframe would the patient need to come in: PRIOR TO 2/7/23    Where will you receive your lab/imaging services: [facility]    Additional notes: PATIENT'S  REQUESTS ORDERS FOR LABS PRIOR TO HER APPOINTMENT WITH DR RANDHAWA TO BE SENT TO THE HOSPITAL. PLEASE CALL TO CONFIRM WHEN ORDERS ARE IN

## 2023-01-31 DIAGNOSIS — E87.1 HYPONATREMIA: Primary | ICD-10-CM

## 2023-02-01 ENCOUNTER — LAB (OUTPATIENT)
Dept: LAB | Facility: HOSPITAL | Age: 83
End: 2023-02-01
Payer: MEDICARE

## 2023-02-01 ENCOUNTER — TELEPHONE (OUTPATIENT)
Dept: INTERNAL MEDICINE | Facility: CLINIC | Age: 83
End: 2023-02-01

## 2023-02-01 DIAGNOSIS — E87.1 HYPONATREMIA: ICD-10-CM

## 2023-02-01 LAB
ANION GAP SERPL CALCULATED.3IONS-SCNC: 12.2 MMOL/L (ref 5–15)
BUN SERPL-MCNC: 11 MG/DL (ref 8–23)
BUN/CREAT SERPL: 21.6 (ref 7–25)
CALCIUM SPEC-SCNC: 8.8 MG/DL (ref 8.6–10.5)
CHLORIDE SERPL-SCNC: 93 MMOL/L (ref 98–107)
CO2 SERPL-SCNC: 26.8 MMOL/L (ref 22–29)
CREAT SERPL-MCNC: 0.51 MG/DL (ref 0.57–1)
EGFRCR SERPLBLD CKD-EPI 2021: 93.3 ML/MIN/1.73
GLUCOSE SERPL-MCNC: 90 MG/DL (ref 65–99)
POTASSIUM SERPL-SCNC: 4.2 MMOL/L (ref 3.5–5.2)
SODIUM SERPL-SCNC: 132 MMOL/L (ref 136–145)

## 2023-02-01 PROCEDURE — 80048 BASIC METABOLIC PNL TOTAL CA: CPT

## 2023-02-01 PROCEDURE — 36415 COLL VENOUS BLD VENIPUNCTURE: CPT

## 2023-02-01 NOTE — TELEPHONE ENCOUNTER
Caller: Ritu Martino    Relationship: Self    Best call back number:[phone]    Who are you requesting to speak with (clinical staff, provider,  specific staff member):CLINICAL    What was the call regarding: PATIENT IS LOOKING FOR A NEW PROVIDER, PATIENT WAS WONDERING OD  COULD GIVE HER A CALL TO ASK SOME GENERAL QUESTION.     CALLBACK-YES

## 2023-02-06 ENCOUNTER — TELEPHONE (OUTPATIENT)
Dept: FAMILY MEDICINE CLINIC | Facility: CLINIC | Age: 83
End: 2023-02-06
Payer: MEDICARE

## 2023-02-06 NOTE — TELEPHONE ENCOUNTER
Caller: MAHENDRA TAYLOR    Relationship: Home Health    Best call back number: [phone]    What orders are you requesting (i.e. lab or imaging): PHYSICAL THERAPY     Where will you receive your lab/imaging services: AT HOME     Additional notes: NEEDS VERBAL ORDERS FOR PT FOR 1X WEEKLY FOR 1 MONTH

## 2023-02-07 ENCOUNTER — OFFICE VISIT (OUTPATIENT)
Dept: FAMILY MEDICINE CLINIC | Facility: CLINIC | Age: 83
End: 2023-02-07
Payer: MEDICARE

## 2023-02-07 VITALS
SYSTOLIC BLOOD PRESSURE: 122 MMHG | HEART RATE: 86 BPM | OXYGEN SATURATION: 100 % | BODY MASS INDEX: 21.64 KG/M2 | HEIGHT: 60 IN | WEIGHT: 110.2 LBS | TEMPERATURE: 98.7 F | DIASTOLIC BLOOD PRESSURE: 70 MMHG

## 2023-02-07 DIAGNOSIS — E87.1 HYPONATREMIA: Primary | ICD-10-CM

## 2023-02-07 DIAGNOSIS — I10 ESSENTIAL HYPERTENSION, BENIGN: ICD-10-CM

## 2023-02-07 DIAGNOSIS — K57.92 DIVERTICULITIS: ICD-10-CM

## 2023-02-07 PROCEDURE — 99214 OFFICE O/P EST MOD 30 MIN: CPT | Performed by: INTERNAL MEDICINE

## 2023-02-07 RX ORDER — ZINC OXIDE 20 %
OINTMENT (GRAM) TOPICAL
COMMUNITY

## 2023-02-07 NOTE — PROGRESS NOTES
"Chief Complaint  2 month f/u and Hypertension    Subjective        Ritu Martino presents to [facility]  History of Present Illness patient was seen for hyponatremia.  Patient is taking furosemide and has trouble with hyponatremia.  Patient also has a history of SIADH.  Patient's sodium was 132 on the last blood draw.  Patient will get it checked monthly.  Patient's blood pressures been running 120s over 70s.  Patient will continue clonidine 0.1 mg twice daily as needed systolic greater than 150, diltiazem  mg daily, valsartan 160 mg daily.  Patient will continue monitoring blood pressure.  Patient does have a history of diverticulitis.  Patient's had no GI bleeding abdominal pain over the past several months.    Dictated utilizing Dragon dictation. If there are questions or for further clarification, please contact me.    Objective   Vital Signs:  Blood Pressure 122/70   Pulse 86   Temperature 98.7 °F (37.1 °C)   Height 152.4 cm (60\")   Weight 50 kg (110 lb 3.2 oz)   Oxygen Saturation 100%   Body Mass Index 21.52 kg/m²   Estimated body mass index is 21.52 kg/m² as calculated from the following:    Height as of this encounter: 152.4 cm (60\").    Weight as of this encounter: 50 kg (110 lb 3.2 oz).       BMI is within normal parameters. No other follow-up for BMI required.      Physical Exam  Vitals and nursing note reviewed.   Constitutional:       Appearance: Normal appearance. She is well-developed.   HENT:      Head: Normocephalic and atraumatic.      Nose: Nose normal.      Mouth/Throat:      Mouth: Mucous membranes are moist.      Pharynx: Oropharynx is clear.   Eyes:      Extraocular Movements: Extraocular movements intact.      Conjunctiva/sclera: Conjunctivae normal.      Pupils: Pupils are equal, round, and reactive to light.   Cardiovascular:      Rate and Rhythm: Normal rate and regular rhythm.      Heart sounds: Normal heart sounds. No murmur heard.    No " friction rub. No gallop.   Pulmonary:      Effort: Pulmonary effort is normal. No respiratory distress.      Breath sounds: Normal breath sounds. No stridor. No wheezing, rhonchi or rales.   Chest:      Chest wall: No tenderness.   Abdominal:      General: Bowel sounds are normal.      Palpations: Abdomen is soft.   Musculoskeletal:         General: Normal range of motion.      Cervical back: Normal range of motion and neck supple.   Skin:     General: Skin is warm and dry.   Neurological:      General: No focal deficit present.      Mental Status: She is alert and oriented to person, place, and time. Mental status is at baseline.   Psychiatric:         Mood and Affect: Mood normal.         Behavior: Behavior normal.         Thought Content: Thought content normal.         Judgment: Judgment normal.        Result Review :                   Assessment and Plan  #1 monthly sodium level #2 continue monitoring blood pressure  Diagnoses and all orders for this visit:    1. Hyponatremia (Primary)  -     Basic Metabolic Panel; Standing    2. Essential hypertension, benign    3. Diverticulitis    Other orders  -     mupirocin (Bactroban Nasal) 2 % nasal ointment; into the nostril(s) as directed by provider 2 (Two) Times a Day.  Dispense: 1 g; Refill: 2             Follow Up   Return in about 3 months (around 5/7/2023), or if symptoms worsen or fail to improve, for Recheck.  Patient was given instructions and counseling regarding her condition or for health maintenance advice. Please see specific information pulled into the AVS if appropriate.

## 2023-02-08 ENCOUNTER — TELEPHONE (OUTPATIENT)
Dept: FAMILY MEDICINE CLINIC | Facility: CLINIC | Age: 83
End: 2023-02-08

## 2023-02-08 ENCOUNTER — OFFICE VISIT (OUTPATIENT)
Dept: INTERNAL MEDICINE | Facility: CLINIC | Age: 83
End: 2023-02-08
Payer: MEDICARE

## 2023-02-08 VITALS
OXYGEN SATURATION: 96 % | WEIGHT: 109 LBS | HEART RATE: 82 BPM | BODY MASS INDEX: 21.4 KG/M2 | TEMPERATURE: 97.6 F | DIASTOLIC BLOOD PRESSURE: 62 MMHG | SYSTOLIC BLOOD PRESSURE: 112 MMHG | HEIGHT: 60 IN

## 2023-02-08 DIAGNOSIS — I10 ESSENTIAL HYPERTENSION, BENIGN: ICD-10-CM

## 2023-02-08 DIAGNOSIS — F13.20 BENZODIAZEPINE DEPENDENCE: ICD-10-CM

## 2023-02-08 DIAGNOSIS — F51.01 PRIMARY INSOMNIA: Primary | ICD-10-CM

## 2023-02-08 DIAGNOSIS — E03.9 HYPOTHYROIDISM, UNSPECIFIED TYPE: ICD-10-CM

## 2023-02-08 PROCEDURE — 99213 OFFICE O/P EST LOW 20 MIN: CPT | Performed by: STUDENT IN AN ORGANIZED HEALTH CARE EDUCATION/TRAINING PROGRAM

## 2023-02-08 NOTE — ASSESSMENT & PLAN NOTE
Discussed that using ambien and Xanax in combination can be overly sedating. Discussed to not drink alcohol in addition to these medication. Will continue to explore other treatment options for patient insomnia.

## 2023-02-08 NOTE — PROGRESS NOTES
"  Melvin Jimenez M.D.  Internal Medicine  [facility]  [address], [address]  [place] [address]  [phone]      Chief Complaint  Establish Care (Looking for new pcp her current pcp is retiring /), low sodium , and Anxiety    SUBJECTIVE    History of Present Illness    Ritu Martino is a 83 y.o. female who presents to the office today as a new patient to establish care.     She is here with her  Nu REDMAN and hyponatremia-For years. She was seen by her previous PCP yesterday for hyponatremia.  She has her sodium monitored monthly.  [facility] as well.  She is on salt tabs 2 g 3 times daily, fluid restriction and Lasix. She limits herself to 1 L/day. She measures her liquid intake at home. Denies edema. She endorses polyuria. Her sodium was 132 at last check.     Hypertension-well-controlled on clonidine, diltiazem, losartan. Takes clonidine as needed but only took it once. /49-86.     Anxiety-on Xanax and Pristiq.She gets on edge and figdety when anxious. Feels it is much better than previous. Takes xanax TID.  In May last year she was depressed and anxious per her . She fell on [place] and 5 other times. She had multiple fractures. Today she has no pain. She has been on Pristiq and xanax for about a year. She had pristiq doubled from 50 mg to 100 mg. Her xanax dose was also doubled. Her  has noticed significant improvement in symptoms since dose increase.     Insomnia-on Ambien.  Also on xanax. She goes to bed at 10:30-11 pm and sleeps well. \"If I didn't have ambien I wouldn't sleep at all\". She is not more social than previous.     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  Follows with neurology. She was recommenced to go to psychiatry but she felt anxiety was under good control.She no longer drives but is planning on again. No issues with getting lost.     Hypothyroid-on " levothyroxine. She is always cold. She has both constipation and loose stools. She has up to 10 stools/day. She states she had 3 stools this AM. She has formed stool. No abdominal pain. No bl;ood in stool. She has appointment with GI. She takes motegrity daily for constipation. She is taking immodium as well straight from the bottle.     She had history of seizures and was taken of seizure medication after 4 months.     prolonged hospitalization for pelvic abscess with ileostomy and subsequent takedown over a year ago    Follows with podiatry after foot fracture    Follows with pain management for epidurals    Follows with orthopedics for fractured vetebrae    Osteopenia on prolia. Last done 6 months ago.  States but in April.     Social-She has sitters that come to her house that take her on outings.     Review of Systems    No Known Allergies     Outpatient Medications Marked as Taking for the 2/8/23 encounter (Office Visit) with Melvin Jimenez MD   Medication Sig Dispense Refill   • acetaminophen (TYLENOL) 325 MG tablet Take 2 tablets by mouth Every 6 (Six) Hours As Needed for Mild Pain . (Patient taking differently: Take 500 mg by mouth Every 6 (Six) Hours As Needed for Mild Pain.)     • ALPRAZolam (Xanax) 0.5 MG tablet Take 1 tablet by mouth 3 (Three) Times a Day As Needed for Anxiety (Fall precautions). 90 tablet 3   • Cholecalciferol (VITAMIN D) 2000 units capsule Take  by mouth Daily With Dinner.     • cloNIDine (Catapres) 0.1 MG tablet 1 po bid prn sys greater or equal to 150 30 tablet 3   • clotrimazole-betamethasone (Lotrisone) 1-0.05 % cream Apply 1 application topically to the appropriate area as directed 2 (Two) Times a Day. 45 g 0   • cycloSPORINE (RESTASIS) 0.05 % ophthalmic emulsion 1 drop 2 (Two) Times a Day.     • desvenlafaxine (Pristiq) 100 MG 24 hr tablet Take 1 tablet by mouth Daily. 90 tablet 3   • dilTIAZem XR (DILACOR XR) 120 MG 24 hr capsule Take 1 capsule by mouth Daily. 90 capsule 3   •  ibuprofen (ADVIL,MOTRIN) 800 MG tablet TAKE 1 TABLET BY MOUTH EVERY 8 HOURS AS NEEDED FOR MILD PAIN 90 tablet 3   • levothyroxine (SYNTHROID, LEVOTHROID) 50 MCG tablet Take 50 mcg by mouth Daily.     • Magnesium 400 MG capsule 1 p.o. twice daily 60 capsule 3   • melatonin 5 MG tablet tablet Take 10 mg by mouth.     • mupirocin (Bactroban Nasal) 2 % nasal ointment into the nostril(s) as directed by provider 2 (Two) Times a Day. 1 g 2   • omeprazole (priLOSEC) 40 MG capsule Take 1 capsule by mouth Daily. 30 capsule 3   • potassium chloride 10 MEQ CR tablet Take 1 tablet by mouth 2 (Two) Times a Day. 180 tablet 3   • Probiotic Product (ALIGN PO) Take 1 capsule by mouth Daily With Lunch.     • Prucalopride Succinate 2 MG tablet Take 1 tablet by mouth Daily As Needed (The patient). 90 tablet 1   • rosuvastatin (CRESTOR) 20 MG tablet Take 20 mg by mouth Every Night.     • SODIUM CHLORIDE PO Take  by mouth 3 (Three) Times a Day. 2 TABLETS 3 6TIMES A DAY     • valsartan (Diovan) 160 MG tablet Take 1 tablet by mouth Daily. (Patient taking differently: Take 80 mg by mouth Daily.) 90 tablet 1   • vitamin B-12 (CYANOCOBALAMIN) 100 MCG tablet Take 50 mcg by mouth Daily.     • zinc oxide 20 % ointment Apply  topically to the appropriate area as directed.     • zolpidem (AMBIEN) 10 MG tablet Take 1 tablet by mouth At Night As Needed for Sleep. 90 tablet 1        Past Medical History:   Diagnosis Date   • Anemia 2000   • Anxiety    • Arthritis    • Bowel dysfunction 08/2021    since having surgery for diverticular infection   • Chronic constipation    • Depression    • Diverticulitis 08/2021    w/ rupture and infection required surgery and ostomy   • Essential hypertension, benign    • Fracture 06/2022    left ankle   • Fracture, tibia and fibula Now wearing bood   • GERD (gastroesophageal reflux disease)    • Hernia, hiatal    • Hypercholesterolemia    • Hypertension    • Hypokalemia    • Hyponatremia 2014    chronic low with  "occasional normal level   • Hypothyroidism     estimated time frame pt nor  could remember exactly how long has been on medication   • Insomnia    • Iron deficiency    • Seizures (HCC)    • Tear of meniscus of knee    • 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     Past Surgical History:   Procedure Laterality Date   • APPENDECTOMY     • BACK SURGERY     • BACK SURGERY      Discectomy   •  SECTION      x2 lower midline incision   • COLON SURGERY  2021    to address ruptured and infected diverticular dz, ostomy also placed   • COLONOSCOPY     • COLOSTOMY CLOSURE  10/2021    ostomy removed   • EYE SURGERY  2 X cataract   • HEMORRHOIDECTOMY     • HERNIA REPAIR     • KNEE ARTHROPLASTY     • TONSILLECTOMY       Family History   Problem Relation Age of Onset   • Brain cancer Mother    • Cancer Mother         Brain tumor,    • Stroke Father         Age 46   • Early death Father         Stroke, age 46   • Cancer Brother         pancreatic   • Cancer Daughter         Dec'd 2000-    reports that she quit smoking about 52 years ago. Her smoking use included cigarettes. She started smoking about 67 years ago. She has a 3.75 pack-year smoking history. She has never used smokeless tobacco. She reports that she does not currently use alcohol after a past usage of about 8.0 standard drinks per week. She reports that she does not use drugs.    OBJECTIVE    Vital Signs:   /62   Pulse 82   Temp 97.6 °F (36.4 °C)   Ht 152.4 cm (60\")   Wt 49.4 kg (109 lb)   SpO2 96%   BMI 21.29 kg/m²     Physical Exam  Constitutional:       Appearance: Normal appearance. She is normal weight.   HENT:      Right Ear: There is impacted cerumen.      Left Ear: There is impacted cerumen.   Cardiovascular:      Rate and Rhythm: Normal rate and regular rhythm.      Heart sounds: Normal heart sounds. No murmur heard.  Pulmonary:      Effort: Pulmonary effort is normal.      Breath sounds: Normal " breath sounds.   Abdominal:      General: Abdomen is flat. There is no distension.      Palpations: Abdomen is soft.      Tenderness: There is no abdominal tenderness.   Musculoskeletal:      Right lower leg: No edema.      Left lower leg: No edema.      Comments: Walks with a walker   Skin:     General: Skin is warm and dry.   Neurological:      Mental Status: She is alert.   Psychiatric:         Mood and Affect: Mood normal.         Behavior: Behavior normal.         Thought Content: Thought content normal.            The following data was reviewed by: Melvin Jimenez MD on 02/08/2023:  Common labs    Common Labs 12/23/22 12/23/22 1/4/23 1/4/23 2/1/23    0647 0647 1227 1227    Glucose  95  94 90   BUN  11  10 11   Creatinine  0.53 (A)  0.57 0.51 (A)   Sodium  128 (A)  137 132 (A)   Potassium  3.6  4.3 4.2   Chloride  95 (A)  98 93 (A)   Calcium  8.7  8.8 8.8   WBC 3.13 (A)  5.3     Hemoglobin 10.9 (A)  11.1     Hematocrit 31.5 (A)  33.5 (A)     Platelets 233  334     (A) Abnormal value            Data reviewed: Previous PCP Notes, renal notes             ASSESSMENT & PLAN   82 yo with chronic hyponatremia and SIADH, depression/ anxiety with chronic benzodiazepine use, insomnia and MCI here today to establish care.     Diagnoses and all orders for this visit:    1. Primary insomnia (Primary)  Assessment & Plan:  Discussed that using ambien and Xanax in combination can be overly sedating. Discussed to not drink alcohol in addition to these medication. Will continue to explore other treatment options for patient insomnia.       2. Benzodiazepine dependence (HCC)  Assessment & Plan:  Patient hesitant to discontinue or cut back at this time as she feels this is well managed and she is back to her normal self. Discussed increased risk of falls in older adults. She has already fell multiple times. .Ramon reviewed and appropriate. Patient signed narcotic use agreement today.       3. Essential hypertension,  benign  Assessment & Plan:  Hypertension is unchanged.  Continue current treatment regimen.  Blood pressure will be reassessed at the next regular appointment.      4. Hypothyroidism, unspecified type  Assessment & Plan:  Stable. Pt asymptomatic. Continue current treatment.        There are no preventive care reminders to display for this patient.     Follow Up  Return in about 3 months (around 5/8/2023) for Recheck.    Patient/family had no further questions at this time and verbalized understanding of the plan discussed today.

## 2023-02-08 NOTE — TELEPHONE ENCOUNTER
Caller: Naina Martino    Relationship: Emergency Contact    Best call back number: [phone]    What was the call regarding: NAINA IS CALLING TO LET DR RANDHAWA KNOW THE NAME OF THE MEDICATION THEY TALKED ABOUT YESTERDAY. THAT MEDICATION IS CLOTRIMAZOLE AND BETAMETHASONE DIPROPIONATE CREAM USP    PLEASE ADVISE

## 2023-02-08 NOTE — ASSESSMENT & PLAN NOTE
Patient hesitant to discontinue or cut back at this time as she feels this is well managed and she is back to her normal self. Discussed increased risk of falls in older adults. She has already fell multiple times. .Ramon reviewed and appropriate. Patient signed narcotic use agreement today.

## 2023-02-09 ENCOUNTER — TELEPHONE (OUTPATIENT)
Dept: FAMILY MEDICINE CLINIC | Facility: CLINIC | Age: 83
End: 2023-02-09
Payer: MEDICARE

## 2023-02-09 ENCOUNTER — TELEPHONE (OUTPATIENT)
Dept: INTERNAL MEDICINE | Facility: CLINIC | Age: 83
End: 2023-02-09
Payer: MEDICARE

## 2023-02-09 NOTE — TELEPHONE ENCOUNTER
Caller: Nu Martino    Relationship: Emergency Contact    Best call back number: [phone]    Who are you requesting to speak with (clinical staff, provider,  specific staff member)CLINICAL    What was the call regarding: PATIENT WANTED TO LET  KNOW THE MONTHLY BLOOD DRAWS WENT THROUGH TO [facility].     Do you require a callback: IF NEEDED

## 2023-02-24 ENCOUNTER — TRANSCRIBE ORDERS (OUTPATIENT)
Dept: ADMINISTRATIVE | Facility: HOSPITAL | Age: 83
End: 2023-02-24
Payer: MEDICARE

## 2023-02-24 ENCOUNTER — LAB (OUTPATIENT)
Dept: LAB | Facility: HOSPITAL | Age: 83
End: 2023-02-24
Payer: MEDICARE

## 2023-02-24 DIAGNOSIS — E87.1 HYPOSMOLALITY SYNDROME: ICD-10-CM

## 2023-02-24 DIAGNOSIS — E87.1 HYPOSMOLALITY SYNDROME: Primary | ICD-10-CM

## 2023-02-24 LAB
ALBUMIN SERPL-MCNC: 4.5 G/DL (ref 3.5–5.2)
ALBUMIN/GLOB SERPL: 1.9 G/DL
ALP SERPL-CCNC: 67 U/L (ref 39–117)
ALT SERPL W P-5'-P-CCNC: 38 U/L (ref 1–33)
ANION GAP SERPL CALCULATED.3IONS-SCNC: 8.5 MMOL/L (ref 5–15)
AST SERPL-CCNC: 22 U/L (ref 1–32)
BACTERIA UR QL AUTO: NORMAL /HPF
BASOPHILS # BLD AUTO: 0.02 10*3/MM3 (ref 0–0.2)
BASOPHILS NFR BLD AUTO: 0.4 % (ref 0–1.5)
BILIRUB SERPL-MCNC: 0.5 MG/DL (ref 0–1.2)
BILIRUB UR QL STRIP: NEGATIVE
BUN SERPL-MCNC: 12 MG/DL (ref 8–23)
BUN/CREAT SERPL: 18.8 (ref 7–25)
CALCIUM SPEC-SCNC: 9.5 MG/DL (ref 8.6–10.5)
CHLORIDE SERPL-SCNC: 101 MMOL/L (ref 98–107)
CLARITY UR: CLEAR
CO2 SERPL-SCNC: 29.5 MMOL/L (ref 22–29)
COLOR UR: YELLOW
CREAT SERPL-MCNC: 0.64 MG/DL (ref 0.57–1)
DEPRECATED RDW RBC AUTO: 40.9 FL (ref 37–54)
EGFRCR SERPLBLD CKD-EPI 2021: 87.8 ML/MIN/1.73
EOSINOPHIL # BLD AUTO: 0.13 10*3/MM3 (ref 0–0.4)
EOSINOPHIL NFR BLD AUTO: 2.4 % (ref 0.3–6.2)
ERYTHROCYTE [DISTWIDTH] IN BLOOD BY AUTOMATED COUNT: 11.8 % (ref 12.3–15.4)
GLOBULIN UR ELPH-MCNC: 2.4 GM/DL
GLUCOSE SERPL-MCNC: 86 MG/DL (ref 65–99)
GLUCOSE UR STRIP-MCNC: NEGATIVE MG/DL
HCT VFR BLD AUTO: 34.8 % (ref 34–46.6)
HGB BLD-MCNC: 11.4 G/DL (ref 12–15.9)
HGB UR QL STRIP.AUTO: NEGATIVE
HYALINE CASTS UR QL AUTO: NORMAL /LPF
IMM GRANULOCYTES # BLD AUTO: 0.02 10*3/MM3 (ref 0–0.05)
IMM GRANULOCYTES NFR BLD AUTO: 0.4 % (ref 0–0.5)
KETONES UR QL STRIP: NEGATIVE
LEUKOCYTE ESTERASE UR QL STRIP.AUTO: ABNORMAL
LYMPHOCYTES # BLD AUTO: 0.78 10*3/MM3 (ref 0.7–3.1)
LYMPHOCYTES NFR BLD AUTO: 14.2 % (ref 19.6–45.3)
MCH RBC QN AUTO: 30.9 PG (ref 26.6–33)
MCHC RBC AUTO-ENTMCNC: 32.8 G/DL (ref 31.5–35.7)
MCV RBC AUTO: 94.3 FL (ref 79–97)
MONOCYTES # BLD AUTO: 0.59 10*3/MM3 (ref 0.1–0.9)
MONOCYTES NFR BLD AUTO: 10.7 % (ref 5–12)
NEUTROPHILS NFR BLD AUTO: 3.96 10*3/MM3 (ref 1.7–7)
NEUTROPHILS NFR BLD AUTO: 71.9 % (ref 42.7–76)
NITRITE UR QL STRIP: NEGATIVE
NRBC BLD AUTO-RTO: 0 /100 WBC (ref 0–0.2)
OSMOLALITY UR: 402 MOSM/KG
PH UR STRIP.AUTO: 5.5 [PH] (ref 5–8)
PLATELET # BLD AUTO: 293 10*3/MM3 (ref 140–450)
PMV BLD AUTO: 8.6 FL (ref 6–12)
POTASSIUM SERPL-SCNC: 4.6 MMOL/L (ref 3.5–5.2)
PROT SERPL-MCNC: 6.9 G/DL (ref 6–8.5)
PROT UR QL STRIP: NEGATIVE
RBC # BLD AUTO: 3.69 10*6/MM3 (ref 3.77–5.28)
RBC # UR STRIP: NORMAL /HPF
REF LAB TEST METHOD: NORMAL
SODIUM SERPL-SCNC: 139 MMOL/L (ref 136–145)
SODIUM UR-SCNC: 126 MMOL/L
SP GR UR STRIP: 1.01 (ref 1–1.03)
SQUAMOUS #/AREA URNS HPF: NORMAL /HPF
UROBILINOGEN UR QL STRIP: ABNORMAL
WBC # UR STRIP: NORMAL /HPF
WBC NRBC COR # BLD: 5.5 10*3/MM3 (ref 3.4–10.8)

## 2023-02-24 PROCEDURE — 80053 COMPREHEN METABOLIC PANEL: CPT

## 2023-02-24 PROCEDURE — 85025 COMPLETE CBC W/AUTO DIFF WBC: CPT

## 2023-02-24 PROCEDURE — 84300 ASSAY OF URINE SODIUM: CPT

## 2023-02-24 PROCEDURE — 83935 ASSAY OF URINE OSMOLALITY: CPT

## 2023-02-24 PROCEDURE — 36415 COLL VENOUS BLD VENIPUNCTURE: CPT

## 2023-02-24 PROCEDURE — 81001 URINALYSIS AUTO W/SCOPE: CPT

## 2023-03-02 DIAGNOSIS — F51.01 PRIMARY INSOMNIA: Primary | ICD-10-CM

## 2023-03-02 RX ORDER — ZOLPIDEM TARTRATE 10 MG/1
10 TABLET ORAL NIGHTLY PRN
Qty: 90 TABLET | Refills: 1 | Status: SHIPPED | OUTPATIENT
Start: 2023-03-02

## 2023-03-02 RX ORDER — FUROSEMIDE 20 MG/1
20 TABLET ORAL 2 TIMES DAILY
Qty: 60 TABLET | Refills: 0 | Status: SHIPPED | OUTPATIENT
Start: 2023-03-02 | End: 2023-04-01

## 2023-03-02 NOTE — TELEPHONE ENCOUNTER
Caller: Nu Martino    Relationship: Emergency Contact    Best call back number: [phone]    Requested Prescriptions:   Requested Prescriptions     Pending Prescriptions Disp Refills   • zolpidem (AMBIEN) 10 MG tablet 90 tablet 1     Sig: Take 1 tablet by mouth At Night As Needed for Sleep.   • furosemide (LASIX) 20 MG tablet 60 tablet 0     Sig: Take 1 tablet by mouth 2 (Two) Times a Day for 30 days.        Pharmacy where request should be sent: [facility] [address] [phone] [facility] [phone]      Additional details provided by patient:     Does the patient have less than a 3 day supply:  [] Yes  [x] No    Would you like a call back once the refill request has been completed: [x] Yes [] No    If the office needs to give you a call back, can they leave a voicemail: [x] Yes [] No    Mj Da Silva Rep   03/02/23 16:08 EST

## 2023-03-16 ENCOUNTER — TELEPHONE (OUTPATIENT)
Dept: INTERNAL MEDICINE | Facility: CLINIC | Age: 83
End: 2023-03-16
Payer: MEDICARE

## 2023-03-16 DIAGNOSIS — R29.6 FALLS FREQUENTLY: Primary | ICD-10-CM

## 2023-03-16 NOTE — TELEPHONE ENCOUNTER
Caller: Ritu Martino    Relationship: Self    Best call back number:[phone]    What is the medical concern/diagnosis: PHYSICAL THERAPY     What specialty or service is being requested: PHYSICAL THERAPY     What is the provider, practice or medical service name: [facility]    What is the office location: [address] [place] [address] · ~6.4 mi    What is the office phone [phone]

## 2023-03-16 NOTE — TELEPHONE ENCOUNTER
I would need to associate this order with a diagnosis.  Would patient like physical therapy for falls? 144

## 2023-03-20 ENCOUNTER — TELEPHONE (OUTPATIENT)
Dept: INTERNAL MEDICINE | Facility: CLINIC | Age: 83
End: 2023-03-20
Payer: MEDICARE

## 2023-03-20 RX ORDER — OMEPRAZOLE 40 MG/1
40 CAPSULE, DELAYED RELEASE ORAL DAILY
Qty: 90 CAPSULE | Refills: 3 | Status: SHIPPED | OUTPATIENT
Start: 2023-03-20

## 2023-03-20 NOTE — TELEPHONE ENCOUNTER
Caller: Nu Martino    Relationship: Emergency Contact    Best call back number: [phone]    What was the call regarding: PATIENT'S  STATES THAT THEY HAVE TOO MUCH DIFFICULTY GETTING IN TO THE PHYSICAL THERAPY OFFICE, AND WOULD LIKE THE REFERRAL SENT TO [facility] INSTEAD

## 2023-03-20 NOTE — TELEPHONE ENCOUNTER
Caller: Nu Martino    Relationship: Emergency Contact    Best call back number: [phone]    Requested Prescriptions:   Requested Prescriptions     Pending Prescriptions Disp Refills   • omeprazole (priLOSEC) 40 MG capsule 30 capsule 3     Sig: Take 1 capsule by mouth Daily.        Pharmacy where request should be sent: [facility] - [address] [phone] [facility] [phone]      Additional details provided by patient: REQUESTS 90 DAY SUPPLY    Does the patient have less than a 3 day supply:  [] Yes  [x] No    Would you like a call back once the refill request has been completed: [] Yes [x] No    If the office needs to give you a call back, can they leave a voicemail: [] Yes [x] No    Mj Hendricks Rep   03/20/23 09:51 EDT

## 2023-03-20 NOTE — TELEPHONE ENCOUNTER
Caller: Nu Martino    Relationship: Emergency Contact    Best call back number: [phone]    Who are you requesting to speak with (clinical staff, provider,  specific staff member): DR CARTAGENA OR MA    What was the call regarding: PATIENT'S  STATES THAT SHE IS CURRENTLY GOING TO AN OFFICE IN [place] FOR PROLIA INJECTIONS, BUT THEY WOULD LIKE TO GO TO AN OFFICE CLOSER TO THEM. PLEASE CALL AND ADVISE    Do you require a callback: YES

## 2023-03-31 DIAGNOSIS — E78.5 HYPERLIPIDEMIA, UNSPECIFIED HYPERLIPIDEMIA TYPE: ICD-10-CM

## 2023-03-31 DIAGNOSIS — E87.1 HYPONATREMIA: ICD-10-CM

## 2023-03-31 DIAGNOSIS — E03.9 HYPOTHYROIDISM, UNSPECIFIED TYPE: ICD-10-CM

## 2023-03-31 DIAGNOSIS — E03.9 HYPOTHYROIDISM, UNSPECIFIED TYPE: Primary | ICD-10-CM

## 2023-03-31 DIAGNOSIS — E87.8 HYPOCHLOREMIA: ICD-10-CM

## 2023-03-31 DIAGNOSIS — E87.1 HYPONATREMIA: Primary | ICD-10-CM

## 2023-04-12 RX ORDER — PRUCALOPRIDE 2 MG/1
TABLET, FILM COATED ORAL
Qty: 90 TABLET | Refills: 3 | Status: SHIPPED | OUTPATIENT
Start: 2023-04-12

## 2023-04-14 ENCOUNTER — OFFICE (OUTPATIENT)
Dept: URBAN - METROPOLITAN AREA CLINIC 76 | Facility: CLINIC | Age: 83
End: 2023-04-14

## 2023-04-14 VITALS
OXYGEN SATURATION: 95 % | DIASTOLIC BLOOD PRESSURE: 60 MMHG | WEIGHT: 124 LBS | SYSTOLIC BLOOD PRESSURE: 110 MMHG | HEART RATE: 80 BPM | HEIGHT: 64 IN

## 2023-04-14 DIAGNOSIS — R15.9 FULL INCONTINENCE OF FECES: ICD-10-CM

## 2023-04-14 DIAGNOSIS — Z90.49 ACQUIRED ABSENCE OF OTHER SPECIFIED PARTS OF DIGESTIVE TRACT: ICD-10-CM

## 2023-04-14 DIAGNOSIS — R19.4 CHANGE IN BOWEL HABIT: ICD-10-CM

## 2023-04-14 DIAGNOSIS — K57.30 DIVERTICULOSIS OF LARGE INTESTINE WITHOUT PERFORATION OR ABS: ICD-10-CM

## 2023-04-14 PROCEDURE — 99214 OFFICE O/P EST MOD 30 MIN: CPT | Performed by: NURSE PRACTITIONER

## 2023-04-24 ENCOUNTER — INFUSION (OUTPATIENT)
Dept: ONCOLOGY | Facility: HOSPITAL | Age: 83
End: 2023-04-24
Payer: MEDICARE

## 2023-04-24 VITALS — HEIGHT: 60 IN | RESPIRATION RATE: 18 BRPM | TEMPERATURE: 97 F | BODY MASS INDEX: 21.29 KG/M2

## 2023-04-24 DIAGNOSIS — M85.80 OSTEOPENIA, UNSPECIFIED LOCATION: Primary | ICD-10-CM

## 2023-04-24 DIAGNOSIS — S22.080A COMPRESSION FRACTURE OF T12 VERTEBRA, INITIAL ENCOUNTER: ICD-10-CM

## 2023-04-24 PROCEDURE — 96372 THER/PROPH/DIAG INJ SC/IM: CPT

## 2023-04-24 PROCEDURE — 25010000002 DENOSUMAB 60 MG/ML SOLUTION PREFILLED SYRINGE: Performed by: INTERNAL MEDICINE

## 2023-04-24 RX ADMIN — DENOSUMAB 60 MG: 60 INJECTION SUBCUTANEOUS at 14:14

## 2023-04-25 ENCOUNTER — TELEPHONE (OUTPATIENT)
Dept: INTERNAL MEDICINE | Facility: CLINIC | Age: 83
End: 2023-04-25

## 2023-04-25 DIAGNOSIS — S22.080S COMPRESSION FRACTURE OF T12 VERTEBRA, SEQUELA: ICD-10-CM

## 2023-04-25 DIAGNOSIS — M85.80 OSTEOPENIA, UNSPECIFIED LOCATION: Primary | ICD-10-CM

## 2023-04-25 DIAGNOSIS — S32.020G COMPRESSION FRACTURE OF L2 VERTEBRA WITH DELAYED HEALING, SUBSEQUENT ENCOUNTER: ICD-10-CM

## 2023-04-25 DIAGNOSIS — S32.010B COMPRESSION FRACTURE OF L1 LUMBAR VERTEBRA, OPEN, INITIAL ENCOUNTER: ICD-10-CM

## 2023-04-25 NOTE — TELEPHONE ENCOUNTER
Caller: Ritu Martino    Relationship: Self    Best call back number: [phone] (Home)    What orders are you requesting (i.e. lab or imaging): MATTHIAS SHOT     In what timeframe would the patient need to come in: EVERY 6 MONTHS    Where will you receive your lab/imaging services: SAME PLACE PATIENT HAD DONE PREVIOUSLY AT [facility]    Additional notes: PATIENT STATES SHE NEEDS AN ORDER SENT THERE FOR HER TO GET THIS DONE EVERY 6 MONTHS, PLEASE ADVISE PATIENT WHEN  THIS ORDER HAS SENT

## 2023-04-26 ENCOUNTER — OFFICE (OUTPATIENT)
Dept: URBAN - METROPOLITAN AREA CLINIC 51 | Facility: CLINIC | Age: 83
End: 2023-04-26

## 2023-04-26 DIAGNOSIS — K62.89 OTHER SPECIFIED DISEASES OF ANUS AND RECTUM: ICD-10-CM

## 2023-04-26 DIAGNOSIS — R19.4 CHANGE IN BOWEL HABIT: ICD-10-CM

## 2023-04-26 DIAGNOSIS — R15.9 FULL INCONTINENCE OF FECES: ICD-10-CM

## 2023-04-26 PROCEDURE — 91122: CPT | Performed by: NURSE PRACTITIONER

## 2023-04-26 PROCEDURE — 91120: CPT | Mod: 59 | Performed by: NURSE PRACTITIONER

## 2023-05-05 ENCOUNTER — LAB (OUTPATIENT)
Dept: INTERNAL MEDICINE | Facility: CLINIC | Age: 83
End: 2023-05-05
Payer: MEDICARE

## 2023-05-05 DIAGNOSIS — E87.6 HYPOKALEMIA: ICD-10-CM

## 2023-05-05 DIAGNOSIS — S32.020G COMPRESSION FRACTURE OF L2 VERTEBRA WITH DELAYED HEALING, SUBSEQUENT ENCOUNTER: ICD-10-CM

## 2023-05-05 DIAGNOSIS — S32.010B COMPRESSION FRACTURE OF L1 LUMBAR VERTEBRA, OPEN, INITIAL ENCOUNTER: ICD-10-CM

## 2023-05-05 DIAGNOSIS — M85.80 OSTEOPENIA, UNSPECIFIED LOCATION: ICD-10-CM

## 2023-05-05 DIAGNOSIS — E61.1 IRON DEFICIENCY: ICD-10-CM

## 2023-05-05 DIAGNOSIS — I10 ESSENTIAL HYPERTENSION, BENIGN: Primary | ICD-10-CM

## 2023-05-05 DIAGNOSIS — S22.080S COMPRESSION FRACTURE OF T12 VERTEBRA, SEQUELA: ICD-10-CM

## 2023-05-05 DIAGNOSIS — E55.9 VITAMIN D DEFICIENCY: ICD-10-CM

## 2023-05-06 LAB
25(OH)D3+25(OH)D2 SERPL-MCNC: 44.9 NG/ML (ref 30–100)
ALBUMIN SERPL-MCNC: 4.7 G/DL (ref 3.5–5.2)
ALBUMIN/GLOB SERPL: 2.1 G/DL
ALP SERPL-CCNC: 66 U/L (ref 39–117)
ALT SERPL-CCNC: 11 U/L (ref 1–33)
AST SERPL-CCNC: 15 U/L (ref 1–32)
BILIRUB SERPL-MCNC: 0.6 MG/DL (ref 0–1.2)
BUN SERPL-MCNC: 18 MG/DL (ref 8–23)
BUN/CREAT SERPL: 27.7 (ref 7–25)
CALCIUM SERPL-MCNC: 9.7 MG/DL (ref 8.6–10.5)
CHLORIDE SERPL-SCNC: 100 MMOL/L (ref 98–107)
CO2 SERPL-SCNC: 29 MMOL/L (ref 22–29)
CREAT SERPL-MCNC: 0.65 MG/DL (ref 0.57–1)
EGFRCR SERPLBLD CKD-EPI 2021: 87.5 ML/MIN/1.73
GLOBULIN SER CALC-MCNC: 2.2 GM/DL
GLUCOSE SERPL-MCNC: 109 MG/DL (ref 65–99)
MAGNESIUM SERPL-MCNC: 1.9 MG/DL (ref 1.6–2.4)
PHOSPHATE SERPL-MCNC: 3.8 MG/DL (ref 2.5–4.5)
POTASSIUM SERPL-SCNC: 4.7 MMOL/L (ref 3.5–5.2)
PROT SERPL-MCNC: 6.9 G/DL (ref 6–8.5)
SODIUM SERPL-SCNC: 139 MMOL/L (ref 136–145)

## 2023-05-11 ENCOUNTER — OFFICE VISIT (OUTPATIENT)
Dept: INTERNAL MEDICINE | Facility: CLINIC | Age: 83
End: 2023-05-11
Payer: MEDICARE

## 2023-05-11 VITALS
HEART RATE: 78 BPM | TEMPERATURE: 97.4 F | DIASTOLIC BLOOD PRESSURE: 69 MMHG | HEIGHT: 60 IN | OXYGEN SATURATION: 98 % | WEIGHT: 124 LBS | SYSTOLIC BLOOD PRESSURE: 127 MMHG | BODY MASS INDEX: 24.35 KG/M2

## 2023-05-11 DIAGNOSIS — F13.20 BENZODIAZEPINE DEPENDENCE: Primary | ICD-10-CM

## 2023-05-11 DIAGNOSIS — B35.3 ATHLETE'S FOOT ON RIGHT: ICD-10-CM

## 2023-05-11 DIAGNOSIS — F41.9 ANXIETY: ICD-10-CM

## 2023-05-11 DIAGNOSIS — R53.81 DEBILITY: ICD-10-CM

## 2023-05-11 DIAGNOSIS — E87.1 HYPONATREMIA: ICD-10-CM

## 2023-05-11 DIAGNOSIS — I10 ESSENTIAL HYPERTENSION, BENIGN: ICD-10-CM

## 2023-05-11 DIAGNOSIS — R09.81 SINUS CONGESTION: ICD-10-CM

## 2023-05-11 RX ORDER — CLOTRIMAZOLE 1 G/ML
SOLUTION TOPICAL 2 TIMES DAILY
Qty: 15 ML | Refills: 0 | Status: SHIPPED | OUTPATIENT
Start: 2023-05-11

## 2023-05-11 RX ORDER — FUROSEMIDE 20 MG/1
20 TABLET ORAL 2 TIMES DAILY
Qty: 60 TABLET | Refills: 2 | Status: SHIPPED | OUTPATIENT
Start: 2023-05-11 | End: 2023-08-09

## 2023-05-11 NOTE — PROGRESS NOTES
"  Melvin Jimenez M.D.  Internal Medicine  [facility]  [address], [address]  [place] [address]  [phone]      Chief Complaint  Follow-up (3 mth F/U /)    SUBJECTIVE    History of Present Illness    Ritu Martino is a 83 y.o. female who presents to the office today as an established patient that last saw me on 2/8/2023.     She is here with her  Nu REDMAN and hyponatremia-She has her sodium monitored monthly.  [facility].  She is on salt tabs 2 g 3 times daily, 1 L fluid restriction and Lasix.  Last BMP showed normal Na.     Runny nose for months. Denies infections symptoms. She took Zyrtec without relief. No cough, shortness of breath, fevers.      Hypertension-well-controlled on clonidine, diltiazem, losartan. Takes clonidine as needed but only took it once. BP range 110-137/60-80     Anxiety-she takes xanax TID.  In May last year she was depressed and anxious per her .  She has a history of multiple falls with fractures.  She has been on Pristiq and xanax for about a year. She had pristiq doubled from 50 mg to 100 mg. Her xanax dose was also doubled. Her  has noticed significant improvement in symptoms since dose increase.  Patient is open to decreasing Xanax dose today.  She takes Xanax so that she does not feel nervous, she does not take it as needed for anxiety.     Insomnia-on Ambien.  Also on xanax.      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  Follows with neurology.       She goes to the bathroom \"10 times per day\". She had a recent GI panel for pathogens that was negative. Apparently had impaired generation of rectal sphincter with impaired sphincter relaxation. She has pelvic floor PT scheduled.     Her great toe is red on the right since Friday after wearing a shoe that rubbed her foot.  It mostly improved. Her toe hurts when she presses. No pain with " walking.        Review of Systems    No Known Allergies     Outpatient Medications Marked as Taking for the 5/11/23 encounter (Office Visit) with Melvin Jimenez MD   Medication Sig Dispense Refill   • acetaminophen (TYLENOL) 325 MG tablet Take 2 tablets by mouth Every 6 (Six) Hours As Needed for Mild Pain . (Patient taking differently: Take 500 mg by mouth Every 6 (Six) Hours As Needed for Mild Pain.)     • ALPRAZolam (Xanax) 0.5 MG tablet Take 1 tablet by mouth 3 (Three) Times a Day As Needed for Anxiety (Fall precautions). 90 tablet 3   • Cholecalciferol (VITAMIN D) 2000 units capsule Take  by mouth Daily With Dinner.     • cloNIDine (Catapres) 0.1 MG tablet 1 po bid prn sys greater or equal to 150 30 tablet 3   • clotrimazole-betamethasone (Lotrisone) 1-0.05 % cream Apply 1 application topically to the appropriate area as directed 2 (Two) Times a Day. 45 g 0   • cycloSPORINE (RESTASIS) 0.05 % ophthalmic emulsion 1 drop 2 (Two) Times a Day.     • desvenlafaxine (Pristiq) 100 MG 24 hr tablet Take 1 tablet by mouth Daily. 90 tablet 3   • dilTIAZem XR (DILACOR XR) 120 MG 24 hr capsule Take 1 capsule by mouth Daily. 90 capsule 3   • furosemide (LASIX) 20 MG tablet Take 1 tablet by mouth 2 (Two) Times a Day for 90 days. 60 tablet 2   • ibuprofen (ADVIL,MOTRIN) 800 MG tablet TAKE 1 TABLET BY MOUTH EVERY 8 HOURS AS NEEDED FOR MILD PAIN 90 tablet 3   • levothyroxine (SYNTHROID, LEVOTHROID) 50 MCG tablet Take 1 tablet by mouth Daily.     • Magnesium 400 MG capsule 1 p.o. twice daily 60 capsule 3   • melatonin 5 MG tablet tablet Take 2 tablets by mouth.     • Motegrity 2 MG tablet TAKE 1 TABLET DAILY AS NEEDED 90 tablet 3   • mupirocin (Bactroban Nasal) 2 % nasal ointment into the nostril(s) as directed by provider 2 (Two) Times a Day. 1 g 2   • omeprazole (priLOSEC) 40 MG capsule Take 1 capsule by mouth Daily. 90 capsule 3   • potassium chloride 10 MEQ CR tablet Take 1 tablet by mouth 2 (Two) Times a Day. 180 tablet 3   •  Probiotic Product (ALIGN PO) Take 1 capsule by mouth Daily With Lunch.     • rosuvastatin (CRESTOR) 20 MG tablet Take 1 tablet by mouth Every Night.     • SODIUM CHLORIDE PO Take  by mouth 3 (Three) Times a Day. 2 TABLETS 3 6TIMES A DAY     • vitamin B-12 (CYANOCOBALAMIN) 100 MCG tablet Take 50 mcg by mouth Daily.     • zinc oxide 20 % ointment Apply  topically to the appropriate area as directed.     • zolpidem (AMBIEN) 10 MG tablet Take 1 tablet by mouth At Night As Needed for Sleep. 90 tablet 1        Past Medical History:   Diagnosis Date   • Anemia    • Anxiety    • Arthritis    • Bowel dysfunction 2021    since having surgery for diverticular infection   • Chronic constipation    • Depression    • Diverticulitis 2021    w/ rupture and infection required surgery and ostomy   • Essential hypertension, benign    • Fracture 2022    left ankle   • Fracture, tibia and fibula Now wearing bood   • GERD (gastroesophageal reflux disease)    • Hernia, hiatal    • Hypercholesterolemia    • Hypertension    • Hypokalemia    • Hyponatremia     chronic low with occasional normal level   • Hypothyroidism     estimated time frame pt nor  could remember exactly how long has been on medication   • Insomnia    • Iron deficiency    • Seizures    • Tear of meniscus of knee    • 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     Past Surgical History:   Procedure Laterality Date   • APPENDECTOMY     • BACK SURGERY     • BACK SURGERY      Discectomy   •  SECTION      x2 lower midline incision   • COLON SURGERY  2021    to address ruptured and infected diverticular dz, ostomy also placed   • COLONOSCOPY     • COLOSTOMY CLOSURE  10/2021    ostomy removed   • EYE SURGERY  2 X cataract   • HEMORRHOIDECTOMY     • HERNIA REPAIR     • KNEE ARTHROPLASTY     • TONSILLECTOMY       Family History   Problem Relation Age of Onset   • Brain cancer Mother    • Cancer Mother         Brain  "tumor, 1979   • Stroke Father         Age 46   • Early death Father         Stroke, age 46   • Cancer Brother         pancreatic   • Cancer Daughter         Dec'd 2000-GIST    reports that she quit smoking about 52 years ago. Her smoking use included cigarettes. She started smoking about 67 years ago. She has a 3.75 pack-year smoking history. She has never used smokeless tobacco. She reports that she does not currently use alcohol after a past usage of about 8.0 standard drinks per week. She reports that she does not use drugs.    OBJECTIVE    Vital Signs:   /69   Pulse 78   Temp 97.4 °F (36.3 °C)   Ht 152.4 cm (60\")   Wt 56.2 kg (124 lb)   SpO2 98%   BMI 24.22 kg/m²     Physical Exam  Constitutional:       Appearance: Normal appearance. She is normal weight.   HENT:      Right Ear: There is impacted cerumen.      Left Ear: There is impacted cerumen.      Nose: Congestion present.   Cardiovascular:      Rate and Rhythm: Normal rate and regular rhythm.      Heart sounds: Normal heart sounds. No murmur heard.  Pulmonary:      Effort: Pulmonary effort is normal.      Breath sounds: Normal breath sounds.   Skin:     General: Skin is warm and dry.      Comments: Her right great toe is erythematous.  Does not feel warm.  It is not swollen.  It is only tender to deep palpation.  She has some dry, inflamed skin in between her toes.   Neurological:      Mental Status: She is alert.   Psychiatric:         Mood and Affect: Mood normal.         Behavior: Behavior normal.         Thought Content: Thought content normal.            The following data was reviewed by: Melvin Jimenez MD on 05/11/2023:  Common labs        2/24/2023    11:01 4/5/2023    10:02 4/5/2023    10:03 5/5/2023    14:45   Common Labs   Glucose 86   99    109     BUN 12   13    18     Creatinine 0.64   0.68    0.65     Sodium 139   137    139     Potassium 4.6   4.9    4.7     Chloride 101   100    100     Calcium 9.5   9.4    9.7     Total Protein  " 6.4    6.9     Albumin 4.5   4.6    4.7     Total Bilirubin 0.5   0.4    0.6     Alkaline Phosphatase 67   63    66     AST (SGOT) 22   24    15     ALT (SGPT) 38   21    11     WBC 5.50        Hemoglobin 11.4        Hematocrit 34.8        Platelets 293        Total Cholesterol   199      Triglycerides   86      HDL Cholesterol   90      LDL Cholesterol    94        Data reviewed: Recent nephrology note             ASSESSMENT & PLAN     Diagnoses and all orders for this visit:    1. Benzodiazepine dependence (Primary)    2. Anxiety    3. Debility    4. Hyponatremia  -     furosemide (LASIX) 20 MG tablet; Take 1 tablet by mouth 2 (Two) Times a Day for 90 days.  Dispense: 60 tablet; Refill: 2    5. Essential hypertension, benign  -     furosemide (LASIX) 20 MG tablet; Take 1 tablet by mouth 2 (Two) Times a Day for 90 days.  Dispense: 60 tablet; Refill: 2    6. Sinus congestion    7. Athlete's foot on right  -     clotrimazole (LOTRIMIN) 1 % external solution; Apply  topically to the appropriate area as directed 2 (Two) Times a Day.  Dispense: 15 mL; Refill: 0      Patient seems quite debilitated with frequent falls.  Discussed benzodiazepine and Ambien increase her fall risk.  Patient is open to weaning these down.  She will take a half of a Klonopin twice daily rather than a whole Klonopin.  We will follow-up in 3 months and see how this is going.    Her sodium is stable.  We will continue to check monthly.    Blood pressure under good control today.  Continue current management.    Continue over-the-counter antihistamine for environmental allergies.  Patient has Flonase at home as well and so she will start this.    Her right great toe does not appear to have cellulitis.  It mostly seems irritated and appears to be getting better on its own.  Discussed patient should not wear the offending shoes again.  The skin in between her toes appears to be quite irritated so we will start clotrimazole for this.    Health  Maintenance Due   Topic Date Due   • Pneumococcal Vaccine 65+ (2 - PCV) 01/18/2019        Follow Up  No follow-ups on file.    Patient/family had no further questions at this time and verbalized understanding of the plan discussed today.

## 2023-05-12 ENCOUNTER — TELEPHONE (OUTPATIENT)
Dept: INTERNAL MEDICINE | Facility: CLINIC | Age: 83
End: 2023-05-12
Payer: MEDICARE

## 2023-05-12 DIAGNOSIS — M62.89 PELVIC FLOOR DYSFUNCTION IN FEMALE: Primary | ICD-10-CM

## 2023-05-12 NOTE — TELEPHONE ENCOUNTER
Glenys from [facility] called requesting a referral for physical therapy for diagnosis of pelvic floor issues. The patient states Dr. Jimenez addressed the issue at her appointment on 05/11/2023. Please fax the order to [phone]. If you have any questions please call them at [phone].

## 2023-05-16 RX ORDER — ALPRAZOLAM 0.5 MG/1
TABLET ORAL
Qty: 90 TABLET | Refills: 3 | Status: CANCELLED
Start: 2023-05-16

## 2023-06-07 ENCOUNTER — TELEPHONE (OUTPATIENT)
Dept: INTERNAL MEDICINE | Facility: CLINIC | Age: 83
End: 2023-06-07
Payer: MEDICARE

## 2023-06-07 NOTE — TELEPHONE ENCOUNTER
Patient  called and stated that her blood pressure is increasing again. He said that back in March her kidney doctor said to stop the Lorsartan blood pressure medication. He said now that her blood pressure is increasing. Would like to know if she needs to start back on medication. Also, patient's  said that he can see medication pills in her stool and concerned the pills are not dissolving.     Please call back at [phone]

## 2023-06-07 NOTE — TELEPHONE ENCOUNTER
I called Ritu Martino at [phone] at 15:25 EDT and talk to her  Nu    He states her blood pressure has been 132-146/70s-80s with pulse .  Previously on valsartan. In March her her valsartan dose was cut in half by her nephrologist. Previously BP was in low 100s systolic on half tab of losartan so she was taken off of losartan due to good blood pressure control.  She did not have symptoms when her blood pressure was lower.  I told patient and her  it is reasonable to retry a half tab of valsartan with close monitoring.  She is also on diltiazem.  She is no longer on clonidine    She has been seeing pieces K CL tablet in her stool.  She is wondering if medication is working for her.  Her potassium has been in a normal range.  I told her this is okay as long as her potassium and is in a normal range and her stomach is not upset.

## 2023-06-15 RX ORDER — ROSUVASTATIN CALCIUM 20 MG/1
20 TABLET, COATED ORAL NIGHTLY
Qty: 90 TABLET | Refills: 0 | Status: SHIPPED | OUTPATIENT
Start: 2023-06-15

## 2023-06-30 ENCOUNTER — OFFICE (OUTPATIENT)
Dept: URBAN - METROPOLITAN AREA CLINIC 76 | Facility: CLINIC | Age: 83
End: 2023-06-30
Payer: MEDICARE

## 2023-06-30 DIAGNOSIS — E78.00 PURE HYPERCHOLESTEROLEMIA, UNSPECIFIED: ICD-10-CM

## 2023-06-30 DIAGNOSIS — I10 ESSENTIAL (PRIMARY) HYPERTENSION: ICD-10-CM

## 2023-06-30 DIAGNOSIS — D50.9 IRON DEFICIENCY ANEMIA, UNSPECIFIED: ICD-10-CM

## 2023-06-30 DIAGNOSIS — K21.00 GASTRO-ESOPHAGEAL REFLUX DISEASE WITH ESOPHAGITIS, WITHOUT B: ICD-10-CM

## 2023-06-30 DIAGNOSIS — K57.30 DIVERTICULOSIS OF LARGE INTESTINE WITHOUT PERFORATION OR ABS: ICD-10-CM

## 2023-06-30 PROCEDURE — 99490 CHRNC CARE MGMT STAFF 1ST 20: CPT | Performed by: INTERNAL MEDICINE

## 2023-06-30 PROCEDURE — 99439 CHRNC CARE MGMT STAF EA ADDL: CPT | Performed by: INTERNAL MEDICINE

## 2023-07-26 ENCOUNTER — OFFICE (OUTPATIENT)
Dept: URBAN - METROPOLITAN AREA CLINIC 76 | Facility: CLINIC | Age: 83
End: 2023-07-26

## 2023-07-26 VITALS
DIASTOLIC BLOOD PRESSURE: 80 MMHG | HEART RATE: 82 BPM | OXYGEN SATURATION: 97 % | SYSTOLIC BLOOD PRESSURE: 100 MMHG | HEIGHT: 64 IN | WEIGHT: 131 LBS

## 2023-07-26 DIAGNOSIS — R15.9 FULL INCONTINENCE OF FECES: ICD-10-CM

## 2023-07-26 DIAGNOSIS — K59.02 OUTLET DYSFUNCTION CONSTIPATION: ICD-10-CM

## 2023-07-26 DIAGNOSIS — R19.7 DIARRHEA, UNSPECIFIED: ICD-10-CM

## 2023-07-26 DIAGNOSIS — K57.30 DIVERTICULOSIS OF LARGE INTESTINE WITHOUT PERFORATION OR ABS: ICD-10-CM

## 2023-07-26 DIAGNOSIS — Z90.49 ACQUIRED ABSENCE OF OTHER SPECIFIED PARTS OF DIGESTIVE TRACT: ICD-10-CM

## 2023-07-26 PROCEDURE — 99214 OFFICE O/P EST MOD 30 MIN: CPT | Performed by: NURSE PRACTITIONER

## 2023-08-03 DIAGNOSIS — E87.1 HYPONATREMIA: ICD-10-CM

## 2023-08-03 DIAGNOSIS — I10 ESSENTIAL HYPERTENSION, BENIGN: ICD-10-CM

## 2023-08-04 RX ORDER — FUROSEMIDE 20 MG/1
TABLET ORAL
Qty: 60 TABLET | Refills: 11 | Status: SHIPPED | OUTPATIENT
Start: 2023-08-04

## 2023-08-15 DIAGNOSIS — E87.1 HYPONATREMIA: Primary | ICD-10-CM

## 2023-08-15 DIAGNOSIS — E78.5 HYPERLIPIDEMIA, UNSPECIFIED HYPERLIPIDEMIA TYPE: ICD-10-CM

## 2023-08-15 LAB
BUN SERPL-MCNC: 10 MG/DL (ref 8–23)
BUN/CREAT SERPL: 13.5 (ref 7–25)
CALCIUM SERPL-MCNC: 8.9 MG/DL (ref 8.6–10.5)
CHLORIDE SERPL-SCNC: 98 MMOL/L (ref 98–107)
CHOLEST SERPL-MCNC: 192 MG/DL (ref 0–200)
CO2 SERPL-SCNC: 25.5 MMOL/L (ref 22–29)
CREAT SERPL-MCNC: 0.74 MG/DL (ref 0.57–1)
EGFRCR SERPLBLD CKD-EPI 2021: 80.4 ML/MIN/1.73
GLUCOSE SERPL-MCNC: 104 MG/DL (ref 65–99)
HDLC SERPL-MCNC: 91 MG/DL (ref 40–60)
LDLC SERPL CALC-MCNC: 86 MG/DL (ref 0–100)
LDLC/HDLC SERPL: 0.93 {RATIO}
POTASSIUM SERPL-SCNC: 4.4 MMOL/L (ref 3.5–5.2)
SODIUM SERPL-SCNC: 134 MMOL/L (ref 136–145)
TRIGL SERPL-MCNC: 82 MG/DL (ref 0–150)
VLDLC SERPL CALC-MCNC: 15 MG/DL (ref 5–40)

## 2023-08-16 ENCOUNTER — OFFICE VISIT (OUTPATIENT)
Dept: INTERNAL MEDICINE | Facility: CLINIC | Age: 83
End: 2023-08-16
Payer: MEDICARE

## 2023-08-16 VITALS
HEIGHT: 60 IN | HEART RATE: 93 BPM | OXYGEN SATURATION: 97 % | DIASTOLIC BLOOD PRESSURE: 74 MMHG | WEIGHT: 130.4 LBS | SYSTOLIC BLOOD PRESSURE: 128 MMHG | BODY MASS INDEX: 25.6 KG/M2

## 2023-08-16 DIAGNOSIS — E87.1 HYPONATREMIA: Primary | ICD-10-CM

## 2023-08-16 DIAGNOSIS — R19.7 DIARRHEA, UNSPECIFIED TYPE: ICD-10-CM

## 2023-08-16 DIAGNOSIS — I10 ESSENTIAL HYPERTENSION, BENIGN: ICD-10-CM

## 2023-08-16 DIAGNOSIS — F13.20 BENZODIAZEPINE DEPENDENCE: ICD-10-CM

## 2023-08-16 PROCEDURE — 3078F DIAST BP <80 MM HG: CPT | Performed by: STUDENT IN AN ORGANIZED HEALTH CARE EDUCATION/TRAINING PROGRAM

## 2023-08-16 PROCEDURE — 99213 OFFICE O/P EST LOW 20 MIN: CPT | Performed by: STUDENT IN AN ORGANIZED HEALTH CARE EDUCATION/TRAINING PROGRAM

## 2023-08-16 PROCEDURE — 3074F SYST BP LT 130 MM HG: CPT | Performed by: STUDENT IN AN ORGANIZED HEALTH CARE EDUCATION/TRAINING PROGRAM

## 2023-08-16 RX ORDER — HYDROCODONE BITARTRATE AND ACETAMINOPHEN 10; 325 MG/1; MG/1
1 TABLET ORAL EVERY 6 HOURS PRN
COMMUNITY

## 2023-08-16 RX ORDER — METAXALONE 800 MG/1
800 TABLET ORAL 3 TIMES DAILY PRN
COMMUNITY

## 2023-08-16 RX ORDER — OXYCODONE AND ACETAMINOPHEN 7.5; 325 MG/1; MG/1
1 TABLET ORAL EVERY 6 HOURS PRN
COMMUNITY

## 2023-08-16 NOTE — PROGRESS NOTES
Melvin Jimenez M.D.  Internal Medicine  [facility]  [address], [address]  [place] [address]  [phone]      Chief Complaint  Follow-up (3 mth F/U /), Med Refill (Ambien ), Medication Problem (Patient would like to know if she takes one xanax in the AM can she have a drink in the evening.), and Discuss labs     SUBJECTIVE    History of Present Illness    Ritu Martino is a 83 y.o. female who presents to the office today as an established patient that last saw me on 2/8/2023.     She is here with her  Nu    Previously took 2 xanax per day. We decreased to half tablet twice daily. She tried 1 tablet in the AM. She likes to have a Cocktail with dinner. She takes very little alcohol in this. She is aware she should avoid mixing. She functions well without anxiety. She is afraid to decrease further. She is feeling really good and does not want to change anything. 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  Follows with neurology.    BG was in 110 fasting. She eats a healthy diet.       SIADH and hyponatremia-She has her sodium monitored monthly.  [facility].  She is on salt tabs 2 g 3 times daily, 1 L fluid restriction and Lasix.  Last BMP showed Na 134. Insomnia-on Ambien.  Also on xanax.     Runny nose for months. Denies infections symptoms. She took Zyrtec without relief. No cough, shortness of breath, fevers.      Hypertension-well-controlled on clonidine as needed, diltiazem, Valsartan. Takes clonidine as needed but took it 1 year ago..      Anxiety-she takes xanax TID.  In May last year she was depressed and anxious per her .  She has a history of multiple falls with fractures.  She has been on Pristiq and xanax for about a year. She had pristiq doubled from 50 mg to 100 mg. Her xanax dose was also doubled. Her  has noticed significant improvement in symptoms since dose  "increase.  Patient is open to decreasing Xanax dose today.  She takes Xanax so that she does not feel nervous, she does not take it as needed for anxiety.       She goes to the bathroom \"10 times per day\".  States she had 6 Bms/day today. Last 2-3 weeks bowel movements were mostly water. She had a recent GI panel for pathogens that was negative. Apparently had impaired generation of rectal sphincter with impaired sphincter relaxation. She has pelvic floor PT for several months at [facility]. She is trying Biofeedback on Monday. They have been seeing APRN at private office. They want a second oppionon and are requesting referral. Stool is urgent. She takes benefiber. She takes magnesium twice daily.      She previously was constipated.         Review of Systems    No Known Allergies     Outpatient Medications Marked as Taking for the 8/16/23 encounter (Office Visit) with Melvin Jimenez MD   Medication Sig Dispense Refill    acetaminophen (TYLENOL) 325 MG tablet Take 2 tablets by mouth Every 6 (Six) Hours As Needed for Mild Pain . (Patient taking differently: Take 500 mg by mouth Every 6 (Six) Hours As Needed for Mild Pain.)      ALPRAZolam (Xanax) 0.5 MG tablet Take 1 tablet by mouth 3 (Three) Times a Day As Needed for Anxiety (Fall precautions). 90 tablet 3    Cholecalciferol (VITAMIN D) 2000 units capsule Take  by mouth Daily With Dinner.      cloNIDine (Catapres) 0.1 MG tablet 1 po bid prn sys greater or equal to 150 30 tablet 3    clotrimazole (LOTRIMIN) 1 % external solution Apply  topically to the appropriate area as directed 2 (Two) Times a Day. 15 mL 0    clotrimazole-betamethasone (Lotrisone) 1-0.05 % cream Apply 1 application topically to the appropriate area as directed 2 (Two) Times a Day. 45 g 0    cycloSPORINE (RESTASIS) 0.05 % ophthalmic emulsion 1 drop 2 (Two) Times a Day.      desvenlafaxine (Pristiq) 100 MG 24 hr tablet Take 1 tablet by mouth Daily. 90 tablet 3    dilTIAZem XR (DILACOR XR) 120 MG 24 hr " capsule Take 1 capsule by mouth Daily. 90 capsule 3    furosemide (LASIX) 20 MG tablet TAKE 1 TABLET TWICE A DAY 60 tablet 11    HYDROcodone-acetaminophen (NORCO)  MG per tablet Take 1 tablet by mouth Every 6 (Six) Hours As Needed for Moderate Pain.      ibuprofen (ADVIL,MOTRIN) 800 MG tablet TAKE 1 TABLET BY MOUTH EVERY 8 HOURS AS NEEDED FOR MILD PAIN 90 tablet 3    levothyroxine (SYNTHROID, LEVOTHROID) 50 MCG tablet Take 1 tablet by mouth Daily. 90 tablet 1    Magnesium 400 MG capsule 1 p.o. twice daily 60 capsule 3    melatonin 5 MG tablet tablet Take 2 tablets by mouth.      metaxalone (SKELAXIN) 800 MG tablet Take 1 tablet by mouth 3 (Three) Times a Day As Needed for Muscle Spasms.      mupirocin (Bactroban Nasal) 2 % nasal ointment into the nostril(s) as directed by provider 2 (Two) Times a Day. 1 g 2    omeprazole (priLOSEC) 40 MG capsule Take 1 capsule by mouth Daily. 90 capsule 3    oxyCODONE-acetaminophen (PERCOCET) 7.5-325 MG per tablet Take 1 tablet by mouth Every 6 (Six) Hours As Needed.      potassium chloride 10 MEQ CR tablet Take 1 tablet by mouth 2 (Two) Times a Day. 180 tablet 3    Probiotic Product (ALIGN PO) Take 1 capsule by mouth Daily With Lunch.      rosuvastatin (CRESTOR) 20 MG tablet Take 1 tablet by mouth Every Night. 90 tablet 0    SODIUM CHLORIDE PO Take  by mouth 3 (Three) Times a Day. 2 TABLETS 3 6TIMES A DAY      valsartan (DIOVAN) 160 MG tablet TAKE 1 TABLET DAILY (Patient taking differently: 0.5 tablets.) 90 tablet 3    vitamin B-12 (CYANOCOBALAMIN) 100 MCG tablet Take 0.5 tablets by mouth Daily.      zinc oxide 20 % ointment Apply  topically to the appropriate area as directed.      zolpidem (AMBIEN) 10 MG tablet Take 1 tablet by mouth At Night As Needed for Sleep. 90 tablet 1        Past Medical History:   Diagnosis Date    Anemia 2000    Anxiety     Arthritis     Bowel dysfunction 08/2021    since having surgery for diverticular infection    Chronic constipation      "Depression     Diverticulitis 2021    w/ rupture and infection required surgery and ostomy    Essential hypertension, benign     Fracture 2022    left ankle    Fracture, tibia and fibula Now wearing bood    GERD (gastroesophageal reflux disease)     Hernia, hiatal     Hypercholesterolemia     Hypertension     Hypokalemia     Hyponatremia     chronic low with occasional normal level    Hypothyroidism     estimated time frame pt nor  could remember exactly how long has been on medication    Insomnia     Iron deficiency     Seizures     Tear of meniscus of knee     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     Past Surgical History:   Procedure Laterality Date    APPENDECTOMY      BACK SURGERY      BACK SURGERY      Discectomy     SECTION      x2 lower midline incision    COLON SURGERY  2021    to address ruptured and infected diverticular dz, ostomy also placed    COLONOSCOPY      COLOSTOMY CLOSURE  10/2021    ostomy removed    EYE SURGERY  2 X cataract    HEMORRHOIDECTOMY      HERNIA REPAIR      KNEE ARTHROPLASTY      TONSILLECTOMY       Family History   Problem Relation Age of Onset    Brain cancer Mother     Cancer Mother         Brain tumor, 1979    Stroke Father         Age 46    Early death Father         Stroke, age 46    Cancer Brother         pancreatic    Cancer Daughter         Dec'd 2000-    reports that she quit smoking about 52 years ago. Her smoking use included cigarettes. She started smoking about 67 years ago. She has a 3.75 pack-year smoking history. She has never used smokeless tobacco. She reports that she does not currently use alcohol after a past usage of about 8.0 standard drinks per week. She reports that she does not use drugs.    OBJECTIVE    Vital Signs:   /74   Pulse 93   Ht 152.4 cm (60\")   Wt 59.1 kg (130 lb 6.4 oz)   SpO2 97%   BMI 25.47 kg/mý     Physical Exam  Constitutional:       Appearance: Normal appearance. " She is normal weight.   Cardiovascular:      Rate and Rhythm: Normal rate and regular rhythm.      Heart sounds: Normal heart sounds. No murmur heard.  Pulmonary:      Effort: Pulmonary effort is normal.      Breath sounds: Normal breath sounds.   Abdominal:      General: Abdomen is flat. There is no distension.      Palpations: Abdomen is soft.      Tenderness: There is no abdominal tenderness.   Musculoskeletal:      Right lower leg: No edema.      Left lower leg: No edema.   Skin:     General: Skin is warm and dry.   Neurological:      Mental Status: She is alert.   Psychiatric:         Mood and Affect: Mood normal.         Behavior: Behavior normal.         Thought Content: Thought content normal.          The following data was reviewed by: Melvin Jimenez MD on 05/11/2023:  Common labs          5/5/2023    14:45 6/28/2023    14:44 8/15/2023    10:32   Common Labs   Glucose 109  115  104    BUN 18  12  10    Creatinine 0.65  0.70  0.74    Sodium 139  140  134    Potassium 4.7  3.9  4.4    Chloride 100  101  98    Calcium 9.7  9.4  8.9    Total Protein 6.9      Albumin 4.7  4.7     Total Bilirubin 0.6  0.4     Alkaline Phosphatase 66  55     AST (SGOT) 15  17     ALT (SGPT) 11  20     Total Cholesterol   192    Triglycerides   82    HDL Cholesterol   91    LDL Cholesterol    86      Data reviewed: Recent nephrology note              ASSESSMENT & PLAN     Diagnoses and all orders for this visit:    1. Hyponatremia (Primary)  -     Basic Metabolic Panel; Future    2. Diarrhea, unspecified type  -     Ambulatory Referral to Gastroenterology    3. Essential hypertension, benign    4. Benzodiazepine dependence      Patient seems quite debilitated with frequent falls.  Discussed benzodiazepine and Ambien increase her fall risk.  Patient is open to weaning these down.  She is now taking Klonopin only in the morning because she has a very small cocktail in the evening.  Reviewed Ramon today and it is appropriate.    Her  sodium was slightly decreased from previous.  We will recheck in 6 weeks.  Continue current management for now.  Her nephrologist is considering demeclocycline     Blood pressure under good control today.  Continue current management.    Try metamucil as bulk forming agent for stools.  Continue PT for frequent stooling.           Health Maintenance Due   Topic Date Due    Pneumococcal Vaccine 65+ (2 - PCV) 01/18/2019    ZOSTER VACCINE (2 of 2) 09/03/2019    COVID-19 Vaccine (6 - Pfizer series) 03/14/2023        Follow Up  Return in about 3 months (around 11/16/2023) for Recheck.    Patient/family had no further questions at this time and verbalized understanding of the plan discussed today.

## 2023-08-21 DIAGNOSIS — F51.01 PRIMARY INSOMNIA: ICD-10-CM

## 2023-08-21 NOTE — TELEPHONE ENCOUNTER
Caller: Nu Martino    Relationship: Emergency Contact    Best call back number:     [phone] (Home)       Requested Prescriptions:   Requested Prescriptions     Pending Prescriptions Disp Refills    zolpidem (AMBIEN) 10 MG tablet 90 tablet 1     Sig: Take 1 tablet by mouth At Night As Needed for Sleep.        Pharmacy where request should be sent:    [facility] - [place] [phone] [facility] [phone] [facility] [phone]     Last office visit with prescribing clinician: 8/16/2023   Last telemedicine visit with prescribing clinician: Visit date not found   Next office visit with prescribing clinician: 11/30/2023     Additional details provided by patient:     Does the patient have less than a 3 day supply:  [] Yes  [] No    Would you like a call back once the refill request has been completed: [] Yes [] No    If the office needs to give you a call back, can they leave a voicemail: [] Yes [] No    Mj Escobar   08/21/23 11:22 EDT

## 2023-08-24 ENCOUNTER — TELEPHONE (OUTPATIENT)
Dept: INTERNAL MEDICINE | Facility: CLINIC | Age: 83
End: 2023-08-24

## 2023-08-24 RX ORDER — ZOLPIDEM TARTRATE 10 MG/1
10 TABLET ORAL NIGHTLY PRN
Qty: 90 TABLET | Refills: 1 | Status: SHIPPED | OUTPATIENT
Start: 2023-08-24

## 2023-08-25 ENCOUNTER — TELEMEDICINE (OUTPATIENT)
Dept: INTERNAL MEDICINE | Facility: CLINIC | Age: 83
End: 2023-08-25
Payer: MEDICARE

## 2023-08-25 DIAGNOSIS — U07.1 COVID-19 VIRUS INFECTION: Primary | ICD-10-CM

## 2023-08-25 PROCEDURE — 99213 OFFICE O/P EST LOW 20 MIN: CPT | Performed by: STUDENT IN AN ORGANIZED HEALTH CARE EDUCATION/TRAINING PROGRAM

## 2023-08-25 NOTE — PROGRESS NOTES
"  Melvin Jimenez M.D.  Internal Medicine  [facility]  [address], [address]  [place] [address]  [phone]      Chief Complaint  COVID    SUBJECTIVE    History of Present Illness    Ritu Martino is a 83 y.o. female who presents to the office today as an established patient that last saw me on 8/16/2023.     Mode of Visit: Video  Location of patient: home  Location of provider: [facility]  You have chosen to receive care through a telehealth visit.  Does the patient consent to use a video/audio connection their medical care today? yes  I could not see patient for this visit due to technical issues.     She had a positive COVID test on August 24. Her  is also sick.     She feels \"lousy\", she is coughing. She has a sore throat, She is short of breath, she had a fever a few days ago. Symptoms started on Tuesday. She is taking robitussin DM, Waldry, tylenol and throat spray. Cough is not that bothersome.     Review of Systems    No Known Allergies     No outpatient medications have been marked as taking for the 8/25/23 encounter (Telemedicine) with Melvin Jimenez MD.        Past Medical History:   Diagnosis Date    Anemia 2000    Anxiety     Arthritis     Bowel dysfunction 08/2021    since having surgery for diverticular infection    Chronic constipation     Depression     Diverticulitis 08/2021    w/ rupture and infection required surgery and ostomy    Essential hypertension, benign     Fracture 06/2022    left ankle    Fracture, tibia and fibula Now wearing bood    GERD (gastroesophageal reflux disease)     Hernia, hiatal     Hypercholesterolemia     Hypertension     Hypokalemia     Hyponatremia 2014    chronic low with occasional normal level    Hypothyroidism 2018    estimated time frame pt nor  could remember exactly how long has been on medication    Insomnia     Iron deficiency     Seizures 2022    Tear of meniscus of knee 2000    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 "     Past Surgical History:   Procedure Laterality Date    APPENDECTOMY      BACK SURGERY      BACK SURGERY  1996    Discectomy     SECTION      x2 lower midline incision    COLON SURGERY  2021    to address ruptured and infected diverticular dz, ostomy also placed    COLONOSCOPY      COLOSTOMY CLOSURE  10/2021    ostomy removed    EYE SURGERY  2 X cataract    HEMORRHOIDECTOMY      HERNIA REPAIR      KNEE ARTHROPLASTY      TONSILLECTOMY       Family History   Problem Relation Age of Onset    Brain cancer Mother     Cancer Mother         Brain tumor, 1979    Stroke Father         Age 46    Early death Father         Stroke, age 46    Cancer Brother         pancreatic    Cancer Daughter         Dec'd 2000-[facility]    reports that she quit smoking about 52 years ago. Her smoking use included cigarettes. She started smoking about 67 years ago. She has a 3.75 pack-year smoking history. She has never used smokeless tobacco. She reports that she does not currently use alcohol after a past usage of about 8.0 standard drinks per week. She reports that she does not use drugs.    OBJECTIVE    Vital Signs:   There were no vitals taken for this visit.    Physical Exam  Pulmonary:      Effort: Pulmonary effort is normal. No respiratory distress.      Comments: Talking in complete sentences  Neurological:      Mental Status: She is alert. Mental status is at baseline.   Psychiatric:         Mood and Affect: Mood normal.         Behavior: Behavior normal.         Thought Content: Thought content normal.          The following data was reviewed by: Melvin Jimenez MD on 2023:  CMP          2023    14:45 2023    14:44 8/15/2023    10:32   CMP   Glucose 109  115  104    BUN 18  12  10    Creatinine 0.65  0.70  0.74    EGFR  85.9     Sodium 139  140  134    Potassium 4.7  3.9  4.4    Chloride 100  101  98    Calcium 9.7  9.4  8.9    Total Protein 6.9      Total Protein  6.8     Albumin 4.7  4.7      Globulin 2.2      Globulin  2.1     Total Bilirubin 0.6  0.4     Alkaline Phosphatase 66  55     AST (SGOT) 15  17     ALT (SGPT) 11  20     Albumin/Globulin Ratio  2.2     BUN/Creatinine Ratio 27.7  17.1  13.5    Anion Gap  11.0       CBC w/diff          2022    06:47 2023    12:27 2023    11:01   CBC w/Diff   WBC 3.13  5.3  5.50    RBC 3.49  3.59  3.69    Hemoglobin 10.9  11.1  11.4    Hematocrit 31.5  33.5  34.8    MCV 90.3  93  94.3    MCH 31.2  30.9  30.9    MCHC 34.6  33.1  32.8    RDW 12.1  12.0  11.8    Platelets 233  334  293    Neutrophil Rel %  77  71.9    Immature Granulocyte Rel %   0.4    Lymphocyte Rel %  11  14.2    Monocyte Rel %  10  10.7    Eosinophil Rel %  2  2.4    Basophil Rel %  0  0.4      Lipid Panel          10/27/2022    11:13 2023    10:03 8/15/2023    10:32   Lipid Panel   Total Cholesterol 189      Total Cholesterol  199  192    Triglycerides 81  86  82    HDL Cholesterol 109  90  91    VLDL Cholesterol 14  15  15    LDL Cholesterol  66  94  86    LDL/HDL Ratio 0.59   0.93      TSH          10/27/2022    11:13 2023    10:03   TSH   TSH 1.320  3.680        Data reviewed : Note from last visit          nirmatrelvir/ritonavir + rosuvastatin  nirmatrelvir/ritonavir will increase the level or effect of rosuvastatin by affecting hepatic/intestinal enzyme CY metabolism. Modify Therapy/Monitor Closely. Consider temporary discontinuation of rosuvastatin during treatment with nirmatrelvir/ritonavir.    nirmatrelvir/ritonavir + diltiazem  nirmatrelvir/ritonavir will increase the level or effect of diltiazem by affecting hepatic/intestinal enzyme CY metabolism. Modify Therapy/Monitor Closely. Consider reducing diltiazem dose by 50% during coadministration and for 3 more days after the last nirmatrelvir/ritonavir dose.    nirmatrelvir/ritonavir + alprazolam  nirmatrelvir/ritonavir will increase the level or effect of alprazolam by affecting hepatic/intestinal enzyme  CY metabolism. Modify Therapy/Monitor Closely. Consider lowering alprazolam dosing when coadministered with nirmatrelvir/ritonavir    nirmatrelvir/ritonavir + valsartan  nirmatrelvir/ritonavir will increase the level or effect of valsartan by Other (see comment). Minor/Significance Unknown. Valsartan is a substrate of the hepatic uptake transporters [id number] and MRP2. Ritonavir inhibits these transporters. No dosage modification needed since induction reaches maximal effect after several days and the short duration of nirmatrelvir/ritonavir treatment.    ASSESSMENT & PLAN     Diagnoses and all orders for this visit:    1. COVID-19 virus infection (Primary)  -     Nirmatrelvir&Ritonavir 300/100 (PAXLOVID) 20 x 150 MG & 10 x 100MG tablet therapy pack tablet; Take 3 tablets by mouth 2 (Two) Times a Day for 5 days.  Dispense: 30 tablet; Refill: 0      -advised pt that COVID 19 in general is treated like other viral URI: cough/cold medications over the counter, tylenol and ibuprofen, ample fluid intake  -advised pt that there  (PAXLOVID) approved for COVID 19 in high risk patients to prevent the risk of progression to severe illness requiring hospitalization.  Short term side effects that have been seen include muscle aches, diarrhea, bad taste in the mouth. She was agreeable to initiate treatment.   -I performed a drug interaction check and her current medications interact with Paxlovid    Rosuvastatin-hold during treatment  Alprazolam, and valsartan-monitor closely  Diltiazem-I discussed with Patient's pharmacy given this can't be split. She will continue and monitor BP closely.       -renal function is appropriate for full dosed PAXLOVID  -informed pt to report to ER for worsening symptoms, feeling of inability to catch breath, chest pain, worsening fever or chills      Health Maintenance Due   Topic Date Due    Pneumococcal Vaccine 65+ (2 - PCV) 2019    ZOSTER VACCINE (2 of 2) 2019    COVID-19 Vaccine  (6 - Pfizer series) 03/14/2023        Follow Up  No follow-ups on file.    Patient/family had no further questions at this time and verbalized understanding of the plan discussed today.

## 2023-09-06 ENCOUNTER — OFFICE VISIT (OUTPATIENT)
Dept: INTERNAL MEDICINE | Facility: CLINIC | Age: 83
End: 2023-09-06
Payer: MEDICARE

## 2023-09-06 VITALS
HEART RATE: 93 BPM | SYSTOLIC BLOOD PRESSURE: 129 MMHG | OXYGEN SATURATION: 97 % | DIASTOLIC BLOOD PRESSURE: 75 MMHG | HEIGHT: 60 IN | BODY MASS INDEX: 25.52 KG/M2 | WEIGHT: 130 LBS

## 2023-09-06 DIAGNOSIS — I10 ESSENTIAL HYPERTENSION, BENIGN: Primary | ICD-10-CM

## 2023-09-06 DIAGNOSIS — Z79.899 HIGH RISK MEDICATION USE: ICD-10-CM

## 2023-09-06 DIAGNOSIS — E87.1 HYPONATREMIA: ICD-10-CM

## 2023-09-06 PROCEDURE — 3078F DIAST BP <80 MM HG: CPT | Performed by: STUDENT IN AN ORGANIZED HEALTH CARE EDUCATION/TRAINING PROGRAM

## 2023-09-06 PROCEDURE — 3074F SYST BP LT 130 MM HG: CPT | Performed by: STUDENT IN AN ORGANIZED HEALTH CARE EDUCATION/TRAINING PROGRAM

## 2023-09-06 PROCEDURE — 99213 OFFICE O/P EST LOW 20 MIN: CPT | Performed by: STUDENT IN AN ORGANIZED HEALTH CARE EDUCATION/TRAINING PROGRAM

## 2023-09-06 NOTE — PROGRESS NOTES
Melvin Jimenez M.D.  Internal Medicine  [facility]  [address], [address]  [place] [address]  [phone]      Chief Complaint  Hypertension (Increased BP  140's to 170's for Dystolic /)    SUBJECTIVE    History of Present Illness    Ritu Martino is a 83 y.o. female who presents to the office today as an established patient that last saw me on 8/16/2023.     I last saw her on August 25 for COVID.  She was treated with Paxlovid.    Hypertension-well-controlled on clonidine as needed, diltiazem, Valsartan. Takes clonidine as needed but took it 1 year ago but took 6 imes this week. States BP is higher in the evening. Takes diltiazem at dinner and valsartan at breakfast. Takes Lasix breakfast and dinner.      Recovering from COVID      Review of Systems    No Known Allergies     Outpatient Medications Marked as Taking for the 9/6/23 encounter (Office Visit) with Melvin Jimenez MD   Medication Sig Dispense Refill    acetaminophen (TYLENOL) 325 MG tablet Take 2 tablets by mouth Every 6 (Six) Hours As Needed for Mild Pain . (Patient taking differently: Take 500 mg by mouth Every 6 (Six) Hours As Needed for Mild Pain.)      ALPRAZolam (Xanax) 0.5 MG tablet Take 1 tablet by mouth 3 (Three) Times a Day As Needed for Anxiety (Fall precautions). 90 tablet 3    Cholecalciferol (VITAMIN D) 2000 units capsule Take  by mouth Daily With Dinner.      cloNIDine (Catapres) 0.1 MG tablet 1 po bid prn sys greater or equal to 150 30 tablet 3    cycloSPORINE (RESTASIS) 0.05 % ophthalmic emulsion 1 drop 2 (Two) Times a Day.      desvenlafaxine (Pristiq) 100 MG 24 hr tablet Take 1 tablet by mouth Daily. 90 tablet 3    dilTIAZem XR (DILACOR XR) 120 MG 24 hr capsule Take 1 capsule by mouth Daily. 90 capsule 3    furosemide (LASIX) 20 MG tablet TAKE 1 TABLET TWICE A DAY 60 tablet 11    ibuprofen (ADVIL,MOTRIN) 800 MG tablet TAKE 1 TABLET BY MOUTH EVERY 8 HOURS AS NEEDED FOR MILD PAIN 90 tablet 3    levothyroxine  (SYNTHROID, LEVOTHROID) 50 MCG tablet Take 1 tablet by mouth Daily. 90 tablet 1    Magnesium 400 MG capsule 1 p.o. twice daily 60 capsule 3    melatonin 5 MG tablet tablet Take 2 tablets by mouth.      omeprazole (priLOSEC) 40 MG capsule Take 1 capsule by mouth Daily. 90 capsule 3    potassium chloride 10 MEQ CR tablet Take 1 tablet by mouth 2 (Two) Times a Day. 180 tablet 3    Probiotic Product (ALIGN PO) Take 1 capsule by mouth Daily With Lunch.      rosuvastatin (CRESTOR) 20 MG tablet Take 1 tablet by mouth Every Night. 90 tablet 0    SODIUM CHLORIDE PO Take  by mouth 3 (Three) Times a Day. 2 TABLETS 3 6TIMES A DAY      valsartan (DIOVAN) 160 MG tablet TAKE 1 TABLET DAILY (Patient taking differently: 0.5 tablets.) 90 tablet 3    vitamin B-12 (CYANOCOBALAMIN) 100 MCG tablet Take 0.5 tablets by mouth Daily.      zolpidem (AMBIEN) 10 MG tablet Take 1 tablet by mouth At Night As Needed for Sleep. 90 tablet 1        Past Medical History:   Diagnosis Date    Anemia     Anxiety     Arthritis     Bowel dysfunction 2021    since having surgery for diverticular infection    Chronic constipation     Depression     Diverticulitis 2021    w/ rupture and infection required surgery and ostomy    Essential hypertension, benign     Fracture 2022    left ankle    Fracture, tibia and fibula Now wearing bood    GERD (gastroesophageal reflux disease)     Hernia, hiatal     Hypercholesterolemia     Hypertension     Hypokalemia     Hyponatremia     chronic low with occasional normal level    Hypothyroidism     estimated time frame pt nor  could remember exactly how long has been on medication    Insomnia     Iron deficiency     Seizures     Tear of meniscus of knee     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     Past Surgical History:   Procedure Laterality Date    APPENDECTOMY      BACK SURGERY      BACK SURGERY      Discectomy     SECTION      x2 lower midline incision    COLON  "SURGERY  08/2021    to address ruptured and infected diverticular dz, ostomy also placed    COLONOSCOPY  2000    COLOSTOMY CLOSURE  10/2021    ostomy removed    EYE SURGERY  2 X cataract    HEMORRHOIDECTOMY      HERNIA REPAIR      KNEE ARTHROPLASTY      TONSILLECTOMY  1946     Family History   Problem Relation Age of Onset    Brain cancer Mother     Cancer Mother         Brain tumor, 1979    Stroke Father         Age 46    Early death Father         Stroke, age 46    Cancer Brother         pancreatic    Cancer Daughter         Dec'd 2000-[facility]    reports that she quit smoking about 52 years ago. Her smoking use included cigarettes. She started smoking about 67 years ago. She has a 3.75 pack-year smoking history. She has never used smokeless tobacco. She reports that she does not currently use alcohol after a past usage of about 8.0 standard drinks per week. She reports that she does not use drugs.    OBJECTIVE    Vital Signs:   /75   Pulse 93   Ht 152.4 cm (60\")   Wt 59 kg (130 lb)   SpO2 97%   BMI 25.39 kg/m²     Physical Exam  Constitutional:       Appearance: Normal appearance. She is normal weight.   Cardiovascular:      Rate and Rhythm: Normal rate and regular rhythm.      Heart sounds: Normal heart sounds. No murmur heard.  Pulmonary:      Effort: Pulmonary effort is normal.      Breath sounds: Normal breath sounds.   Abdominal:      General: Abdomen is flat. There is no distension.      Palpations: Abdomen is soft.      Tenderness: There is no abdominal tenderness.   Musculoskeletal:      Right lower leg: No edema.      Left lower leg: No edema.   Skin:     General: Skin is warm and dry.   Neurological:      Mental Status: She is alert.   Psychiatric:         Mood and Affect: Mood normal.         Behavior: Behavior normal.         Thought Content: Thought content normal.          The following data was reviewed by: Melvin Jimenez MD on 09/06/2023:  [facility]          5/5/2023    14:45 6/28/2023    14:44 " 8/15/2023    10:32   CMP   Glucose 109  115  104    BUN 18  12  10    Creatinine 0.65  0.70  0.74    EGFR  85.9     Sodium 139  140  134    Potassium 4.7  3.9  4.4    Chloride 100  101  98    Calcium 9.7  9.4  8.9    Total Protein 6.9      Total Protein  6.8     Albumin 4.7  4.7     Globulin 2.2      Globulin  2.1     Total Bilirubin 0.6  0.4     Alkaline Phosphatase 66  55     AST (SGOT) 15  17     ALT (SGPT) 11  20     Albumin/Globulin Ratio  2.2     BUN/Creatinine Ratio 27.7  17.1  13.5    Anion Gap  11.0       CBC w/diff          12/23/2022    06:47 1/4/2023    12:27 2/24/2023    11:01   CBC w/Diff   WBC 3.13  5.3  5.50    RBC 3.49  3.59  3.69    Hemoglobin 10.9  11.1  11.4    Hematocrit 31.5  33.5  34.8    MCV 90.3  93  94.3    MCH 31.2  30.9  30.9    MCHC 34.6  33.1  32.8    RDW 12.1  12.0  11.8    Platelets 233  334  293    Neutrophil Rel %  77  71.9    Immature Granulocyte Rel %   0.4    Lymphocyte Rel %  11  14.2    Monocyte Rel %  10  10.7    Eosinophil Rel %  2  2.4    Basophil Rel %  0  0.4      Lipid Panel          10/27/2022    11:13 4/5/2023    10:03 8/15/2023    10:32   Lipid Panel   Total Cholesterol 189      Total Cholesterol  199  192    Triglycerides 81  86  82    HDL Cholesterol 109  90  91    VLDL Cholesterol 14  15  15    LDL Cholesterol  66  94  86    LDL/HDL Ratio 0.59   0.93      TSH          10/27/2022    11:13 4/5/2023    10:03   TSH   TSH 1.320  3.680        Data reviewed : Note from last visit.               File Link    ASSESSMENT & PLAN     Diagnoses and all orders for this visit:    1. Essential hypertension, benign (Primary)    2. High risk medication use  -     Urine Drug Screen - Urine, Clean Catch    3. Hyponatremia  -     Basic Metabolic Panel      She brought her machine and it matched ours    I am worried that is we are more aggressive in managing her blood pressure we will cause hypotension and falls. Her blood pressure is mostly in an acceptable range.     Patient was asked  to check their BP daily at various times of day after being seated for 5 minutes or longer. Asked patient to bring log to next visit. She does not needs to check blood pressure multiple times a day. Previous to this week it was under excellent control.     She can continue clonidine as needed for BP greater than 150/90.     Continue diltiazem, valsartan and lasix at current dose.       Health Maintenance Due   Topic Date Due    Pneumococcal Vaccine 65+ (2 - PCV) 01/18/2019    ZOSTER VACCINE (2 of 2) 09/03/2019    COVID-19 Vaccine (6 - Pfizer series) 03/14/2023        Follow Up  No follow-ups on file.    Patient/family had no further questions at this time and verbalized understanding of the plan discussed today.

## 2023-09-07 LAB
AMPHETAMINES UR QL SCN: NEGATIVE NG/ML
BARBITURATES UR QL SCN: NEGATIVE NG/ML
BENZODIAZ UR QL SCN: POSITIVE NG/ML
BUN SERPL-MCNC: 12 MG/DL (ref 8–23)
BUN/CREAT SERPL: 20.3 (ref 7–25)
BZE UR QL SCN: NEGATIVE NG/ML
CALCIUM SERPL-MCNC: 9.3 MG/DL (ref 8.6–10.5)
CANNABINOIDS UR QL SCN: NEGATIVE NG/ML
CHLORIDE SERPL-SCNC: 101 MMOL/L (ref 98–107)
CO2 SERPL-SCNC: 27.8 MMOL/L (ref 22–29)
CREAT SERPL-MCNC: 0.59 MG/DL (ref 0.57–1)
CREAT UR-MCNC: 113.6 MG/DL (ref 20–300)
EGFRCR SERPLBLD CKD-EPI 2021: 89.6 ML/MIN/1.73
GLUCOSE SERPL-MCNC: 86 MG/DL (ref 65–99)
LABORATORY COMMENT REPORT: ABNORMAL
METHADONE UR QL SCN: NEGATIVE NG/ML
OPIATES UR QL SCN: NEGATIVE NG/ML
OXYCODONE+OXYMORPHONE UR QL SCN: NEGATIVE NG/ML
PCP UR QL: NEGATIVE NG/ML
PH UR: 5.7 [PH] (ref 4.5–8.9)
POTASSIUM SERPL-SCNC: 4.6 MMOL/L (ref 3.5–5.2)
PROPOXYPH UR QL SCN: NEGATIVE NG/ML
SODIUM SERPL-SCNC: 138 MMOL/L (ref 136–145)

## 2023-09-20 DIAGNOSIS — F51.01 PRIMARY INSOMNIA: ICD-10-CM

## 2023-09-20 RX ORDER — ROSUVASTATIN CALCIUM 20 MG/1
20 TABLET, COATED ORAL NIGHTLY
Qty: 90 TABLET | Refills: 2 | Status: SHIPPED | OUTPATIENT
Start: 2023-09-20

## 2023-09-20 RX ORDER — ZOLPIDEM TARTRATE 10 MG/1
10 TABLET ORAL NIGHTLY PRN
Qty: 30 TABLET | Refills: 0 | Status: SHIPPED | OUTPATIENT
Start: 2023-09-20

## 2023-09-20 NOTE — TELEPHONE ENCOUNTER
Caller: Nu Martino    Relationship: Emergency Contact    Best call back number: [phone]     Requested Prescriptions:   Requested Prescriptions     Pending Prescriptions Disp Refills    rosuvastatin (CRESTOR) 20 MG tablet 90 tablet 0     Sig: Take 1 tablet by mouth Every Night.    zolpidem (AMBIEN) 10 MG tablet 90 tablet 1     Sig: Take 1 tablet by mouth At Night As Needed for Sleep.        Pharmacy where request should be sent: [facility] [address] [phone] [facility] [phone]      Last office visit with prescribing clinician: 9/6/2023   Last telemedicine visit with prescribing clinician: 8/25/2023   Next office visit with prescribing clinician: 11/30/2023     Additional details provided by patient:     Does the patient have less than a 3 day supply:  [] Yes  [x] No    Would you like a call back once the refill request has been completed: [] Yes [] No    If the office needs to give you a call back, can they leave a voicemail: [] Yes [] No    Mj Novoa   09/20/23 12:00 EDT

## 2023-09-26 ENCOUNTER — TELEPHONE (OUTPATIENT)
Dept: INTERNAL MEDICINE | Facility: CLINIC | Age: 83
End: 2023-09-26
Payer: MEDICARE

## 2023-09-27 DIAGNOSIS — F41.9 ANXIETY: Primary | ICD-10-CM

## 2023-09-28 RX ORDER — ALPRAZOLAM 0.5 MG/1
0.5 TABLET ORAL 3 TIMES DAILY PRN
Qty: 90 TABLET | Refills: 3 | Status: SHIPPED | OUTPATIENT
Start: 2023-09-28 | End: 2023-10-02 | Stop reason: SDUPTHER

## 2023-10-02 ENCOUNTER — TELEPHONE (OUTPATIENT)
Dept: INTERNAL MEDICINE | Facility: CLINIC | Age: 83
End: 2023-10-02

## 2023-10-02 DIAGNOSIS — F41.9 ANXIETY: ICD-10-CM

## 2023-10-02 RX ORDER — ALPRAZOLAM 0.5 MG/1
0.5 TABLET ORAL 3 TIMES DAILY PRN
Qty: 30 TABLET | Refills: 0 | Status: SHIPPED | OUTPATIENT
Start: 2023-10-02

## 2023-10-02 NOTE — TELEPHONE ENCOUNTER
MEDICATION WENT TO WRONG PHARMACY, NEEDS TO GO TO [facility]. CAN THE OTHER BE WITHDRAWN AND RESENT TO [facility].     Caller: Sally Martinon    Relationship: Emergency Contact    Best call back number: [phone]     Requested Prescriptions:   Requested Prescriptions     Pending Prescriptions Disp Refills    ALPRAZolam (Xanax) 0.5 MG tablet 90 tablet 3     Sig: Take 1 tablet by mouth 3 (Three) Times a Day As Needed for Anxiety (Fall precautions).        Pharmacy where request should be sent: [facility] - [address] [phone] [facility] [phone]      Last office visit with prescribing clinician: 9/6/2023   Last telemedicine visit with prescribing clinician: 8/25/2023   Next office visit with prescribing clinician: 11/30/2023     Additional details provided by patient: MEDICATION WENT TO WRONG PHARMACY, NEEDS TO GO TO [facility]. CAN THE OTHER BE WITHDRAWN AND RESENT TO [facility].     Does the patient have less than a 3 day supply:  [] Yes  [] No    Would you like a call back once the refill request has been completed: [] Yes [] No    If the office needs to give you a call back, can they leave a voicemail: [] Yes [] No    Mj Novoa Rep   10/02/23 14:21 EDT

## 2023-10-24 NOTE — NURSING NOTE
Per oscar Quinn to use BMP from 10/17/23 for Prolia injection on 10/26/23. Additional labs cancelled.

## 2023-10-26 ENCOUNTER — INFUSION (OUTPATIENT)
Dept: ONCOLOGY | Facility: HOSPITAL | Age: 83
End: 2023-10-26
Payer: MEDICARE

## 2023-10-26 VITALS — RESPIRATION RATE: 15 BRPM | HEIGHT: 60 IN | BODY MASS INDEX: 25.39 KG/M2 | TEMPERATURE: 96.7 F

## 2023-10-26 DIAGNOSIS — S32.020G COMPRESSION FRACTURE OF L2 VERTEBRA WITH DELAYED HEALING, SUBSEQUENT ENCOUNTER: ICD-10-CM

## 2023-10-26 DIAGNOSIS — S22.080S COMPRESSION FRACTURE OF T12 VERTEBRA, SEQUELA: ICD-10-CM

## 2023-10-26 DIAGNOSIS — S32.010B COMPRESSION FRACTURE OF L1 LUMBAR VERTEBRA, OPEN, INITIAL ENCOUNTER: Primary | ICD-10-CM

## 2023-10-26 DIAGNOSIS — M85.80 OSTEOPENIA, UNSPECIFIED LOCATION: ICD-10-CM

## 2023-10-26 PROCEDURE — 25010000002 DENOSUMAB 60 MG/ML SOLUTION PREFILLED SYRINGE: Performed by: STUDENT IN AN ORGANIZED HEALTH CARE EDUCATION/TRAINING PROGRAM

## 2023-10-26 PROCEDURE — 96372 THER/PROPH/DIAG INJ SC/IM: CPT

## 2023-10-26 RX ADMIN — DENOSUMAB 60 MG: 60 INJECTION SUBCUTANEOUS at 14:08

## 2023-11-21 ENCOUNTER — LAB (OUTPATIENT)
Dept: INTERNAL MEDICINE | Facility: CLINIC | Age: 83
End: 2023-11-21
Payer: MEDICARE

## 2023-11-21 DIAGNOSIS — E61.1 IRON DEFICIENCY: ICD-10-CM

## 2023-11-21 DIAGNOSIS — I10 ESSENTIAL HYPERTENSION, BENIGN: ICD-10-CM

## 2023-11-21 DIAGNOSIS — E87.6 HYPOKALEMIA: ICD-10-CM

## 2023-11-21 DIAGNOSIS — S22.080S COMPRESSION FRACTURE OF T12 VERTEBRA, SEQUELA: ICD-10-CM

## 2023-11-21 DIAGNOSIS — S32.010B COMPRESSION FRACTURE OF L1 LUMBAR VERTEBRA, OPEN, INITIAL ENCOUNTER: ICD-10-CM

## 2023-11-21 DIAGNOSIS — M85.80 OSTEOPENIA, UNSPECIFIED LOCATION: ICD-10-CM

## 2023-11-21 DIAGNOSIS — S32.020G COMPRESSION FRACTURE OF L2 VERTEBRA WITH DELAYED HEALING, SUBSEQUENT ENCOUNTER: ICD-10-CM

## 2023-11-21 DIAGNOSIS — R79.89 LOW VITAMIN D LEVEL: ICD-10-CM

## 2023-11-21 DIAGNOSIS — E03.9 HYPOTHYROIDISM, UNSPECIFIED TYPE: Primary | ICD-10-CM

## 2023-11-22 LAB
BUN SERPL-MCNC: 12 MG/DL (ref 8–23)
BUN/CREAT SERPL: 18.5 (ref 7–25)
CALCIUM SERPL-MCNC: 9.4 MG/DL (ref 8.6–10.5)
CHLORIDE SERPL-SCNC: 97 MMOL/L (ref 98–107)
CO2 SERPL-SCNC: 28 MMOL/L (ref 22–29)
CREAT SERPL-MCNC: 0.65 MG/DL (ref 0.57–1)
EGFRCR SERPLBLD CKD-EPI 2021: 87.5 ML/MIN/1.73
GLUCOSE SERPL-MCNC: 107 MG/DL (ref 65–99)
MAGNESIUM SERPL-MCNC: 2 MG/DL (ref 1.6–2.4)
PHOSPHATE SERPL-MCNC: 3.3 MG/DL (ref 2.5–4.5)
POTASSIUM SERPL-SCNC: 3.9 MMOL/L (ref 3.5–5.2)
SODIUM SERPL-SCNC: 135 MMOL/L (ref 136–145)
TSH SERPL DL<=0.005 MIU/L-ACNC: 2.48 UIU/ML (ref 0.27–4.2)

## 2023-11-30 ENCOUNTER — OFFICE VISIT (OUTPATIENT)
Dept: INTERNAL MEDICINE | Facility: CLINIC | Age: 83
End: 2023-11-30
Payer: MEDICARE

## 2023-11-30 VITALS
SYSTOLIC BLOOD PRESSURE: 145 MMHG | BODY MASS INDEX: 28.07 KG/M2 | WEIGHT: 143 LBS | HEIGHT: 60 IN | OXYGEN SATURATION: 98 % | DIASTOLIC BLOOD PRESSURE: 81 MMHG | HEART RATE: 101 BPM

## 2023-11-30 DIAGNOSIS — N39.3 STRESS INCONTINENCE OF URINE: Primary | ICD-10-CM

## 2023-11-30 DIAGNOSIS — E87.1 HYPONATREMIA: ICD-10-CM

## 2023-11-30 PROCEDURE — 3079F DIAST BP 80-89 MM HG: CPT | Performed by: STUDENT IN AN ORGANIZED HEALTH CARE EDUCATION/TRAINING PROGRAM

## 2023-11-30 PROCEDURE — 3077F SYST BP >= 140 MM HG: CPT | Performed by: STUDENT IN AN ORGANIZED HEALTH CARE EDUCATION/TRAINING PROGRAM

## 2023-11-30 PROCEDURE — 99214 OFFICE O/P EST MOD 30 MIN: CPT | Performed by: STUDENT IN AN ORGANIZED HEALTH CARE EDUCATION/TRAINING PROGRAM

## 2023-11-30 NOTE — PROGRESS NOTES
"  Melvin Jimenez M.D.  Internal Medicine  [facility]  [address]], [address]  [place] [address]  [phone]      Chief Complaint  Hypertension (3 mth F/U /)    SUBJECTIVE    History of Present Illness    Ritu Martino is a 83 y.o. female with hypertension who presents to the office today as an established patient that last saw me on 9/6/2023.     She was seen last week in urgent care for viral upper respiratory infection with cough.  She was started on prednisone and azithromycin. Still blowing her nose and coughing. Using Flonase over the counter. Not taking antihistamine. She has yellow nasal discharge. No shortness of breath, no wheezing. Finishes antibiotic tomorrow.     Last Prolia injection was October 26.    Hypertension-well-controlled on clonidine as needed, diltiazem, Valsartan. Takes valsartan 80 mg BID.Never takes clonidone. Takes Lasix breakfast and dinner.       She is feeling much better in the last year. She is driving again.     Hypothyroidism-Recent TSH normal. Takes 60 mcg levothyroxine 6 days/week.     Her  is concerned she does not eat vegetables.     She goes to the bathroom \"10 times per day\". Bowel movements were mostly water. GI panel for pathogens that was negative. Apparently had impaired generation of rectal sphincter with impaired sphincter relaxation. She has PT for several months at [facility]. Attributes improvement in BMs to metamucil. Still bothered by urgency with urination. She is on lasix    Review of Systems    No Known Allergies     Outpatient Medications Marked as Taking for the 11/30/23 encounter (Office Visit) with Melvin Jimenez MD   Medication Sig Dispense Refill    acetaminophen (TYLENOL) 325 MG tablet Take 2 tablets by mouth Every 6 (Six) Hours As Needed for Mild Pain . (Patient taking differently: Take 500 mg by mouth Every 6 (Six) Hours As Needed for Mild Pain.)      ALPRAZolam (Xanax) 0.5 MG tablet Take 1 tablet by mouth 3 (Three) Times a " Day As Needed for Anxiety (Fall precautions). 30 tablet 0    cetirizine (zyrTEC) 10 MG tablet Take 1 tablet by mouth Daily. 30 tablet 0    Cholecalciferol (VITAMIN D) 2000 units capsule Take  by mouth Daily With Dinner.      cloNIDine (Catapres) 0.1 MG tablet 1 po bid prn sys greater or equal to 150 30 tablet 3    cycloSPORINE (RESTASIS) 0.05 % ophthalmic emulsion 1 drop 2 (Two) Times a Day.      desvenlafaxine (Pristiq) 100 MG 24 hr tablet Take 1 tablet by mouth Daily. 90 tablet 3    dilTIAZem XR (DILACOR XR) 120 MG 24 hr capsule Take 1 capsule by mouth Daily. 90 capsule 3    furosemide (LASIX) 20 MG tablet TAKE 1 TABLET TWICE A DAY 60 tablet 11    ibuprofen (ADVIL,MOTRIN) 800 MG tablet TAKE 1 TABLET BY MOUTH EVERY 8 HOURS AS NEEDED FOR MILD PAIN 90 tablet 3    levothyroxine (SYNTHROID, LEVOTHROID) 50 MCG tablet Take 1 tablet by mouth Daily. 90 tablet 1    Magnesium 400 MG capsule 1 p.o. twice daily 60 capsule 3    melatonin 5 MG tablet tablet Take 2 tablets by mouth.      omeprazole (priLOSEC) 40 MG capsule Take 1 capsule by mouth Daily. 90 capsule 3    potassium chloride 10 MEQ CR tablet Take 1 tablet by mouth 2 (Two) Times a Day. 180 tablet 3    predniSONE (DELTASONE) 10 MG tablet Take 3 tablets by mouth Daily for 5 days. 15 tablet 0    Probiotic Product (ALIGN PO) Take 1 capsule by mouth Daily With Lunch.      promethazine-dextromethorphan (PROMETHAZINE-DM) 6.25-15 MG/5ML syrup Take 5 mL by mouth 4 (Four) Times a Day As Needed for Cough. 240 mL 0    rosuvastatin (CRESTOR) 20 MG tablet Take 1 tablet by mouth Every Night. 90 tablet 2    SODIUM CHLORIDE PO Take  by mouth 3 (Three) Times a Day. 2 TABLETS 3 6TIMES A DAY      valsartan (DIOVAN) 160 MG tablet TAKE 1 TABLET DAILY (Patient taking differently: 0.5 tablets 2 (Two) Times a Day.) 90 tablet 3    vitamin B-12 (CYANOCOBALAMIN) 100 MCG tablet Take 0.5 tablets by mouth Daily.      zolpidem (AMBIEN) 10 MG tablet Take 1 tablet by mouth At Night As Needed for  Sleep. 30 tablet 0    [DISCONTINUED] azithromycin (ZITHROMAX) 250 MG tablet 2 tablets first day, then 1 daily for 4 days 6 tablet 0        Past Medical History:   Diagnosis Date    Anemia     Anxiety     Arthritis     Bowel dysfunction 2021    since having surgery for diverticular infection    Chronic constipation     Depression     Diverticulitis 2021    w/ rupture and infection required surgery and ostomy    Essential hypertension, benign     Fracture 2022    left ankle    Fracture, tibia and fibula Now wearing bood    GERD (gastroesophageal reflux disease)     Hernia, hiatal     Hypercholesterolemia     Hypertension     Hypokalemia     Hyponatremia     chronic low with occasional normal level    Hypothyroidism     estimated time frame pt nor  could remember exactly how long has been on medication    Insomnia     Iron deficiency     Seizures     Tear of meniscus of knee     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     Past Surgical History:   Procedure Laterality Date    APPENDECTOMY      BACK SURGERY      BACK SURGERY      Discectomy     SECTION      x2 lower midline incision    COLON SURGERY  2021    to address ruptured and infected diverticular dz, ostomy also placed    COLONOSCOPY      COLOSTOMY CLOSURE  10/2021    ostomy removed    EYE SURGERY  2 X cataract    HEMORRHOIDECTOMY      HERNIA REPAIR      KNEE ARTHROPLASTY      TONSILLECTOMY       Family History   Problem Relation Age of Onset    Brain cancer Mother     Cancer Mother         Brain tumor,     Stroke Father         Age 46    Early death Father         Stroke, age 46    Cancer Brother         pancreatic    Cancer Daughter         Dec'd 2000-[facility]    reports that she quit smoking about 52 years ago. Her smoking use included cigarettes. She started smoking about 67 years ago. She has a 3.75 pack-year smoking history. She has never used smokeless tobacco. She reports that she does not  "currently use alcohol after a past usage of about 8.0 standard drinks of alcohol per week. She reports that she does not use drugs.    OBJECTIVE    Vital Signs:   /81   Pulse 101   Ht 152.4 cm (60\")   Wt 64.9 kg (143 lb)   SpO2 98%   BMI 27.93 kg/m²     Physical Exam  Constitutional:       Appearance: Normal appearance. She is normal weight.   Cardiovascular:      Rate and Rhythm: Normal rate and regular rhythm.      Heart sounds: Normal heart sounds. No murmur heard.  Pulmonary:      Effort: Pulmonary effort is normal.      Breath sounds: Normal breath sounds.   Skin:     General: Skin is warm and dry.   Neurological:      Mental Status: She is alert.   Psychiatric:         Mood and Affect: Mood normal.         Behavior: Behavior normal.         Thought Content: Thought content normal.            The following data was reviewed by: Melvin Jimenez MD on 11/30/2023:  CMP          9/6/2023    15:32 10/17/2023    11:04 11/21/2023    12:00   CMP   Glucose 86  102  107    BUN 12  15  12    Creatinine 0.59  0.77  0.65    Sodium 138  140  135    Potassium 4.6  4.2  3.9    Chloride 101  102  97    Calcium 9.3  9.6  9.4    BUN/Creatinine Ratio 20.3  19.5  18.5      CBC w/diff          12/23/2022    06:47 1/4/2023    12:27 2/24/2023    11:01   CBC w/Diff   WBC 3.13  5.3  5.50    RBC 3.49  3.59  3.69    Hemoglobin 10.9  11.1  11.4    Hematocrit 31.5  33.5  34.8    MCV 90.3  93  94.3    MCH 31.2  30.9  30.9    MCHC 34.6  33.1  32.8    RDW 12.1  12.0  11.8    Platelets 233  334  293    Neutrophil Rel %  77  71.9    Immature Granulocyte Rel %   0.4    Lymphocyte Rel %  11  14.2    Monocyte Rel %  10  10.7    Eosinophil Rel %  2  2.4    Basophil Rel %  0  0.4      TSH          4/5/2023    10:03 11/21/2023    12:00   TSH   TSH 3.680  2.480        Data reviewed : recent UC note              ASSESSMENT & PLAN     Diagnoses and all orders for this visit:    1. Stress incontinence of urine (Primary)  -     Ambulatory Referral " to Gynecologic Urology    2. Hyponatremia         Her kidney function baseline.  Her sodium is a little lower than it has been recently.  It is barely outside of what is considered to be the normal range.  I would continue her current management for now with Lasix, salt tabs and fluid restriction and we can continue to monitor this closely. She would like to continue checks every 4-6 weeks. Discussed we can probaly space it out every 8 week but she would like to continue to monitor more frequently.     Discussed to expect polyuria and urinary urgency on lasix. She can try lubricating jelly as she is postmenopausal and may have vaginal dryness. Consider vaginal estrogen. She is interested in Uro GYN referral.     Health Maintenance Due   Topic Date Due    Pneumococcal Vaccine 65+ (2 - PCV) 01/18/2019    ZOSTER VACCINE (2 of 2) 09/03/2019        Follow Up  Return in about 7 weeks (around 1/18/2024).    Patient/family had no further questions at this time and verbalized understanding of the plan discussed today.    no

## 2023-12-04 ENCOUNTER — TELEPHONE (OUTPATIENT)
Dept: PEDIATRICS | Facility: OTHER | Age: 83
End: 2023-12-04
Payer: MEDICARE

## 2023-12-04 DIAGNOSIS — J06.9 VIRAL URI WITH COUGH: ICD-10-CM

## 2023-12-04 NOTE — TELEPHONE ENCOUNTER
Caller: Nu Martino    Relationship: Emergency Contact    Best call back number: [phone]    Requested Prescriptions:   Requested Prescriptions     Pending Prescriptions Disp Refills    promethazine-dextromethorphan (PROMETHAZINE-DM) 6.25-15 MG/5ML syrup 240 mL 0     Sig: Take 5 mL by mouth 4 (Four) Times a Day As Needed for Cough.        Pharmacy where request should be sent: [facility] #[id number] [place] [id number] [facility] AT [facility] - [phone]  - [phone] FX     Last office visit with prescribing clinician: 11/30/2023   Last telemedicine visit with prescribing clinician: Visit date not found   Next office visit with prescribing clinician: 1/18/2024     Additional details provided by patient: PATIENT COMPLETELY OUT    Does the patient have less than a 3 day supply:  [x] Yes  [] No    Would you like a call back once the refill request has been completed: [] Yes [x] No    If the office needs to give you a call back, can they leave a voicemail: [] Yes [x] No    Mj Lopez Rep   12/04/23 13:53 EST

## 2023-12-05 DIAGNOSIS — J06.9 VIRAL URI WITH COUGH: ICD-10-CM

## 2023-12-05 RX ORDER — DEXTROMETHORPHAN HYDROBROMIDE AND PROMETHAZINE HYDROCHLORIDE 15; 6.25 MG/5ML; MG/5ML
5 SYRUP ORAL 4 TIMES DAILY PRN
Qty: 240 ML | Refills: 0 | Status: SHIPPED | OUTPATIENT
Start: 2023-12-05 | End: 2023-12-05 | Stop reason: SDUPTHER

## 2023-12-05 RX ORDER — DEXTROMETHORPHAN HYDROBROMIDE AND PROMETHAZINE HYDROCHLORIDE 15; 6.25 MG/5ML; MG/5ML
5 SYRUP ORAL 4 TIMES DAILY PRN
Qty: 240 ML | Refills: 0 | Status: SHIPPED | OUTPATIENT
Start: 2023-12-05

## 2023-12-11 ENCOUNTER — TELEPHONE (OUTPATIENT)
Dept: INTERNAL MEDICINE | Facility: CLINIC | Age: 83
End: 2023-12-11
Payer: MEDICARE

## 2023-12-11 DIAGNOSIS — F51.01 PRIMARY INSOMNIA: ICD-10-CM

## 2023-12-11 RX ORDER — ZOLPIDEM TARTRATE 10 MG/1
10 TABLET ORAL NIGHTLY PRN
Qty: 10 TABLET | Refills: 0 | Status: SHIPPED | OUTPATIENT
Start: 2023-12-11

## 2023-12-11 RX ORDER — ZOLPIDEM TARTRATE 10 MG/1
10 TABLET ORAL NIGHTLY PRN
Qty: 30 TABLET | Refills: 0 | Status: SHIPPED | OUTPATIENT
Start: 2023-12-11

## 2023-12-11 NOTE — TELEPHONE ENCOUNTER
Caller: Nu Martino    Relationship: Emergency Contact    Best call back number: [phone]    Requested Prescriptions:   Requested Prescriptions     Pending Prescriptions Disp Refills    zolpidem (AMBIEN) 10 MG tablet 30 tablet 0     Sig: Take 1 tablet by mouth At Night As Needed for Sleep.        Pharmacy where request should be sent: [facility] [address] [phone] [facility] [phone] FX     Last office visit with prescribing clinician: 11/30/2023   Last telemedicine visit with prescribing clinician: 8/25/2023   Next office visit with prescribing clinician: 1/18/2024     Additional details provided by patient: PATIENTS  WOULD LIKE TO REQUEST A 90 DAY SUPPLY.    Does the patient have less than a 3 day supply:  [x] Yes  [] No    Would you like a call back once the refill request has been completed: [x] Yes [] No    If the office needs to give you a call back, can they leave a voicemail: [x] Yes [] No    Mj Chaidez Rep   12/11/23 13:58 EST

## 2023-12-11 NOTE — TELEPHONE ENCOUNTER
Caller: Nu Martino    Relationship: Emergency Contact    Best call back number: [phone]    What was the call regarding: PATIENTS  WOULD LIKE TO REQUEST 20-30 PILLS OF PATIENTS zolpidem (AMBIEN) 10 MG tablet  BE SENT TO [facility] #[id number] - [place], [place] - [address] AT [address] - [phone]  - [phone] FX  UNTIL THEY GET THE PRESCRIPTION FROM [facility].    PLEASE CALL.

## 2023-12-13 ENCOUNTER — OFFICE VISIT (OUTPATIENT)
Dept: GASTROENTEROLOGY | Facility: CLINIC | Age: 83
End: 2023-12-13
Payer: MEDICARE

## 2023-12-13 ENCOUNTER — HOSPITAL ENCOUNTER (OUTPATIENT)
Dept: GENERAL RADIOLOGY | Facility: HOSPITAL | Age: 83
Discharge: HOME | End: 2023-12-13
Admitting: NURSE PRACTITIONER
Payer: MEDICARE

## 2023-12-13 ENCOUNTER — PATIENT ROUNDING (BHMG ONLY) (OUTPATIENT)
Dept: GASTROENTEROLOGY | Facility: CLINIC | Age: 83
End: 2023-12-13
Payer: MEDICARE

## 2023-12-13 VITALS — TEMPERATURE: 95.6 F | HEIGHT: 60 IN | WEIGHT: 133.2 LBS | BODY MASS INDEX: 26.15 KG/M2

## 2023-12-13 DIAGNOSIS — K59.02 DYSSYNERGIC DEFECATION: ICD-10-CM

## 2023-12-13 DIAGNOSIS — Z80.0 FAMILY HISTORY OF GI MALIGNANCY: ICD-10-CM

## 2023-12-13 DIAGNOSIS — R19.4 CHANGE IN BOWEL HABITS: Primary | ICD-10-CM

## 2023-12-13 PROCEDURE — 74019 RADEX ABDOMEN 2 VIEWS: CPT

## 2023-12-13 RX ORDER — L.RHAMNOSUS/B.ANIMALIS(LACTIS) 3B CELL
1 CAPSULE ORAL DAILY
Start: 2023-12-13

## 2023-12-13 NOTE — PROGRESS NOTES
Chief Complaint   Patient presents with    change in bowel habit       HPI    Ritu Martino is a  83 y.o. female here to establish care as a new patient for change in bowel habits.    This patient will also follow with Dr. Luong.    Past medical history of anxiety, arthritis, chronic constipation, colon resection  due to diverticular abscess requiring temporary colostomy with subsequent reversal, hypertension, GERD, seizures along with thyroid disease.    Patient reports approximately 1 year of bowel issues.  Prior to onset of symptoms she was having 1 bowel movement a day fairly predictable.  For the past year she has been having 5-7 bowel movements a day.  Initially she denied having diarrhea but in hindsight does recall experiencing diarrhea with onset of symptoms however she was started on Metamucil 3 capsules 4 times a day by her primary care provider which led to formed stool but did not decrease the frequency.  Fiber also resolved fecal incontinence.  She is currently on a probiotic.  She has been on the same probiotic for more than 1 year.  She denies abdominal pain, rectal pain or rectal bleeding.  Her appetite is good.  Her weight is stable.  Onset of symptoms seem to correlate with multiple hospitalizations for hyponatremia.    After further discussion patient reports she has been seeing several GI providers for her symptoms.  She was evaluated by [facility] over the summer and then was seen by [facility] in the spring.  She brought a copy of records for my review.  Patient denies a personal history of colon polyps or family history of colon cancer.  Her daughter  from a malignant GIST tumor in her 40s.    She has a consult in the spring with urogynecology for frequent urination and urinary incontinence.    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  Consider InterStim evaluation.  Patient reports she has had 14  sessions of pelvic floor therapy.  She has not found these helpful.    Her last colonoscopy was spring 2021 with diverticulosis of the sigmoid colon, internal and external hemorrhoids.  Incomplete due to adhesions, looping despite positional changes and abdominal splinting.     C-scope  with moderate diverticulosis.  Internal and external hemorrhoids.    Past Medical History:   Diagnosis Date    Anemia     Anxiety     Arthritis     Bowel dysfunction 2021    since having surgery for diverticular infection    Chronic constipation     Depression     Diverticulitis 2021    w/ rupture and infection required surgery and ostomy    Essential hypertension, benign     Fracture 2022    left ankle    Fracture, tibia and fibula Now wearing bood    GERD (gastroesophageal reflux disease)     Hernia, hiatal     Hypercholesterolemia     Hypertension     Hypokalemia     Hyponatremia     chronic low with occasional normal level    Hypothyroidism 2018    estimated time frame pt nor  could remember exactly how long has been on medication    Insomnia     Iron deficiency     Seizures     Tear of meniscus of knee     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      PRN Meds:     No Known Allergies    Social History     Socioeconomic History    Marital status:    Tobacco Use    Smoking status: Former     Packs/day: 0.25     Years: 15.00      Additional pack years: 0.00     Total pack years: 3.75     Types: Cigarettes     Start date: 1956     Quit date: 1971     Years since quittin.9    Smokeless tobacco: Never   Vaping Use    Vaping Use: Never used   Substance and Sexual Activity    Alcohol use: Not Currently     Alcohol/week: 8.0 standard drinks of alcohol     Types: 4 Glasses of wine, 4 Shots of liquor per week     Comment: Stopped 3 months ago.    Drug use: No    Sexual activity: Not Currently     Partners: Male       Family History   Problem Relation Age of Onset    Brain cancer Mother     Cancer Mother         Brain tumor, 1979    Stroke Father         Age 46    Early death Father         Stroke, age 46    Cancer Brother         pancreatic    Cancer Daughter         Dec'2000-[facility]       Review of Systems   HENT:  Negative for trouble swallowing.    Gastrointestinal:  Negative for abdominal distention, abdominal pain, anal bleeding and blood in stool.        + Change in bowel habits       Vitals:    23 1347   Temp: 95.6 °F (35.3 °C)       Physical Exam  Constitutional:       Appearance: She is well-developed.   Abdominal:      General: Bowel sounds are normal. There is no distension.      Palpations: Abdomen is soft. There is no mass.      Tenderness: There is no abdominal tenderness. There is no guarding.      Hernia: No hernia is present.   Skin:     General: Skin is warm and dry.      Capillary Refill: Capillary refill takes less than 2 seconds.   Neurological:      Mental Status: She is alert and oriented to person, place, and time.   Psychiatric:         Behavior: Behavior normal.     Assessment    Diagnoses and all orders for this visit:    1. Change in bowel habits (Primary)  -     XR Abdomen Flat & Upright    2. Dyssynergic defecation    3. Family history of GI malignancy    Other orders  -     Probiotic Product ([employer]) capsule; Take 1 tablet by mouth Daily.    Plan    Pleasant 83-year-old female seen  today for change in bowel habits with evidence of dyssynergic defecation on recent anal motility testing seen today to establish care as a new patient.  We discussed abdominal x-ray today to assess stool burden.  Recommend she change probiotic to [facility]' colon health.  Continue Metamucil regimen.  Further recommendations pending imaging.         FIONA Bar  [facility]  [address] [place] [address]  Office: [phone]    I spent 40 minutes caring for Ritu on this date of service. This time includes time spent by me in the following activities: preparing for the visit, reviewing tests, obtaining and/or reviewing a separately obtained history, performing a medically appropriate examination and/or evaluation , counseling and educating the patient/family/caregiver, ordering medications, tests, or procedures, documenting information in the medical record, independently interpreting results and communicating that information with the patient/family/caregiver and care coordination.

## 2023-12-15 ENCOUNTER — TELEPHONE (OUTPATIENT)
Dept: GASTROENTEROLOGY | Facility: CLINIC | Age: 83
End: 2023-12-15
Payer: MEDICARE

## 2023-12-15 NOTE — PROGRESS NOTES
December 13, 2023    Hello, may I speak with Ritu Martino?    My name is Isabella      I am  with [facility]  [address] [place] [id number] [phone].    Before we get started may I verify your date of birth? 1940    I am calling to officially welcome you to our practice and ask about your recent visit. Is this a good time to talk? yes    Tell me about your visit with us. What things went well?  I did not have any complaints.        We're always looking for ways to make our patients' experiences even better. Do you have recommendations on ways we may improve?  no    Overall were you satisfied with your first visit to our practice? yes       I appreciate you taking the time to speak with me today. Is there anything else I can do for you? no      Thank you, and have a great day.

## 2023-12-18 ENCOUNTER — TRANSCRIBE ORDERS (OUTPATIENT)
Dept: ADMINISTRATIVE | Facility: HOSPITAL | Age: 83
End: 2023-12-18
Payer: MEDICARE

## 2023-12-18 ENCOUNTER — LAB (OUTPATIENT)
Dept: LAB | Facility: HOSPITAL | Age: 83
End: 2023-12-18
Payer: MEDICARE

## 2023-12-18 DIAGNOSIS — E87.1 HYPOSMOLALITY SYNDROME: ICD-10-CM

## 2023-12-18 DIAGNOSIS — E87.1 HYPOSMOLALITY SYNDROME: Primary | ICD-10-CM

## 2023-12-18 LAB
ALBUMIN SERPL-MCNC: 4.7 G/DL (ref 3.5–5.2)
ALBUMIN/GLOB SERPL: 1.7 G/DL
ALP SERPL-CCNC: 55 U/L (ref 39–117)
ALT SERPL W P-5'-P-CCNC: 18 U/L (ref 1–33)
ANION GAP SERPL CALCULATED.3IONS-SCNC: 12.3 MMOL/L (ref 5–15)
AST SERPL-CCNC: 22 U/L (ref 1–32)
BILIRUB SERPL-MCNC: 0.6 MG/DL (ref 0–1.2)
BUN SERPL-MCNC: 13 MG/DL (ref 8–23)
BUN/CREAT SERPL: 17.3 (ref 7–25)
CALCIUM SPEC-SCNC: 9.8 MG/DL (ref 8.6–10.5)
CHLORIDE SERPL-SCNC: 98 MMOL/L (ref 98–107)
CO2 SERPL-SCNC: 25.7 MMOL/L (ref 22–29)
CREAT SERPL-MCNC: 0.75 MG/DL (ref 0.57–1)
EGFRCR SERPLBLD CKD-EPI 2021: 79.1 ML/MIN/1.73
GLOBULIN UR ELPH-MCNC: 2.8 GM/DL
GLUCOSE SERPL-MCNC: 91 MG/DL (ref 65–99)
OSMOLALITY UR: 211 MOSM/KG
POTASSIUM SERPL-SCNC: 3.6 MMOL/L (ref 3.5–5.2)
PROT SERPL-MCNC: 7.5 G/DL (ref 6–8.5)
SODIUM SERPL-SCNC: 136 MMOL/L (ref 136–145)
SODIUM UR-SCNC: 69 MMOL/L

## 2023-12-18 PROCEDURE — 80053 COMPREHEN METABOLIC PANEL: CPT

## 2023-12-18 PROCEDURE — 84300 ASSAY OF URINE SODIUM: CPT

## 2023-12-18 PROCEDURE — 83935 ASSAY OF URINE OSMOLALITY: CPT

## 2023-12-18 PROCEDURE — 36415 COLL VENOUS BLD VENIPUNCTURE: CPT

## 2023-12-18 RX ORDER — NORTRIPTYLINE HYDROCHLORIDE 10 MG/1
10 CAPSULE ORAL NIGHTLY
Qty: 90 CAPSULE | Refills: 3 | Status: SHIPPED | OUTPATIENT
Start: 2023-12-18

## 2023-12-18 NOTE — TELEPHONE ENCOUNTER
Spoke with the patient over the phone regarding x-ray results.  I also reviewed her entire workup and symptoms with Dr. Luong.  We discussed starting her on nightly Pamelor to reduce IBS type symptoms of urgency and incomplete evacuation with reduction in Metamucil to 3 capsules twice daily.  She plans to start a new probiotic as discussed on last office visit as well.  She is to update us on symptoms in 2 weeks at which point we can determine follow-up.

## 2023-12-21 ENCOUNTER — TELEPHONE (OUTPATIENT)
Dept: GASTROENTEROLOGY | Facility: CLINIC | Age: 83
End: 2023-12-21

## 2023-12-26 NOTE — CASE MANAGEMENT/SOCIAL WORK
Discharge Planning Assessment  [facility]     Patient Name: Ritu Martino  MRN: [id number]  Today's Date: 7/1/2022    Admit Date: 6/30/2022     Discharge Needs Assessment     Row Name 07/01/22 1022       Living Environment    People in Home spouse    Name(s) of People in Home  Nu [phone]    Current Living Arrangements home    Primary Care Provided by self    Provides Primary Care For no one    Family Caregiver if Needed spouse    Family Caregiver Names  Nu [phone] or daughter Marielle Regan [phone]    Quality of Family Relationships helpful    Able to Return to Prior Arrangements yes       Resource/Environmental Concerns    Resource/Environmental Concerns none       Transition Planning    Patient/Family Anticipates Transition to home with family    Patient/Family Anticipated Services at Transition home health care    Transportation Anticipated family or friend will provide       Discharge Needs Assessment    Readmission Within the Last 30 Days no previous admission in last 30 days    Equipment Currently Used at Home shower chair;walker, rolling;cane, straight    Concerns to be Addressed basic needs    Anticipated Changes Related to Illness none    Equipment Needed After Discharge none    Discharge Facility/Level of Care Needs home with home health    Provided Post Acute Provider List? N/A    N/A Provider List Comment Is current with [facility] and will continue to use them               Discharge Plan     Row Name 07/01/22 1030       Plan    Plan Return home with spouse and [facility] following    Patient/Family in Agreement with Plan yes    Plan Comments Spoke with patient at bedside.  She lives with  Nu [phone], is IADL, has a cane, walker and shower chair (does not use them all the time), has scales, is current with [facility] and has never been to SNF.  PCP is Dr. Gaurav Barger and pharmacy is [facility] [address] @ [facility].  Spoke with  Lilo/Vincent and they will follow. Patient drives, but states her  or daughter will help her if needed.  Patient plans to return home at ID.  [facility] will follow.  BHumeniuk RN              Continued Care and Services - Admitted Since 6/30/2022     Home Medical Care     Service Provider Request Status Selected Services Address Phone Fax Patient Preferred    [facility]  [facility] - Request Sent N/A [address], [address], [place] [address] [phone] --            Selected Continued Care - Prior Encounters Includes selections from prior encounters from 4/1/2022 to 7/1/2022    Discharged on 6/14/2022 Admission date: 6/12/2022 - Discharge disposition: Home or Self Care    Home Medical Care     Service Provider Selected Services Address Phone Fax Patient Preferred    [facility] [address], [address], [place] [address] [phone] --                Discharged on 5/11/2022 Admission date: 5/7/2022 - Discharge disposition: Home-Health Care [facility]    Destination     Service Provider Selected Services Address Phone Fax Patient Preferred    [facility]  Skilled Nursing [address] [address] [phone] --          Home Medical Care     Service Provider Selected Services Address Phone Fax Patient Preferred    [facility] [address], [address], [place] [address] [phone] --                    Expected Discharge Date and Time     Expected Discharge Date Expected Discharge Time    Jul 5, 2022          Demographic Summary     Row Name 07/01/22 1021       General Information    Admission Type inpatient    Arrived From home    Referral Source admission list    Reason for Consult discharge planning    Preferred Language English               Functional Status     Row Name 07/01/22 1021        Functional Status    Usual Activity Tolerance moderate    Current Activity Tolerance moderate       Functional Status, IADL    Medications independent    Meal Preparation independent;assistive person      Housekeeping assistive person;independent    Laundry independent;assistive person    Shopping assistive person;independent       Mental Status    General Appearance WDL WDL       Mental Status Summary    Recent Changes in Mental Status/Cognitive Functioning no changes                         Becky S. Humeniuk, RN     Refer to the Assessment tab to view/cancel completed assessment.

## 2023-12-31 DIAGNOSIS — F41.9 ANXIETY: ICD-10-CM

## 2024-01-02 DIAGNOSIS — F41.9 ANXIETY: ICD-10-CM

## 2024-01-02 DIAGNOSIS — F51.01 PRIMARY INSOMNIA: ICD-10-CM

## 2024-01-02 RX ORDER — ALPRAZOLAM 0.5 MG/1
TABLET ORAL
Qty: 30 TABLET | Refills: 0 | Status: SHIPPED | OUTPATIENT
Start: 2024-01-02 | End: 2024-01-02 | Stop reason: SDUPTHER

## 2024-01-02 RX ORDER — ZOLPIDEM TARTRATE 10 MG/1
10 TABLET ORAL NIGHTLY PRN
Qty: 10 TABLET | Refills: 0 | Status: SHIPPED | OUTPATIENT
Start: 2024-01-02

## 2024-01-02 RX ORDER — ALPRAZOLAM 0.5 MG/1
0.5 TABLET ORAL 3 TIMES DAILY PRN
Qty: 30 TABLET | Refills: 0 | Status: SHIPPED | OUTPATIENT
Start: 2024-01-02

## 2024-01-02 NOTE — TELEPHONE ENCOUNTER
Caller: Nu Martino    Relationship: Emergency Contact    Best call back number: [phone]     Requested Prescriptions:   Requested Prescriptions     Pending Prescriptions Disp Refills    zolpidem (AMBIEN) 10 MG tablet 10 tablet 0     Sig: Take 1 tablet by mouth At Night As Needed for Sleep.        Pharmacy where request should be sent: [id number] [place] [id number] [facility] AT [facility] - [phone] [facility] [phone] FX     Last office visit with prescribing clinician: 11/30/2023   Last telemedicine visit with prescribing clinician: 8/25/2023   Next office visit with prescribing clinician: 1/18/2024     Additional details provided by patient: PATIENT IS OUT OF MEDICATION. PATIENT IS REQUESTING A QUANTITY OF 30 PILLS. PLEASE ADVISE.     Does the patient have less than a 3 day supply:  [x] Yes  [] No    Would you like a call back once the refill request has been completed: [] Yes [x] No    If the office needs to give you a call back, can they leave a voicemail: [] Yes [x] No    Mj oWrthington Rep   01/02/24 12:14 EST

## 2024-01-02 NOTE — TELEPHONE ENCOUNTER
Please resend prescription for Xananx you sent it to the mail order pharmacy. Needs to go to the patients local pharmacy.

## 2024-01-08 RX ORDER — LEVOTHYROXINE SODIUM 0.05 MG/1
50 TABLET ORAL DAILY
Qty: 90 TABLET | Refills: 3 | Status: SHIPPED | OUTPATIENT
Start: 2024-01-08

## 2024-01-08 NOTE — TELEPHONE ENCOUNTER
Caller: SalenaNu    Relationship: Emergency Contact    Best call back number: [phone]     Requested Prescriptions:   Requested Prescriptions     Pending Prescriptions Disp Refills    desvenlafaxine (Pristiq) 100 MG 24 hr tablet 90 tablet 3     Sig: Take 1 tablet by mouth Daily.        Pharmacy where request should be sent: [facility] [address] [phone] [facility] [phone] FX     Last office visit with prescribing clinician: 11/30/2023   Last telemedicine visit with prescribing clinician: 8/25/2023   Next office visit with prescribing clinician: 1/18/2024       Does the patient have less than a 3 day supply:  [] Yes  [x] No    Mj Duran Rep   01/08/24 14:21 EST

## 2024-01-09 RX ORDER — DESVENLAFAXINE 100 MG/1
100 TABLET, EXTENDED RELEASE ORAL DAILY
Qty: 90 TABLET | Refills: 3 | Status: SHIPPED | OUTPATIENT
Start: 2024-01-09

## 2024-01-10 ENCOUNTER — TELEPHONE (OUTPATIENT)
Dept: GASTROENTEROLOGY | Facility: CLINIC | Age: 84
End: 2024-01-10
Payer: MEDICARE

## 2024-01-12 ENCOUNTER — TELEPHONE (OUTPATIENT)
Dept: INTERNAL MEDICINE | Facility: CLINIC | Age: 84
End: 2024-01-12
Payer: MEDICARE

## 2024-01-12 DIAGNOSIS — E87.6 HYPOKALEMIA: ICD-10-CM

## 2024-01-12 DIAGNOSIS — E78.5 HYPERLIPIDEMIA, UNSPECIFIED HYPERLIPIDEMIA TYPE: ICD-10-CM

## 2024-01-12 DIAGNOSIS — E87.1 HYPONATREMIA: ICD-10-CM

## 2024-01-12 DIAGNOSIS — I10 ESSENTIAL HYPERTENSION, BENIGN: ICD-10-CM

## 2024-01-12 DIAGNOSIS — E61.1 IRON DEFICIENCY: ICD-10-CM

## 2024-01-12 DIAGNOSIS — E03.9 HYPOTHYROIDISM, UNSPECIFIED TYPE: Primary | ICD-10-CM

## 2024-01-12 NOTE — TELEPHONE ENCOUNTER
Caller: NAINA SCHILLING     Relationship: SPOUSE    Best call back number: [phone]     What orders are you requesting (i.e. lab or imaging): LABS REQUEST     In what timeframe would the patient need to come in: BEFORE 1/18/24    Where will you receive your lab/imaging services: IN OFFICE     Additional notes: REQUESTING CBC, BMP, TSH, MAGNESIUM, PHOSPHORUS BEFORE OFFICE VISIT FOLLOW UP WITH DR CARTAGENA ON 1/18/24

## 2024-01-18 ENCOUNTER — OFFICE VISIT (OUTPATIENT)
Dept: INTERNAL MEDICINE | Facility: CLINIC | Age: 84
End: 2024-01-18
Payer: MEDICARE

## 2024-01-18 VITALS
OXYGEN SATURATION: 98 % | HEIGHT: 60 IN | WEIGHT: 145 LBS | DIASTOLIC BLOOD PRESSURE: 70 MMHG | BODY MASS INDEX: 28.47 KG/M2 | HEART RATE: 87 BPM | SYSTOLIC BLOOD PRESSURE: 115 MMHG

## 2024-01-18 DIAGNOSIS — E87.1 HYPONATREMIA: Primary | ICD-10-CM

## 2024-01-18 DIAGNOSIS — F51.01 PRIMARY INSOMNIA: ICD-10-CM

## 2024-01-18 DIAGNOSIS — F41.9 ANXIETY: ICD-10-CM

## 2024-01-18 RX ORDER — ZOLPIDEM TARTRATE 10 MG/1
10 TABLET ORAL NIGHTLY PRN
Qty: 30 TABLET | Refills: 0 | Status: SHIPPED | OUTPATIENT
Start: 2024-01-18

## 2024-01-18 NOTE — PROGRESS NOTES
"  Melvin Jimenez M.D.  Internal Medicine  [facility]  [address], [address]  [place] [address]  [phone]      Chief Complaint  Hypertension and Hypothyroidism    SUBJECTIVE    History of Present Illness    Ritu Martino is a 83 y.o. female with hyponatremia, hypertension, anemia, vitamin D deficiency, hypokalemia who presents to the office today as an established patient that last saw me on 11/30/2023.     Sodium is better than it ever has been.     Saw Zak Mock and reduced metamucil. Started Pamelor and patient is not sure why. States she never has normal bowel movements. Has formed Bms. She \"goes all the time\".     Quit pelvic floor PT after 13 weeks.  Has urine incontinence. Urinates when coughing.     -120/59/65    Review of Systems    No Known Allergies     Outpatient Medications Marked as Taking for the 1/18/24 encounter (Office Visit) with Melvin Jimenez MD   Medication Sig Dispense Refill    acetaminophen (TYLENOL) 325 MG tablet Take 2 tablets by mouth Every 6 (Six) Hours As Needed for Mild Pain . (Patient taking differently: Take 500 mg by mouth Every 6 (Six) Hours As Needed for Mild Pain.)      ALPRAZolam (XANAX) 0.5 MG tablet Take 1 tablet by mouth 3 (Three) Times a Day As Needed for Anxiety. 30 tablet 0    cetirizine (zyrTEC) 10 MG tablet Take 1 tablet by mouth Daily. 30 tablet 0    Cholecalciferol (VITAMIN D) 2000 units capsule Take  by mouth Daily With Dinner.      cloNIDine (Catapres) 0.1 MG tablet 1 po bid prn sys greater or equal to 150 30 tablet 3    cycloSPORINE (RESTASIS) 0.05 % ophthalmic emulsion 1 drop 2 (Two) Times a Day.      desvenlafaxine (Pristiq) 100 MG 24 hr tablet Take 1 tablet by mouth Daily. 90 tablet 3    dilTIAZem XR (DILACOR XR) 120 MG 24 hr capsule Take 1 capsule by mouth Daily. 90 capsule 3    furosemide (LASIX) 20 MG tablet TAKE 1 TABLET TWICE A DAY 60 tablet 11    ibuprofen (ADVIL,MOTRIN) 800 MG tablet TAKE 1 TABLET BY MOUTH EVERY 8 " HOURS AS NEEDED FOR MILD PAIN 90 tablet 3    levothyroxine (SYNTHROID, LEVOTHROID) 50 MCG tablet TAKE 1 TABLET DAILY 90 tablet 3    Magnesium 400 MG capsule 1 p.o. twice daily 60 capsule 3    melatonin 5 MG tablet tablet Take 2 tablets by mouth.      nortriptyline (Pamelor) 10 MG capsule Take 1 capsule by mouth Every Night. 90 capsule 3    omeprazole (priLOSEC) 40 MG capsule Take 1 capsule by mouth Daily. 90 capsule 3    potassium chloride 10 MEQ CR tablet Take 1 tablet by mouth 2 (Two) Times a Day. 180 tablet 3    Probiotic Product (ALIGN PO) Take 1 capsule by mouth Daily With Lunch.      Probiotic Product ([employer]) capsule Take 1 tablet by mouth Daily.      promethazine-dextromethorphan (PROMETHAZINE-DM) 6.25-15 MG/5ML syrup Take 5 mL by mouth 4 (Four) Times a Day As Needed for Cough. 240 mL 0    rosuvastatin (CRESTOR) 20 MG tablet Take 1 tablet by mouth Every Night. 90 tablet 2    SODIUM CHLORIDE PO Take  by mouth 3 (Three) Times a Day. 2 TABLETS 3 6TIMES A DAY      valsartan (DIOVAN) 160 MG tablet TAKE 1 TABLET DAILY (Patient taking differently: 0.5 tablets 2 (Two) Times a Day.) 90 tablet 3    vitamin B-12 (CYANOCOBALAMIN) 100 MCG tablet Take 0.5 tablets by mouth Daily.      zolpidem (AMBIEN) 10 MG tablet Take 1 tablet by mouth At Night As Needed for Sleep. 30 tablet 0    [DISCONTINUED] zolpidem (AMBIEN) 10 MG tablet Take 1 tablet by mouth At Night As Needed for Sleep. 10 tablet 0        Past Medical History:   Diagnosis Date    Anemia 2000    Anxiety     Arthritis     Bowel dysfunction 08/2021    since having surgery for diverticular infection    Chronic constipation     Depression     Diverticulitis 08/2021    w/ rupture and infection required surgery and ostomy    Essential hypertension, benign     Fracture 06/2022    left ankle    Fracture, tibia and fibula Now wearing bood    GERD (gastroesophageal reflux disease)     Hernia, hiatal     Hypercholesterolemia     Hypertension     Hypokalemia      "Hyponatremia 2014    chronic low with occasional normal level    Hypothyroidism     estimated time frame pt nor  could remember exactly how long has been on medication    Insomnia     Iron deficiency     Seizures     Tear of meniscus of knee     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     Past Surgical History:   Procedure Laterality Date    APPENDECTOMY      BACK SURGERY      BACK SURGERY      Discectomy     SECTION      x2 lower midline incision    COLON SURGERY  2021    to address ruptured and infected diverticular dz, ostomy also placed    COLONOSCOPY      COLOSTOMY CLOSURE  10/2021    ostomy removed    EYE SURGERY  2 X cataract    HEMORRHOIDECTOMY      HERNIA REPAIR      KNEE ARTHROPLASTY      TONSILLECTOMY       Family History   Problem Relation Age of Onset    Brain cancer Mother     Cancer Mother         Brain tumor, 1979    Stroke Father         Age 46    Early death Father         Stroke, age 46    Cancer Brother         pancreatic    Cancer Daughter         Dec'd 2000-    reports that she quit smoking about 53 years ago. Her smoking use included cigarettes. She started smoking about 68 years ago. She has a 3.75 pack-year smoking history. She has never used smokeless tobacco. She reports that she does not currently use alcohol after a past usage of about 8.0 standard drinks of alcohol per week. She reports that she does not use drugs.    OBJECTIVE    Vital Signs:   /70   Pulse 87   Ht 152.4 cm (60\")   Wt 65.8 kg (145 lb)   SpO2 98%   BMI 28.32 kg/m²     Physical Exam  Constitutional:       Appearance: Normal appearance. She is normal weight.   HENT:      Right Ear: Tympanic membrane normal.      Left Ear: Tympanic membrane normal.   Cardiovascular:      Rate and Rhythm: Normal rate and regular rhythm.      Heart sounds: Normal heart sounds. No murmur heard.  Pulmonary:      Effort: Pulmonary effort is normal.      Breath sounds: Normal breath " sounds.   Abdominal:      General: Abdomen is flat. There is no distension.      Palpations: Abdomen is soft.      Tenderness: There is no abdominal tenderness.   Musculoskeletal:      Right lower leg: No edema.      Left lower leg: No edema.   Skin:     General: Skin is warm and dry.   Neurological:      Mental Status: She is alert.   Psychiatric:         Mood and Affect: Mood normal.         Behavior: Behavior normal.         Thought Content: Thought content normal.            The following data was reviewed by: Melvin Jimenez MD on 01/18/2024:  CMP          11/21/2023    12:00 12/18/2023    10:50 1/17/2024    14:04   CMP   Glucose 107  91  101    BUN 12  13  15    Creatinine 0.65  0.75  1.01    EGFR  79.1     Sodium 135  136  140    Potassium 3.9  3.6  4.5    Chloride 97  98  102    Calcium 9.4  9.8  9.2    Total Protein  7.5     Albumin  4.7     Globulin  2.8     Total Bilirubin  0.6     Alkaline Phosphatase  55     AST (SGOT)  22     ALT (SGPT)  18     Albumin/Globulin Ratio  1.7     BUN/Creatinine Ratio 18.5  17.3  14.9    Anion Gap  12.3       CBC w/diff          2/24/2023    11:01 1/17/2024    14:04   CBC w/Diff   WBC 5.50  5.47    RBC 3.69  3.63    Hemoglobin 11.4  11.3    Hematocrit 34.8  34.0    MCV 94.3  93.7    MCH 30.9  31.1    MCHC 32.8  33.2    RDW 11.8  12.3    Platelets 293  282    Neutrophil Rel % 71.9  65.0    Immature Granulocyte Rel % 0.4     Lymphocyte Rel % 14.2  23.6    Monocyte Rel % 10.7  8.2    Eosinophil Rel % 2.4  2.6    Basophil Rel % 0.4  0.4      Lipid Panel          4/5/2023    10:03 8/15/2023    10:32 1/17/2024    14:04   Lipid Panel   Total Cholesterol 199  192  210    Triglycerides 86  82  167    HDL Cholesterol 90  91  92    VLDL Cholesterol 15  15  28    LDL Cholesterol  94  86  90    LDL/HDL Ratio  0.93  0.92      TSH          4/5/2023    10:03 11/21/2023    12:00 1/17/2024    14:04   TSH   TSH 3.680  2.480  1.830        Data reviewed : nephrology notes              ASSESSMENT &  PLAN     Diagnoses and all orders for this visit:    1. Hyponatremia (Primary)  -     Basic Metabolic Panel; Future    2. Primary insomnia  -     zolpidem (AMBIEN) 10 MG tablet; Take 1 tablet by mouth At Night As Needed for Sleep.  Dispense: 30 tablet; Refill: 0    3. Anxiety    Other orders  -     Pneumococcal Conjugate Vaccine 20-Valent (PCV20)      Hyponatremia is well controlled.     Patient on both Ambien and Alprazolam. Discussed not to use together to avoid oversedation. She is tolerating well.      As part of the patient's treatment plan, I am prescribing controlled substances. The patient has been made aware of appropriate use of such medications. It has also been made clear that these medications are for use by this patient only, without concomitant use of alcohol or other substances unless prescribed.        As the clinician, I personally reviewed the ROXANA from 01/18/2024 while the patient was in the office today.     History and physical exam exhibit continued safe and appropriate use of controlled substances.    Anxiety well controlled      Health Maintenance Due   Topic Date Due    ZOSTER VACCINE (2 of 2) 09/03/2019    ANNUAL WELLNESS VISIT  12/07/2023        Follow Up  Return in about 4 months (around 5/28/2024) for Medicare Wellness.    Patient/family had no further questions at this time and verbalized understanding of the plan discussed today.

## 2024-01-22 RX ORDER — OMEPRAZOLE 40 MG/1
40 CAPSULE, DELAYED RELEASE ORAL DAILY
Qty: 90 CAPSULE | Refills: 3 | Status: SHIPPED | OUTPATIENT
Start: 2024-01-22

## 2024-01-23 ENCOUNTER — TELEPHONE (OUTPATIENT)
Dept: INTERNAL MEDICINE | Facility: CLINIC | Age: 84
End: 2024-01-23

## 2024-01-23 DIAGNOSIS — M81.0 AGE-RELATED OSTEOPOROSIS WITHOUT CURRENT PATHOLOGICAL FRACTURE: Primary | ICD-10-CM

## 2024-01-23 NOTE — TELEPHONE ENCOUNTER
I am happy to put in referral for Prolia at [facility] on [address] [facility].  I am checking in with her schedulers if there is anything in particular they to get this started.  There is an order for DEXA scan.  Please contact FATOU at [facility] [phone]

## 2024-01-23 NOTE — TELEPHONE ENCOUNTER
Caller: Nu Martino    Relationship: Emergency Contact    Best call back number: [phone]    What was the call regarding: PATIENTS  STATED PATIENT WAS GETTING PROLIA SHOTS WHEN SHE WAS A PATIENT OF DR RANDHAWA, AND GOING TO [place] FOR THE SHOTS.    HE STATED SHE IS DUE FOR ANOTHER IN APRIL, BUT WOULD LIKE TO GO SOMEWHERE CLOSER.    HE STATED [facility] [address] DOES THE PROLIA SHOTS.    HE STATED HE SPOKE WITH  AND SHE ADVISED THAT PATIENT NEEDS TO HAVE A DEXA SCAN, CLINICAL, AND A BMP SENT OVER BEFORE GETTING THE SHOT IN APRIL.    HE WOULD LIKE TO REQUEST DR CARTAGENA CALL FATOU AT [facility] IN REGARDS TO THIS.     NUMBER- [phone]    HE WOULD LIKE A CALL ONCE THIS HAS BEEN DONE.

## 2024-01-30 ENCOUNTER — OFFICE VISIT (OUTPATIENT)
Dept: GASTROENTEROLOGY | Facility: CLINIC | Age: 84
End: 2024-01-30
Payer: MEDICARE

## 2024-01-30 VITALS
SYSTOLIC BLOOD PRESSURE: 118 MMHG | HEART RATE: 99 BPM | WEIGHT: 144.6 LBS | TEMPERATURE: 97.3 F | BODY MASS INDEX: 28.39 KG/M2 | DIASTOLIC BLOOD PRESSURE: 75 MMHG | HEIGHT: 60 IN

## 2024-01-30 DIAGNOSIS — K58.9 IRRITABLE BOWEL SYNDROME WITHOUT DIARRHEA: Primary | ICD-10-CM

## 2024-01-30 PROCEDURE — 3078F DIAST BP <80 MM HG: CPT | Performed by: INTERNAL MEDICINE

## 2024-01-30 PROCEDURE — 3074F SYST BP LT 130 MM HG: CPT | Performed by: INTERNAL MEDICINE

## 2024-01-30 PROCEDURE — 99213 OFFICE O/P EST LOW 20 MIN: CPT | Performed by: INTERNAL MEDICINE

## 2024-01-30 PROCEDURE — 1159F MED LIST DOCD IN RCRD: CPT | Performed by: INTERNAL MEDICINE

## 2024-01-30 PROCEDURE — 1160F RVW MEDS BY RX/DR IN RCRD: CPT | Performed by: INTERNAL MEDICINE

## 2024-01-30 RX ORDER — OXYCODONE AND ACETAMINOPHEN 7.5; 325 MG/1; MG/1
1 TABLET ORAL EVERY 6 HOURS PRN
COMMUNITY

## 2024-01-30 RX ORDER — HYDROCODONE BITARTRATE AND ACETAMINOPHEN 10; 325 MG/1; MG/1
1 TABLET ORAL EVERY 6 HOURS PRN
COMMUNITY

## 2024-01-30 RX ORDER — NORTRIPTYLINE HYDROCHLORIDE 10 MG/1
20 CAPSULE ORAL NIGHTLY
Qty: 180 CAPSULE | Refills: 3 | Status: SHIPPED | OUTPATIENT
Start: 2024-01-30

## 2024-01-30 NOTE — PROGRESS NOTES
Chief Complaint   Patient presents with   • change in bowel habit   • Diarrhea       Ritu Martino is a  83 y.o. female here for a follow up visit for continued frequent stools.    HPI    Patient 83-year-old female with history of hypertension, hyperlipidemia and GERD with chronic frequent stools.  Patient reports 5-7 stools a day even when formed has the urge for the bathroom with little stool is present.  Patient a small movement 5-7 times daily.  Patient did notice some decrease in stool consistency since stopping her for Metamucil tablets now here for further recommendations.  Patient denies abdominal pain no bright red blood per rectum or melena seen.    Past Medical History:   Diagnosis Date   • Anemia 2000   • Anxiety    • Arthritis    • Bowel dysfunction 08/2021    since having surgery for diverticular infection   • Chronic constipation    • Depression    • Diverticulitis 08/2021    w/ rupture and infection required surgery and ostomy   • Essential hypertension, benign    • Fracture 06/2022    left ankle   • Fracture, tibia and fibula Now wearing bood   • GERD (gastroesophageal reflux disease)    • Hernia, hiatal    • Hypercholesterolemia    • Hypertension    • Hypokalemia    • Hyponatremia 2014    chronic low with occasional normal level   • Hypothyroidism 2018    estimated time frame pt nor  could remember exactly how long has been on medication   • Insomnia    • Iron deficiency    • Seizures 2022   • Tear of meniscus of knee 2000   • 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 (Patient taking differently: Take 500 mg by mouth Every 6 (Six) Hours As Needed for Mild Pain.), Disp: , Rfl:   •  ALPRAZolam (XANAX) 0.5 MG tablet, Take 1 tablet by mouth 3 (Three) Times a Day As Needed for Anxiety., Disp: 30 tablet, Rfl: 0  •  Cholecalciferol (VITAMIN D) 2000  units capsule, Take  by mouth Daily With Dinner., Disp: , Rfl:   •  cloNIDine (Catapres) 0.1 MG tablet, 1 po bid prn sys greater or equal to 150, Disp: 30 tablet, Rfl: 3  •  cycloSPORINE (RESTASIS) 0.05 % ophthalmic emulsion, 1 drop 2 (Two) Times a Day., Disp: , Rfl:   •  desvenlafaxine (Pristiq) 100 MG 24 hr tablet, Take 1 tablet by mouth Daily., Disp: 90 tablet, Rfl: 3  •  dilTIAZem XR (DILACOR XR) 120 MG 24 hr capsule, Take 1 capsule by mouth Daily., Disp: 90 capsule, Rfl: 3  •  furosemide (LASIX) 20 MG tablet, TAKE 1 TABLET TWICE A DAY, Disp: 60 tablet, Rfl: 11  •  ibuprofen (ADVIL,MOTRIN) 800 MG tablet, TAKE 1 TABLET BY MOUTH EVERY 8 HOURS AS NEEDED FOR MILD PAIN, Disp: 90 tablet, Rfl: 3  •  levothyroxine (SYNTHROID, LEVOTHROID) 50 MCG tablet, TAKE 1 TABLET DAILY, Disp: 90 tablet, Rfl: 3  •  Magnesium 400 MG capsule, 1 p.o. twice daily, Disp: 60 capsule, Rfl: 3  •  melatonin 5 MG tablet tablet, Take 2 tablets by mouth., Disp: , Rfl:   •  nortriptyline (Pamelor) 10 MG capsule, Take 2 capsules by mouth Every Night., Disp: 180 capsule, Rfl: 3  •  omeprazole (priLOSEC) 40 MG capsule, TAKE 1 CAPSULE DAILY, Disp: 90 capsule, Rfl: 3  •  potassium chloride 10 MEQ CR tablet, Take 1 tablet by mouth 2 (Two) Times a Day., Disp: 180 tablet, Rfl: 3  •  Probiotic Product (ALIGN PO), Take 1 capsule by mouth Daily With Lunch., Disp: , Rfl:   •  Probiotic Product (StageBloc) capsule, Take 1 tablet by mouth Daily., Disp: , Rfl:   •  rosuvastatin (CRESTOR) 20 MG tablet, Take 1 tablet by mouth Every Night., Disp: 90 tablet, Rfl: 2  •  SODIUM CHLORIDE PO, Take  by mouth 3 (Three) Times a Day. 2 TABLETS 3 6TIMES A DAY, Disp: , Rfl:   •  valsartan (DIOVAN) 160 MG tablet, TAKE 1 TABLET DAILY (Patient taking differently: 0.5 tablets 2 (Two) Times a Day.), Disp: 90 tablet, Rfl: 3  •  vitamin B-12 (CYANOCOBALAMIN) 100 MCG tablet, Take 0.5 tablets by mouth Daily., Disp: , Rfl:   •  zolpidem (AMBIEN) 10 MG tablet, Take 1 tablet  by mouth At Night As Needed for Sleep., Disp: 30 tablet, Rfl: 0  •  cetirizine (zyrTEC) 10 MG tablet, Take 1 tablet by mouth Daily. (Patient not taking: Reported on 2024), Disp: 30 tablet, Rfl: 0  •  HYDROcodone-acetaminophen (NORCO)  MG per tablet, Take 1 tablet by mouth Every 6 (Six) Hours As Needed for Moderate Pain. (Patient not taking: Reported on 2024), Disp: , Rfl:   •  oxyCODONE-acetaminophen (PERCOCET) 7.5-325 MG per tablet, Take 1 tablet by mouth Every 6 (Six) Hours As Needed. (Patient not taking: Reported on 2024), Disp: , Rfl:   •  promethazine-dextromethorphan (PROMETHAZINE-DM) 6.25-15 MG/5ML syrup, Take 5 mL by mouth 4 (Four) Times a Day As Needed for Cough. (Patient not taking: Reported on 2024), Disp: 240 mL, Rfl: 0    No Known Allergies    Social History     Socioeconomic History   • Marital status:    Tobacco Use   • Smoking status: Former     Packs/day: 0.25     Years: 15.00     Additional pack years: 0.00     Total pack years: 3.75     Types: Cigarettes     Start date: 1956     Quit date: 1971     Years since quittin.1   • Smokeless tobacco: Never   Vaping Use   • Vaping Use: Never used   Substance and Sexual Activity   • Alcohol use: Not Currently     Alcohol/week: 8.0 standard drinks of alcohol     Types: 4 Glasses of wine, 4 Shots of liquor per week     Comment: Stopped 3 months ago.   • Drug use: No   • Sexual activity: Not Currently     Partners: Male       Family History   Problem Relation Age of Onset   • Brain cancer Mother    • Cancer Mother         Brain tumor,    • Stroke Father         Age 46   • Early death Father         Stroke, age 46   • Cancer Brother         pancreatic   • Cancer Daughter         Dec'd 2000-       Review of Systems   Constitutional: Negative.    Respiratory: Negative.     Cardiovascular: Negative.    Gastrointestinal: Negative.    Musculoskeletal: Negative.    Skin: Negative.    Hematological: Negative.       Vitals:    01/30/24 1236   BP: 118/75   Pulse: 99   Temp: 97.3 °F (36.3 °C)       Physical Exam  Vitals reviewed.   Constitutional:       Appearance: Normal appearance. She is well-developed.   HENT:      Head: Normocephalic and atraumatic.      Mouth/Throat:      Mouth: Mucous membranes are moist.   Eyes:      General: No scleral icterus.     Pupils: Pupils are equal, round, and reactive to light.   Pulmonary:      Effort: Pulmonary effort is normal. No respiratory distress.      Breath sounds: Normal breath sounds.   Abdominal:      General: Bowel sounds are normal. There is no distension.      Palpations: Abdomen is soft.      Tenderness: There is no abdominal tenderness.   Skin:     General: Skin is warm and dry.      Coloration: Skin is not jaundiced.      Findings: No rash.   Neurological:      General: No focal deficit present.      Mental Status: She is alert and oriented to person, place, and time.      Cranial Nerves: No cranial nerve deficit.   Psychiatric:         Mood and Affect: Mood normal.         Behavior: Behavior normal.         Thought Content: Thought content normal.     Results Encounter on 01/15/2024   Component Date Value Ref Range Status   • Glucose 01/17/2024 101 (H)  65 - 99 mg/dL Final   • BUN 01/17/2024 15  8 - 23 mg/dL Final   • Creatinine 01/17/2024 1.01 (H)  0.57 - 1.00 mg/dL Final   • EGFR Result 01/17/2024 55.3 (L)  >60.0 mL/min/1.73 Final   • BUN/Creatinine Ratio 01/17/2024 14.9  7.0 - 25.0 Final   • Sodium 01/17/2024 140  136 - 145 mmol/L Final   • Potassium 01/17/2024 4.5  3.5 - 5.2 mmol/L Final   • Chloride 01/17/2024 102  98 - 107 mmol/L Final   • Total CO2 01/17/2024 25.9  22.0 - 29.0 mmol/L Final   • Calcium 01/17/2024 9.2  8.6 - 10.5 mg/dL Final   • WBC 01/17/2024 5.47  3.40 - 10.80 10*3/mm3 Final   • RBC 01/17/2024 3.63 (L)  3.77 - 5.28 10*6/mm3 Final   • Hemoglobin 01/17/2024 11.3 (L)  12.0 - 15.9 g/dL Final   • Hematocrit 01/17/2024 34.0  34.0 - 46.6 % Final   • MCV  01/17/2024 93.7  79.0 - 97.0 fL Final   • MCH 01/17/2024 31.1  26.6 - 33.0 pg Final   • MCHC 01/17/2024 33.2  31.5 - 35.7 g/dL Final   • RDW 01/17/2024 12.3  12.3 - 15.4 % Final   • Platelets 01/17/2024 282  140 - 450 10*3/mm3 Final   • Neutrophil Rel % 01/17/2024 65.0  42.7 - 76.0 % Final   • Lymphocyte Rel % 01/17/2024 23.6  19.6 - 45.3 % Final   • Monocyte Rel % 01/17/2024 8.2  5.0 - 12.0 % Final   • Eosinophil Rel % 01/17/2024 2.6  0.3 - 6.2 % Final   • Basophil Rel % 01/17/2024 0.4  0.0 - 1.5 % Final   • Neutrophils Absolute 01/17/2024 3.56  1.70 - 7.00 10*3/mm3 Final   • Lymphocytes Absolute 01/17/2024 1.29  0.70 - 3.10 10*3/mm3 Final   • Monocytes Absolute 01/17/2024 0.45  0.10 - 0.90 10*3/mm3 Final   • Eosinophils Absolute 01/17/2024 0.14  0.00 - 0.40 10*3/mm3 Final   • Basophils Absolute 01/17/2024 0.02  0.00 - 0.20 10*3/mm3 Final   • Immature Granulocyte Rel % 01/17/2024 0.2  0.0 - 0.5 % Final   • Immature Grans Absolute 01/17/2024 0.01  0.00 - 0.05 10*3/mm3 Final   • nRBC 01/17/2024 0.0  0.0 - 0.2 /100 WBC Final   • TSH 01/17/2024 1.830  0.270 - 4.200 uIU/mL Final   • Magnesium 01/17/2024 2.1  1.6 - 2.4 mg/dL Final   • Phosphorus 01/17/2024 2.8  2.5 - 4.5 mg/dL Final   • Total Cholesterol 01/17/2024 210 (H)  0 - 200 mg/dL Final   • Triglycerides 01/17/2024 167 (H)  0 - 150 mg/dL Final   • HDL Cholesterol 01/17/2024 92 (H)  40 - 60 mg/dL Final   • VLDL Cholesterol Viktor 01/17/2024 28  5 - 40 mg/dL Final   • LDL Chol Calc ([facility]) 01/17/2024 90  0 - 100 mg/dL Final   • LDL/HDL RATIO 01/17/2024 0.92   Final   Lab on 12/18/2023   Component Date Value Ref Range Status   • Osmolality, Urine 12/18/2023 211  mOsm/kg Final   • Sodium, Urine 12/18/2023 69  mmol/L Final   • Glucose 12/18/2023 91  65 - 99 mg/dL Final   • BUN 12/18/2023 13  8 - 23 mg/dL Final   • Creatinine 12/18/2023 0.75  0.57 - 1.00 mg/dL Final   • Sodium 12/18/2023 136  136 - 145 mmol/L Final   • Potassium 12/18/2023 3.6  3.5 - 5.2 mmol/L Final   •  Chloride 12/18/2023 98  98 - 107 mmol/L Final   • CO2 12/18/2023 25.7  22.0 - 29.0 mmol/L Final   • Calcium 12/18/2023 9.8  8.6 - 10.5 mg/dL Final   • Total Protein 12/18/2023 7.5  6.0 - 8.5 g/dL Final   • Albumin 12/18/2023 4.7  3.5 - 5.2 g/dL Final   • ALT (SGPT) 12/18/2023 18  1 - 33 U/L Final   • AST (SGOT) 12/18/2023 22  1 - 32 U/L Final   • Alkaline Phosphatase 12/18/2023 55  39 - 117 U/L Final   • Total Bilirubin 12/18/2023 0.6  0.0 - 1.2 mg/dL Final   • Globulin 12/18/2023 2.8  gm/dL Final   • A/G Ratio 12/18/2023 1.7  g/dL Final   • BUN/Creatinine Ratio 12/18/2023 17.3  7.0 - 25.0 Final   • Anion Gap 12/18/2023 12.3  5.0 - 15.0 mmol/L Final   • eGFR 12/18/2023 79.1  >60.0 mL/min/1.73 Final       Diagnoses and all orders for this visit:    1. Irritable bowel syndrome without diarrhea (Primary)    Other orders  -     nortriptyline (Pamelor) 10 MG capsule; Take 2 capsules by mouth Every Night.  Dispense: 180 capsule; Refill: 3      Patient 83-year-old female with history of IBS with chronic frequent stools with ongoing symptoms.  Patient did decrease her fiber intake but her frequent small stools persisted and now a little bit looser off of the fiber.  At this point would recommend patient increase the fiber back again we will begin with 3 tablets of Metamucil with meals as well as increasing her nortriptyline to 20 mg at bedtime.  Symptomatically patient seems to have an over sensitize rectum requiring her to have a bowel movement every time a small amount of stool enters the rectum even if they are solid patient needs on the bathroom 5-7 times a day without any real diarrhea.  Will monitor clinically and if no improvement will adjust therapy.

## 2024-02-05 NOTE — TELEPHONE ENCOUNTER
PATIENTS SPOUSE STATES HE IS NEEDING SOMEONE TO CALL  WITH [facility] FACILITY THAT SETS UP THE [facility] SHOTS.      : [phone]    PATIENT IS REQUESTING A CALL BACK AFTER  HAS BEEN CONTACTED.     CALL BACK NUMBER: [phone]

## 2024-02-06 DIAGNOSIS — S22.080A COMPRESSION FRACTURE OF T12 VERTEBRA, INITIAL ENCOUNTER: Primary | ICD-10-CM

## 2024-02-06 DIAGNOSIS — M85.80 OSTEOPENIA, UNSPECIFIED LOCATION: ICD-10-CM

## 2024-02-06 NOTE — TELEPHONE ENCOUNTER
Referral placed    PATIENTS SPOUSE STATES HE IS NEEDING SOMEONE TO CALL  WITH [facility] FACILITY THAT SETS UP THE [facility] SHOTS.     FATOU : [phone]    PATIENT IS REQUESTING A CALL BACK AFTER  HAS BEEN CONTACTED.     CALL BACK NUMBER: [phone]

## 2024-02-14 DIAGNOSIS — F51.01 PRIMARY INSOMNIA: ICD-10-CM

## 2024-02-14 RX ORDER — ZOLPIDEM TARTRATE 10 MG/1
10 TABLET ORAL NIGHTLY PRN
Qty: 30 TABLET | Refills: 0 | Status: SHIPPED | OUTPATIENT
Start: 2024-02-14

## 2024-02-18 ENCOUNTER — TELEPHONE (OUTPATIENT)
Age: 84
End: 2024-02-18

## 2024-02-19 DIAGNOSIS — R93.89 ABNORMAL CHEST X-RAY: Primary | ICD-10-CM

## 2024-02-26 DIAGNOSIS — F41.9 ANXIETY: ICD-10-CM

## 2024-02-26 RX ORDER — ALPRAZOLAM 0.5 MG/1
0.5 TABLET ORAL 3 TIMES DAILY PRN
Qty: 90 TABLET | Refills: 0 | Status: SHIPPED | OUTPATIENT
Start: 2024-02-26

## 2024-02-26 NOTE — TELEPHONE ENCOUNTER
Caller: NAINA METZGER (SPOUSE ON VERBAL)    Relationship: SPOUSE    Best call back number: [phone]    Requested Prescriptions:   Requested Prescriptions     Pending Prescriptions Disp Refills    ALPRAZolam (XANAX) 0.5 MG tablet 30 tablet 0     Sig: Take 1 tablet by mouth 3 (Three) Times a Day As Needed for Anxiety.        Pharmacy where request should be sent: [facility] - [address] [phone] [facility] [phone]      Last office visit with prescribing clinician: 1/18/2024   Last telemedicine visit with prescribing clinician: Visit date not found   Next office visit with prescribing clinician: 5/24/2024     Additional details provided by patient: PATIENT WAS ONLY PRESCRIBED 30 PILLS, PATIENT TAKES THREE A DAY. PATIENT WOULD LIKE 90 PILLS CALLED IN. PLEASE ADVISE.    Does the patient have less than a 3 day supply:  [x] Yes  [] No    Would you like a call back once the refill request has been completed: [] Yes [x] No    If the office needs to give you a call back, can they leave a voicemail: [] Yes [x] No    Mj Olivera Rep   02/26/24 10:54 EST

## 2024-03-04 DIAGNOSIS — F51.01 PRIMARY INSOMNIA: ICD-10-CM

## 2024-03-05 RX ORDER — ZOLPIDEM TARTRATE 10 MG/1
10 TABLET ORAL NIGHTLY PRN
Qty: 30 TABLET | Refills: 0 | OUTPATIENT
Start: 2024-03-05

## 2024-03-09 ENCOUNTER — HOSPITAL ENCOUNTER (OUTPATIENT)
Facility: HOSPITAL | Age: 84
Discharge: HOME | End: 2024-03-09
Payer: MEDICARE

## 2024-03-09 DIAGNOSIS — R93.89 ABNORMAL CHEST X-RAY: ICD-10-CM

## 2024-03-09 PROCEDURE — 71250 CT THORAX DX C-: CPT

## 2024-03-13 DIAGNOSIS — F51.01 PRIMARY INSOMNIA: ICD-10-CM

## 2024-03-14 ENCOUNTER — DOCUMENTATION (OUTPATIENT)
Dept: INTERNAL MEDICINE | Facility: CLINIC | Age: 84
End: 2024-03-14
Payer: MEDICARE

## 2024-03-14 ENCOUNTER — TELEPHONE (OUTPATIENT)
Dept: INTERNAL MEDICINE | Facility: CLINIC | Age: 84
End: 2024-03-14

## 2024-03-14 RX ORDER — ZOLPIDEM TARTRATE 10 MG/1
10 TABLET ORAL NIGHTLY PRN
Qty: 30 TABLET | Refills: 0 | Status: SHIPPED | OUTPATIENT
Start: 2024-03-14

## 2024-03-14 NOTE — PROGRESS NOTES
Patient w/ recent CT chest after abnormal chest xray. No evidence of suspicious nodule. She does have extensive cardiac calcifications. Advises asa 81 mg if no other blood thinners and no risk for fall or bleeding. She is to f/u to discuss additional cardiac testing if appropriate.   MICHAEL

## 2024-03-14 NOTE — TELEPHONE ENCOUNTER
Caller: Naina Martino    Relationship: Emergency Contact    Best call back number: [phone]     What test was performed: CT SCAN     When was the test performed: ABOUT A WEEK AGO     Where was the test performed: [facility]     Additional notes: NAINA WOULD LIKE TO KNOW IF THE RESULTS COULD BE RELEASED ON TO [facility] OR IF SOMEONE COULD GIVE HIM A CALL WITH THE RESULTS

## 2024-03-14 NOTE — CONSULTS
Requesting Provider:  Dr. Huber  Reason for Consult: Benzodiazepine dependence    Date of admission: May 7, 2022  Date of assessment: May 8, 2022    Chief Complaint: None given    History of presenting illness: Patient is an 82 y.o. female with a reported past psychiatric history of anxiety seen on consultation for possible benzodiazepine dependence.  The patient had presented to the ED due to generalized weakness and difficulty standing up.  The patient was also found to be aphasic.  Family reported in the ED that the patient has had similar episodes when her sodium is low.  The patient was, in fact, found to have hyponatremia and admitted for stabilization.  Her neurological examination was normal.  Her aphasia subsided after approximately 24 hours from admission.    The patient has no history of inpatient psychiatric admissions.  She does not have a current psychiatrist.  She has never had a suicide attempt.  She is currently on Xanax 0.25 mg 2 times daily that is prescribed by her primary care physician.  She denies that she ever takes more than prescribed or requires an early refill.  She has been on this dose since the beginning of the pandemic.  Previously she was on 1 daily for approximately 5 years.     is present for the interview.  He reports that she has been confused for the past 7 to 10 days.  He reports that she had a similar episode May 2021.  She also has a history of having confusion when she has a urinary tract infection.   reports that she has been confused and irritable today.  He describes her confusion as waxing and waning.  She is currently alert and oriented x4.  Mood is somewhat anxious and irritable.  She denies any acute suicidal ideations, homicidal ideations or audiovisual hallucinations.  She has no symptoms of benzodiazepine withdrawal.   does report that she drinks a lot of water at home.    Past psychiatric history: See HPI    Past medical history:  Diagnoses:  Hyperlipidemia, hyponatremia, hypothyroidism, GERD, hypertension.  Medications:     Current Facility-Administered Medications   Medication Dose Route Frequency Provider Last Rate Last Admin   • acetaminophen (TYLENOL) tablet 650 mg  650 mg Oral Q4H PRN Angelic Gaston APRN        Or   • acetaminophen (TYLENOL) 160 MG/5ML solution 650 mg  650 mg Oral Q4H PRN Angelic Gaston APRN        Or   • acetaminophen (TYLENOL) suppository 650 mg  650 mg Rectal Q4H PRN Angelic Gaston APRN       • ALPRAZolam (XANAX) tablet 0.25 mg  0.25 mg Oral BID Carlos Bradshaw APRN   0.25 mg at 05/08/22 1121   • aspirin tablet 325 mg  325 mg Oral Daily Angelic Gaston APRN   325 mg at 05/08/22 1121    Or   • aspirin suppository 300 mg  300 mg Rectal Daily Angelic Gaston APRN       • atorvastatin (LIPITOR) tablet 80 mg  80 mg Oral Nightly Angelic Gaston APRN   80 mg at 05/07/22 2107   • ketoconazole (NIZORAL) 2 % cream 1 application  1 application Topical Q24H Carlos Bradshaw APRN   1 application at 05/08/22 1122   • levothyroxine (SYNTHROID, LEVOTHROID) tablet 50 mcg  50 mcg Oral Q AM Carlos Bradshaw APRN   50 mcg at 05/08/22 0514   • lisinopril (PRINIVIL,ZESTRIL) tablet 20 mg  20 mg Oral Q24H Carlos Bradshaw APRN   20 mg at 05/08/22 1120   • nitroglycerin (NITROSTAT) SL tablet 0.4 mg  0.4 mg Sublingual Q5 Min PRN Angelic Gaston APRN       • nystatin (MYCOSTATIN) powder   Topical Q12H Carlos Bradshaw APRN   Given at 05/08/22 1122   • potassium chloride (K-DUR,KLOR-CON) ER tablet 40 mEq  40 mEq Oral PRN Carlos Bradshaw APRN   40 mEq at 05/08/22 1121    Or   • potassium chloride (KLOR-CON) packet 40 mEq  40 mEq Oral PRN Carlos Bradshaw APRN        Or   • potassium chloride 10 mEq in 100 mL IVPB  10 mEq Intravenous Q1H PRN Carlos Bradshaw APRN       • sodium chloride 0.9 % flush 10 mL  10 mL Intravenous PRN Todd Medel MD       • sodium chloride 0.9 % flush 10 mL  10 mL Intravenous Q12H  Angelic Gaston APRN   10 mL at 05/08/22 1121   • sodium chloride 0.9 % flush 10 mL  10 mL Intravenous PRN Angelic Gaston APRN       • sodium chloride tablet 2 g  2 g Oral TID With Meals Al Lagos MD   2 g at 05/08/22 1121   • Urea (URE-NA) packet 15 g  15 g Oral BID Al Lagos MD   15 g at 05/08/22 1120   Medication allergies: NKDA    Social history: Noncontributory    Family history: Noncontributory    Substance abuse history: None    Vital Signs    Temp:  98.6  Heart Rate: 98   Resp:  16  BP:  150/76    Mental Status Exam: The patient is found lying in bed.  She is awake and alert.  She has dressed in hospital attire.  She is generally appropriate and cooperative to the interview process.  She is oriented times 4.  Speech is fluent with a normal tone and volume.  Mood is anxious.  Affect congruent.  She denies any acute suicidal ideation, homicidal ideations or audiovisual hallucinations.  Thought processes are circumstantial.  Judgment and insight are okay.  Memory is intact.    Assessment:   Generalized anxiety disorder  Metabolic encephalopathy    Treatment Plan:     The patient's dose of Xanax is not consistent with that which would lead to a physiological dependence.  She is not in benzodiazepine withdrawal.  She does have persistent confusion, most likely due to metabolic encephalopathy.  She has persistent hyponatremia and hypokalemia.  Her cognitive functioning will likely return to baseline with improvement in her electrolyte abnormalities.  Primary team may wish to repeat UA.    Thank you for this consultation.  Please contact Access for any additional requests.   no iv

## 2024-03-29 NOTE — CASE MANAGEMENT/SOCIAL WORK
Continued Stay Note  [facility]     Patient Name: Ritu Martino  MRN: [id number]  Today's Date: 11/2/2022    Admit Date: 10/27/2022    Plan: [facility]   Discharge Plan     Row Name 11/02/22 1205       Plan    Plan [facility]    Plan Comments S/W [facility]/ [facility] - precert received and bed is available today.  S/w pt and her  Nu who are in agreement w/ DC to skilled bed at [facility] toeay.  DC summary and DC packet given to RN.  Zaynab w/c amelia arranged for today at 1430.    Final Note DC to skilled bed at [facility] ..........Bailey DE LEON/ NORIS               Discharge Codes    No documentation.               Expected Discharge Date and Time     Expected Discharge Date Expected Discharge Time    Nov 2, 2022             Bailey Harris RN     palpable right posterior scalp mass, firm . non tender totouch

## 2024-04-11 DIAGNOSIS — F51.01 PRIMARY INSOMNIA: ICD-10-CM

## 2024-04-11 RX ORDER — ZOLPIDEM TARTRATE 10 MG/1
10 TABLET ORAL NIGHTLY PRN
Qty: 30 TABLET | Refills: 0 | Status: SHIPPED | OUTPATIENT
Start: 2024-04-11

## 2024-04-12 ENCOUNTER — TELEPHONE (OUTPATIENT)
Dept: INTERNAL MEDICINE | Facility: CLINIC | Age: 84
End: 2024-04-12

## 2024-04-12 NOTE — TELEPHONE ENCOUNTER
Caller: DarielmercyNaina    Relationship to patient: Emergency Contact    Best call back number:      Patient is needing: PATIENT'S , NAINA, IS CALLING TO REQUEST AN ORTHOPEDIC REFERRAL FOR THE PATIENT TO HAVE HER RIGHT KNEE EVALUATED.  HE STATES PATIENT WAS SEEN AT A [facility]EE [facility]IN CENTER (AS SEEN ON TV), BUT SHE WOULD LIKE TO HAVE AN EVAL BY ORTHO IN [place] BEFORE  PROCEEDING WITH THAT CLINIC.    PLEASE ADVISE PATIENT WHEN REFERRAL HAS BEEN PLACED.

## 2024-04-15 ENCOUNTER — OFFICE VISIT (OUTPATIENT)
Dept: INTERNAL MEDICINE | Facility: CLINIC | Age: 84
End: 2024-04-15
Payer: MEDICARE

## 2024-04-15 VITALS
DIASTOLIC BLOOD PRESSURE: 70 MMHG | OXYGEN SATURATION: 98 % | BODY MASS INDEX: 27.88 KG/M2 | HEART RATE: 91 BPM | WEIGHT: 142 LBS | HEIGHT: 60 IN | SYSTOLIC BLOOD PRESSURE: 120 MMHG

## 2024-04-15 DIAGNOSIS — M17.0 OSTEOARTHRITIS OF BOTH KNEES, UNSPECIFIED OSTEOARTHRITIS TYPE: Primary | ICD-10-CM

## 2024-04-15 PROCEDURE — 3078F DIAST BP <80 MM HG: CPT | Performed by: INTERNAL MEDICINE

## 2024-04-15 PROCEDURE — 99213 OFFICE O/P EST LOW 20 MIN: CPT | Performed by: INTERNAL MEDICINE

## 2024-04-15 PROCEDURE — 3074F SYST BP LT 130 MM HG: CPT | Performed by: INTERNAL MEDICINE

## 2024-04-15 PROCEDURE — 1159F MED LIST DOCD IN RCRD: CPT | Performed by: INTERNAL MEDICINE

## 2024-04-15 PROCEDURE — 1160F RVW MEDS BY RX/DR IN RCRD: CPT | Performed by: INTERNAL MEDICINE

## 2024-04-15 NOTE — PROGRESS NOTES
"Subjective     Ritu Martino is a 84 y.o. female who presents with   Chief Complaint   Patient presents with    Knee Pain       History of Present Illness    Left knee pain.  Chronic pain.  Going on 6-9 months.  Better with walking.  She saw an \"arthritis clinic\" that wanted to started doing shots.  Her  not comfortable with this.     Records reviewed.      Review of Systems   Respiratory: Negative.     Cardiovascular: Negative.        The following portions of the patient's history were reviewed and updated as appropriate: allergies, current medications and problem list.    Patient Active Problem List    Diagnosis Date Noted    Hospital discharge follow-up 01/04/2023    Acute UTI 10/28/2022    Back pain 10/28/2022    Compression fracture of L2 lumbar vertebra 10/28/2022    Closed fracture of right inferior pubic ramus 10/28/2022    Osteopenia 10/19/2022    Compression fracture of T12 vertebra 09/01/2022    Abdominal hernia 08/04/2022    Compression fracture of L1 lumbar vertebra, open, initial encounter 08/04/2022    Tachycardia 07/12/2022    Debility 07/01/2022    History of seizure 07/01/2022    Fall 07/01/2022    Closed fracture of left distal fibula 06/30/2022    Diverticulitis 06/28/2022    Arthritis 06/28/2022    GERD (gastroesophageal reflux disease) 06/28/2022    Hernia, hiatal 06/28/2022    Seizures 06/28/2022    Meningioma 06/28/2022    E coli infection 06/14/2022    Acute cystitis 06/13/2022    Anemia 06/12/2022    Cervicalgia 06/12/2022    Intertrigo 06/12/2022    Insomnia 06/12/2022    Fluent aphasia 05/07/2022    Hypochloremia 05/07/2022    Benzodiazepine dependence 05/07/2022    Iron deficiency 04/19/2022    Hypokalemia 04/19/2022    Essential hypertension, benign 04/19/2022    Anxiety 12/15/2021    Hypothyroidism 12/15/2021    Bowel dysfunction 08/2021     Note Last Updated: 6/28/2022     since having surgery for diverticular infection      Hyponatremia 2014     Note Last Updated: " 4/19/2022     chronic low with occasional normal level         Current Outpatient Medications on File Prior to Visit   Medication Sig Dispense Refill    acetaminophen (TYLENOL) 325 MG tablet Take 2 tablets by mouth Every 6 (Six) Hours As Needed for Mild Pain . (Patient taking differently: Take 500 mg by mouth Every 6 (Six) Hours As Needed for Mild Pain.)      albuterol sulfate  (90 Base) MCG/ACT inhaler Inhale 2 puffs Every 4 (Four) Hours As Needed for Wheezing or Shortness of Air. 18 g 0    ALPRAZolam (XANAX) 0.5 MG tablet Take 1 tablet by mouth 3 (Three) Times a Day As Needed for Anxiety. 90 tablet 0    amoxicillin-clavulanate (AUGMENTIN) 875-125 MG per tablet Take 1 tablet by mouth 2 (Two) Times a Day. 20 tablet 0    benzonatate (TESSALON) 200 MG capsule Take 1 capsule by mouth 3 (Three) Times a Day As Needed for Cough. 90 capsule 0    cetirizine (zyrTEC) 10 MG tablet Take 1 tablet by mouth Daily. 30 tablet 0    Cholecalciferol (VITAMIN D) 2000 units capsule Take  by mouth Daily With Dinner.      cloNIDine (Catapres) 0.1 MG tablet 1 po bid prn sys greater or equal to 150 30 tablet 3    cycloSPORINE (RESTASIS) 0.05 % ophthalmic emulsion 1 drop 2 (Two) Times a Day.      desvenlafaxine (Pristiq) 100 MG 24 hr tablet Take 1 tablet by mouth Daily. 90 tablet 3    dilTIAZem XR (DILACOR XR) 120 MG 24 hr capsule Take 1 capsule by mouth Daily. 90 capsule 3    furosemide (LASIX) 20 MG tablet TAKE 1 TABLET TWICE A DAY 60 tablet 11    HYDROcodone-acetaminophen (NORCO)  MG per tablet Take 1 tablet by mouth Every 6 (Six) Hours As Needed for Moderate Pain.      ibuprofen (ADVIL,MOTRIN) 800 MG tablet TAKE 1 TABLET BY MOUTH EVERY 8 HOURS AS NEEDED FOR MILD PAIN 90 tablet 3    levothyroxine (SYNTHROID, LEVOTHROID) 50 MCG tablet TAKE 1 TABLET DAILY 90 tablet 3    Magnesium 400 MG capsule 1 p.o. twice daily 60 capsule 3    melatonin 5 MG tablet tablet Take 2 tablets by mouth.      nortriptyline (Pamelor) 10 MG capsule  "Take 2 capsules by mouth Every Night. 180 capsule 3    omeprazole (priLOSEC) 40 MG capsule TAKE 1 CAPSULE DAILY 90 capsule 3    oxyCODONE-acetaminophen (PERCOCET) 7.5-325 MG per tablet Take 1 tablet by mouth Every 6 (Six) Hours As Needed.      potassium chloride 10 MEQ CR tablet Take 1 tablet by mouth 2 (Two) Times a Day. 180 tablet 3    Probiotic Product (ALIGN PO) Take 1 capsule by mouth Daily With Lunch.      Probiotic Product (Examify) capsule Take 1 tablet by mouth Daily.      promethazine-dextromethorphan (PROMETHAZINE-DM) 6.25-15 MG/5ML syrup Take 5 mL by mouth 4 (Four) Times a Day As Needed for Cough. 240 mL 0    rosuvastatin (CRESTOR) 20 MG tablet Take 1 tablet by mouth Every Night. 90 tablet 2    SODIUM CHLORIDE PO Take  by mouth 3 (Three) Times a Day. 2 TABLETS 3 6TIMES A DAY      valsartan (DIOVAN) 160 MG tablet TAKE 1 TABLET DAILY (Patient taking differently: 0.5 tablets 2 (Two) Times a Day.) 90 tablet 3    vitamin B-12 (CYANOCOBALAMIN) 100 MCG tablet Take 0.5 tablets by mouth Daily.      zolpidem (AMBIEN) 10 MG tablet Take 1 tablet by mouth At Night As Needed for Sleep. 30 tablet 0     No current facility-administered medications on file prior to visit.       Objective     /70   Pulse 91   Ht 152.4 cm (60\")   Wt 64.4 kg (142 lb)   LMP  (LMP Unknown)   SpO2 98%   BMI 27.73 kg/m²     Physical Exam  Constitutional:       Appearance: She is well-developed.   HENT:      Head: Normocephalic and atraumatic.   Pulmonary:      Effort: Pulmonary effort is normal.   Musculoskeletal:      Right knee: Deformity and crepitus present. Normal range of motion.      Left knee: Deformity and crepitus present. Normal range of motion.   Neurological:      Mental Status: She is alert and oriented to person, place, and time.   Psychiatric:         Behavior: Behavior normal.         Assessment & Plan   Diagnoses and all orders for this visit:    1. Osteoarthritis of both knees, unspecified " osteoarthritis type (Primary)  -     Ambulatory Referral to Sports Medicine        Discussion    Patient presents with arthritis reportedly secondary to arthritis.  She is interested in injections but not surgery.  Referral is placed to sports medicine for consideration of injections.         Future Appointments   Date Time Provider Department Center   5/17/2024 11:30 AM [facility]   5/24/2024  3:45 PM Melvin Jimenez MD [facility]   8/16/2024 11:40 AM [facility] None

## 2024-04-23 ENCOUNTER — OFFICE VISIT (OUTPATIENT)
Dept: SPORTS MEDICINE | Facility: CLINIC | Age: 84
End: 2024-04-23
Payer: MEDICARE

## 2024-04-23 VITALS
WEIGHT: 142 LBS | HEART RATE: 82 BPM | SYSTOLIC BLOOD PRESSURE: 110 MMHG | RESPIRATION RATE: 12 BRPM | HEIGHT: 60 IN | TEMPERATURE: 97.1 F | OXYGEN SATURATION: 95 % | BODY MASS INDEX: 27.88 KG/M2 | DIASTOLIC BLOOD PRESSURE: 74 MMHG

## 2024-04-23 DIAGNOSIS — M25.552 GREATER TROCHANTERIC PAIN SYNDROME OF BOTH LOWER EXTREMITIES: ICD-10-CM

## 2024-04-23 DIAGNOSIS — M17.12 PRIMARY OSTEOARTHRITIS OF LEFT KNEE: ICD-10-CM

## 2024-04-23 DIAGNOSIS — M76.891 PES ANSERINUS TENDINITIS OF BOTH LOWER EXTREMITIES: ICD-10-CM

## 2024-04-23 DIAGNOSIS — M76.892 PES ANSERINUS TENDINITIS OF BOTH LOWER EXTREMITIES: ICD-10-CM

## 2024-04-23 DIAGNOSIS — R29.898 WEAKNESS OF BOTH LOWER EXTREMITIES: ICD-10-CM

## 2024-04-23 DIAGNOSIS — M17.11 PRIMARY OSTEOARTHRITIS OF RIGHT KNEE: ICD-10-CM

## 2024-04-23 DIAGNOSIS — Z74.09 IMPAIRED MOBILITY: ICD-10-CM

## 2024-04-23 DIAGNOSIS — M25.551 GREATER TROCHANTERIC PAIN SYNDROME OF BOTH LOWER EXTREMITIES: ICD-10-CM

## 2024-04-23 RX ADMIN — LIDOCAINE HYDROCHLORIDE 2 ML: 10 INJECTION, SOLUTION INFILTRATION; PERINEURAL at 14:35

## 2024-04-23 RX ADMIN — TRIAMCINOLONE ACETONIDE 40 MG: 40 INJECTION, SUSPENSION INTRA-ARTICULAR; INTRAMUSCULAR at 14:35

## 2024-04-24 ENCOUNTER — TELEPHONE (OUTPATIENT)
Dept: SPORTS MEDICINE | Facility: CLINIC | Age: 84
End: 2024-04-24

## 2024-04-24 ENCOUNTER — TELEPHONE (OUTPATIENT)
Dept: INTERNAL MEDICINE | Facility: CLINIC | Age: 84
End: 2024-04-24

## 2024-04-24 ENCOUNTER — PATIENT ROUNDING (BHMG ONLY) (OUTPATIENT)
Dept: SPORTS MEDICINE | Facility: CLINIC | Age: 84
End: 2024-04-24
Payer: MEDICARE

## 2024-04-24 NOTE — PROGRESS NOTES
April 24, 2024    A [employer] Message has been sent to the patient [place] [facility] [place]

## 2024-04-24 NOTE — TELEPHONE ENCOUNTER
Hub staff attempted to follow warm transfer process and was unsuccessful     Caller: Ritu Martino    Relationship to patient: Self    Best call back number: [phone]     Patient is needing: PATIENT IS CALLING TO RESCHEDULE AN APPOINTMENT THAT SHE HAS FOR A COUGH ON 04/30/24.  PATIENT STATES SHE IS WHEEZING AND WE NEED TO GET HER IN TODAY.  THERE ARE NO AVAILABLE APPOINTMENTS  UNABLE TO WARM TRANSFER    PLEASE ADVISE.

## 2024-04-24 NOTE — TELEPHONE ENCOUNTER
Caller: Nu Martino    Relationship to patient: Emergency Contact    Best call back number: [phone]    Chief complaint: BILATERAL KNEES     Type of visit: FOLLOW UP     Requested date: N/A      If rescheduling, when is the original appointment: 6-4-24     Additional notes:I WAS UNABLE TO RESCHEDULE THIS PATIENT DUE TO SOMEONE ELSE BEING IN THE CHART. WT TO THE OFFICE BUT THERE WAS NO ONE BY THAT NAME.  I SENT A MESSAGE BUT WAS UNABLE TO REACH THAT PERSON SO I COULD ACCESS IT. PLEASE CALL TO RESCHEDULE.

## 2024-04-26 ENCOUNTER — OFFICE VISIT (OUTPATIENT)
Dept: INTERNAL MEDICINE | Facility: CLINIC | Age: 84
End: 2024-04-26
Payer: MEDICARE

## 2024-04-26 VITALS
WEIGHT: 143.7 LBS | HEART RATE: 94 BPM | OXYGEN SATURATION: 98 % | BODY MASS INDEX: 28.21 KG/M2 | SYSTOLIC BLOOD PRESSURE: 134 MMHG | DIASTOLIC BLOOD PRESSURE: 70 MMHG | HEIGHT: 60 IN | TEMPERATURE: 96.8 F

## 2024-04-26 DIAGNOSIS — R06.2 WHEEZING: ICD-10-CM

## 2024-04-26 DIAGNOSIS — J20.9 ACUTE BRONCHITIS, UNSPECIFIED ORGANISM: Primary | ICD-10-CM

## 2024-04-26 PROBLEM — E87.8 HYPOCHLOREMIA: Status: RESOLVED | Noted: 2022-05-07 | Resolved: 2024-04-26

## 2024-04-26 PROBLEM — A49.8 E COLI INFECTION: Status: RESOLVED | Noted: 2022-06-14 | Resolved: 2024-04-26

## 2024-04-26 PROBLEM — Z09 HOSPITAL DISCHARGE FOLLOW-UP: Status: RESOLVED | Noted: 2023-01-04 | Resolved: 2024-04-26

## 2024-04-26 PROBLEM — N39.0 ACUTE UTI: Status: RESOLVED | Noted: 2022-10-28 | Resolved: 2024-04-26

## 2024-04-26 PROBLEM — Z87.898 HISTORY OF SEIZURE: Status: RESOLVED | Noted: 2022-07-01 | Resolved: 2024-04-26

## 2024-04-26 PROBLEM — E87.6 HYPOKALEMIA: Status: RESOLVED | Noted: 2022-04-19 | Resolved: 2024-04-26

## 2024-04-26 PROBLEM — M54.9 BACK PAIN: Status: RESOLVED | Noted: 2022-10-28 | Resolved: 2024-04-26

## 2024-04-26 PROBLEM — D64.9 ANEMIA: Status: RESOLVED | Noted: 2022-06-12 | Resolved: 2024-04-26

## 2024-04-26 PROBLEM — N30.00 ACUTE CYSTITIS: Status: RESOLVED | Noted: 2022-06-13 | Resolved: 2024-04-26
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
## 2024-04-26 PROBLEM — R00.0 TACHYCARDIA: Status: RESOLVED | Noted: 2022-07-12 | Resolved: 2024-04-26

## 2024-04-26 PROCEDURE — 3078F DIAST BP <80 MM HG: CPT | Performed by: INTERNAL MEDICINE

## 2024-04-26 PROCEDURE — 99213 OFFICE O/P EST LOW 20 MIN: CPT | Performed by: INTERNAL MEDICINE

## 2024-04-26 PROCEDURE — 1159F MED LIST DOCD IN RCRD: CPT | Performed by: INTERNAL MEDICINE

## 2024-04-26 PROCEDURE — 1160F RVW MEDS BY RX/DR IN RCRD: CPT | Performed by: INTERNAL MEDICINE

## 2024-04-26 PROCEDURE — 3075F SYST BP GE 130 - 139MM HG: CPT | Performed by: INTERNAL MEDICINE

## 2024-04-26 RX ORDER — ALBUTEROL SULFATE 90 UG/1
2 AEROSOL, METERED RESPIRATORY (INHALATION) EVERY 4 HOURS PRN
Qty: 18 G | Refills: 0 | Status: SHIPPED | OUTPATIENT
Start: 2024-04-26

## 2024-04-26 RX ORDER — DOXYCYCLINE HYCLATE 100 MG/1
100 CAPSULE ORAL 2 TIMES DAILY
Qty: 14 CAPSULE | Refills: 0 | Status: SHIPPED | OUTPATIENT
Start: 2024-04-26

## 2024-04-26 RX ORDER — BENZONATATE 200 MG/1
200 CAPSULE ORAL 3 TIMES DAILY PRN
Qty: 30 CAPSULE | Refills: 0 | Status: SHIPPED | OUTPATIENT
Start: 2024-04-26 | End: 2024-04-26 | Stop reason: SDUPTHER

## 2024-04-26 RX ORDER — BENZONATATE 200 MG/1
200 CAPSULE ORAL 3 TIMES DAILY PRN
Qty: 30 CAPSULE | Refills: 0 | Status: SHIPPED | OUTPATIENT
Start: 2024-04-26

## 2024-04-26 RX ORDER — ALBUTEROL SULFATE 90 UG/1
2 AEROSOL, METERED RESPIRATORY (INHALATION) EVERY 4 HOURS PRN
Qty: 18 G | Refills: 0 | Status: SHIPPED | OUTPATIENT
Start: 2024-04-26 | End: 2024-04-26 | Stop reason: SDUPTHER

## 2024-04-26 RX ORDER — DOXYCYCLINE HYCLATE 100 MG/1
100 CAPSULE ORAL 2 TIMES DAILY
Qty: 14 CAPSULE | Refills: 0 | Status: SHIPPED | OUTPATIENT
Start: 2024-04-26 | End: 2024-04-26 | Stop reason: SDUPTHER

## 2024-04-26 NOTE — PROGRESS NOTES
Subjective     Ritu Martino is a 84 y.o. female who presents with   Chief Complaint   Patient presents with    Cough     X 2 weeks       Wheezing       Cough  Associated symptoms include rhinorrhea and wheezing. Pertinent negatives include no fever.   Wheezing   Associated symptoms include coughing and rhinorrhea. Pertinent negatives include no fever.        C/o cough for two weeks.  Cough with production.  No SOA.  Some wheezing.      Review of Systems   Constitutional:  Negative for fever.   HENT:  Positive for congestion and rhinorrhea.    Respiratory:  Positive for cough and wheezing.        The following portions of the patient's history were reviewed and updated as appropriate: allergies, current medications and problem list.    Patient Active Problem List    Diagnosis Date Noted    Hospital discharge follow-up 01/04/2023    Acute UTI 10/28/2022    Back pain 10/28/2022    Compression fracture of L2 lumbar vertebra 10/28/2022    Closed fracture of right inferior pubic ramus 10/28/2022    Osteopenia 10/19/2022    Compression fracture of T12 vertebra 09/01/2022    Abdominal hernia 08/04/2022    Compression fracture of L1 lumbar vertebra, open, initial encounter 08/04/2022    Tachycardia 07/12/2022    Debility 07/01/2022    History of seizure 07/01/2022    Fall 07/01/2022    Closed fracture of left distal fibula 06/30/2022    Diverticulitis 06/28/2022    Arthritis 06/28/2022    GERD (gastroesophageal reflux disease) 06/28/2022    Hernia, hiatal 06/28/2022    Seizures 06/28/2022    Meningioma 06/28/2022    E coli infection 06/14/2022    Acute cystitis 06/13/2022    Anemia 06/12/2022    Cervicalgia 06/12/2022    Intertrigo 06/12/2022    Insomnia 06/12/2022    Fluent aphasia 05/07/2022    Hypochloremia 05/07/2022    Benzodiazepine dependence 05/07/2022    Iron deficiency 04/19/2022    Hypokalemia 04/19/2022    Essential hypertension, benign 04/19/2022    Anxiety 12/15/2021    Hypothyroidism 12/15/2021    Bowel  dysfunction 08/2021     Note Last Updated: 6/28/2022     since having surgery for diverticular infection      Hyponatremia 2014     Note Last Updated: 4/19/2022     chronic low with occasional normal level         Current Outpatient Medications on File Prior to Visit   Medication Sig Dispense Refill    acetaminophen (TYLENOL) 325 MG tablet Take 2 tablets by mouth Every 6 (Six) Hours As Needed for Mild Pain . (Patient taking differently: Take 500 mg by mouth Every 6 (Six) Hours As Needed for Mild Pain.)      albuterol sulfate  (90 Base) MCG/ACT inhaler Inhale 2 puffs Every 4 (Four) Hours As Needed for Wheezing or Shortness of Air. 18 g 0    ALPRAZolam (XANAX) 0.5 MG tablet Take 1 tablet by mouth 3 (Three) Times a Day As Needed for Anxiety. 90 tablet 0    benzonatate (TESSALON) 200 MG capsule Take 1 capsule by mouth 3 (Three) Times a Day As Needed for Cough. 90 capsule 0    cetirizine (zyrTEC) 10 MG tablet Take 1 tablet by mouth Daily. 30 tablet 0    Cholecalciferol (VITAMIN D) 2000 units capsule Take  by mouth Daily With Dinner.      cloNIDine (Catapres) 0.1 MG tablet 1 po bid prn sys greater or equal to 150 30 tablet 3    cycloSPORINE (RESTASIS) 0.05 % ophthalmic emulsion 1 drop 2 (Two) Times a Day.      desvenlafaxine (Pristiq) 100 MG 24 hr tablet Take 1 tablet by mouth Daily. 90 tablet 3    dilTIAZem XR (DILACOR XR) 120 MG 24 hr capsule Take 1 capsule by mouth Daily. 90 capsule 3    furosemide (LASIX) 20 MG tablet TAKE 1 TABLET TWICE A DAY 60 tablet 11    HYDROcodone-acetaminophen (NORCO)  MG per tablet Take 1 tablet by mouth Every 6 (Six) Hours As Needed for Moderate Pain.      ibuprofen (ADVIL,MOTRIN) 800 MG tablet TAKE 1 TABLET BY MOUTH EVERY 8 HOURS AS NEEDED FOR MILD PAIN 90 tablet 3    levothyroxine (SYNTHROID, LEVOTHROID) 50 MCG tablet TAKE 1 TABLET DAILY 90 tablet 3    Magnesium 400 MG capsule 1 p.o. twice daily 60 capsule 3    melatonin 5 MG tablet tablet Take 2 tablets by mouth.       "nortriptyline (Pamelor) 10 MG capsule Take 2 capsules by mouth Every Night. 180 capsule 3    omeprazole (priLOSEC) 40 MG capsule TAKE 1 CAPSULE DAILY 90 capsule 3    oxyCODONE-acetaminophen (PERCOCET) 7.5-325 MG per tablet Take 1 tablet by mouth Every 6 (Six) Hours As Needed.      potassium chloride 10 MEQ CR tablet Take 1 tablet by mouth 2 (Two) Times a Day. 180 tablet 3    Probiotic Product (ALIGN PO) Take 1 capsule by mouth Daily With Lunch.      Probiotic Product (Unype) capsule Take 1 tablet by mouth Daily.      promethazine-dextromethorphan (PROMETHAZINE-DM) 6.25-15 MG/5ML syrup Take 5 mL by mouth 4 (Four) Times a Day As Needed for Cough. 240 mL 0    rosuvastatin (CRESTOR) 20 MG tablet Take 1 tablet by mouth Every Night. 90 tablet 2    SODIUM CHLORIDE PO Take  by mouth 3 (Three) Times a Day. 2 TABLETS 3 6TIMES A DAY      valsartan (DIOVAN) 160 MG tablet TAKE 1 TABLET DAILY (Patient taking differently: 0.5 tablets 2 (Two) Times a Day.) 90 tablet 3    vitamin B-12 (CYANOCOBALAMIN) 100 MCG tablet Take 0.5 tablets by mouth Daily.      zolpidem (AMBIEN) 10 MG tablet Take 1 tablet by mouth At Night As Needed for Sleep. 30 tablet 0    amoxicillin-clavulanate (AUGMENTIN) 875-125 MG per tablet Take 1 tablet by mouth 2 (Two) Times a Day. 20 tablet 0     No current facility-administered medications on file prior to visit.       Objective     /70   Pulse 94   Temp 96.8 °F (36 °C)   Ht 152.4 cm (60\")   Wt 65.2 kg (143 lb 11.2 oz)   LMP  (LMP Unknown)   SpO2 98%   BMI 28.06 kg/m²     Physical Exam  Constitutional:       Appearance: She is well-developed.   HENT:      Head: Normocephalic and atraumatic.      Right Ear: Hearing and tympanic membrane normal.      Left Ear: Hearing and tympanic membrane normal.      Mouth/Throat:      Pharynx: No oropharyngeal exudate or posterior oropharyngeal erythema.   Cardiovascular:      Rate and Rhythm: Normal rate and regular rhythm.      Heart sounds: " Normal heart sounds.   Pulmonary:      Effort: Pulmonary effort is normal.      Breath sounds: Normal breath sounds.   Skin:     General: Skin is warm and dry.   Neurological:      Mental Status: She is alert and oriented to person, place, and time.   Psychiatric:         Behavior: Behavior normal.         Assessment & Plan   Diagnoses and all orders for this visit:    1. Acute bronchitis, unspecified organism (Primary)  -     Discontinue: benzonatate (TESSALON) 200 MG capsule; Take 1 capsule by mouth 3 (Three) Times a Day As Needed for Cough.  Dispense: 30 capsule; Refill: 0  -     benzonatate (TESSALON) 200 MG capsule; Take 1 capsule by mouth 3 (Three) Times a Day As Needed for Cough.  Dispense: 30 capsule; Refill: 0    2. Wheezing  -     Discontinue: albuterol sulfate  (90 Base) MCG/ACT inhaler; Inhale 2 puffs Every 4 (Four) Hours As Needed for Wheezing or Shortness of Air.  Dispense: 18 g; Refill: 0  -     albuterol sulfate  (90 Base) MCG/ACT inhaler; Inhale 2 puffs Every 4 (Four) Hours As Needed for Wheezing or Shortness of Air.  Dispense: 18 g; Refill: 0    Other orders  -     Discontinue: doxycycline (VIBRAMYCIN) 100 MG capsule; Take 1 capsule by mouth 2 (Two) Times a Day.  Dispense: 14 capsule; Refill: 0  -     doxycycline (VIBRAMYCIN) 100 MG capsule; Take 1 capsule by mouth 2 (Two) Times a Day.  Dispense: 14 capsule; Refill: 0        Discussion    Patient presents with episode of acute bronchitis.  A prescription for antibiotics is provided today.  The patient is instructed to take along with Mucinex DM, tessalon perrles, prn albuterol.  Let me know they are not feeling better over the next 3 days or if there is any change in symptoms.           Future Appointments   Date Time Provider Department Center   4/26/2024  8:45 AM Louise Reyes MD [facility]   5/17/2024 11:30 AM [facility]   5/24/2024  3:45 PM Melvin Jimenez MD [facility]   6/7/2024  2:00 PM  Ladonna Melchor DO [facility]   8/16/2024 11:40 AM [facility] None

## 2024-05-10 RX ORDER — TRIAMCINOLONE ACETONIDE 40 MG/ML
40 INJECTION, SUSPENSION INTRA-ARTICULAR; INTRAMUSCULAR
Status: DISCONTINUED | OUTPATIENT
Start: 2024-04-23 | End: 2024-05-10 | Stop reason: HOSPADM

## 2024-05-10 RX ORDER — LIDOCAINE HYDROCHLORIDE 10 MG/ML
2 INJECTION, SOLUTION INFILTRATION; PERINEURAL
Status: DISCONTINUED | OUTPATIENT
Start: 2024-04-23 | End: 2024-05-10 | Stop reason: HOSPADM

## 2024-05-12 DIAGNOSIS — F51.01 PRIMARY INSOMNIA: ICD-10-CM

## 2024-05-13 RX ORDER — ZOLPIDEM TARTRATE 10 MG/1
10 TABLET ORAL NIGHTLY PRN
Qty: 30 TABLET | Refills: 0 | Status: SHIPPED | OUTPATIENT
Start: 2024-05-13

## 2024-05-16 ENCOUNTER — TELEPHONE (OUTPATIENT)
Dept: INTERNAL MEDICINE | Facility: CLINIC | Age: 84
End: 2024-05-16
Payer: MEDICARE

## 2024-05-16 DIAGNOSIS — E03.9 HYPOTHYROIDISM, UNSPECIFIED TYPE: Primary | ICD-10-CM

## 2024-05-16 NOTE — TELEPHONE ENCOUNTER
Caller: Nu Martino    Relationship: Emergency Contact    Best call back number: [phone]     What orders are you requesting (i.e. lab or imaging): TSH AND T3 FREE LABS    Additional notes: PATIENTS  IS REQUESTING FOR THESE LABS TO BE COMPLETED WITH PATIENTS LAB WORK TOMORROW 5/17    PLEASE ADVISE

## 2024-05-18 LAB
T3FREE SERPL-MCNC: 2.4 PG/ML (ref 2–4.4)
T4 FREE SERPL-MCNC: 1.52 NG/DL (ref 0.82–1.77)
TSH SERPL DL<=0.005 MIU/L-ACNC: 2.39 UIU/ML (ref 0.45–4.5)

## 2024-05-21 ENCOUNTER — TELEPHONE (OUTPATIENT)
Dept: INTERNAL MEDICINE | Facility: CLINIC | Age: 84
End: 2024-05-21
Payer: MEDICARE

## 2024-05-21 DIAGNOSIS — M85.80 OSTEOPENIA, UNSPECIFIED LOCATION: ICD-10-CM

## 2024-05-21 DIAGNOSIS — R53.81 DEBILITY: ICD-10-CM

## 2024-05-21 DIAGNOSIS — E03.9 HYPOTHYROIDISM, UNSPECIFIED TYPE: Primary | ICD-10-CM

## 2024-05-21 DIAGNOSIS — R79.9 ABNORMAL FINDING OF BLOOD CHEMISTRY, UNSPECIFIED: ICD-10-CM

## 2024-05-21 DIAGNOSIS — E61.1 IRON DEFICIENCY: ICD-10-CM

## 2024-05-21 NOTE — TELEPHONE ENCOUNTER
Pts , Nu, calling in regards to pts recent lab visit- Pt came in for [facility] labs on 05/17/2024 and all that was done were labs to check thyroid- Nu wants pt to get a basic metabolic panel done and any other [facility] labs before the [facility] appt that is this Friday, 05/24/2024- Please advise

## 2024-05-22 LAB
ALBUMIN SERPL-MCNC: 4.9 G/DL (ref 3.5–5.2)
ALBUMIN/GLOB SERPL: 2.2 G/DL
ALP SERPL-CCNC: 61 U/L (ref 39–117)
ALT SERPL-CCNC: 18 U/L (ref 1–33)
AST SERPL-CCNC: 20 U/L (ref 1–32)
BASOPHILS # BLD AUTO: 0.02 10*3/MM3 (ref 0–0.2)
BASOPHILS NFR BLD AUTO: 0.3 % (ref 0–1.5)
BILIRUB SERPL-MCNC: 0.7 MG/DL (ref 0–1.2)
BUN SERPL-MCNC: 11 MG/DL (ref 8–23)
BUN/CREAT SERPL: 15.3 (ref 7–25)
CALCIUM SERPL-MCNC: 9.2 MG/DL (ref 8.6–10.5)
CHLORIDE SERPL-SCNC: 96 MMOL/L (ref 98–107)
CHOLEST SERPL-MCNC: 216 MG/DL (ref 0–200)
CO2 SERPL-SCNC: 25.1 MMOL/L (ref 22–29)
CREAT SERPL-MCNC: 0.72 MG/DL (ref 0.57–1)
EGFRCR SERPLBLD CKD-EPI 2021: 82.6 ML/MIN/1.73
EOSINOPHIL # BLD AUTO: 0.12 10*3/MM3 (ref 0–0.4)
EOSINOPHIL NFR BLD AUTO: 2 % (ref 0.3–6.2)
ERYTHROCYTE [DISTWIDTH] IN BLOOD BY AUTOMATED COUNT: 12.4 % (ref 12.3–15.4)
GLOBULIN SER CALC-MCNC: 2.2 GM/DL
GLUCOSE SERPL-MCNC: 102 MG/DL (ref 65–99)
HBA1C MFR BLD: 5.7 % (ref 4.8–5.6)
HCT VFR BLD AUTO: 34.5 % (ref 34–46.6)
HDLC SERPL-MCNC: 124 MG/DL (ref 40–60)
HGB BLD-MCNC: 11.5 G/DL (ref 12–15.9)
IMM GRANULOCYTES # BLD AUTO: 0.01 10*3/MM3 (ref 0–0.05)
IMM GRANULOCYTES NFR BLD AUTO: 0.2 % (ref 0–0.5)
LDLC SERPL CALC-MCNC: 77 MG/DL (ref 0–100)
LYMPHOCYTES # BLD AUTO: 0.86 10*3/MM3 (ref 0.7–3.1)
LYMPHOCYTES NFR BLD AUTO: 14.5 % (ref 19.6–45.3)
MCH RBC QN AUTO: 30.8 PG (ref 26.6–33)
MCHC RBC AUTO-ENTMCNC: 33.3 G/DL (ref 31.5–35.7)
MCV RBC AUTO: 92.5 FL (ref 79–97)
MONOCYTES # BLD AUTO: 0.61 10*3/MM3 (ref 0.1–0.9)
MONOCYTES NFR BLD AUTO: 10.3 % (ref 5–12)
NEUTROPHILS # BLD AUTO: 4.31 10*3/MM3 (ref 1.7–7)
NEUTROPHILS NFR BLD AUTO: 72.7 % (ref 42.7–76)
NRBC BLD AUTO-RTO: 0 /100 WBC (ref 0–0.2)
PLATELET # BLD AUTO: 324 10*3/MM3 (ref 140–450)
POTASSIUM SERPL-SCNC: 4.2 MMOL/L (ref 3.5–5.2)
PROT SERPL-MCNC: 7.1 G/DL (ref 6–8.5)
RBC # BLD AUTO: 3.73 10*6/MM3 (ref 3.77–5.28)
SODIUM SERPL-SCNC: 137 MMOL/L (ref 136–145)
TRIGL SERPL-MCNC: 91 MG/DL (ref 0–150)
UNABLE TO VOID: NORMAL
VLDLC SERPL CALC-MCNC: 15 MG/DL (ref 5–40)
WBC # BLD AUTO: 5.93 10*3/MM3 (ref 3.4–10.8)

## 2024-05-24 ENCOUNTER — OFFICE VISIT (OUTPATIENT)
Dept: INTERNAL MEDICINE | Facility: CLINIC | Age: 84
End: 2024-05-24
Payer: MEDICARE

## 2024-05-24 VITALS
BODY MASS INDEX: 28.54 KG/M2 | OXYGEN SATURATION: 97 % | HEART RATE: 91 BPM | WEIGHT: 145.4 LBS | SYSTOLIC BLOOD PRESSURE: 122 MMHG | DIASTOLIC BLOOD PRESSURE: 62 MMHG | HEIGHT: 60 IN

## 2024-05-24 DIAGNOSIS — K43.2 INCISIONAL HERNIA, WITHOUT OBSTRUCTION OR GANGRENE: ICD-10-CM

## 2024-05-24 DIAGNOSIS — Z00.00 MEDICARE ANNUAL WELLNESS VISIT, SUBSEQUENT: Primary | ICD-10-CM

## 2024-05-24 DIAGNOSIS — I10 ESSENTIAL HYPERTENSION, BENIGN: ICD-10-CM

## 2024-05-24 PROCEDURE — 3074F SYST BP LT 130 MM HG: CPT | Performed by: STUDENT IN AN ORGANIZED HEALTH CARE EDUCATION/TRAINING PROGRAM

## 2024-05-24 PROCEDURE — G0439 PPPS, SUBSEQ VISIT: HCPCS | Performed by: STUDENT IN AN ORGANIZED HEALTH CARE EDUCATION/TRAINING PROGRAM

## 2024-05-24 PROCEDURE — 3078F DIAST BP <80 MM HG: CPT | Performed by: STUDENT IN AN ORGANIZED HEALTH CARE EDUCATION/TRAINING PROGRAM

## 2024-05-24 PROCEDURE — 1125F AMNT PAIN NOTED PAIN PRSNT: CPT | Performed by: STUDENT IN AN ORGANIZED HEALTH CARE EDUCATION/TRAINING PROGRAM

## 2024-05-24 RX ORDER — VARENICLINE 0.03 MG/.05ML
SPRAY NASAL
COMMUNITY

## 2024-05-24 NOTE — PROGRESS NOTES
The ABCs of the Annual Wellness Visit  Subsequent Medicare Wellness Visit    Chief Complaint   Patient presents with    Medicare Wellness-subsequent      Subjective    History of Present Illness:  Ritu Martino is a 84 y.o. female who presents for a Subsequent Medicare Wellness Visit.    The following portions of the patient's history were reviewed and   updated as appropriate: allergies, current medications, past family history, past medical history, past social history, past surgical history, and problem list.    She saw Dr. Reyes in April for an episode of acute bronchitis.    Vaginal taping helped somewhat.    Knee injections help somewhat.     Trouble raising her knee because michael hernia. Now the size of a grapefruit. Hurts to bend over.     Compared to one year ago, the patient feels her physical   health is better.    Compared to one year ago, the patient feels her mental   health is better.    Recent Hospitalizations:  She was not admitted to the hospital during the last year.       Current Medical Providers:  Patient Care Team:  Melvin Jimenez MD as PCP - General (Internal Medicine)  Ashu Aguirre MD as Consulting Physician (Hematology and Oncology)  Alejandra Cantu MD (Obstetrics and Gynecology)  Ladonna Melchor DO as Consulting Physician (Orthopedic Surgery)  Ai Flores MD as Consulting Physician (Nephrology)    Outpatient Medications Prior to Visit   Medication Sig Dispense Refill    acetaminophen (TYLENOL) 325 MG tablet Take 2 tablets by mouth Every 6 (Six) Hours As Needed for Mild Pain . (Patient taking differently: Take 500 mg by mouth Every 6 (Six) Hours As Needed for Mild Pain.)      albuterol sulfate  (90 Base) MCG/ACT inhaler Inhale 2 puffs Every 4 (Four) Hours As Needed for Wheezing or Shortness of Air. 18 g 0    ALPRAZolam (XANAX) 0.5 MG tablet Take 1 tablet by mouth 3 (Three) Times a Day As Needed for Anxiety. 90 tablet 0    benzonatate (TESSALON) 200 MG capsule Take 1  capsule by mouth 3 (Three) Times a Day As Needed for Cough. 30 capsule 0    cetirizine (zyrTEC) 10 MG tablet Take 1 tablet by mouth Daily. 30 tablet 0    Cholecalciferol (VITAMIN D) 2000 units capsule Take  by mouth Daily With Dinner.      cloNIDine (Catapres) 0.1 MG tablet 1 po bid prn sys greater or equal to 150 30 tablet 3    desvenlafaxine (Pristiq) 100 MG 24 hr tablet Take 1 tablet by mouth Daily. 90 tablet 3    dilTIAZem XR (DILACOR XR) 120 MG 24 hr capsule Take 1 capsule by mouth Daily. 90 capsule 3    doxycycline (VIBRAMYCIN) 100 MG capsule Take 1 capsule by mouth 2 (Two) Times a Day. 14 capsule 0    furosemide (LASIX) 20 MG tablet TAKE 1 TABLET TWICE A DAY 60 tablet 11    HYDROcodone-acetaminophen (NORCO)  MG per tablet Take 1 tablet by mouth Every 6 (Six) Hours As Needed for Moderate Pain.      ibuprofen (ADVIL,MOTRIN) 800 MG tablet TAKE 1 TABLET BY MOUTH EVERY 8 HOURS AS NEEDED FOR MILD PAIN 90 tablet 3    levothyroxine (SYNTHROID, LEVOTHROID) 50 MCG tablet TAKE 1 TABLET DAILY 90 tablet 3    Magnesium 400 MG capsule 1 p.o. twice daily 60 capsule 3    melatonin 5 MG tablet tablet Take 2 tablets by mouth.      nortriptyline (Pamelor) 10 MG capsule Take 2 capsules by mouth Every Night. 180 capsule 3    omeprazole (priLOSEC) 40 MG capsule TAKE 1 CAPSULE DAILY 90 capsule 3    oxyCODONE-acetaminophen (PERCOCET) 7.5-325 MG per tablet Take 1 tablet by mouth Every 6 (Six) Hours As Needed.      potassium chloride 10 MEQ CR tablet Take 1 tablet by mouth 2 (Two) Times a Day. 180 tablet 3    Probiotic Product (ALIGN PO) Take 1 capsule by mouth Daily With Lunch.      Probiotic Product ([employer]) capsule Take 1 tablet by mouth Daily.      promethazine-dextromethorphan (PROMETHAZINE-DM) 6.25-15 MG/5ML syrup Take 5 mL by mouth 4 (Four) Times a Day As Needed for Cough. 240 mL 0    rosuvastatin (CRESTOR) 20 MG tablet Take 1 tablet by mouth Every Night. 90 tablet 2    SODIUM CHLORIDE PO Take  by mouth 3  (Three) Times a Day. 2 TABLETS 3 6TIMES A DAY      valsartan (DIOVAN) 160 MG tablet TAKE 1 TABLET DAILY (Patient taking differently: 0.5 tablets 2 (Two) Times a Day.) 90 tablet 3    Varenicline Tartrate (Tyrvaya) 0.03 MG/ACT solution into the nostril(s) as directed by provider.      vitamin B-12 (CYANOCOBALAMIN) 100 MCG tablet Take 0.5 tablets by mouth Daily.      zolpidem (AMBIEN) 10 MG tablet TAKE 1 TABLET AT NIGHT AS NEEDED FOR SLEEP 30 tablet 0    cycloSPORINE (RESTASIS) 0.05 % ophthalmic emulsion 1 drop 2 (Two) Times a Day.       No facility-administered medications prior to visit.       Opioid medication/s are on active medication list.  and I have evaluated her active treatment plan and pain score trends (see table).  There were no vitals filed for this visit.  I have reviewed the chart for potential of high risk medication and harmful drug interactions in the elderly.          Aspirin is not on active medication list.  Aspirin use is not indicated based on review of current medical condition/s. Risk of harm outweighs potential benefits.  .    Patient Active Problem List   Diagnosis    Anxiety    Hypothyroidism    Iron deficiency    Essential hypertension, benign    Fluent aphasia    Benzodiazepine dependence    Cervicalgia    Intertrigo    Insomnia    Diverticulitis    Arthritis    Bowel dysfunction    GERD (gastroesophageal reflux disease)    Hernia, hiatal    Seizures    Meningioma    Closed fracture of left distal fibula    Debility    Abdominal hernia    Compression fracture of L1 lumbar vertebra, open, initial encounter    Compression fracture of T12 vertebra    Osteopenia    Compression fracture of L2 lumbar vertebra    Closed fracture of right inferior pubic ramus     Advance Care Planning  Advance Directive is not on file.  ACP discussion was held with the patient during this visit. Patient has an advance directive (not in EMR), copy requested.          Objective    Vitals:    05/24/24 1549   BP:  "122/62   Pulse: 91   SpO2: 97%   Weight: 66 kg (145 lb 6.4 oz)   Height: 152.4 cm (60\")     Estimated body mass index is 28.4 kg/m² as calculated from the following:    Height as of this encounter: 152.4 cm (60\").    Weight as of this encounter: 66 kg (145 lb 6.4 oz).           Does the patient have evidence of cognitive impairment? Yes    Physical Exam  Constitutional:       Appearance: Normal appearance. She is normal weight.   Cardiovascular:      Rate and Rhythm: Normal rate and regular rhythm.      Heart sounds: Normal heart sounds. No murmur heard.  Pulmonary:      Effort: Pulmonary effort is normal.      Breath sounds: Normal breath sounds.   Abdominal:      General: Abdomen is flat. There is no distension.      Palpations: Abdomen is soft.      Tenderness: There is no abdominal tenderness.   Skin:     General: Skin is warm and dry.   Neurological:      Mental Status: She is alert.   Psychiatric:         Mood and Affect: Mood normal.         Behavior: Behavior normal.         Thought Content: Thought content normal.       Lab Results   Component Value Date    CHLPL 216 (H) 2024    TRIG 91 2024     (H) 2024    LDL 77 2024    VLDL 15 2024    HGBA1C 5.70 (H) 2024            HEALTH RISK ASSESSMENT    Smoking Status:  Social History     Tobacco Use   Smoking Status Former    Current packs/day: 0.00    Average packs/day: 0.3 packs/day for 15.0 years (3.8 ttl pk-yrs)    Types: Cigarettes    Start date: 1956    Quit date: 1971    Years since quittin.4   Smokeless Tobacco Never     Alcohol Consumption:  Social History     Substance and Sexual Activity   Alcohol Use Not Currently    Alcohol/week: 8.0 standard drinks of alcohol    Types: 4 Glasses of wine, 4 Shots of liquor per week    Comment: Stopped 3 months ago.     Fall Risk Screen:    STEADI Fall Risk Assessment was completed, and patient is at LOW risk for falls.Assessment completed " on:2024    Depression Screenin/24/2024     3:49 PM   PHQ-2/PHQ-9 Depression Screening   Little Interest or Pleasure in Doing Things 0-->not at all   Feeling Down, Depressed or Hopeless 0-->not at all   PHQ-9: Brief Depression Severity Measure Score 0       Health Habits and Functional and Cognitive Screenin/24/2024     3:48 PM   Functional & Cognitive Status   Do you have difficulty preparing food and eating? No   Do you have difficulty bathing yourself, getting dressed or grooming yourself? No   Do you have difficulty using the toilet? No   Do you have difficulty moving around from place to place? No   Do you have trouble with steps or getting out of a bed or a chair? No   Do you need help using the phone?  No   Are you deaf or do you have serious difficulty hearing?  No   Do you need help to go to places out of walking distance? Yes   Do you need help shopping? No   Do you need help preparing meals?  No   Do you need help with housework?  Yes   Do you need help with laundry? Yes   Do you need help taking your medications? No   Do you need help managing money? No   Do you ever drive or ride in a car without wearing a seat belt? No   Have you felt unusual stress, anger or loneliness in the last month? No   Who do you live with? Spouse   If you need help, do you have trouble finding someone available to you? No   Have you been bothered in the last four weeks by sexual problems? No   Do you have difficulty concentrating, remembering or making decisions? No       Age-appropriate Screening Schedule:  Refer to the list below for future screening recommendations based on patient's age, sex and/or medical conditions. Orders for these recommended tests are listed in the plan section. The patient has been provided with a written plan.    Health Maintenance   Topic Date Due    ANNUAL WELLNESS VISIT  2023    COVID-19 Vaccine ( season) 2024    DXA SCAN  08/15/2024    INFLUENZA  VACCINE  08/01/2024    BMI FOLLOWUP  04/23/2025    LIPID PANEL  05/22/2025    TDAP/TD VACCINES (2 - Tdap) 05/13/2029    RSV Vaccine - Adults  Completed    Pneumococcal Vaccine 65+  Completed    ZOSTER VACCINE  Completed              Assessment & Plan   CMS Preventative Services Quick Reference  Risk Factors Identified During Encounter  Urinary Incontinence:  follows with GYN  The above risks/problems have been discussed with the patient.  Follow up actions/plans if indicated are seen below in the Assessment/Plan Section.  Pertinent information has been shared with the patient in the After Visit Summary.    Diagnoses and all orders for this visit:    1. Medicare annual wellness visit, subsequent (Primary)    2. Essential hypertension, benign  Assessment & Plan:  Hypertension is stable and controlled  Continue current treatment regimen.  Blood pressure will be reassessed  next appointment .      3. Incisional hernia, without obstruction or gangrene  -     Ambulatory Referral to General Surgery      Discussed general management of hernias.  She has tried an abdominal binder but does not like to wear it.  They are requesting surgery referral.  Discussed the emergency department for severe abdominal pain or if she is unable to reduce hernia.  Follow Up:   Return in about 3 months (around 8/24/2024) for Recheck.     An After Visit Summary and PPPS were made available to the patient.

## 2024-05-24 NOTE — ASSESSMENT & PLAN NOTE
Hypertension is stable and controlled  Continue current treatment regimen.  Blood pressure will be reassessed  next appointment .

## 2024-06-12 DIAGNOSIS — F51.01 PRIMARY INSOMNIA: ICD-10-CM

## 2024-06-12 RX ORDER — ZOLPIDEM TARTRATE 10 MG/1
10 TABLET ORAL NIGHTLY PRN
Qty: 30 TABLET | Refills: 0 | Status: SHIPPED | OUTPATIENT
Start: 2024-06-12

## 2024-06-13 DIAGNOSIS — F41.9 ANXIETY: ICD-10-CM

## 2024-06-13 RX ORDER — ALPRAZOLAM 0.5 MG/1
0.5 TABLET ORAL 3 TIMES DAILY PRN
Qty: 90 TABLET | OUTPATIENT
Start: 2024-06-13

## 2024-06-13 RX ORDER — ALPRAZOLAM 0.5 MG/1
0.5 TABLET ORAL 3 TIMES DAILY PRN
Qty: 90 TABLET | Refills: 0 | Status: SHIPPED | OUTPATIENT
Start: 2024-06-13

## 2024-06-13 RX ORDER — ALPRAZOLAM 0.5 MG/1
TABLET ORAL
Qty: 30 TABLET | Refills: 0 | OUTPATIENT
Start: 2024-06-13

## 2024-06-13 NOTE — TELEPHONE ENCOUNTER
No Ambien was sent yesterday and it was sent to the wrong pharmacy. [phone].     But the Xanax has not been filled since February by Dr. Cannon.     Please make sure it goes to [facility] pharmacy and not [facility].     Thank you    Problem: Hypertension  Goal: *Blood pressure within specified parameters  Outcome: Progressing Towards Goal     Problem: Pressure Injury - Risk of  Goal: *Prevention of pressure injury  Description: Document Esau Scale and appropriate interventions in the flowsheet. Outcome: Progressing Towards Goal  Note: Pressure Injury Interventions:  Sensory Interventions: Assess changes in LOC, Float heels    Moisture Interventions: Absorbent underpads, Check for incontinence Q2 hours and as needed, Maintain skin hydration (lotion/cream), Minimize layers    Activity Interventions: Increase time out of bed, Pressure redistribution bed/mattress(bed type), PT/OT evaluation    Mobility Interventions: Float heels, HOB 30 degrees or less, Pressure redistribution bed/mattress (bed type), PT/OT evaluation    Nutrition Interventions: Document food/fluid/supplement intake    Friction and Shear Interventions: Lift sheet, Minimize layers                Problem: Falls - Risk of  Goal: *Absence of Falls  Description: Document Xiang Fall Risk and appropriate interventions in the flowsheet.   Outcome: Progressing Towards Goal  Note: Fall Risk Interventions:  Mobility Interventions: Communicate number of staff needed for ambulation/transfer, Patient to call before getting OOB, PT Consult for mobility concerns, PT Consult for assist device competence, Strengthening exercises (ROM-active/passive), Utilize walker, cane, or other assistive device, Utilize gait belt for transfers/ambulation    Mentation Interventions: Adequate sleep, hydration, pain control, Door open when patient unattended, Evaluate medications/consider consulting pharmacy, Gait belt with transfers/ambulation, Update white board, Toileting rounds, Family/sitter at bedside, Eyeglasses and hearing aids    Medication Interventions: Evaluate medications/consider consulting pharmacy, Patient to call before getting OOB    Elimination Interventions: Call light in reach, Patient to call for help with toileting needs, Toileting schedule/hourly rounds    History of Falls Interventions: Door open when patient unattended, Investigate reason for fall, Evaluate medications/consider consulting pharmacy

## 2024-06-13 NOTE — TELEPHONE ENCOUNTER
Thanks for letting me know.  I have two [employer] in her chart.  Which 1 do they want me to send it to?

## 2024-06-16 DIAGNOSIS — F41.9 ANXIETY: ICD-10-CM

## 2024-06-18 RX ORDER — ALPRAZOLAM 0.5 MG/1
0.5 TABLET ORAL 3 TIMES DAILY PRN
Qty: 90 TABLET | OUTPATIENT
Start: 2024-06-18

## 2024-06-19 RX ORDER — DILTIAZEM HYDROCHLORIDE 120 MG/1
120 CAPSULE, EXTENDED RELEASE ORAL DAILY
Qty: 90 CAPSULE | Refills: 3 | Status: SHIPPED | OUTPATIENT
Start: 2024-06-19

## 2024-06-19 RX ORDER — ROSUVASTATIN CALCIUM 20 MG/1
20 TABLET, COATED ORAL NIGHTLY
Qty: 90 TABLET | Refills: 3 | Status: SHIPPED | OUTPATIENT
Start: 2024-06-19

## 2024-06-27 ENCOUNTER — TRANSCRIBE ORDERS (OUTPATIENT)
Dept: ADMINISTRATIVE | Facility: HOSPITAL | Age: 84
End: 2024-06-27
Payer: MEDICARE

## 2024-06-27 ENCOUNTER — LAB (OUTPATIENT)
Dept: LAB | Facility: HOSPITAL | Age: 84
End: 2024-06-27
Payer: MEDICARE

## 2024-06-27 ENCOUNTER — TELEPHONE (OUTPATIENT)
Age: 84
End: 2024-06-27
Payer: MEDICARE

## 2024-06-27 DIAGNOSIS — E87.1 HYPOSMOLALITY SYNDROME: Primary | ICD-10-CM

## 2024-06-27 DIAGNOSIS — E87.1 HYPOSMOLALITY SYNDROME: ICD-10-CM

## 2024-06-27 LAB
ALBUMIN SERPL-MCNC: 4.5 G/DL (ref 3.5–5.2)
ALBUMIN/GLOB SERPL: 1.7 G/DL
ALP SERPL-CCNC: 61 U/L (ref 39–117)
ALT SERPL W P-5'-P-CCNC: 28 U/L (ref 1–33)
ANION GAP SERPL CALCULATED.3IONS-SCNC: 10 MMOL/L (ref 5–15)
AST SERPL-CCNC: 24 U/L (ref 1–32)
BILIRUB SERPL-MCNC: 0.4 MG/DL (ref 0–1.2)
BUN SERPL-MCNC: 13 MG/DL (ref 8–23)
BUN/CREAT SERPL: 14.6 (ref 7–25)
CALCIUM SPEC-SCNC: 9.4 MG/DL (ref 8.6–10.5)
CHLORIDE SERPL-SCNC: 99 MMOL/L (ref 98–107)
CO2 SERPL-SCNC: 28 MMOL/L (ref 22–29)
CREAT SERPL-MCNC: 0.89 MG/DL (ref 0.57–1)
EGFRCR SERPLBLD CKD-EPI 2021: 64 ML/MIN/1.73
GLOBULIN UR ELPH-MCNC: 2.6 GM/DL
GLUCOSE SERPL-MCNC: 115 MG/DL (ref 65–99)
OSMOLALITY UR: 342 MOSM/KG
POTASSIUM SERPL-SCNC: 4.1 MMOL/L (ref 3.5–5.2)
PROT SERPL-MCNC: 7.1 G/DL (ref 6–8.5)
SODIUM SERPL-SCNC: 137 MMOL/L (ref 136–145)
SODIUM UR-SCNC: 102 MMOL/L

## 2024-06-27 PROCEDURE — 80053 COMPREHEN METABOLIC PANEL: CPT

## 2024-06-27 PROCEDURE — 84300 ASSAY OF URINE SODIUM: CPT

## 2024-06-27 PROCEDURE — 83935 ASSAY OF URINE OSMOLALITY: CPT

## 2024-06-27 PROCEDURE — 36415 COLL VENOUS BLD VENIPUNCTURE: CPT

## 2024-06-27 NOTE — TELEPHONE ENCOUNTER
----- Message from Elian Zambrano sent at 6/27/2024  5:37 PM EDT -----  Please inform patient that bacteria in urine is sensitive to Keflex, the antibiotic she was prescribed.

## 2024-06-28 NOTE — PROGRESS NOTES
General Surgery History and Physical      Summary:    Ritu Martino is a 84 y.o. lady with an incisional hernia at her prior ileostomy site. This is causing minimal to no symptoms at this point. She elected for observation in 2022.  She has been working out more recently and doing physical therapy.  Because of this, she has had some intermittent pain and discomfort at this hernia site and wants to make sure everything appears stable.  She is still not interested in repair short of an urgent or emergent location for intervention.  We discussed obstructive signs and symptoms to look for.  She will follow-up with me as needed.    Referring Provider: Melvin Jimenez MD    Chief Complaint:    Abdominal wall hernia     History of Present Illness:    Ritu Martino is a 84 y.o. lady who presents with concerns for an abdominal wall hernia. She has minimal symptoms but her primary care physician noticed it and she wanted it further evaluated. She denies obstructing signs or symptoms. She has a long standing history of constipation. She has no abdominal pain from this. Her primary complaint is back pain.    7/1/2024 She presents today for follow up.  She is overall done well since I last saw her.  She has been doing physical therapy and has noticed a little bit of increasing pain and discomfort in her abdomen.  She said no obstructive signs or symptoms.    Past Medical History:   GERD  HLD  Hyponatremia  Hypothyroidism     Past Surgical History:    Appendectomy   C section   Hemorrhoidectomy   Knee arthroscopy   Tonsillectomy  Sigmoidectomy with loop ileostomy   Loop ileostomy takedown    Family History:    Mother with brain cancer  Brother with pancreatic cancer    Social History:    Denies tobacco use  Denies alcohol use    Allergies:   No Known Allergies    Medications:     Current Outpatient Medications:     acetaminophen (TYLENOL) 325 MG tablet, Take 2 tablets by mouth Every 6 (Six) Hours As Needed for Mild Pain .  (Patient taking differently: Take 500 mg by mouth Every 6 (Six) Hours As Needed for Mild Pain.), Disp: , Rfl:     albuterol sulfate  (90 Base) MCG/ACT inhaler, Inhale 2 puffs Every 4 (Four) Hours As Needed for Wheezing or Shortness of Air., Disp: 18 g, Rfl: 0    ALPRAZolam (XANAX) 0.5 MG tablet, Take 1 tablet by mouth 3 (Three) Times a Day As Needed for Anxiety., Disp: 90 tablet, Rfl: 0    benzonatate (TESSALON) 200 MG capsule, Take 1 capsule by mouth 3 (Three) Times a Day As Needed for Cough., Disp: 30 capsule, Rfl: 0    cephalexin (KEFLEX) 500 MG capsule, Take 1 capsule by mouth 3 (Three) Times a Day., Disp: 30 capsule, Rfl: 0    cetirizine (zyrTEC) 10 MG tablet, Take 1 tablet by mouth Daily., Disp: 30 tablet, Rfl: 0    Cholecalciferol (VITAMIN D) 2000 units capsule, Take  by mouth Daily With Dinner., Disp: , Rfl:     cloNIDine (Catapres) 0.1 MG tablet, 1 po bid prn sys greater or equal to 150, Disp: 30 tablet, Rfl: 3    desvenlafaxine (Pristiq) 100 MG 24 hr tablet, Take 1 tablet by mouth Daily., Disp: 90 tablet, Rfl: 3    dilTIAZem XR (DILACOR XR) 120 MG 24 hr capsule, TAKE 1 CAPSULE DAILY, Disp: 90 capsule, Rfl: 3    doxycycline (VIBRAMYCIN) 100 MG capsule, Take 1 capsule by mouth 2 (Two) Times a Day., Disp: 14 capsule, Rfl: 0    furosemide (LASIX) 20 MG tablet, TAKE 1 TABLET TWICE A DAY, Disp: 60 tablet, Rfl: 11    HYDROcodone-acetaminophen (NORCO)  MG per tablet, Take 1 tablet by mouth Every 6 (Six) Hours As Needed for Moderate Pain., Disp: , Rfl:     ibuprofen (ADVIL,MOTRIN) 800 MG tablet, TAKE 1 TABLET BY MOUTH EVERY 8 HOURS AS NEEDED FOR MILD PAIN, Disp: 90 tablet, Rfl: 3    levothyroxine (SYNTHROID, LEVOTHROID) 50 MCG tablet, TAKE 1 TABLET DAILY, Disp: 90 tablet, Rfl: 3    Magnesium 400 MG capsule, 1 p.o. twice daily, Disp: 60 capsule, Rfl: 3    melatonin 5 MG tablet tablet, Take 2 tablets by mouth., Disp: , Rfl:     nortriptyline (Pamelor) 10 MG capsule, Take 2 capsules by mouth Every Night.,  Disp: 180 capsule, Rfl: 3    omeprazole (priLOSEC) 40 MG capsule, TAKE 1 CAPSULE DAILY, Disp: 90 capsule, Rfl: 3    oxyCODONE-acetaminophen (PERCOCET) 7.5-325 MG per tablet, Take 1 tablet by mouth Every 6 (Six) Hours As Needed., Disp: , Rfl:     potassium chloride 10 MEQ CR tablet, Take 1 tablet by mouth 2 (Two) Times a Day., Disp: 180 tablet, Rfl: 3    Probiotic Product (ALIGN PO), Take 1 capsule by mouth Daily With Lunch., Disp: , Rfl:     Probiotic Product ([employer]) capsule, Take 1 tablet by mouth Daily., Disp: , Rfl:     promethazine-dextromethorphan (PROMETHAZINE-DM) 6.25-15 MG/5ML syrup, Take 5 mL by mouth 4 (Four) Times a Day As Needed for Cough., Disp: 240 mL, Rfl: 0    rosuvastatin (CRESTOR) 20 MG tablet, TAKE 1 TABLET EVERY NIGHT, Disp: 90 tablet, Rfl: 3    SODIUM CHLORIDE PO, Take  by mouth 3 (Three) Times a Day. 2 TABLETS 3 6TIMES A DAY, Disp: , Rfl:     valsartan (DIOVAN) 160 MG tablet, TAKE 1 TABLET DAILY (Patient taking differently: 0.5 tablets 2 (Two) Times a Day.), Disp: 90 tablet, Rfl: 3    Varenicline Tartrate (Tyrvaya) 0.03 MG/ACT solution, into the nostril(s) as directed by provider., Disp: , Rfl:     vitamin B-12 (CYANOCOBALAMIN) 100 MCG tablet, Take 0.5 tablets by mouth Daily., Disp: , Rfl:     zolpidem (AMBIEN) 10 MG tablet, TAKE 1 TABLET AT NIGHT AS NEEDED FOR SLEEP, Disp: 30 tablet, Rfl: 0    Radiology/Endoscopy:    CT scan from 10/27/2022 reviewed by me; large ventral abdominal wall hernia containing the cecum     Labs:    Labs from 6/27/2024 reviewed    Review of Systems:   Influenza-like illness: no fever, no  cough, no  sore throat, no  body aches, no loss of sense of taste or smell, no known exposure to person with Covid-19.  Constitutional: Negative for fevers or chills  HENT: Negative for hearing loss or runny nose  Eyes: Negative for vision changes or scleral icterus  Respiratory: Negative for cough or shortness of breath  Cardiovascular: Negative for chest pain or  heart palpitations  Gastrointestinal: Negative for abdominal pain, nausea, vomiting, constipation, melena, or hematochezia  Genitourinary: Negative for hematuria or dysuria  Musculoskeletal: Negative for joint swelling or gait instability  Neurologic: Negative for tremors or seizures  Psychiatric: Negative for suicidal ideations or depression  All other systems reviewed and negative    Physical Exam:   Constitutional: Well-developed, well-nourished, no acute distress, BMI 22, ambulating with a walker   Eyes: Conjunctiva normal, sclera nonicteric  ENMT: Hearing grossly normal, oral mucosa moist  Neck: Supple, trachea midline  Respiratory: No increased work of breathing, normal inspiratory effort  Cardiovascular: Regular rate, no peripheral edema, no jugular venous distention  Gastrointestinal: Soft, nontender, multiple prior surgical incisions, reducible hernia of the abdominal wall near prior ileostomy site  Skin:  Warm, dry, no rash on visualized skin surfaces  Musculoskeletal: Symmetric strength, normal gait  Psychiatric: Alert and oriented ×3, normal affect     Nita Rivas M.D.  General and Endoscopic Surgery  [facility]    [address], [address]  [place], [place], [facility]  P: [phone]  F: [phone]

## 2024-07-01 ENCOUNTER — OFFICE VISIT (OUTPATIENT)
Dept: SURGERY | Facility: CLINIC | Age: 84
End: 2024-07-01
Payer: MEDICARE

## 2024-07-01 VITALS
BODY MASS INDEX: 29.25 KG/M2 | WEIGHT: 149 LBS | HEIGHT: 60 IN | SYSTOLIC BLOOD PRESSURE: 108 MMHG | DIASTOLIC BLOOD PRESSURE: 58 MMHG

## 2024-07-01 DIAGNOSIS — K43.9 VENTRAL HERNIA WITHOUT OBSTRUCTION OR GANGRENE: Primary | ICD-10-CM

## 2024-07-01 PROCEDURE — 99213 OFFICE O/P EST LOW 20 MIN: CPT | Performed by: STUDENT IN AN ORGANIZED HEALTH CARE EDUCATION/TRAINING PROGRAM

## 2024-07-01 PROCEDURE — 3078F DIAST BP <80 MM HG: CPT | Performed by: STUDENT IN AN ORGANIZED HEALTH CARE EDUCATION/TRAINING PROGRAM

## 2024-07-01 PROCEDURE — 1159F MED LIST DOCD IN RCRD: CPT | Performed by: STUDENT IN AN ORGANIZED HEALTH CARE EDUCATION/TRAINING PROGRAM

## 2024-07-01 PROCEDURE — 1160F RVW MEDS BY RX/DR IN RCRD: CPT | Performed by: STUDENT IN AN ORGANIZED HEALTH CARE EDUCATION/TRAINING PROGRAM

## 2024-07-01 PROCEDURE — 3074F SYST BP LT 130 MM HG: CPT | Performed by: STUDENT IN AN ORGANIZED HEALTH CARE EDUCATION/TRAINING PROGRAM

## 2024-07-01 RX ORDER — PSYLLIUM SEED (WITH DEXTROSE)
POWDER (GRAM) ORAL DAILY
COMMUNITY

## 2024-07-01 RX ORDER — CYCLOSPORINE 0.5 MG/ML
EMULSION OPHTHALMIC
COMMUNITY
Start: 2024-06-28

## 2024-07-01 RX ORDER — VIBEGRON 75 MG/1
TABLET, FILM COATED ORAL
COMMUNITY

## 2024-07-03 ENCOUNTER — OFFICE VISIT (OUTPATIENT)
Age: 84
End: 2024-07-03
Payer: MEDICARE

## 2024-07-03 VITALS — WEIGHT: 145.7 LBS | HEIGHT: 60 IN | BODY MASS INDEX: 28.61 KG/M2 | TEMPERATURE: 97.1 F

## 2024-07-03 DIAGNOSIS — M25.552 GREATER TROCHANTERIC PAIN SYNDROME OF BOTH LOWER EXTREMITIES: ICD-10-CM

## 2024-07-03 DIAGNOSIS — M76.891 PES ANSERINUS TENDINITIS OF BOTH LOWER EXTREMITIES: ICD-10-CM

## 2024-07-03 DIAGNOSIS — M17.11 PRIMARY OSTEOARTHRITIS OF RIGHT KNEE: ICD-10-CM

## 2024-07-03 DIAGNOSIS — R29.898 WEAKNESS OF BOTH LOWER EXTREMITIES: ICD-10-CM

## 2024-07-03 DIAGNOSIS — M17.12 PRIMARY OSTEOARTHRITIS OF LEFT KNEE: Primary | ICD-10-CM

## 2024-07-03 DIAGNOSIS — M25.551 GREATER TROCHANTERIC PAIN SYNDROME OF BOTH LOWER EXTREMITIES: ICD-10-CM

## 2024-07-03 DIAGNOSIS — M76.892 PES ANSERINUS TENDINITIS OF BOTH LOWER EXTREMITIES: ICD-10-CM

## 2024-07-03 DIAGNOSIS — Z74.09 IMPAIRED MOBILITY: ICD-10-CM

## 2024-07-03 NOTE — PROGRESS NOTES
"Chief Complaint   Patient presents with    Left Knee - Follow-up       History of Present Illness  Ritu is a 84 y.o. year old female here today for follow-up of bilateral knee pain, left worse than right, that has been present intermittently for several (10-20+) years with worsening over the past couple months but with no known injury or trauma. Initial x-rays showed moderate right and severe left osteoarthritis osteoarthritis, which is consistent with her history and exam findings.  Decision was made to proceed with cortisone injection on the left knee on 4/23/24.  Please see previous notes for complete history and treatment course. She returns today persistent symptoms, but significant improvement following infection.  Left knee pain is currently rated 3/10 (previously rated 7/10) but is not limiting.  Pain is still located on the medial aspect.  She has been doing PT but is not sure if it is helping or hindering, because she feels like symptoms are worse when coming out of physical therapy. She also feels stiff and sore when first gets up in the morning but it somewhat improves as she gets moving.  No new injuries or complaints.    Had arthroscopy performed 30 years or so ago for what she things was a meniscus tear, but she is unsure of which knee.  Was recently seen at the [facility] on 4/9/2024 who recommended she proceed with injections (intra-articular Toradol and HA).        Temp 97.1 °F (36.2 °C)   Ht 152.4 cm (60\")   Wt 66.1 kg (145 lb 11.2 oz)   LMP  (LMP Unknown)   BMI 28.46 kg/m²        Physical Exam  MSK Exam:  The bilateral knees are without obvious signs of acute bony deformity, quadriceps atrophy, swelling, erythema, or ecchymosis. There is a mild effusion on the left.  The patellar facets are tender, left worse than right. Patella crepitus is positive. The medial and lateral joint lines are tender (medial worse than latera) and without bony crepitus or step-off, left worse " than right, though slightly improved compared to previous. Soft tissue tenderness of the pes anserine and associated tendons. Tenderness of the greater trochanter. Flexion & extension are limited and painful, but improved compared to previous.  Knee and hip strength is 4/5. Gait is slightly antalgic. Overall, no significant change but mildly improved compared to previous.      Assessment and Plan  Diagnoses and all orders for this visit:    1. Primary osteoarthritis of left knee (Primary)    2. Primary osteoarthritis of right knee    3. Pes anserinus tendinitis of both lower extremities    4. Weakness of both lower extremities    5. Greater trochanteric pain syndrome of both lower extremities    6. Impaired mobility    Ritu is a 84 y.o. year old female here today for follow-up of bilateral knee pain, left worse than right, that has been present intermittently for several (10-20+) years with worsening over the past couple months but with no known injury or trauma. Initial x-rays showed moderate right and severe left osteoarthritis osteoarthritis, which is consistent with her history and exam findings.  Decision was made to proceed with cortisone injection on the left knee on 4/23/24.  She returns today reporting improvement following the injection, but with persistent symptoms.  We again had a long discussion reviewing conservative treatment options. I offered bracing to help with pain and comfort, but it was declined.  Discussed alternative treatments, including HA and PRP injections.  She is able to undergo repeat cortisone injection in just few weeks, so decision was made to hold off until that time and perform a repeat cortisone injection.  If she continues to have persistent symptoms and/or inadequate response following the injection, we will consider viscosupplementation at that time.  She may continue with supportive measures as previously recommended. She may continue with activity as tolerated but should  avoid painful or overly strenuous activity.  We will follow-up in 6 weeks or sooner as needed. Follow-up in 3 weeks for repeat steroid injection.  All of her questions were answered and she is agreeable with the plan.    Dictated utilizing Dragon dictation.

## 2024-07-11 DIAGNOSIS — I10 ESSENTIAL HYPERTENSION, BENIGN: ICD-10-CM

## 2024-07-11 DIAGNOSIS — E87.1 HYPONATREMIA: ICD-10-CM

## 2024-07-11 RX ORDER — FUROSEMIDE 20 MG/1
TABLET ORAL
Qty: 60 TABLET | Refills: 11 | Status: SHIPPED | OUTPATIENT
Start: 2024-07-11

## 2024-07-14 DIAGNOSIS — F51.01 PRIMARY INSOMNIA: ICD-10-CM

## 2024-07-15 RX ORDER — ZOLPIDEM TARTRATE 10 MG/1
10 TABLET ORAL NIGHTLY PRN
Qty: 30 TABLET | Refills: 0 | Status: SHIPPED | OUTPATIENT
Start: 2024-07-15

## 2024-07-23 ENCOUNTER — TELEPHONE (OUTPATIENT)
Dept: SPORTS MEDICINE | Facility: CLINIC | Age: 84
End: 2024-07-23
Payer: MEDICARE

## 2024-07-23 NOTE — TELEPHONE ENCOUNTER
Called patient to explain Dr Melchor's response. Once on the phone with the patient she realized that the Gemtesa medication was prescribed by a provider at [facility] and that she has a procdure with Dr Cantu in early August and that she is to stop taking Gemtesa before the procedure with Dr Cantu. She was very apologetic and understood that she can get the injection with Dr Melchor tomorrow. I did give the patient Dr Cantu's office number incase she wanted to call to check just incase.

## 2024-07-23 NOTE — TELEPHONE ENCOUNTER
Caller: Ritu Martino    Relationship: Self    Best call back number:[phone] (home)     Who are you requesting to speak with (clinical staff, provider,  specific staff member): CLINICAL    What was the call regarding: THE PATIENT STATED SHE RECEIVED A LETTER IN THE MAIL THAT SAID SHE NEEDED TO STOP TAKE GEMTESA 4/5 DAYS BEFORE HER INJECTION. SHE HAS NOT STOPPED TAKING IT AND WOULD LIKE TO KNOW IF SHE NEEDS TO RESCHEDULE HER INJECTION TOMORROW    Is it okay if the provider responds through MyChart: CALL    HUB UNABLE TO TRANSFER

## 2024-07-24 ENCOUNTER — OFFICE VISIT (OUTPATIENT)
Age: 84
End: 2024-07-24
Payer: MEDICARE

## 2024-07-24 VITALS — HEIGHT: 60 IN | BODY MASS INDEX: 29.25 KG/M2 | TEMPERATURE: 98.4 F | WEIGHT: 149 LBS

## 2024-07-24 DIAGNOSIS — M17.12 PRIMARY OSTEOARTHRITIS OF LEFT KNEE: Primary | ICD-10-CM

## 2024-07-24 RX ORDER — OLMESARTAN MEDOXOMIL 20 MG/1
1 TABLET ORAL DAILY
COMMUNITY

## 2024-07-24 RX ORDER — FLUCONAZOLE 200 MG/1
1 TABLET ORAL DAILY
COMMUNITY

## 2024-07-24 RX ORDER — ESTRADIOL 0.1 MG/G
1 CREAM VAGINAL
COMMUNITY
Start: 2024-05-06 | End: 2024-08-04

## 2024-07-24 RX ADMIN — LIDOCAINE HYDROCHLORIDE 2 ML: 10 INJECTION, SOLUTION EPIDURAL; INFILTRATION; INTRACAUDAL; PERINEURAL at 14:47

## 2024-07-24 RX ADMIN — METHYLPREDNISOLONE ACETATE 80 MG: 80 INJECTION, SUSPENSION INTRA-ARTICULAR; INTRALESIONAL; INTRAMUSCULAR; SOFT TISSUE at 14:47

## 2024-07-24 NOTE — PROGRESS NOTES
"Chief Complaint   Patient presents with    Left Knee - Follow-up    Injections       History of Present Illness  Ritu is a 84 y.o. year old female here today for follow-up of bilateral knee pain, left worse than right, that has been present intermittently for several (10-20+) years with worsening over the past couple months but with no known injury or trauma. Initial x-rays showed moderate right and severe left osteoarthritis osteoarthritis, which is consistent with her history and exam findings.  Decision was made to proceed with cortisone injection on the left knee on 4/23/24.  She returned at last visit reporting improvement following the injection, but with persistent symptoms.  We again had a long discussion reviewing conservative treatment options. Decision was proceed with repeat cortisone injection and she presents today for intra-articular cortisone injection. No new injuries or complaints.    Temp 98.4 °F (36.9 °C) (Temporal)   Ht 152.4 cm (60\")   Wt 67.6 kg (149 lb)   LMP  (LMP Unknown)   BMI 29.10 kg/m²        Physical Exam  Vital signs reviewed.   General: Well developed, well nourished; No acute distress.  Eyes: conjunctiva clear; pupils equally round and reactive  ENT: external ears and nose atraumatic; hearing normal  CV: extremity warm and well perfused  Resp: normal respiratory effort, no use of accessory muscles  Skin: normal color and pigmentation; no rashes or wounds; normal turgor  Psych: alert and oriented; mood and affect appropriate; recent and remote memory intact      - Large Joint Arthrocentesis: L knee on 7/24/2024 2:47 PM  Indications: pain  Details: 21 G needle, ultrasound-guided superolateral approach  Medications: 80 mg methylPREDNISolone acetate 80 MG/ML; 2 mL lidocaine PF 1% 1 %  Outcome: tolerated well, no immediate complications  Procedure, treatment alternatives, risks and benefits explained, specific risks discussed. Consent was given by the patient. Immediately prior to " procedure a time out was called to verify the correct patient, procedure, equipment, support staff and site/side marked as required. Patient was prepped and draped in the usual sterile fashion.         Assessment and Plan  Diagnoses and all orders for this visit:    1. Primary osteoarthritis of left knee (Primary)  -     - Large Joint Arthrocentesis: L knee    The procedure was well tolerated. She has been educated on the signs and symptoms of local reaction and infection and should call the office if she experiences any of these. She should take it easy for the next 24 to 48 hours and ice as needed. After that, she may return to normal activity. It was recommended to continue low impact activity. She may continue with previously recommended supportive measures.  We discussed that these injections may be performed safely every 3 months so long as they continue to provide adequate relief. We will follow-up as needed. All of her questions were answered and she is agreeable with the plan.    Dictated utilizing Dragon dictation.

## 2024-07-29 RX ORDER — LIDOCAINE HYDROCHLORIDE 10 MG/ML
2 INJECTION, SOLUTION EPIDURAL; INFILTRATION; INTRACAUDAL; PERINEURAL
Status: COMPLETED | OUTPATIENT
Start: 2024-07-24 | End: 2024-07-24

## 2024-07-29 RX ORDER — METHYLPREDNISOLONE ACETATE 80 MG/ML
80 INJECTION, SUSPENSION INTRA-ARTICULAR; INTRALESIONAL; INTRAMUSCULAR; SOFT TISSUE
Status: COMPLETED | OUTPATIENT
Start: 2024-07-24 | End: 2024-07-24

## 2024-08-03 PROBLEM — R35.0 URINARY FREQUENCY: Status: ACTIVE | Noted: 2024-08-03

## 2024-08-12 DIAGNOSIS — F51.01 PRIMARY INSOMNIA: ICD-10-CM

## 2024-08-12 RX ORDER — VALSARTAN 160 MG/1
TABLET ORAL
Qty: 90 TABLET | Refills: 3 | Status: SHIPPED | OUTPATIENT
Start: 2024-08-12

## 2024-08-12 RX ORDER — ZOLPIDEM TARTRATE 10 MG/1
10 TABLET ORAL NIGHTLY PRN
Qty: 30 TABLET | Refills: 0 | Status: SHIPPED | OUTPATIENT
Start: 2024-08-12

## 2024-08-12 NOTE — TELEPHONE ENCOUNTER
Please call patient and ask if she is taking Olmesartan or Benicar. She should not take valsartan and Olmesartan together. I am refilling her Olmesartan,

## 2024-08-16 ENCOUNTER — HOSPITAL ENCOUNTER (OUTPATIENT)
Facility: HOSPITAL | Age: 84
Discharge: HOME | End: 2024-08-16
Payer: MEDICARE

## 2024-08-16 DIAGNOSIS — M81.0 AGE-RELATED OSTEOPOROSIS WITHOUT CURRENT PATHOLOGICAL FRACTURE: ICD-10-CM

## 2024-08-16 PROCEDURE — 77080 DXA BONE DENSITY AXIAL: CPT

## 2024-08-19 DIAGNOSIS — F41.9 ANXIETY: ICD-10-CM

## 2024-08-19 NOTE — TELEPHONE ENCOUNTER
Caller: Nu Martino    Relationship: Emergency Contact    Best call back number: [phone]    Requested Prescriptions:   Requested Prescriptions     Pending Prescriptions Disp Refills    ALPRAZolam (XANAX) 0.5 MG tablet 90 tablet 0     Sig: Take 1 tablet by mouth 3 (Three) Times a Day As Needed for Anxiety.        Pharmacy where request should be sent: [facility] [address] [phone] [facility] [phone]      Last office visit with prescribing clinician: 5/24/2024   Last telemedicine visit with prescribing clinician: Visit date not found   Next office visit with prescribing clinician: 8/28/2024     Does the patient have less than a 3 day supply:  [] Yes  [x] No    Mj Mulligan Rep   08/19/24 09:52 EDT

## 2024-08-19 NOTE — TELEPHONE ENCOUNTER
I filled this last week. Is there a different pharmacy they want me to send it to? It usually can't be filled mail order.

## 2024-08-20 RX ORDER — ALPRAZOLAM 0.5 MG/1
0.5 TABLET ORAL 3 TIMES DAILY PRN
Qty: 90 TABLET | Refills: 0 | OUTPATIENT
Start: 2024-08-20

## 2024-08-22 ENCOUNTER — LAB (OUTPATIENT)
Dept: INTERNAL MEDICINE | Facility: CLINIC | Age: 84
End: 2024-08-22
Payer: MEDICARE

## 2024-08-22 DIAGNOSIS — M85.80 OSTEOPENIA, UNSPECIFIED LOCATION: ICD-10-CM

## 2024-08-22 DIAGNOSIS — M85.80 OSTEOPENIA, UNSPECIFIED LOCATION: Primary | ICD-10-CM

## 2024-08-22 DIAGNOSIS — E78.5 HYPERLIPIDEMIA, UNSPECIFIED HYPERLIPIDEMIA TYPE: ICD-10-CM

## 2024-08-22 DIAGNOSIS — E87.1 HYPONATREMIA: ICD-10-CM

## 2024-08-22 DIAGNOSIS — S22.080A COMPRESSION FRACTURE OF T12 VERTEBRA, INITIAL ENCOUNTER: ICD-10-CM

## 2024-08-23 LAB
25(OH)D3+25(OH)D2 SERPL-MCNC: 45.7 NG/ML (ref 30–100)
ALBUMIN SERPL-MCNC: 4.3 G/DL (ref 3.5–5.2)
ALBUMIN/GLOB SERPL: 2 G/DL
ALP SERPL-CCNC: 56 U/L (ref 39–117)
ALT SERPL-CCNC: 36 U/L (ref 1–33)
AST SERPL-CCNC: 22 U/L (ref 1–32)
BILIRUB SERPL-MCNC: 0.5 MG/DL (ref 0–1.2)
BUN SERPL-MCNC: 18 MG/DL (ref 8–23)
BUN/CREAT SERPL: 23.4 (ref 7–25)
CALCIUM SERPL-MCNC: 9.3 MG/DL (ref 8.6–10.5)
CHLORIDE SERPL-SCNC: 95 MMOL/L (ref 98–107)
CHOLEST SERPL-MCNC: 218 MG/DL (ref 0–200)
CO2 SERPL-SCNC: 27.6 MMOL/L (ref 22–29)
CREAT SERPL-MCNC: 0.77 MG/DL (ref 0.57–1)
EGFRCR SERPLBLD CKD-EPI 2021: 76.2 ML/MIN/1.73
GLOBULIN SER CALC-MCNC: 2.2 GM/DL
GLUCOSE SERPL-MCNC: 107 MG/DL (ref 65–99)
HDLC SERPL-MCNC: 109 MG/DL (ref 40–60)
LDLC SERPL CALC-MCNC: 95 MG/DL (ref 0–100)
MAGNESIUM SERPL-MCNC: 1.8 MG/DL (ref 1.6–2.4)
PHOSPHATE SERPL-MCNC: 3.5 MG/DL (ref 2.5–4.5)
POTASSIUM SERPL-SCNC: 4 MMOL/L (ref 3.5–5.2)
PROT SERPL-MCNC: 6.5 G/DL (ref 6–8.5)
SODIUM SERPL-SCNC: 137 MMOL/L (ref 136–145)
TRIGL SERPL-MCNC: 79 MG/DL (ref 0–150)
VLDLC SERPL CALC-MCNC: 14 MG/DL (ref 5–40)

## 2024-08-28 ENCOUNTER — OFFICE VISIT (OUTPATIENT)
Dept: INTERNAL MEDICINE | Facility: CLINIC | Age: 84
End: 2024-08-28
Payer: MEDICARE

## 2024-08-28 VITALS
HEIGHT: 60 IN | OXYGEN SATURATION: 96 % | HEART RATE: 91 BPM | BODY MASS INDEX: 29.33 KG/M2 | SYSTOLIC BLOOD PRESSURE: 144 MMHG | DIASTOLIC BLOOD PRESSURE: 79 MMHG | WEIGHT: 149.4 LBS

## 2024-08-28 DIAGNOSIS — E87.6 HYPOKALEMIA: ICD-10-CM

## 2024-08-28 DIAGNOSIS — E03.9 HYPOTHYROIDISM, UNSPECIFIED TYPE: ICD-10-CM

## 2024-08-28 DIAGNOSIS — M81.0 AGE-RELATED OSTEOPOROSIS WITHOUT CURRENT PATHOLOGICAL FRACTURE: ICD-10-CM

## 2024-08-28 DIAGNOSIS — I10 ESSENTIAL HYPERTENSION, BENIGN: Primary | ICD-10-CM

## 2024-08-28 DIAGNOSIS — E78.89 ELEVATED HDL: ICD-10-CM

## 2024-08-28 DIAGNOSIS — F41.9 ANXIETY: ICD-10-CM

## 2024-08-28 DIAGNOSIS — F51.01 PRIMARY INSOMNIA: ICD-10-CM

## 2024-08-28 DIAGNOSIS — R79.89 LOW VITAMIN D LEVEL: ICD-10-CM

## 2024-08-28 DIAGNOSIS — E87.1 HYPONATREMIA: ICD-10-CM

## 2024-08-28 PROCEDURE — 3078F DIAST BP <80 MM HG: CPT | Performed by: STUDENT IN AN ORGANIZED HEALTH CARE EDUCATION/TRAINING PROGRAM

## 2024-08-28 PROCEDURE — 3077F SYST BP >= 140 MM HG: CPT | Performed by: STUDENT IN AN ORGANIZED HEALTH CARE EDUCATION/TRAINING PROGRAM

## 2024-08-28 PROCEDURE — 1125F AMNT PAIN NOTED PAIN PRSNT: CPT | Performed by: STUDENT IN AN ORGANIZED HEALTH CARE EDUCATION/TRAINING PROGRAM

## 2024-08-28 PROCEDURE — 99214 OFFICE O/P EST MOD 30 MIN: CPT | Performed by: STUDENT IN AN ORGANIZED HEALTH CARE EDUCATION/TRAINING PROGRAM

## 2024-08-28 RX ORDER — ALPRAZOLAM 0.5 MG
0.5 TABLET ORAL DAILY
Qty: 30 TABLET | Refills: 0 | Status: SHIPPED | OUTPATIENT
Start: 2024-08-28

## 2024-08-28 RX ORDER — NITROFURANTOIN 25; 75 MG/1; MG/1
100 CAPSULE ORAL
COMMUNITY
Start: 2024-08-23 | End: 2024-08-28

## 2024-08-28 NOTE — PROGRESS NOTES
Melvin Jimenez M.D.  Internal Medicine  [facility]  [address], [address]  [place] [address]  [phone]      Chief Complaint  Follow-up (F/U /)    SUBJECTIVE    History of Present Illness    Ritu Martino is a 84 y.o. female  with hyponatremia, hypertension, anemia, vitamin D deficiency, hypokalemia who presents to the office today as an established patient that last saw me on 5/24/2024.     Last DEXA scan showed improvement in bone mineral density on Prolia. She is due for a repeat prolia.     Had an office procedure with Dr. Alejandra Cantu for urinary incontinence. Getting Botox next week. Gemtesa did not help.     Seeing sports medicine for injections of the knee but she does not think this is helping much.     She saw Dr. Rivas with general surgery for hernia.  She elected to observe.    Blood work for this appointment showed normal sodium, mildly elevated ALT at 36, cholesterol well-controlled, normal MND    Enjoys pre dinner cocktail. Rash on cheeks before bedtime.     2 UTIs this year    Review of Systems    No Known Allergies     Outpatient Medications Marked as Taking for the 8/28/24 encounter (Office Visit) with Melvin Jimenez MD   Medication Sig Dispense Refill    acetaminophen (TYLENOL) 325 MG tablet Take 2 tablets by mouth Every 6 (Six) Hours As Needed for Mild Pain . (Patient taking differently: Take 500 mg by mouth Every 6 (Six) Hours As Needed for Mild Pain.)      ALPRAZolam (XANAX) 0.5 MG tablet Take 1 tablet by mouth Daily. 30 tablet 0    Cholecalciferol (VITAMIN D) 2000 units capsule Take  by mouth Daily With Dinner.      cloNIDine (Catapres) 0.1 MG tablet 1 po bid prn sys greater or equal to 150 30 tablet 3    cycloSPORINE (RESTASIS) 0.05 % ophthalmic emulsion       desvenlafaxine (Pristiq) 100 MG 24 hr tablet Take 1 tablet by mouth Daily. 90 tablet 3    fluconazole (DIFLUCAN) 200 MG tablet Take 1 tablet by mouth Daily.      furosemide (LASIX) 20 MG tablet TAKE 1  TABLET TWICE A DAY 60 tablet 11    levothyroxine (SYNTHROID, LEVOTHROID) 50 MCG tablet TAKE 1 TABLET DAILY 90 tablet 3    Magnesium 400 MG capsule 1 p.o. twice daily 60 capsule 3    melatonin 5 MG tablet tablet Take 2 tablets by mouth.      omeprazole (priLOSEC) 40 MG capsule TAKE 1 CAPSULE DAILY 90 capsule 3    Probiotic Product (ALIGN PO) Take 1 capsule by mouth Daily With Lunch.      Psyllium (Metamucil) wafer wafer Take  by mouth Daily.      rosuvastatin (CRESTOR) 20 MG tablet TAKE 1 TABLET EVERY NIGHT 90 tablet 3    SODIUM CHLORIDE PO Take  by mouth 3 (Three) Times a Day. 2 TABLETS 3 6TIMES A DAY      valsartan (DIOVAN) 160 MG tablet TAKE 1 TABLET DAILY 90 tablet 3    Vibegron (Gemtesa) 75 MG tablet Take  by mouth.      vitamin B-12 (CYANOCOBALAMIN) 100 MCG tablet Take 0.5 tablets by mouth Daily.      zolpidem (AMBIEN) 10 MG tablet TAKE 1 TABLET AT NIGHT AS NEEDED FOR SLEEP 30 tablet 0    [DISCONTINUED] ALPRAZolam (XANAX) 0.5 MG tablet Take 1 tablet by mouth 3 (Three) Times a Day As Needed for Anxiety. 90 tablet 0        Past Medical History:   Diagnosis Date    Anemia 2000    Anxiety     Arthritis     Bowel dysfunction 08/2021    since having surgery for diverticular infection    Chronic constipation     Depression     Diverticulitis 08/2021    w/ rupture and infection required surgery and ostomy    E coli infection 06/14/2022    Essential hypertension, benign     Fracture 06/2022    left ankle    Fracture, tibia and fibula Now wearing bood    GERD (gastroesophageal reflux disease)     Hernia, hiatal     Hypercholesterolemia     Hypertension     Hypokalemia     Hyponatremia 2014    chronic low with occasional normal level    Hypothyroidism 2018    estimated time frame pt nor  could remember exactly how long has been on medication    Insomnia     Iron deficiency     Seizures 2022    Tear of meniscus of knee 2000    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     Past Surgical History:   Procedure Laterality  "Date    APPENDECTOMY      BACK SURGERY      BACK SURGERY      Discectomy     SECTION      x2 lower midline incision    COLON SURGERY  2021    to address ruptured and infected diverticular dz, ostomy also placed    COLONOSCOPY      COLOSTOMY CLOSURE  10/2021    ostomy removed    EYE SURGERY  2 X cataract    HEMORRHOIDECTOMY      HERNIA REPAIR      KNEE ARTHROPLASTY       Family History   Problem Relation Age of Onset    Brain cancer Mother     Cancer Mother         Brain tumor, 1979    Stroke Father         Age 46    Early death Father         Stroke, age 46    Cancer Brother         pancreatic    Cancer Daughter         Dec'd 2000-[facility] [place]    reports that she quit smoking about 43 years ago. Her smoking use included cigarettes. She started smoking about 68 years ago. She has a 3.8 pack-year smoking history. She has been exposed to tobacco smoke. She has never used smokeless tobacco. She reports current alcohol use of about 8.0 standard drinks of alcohol per week. She reports that she does not use drugs.    OBJECTIVE    Vital Signs:   /79   Pulse 91   Ht 152.4 cm (60\")   Wt 67.8 kg (149 lb 6.4 oz)   SpO2 96%   BMI 29.18 kg/m²     Physical Exam  Constitutional:       Appearance: Normal appearance.   Cardiovascular:      Rate and Rhythm: Normal rate and regular rhythm.      Heart sounds: Normal heart sounds. No murmur heard.  Pulmonary:      Effort: Pulmonary effort is normal.      Breath sounds: Normal breath sounds.   Musculoskeletal:      Right lower leg: No edema.      Left lower leg: No edema.   Skin:     General: Skin is warm and dry.   Neurological:      Mental Status: She is alert.   Psychiatric:         Mood and Affect: Mood normal.         Behavior: Behavior normal.         Thought Content: Thought content normal.            The following data was reviewed by: Melvin Jimenez MD on 2024:  CMP          2024    11:01 2024    13:04 2024    11:31   CMP   Glucose " 102  115  107    BUN 11  13  18    Creatinine 0.72  0.89  0.77    EGFR  64.0     Sodium 137  137  137    Potassium 4.2  4.1  4.0    Chloride 96  99  95    Calcium 9.2  9.4  9.3    Total Protein 7.1   6.5    Total Protein  7.1     Albumin 4.9  4.5  4.3    Globulin 2.2   2.2    Globulin  2.6     Total Bilirubin 0.7  0.4  0.5    Alkaline Phosphatase 61  61  56    AST (SGOT) 20  24  22    ALT (SGPT) 18  28  36    Albumin/Globulin Ratio  1.7     BUN/Creatinine Ratio 15.3  14.6  23.4    Anion Gap  10.0       CBC w/diff          4/24/2024    09:25 5/6/2024    06:30 5/22/2024    11:01   CBC w/Diff   WBC 5.27     6.89     5.93    RBC 3.85     3.46     3.73    Hemoglobin 11.7     10.7     11.5    Hematocrit 36.5     32.3     34.5    MCV 94.8     93.4     92.5    MCH 30.4     30.9     30.8    MCHC 32.1     33.1     33.3    RDW 12.2     12.6     12.4    Platelets 258     263     324    Neutrophil Rel % 65.8     71.2     72.7    Immature Granulocyte Rel % 0.4     0.4        Lymphocyte Rel % 22.2     18.0     14.5    Monocyte Rel % 8.0     8.4     10.3    Eosinophil Rel % 3.2     1.7     2.0    Basophil Rel % 0.4     0.3     0.3       Details          This result is from an external source.             Lipid Panel          1/17/2024    14:04 5/22/2024    11:01 8/22/2024    11:31   Lipid Panel   Total Cholesterol 210  216  218    Triglycerides 167  91  79    HDL Cholesterol 92  124  109    VLDL Cholesterol 28  15  14    LDL Cholesterol  90  77  95    LDL/HDL Ratio 0.92        TSH          11/21/2023    12:00 1/17/2024    14:04 5/17/2024    11:31   TSH   TSH 2.480  1.830  2.390      A1C Last 3 Results          5/22/2024    11:01   HGBA1C Last 3 Results   Hemoglobin A1C 5.70      Data reviewed : Recent uro-GYN and surgery note              ASSESSMENT & PLAN     Diagnoses and all orders for this visit:    1. Essential hypertension, benign (Primary)  Assessment & Plan:  Hypertension is stable and controlled  Continue current treatment  regimen.  Blood pressure will be reassessed in 3 months.    Orders:  -     Comprehensive Metabolic Panel  -     CBC & Differential  -     Lipid Panel    2. Hypothyroidism, unspecified type  -     TSH Rfx On Abnormal To Free T4    3. Anxiety  -     ALPRAZolam (XANAX) 0.5 MG tablet; Take 1 tablet by mouth Daily.  Dispense: 30 tablet; Refill: 0    4. Hyponatremia  -     Comprehensive Metabolic Panel  -     CBC & Differential  -     Lipid Panel    5. Hypokalemia  -     Comprehensive Metabolic Panel  -     CBC & Differential  -     Lipid Panel    6. Age-related osteoporosis without current pathological fracture  -     Comprehensive Metabolic Panel    7. Primary insomnia    8. Low vitamin D level  -     Vitamin D,25-Hydroxy    9. Elevated HDL  -     Lipid Panel    Discussed elevated HDL can be related to alcohol use or hypothyroidism.    As part of the patient's treatment plan, I am prescribing controlled substances. The patient has been made aware of appropriate use of such medications. It has also been made clear that these medications are for use by this patient only, without concomitant use of alcohol or other substances unless prescribed.  Discussed increased fall risk with alcohol use     Patient has completed prescribing agreement detailing terms of continued prescribing of controlled substances, including monitoring ROXANA reports, urine drug screening, and pill counts if necessary. The patient is aware that inappropriate use will results in cessation of prescribing such medications.     As the clinician, I personally reviewed the ROXANA from 08/28/2024 while the patient was in the office today.     History and physical exam exhibit continued safe and appropriate use of controlled substances.      Facial rash at night probably flushing from EtOH   Recommend she hold off on evening cocktail to see if rash resolves  Requesting list of dermatologist.  She will receive printed list for self-referral at  checkout.        Health Maintenance Due   Topic Date Due    COVID-19 Vaccine (7 - 2023-24 season) 02/07/2024    INFLUENZA VACCINE  08/01/2024        Follow Up  Return in about 3 months (around 11/28/2024) for Recheck.    Patient/family had no further questions at this time and verbalized understanding of the plan discussed today.

## 2024-08-28 NOTE — PATIENT INSTRUCTIONS
Dermatologists    [facility]  [address] [place] [address] [phone]    [facility]  [address]  [place], [place] [phone]    Dr. Corinne Aggarwal  [address] [place] [address] [phone]    Dr. Sabi Canchola  [address] [address] [phone]

## 2024-08-30 ENCOUNTER — TELEPHONE (OUTPATIENT)
Dept: INTERNAL MEDICINE | Facility: CLINIC | Age: 84
End: 2024-08-30

## 2024-08-30 NOTE — TELEPHONE ENCOUNTER
Caller: Nu Martino    Relationship: Emergency Contact    Best call back number: [phone]    What was the call regarding: ON 08/28, DR. CARTAGENA INDICATED THAT PATIENT'S LAST PROLIA SHOT WAS IN FEBRUARY. PATIENT'S 'S RECORDS INDICATE THAT THIS WAS DONE ON 04/30/24 WITH NEXT INJECTION SCHEDULED 11/05/2024, BOTH INJECTIONS DONE AT [facility]. PATIENT'S  STATES THAT HE THINKS THEY ARE ON SCHEDULE.

## 2024-09-16 DIAGNOSIS — F51.01 PRIMARY INSOMNIA: ICD-10-CM

## 2024-09-17 RX ORDER — ZOLPIDEM TARTRATE 10 MG/1
10 TABLET ORAL NIGHTLY PRN
Qty: 30 TABLET | Refills: 0 | Status: SHIPPED | OUTPATIENT
Start: 2024-09-17

## 2024-09-23 DIAGNOSIS — F41.9 ANXIETY: ICD-10-CM

## 2024-09-24 RX ORDER — ALPRAZOLAM 0.5 MG
0.5 TABLET ORAL DAILY
Qty: 90 TABLET | Refills: 0 | Status: SHIPPED | OUTPATIENT
Start: 2024-09-24

## 2024-10-11 ENCOUNTER — TELEPHONE (OUTPATIENT)
Dept: SPORTS MEDICINE | Facility: CLINIC | Age: 84
End: 2024-10-11
Payer: MEDICARE

## 2024-10-11 DIAGNOSIS — M17.12 PRIMARY OSTEOARTHRITIS OF LEFT KNEE: Primary | ICD-10-CM

## 2024-10-11 NOTE — TELEPHONE ENCOUNTER
Caller: NAINA   Relationship to Patient:   Phone Number: [phone] (home)     Reason for Call:    PATIENTS  CALLING ASKING IF THE PATIENT CAN GET AN ORDER FOR A LEFT KNEE GEL INJECTION STATES THAT SHE WOULD LIKE TO TRY ONE

## 2024-10-16 DIAGNOSIS — F51.01 PRIMARY INSOMNIA: ICD-10-CM

## 2024-10-17 RX ORDER — ZOLPIDEM TARTRATE 10 MG/1
10 TABLET ORAL NIGHTLY PRN
Qty: 30 TABLET | Refills: 0 | Status: SHIPPED | OUTPATIENT
Start: 2024-10-17

## 2024-10-23 ENCOUNTER — PROCEDURE VISIT (OUTPATIENT)
Age: 84
End: 2024-10-23
Payer: MEDICARE

## 2024-10-23 VITALS — WEIGHT: 153.4 LBS | HEIGHT: 60 IN | BODY MASS INDEX: 30.12 KG/M2 | TEMPERATURE: 97.9 F

## 2024-10-23 DIAGNOSIS — M17.12 PRIMARY OSTEOARTHRITIS OF LEFT KNEE: Primary | ICD-10-CM

## 2024-10-23 DIAGNOSIS — S80.01XA CONTUSION OF RIGHT KNEE, INITIAL ENCOUNTER: ICD-10-CM

## 2024-10-23 DIAGNOSIS — M17.11 PRIMARY OSTEOARTHRITIS OF RIGHT KNEE: ICD-10-CM

## 2024-10-23 PROCEDURE — 99213 OFFICE O/P EST LOW 20 MIN: CPT | Performed by: STUDENT IN AN ORGANIZED HEALTH CARE EDUCATION/TRAINING PROGRAM

## 2024-10-23 PROCEDURE — 20611 DRAIN/INJ JOINT/BURSA W/US: CPT | Performed by: STUDENT IN AN ORGANIZED HEALTH CARE EDUCATION/TRAINING PROGRAM

## 2024-10-23 NOTE — PROGRESS NOTES
"Chief Complaint   Patient presents with    Left Knee - Follow-up, Pain       History of Present Illness  Ritu is a 84 y.o. year old female here today accompanied by her  for follow-up of bilateral knee pain, left worse than right, that has been present intermittently for several (10-20+) years with worsening over the past couple months but with no known injury or trauma. Initial x-rays showed moderate right and severe left osteoarthritis osteoarthritis, which is consistent with her history and exam findings.  Her symptoms have been managed with serial injections. Most recent cortisone injection was on 7/24/24. Please see previous notes for complete history and treatment course.   History of Present Illness  She received a steroid injection in her left knee on 04/23/2024 and again on 7/24/24, which initially provided relief but is no longer effective. She reports pain in her left knee when sitting or changing positions, but there is no swelling. She has not received any gel injections previously.    Approximately 3 weeks ago, she fell in the bathroom and injured her right knee, which continues to cause discomfort. She is uncertain if she landed on her knee or hit it against the commode. The pain in her right knee is more pronounced in the middle and is exacerbated by walking. She denies any worsening of left knee pain as a result of the fall. She attempted to alleviate the pain by wearing an elastic band around her knee, but discontinued this as it seemed to increase her discomfort.      Temp 97.9 °F (36.6 °C) (Temporal)   Ht 152.4 cm (60\")   Wt 69.6 kg (153 lb 6.4 oz)   LMP  (LMP Unknown)   BMI 29.96 kg/m²        Physical Exam  MSK:  The bilateral knees are without obvious signs of acute bony deformity, quadriceps atrophy, swelling, erythema, or ecchymosis. No appreciable effusion. The medial and lateral joint lines are tender (medial worse than latera) and without bony crepitus or step-off. Soft tissue " tenderness of the pes anserine and associated tendons. Knee and strength is 4/5. Gait is slightly antalgic.       Right Knee X-Ray  Indication: Pain  Views: Bilateral AP, Right Lateral, and Bilateral [place]  Findings:  No acute fracture  No bony lesion  Normal soft tissues  Tricompartmental degenerative changes, worst at the medial compartment (left worse than right)      - Large Joint Arthrocentesis: L knee on 10/23/2024 12:15 PM  Indications: pain  Details: 22 G needle, ultrasound-guided superolateral approach  Medications: 60 mg Sodium Hyaluronate 60 MG/3ML  Outcome: tolerated well, no immediate complications  Procedure, treatment alternatives, risks and benefits explained, specific risks discussed. Consent was given by the patient. Immediately prior to procedure a time out was called to verify the correct patient, procedure, equipment, support staff and site/side marked as required. Patient was prepped and draped in the usual sterile fashion.         Assessment and Plan  Diagnoses and all orders for this visit:    1. Primary osteoarthritis of left knee (Primary)  -     - Large Joint Arthrocentesis    2. Primary osteoarthritis of right knee    3. Contusion of right knee, initial encounter      Ritu is a 84 y.o. year old female here today accompanied by her  for follow-up of bilateral knee pain, left worse than right, that has been present intermittently for several (10-20+) years with worsening over the past couple months but with no known injury or trauma. Initial x-rays showed moderate right and severe left osteoarthritis osteoarthritis, which is consistent with her history and exam findings.  Her symptoms have been managed with serial injections. Most recent cortisone injection was on 7/24/24. She returns today with return of left knee pain. Also has worsening of right knee pain after a fall about 3 weeks ago. X-rays of the right knee with no acute findings. I recommended supportive management.      Decision was made to proceed with viscosupplementation injection on the left. The procedure was well tolerated. She has been educated on the signs and symptoms of local reaction and infection and should call the office if she experiences any of these. She should take it easy for the next 24 to 48 hours. After that, she may return to normal activity. It was recommended to continue low impact activity. She may continue with previously recommended supportive measures.  If there is no improvement within 4 to 6 weeks, another steroid injection may be considered  Assessment & Plan  Right Knee Pain.  The patient experienced a fall approximately 3 weeks ago, leading to persistent pain in the right knee. X-rays were obtained today to rule out any fractures, which showed no signs of a break but confirmed arthritis. She is advised to continue using a wrap if it helps and to apply ice or heat as needed. Over-the-counter pain relievers such as ibuprofen or Tylenol can be used as required. If the pain persists after a few weeks, a steroid injection may be considered.    Follow-up  Return in 4 to 6 weeks for follow-up.  All of her questions were answered and she is agreeable with the plan.    Dictated utilizing Dragon dictation.  Patient or patient representative verbalized consent for the use of Ambient Listening during the visit with  Ladonna Melchor DO for chart documentation. 10/23/2024  11:34 EST

## 2024-10-31 ENCOUNTER — TELEPHONE (OUTPATIENT)
Dept: INTERNAL MEDICINE | Facility: CLINIC | Age: 84
End: 2024-10-31

## 2024-10-31 NOTE — TELEPHONE ENCOUNTER
Caller: Nu Martino    Relationship to patient: Emergency Contact    Best call back number: [phone]     Patient is needing: WANTING TO KNOW IF THE PNEUMOCOCCAL- PREVNAR 20 VACCINE SHE GOT IN JANUARY 2024 THE ONLY ONE SHE NEEDS OR IS SHE GOING TO BE DUE FOR ANOTHER ANY TIME

## 2024-11-01 ENCOUNTER — TELEPHONE (OUTPATIENT)
Dept: SPORTS MEDICINE | Facility: CLINIC | Age: 84
End: 2024-11-01
Payer: MEDICARE

## 2024-11-01 NOTE — TELEPHONE ENCOUNTER
Pt calling said the gel injection she received recently is not helping her lt knee and wants to discuss the pain with you. Please call pt

## 2024-11-05 DIAGNOSIS — F41.9 ANXIETY: ICD-10-CM

## 2024-11-05 RX ORDER — ALPRAZOLAM 0.5 MG
0.5 TABLET ORAL DAILY
Qty: 90 TABLET | Refills: 0 | Status: SHIPPED | OUTPATIENT
Start: 2024-11-05

## 2024-11-05 NOTE — TELEPHONE ENCOUNTER
Caller: Nu Martino    Relationship: Emergency Contact    Best call back number: [phone]     Requested Prescriptions:   Requested Prescriptions     Pending Prescriptions Disp Refills    ALPRAZolam (XANAX) 0.5 MG tablet 90 tablet 0     Sig: Take 1 tablet by mouth Daily.        Pharmacy where request should be sent: [facility] [address] [phone] [facility] [phone]      Last office visit with prescribing clinician: 8/28/2024   Last telemedicine visit with prescribing clinician: Visit date not found   Next office visit with prescribing clinician: 12/6/2024     Additional details provided by patient: 3 MONTH SUPPLY PLEASE    Does the patient have less than a 3 day supply:  [] Yes  [] No    Would you like a call back once the refill request has been completed: [] Yes [] No    If the office needs to give you a call back, can they leave a voicemail: [] Yes [] No    Mj Thompson Rep   11/05/24 15:31 EST

## 2024-11-07 DIAGNOSIS — F51.01 PRIMARY INSOMNIA: ICD-10-CM

## 2024-11-08 RX ORDER — ZOLPIDEM TARTRATE 10 MG/1
10 TABLET ORAL NIGHTLY PRN
Qty: 30 TABLET | Refills: 0 | Status: SHIPPED | OUTPATIENT
Start: 2024-11-08

## 2024-11-26 DIAGNOSIS — I10 ESSENTIAL HYPERTENSION, BENIGN: ICD-10-CM

## 2024-11-26 DIAGNOSIS — E61.1 IRON DEFICIENCY: ICD-10-CM

## 2024-11-26 DIAGNOSIS — Z79.899 HIGH RISK MEDICATION USE: ICD-10-CM

## 2024-11-26 DIAGNOSIS — F51.01 PRIMARY INSOMNIA: ICD-10-CM

## 2024-11-26 DIAGNOSIS — E03.9 HYPOTHYROIDISM, UNSPECIFIED TYPE: Primary | ICD-10-CM

## 2024-11-26 LAB
ALBUMIN SERPL-MCNC: 4.5 G/DL (ref 3.5–5.2)
ALBUMIN/GLOB SERPL: 1.8 G/DL
ALP SERPL-CCNC: 69 U/L (ref 39–117)
ALT SERPL-CCNC: 24 U/L (ref 1–33)
AST SERPL-CCNC: 24 U/L (ref 1–32)
BASOPHILS # BLD AUTO: 0.03 10*3/MM3 (ref 0–0.2)
BASOPHILS NFR BLD AUTO: 0.5 % (ref 0–1.5)
BILIRUB SERPL-MCNC: 0.7 MG/DL (ref 0–1.2)
BUN SERPL-MCNC: 10 MG/DL (ref 8–23)
BUN/CREAT SERPL: 11.9 (ref 7–25)
CALCIUM SERPL-MCNC: 9 MG/DL (ref 8.6–10.5)
CHLORIDE SERPL-SCNC: 96 MMOL/L (ref 98–107)
CHOLEST SERPL-MCNC: 219 MG/DL (ref 0–200)
CO2 SERPL-SCNC: 25.8 MMOL/L (ref 22–29)
CREAT SERPL-MCNC: 0.84 MG/DL (ref 0.57–1)
EGFRCR SERPLBLD CKD-EPI 2021: 68.6 ML/MIN/1.73
EOSINOPHIL # BLD AUTO: 0.14 10*3/MM3 (ref 0–0.4)
EOSINOPHIL NFR BLD AUTO: 2.5 % (ref 0.3–6.2)
ERYTHROCYTE [DISTWIDTH] IN BLOOD BY AUTOMATED COUNT: 12.3 % (ref 12.3–15.4)
GLOBULIN SER CALC-MCNC: 2.5 GM/DL
GLUCOSE SERPL-MCNC: 104 MG/DL (ref 65–99)
HCT VFR BLD AUTO: 35.6 % (ref 34–46.6)
HDLC SERPL-MCNC: 106 MG/DL (ref 40–60)
HGB BLD-MCNC: 11.7 G/DL (ref 12–15.9)
IMM GRANULOCYTES # BLD AUTO: 0.02 10*3/MM3 (ref 0–0.05)
IMM GRANULOCYTES NFR BLD AUTO: 0.4 % (ref 0–0.5)
LDLC SERPL CALC-MCNC: 93 MG/DL (ref 0–100)
LYMPHOCYTES # BLD AUTO: 0.76 10*3/MM3 (ref 0.7–3.1)
LYMPHOCYTES NFR BLD AUTO: 13.8 % (ref 19.6–45.3)
MCH RBC QN AUTO: 31.5 PG (ref 26.6–33)
MCHC RBC AUTO-ENTMCNC: 32.9 G/DL (ref 31.5–35.7)
MCV RBC AUTO: 95.7 FL (ref 79–97)
MONOCYTES # BLD AUTO: 0.52 10*3/MM3 (ref 0.1–0.9)
MONOCYTES NFR BLD AUTO: 9.5 % (ref 5–12)
NEUTROPHILS # BLD AUTO: 4.03 10*3/MM3 (ref 1.7–7)
NEUTROPHILS NFR BLD AUTO: 73.3 % (ref 42.7–76)
NRBC BLD AUTO-RTO: 0 /100 WBC (ref 0–0.2)
PLATELET # BLD AUTO: 310 10*3/MM3 (ref 140–450)
POTASSIUM SERPL-SCNC: 4.1 MMOL/L (ref 3.5–5.2)
PROT SERPL-MCNC: 7 G/DL (ref 6–8.5)
RBC # BLD AUTO: 3.72 10*6/MM3 (ref 3.77–5.28)
SODIUM SERPL-SCNC: 136 MMOL/L (ref 136–145)
TRIGL SERPL-MCNC: 122 MG/DL (ref 0–150)
TSH SERPL DL<=0.005 MIU/L-ACNC: 2.32 UIU/ML (ref 0.27–4.2)
UNABLE TO VOID: NORMAL
VLDLC SERPL CALC-MCNC: 20 MG/DL (ref 5–40)
WBC # BLD AUTO: 5.5 10*3/MM3 (ref 3.4–10.8)

## 2024-12-06 ENCOUNTER — OFFICE VISIT (OUTPATIENT)
Dept: INTERNAL MEDICINE | Facility: CLINIC | Age: 84
End: 2024-12-06
Payer: MEDICARE

## 2024-12-06 VITALS
DIASTOLIC BLOOD PRESSURE: 62 MMHG | SYSTOLIC BLOOD PRESSURE: 138 MMHG | HEIGHT: 60 IN | OXYGEN SATURATION: 96 % | BODY MASS INDEX: 30.7 KG/M2 | WEIGHT: 156.4 LBS | HEART RATE: 102 BPM

## 2024-12-06 DIAGNOSIS — R30.0 DYSURIA: ICD-10-CM

## 2024-12-06 DIAGNOSIS — Z79.899 HIGH RISK MEDICATION USE: ICD-10-CM

## 2024-12-06 DIAGNOSIS — I10 ESSENTIAL HYPERTENSION, BENIGN: ICD-10-CM

## 2024-12-06 DIAGNOSIS — E87.1 HYPONATREMIA: ICD-10-CM

## 2024-12-06 DIAGNOSIS — F51.01 PRIMARY INSOMNIA: Primary | ICD-10-CM

## 2024-12-06 PROCEDURE — 3075F SYST BP GE 130 - 139MM HG: CPT | Performed by: STUDENT IN AN ORGANIZED HEALTH CARE EDUCATION/TRAINING PROGRAM

## 2024-12-06 PROCEDURE — 1125F AMNT PAIN NOTED PAIN PRSNT: CPT | Performed by: STUDENT IN AN ORGANIZED HEALTH CARE EDUCATION/TRAINING PROGRAM

## 2024-12-06 PROCEDURE — 3078F DIAST BP <80 MM HG: CPT | Performed by: STUDENT IN AN ORGANIZED HEALTH CARE EDUCATION/TRAINING PROGRAM

## 2024-12-06 PROCEDURE — 99214 OFFICE O/P EST MOD 30 MIN: CPT | Performed by: STUDENT IN AN ORGANIZED HEALTH CARE EDUCATION/TRAINING PROGRAM

## 2024-12-06 RX ORDER — VALSARTAN 80 MG/1
80 TABLET ORAL 2 TIMES DAILY
Qty: 180 TABLET | Refills: 1 | Status: SHIPPED | OUTPATIENT
Start: 2024-12-06

## 2024-12-06 RX ORDER — VALSARTAN 80 MG/1
80 TABLET ORAL 2 TIMES DAILY
Qty: 180 TABLET | Refills: 0 | Status: SHIPPED | OUTPATIENT
Start: 2024-12-06 | End: 2024-12-06 | Stop reason: SDUPTHER

## 2024-12-06 NOTE — PROGRESS NOTES
Melvin Jimenez M.D.  Internal Medicine  [facility]  [address], [address]  [place] [address]  [phone]      Chief Complaint  Hypertension (3 mth F/U /)    SUBJECTIVE    History of Present Illness    Ritu Martino is a 84 y.o. female  with hyponatremia, hypertension, anemia, vitamin D deficiency, hypokalemia who presents to the office today as an established patient that last saw me on 5/24/2024.     Osteoporosis-on Prolia    Urinary incontinence-follows with Dr. Alejandra Cantu.  Getting Botox.  No improvement with medication. Burning with urnation for a few days.     Knee osteoarthritis-seeing sports medicine for injections of the knee but she does not think this is helping much.     Blood pressure well controlled per home readings.     HR in 90s to low 100s.     Did gel injections for knee OA without relief. Taking 2 tylenol BID.     Skin spots frozen off by derm    Sharp rib pain when moving.     Valsartan decreased by nephrolgy      Review of Systems    No Known Allergies     Outpatient Medications Marked as Taking for the 12/6/24 encounter (Office Visit) with Melvin Jimenez MD   Medication Sig Dispense Refill    acetaminophen (TYLENOL) 325 MG tablet Take 2 tablets by mouth Every 6 (Six) Hours As Needed for Mild Pain . (Patient taking differently: Take 500 mg by mouth Every 6 (Six) Hours As Needed for Mild Pain.)      ALPRAZolam (XANAX) 0.5 MG tablet Take 1 tablet by mouth Daily. 90 tablet 0    Cholecalciferol (VITAMIN D) 2000 units capsule Take  by mouth Daily With Dinner.      cloNIDine (Catapres) 0.1 MG tablet 1 po bid prn sys greater or equal to 150 30 tablet 3    cycloSPORINE (RESTASIS) 0.05 % ophthalmic emulsion       desvenlafaxine (Pristiq) 100 MG 24 hr tablet Take 1 tablet by mouth Daily. 90 tablet 3    fluconazole (DIFLUCAN) 200 MG tablet Take 1 tablet by mouth Daily.      furosemide (LASIX) 20 MG tablet TAKE 1 TABLET TWICE A DAY 60 tablet 11    levothyroxine (SYNTHROID,  LEVOTHROID) 50 MCG tablet TAKE 1 TABLET DAILY 90 tablet 3    Magnesium 400 MG capsule 1 p.o. twice daily 60 capsule 3    melatonin 5 MG tablet tablet Take 2 tablets by mouth.      omeprazole (priLOSEC) 40 MG capsule TAKE 1 CAPSULE DAILY 90 capsule 3    Probiotic Product (ALIGN PO) Take 1 capsule by mouth Daily With Lunch.      Psyllium (Metamucil) wafer wafer Take  by mouth Daily.      rosuvastatin (CRESTOR) 20 MG tablet TAKE 1 TABLET EVERY NIGHT 90 tablet 3    SODIUM CHLORIDE PO Take  by mouth 3 (Three) Times a Day. 2 TABLETS 3 6TIMES A DAY      valsartan (DIOVAN) 160 MG tablet TAKE 1 TABLET DAILY 90 tablet 3    Vibegron (Gemtesa) 75 MG tablet Take  by mouth.      vitamin B-12 (CYANOCOBALAMIN) 100 MCG tablet Take 0.5 tablets by mouth Daily.      zolpidem (AMBIEN) 10 MG tablet TAKE 1 TABLET AT NIGHT AS NEEDED FOR SLEEP 30 tablet 0        Past Medical History:   Diagnosis Date    Anemia     Anxiety     Arthritis     Bowel dysfunction 2021    since having surgery for diverticular infection    Chronic constipation     Depression     Diverticulitis 2021    w/ rupture and infection required surgery and ostomy    E coli infection 2022    Essential hypertension, benign     Fracture 2022    left ankle    Fracture, tibia and fibula Now wearing bood    GERD (gastroesophageal reflux disease)     Hernia, hiatal     Hypercholesterolemia     Hypertension     Hypokalemia     Hyponatremia     chronic low with occasional normal level    Hypothyroidism     estimated time frame pt nor  could remember exactly how long has been on medication    Insomnia     Iron deficiency     Seizures     Tear of meniscus of knee     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     Past Surgical History:   Procedure Laterality Date    APPENDECTOMY  195    BACK SURGERY      BACK SURGERY      Discectomy     SECTION      x2 lower midline incision    COLON SURGERY  2021    to address ruptured and  "infected diverticular dz, ostomy also placed    COLONOSCOPY  2000    COLOSTOMY CLOSURE  10/2021    ostomy removed    EYE SURGERY  2 X cataract    HEMORRHOIDECTOMY      HERNIA REPAIR      KNEE ARTHROPLASTY       Family History   Problem Relation Age of Onset    Brain cancer Mother     Cancer Mother         Brain tumor, 1979    Stroke Father         Age 46    Early death Father         Stroke, age 46    Cancer Brother         pancreatic    Cancer Maternal Grandmother     Stroke Maternal Grandfather     Other (age) Paternal Grandmother     Other (age) Paternal Grandfather     Cancer Daughter         Dec'd [facility]    reports that she quit smoking about 43 years ago. Her smoking use included cigarettes. She started smoking about 68 years ago. She has a 3.8 pack-year smoking history. She has been exposed to tobacco smoke. She has never used smokeless tobacco. She reports current alcohol use of about 8.0 standard drinks of alcohol per week. She reports that she does not use drugs.    OBJECTIVE    Vital Signs:   /62   Pulse 102   Ht 152.4 cm (60\")   Wt 70.9 kg (156 lb 6.4 oz)   SpO2 96%   BMI 30.54 kg/m²     Physical Exam  Constitutional:       Appearance: Normal appearance.   Cardiovascular:      Rate and Rhythm: Normal rate and regular rhythm.      Heart sounds: Normal heart sounds. No murmur heard.  Pulmonary:      Effort: Pulmonary effort is normal.      Breath sounds: Normal breath sounds.   Musculoskeletal:      Right lower leg: No edema.      Left lower leg: No edema.   Skin:     General: Skin is warm and dry.   Neurological:      Mental Status: She is alert.   Psychiatric:         Mood and Affect: Mood normal.         Behavior: Behavior normal.         Thought Content: Thought content normal.            The following data was reviewed by: Melvin Jimenez MD on 08/28/2024:  CMP          6/27/2024    13:04 8/22/2024    11:31 11/26/2024    10:54   CMP   Glucose 115  107  104    BUN 13  18  10    Creatinine " 0.89  0.77  0.84    EGFR 64.0      Sodium 137  137  136    Potassium 4.1  4.0  4.1    Chloride 99  95  96    Calcium 9.4  9.3  9.0    Total Protein  6.5  7.0    Total Protein 7.1      Albumin 4.5  4.3  4.5    Globulin  2.2  2.5    Globulin 2.6      Total Bilirubin 0.4  0.5  0.7    Alkaline Phosphatase 61  56  69    AST (SGOT) 24  22  24    ALT (SGPT) 28  36  24    Albumin/Globulin Ratio 1.7      BUN/Creatinine Ratio 14.6  23.4  11.9    Anion Gap 10.0        CBC w/diff          4/24/2024    09:25 5/6/2024    06:30 5/22/2024    11:01   CBC w/Diff   WBC 5.27     6.89     5.93    RBC 3.85     3.46     3.73    Hemoglobin 11.7     10.7     11.5    Hematocrit 36.5     32.3     34.5    MCV 94.8     93.4     92.5    MCH 30.4     30.9     30.8    MCHC 32.1     33.1     33.3    RDW 12.2     12.6     12.4    Platelets 258     263     324    Neutrophil Rel % 65.8     71.2     72.7    Immature Granulocyte Rel % 0.4     0.4        Lymphocyte Rel % 22.2     18.0     14.5    Monocyte Rel % 8.0     8.4     10.3    Eosinophil Rel % 3.2     1.7     2.0    Basophil Rel % 0.4     0.3     0.3       Details          This result is from an external source.             Lipid Panel          5/22/2024    11:01 8/22/2024    11:31 11/26/2024    10:54   Lipid Panel   Total Cholesterol 216  218  219    Triglycerides 91  79  122    HDL Cholesterol 124  109  106    VLDL Cholesterol 15  14  20    LDL Cholesterol  77  95  93      TSH          1/17/2024    14:04 5/17/2024    11:31 11/26/2024    10:54   TSH   TSH 1.830  2.390  2.320      A1C Last 3 Results          5/22/2024    11:01   HGBA1C Last 3 Results   Hemoglobin A1C 5.70      Data reviewed : Recent uro-GYN and surgery note    DEXA Bone Density Axial (08/16/2024 11:54)       ASSESSMENT & PLAN          Primary insomnia  She has been tolerating Ambien.  Also on Xanax.  This is used cautiously in older adults.  Ramon reviewed and appropriate.       Essential hypertension, benign  Hypertension is  stable and controlled  Continue current treatment regimen.  Blood pressure will be reassessed in 3 months.         High risk medication use    Orders:    Urine Drug Screen - Urine, Clean Catch    Hyponatremia  Stable. Resolved       Dysuria  Checking urinalysis today  Orders:    Urinalysis With Culture If Indicated -        Frustrated knee pain not reolving    Decline PT.     There are no preventive care reminders to display for this patient.       Follow Up  No follow-ups on file.    Patient/family had no further questions at this time and verbalized understanding of the plan discussed today.

## 2024-12-06 NOTE — ASSESSMENT & PLAN NOTE
She has been tolerating Ambien.  Also on Xanax.  This is used cautiously in older adults.  Ramon reviewed and appropriate.

## 2024-12-09 ENCOUNTER — TELEPHONE (OUTPATIENT)
Dept: INTERNAL MEDICINE | Facility: CLINIC | Age: 84
End: 2024-12-09
Payer: MEDICARE

## 2024-12-09 DIAGNOSIS — F51.01 PRIMARY INSOMNIA: ICD-10-CM

## 2024-12-09 RX ORDER — NITROFURANTOIN 25; 75 MG/1; MG/1
100 CAPSULE ORAL 2 TIMES DAILY
Qty: 6 CAPSULE | Refills: 0 | Status: SHIPPED | OUTPATIENT
Start: 2024-12-09 | End: 2024-12-12

## 2024-12-09 NOTE — TELEPHONE ENCOUNTER
Caller: Nu Martino    Relationship: Emergency Contact    Best call back number:   Telephone Information:   Mobile [phone]       What orders are you requesting (i.e. lab or imaging): LABS    In what timeframe would the patient need to come in: BEFORE 3/7/2024 APPOINTMENT    Where will you receive your lab/imaging services: [facility]    Additional notes: PATIENT  IS REQUESTING TO COME IN AND HAVE LABS DONE BEFORE THE UPCOMING APPOINTMENT ON 3/7/2024 AND IF WE DO THE LABS, WANTING TO PUSH THE [facility] AND MEDICARE WELLNESS VISIT OUT UNTIL JUNE/JULY.

## 2024-12-09 NOTE — TELEPHONE ENCOUNTER
Patient was seen on 12/06/2024 by Dr. Jimenez for a UTI symptoms. Patient stated that Dr. Jimenez sent in prescription for UTI but pharmacy has not received it. It looks like it went to mail order pharmacy.    Can you please re-send to local pharmacy

## 2024-12-10 DIAGNOSIS — F51.01 PRIMARY INSOMNIA: Primary | ICD-10-CM

## 2024-12-10 DIAGNOSIS — E03.9 HYPOTHYROIDISM, UNSPECIFIED TYPE: ICD-10-CM

## 2024-12-10 DIAGNOSIS — E78.5 HYPERLIPIDEMIA, UNSPECIFIED HYPERLIPIDEMIA TYPE: ICD-10-CM

## 2024-12-10 DIAGNOSIS — I10 ESSENTIAL HYPERTENSION, BENIGN: ICD-10-CM

## 2024-12-10 DIAGNOSIS — E87.1 HYPONATREMIA: ICD-10-CM

## 2024-12-10 DIAGNOSIS — Z79.899 HIGH RISK MEDICATION USE: ICD-10-CM

## 2024-12-10 LAB
AMPHETAMINES UR QL SCN: NEGATIVE NG/ML
APPEARANCE UR: ABNORMAL
BACTERIA #/AREA URNS HPF: ABNORMAL /[HPF]
BACTERIA UR CULT: ABNORMAL
BACTERIA UR CULT: ABNORMAL
BARBITURATES UR QL SCN: NEGATIVE NG/ML
BENZODIAZ UR QL SCN: POSITIVE NG/ML
BILIRUB UR QL STRIP: NEGATIVE
BZE UR QL SCN: NEGATIVE NG/ML
CANNABINOIDS UR QL SCN: NEGATIVE NG/ML
CASTS URNS QL MICRO: ABNORMAL /LPF
COLOR UR: YELLOW
CREAT UR-MCNC: 106.8 MG/DL (ref 20–300)
EPI CELLS #/AREA URNS HPF: ABNORMAL /HPF (ref 0–10)
GLUCOSE UR QL STRIP: NEGATIVE
HGB UR QL STRIP: ABNORMAL
KETONES UR QL STRIP: NEGATIVE
LABORATORY COMMENT REPORT: ABNORMAL
LEUKOCYTE ESTERASE UR QL STRIP: ABNORMAL
METHADONE UR QL SCN: NEGATIVE NG/ML
MICRO URNS: ABNORMAL
NITRITE UR QL STRIP: NEGATIVE
OPIATES UR QL SCN: NEGATIVE NG/ML
OTHER ANTIBIOTIC SUSC ISLT: ABNORMAL
OXYCODONE+OXYMORPHONE UR QL SCN: NEGATIVE NG/ML
PCP UR QL: NEGATIVE NG/ML
PH UR STRIP: 5.5 [PH] (ref 5–7.5)
PH UR: 5.3 [PH] (ref 4.5–8.9)
PROPOXYPH UR QL SCN: NEGATIVE NG/ML
PROT UR QL STRIP: ABNORMAL
RBC #/AREA URNS HPF: ABNORMAL /HPF (ref 0–2)
SP GR UR STRIP: 1.02 (ref 1–1.03)
URINALYSIS REFLEX: ABNORMAL
UROBILINOGEN UR STRIP-MCNC: 0.2 MG/DL (ref 0.2–1)
WBC #/AREA URNS HPF: >30 /HPF (ref 0–5)
YEAST #/AREA URNS HPF: PRESENT /[HPF]

## 2024-12-10 RX ORDER — ZOLPIDEM TARTRATE 10 MG/1
10 TABLET ORAL NIGHTLY PRN
Qty: 30 TABLET | Refills: 0 | Status: SHIPPED | OUTPATIENT
Start: 2024-12-10

## 2024-12-10 NOTE — TELEPHONE ENCOUNTER
Patient was just seen last week. That will be her 3 month follow up appointment in March. Patient is wanting to make sure they can do their AWV in June or July along with labs.

## 2024-12-11 ENCOUNTER — OFFICE VISIT (OUTPATIENT)
Age: 84
End: 2024-12-11
Payer: MEDICARE

## 2024-12-11 VITALS — WEIGHT: 150 LBS | BODY MASS INDEX: 29.45 KG/M2 | TEMPERATURE: 97.3 F | HEIGHT: 60 IN

## 2024-12-11 DIAGNOSIS — M17.12 PRIMARY OSTEOARTHRITIS OF LEFT KNEE: Primary | ICD-10-CM

## 2024-12-11 DIAGNOSIS — M17.11 PRIMARY OSTEOARTHRITIS OF RIGHT KNEE: ICD-10-CM

## 2024-12-11 PROCEDURE — 1160F RVW MEDS BY RX/DR IN RCRD: CPT | Performed by: STUDENT IN AN ORGANIZED HEALTH CARE EDUCATION/TRAINING PROGRAM

## 2024-12-11 PROCEDURE — G2211 COMPLEX E/M VISIT ADD ON: HCPCS | Performed by: STUDENT IN AN ORGANIZED HEALTH CARE EDUCATION/TRAINING PROGRAM

## 2024-12-11 PROCEDURE — 1159F MED LIST DOCD IN RCRD: CPT | Performed by: STUDENT IN AN ORGANIZED HEALTH CARE EDUCATION/TRAINING PROGRAM

## 2024-12-11 PROCEDURE — 99214 OFFICE O/P EST MOD 30 MIN: CPT | Performed by: STUDENT IN AN ORGANIZED HEALTH CARE EDUCATION/TRAINING PROGRAM

## 2024-12-11 RX ORDER — MELOXICAM 7.5 MG/1
7.5 TABLET ORAL DAILY
Qty: 30 TABLET | Refills: 0 | Status: SHIPPED | OUTPATIENT
Start: 2024-12-11

## 2024-12-11 NOTE — PROGRESS NOTES
Chief Complaint   Patient presents with    Right Knee - Follow-up, Pain    Left Knee - Follow-up, Pain       History of Present Illness  Ritu is a 84 y.o. year old female here today accompanied by her  for follow-up of bilateral knee pain, left worse than right, that has been present intermittently for several (10-20+) years with worsening since early 2024 with no known injury or trauma. Initial x-rays showed moderate right and severe left osteoarthritis osteoarthritis, which is consistent with her history and exam findings.  Her symptoms have been managed with serial injections on the left. Most recent cortisone injection was on 10/23/24. Please see previous notes for complete history and treatment course.   History of Present Illness  She reports no significant improvement following her previous injection, with her current pain level rated as 6 out of 10. The pain is not severe but remains a source of discomfort, occasionally escalating in intensity. She experiences more discomfort in the right knee currently than the left. She recalls a fall prior to her last visit, which she believes has led to persistent irritation in her right knee. However, she has not experienced any subsequent falls. She does not report any swelling in her knees. She expresses interest in exploring medication options for pain management. She has been using Voltaren for relief and has not engaged in physical therapy for her knees due to a lack of confidence in its efficacy. She has previously undergone physical therapy for incontinence and back issues but is reluctant to continue. Her exercise routine is limited, often avoiding it due to knee pain. She primarily focuses on upper body stretching exercises to avoid straining her knees. She has been taking Tylenol 4 times daily, as advised by her primary care physician during a consultation 2 weeks ago. Her blood pressure readings have been within normal limits.    Supplemental  "Information  She has a history of intermittent reflux but has not experienced symptoms for several years. She is currently on furosemide, prescribed by her nephrologist during a hospital stay 2 years ago. She was hospitalized due to hyponatremia, and the nephrologist initiated furosemide therapy. Her sodium levels have consistently been low over the past 3 to 4 years.    MEDICATIONS  Current: Tylenol, furosemide, Voltaren      Lab Results   Component Value Date    GLUCOSE 104 (H) 11/26/2024    BUN 10 11/26/2024    CREATININE 0.84 11/26/2024     11/26/2024    K 4.1 11/26/2024    CL 96 (L) 11/26/2024    CALCIUM 9.0 11/26/2024    PROTEINTOT 7.1 06/27/2024    ALBUMIN 4.5 11/26/2024    ALT 24 11/26/2024    AST 24 11/26/2024    ALKPHOS 69 11/26/2024    BILITOT 0.7 11/26/2024    GLOB 2.6 06/27/2024    AGRATIO 1.7 06/27/2024    BCR 11.9 11/26/2024    ANIONGAP 10.0 06/27/2024    EGFR 64.0 06/27/2024       Temp 97.3 °F (36.3 °C) (Temporal)   Ht 152.4 cm (60\")   Wt 68 kg (150 lb)   LMP  (LMP Unknown)   BMI 29.29 kg/m²        Physical Exam  MSK:  The bilateral knees are without obvious signs of acute bony deformity, quadriceps atrophy, swelling, erythema, or ecchymosis. No appreciable effusion. The medial and lateral joint lines are tender (medial worse than latera) and without bony crepitus or step-off. Soft tissue tenderness of the pes anserine and associated tendons. Knee strength is 4/5. Gait is slightly antalgic. No significant change compared to previous.      Assessment and Plan  Diagnoses and all orders for this visit:    1. Primary osteoarthritis of left knee (Primary)  -     meloxicam (MOBIC) 7.5 MG tablet; Take 1 tablet by mouth Daily.  Dispense: 30 tablet; Refill: 0    2. Primary osteoarthritis of right knee  -     meloxicam (MOBIC) 7.5 MG tablet; Take 1 tablet by mouth Daily.  Dispense: 30 tablet; Refill: 0    Ritu is a 84 y.o. year old female here today accompanied by her  for follow-up of " bilateral knee pain, left worse than right, that has been present intermittently for several (10-20+) years with worsening over the past couple months but with no known injury or trauma. Initial x-rays showed moderate right and severe left osteoarthritis osteoarthritis, which is consistent with her history and exam findings.  Her symptoms have been managed with serial injections. Most recent cortisone injection was on 7/24/24. She returns today with return of left knee pain. Also has worsening of right knee pain after a fall about 3 weeks ago. X-rays of the right knee with no acute findings. I recommended supportive management.   Assessment & Plan  1. Bilateral knee pain.  The patient's arthritis is more severe in the left knee compared to the right, contributing to her pain. Previous injections in the left knee have not provided significant relief. She has been taking Tylenol regularly, 4 times a day, without significant improvement in symptoms. Her strength is good. The potential benefits and risks of surgical intervention, specifically knee replacement, were discussed. She was advised to consider physical therapy to strengthen the hips, knees, trunk, and core, and to improve mobility. Recommended to use light, limited WB activities such as pool and stationary bike for exercise. She was also encouraged to maintain an active lifestyle, including walking and using a stationary bike. We discussed risks and benefits of oral anti-inflammatory use. A prescription for oral meloxicam was provided to be used daily for the next 7-14 days then as needed. She was instructed to avoid the use of other OTC anti-inflammatories while on this medication. May take Tylenol in addition as needed. She was also advised to use Voltaren 3 to 4 times daily. She would like to assess how she responds to the medication before moving forward with surgical referral. She will let me know and will plan on placing referral to Dr. Gomez for a  surgical consultation if she decides to pursue that as a potential option. If she experiences adverse reactions to meloxicam, she should discontinue use and notify the office. If she has a good response to the medication and would like to continue, I would recommend that she discuss with her PCP.     We will follow-up as needed.  All of her questions were answered and she is agreeable with the plan.    Dictated utilizing Dragon dictation.  Patient or patient representative verbalized consent for the use of Ambient Listening during the visit with  Ladonna Melchor DO for chart documentation. 12/11/2024  11:39 EST

## 2024-12-13 ENCOUNTER — TELEPHONE (OUTPATIENT)
Dept: SPORTS MEDICINE | Facility: CLINIC | Age: 84
End: 2024-12-13
Payer: MEDICARE

## 2024-12-13 NOTE — TELEPHONE ENCOUNTER
Patient  called and stated that pharmacy does not have the prescription of meloxicam Dr Melchor sent 12/11/24. I then called pharmacy at [phone]. Spoke with a tech and she stated that prescription is in the system but they need updated insurance information. Called patient's  Nu and he stated understanding and was going to call pharmacy.

## 2024-12-31 RX ORDER — LEVOTHYROXINE SODIUM 50 UG/1
50 TABLET ORAL DAILY
Qty: 90 TABLET | Refills: 3 | Status: SHIPPED | OUTPATIENT
Start: 2024-12-31

## 2025-01-02 DIAGNOSIS — F51.01 PRIMARY INSOMNIA: ICD-10-CM

## 2025-01-02 RX ORDER — ZOLPIDEM TARTRATE 10 MG/1
10 TABLET ORAL NIGHTLY PRN
Qty: 30 TABLET | Refills: 0 | OUTPATIENT
Start: 2025-01-02

## 2025-01-08 ENCOUNTER — TELEPHONE (OUTPATIENT)
Dept: SPORTS MEDICINE | Facility: CLINIC | Age: 85
End: 2025-01-08
Payer: MEDICARE

## 2025-01-08 NOTE — TELEPHONE ENCOUNTER
Pt's  (uN) called to request RX-Meloxicam 7.5mg to be sent to [facility].  Pt wants more than a 30day supply w/refills.  Please call  to advise

## 2025-01-08 NOTE — TELEPHONE ENCOUNTER
Called pt per Dr Melchor advised her to contact her PCP for refill of Meloxicam so that they can monitor her BP and blood work. Pt voiced understanding

## 2025-01-09 DIAGNOSIS — M17.11 PRIMARY OSTEOARTHRITIS OF RIGHT KNEE: ICD-10-CM

## 2025-01-09 DIAGNOSIS — M17.12 PRIMARY OSTEOARTHRITIS OF LEFT KNEE: ICD-10-CM

## 2025-01-09 RX ORDER — MELOXICAM 7.5 MG/1
7.5 TABLET ORAL DAILY
Qty: 90 TABLET | Refills: 0 | Status: SHIPPED | OUTPATIENT
Start: 2025-01-09

## 2025-01-09 NOTE — TELEPHONE ENCOUNTER
Caller: Nu Martino    Relationship: Emergency Contact    Best call back number: [phone]    Requested Prescriptions:   Requested Prescriptions     Pending Prescriptions Disp Refills    meloxicam (MOBIC) 7.5 MG tablet 30 tablet 0     Sig: Take 1 tablet by mouth Daily.        Pharmacy where request should be sent: [facility] #[id number] [facility] [address]  AT [facility] [phone] [facility] [phone] FX     Last office visit with prescribing clinician: 12/6/2024   Last telemedicine visit with prescribing clinician: Visit date not found   Next office visit with prescribing clinician: 3/7/2025     Additional details provided by patient: DR. DE LA O'S OFFICE AND ADVISED THEM TO CALL AND REQUEST THE REFILL. FOR LEFT KNEE PAIN, REQUESTING A 90 DAY SUPPLY.     Does the patient have less than a 3 day supply:  [] Yes  [x] No    Would you like a call back once the refill request has been completed: [] Yes [x] No    If the office needs to give you a call back, can they leave a voicemail: [] Yes [x] No    Mj Quinnoes Rep   01/09/25 10:28 EST

## 2025-01-10 ENCOUNTER — TRANSCRIBE ORDERS (OUTPATIENT)
Dept: ADMINISTRATIVE | Facility: HOSPITAL | Age: 85
End: 2025-01-10
Payer: MEDICARE

## 2025-01-10 ENCOUNTER — LAB (OUTPATIENT)
Dept: LAB | Facility: HOSPITAL | Age: 85
End: 2025-01-10
Payer: MEDICARE

## 2025-01-10 DIAGNOSIS — E87.1 HYPOSMOLALITY SYNDROME: Primary | ICD-10-CM

## 2025-01-10 DIAGNOSIS — E87.1 HYPOSMOLALITY SYNDROME: ICD-10-CM

## 2025-01-10 LAB
ALBUMIN SERPL-MCNC: 4.2 G/DL (ref 3.5–5.2)
ALBUMIN/GLOB SERPL: 1.6 G/DL
ALP SERPL-CCNC: 58 U/L (ref 39–117)
ALT SERPL W P-5'-P-CCNC: 22 U/L (ref 1–33)
ANION GAP SERPL CALCULATED.3IONS-SCNC: 10 MMOL/L (ref 5–15)
AST SERPL-CCNC: 20 U/L (ref 1–32)
BILIRUB SERPL-MCNC: 0.5 MG/DL (ref 0–1.2)
BUN SERPL-MCNC: 11 MG/DL (ref 8–23)
BUN/CREAT SERPL: 14.3 (ref 7–25)
CALCIUM SPEC-SCNC: 9.3 MG/DL (ref 8.6–10.5)
CHLORIDE SERPL-SCNC: 96 MMOL/L (ref 98–107)
CO2 SERPL-SCNC: 28 MMOL/L (ref 22–29)
CREAT SERPL-MCNC: 0.77 MG/DL (ref 0.57–1)
EGFRCR SERPLBLD CKD-EPI 2021: 76.2 ML/MIN/1.73
GLOBULIN UR ELPH-MCNC: 2.6 GM/DL
GLUCOSE SERPL-MCNC: 95 MG/DL (ref 65–99)
POTASSIUM SERPL-SCNC: 4.5 MMOL/L (ref 3.5–5.2)
PROT SERPL-MCNC: 6.8 G/DL (ref 6–8.5)
SODIUM SERPL-SCNC: 134 MMOL/L (ref 136–145)

## 2025-01-10 PROCEDURE — 80053 COMPREHEN METABOLIC PANEL: CPT

## 2025-01-10 PROCEDURE — 36415 COLL VENOUS BLD VENIPUNCTURE: CPT

## 2025-01-14 DIAGNOSIS — F51.01 PRIMARY INSOMNIA: ICD-10-CM

## 2025-01-14 RX ORDER — ZOLPIDEM TARTRATE 10 MG/1
10 TABLET ORAL NIGHTLY PRN
Qty: 30 TABLET | Refills: 0 | Status: SHIPPED | OUTPATIENT
Start: 2025-01-14

## 2025-01-14 NOTE — TELEPHONE ENCOUNTER
Caller: Nu Martino    Relationship: Emergency Contact    Best call back number: [phone]    Requested Prescriptions:   Requested Prescriptions     Pending Prescriptions Disp Refills    zolpidem (AMBIEN) 10 MG tablet 30 tablet 0     Sig: Take 1 tablet by mouth At Night As Needed for Sleep.        Pharmacy where request should be sent: [facility] [address] [phone] [facility] [phone] FX     Last office visit with prescribing clinician: 12/6/2024   Last telemedicine visit with prescribing clinician: Visit date not found   Next office visit with prescribing clinician: 3/7/2025     Additional details provided by patient:     Does the patient have less than a 3 day supply:  [] Yes  [x] No      Mj Vasquez Rep   01/14/25 10:42 EST

## 2025-01-20 ENCOUNTER — LAB (OUTPATIENT)
Dept: LAB | Facility: HOSPITAL | Age: 85
End: 2025-01-20
Payer: MEDICARE

## 2025-01-20 ENCOUNTER — TELEPHONE (OUTPATIENT)
Dept: INTERNAL MEDICINE | Facility: CLINIC | Age: 85
End: 2025-01-20
Payer: MEDICARE

## 2025-01-20 ENCOUNTER — TRANSCRIBE ORDERS (OUTPATIENT)
Dept: ADMINISTRATIVE | Facility: HOSPITAL | Age: 85
End: 2025-01-20
Payer: MEDICARE

## 2025-01-20 DIAGNOSIS — E87.1 HYPONATREMIA: Primary | ICD-10-CM

## 2025-01-20 DIAGNOSIS — M17.11 PRIMARY OSTEOARTHRITIS OF RIGHT KNEE: ICD-10-CM

## 2025-01-20 DIAGNOSIS — E87.1 HYPONATREMIA: ICD-10-CM

## 2025-01-20 DIAGNOSIS — M17.12 PRIMARY OSTEOARTHRITIS OF LEFT KNEE: ICD-10-CM

## 2025-01-20 LAB
ANION GAP SERPL CALCULATED.3IONS-SCNC: 13.4 MMOL/L (ref 5–15)
BUN SERPL-MCNC: 12 MG/DL (ref 8–23)
BUN/CREAT SERPL: 14.8 (ref 7–25)
CALCIUM SPEC-SCNC: 9 MG/DL (ref 8.6–10.5)
CHLORIDE SERPL-SCNC: 101 MMOL/L (ref 98–107)
CO2 SERPL-SCNC: 23.6 MMOL/L (ref 22–29)
CREAT SERPL-MCNC: 0.81 MG/DL (ref 0.57–1)
EGFRCR SERPLBLD CKD-EPI 2021: 71.7 ML/MIN/1.73
GLUCOSE SERPL-MCNC: 104 MG/DL (ref 65–99)
POTASSIUM SERPL-SCNC: 3.7 MMOL/L (ref 3.5–5.2)
SODIUM SERPL-SCNC: 138 MMOL/L (ref 136–145)

## 2025-01-20 PROCEDURE — 80048 BASIC METABOLIC PNL TOTAL CA: CPT

## 2025-01-20 PROCEDURE — 36415 COLL VENOUS BLD VENIPUNCTURE: CPT

## 2025-01-20 RX ORDER — MELOXICAM 7.5 MG/1
7.5 TABLET ORAL DAILY
Qty: 90 TABLET | Refills: 0 | Status: SHIPPED | OUTPATIENT
Start: 2025-01-20

## 2025-01-20 NOTE — TELEPHONE ENCOUNTER
Caller: Naina Martino    Relationship: Emergency Contact    Best call back number:     What is the best time to reach you:     Who are you requesting to speak with (clinical staff, provider,  specific staff member):     Do you know the name of the person who called:     What was the call regarding: PATIENTS SPOUSE NAINA IS CALLING IN TO ASK FOR A CALL BACK TO DISCUSS A REFILL REQUEST FOR THE PATIENTS meloxicam (MOBIC) 7.5 MG tablet

## 2025-02-05 RX ORDER — OMEPRAZOLE 40 MG/1
40 CAPSULE, DELAYED RELEASE ORAL DAILY
Qty: 90 CAPSULE | Refills: 3 | Status: SHIPPED | OUTPATIENT
Start: 2025-02-05

## 2025-02-15 DIAGNOSIS — F51.01 PRIMARY INSOMNIA: ICD-10-CM

## 2025-02-17 RX ORDER — ZOLPIDEM TARTRATE 10 MG/1
10 TABLET ORAL NIGHTLY PRN
Qty: 30 TABLET | Refills: 0 | Status: SHIPPED | OUTPATIENT
Start: 2025-02-17

## 2025-02-19 ENCOUNTER — OFFICE VISIT (OUTPATIENT)
Age: 85
End: 2025-02-19
Payer: MEDICARE

## 2025-02-19 VITALS — BODY MASS INDEX: 29.64 KG/M2 | HEIGHT: 60 IN | TEMPERATURE: 98.2 F | WEIGHT: 151 LBS

## 2025-02-19 DIAGNOSIS — M17.11 PRIMARY OSTEOARTHRITIS OF RIGHT KNEE: ICD-10-CM

## 2025-02-19 DIAGNOSIS — R29.898 WEAKNESS OF BOTH LOWER EXTREMITIES: ICD-10-CM

## 2025-02-19 DIAGNOSIS — M17.12 PRIMARY OSTEOARTHRITIS OF LEFT KNEE: Primary | ICD-10-CM

## 2025-02-19 PROCEDURE — 1160F RVW MEDS BY RX/DR IN RCRD: CPT | Performed by: STUDENT IN AN ORGANIZED HEALTH CARE EDUCATION/TRAINING PROGRAM

## 2025-02-19 PROCEDURE — 1159F MED LIST DOCD IN RCRD: CPT | Performed by: STUDENT IN AN ORGANIZED HEALTH CARE EDUCATION/TRAINING PROGRAM

## 2025-02-19 PROCEDURE — 99214 OFFICE O/P EST MOD 30 MIN: CPT | Performed by: STUDENT IN AN ORGANIZED HEALTH CARE EDUCATION/TRAINING PROGRAM

## 2025-02-19 RX ORDER — KETOCONAZOLE 20 MG/G
CREAM TOPICAL
COMMUNITY
Start: 2025-02-10

## 2025-02-21 ENCOUNTER — TELEPHONE (OUTPATIENT)
Dept: ORTHOPEDIC SURGERY | Facility: CLINIC | Age: 85
End: 2025-02-21

## 2025-02-21 NOTE — TELEPHONE ENCOUNTER
Caller: Nu Martino    Relationship: Emergency Contact    Best call back number: [phone]    What form or medical record are you requesting: COPY OF RECENT IMAGING  OF KNEE ON DISC     Who is requesting this form or medical record from you: PATIENT    How would you like to receive the form or medical records (pick-up, mail, fax):      Timeframe paperwork needed: ASAP

## 2025-03-06 LAB
ALBUMIN SERPL-MCNC: 4.6 G/DL (ref 3.5–5.2)
ALBUMIN/GLOB SERPL: 1.8 G/DL
ALP SERPL-CCNC: 62 U/L (ref 39–117)
ALT SERPL-CCNC: 15 U/L (ref 1–33)
AMPHETAMINES UR QL SCN: NEGATIVE NG/ML
AST SERPL-CCNC: 20 U/L (ref 1–32)
BARBITURATES UR QL SCN: NEGATIVE NG/ML
BASOPHILS # BLD AUTO: 0.03 10*3/MM3 (ref 0–0.2)
BASOPHILS NFR BLD AUTO: 0.5 % (ref 0–1.5)
BENZODIAZ UR QL SCN: POSITIVE NG/ML
BILIRUB SERPL-MCNC: 0.6 MG/DL (ref 0–1.2)
BUN SERPL-MCNC: 12 MG/DL (ref 8–23)
BUN/CREAT SERPL: 14.8 (ref 7–25)
BZE UR QL SCN: NEGATIVE NG/ML
CALCIUM SERPL-MCNC: 10 MG/DL (ref 8.6–10.5)
CANNABINOIDS UR QL SCN: NEGATIVE NG/ML
CHLORIDE SERPL-SCNC: 96 MMOL/L (ref 98–107)
CHOLEST SERPL-MCNC: 228 MG/DL (ref 0–200)
CO2 SERPL-SCNC: 27.9 MMOL/L (ref 22–29)
CREAT SERPL-MCNC: 0.81 MG/DL (ref 0.57–1)
CREAT UR-MCNC: 99.3 MG/DL (ref 20–300)
EGFRCR SERPLBLD CKD-EPI 2021: 71.2 ML/MIN/1.73
EOSINOPHIL # BLD AUTO: 0.27 10*3/MM3 (ref 0–0.4)
EOSINOPHIL NFR BLD AUTO: 4.1 % (ref 0.3–6.2)
ERYTHROCYTE [DISTWIDTH] IN BLOOD BY AUTOMATED COUNT: 12.1 % (ref 12.3–15.4)
GLOBULIN SER CALC-MCNC: 2.6 GM/DL
GLUCOSE SERPL-MCNC: 100 MG/DL (ref 65–99)
HCT VFR BLD AUTO: 36.6 % (ref 34–46.6)
HDLC SERPL-MCNC: 93 MG/DL (ref 40–60)
HGB BLD-MCNC: 11.9 G/DL (ref 12–15.9)
IMM GRANULOCYTES # BLD AUTO: 0.03 10*3/MM3 (ref 0–0.05)
IMM GRANULOCYTES NFR BLD AUTO: 0.5 % (ref 0–0.5)
LABORATORY COMMENT REPORT: ABNORMAL
LDLC SERPL CALC-MCNC: 101 MG/DL (ref 0–100)
LYMPHOCYTES # BLD AUTO: 1.1 10*3/MM3 (ref 0.7–3.1)
LYMPHOCYTES NFR BLD AUTO: 16.8 % (ref 19.6–45.3)
MCH RBC QN AUTO: 31.8 PG (ref 26.6–33)
MCHC RBC AUTO-ENTMCNC: 32.5 G/DL (ref 31.5–35.7)
MCV RBC AUTO: 97.9 FL (ref 79–97)
METHADONE UR QL SCN: NEGATIVE NG/ML
MONOCYTES # BLD AUTO: 0.62 10*3/MM3 (ref 0.1–0.9)
MONOCYTES NFR BLD AUTO: 9.5 % (ref 5–12)
NEUTROPHILS # BLD AUTO: 4.51 10*3/MM3 (ref 1.7–7)
NEUTROPHILS NFR BLD AUTO: 68.6 % (ref 42.7–76)
NRBC BLD AUTO-RTO: 0 /100 WBC (ref 0–0.2)
OPIATES UR QL SCN: NEGATIVE NG/ML
OXYCODONE+OXYMORPHONE UR QL SCN: NEGATIVE NG/ML
PCP UR QL: NEGATIVE NG/ML
PH UR: 7.6 [PH] (ref 4.5–8.9)
PLATELET # BLD AUTO: 287 10*3/MM3 (ref 140–450)
POTASSIUM SERPL-SCNC: 4.6 MMOL/L (ref 3.5–5.2)
PROPOXYPH UR QL SCN: NEGATIVE NG/ML
PROT SERPL-MCNC: 7.2 G/DL (ref 6–8.5)
RBC # BLD AUTO: 3.74 10*6/MM3 (ref 3.77–5.28)
SODIUM SERPL-SCNC: 138 MMOL/L (ref 136–145)
TRIGL SERPL-MCNC: 203 MG/DL (ref 0–150)
TSH SERPL DL<=0.005 MIU/L-ACNC: 4.07 UIU/ML (ref 0.27–4.2)
VLDLC SERPL CALC-MCNC: 34 MG/DL (ref 5–40)
WBC # BLD AUTO: 6.56 10*3/MM3 (ref 3.4–10.8)

## 2025-03-10 NOTE — PROGRESS NOTES
"Chief Complaint   Patient presents with    Left Knee - Follow-up, Pain       History of Present Illness  Ritu is a 85 y.o. year old female here today accompanied by her  for follow-up of bilateral knee pain, left worse than right, that has been present intermittently for several (10-20+) years with worsening since early 2024 with no known injury or trauma. Initial x-rays showed moderate right and severe left osteoarthritis, which was consistent with her history and exam findings.  Her symptoms have been managed with serial injections on the left. Most recent cortisone injection was on 10/23/24. Please see previous notes for complete history and treatment course.   History of Present Illness  She reports that the gel injection administered October was ineffective in alleviating her knee pain. She has been managing her pain with meloxicam, prescribed by Dr. Figueroa, but continues to experience severe discomfort. She spends approximately 8 hours daily seated with a heating pad on her knee and has not been engaging in any physical activity. She has previously received two steroidal injections, which also failed to provide relief. She expresses interest in exploring surgical options for her condition. She reports no new injuries or falls.    MEDICATIONS  Meloxicam      Lab Results   Component Value Date    GLUCOSE 100 (H) 03/04/2025    BUN 12 03/04/2025    CREATININE 0.81 03/04/2025     03/04/2025    K 4.6 03/04/2025    CL 96 (L) 03/04/2025    CALCIUM 10.0 03/04/2025    PROTEINTOT 7.2 03/04/2025    ALBUMIN 4.6 03/04/2025    ALT 15 03/04/2025    AST 20 03/04/2025    ALKPHOS 62 03/04/2025    BILITOT 0.6 03/04/2025    GLOB 2.6 03/04/2025    AGRATIO 1.8 03/04/2025    BCR 14.8 03/04/2025    ANIONGAP 13.4 01/20/2025    EGFR 71.2 03/04/2025       Temp 98.2 °F (36.8 °C)   Ht 152.4 cm (60\")   Wt 68.5 kg (151 lb)   LMP  (LMP Unknown)   BMI 29.49 kg/m²        Physical Exam  MSK:  The bilateral knees are without " obvious signs of acute bony deformity, quadriceps atrophy, erythema, or ecchymosis. There is a mild effusion. The medial and lateral joint lines are tender (medial worse than lateral) and without bony crepitus or step-off. Soft tissue tenderness of the pes anserine and associated tendons. Knee strength is 4/5. Gait is slightly antalgic. No significant change compared to previous.      Assessment and Plan  Diagnoses and all orders for this visit:    1. Primary osteoarthritis of left knee (Primary)  -     Ambulatory Referral to Orthopedic Surgery  -     Ibuprofen 3 %, Gabapentin 10 %, Baclofen 2 %, lidocaine 4 %; Apply 1-2 g topically to the appropriate area as directed 3 (Three) to 4 (Four) times daily.  Dispense: 90 g; Refill: 3    2. Primary osteoarthritis of right knee    3. Weakness of both lower extremities    Ritu is a 85 y.o. year old female here today accompanied by her  for follow-up of bilateral knee pain, left worse than right, that has been present intermittently for several (10-20+) years with worsening since early 2024 with no known injury or trauma. Initial x-rays showed moderate right and severe left osteoarthritis osteoarthritis, which is consistent with her history and exam findings.  Her symptoms have been managed with cortisone injections, viscosupplementation, as well as oral and topical anti-inflammatories.   Assessment & Plan  1. Knee pain.  The patient reports persistent knee pain despite previous treatments. The patient has been taking meloxicam as prescribed by Dr. Figueroa, but it has not provided significant relief. A referral to Dr. Gomez, a knee replacement surgeon, will be initiated for further evaluation and to discuss potential surgical intervention. Additionally, a prescription for a topical compound medication will be provided to hopefully provide some relief of pain.    We will follow-up as needed.  All of her questions were answered and she is agreeable with the  plan.    Dictated utilizing Dragon dictation.  Patient or patient representative verbalized consent for the use of Ambient Listening during the visit with  Ladonna Melchor DO for chart documentation. 2/18/2025  15:44 EST

## 2025-03-12 ENCOUNTER — OFFICE VISIT (OUTPATIENT)
Dept: INTERNAL MEDICINE | Facility: CLINIC | Age: 85
End: 2025-03-12
Payer: MEDICARE

## 2025-03-12 VITALS
WEIGHT: 154 LBS | OXYGEN SATURATION: 96 % | SYSTOLIC BLOOD PRESSURE: 136 MMHG | HEIGHT: 60 IN | HEART RATE: 90 BPM | BODY MASS INDEX: 30.23 KG/M2 | DIASTOLIC BLOOD PRESSURE: 75 MMHG

## 2025-03-12 DIAGNOSIS — E87.1 HYPONATREMIA: ICD-10-CM

## 2025-03-12 DIAGNOSIS — E03.9 HYPOTHYROIDISM, UNSPECIFIED TYPE: ICD-10-CM

## 2025-03-12 DIAGNOSIS — R53.83 OTHER FATIGUE: ICD-10-CM

## 2025-03-12 DIAGNOSIS — E61.1 IRON DEFICIENCY: ICD-10-CM

## 2025-03-12 DIAGNOSIS — N39.3 STRESS INCONTINENCE OF URINE: ICD-10-CM

## 2025-03-12 DIAGNOSIS — F51.01 PRIMARY INSOMNIA: ICD-10-CM

## 2025-03-12 DIAGNOSIS — M25.561 CHRONIC PAIN OF BOTH KNEES: ICD-10-CM

## 2025-03-12 DIAGNOSIS — E87.6 HYPOKALEMIA: ICD-10-CM

## 2025-03-12 DIAGNOSIS — G89.29 CHRONIC PAIN OF BOTH KNEES: ICD-10-CM

## 2025-03-12 DIAGNOSIS — F41.9 ANXIETY: Primary | ICD-10-CM

## 2025-03-12 DIAGNOSIS — M25.562 CHRONIC PAIN OF BOTH KNEES: ICD-10-CM

## 2025-03-12 DIAGNOSIS — I10 ESSENTIAL HYPERTENSION, BENIGN: ICD-10-CM

## 2025-03-12 PROCEDURE — 99214 OFFICE O/P EST MOD 30 MIN: CPT | Performed by: STUDENT IN AN ORGANIZED HEALTH CARE EDUCATION/TRAINING PROGRAM

## 2025-03-12 PROCEDURE — 1125F AMNT PAIN NOTED PAIN PRSNT: CPT | Performed by: STUDENT IN AN ORGANIZED HEALTH CARE EDUCATION/TRAINING PROGRAM

## 2025-03-12 PROCEDURE — 3075F SYST BP GE 130 - 139MM HG: CPT | Performed by: STUDENT IN AN ORGANIZED HEALTH CARE EDUCATION/TRAINING PROGRAM

## 2025-03-12 PROCEDURE — 3078F DIAST BP <80 MM HG: CPT | Performed by: STUDENT IN AN ORGANIZED HEALTH CARE EDUCATION/TRAINING PROGRAM

## 2025-03-12 RX ORDER — FUROSEMIDE 20 MG/1
20 TABLET ORAL 2 TIMES DAILY
Qty: 120 TABLET | Refills: 2 | Status: SHIPPED | OUTPATIENT
Start: 2025-03-12

## 2025-03-12 RX ORDER — ZOLPIDEM TARTRATE 10 MG/1
10 TABLET ORAL NIGHTLY PRN
Qty: 60 TABLET | Refills: 0 | Status: SHIPPED | OUTPATIENT
Start: 2025-03-12

## 2025-03-12 RX ORDER — ALPRAZOLAM 0.5 MG
0.25 TABLET ORAL DAILY
Start: 2025-03-12

## 2025-03-12 RX ORDER — LEVOTHYROXINE SODIUM 50 UG/1
TABLET ORAL
Start: 2025-03-12

## 2025-03-12 NOTE — ASSESSMENT & PLAN NOTE
Only symptom is fatigue  - TSH and free T4 within normal range  - Currently taking levothyroxine 6 days/week  - No changes to medication regimen recommended

## 2025-03-12 NOTE — ASSESSMENT & PLAN NOTE
Hypertension is stable and controlled  Continue current treatment regimen.  Blood pressure will be reassessed in 6 months.  Follows with nephrology.  On Lasix 20 mg, valsartan 80 mg, clonidine as needed.  Orders:    furosemide (LASIX) 20 MG tablet; Take 1 tablet by mouth 2 (Two) Times a Day.

## 2025-03-12 NOTE — PROGRESS NOTES
Melvin Jimenez M.D.  Internal Medicine  [facility]  [address] [facility] [address], [address]  [place] [address]  [phone]      Chief Complaint  Hypertension (Follow up /)    SUBJECTIVE    History of Present Illness    Ritu Martino is a 85 y.o. female with hyponatremia, hypertension, anemia, vitamin D deficiency, hypokalemia who presents to the office today as an established patient that last saw me on 12/6/2024.     History of Present Illness  She has been taking levothyroxine for 5-6 years, initially every day but now 6 days a week because her TSH was high. She has been feeling very tired for the past 6 weeks, even though she gets enough sleep. She doesn't exercise because her knee hurts too much. She doesn't feel depressed and doesn't have issues with snoring or gasping for air while sleeping. Under Dr. Melchor's care, she received steroid and gel injections for her knee pain, but they didn't help, so she was referred to a surgeon. She has appointments with Dr. Ray Gomez on 04/22/2025 and with Dr. Mynor Bajwa's PA in 2 weeks. She is looking for advice on using CBD for her joint pain. She doesn't have any swelling in her legs. She takes Ambien and Xanax daily, sleeps from 10:30 PM to 9:00 AM, and sometimes wakes up to use the restroom. She drinks alcohol every night and hasn't changed this habit in the last 6 years.      Review of Systems    No Known Allergies     Outpatient Medications Marked as Taking for the 3/12/25 encounter (Office Visit) with Melvin Jimenez MD   Medication Sig Dispense Refill    acetaminophen (TYLENOL) 325 MG tablet Take 2 tablets by mouth Every 6 (Six) Hours As Needed for Mild Pain . (Patient taking differently: Take 500 mg by mouth Every 6 (Six) Hours As Needed for Mild Pain.)      ALPRAZolam (XANAX) 0.5 MG tablet Take 1 tablet by mouth Daily. 90 tablet 0    Cholecalciferol (VITAMIN D) 2000 units capsule Take  by mouth Daily With Dinner.      cloNIDine (Catapres) 0.1 MG  tablet 1 po bid prn sys greater or equal to 150 30 tablet 3    cycloSPORINE (RESTASIS) 0.05 % ophthalmic emulsion       desvenlafaxine (Pristiq) 100 MG 24 hr tablet Take 1 tablet by mouth Daily. 90 tablet 3    fluconazole (DIFLUCAN) 200 MG tablet Take 1 tablet by mouth Daily.      furosemide (LASIX) 20 MG tablet TAKE 1 TABLET TWICE A DAY 60 tablet 11    Ibuprofen 3 %, Gabapentin 10 %, Baclofen 2 %, lidocaine 4 % Apply 1-2 g topically to the appropriate area as directed 3 (Three) to 4 (Four) times daily. 90 g 3    ketoconazole (NIZORAL) 2 % cream APPLY TO RASH UNDER BREAST 1-2X DAILY AS NEEDED WHEN FLARED. MAY MIX 50/50 WITH TRIAMCINOLONE CREAM.      levothyroxine (SYNTHROID, LEVOTHROID) 50 MCG tablet TAKE 1 TABLET DAILY 90 tablet 3    Magnesium 400 MG capsule 1 p.o. twice daily 60 capsule 3    melatonin 5 MG tablet tablet Take 2 tablets by mouth.      meloxicam (MOBIC) 7.5 MG tablet Take 1 tablet by mouth Daily. 90 tablet 0    omeprazole (priLOSEC) 40 MG capsule TAKE 1 CAPSULE DAILY 90 capsule 3    Probiotic Product (ALIGN PO) Take 1 capsule by mouth Daily With Lunch.      Psyllium (Metamucil) wafer wafer Take  by mouth Daily.      rosuvastatin (CRESTOR) 20 MG tablet TAKE 1 TABLET EVERY NIGHT 90 tablet 3    SODIUM CHLORIDE PO Take  by mouth 3 (Three) Times a Day. 2 TABLETS 3 6TIMES A DAY      valsartan (DIOVAN) 80 MG tablet Take 1 tablet by mouth 2 (Two) Times a Day. 180 tablet 1    Vibegron (Gemtesa) 75 MG tablet Take  by mouth.      vitamin B-12 (CYANOCOBALAMIN) 100 MCG tablet Take 0.5 tablets by mouth Daily.      zolpidem (AMBIEN) 10 MG tablet TAKE 1 TABLET AT NIGHT AS NEEDED FOR SLEEP 30 tablet 0        Past Medical History:   Diagnosis Date    Anemia 2000    Anxiety     Arthritis     Bowel dysfunction 08/2021    since having surgery for diverticular infection    Chronic constipation     Depression     Diverticulitis 08/2021    w/ rupture and infection required surgery and ostomy    E coli infection 06/14/2022  "   Essential hypertension, benign     Fracture 2022    left ankle    Fracture, tibia and fibula Now wearing bood    GERD (gastroesophageal reflux disease)     Hernia, hiatal     Hypercholesterolemia     Hypertension     Hypokalemia     Hyponatremia     chronic low with occasional normal level    Hypothyroidism     estimated time frame pt nor  could remember exactly how long has been on medication    Insomnia     Iron deficiency     Seizures     Tear of meniscus of knee     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     Past Surgical History:   Procedure Laterality Date    APPENDECTOMY      BACK SURGERY      BACK SURGERY      Discectomy     SECTION      x2 lower midline incision    COLON SURGERY  2021    to address ruptured and infected diverticular dz, ostomy also placed    COLONOSCOPY      COLOSTOMY CLOSURE  10/2021    ostomy removed    EYE SURGERY  2 X cataract    HEMORRHOIDECTOMY      HERNIA REPAIR      KNEE ARTHROPLASTY       Family History   Problem Relation Age of Onset    Brain cancer Mother     Cancer Mother         Brain tumor, 1979    Stroke Father         Age 46    Early death Father         Stroke, age 46    Cancer Brother         pancreatic    Cancer Maternal Grandmother     Stroke Maternal Grandfather     Other (age) Paternal Grandmother     Other (age) Paternal Grandfather     Cancer Daughter         Dec'd 2000-    reports that she quit smoking about 44 years ago. Her smoking use included cigarettes. She started smoking about 69 years ago. She has a 3.8 pack-year smoking history. She has been exposed to tobacco smoke. She has never used smokeless tobacco. She reports current alcohol use of about 8.0 standard drinks of alcohol per week. She reports that she does not use drugs.    OBJECTIVE    Vital Signs:   /75   Pulse 90   Ht 152.4 cm (60\")   Wt 69.9 kg (154 lb)   SpO2 96%   BMI 30.08 kg/m²     Physical Exam  Constitutional:       " Appearance: Normal appearance.   Cardiovascular:      Rate and Rhythm: Normal rate and regular rhythm.      Heart sounds: Normal heart sounds. No murmur heard.  Pulmonary:      Effort: Pulmonary effort is normal.      Breath sounds: Normal breath sounds.   Musculoskeletal:      Right lower leg: No edema.      Left lower leg: No edema.   Skin:     General: Skin is warm and dry.   Neurological:      Mental Status: She is alert.   Psychiatric:         Behavior: Behavior normal.          Physical Exam      The following data was reviewed by: Melvin Jimenez MD on 03/12/2025:  CMP          1/10/2025    08:28 1/20/2025    14:03 3/4/2025    09:45   CMP   Glucose 95  104  100    BUN 11  12  12    Creatinine 0.77  0.81  0.81    EGFR 76.2  71.7  71.2    Sodium 134  138  138    Potassium 4.5  3.7  4.6    Chloride 96  101  96    Calcium 9.3  9.0  10.0    Total Protein 6.8   7.2    Albumin 4.2   4.6    Globulin 2.6   2.6    Total Bilirubin 0.5   0.6    Alkaline Phosphatase 58   62    AST (SGOT) 20   20    ALT (SGPT) 22   15    Albumin/Globulin Ratio 1.6   1.8    BUN/Creatinine Ratio 14.3  14.8  14.8    Anion Gap 10.0  13.4       CBC w/diff          5/22/2024    11:01 11/26/2024    10:54 3/4/2025    09:45   CBC w/Diff   WBC 5.93  5.50  6.56    RBC 3.73  3.72  3.74    Hemoglobin 11.5  11.7  11.9    Hematocrit 34.5  35.6  36.6    MCV 92.5  95.7  97.9    MCH 30.8  31.5  31.8    MCHC 33.3  32.9  32.5    RDW 12.4  12.3  12.1    Platelets 324  310  287    Neutrophil Rel % 72.7  73.3  68.6    Lymphocyte Rel % 14.5  13.8  16.8    Monocyte Rel % 10.3  9.5  9.5    Eosinophil Rel % 2.0  2.5  4.1    Basophil Rel % 0.3  0.5  0.5      Lipid Panel          8/22/2024    11:31 11/26/2024    10:54 3/4/2025    09:45   Lipid Panel   Total Cholesterol 218  219  228    Triglycerides 79  122  203    HDL Cholesterol 109  106  93    VLDL Cholesterol 14  20  34    LDL Cholesterol  95  93  101      Electrolytes          1/10/2025    08:28 1/20/2025    14:03  3/4/2025    09:45   Electrolytes   Sodium 134  138  138    Potassium 4.5  3.7  4.6    Chloride 96  101  96    Calcium 9.3  9.0  10.0      A1C Last 3 Results          5/22/2024    11:01   HGBA1C Last 3 Results   Hemoglobin A1C 5.70      Data reviewed : Sports medicine note              ASSESSMENT & PLAN        Anxiety  On Pristiq and Xanax as needed.  She also has Ambien for insomnia. This works well. Advised to avoid alcohol.    Orders:    ALPRAZolam (XANAX) 0.5 MG tablet; Take 0.5 tablets by mouth Daily.    Essential hypertension, benign  Hypertension is stable and controlled  Continue current treatment regimen.  Blood pressure will be reassessed in 6 months.  Follows with nephrology.  On Lasix 20 mg, valsartan 80 mg, clonidine as needed.  Orders:    furosemide (LASIX) 20 MG tablet; Take 1 tablet by mouth 2 (Two) Times a Day.    Hypothyroidism, unspecified type  Only symptom is fatigue  - TSH and free T4 within normal range  - Currently taking levothyroxine 6 days/week  - No changes to medication regimen recommended       Stress incontinence of urine  -follows with Dr. Alejandra Cantu.  Botox did not help.  Now on Gemtesa.       Chronic pain of both knees  -Knee osteoarthritis-seeing sports medicine for injections of the knee but she does not think this is helping much.  She was referred to surgeon by sports medicine. Prescribed topical medication. She wants to try CBD.   - Received steroid and gel injections from Dr. Melchor without sufficient relief  - Scheduled to see a surgeon for possible knee replacement       Hyponatremia  Follows with nephrology.  This has been stable.  She is on an SSRI which could be contributing.  On salt tablets.  Orders:    furosemide (LASIX) 20 MG tablet; Take 1 tablet by mouth 2 (Two) Times a Day.    Iron deficiency  Stable anemia.  On replacement       Hypokalemia  Potassium supplementation discontinued by her nephrologist.       Other fatigue  -Fatigued for six weeks.   -Tired at end of  day.   -Discussed sleep hygiene measures including regular sleep schedule, optimal sleep environment, and relaxing presleep rituals.  -Avoid daytime naps.  -Avoid caffeine after noon.  -Avoid excess alcohol.  -Recommended daily exercise.  - Blood work stable  - Fatigue unlikely serious given recent labs and health status  - Sedating Medications (Ambien, Xanax) may contribute  - Reduce Xanax dosage by half   - Decrease alcohol consumption to improve sleep quality             Assessment & Plan                Health Maintenance Due   Topic Date Due    COVID-19 Vaccine (8 - 2024-25 season) 03/13/2025    ANNUAL WELLNESS VISIT  05/24/2025        Follow Up  No follow-ups on file.    Patient/family had no further questions at this time and verbalized understanding of the plan discussed today.     Patient or patient representative verbalized consent for the use of Ambient Listening during the visit with  Melvin Jimenez MD for chart documentation. 3/12/2025  15:19 EDT

## 2025-03-12 NOTE — ASSESSMENT & PLAN NOTE
On Pristiq and Xanax as needed.  She also has Ambien for insomnia. This works well. Advised to avoid alcohol.    Orders:    ALPRAZolam (XANAX) 0.5 MG tablet; Take 0.5 tablets by mouth Daily.

## 2025-04-01 ENCOUNTER — TELEPHONE (OUTPATIENT)
Dept: INTERNAL MEDICINE | Facility: CLINIC | Age: 85
End: 2025-04-01

## 2025-04-01 NOTE — TELEPHONE ENCOUNTER
Caller: Nu Martino    Relationship: Emergency Contact    Best call back number: [phone]    What is the best time to reach you: ANYTIME     Who are you requesting to speak with (clinical staff, provider,  specific staff member): CLINICAL     What was the call regarding: PATIENTS  STATES THAT THE PATIENT WILL BE HAVING A LEFT KNEE REPLACEMENT ON 4/21/25 BY DR POSADA THROUGH [facility] AND HE WANTED TO MAKE SURE DR CARTAGENA WAS AWARE.

## 2025-04-03 ENCOUNTER — LAB (OUTPATIENT)
Dept: LAB | Facility: HOSPITAL | Age: 85
End: 2025-04-03
Payer: MEDICARE

## 2025-04-03 ENCOUNTER — TRANSCRIBE ORDERS (OUTPATIENT)
Dept: LAB | Facility: HOSPITAL | Age: 85
End: 2025-04-03
Payer: MEDICARE

## 2025-04-03 ENCOUNTER — HOSPITAL ENCOUNTER (OUTPATIENT)
Dept: CARDIOLOGY | Facility: HOSPITAL | Age: 85
Discharge: HOME | End: 2025-04-03
Payer: MEDICARE

## 2025-04-03 DIAGNOSIS — M17.12 ARTHRITIS OF LEFT KNEE: ICD-10-CM

## 2025-04-03 DIAGNOSIS — M17.12 ARTHRITIS OF LEFT KNEE: Primary | ICD-10-CM

## 2025-04-03 LAB
ANION GAP SERPL CALCULATED.3IONS-SCNC: 8 MMOL/L (ref 5–15)
BASOPHILS # BLD AUTO: 0.01 10*3/MM3 (ref 0–0.2)
BASOPHILS NFR BLD AUTO: 0.2 % (ref 0–1.5)
BUN SERPL-MCNC: 13 MG/DL (ref 8–23)
BUN/CREAT SERPL: 17.8 (ref 7–25)
CALCIUM SPEC-SCNC: 8.4 MG/DL (ref 8.6–10.5)
CHLORIDE SERPL-SCNC: 104 MMOL/L (ref 98–107)
CO2 SERPL-SCNC: 26 MMOL/L (ref 22–29)
CREAT SERPL-MCNC: 0.73 MG/DL (ref 0.57–1)
DEPRECATED RDW RBC AUTO: 42 FL (ref 37–54)
EGFRCR SERPLBLD CKD-EPI 2021: 80.7 ML/MIN/1.73
EOSINOPHIL # BLD AUTO: 0.32 10*3/MM3 (ref 0–0.4)
EOSINOPHIL NFR BLD AUTO: 6.1 % (ref 0.3–6.2)
ERYTHROCYTE [DISTWIDTH] IN BLOOD BY AUTOMATED COUNT: 12 % (ref 12.3–15.4)
GLUCOSE SERPL-MCNC: 96 MG/DL (ref 65–99)
HBA1C MFR BLD: 5 % (ref 4.8–5.6)
HCT VFR BLD AUTO: 32.3 % (ref 34–46.6)
HGB BLD-MCNC: 10.2 G/DL (ref 12–15.9)
IMM GRANULOCYTES # BLD AUTO: 0.02 10*3/MM3 (ref 0–0.05)
IMM GRANULOCYTES NFR BLD AUTO: 0.4 % (ref 0–0.5)
LYMPHOCYTES # BLD AUTO: 1.05 10*3/MM3 (ref 0.7–3.1)
LYMPHOCYTES NFR BLD AUTO: 20.2 % (ref 19.6–45.3)
MCH RBC QN AUTO: 30.5 PG (ref 26.6–33)
MCHC RBC AUTO-ENTMCNC: 31.6 G/DL (ref 31.5–35.7)
MCV RBC AUTO: 96.7 FL (ref 79–97)
MONOCYTES # BLD AUTO: 0.51 10*3/MM3 (ref 0.1–0.9)
MONOCYTES NFR BLD AUTO: 9.8 % (ref 5–12)
NEUTROPHILS NFR BLD AUTO: 3.3 10*3/MM3 (ref 1.7–7)
NEUTROPHILS NFR BLD AUTO: 63.3 % (ref 42.7–76)
NRBC BLD AUTO-RTO: 0 /100 WBC (ref 0–0.2)
PLATELET # BLD AUTO: 278 10*3/MM3 (ref 140–450)
PMV BLD AUTO: 8.6 FL (ref 6–12)
POTASSIUM SERPL-SCNC: 3.5 MMOL/L (ref 3.5–5.2)
QT INTERVAL: 396 MS
QTC INTERVAL: 434 MS
RBC # BLD AUTO: 3.34 10*6/MM3 (ref 3.77–5.28)
SODIUM SERPL-SCNC: 138 MMOL/L (ref 136–145)
WBC NRBC COR # BLD AUTO: 5.21 10*3/MM3 (ref 3.4–10.8)

## 2025-04-03 PROCEDURE — 82985 ASSAY OF GLYCATED PROTEIN: CPT

## 2025-04-03 PROCEDURE — 80048 BASIC METABOLIC PNL TOTAL CA: CPT

## 2025-04-03 PROCEDURE — 93005 ELECTROCARDIOGRAM TRACING: CPT | Performed by: ORTHOPAEDIC SURGERY

## 2025-04-03 PROCEDURE — 83036 HEMOGLOBIN GLYCOSYLATED A1C: CPT

## 2025-04-03 PROCEDURE — 36415 COLL VENOUS BLD VENIPUNCTURE: CPT

## 2025-04-03 PROCEDURE — 85025 COMPLETE CBC W/AUTO DIFF WBC: CPT

## 2025-04-04 LAB — FRUCTOSAMINE SERPL-SCNC: 206 UMOL/L (ref 0–285)

## 2025-04-14 DIAGNOSIS — F41.9 ANXIETY: ICD-10-CM

## 2025-04-14 RX ORDER — ALPRAZOLAM 0.5 MG
0.25 TABLET ORAL DAILY
Start: 2025-04-14 | End: 2025-04-16 | Stop reason: SDUPTHER

## 2025-04-14 NOTE — TELEPHONE ENCOUNTER
Caller: Nu Martino    Relationship: Emergency Contact    Best call back number: [phone]     Requested Prescriptions:   Requested Prescriptions     Pending Prescriptions Disp Refills    ALPRAZolam (XANAX) 0.5 MG tablet       Sig: Take 0.5 tablets by mouth Daily.        Pharmacy where request should be sent: [id number] [address]  AT [facility] [phone] [facility] [phone] FX     Last office visit with prescribing clinician: 3/12/2025   Last telemedicine visit with prescribing clinician: Visit date not found   Next office visit with prescribing clinician: 5/27/2025     Additional details provided by patient: 2 TABLETS ON HAND    REQUESTING A 90 DAY SUPPLY    Does the patient have less than a 3 day supply:  [x] Yes  [] No    Would you like a call back once the refill request has been completed: [] Yes [] No    If the office needs to give you a call back, can they leave a voicemail: [] Yes [] No    Mj Webb Rep   04/14/25 12:30 EDT

## 2025-04-16 DIAGNOSIS — F41.9 ANXIETY: ICD-10-CM

## 2025-04-16 RX ORDER — ALPRAZOLAM 0.5 MG
0.25 TABLET ORAL DAILY
Qty: 30 TABLET | Refills: 0 | Status: SHIPPED | OUTPATIENT
Start: 2025-04-16

## 2025-04-28 ENCOUNTER — TELEPHONE (OUTPATIENT)
Age: 85
End: 2025-04-28
Payer: MEDICARE

## 2025-04-28 NOTE — TELEPHONE ENCOUNTER
Caller: Ritu Martino    Relationship: Self    Best call back number:   Telephone Information:   Mobile [phone]             What was the call regarding: PATIENT CALLING IS A POST OP WILL BE FINISHED WITH IN HOME THERAPY THIS WEEK IS NEEDING TO GET SCHEDULED FOR OUT PATIENT. CALLED OFFICE WITH NO ANSWER.

## 2025-04-29 ENCOUNTER — TELEPHONE (OUTPATIENT)
Dept: INTERNAL MEDICINE | Facility: CLINIC | Age: 85
End: 2025-04-29
Payer: MEDICARE

## 2025-04-29 NOTE — TELEPHONE ENCOUNTER
Left voice mail for [employer]. Received fax today, awaiting Dr. Jimenez's signature and will fax later today or early AM, but it will be faxed in the next 24 hours.     OKAY FOR HUB TO SHARE INFORMATION ABOVE AND   ANY OTHER PREVIOUS MESSAGES IN THIS NOTE/CALL.

## 2025-04-29 NOTE — TELEPHONE ENCOUNTER
FATOU with [facility] called and states she is faxing a form for [employer]. She states this will be the 3rd time faxed. I gave her the regular and STAT fax numbers.The form is required by their facility to be completed, signed, and faxed back to their office at [phone] no later than 05/02/2025.   If not completed and faxed back in the time frame the patient's appointment will be cancelled. If you have any questions please call FATOU back at [phone].

## 2025-05-02 DIAGNOSIS — F51.01 PRIMARY INSOMNIA: ICD-10-CM

## 2025-05-02 RX ORDER — ZOLPIDEM TARTRATE 10 MG/1
10 TABLET ORAL NIGHTLY PRN
Qty: 60 TABLET | Refills: 0 | Status: SHIPPED | OUTPATIENT
Start: 2025-05-02

## 2025-05-02 NOTE — TELEPHONE ENCOUNTER
Caller: Nu Martino    Relationship: Emergency Contact    Best call back number: [phone]     Requested Prescriptions:   Requested Prescriptions     Pending Prescriptions Disp Refills    zolpidem (AMBIEN) 10 MG tablet 60 tablet 0     Sig: Take 1 tablet by mouth At Night As Needed for Sleep.        Pharmacy where request should be sent: [facility] #[id number] [facility] [address] [place] [address]  AT [facility] [phone] [facility] [phone] FX     Last office visit with prescribing clinician: 3/12/2025   Last telemedicine visit with prescribing clinician: Visit date not found   Next office visit with prescribing clinician: 5/27/2025       Mj Spaulding Rep   05/02/25 13:24 EDT

## 2025-05-06 ENCOUNTER — TREATMENT (OUTPATIENT)
Age: 85
End: 2025-05-06
Payer: MEDICARE

## 2025-05-06 DIAGNOSIS — G89.29 CHRONIC PAIN OF LEFT KNEE: ICD-10-CM

## 2025-05-06 DIAGNOSIS — Z96.652 STATUS POST TOTAL KNEE REPLACEMENT, LEFT: Primary | ICD-10-CM

## 2025-05-06 DIAGNOSIS — M25.562 CHRONIC PAIN OF LEFT KNEE: ICD-10-CM

## 2025-05-06 DIAGNOSIS — R26.2 DIFFICULTY WALKING: ICD-10-CM

## 2025-05-06 PROCEDURE — 97530 THERAPEUTIC ACTIVITIES: CPT | Performed by: PHYSICAL THERAPIST

## 2025-05-06 PROCEDURE — 97110 THERAPEUTIC EXERCISES: CPT | Performed by: PHYSICAL THERAPIST

## 2025-05-06 PROCEDURE — 97162 PT EVAL MOD COMPLEX 30 MIN: CPT | Performed by: PHYSICAL THERAPIST

## 2025-05-06 NOTE — PROGRESS NOTES
Physical Therapy Initial Evaluation and Plan of Care    Patient: Ritu Martino   : 1940  Diagnosis/ICD-10 Code:  Status post total knee replacement, left [Z96.652]  Referring practitioner: Ryder Jo MD  Date of Initial Visit: 2025  Today's Date: 2025  Patient seen for 1 sessions  PT Clinic location: [place] [address]     Phone: [phone]   Fax: [phone]          Visit Diagnoses:    ICD-10-CM ICD-9-CM   1. Status post total knee replacement, left  Z96.652 V43.65   2. Chronic pain of left knee  M25.562 719.46    G89.29 338.29   3. Difficulty walking  R26.2 719.7       Subjective Questionnaire: WOMAC: 35/96    Subjective Evaluation    History of Present Illness  Mechanism of injury: History of current condition: Presents s/p left total knee replacement on 25 with Dr. Jo. Had 2 weeks of home health. They had her try to stop using the walker but that increased her pain so went back to it.     Reported functional limitation: limited walking and standing distance, was using cane for 1 year prior to surgery but currently reliant on walker, needs help with socks and shoes.         Quality of life: good    Pain  Current pain ratin  Location: left knee    Patient Goals  Patient goals for therapy: decreased pain, improved balance, increased motion, increased strength, independence with ADLs/IADLs and return to sport/leisure activities           Medical history: anxiety, arthritis, HTN. See chart for further detail.       Objective          Observations     Additional Knee Observation Details  GAIT: ambulates with FWW, slow maddy, decreased step length  TRANSFERS: BUE assist for sit to stand    Active Range of Motion   Left Knee   Flexion: 95 degrees   Extension: 6 degrees   Extensor lag: 10 degrees     Passive Range of Motion   Left Knee   Flexion: 98 degrees   Extension: 5 degrees     Patellar Mobility   Left Knee  Hypomobile in the left medial, left lateral, left superior and left inferior patellar tendon(s).     Strength/Myotome Testing     Left Knee   Quadriceps contraction: fair          Assessment & Plan       Assessment  Impairments: abnormal gait, abnormal muscle firing, abnormal muscle tone, abnormal or restricted ROM, activity intolerance, impaired balance, impaired physical strength, lacks appropriate home exercise program, pain with function and safety issue   Functional limitations: sleeping, walking, uncomfortable because of pain, moving in bed, sitting, standing and unable to perform repetitive tasks   Assessment details: The patient is a 85 y.o. female who presents to physical therapy today s/p left total knee replacement on 4/21/25. Upon initial evaluation, the patient demonstrates the following impairments: limited knee ROM, impaired gait mechanics, LLE weakness, impaired gait mechanics, reliance on AD for ambulation.     Due to these impairments and post op restrictions, the patient is unable to perform or has difficulty with the following functional tasks: standing, walking, sleeping, negotiating stairs, reliant on walker. The patient would benefit from skilled PT services to address functional limitations and impairments and to improve patient quality of life.    Prognosis: good    Goals  Plan Goals: STG: (to be met in 4 weeks)  1. Pt will be independent and compliant with initial HEP in 2 weeks.  2. Pt will improve knee flexion AROM to > 105 degrees.  3. Pt will improve knee extension PROM to <5 degrees.  4. Pt will complete 10 SLR without extensor lag to indicate improved quad strength.  5. Pt will ambulate household distances without AD with minimally antalgic gait with good balance.     LTG: (to be met by DC)  1. Pt will be independent with final HEP for self-management of condition by DC.  2. Pt will improve score on WOMAC outcome measure to <20/96 by DC.  3. Pt will improve knee flexion AROM to at  least 115 degrees and knee extension to at least 0 deg in order to be able to walk with normal gait mechanics by DC.  4. Pt will improve knee strength to at least 4+/5 in order to be able to negotiate stairs with proper mechanics by DC.  5. Pt will be able to ambulate community distances with or without SPC and normal mechanics by DC.  6. Pt will be able to step over 4 inch obstacle without UE support and without LOB in order to be able to safely ambulate on uneven surfaces by DC.    Plan  Therapy options: will be seen for skilled therapy services  Planned modality interventions: cryotherapy, electrical stimulation/Russian stimulation, TENS and thermotherapy (hydrocollator packs)  Planned therapy interventions: manual therapy, abdominal trunk stabilization, ADL retraining, balance/weight-bearing training, body mechanics training, flexibility, functional ROM exercises, gait training, home exercise program, IADL retraining, joint mobilization, motor coordination training, neuromuscular re-education, postural training, soft tissue mobilization, spinal/joint mobilization, strengthening, stretching, therapeutic activities and transfer training  Frequency: 2x week  Duration in weeks: 12  Treatment plan discussed with: patient        See flowsheets for treatment detail.    History # of Personal Factors and/or Comorbidities: MODERATE (1-2)  Examination of Body System(s): # of elements: MODERATE (3)  Clinical Presentation: EVOLVING  Clinical Decision Making: MODERATE      Timed:         Manual Therapy:         mins  34945;     Therapeutic Exercise:    15     mins  50355;     Neuromuscular Jessica:        mins  24749;    Therapeutic Activity:     10     mins  89937;     Gait Training:           mins  26671;     Ultrasound:          mins  87951;    Ionto                                   mins   77201  Self Care                            mins   31846      Un-Timed:  Electrical Stimulation:         mins  73725 ( );  Dry  Needling          mins self-pay  Traction          mins 75526  Low Eval          Mins  20247  Mod Eval     25     Mins  79319  High Eval                            Mins  50000  Re-Eval                               mins  08335      Timed Treatment:   25   mins   Total Treatment:     50   mins      PT: Aylin Arenas PT     [place] PT license #: [id number]  [place] PT license #: [id number]  Electronically signed by Aylin Arenas PT, 05/06/25, 11:44 AM EDT    Certification Period: 5/6/2025 thru 8/3/2025  I certify that the therapy services are furnished while this patient is under my care.  The services outlined above are required by this patient, and will be reviewed every 90 days.         Physician Signature:__________________________________________________    PHYSICIAN: Ryder Jo MD  NPI: [id number]                                      DATE:      Please sign and return via fax to [place] [address] [address]     Phone: [phone]   Fax: [phone]  . Thank you, [facility].

## 2025-05-07 DIAGNOSIS — F51.01 PRIMARY INSOMNIA: ICD-10-CM

## 2025-05-08 ENCOUNTER — TELEPHONE (OUTPATIENT)
Dept: INTERNAL MEDICINE | Facility: CLINIC | Age: 85
End: 2025-05-08
Payer: MEDICARE

## 2025-05-08 RX ORDER — ZOLPIDEM TARTRATE 10 MG/1
10 TABLET ORAL NIGHTLY PRN
Qty: 30 TABLET | Refills: 0 | Status: SHIPPED | OUTPATIENT
Start: 2025-05-08

## 2025-05-08 RX ORDER — DESVENLAFAXINE 100 MG/1
100 TABLET, EXTENDED RELEASE ORAL DAILY
Qty: 90 TABLET | Refills: 3 | Status: SHIPPED | OUTPATIENT
Start: 2025-05-08

## 2025-05-08 NOTE — TELEPHONE ENCOUNTER
OK for HUB to read:    If patient calls or  calls please let them know that Dr. Jimenez would like for her to start take 400 mg of magnesium oxide when she gets home from her prolia infusion today.     Dr. Jimenez at the end of the month will plan to check her magnesium and calcium with her next scheduled blood work appointment

## 2025-05-12 ENCOUNTER — TREATMENT (OUTPATIENT)
Age: 85
End: 2025-05-12
Payer: MEDICARE

## 2025-05-12 DIAGNOSIS — Z96.652 STATUS POST TOTAL KNEE REPLACEMENT, LEFT: ICD-10-CM

## 2025-05-12 DIAGNOSIS — R26.2 DIFFICULTY WALKING: ICD-10-CM

## 2025-05-12 DIAGNOSIS — M25.562 CHRONIC PAIN OF LEFT KNEE: Primary | ICD-10-CM

## 2025-05-12 DIAGNOSIS — G89.29 CHRONIC PAIN OF LEFT KNEE: Primary | ICD-10-CM

## 2025-05-12 PROCEDURE — 97140 MANUAL THERAPY 1/> REGIONS: CPT | Performed by: PHYSICAL THERAPIST

## 2025-05-12 PROCEDURE — 97116 GAIT TRAINING THERAPY: CPT | Performed by: PHYSICAL THERAPIST

## 2025-05-12 PROCEDURE — 97535 SELF CARE MNGMENT TRAINING: CPT | Performed by: PHYSICAL THERAPIST

## 2025-05-12 PROCEDURE — 97110 THERAPEUTIC EXERCISES: CPT | Performed by: PHYSICAL THERAPIST

## 2025-05-12 NOTE — PROGRESS NOTES
Physical Therapy Daily Treatment Note    [facility] PT - [facility]  [address]  [address]  [place] [address]     Patient: Ritu Martino   : 1940  Referring practitioner: No ref. provider found  Date of Initial Visit: Type: THERAPY  Noted: 2025  Today's Date: 2025  Patient seen for 2 sessions         Ritu Martino reports: Left knee is sore and stiff, but the pain is less than before the surgery.        Subjective     Objective   Ambulates in and out of clinic with rolling walker and noted decreased bilateral step and stride length.  See Exercise, Manual, and Modality Logs for complete treatment.   Exercise rationale/ pain free exercise performance  Anatomy and structure of affected musculature  Assistive device - cane vs walker  Ice application to affected area  Sleeping positions with pillows  Alternate exercise positions  Verbal/Tactile cues to ensure correct exercise performance/technique    Added:NuStep, hip flexor/quad stretch over table L, hs stretch with strap    Reviewed and performed proper sequencing, cane height with patient.  Ambulated in and around clinic with straight cane working on sequencing and technique with patient.  At end of 8-minute instruction and practice, patient was able to ambulate with step through gait with increased foot clearance and step and stride length bilateral lower extremities.  No loss of balance episodes with performance.      Assessment/Plan  Compliant/Cooperative with rehab efforts.  Subjectively reports no increased symptoms or discomfort with therapeutic exercise today.  Able to perform increased exercise/functional activity without increased L knee pain.  Benefits from verbal/tactile cues to ensure proper exercise positioning, technique and performance as well as education regarding condition and strategies to improve posture, sleep and ambulation..          Progress strengthening /stabilization /functional activity            Timed:  Manual Therapy:    15     mins  69232;  Therapeutic Exercise:  20       mins  79468;     Neuromuscular Jessica:        mins  16553;    Therapeutic Activity:          mins  95542;     Gait Trainin    mins  91076;     Ultrasound:          mins  26112;    Self Care                    _12__      mins 01522    Untimed:  Electrical Stimulation:         mins  15073 ( );  Mechanical Traction:         mins  16719;     Timed Treatment:  55    mins   Total Treatment:    55    mins  [facility]  Physical Therapist  Assistant  [id number]

## 2025-05-13 DIAGNOSIS — F41.9 ANXIETY: ICD-10-CM

## 2025-05-13 RX ORDER — ALPRAZOLAM 0.5 MG
0.25 TABLET ORAL DAILY
Qty: 45 TABLET | Refills: 0 | Status: SHIPPED | OUTPATIENT
Start: 2025-05-13

## 2025-05-13 NOTE — TELEPHONE ENCOUNTER
Spoke with patient and she states she has been taking medication Xanax one pill daily, not half a pill as prescribed. She was not aware that Dr. Jimenez had put directions to take 0.5 pill daily.       LOV 03.12.25 NOV 05.27.25  UDS 09.06.23  CONTRACT 12.06.24

## 2025-05-13 NOTE — TELEPHONE ENCOUNTER
Caller: Nu Martino    Relationship: Emergency Contact    Best call back number: [phone] (Home)     Requested Prescriptions:   ALPRAZolam (XANAX) 0.5 MG tablet       Pharmacy where request should be sent:  [facility] - [address] [phone] [facility] [phone]      Last office visit with prescribing clinician: 3/12/2025   Last telemedicine visit with prescribing clinician: Visit date not found   Next office visit with prescribing clinician: 5/27/2025     Additional details provided by patient: PATIENT CALLED TO REQUEST A MEDICATION REFILL ON PATIENT'S MEDICATION. PATIENT HAS A 11 DAY SUPPLY LEFT.      Does the patient have less than a 3 day supply:  [] Yes  [x] No    Would you like a call back once the refill request has been completed: [] Yes [] No    If the office needs to give you a call back, can they leave a voicemail: [] Yes [] No    Mj Sepulveda Rep   05/13/25 12:40 EDT         THANKS

## 2025-05-14 ENCOUNTER — TREATMENT (OUTPATIENT)
Age: 85
End: 2025-05-14
Payer: MEDICARE

## 2025-05-14 DIAGNOSIS — G89.29 CHRONIC PAIN OF LEFT KNEE: Primary | ICD-10-CM

## 2025-05-14 DIAGNOSIS — Z96.652 STATUS POST TOTAL KNEE REPLACEMENT, LEFT: ICD-10-CM

## 2025-05-14 DIAGNOSIS — R26.2 DIFFICULTY WALKING: ICD-10-CM

## 2025-05-14 DIAGNOSIS — M25.562 CHRONIC PAIN OF LEFT KNEE: Primary | ICD-10-CM

## 2025-05-14 PROCEDURE — 97530 THERAPEUTIC ACTIVITIES: CPT | Performed by: PHYSICAL THERAPIST

## 2025-05-14 PROCEDURE — 97535 SELF CARE MNGMENT TRAINING: CPT | Performed by: PHYSICAL THERAPIST

## 2025-05-14 PROCEDURE — 97110 THERAPEUTIC EXERCISES: CPT | Performed by: PHYSICAL THERAPIST

## 2025-05-14 NOTE — PROGRESS NOTES
Physical Therapy Daily Treatment Note    [facility] - [facility]  [address]  [place] [address]     Patient: Ritu Martino   : 1940  Referring practitioner: Ryder Jo MD  Date of Initial Visit: Type: THERAPY  Noted: 2025  Today's Date: 2025  Patient seen for 3 sessions         Ritu Martino reports: left knee sore in the thigh from exercises.  Sleeping better and moving better.  Trying to use the cane vs rolling walker more in the walking.         Subjective     Objective          Active Range of Motion   Left Knee   Flexion: 100 degrees     Additional Active Range of Motion Details  AAROM L knee flexion 108 deg    Passive Range of Motion   Left Knee   Flexion: 113 degrees       Ambulates in and out of clinic with quad-based cane.  Noted in increased step and stride length bilateral lower extremities with cane but also noted improper quad cane use as patient tends to put 2 prongs of the cane down and ambulate into 4 canes with 2 cane pushoff.  Reviewed proper quad cane usage with patient.    See Exercise, Manual, and Modality Logs for complete treatment.   Reviewed and performed exercises with updated [employer] program given to patient and caregiver ().  Answered questions of both patient and caregiver in regards to exercise activity and performance    Assessment/Plan  Positive response to manual intervention this session in regards to improved L LE.  Benefits from verbal/tactile cues to ensure proper posture, exercise technique and performance for affected musculature. Should improve with continued PT intervention.          Progress per Plan of Care and Progress strengthening /stabilization /functional activity           Timed:  Manual Therapy:         mins  38407;  Therapeutic Exercise:   26      mins  39245;     Neuromuscular Jessica:        mins  90710;    Therapeutic Activity:    10      mins  82646;     Gait Trainin     mins  19105;      Ultrasound:          mins  14500;    Self Care                    __8_      mins 40615    Untimed:  Electrical Stimulation:         mins  52201 ( );  Mechanical Traction:         mins  71676;     Timed Treatment:   48   mins   Total Treatment:  48      mins  [facility]  Physical Therapist  Assistant  [id number]

## 2025-05-16 ENCOUNTER — TELEPHONE (OUTPATIENT)
Dept: INTERNAL MEDICINE | Facility: CLINIC | Age: 85
End: 2025-05-16
Payer: MEDICARE

## 2025-05-16 DIAGNOSIS — F41.9 ANXIETY: ICD-10-CM

## 2025-05-16 RX ORDER — ALPRAZOLAM 0.5 MG
0.25 TABLET ORAL DAILY
Qty: 30 TABLET | OUTPATIENT
Start: 2025-05-16

## 2025-05-16 NOTE — TELEPHONE ENCOUNTER
Mr. Martino called office 5/16 and stated he had spoke to mariella about getting a refill on his wife medication Alprazolam ( Xanax) 0.5mg he said he spoke with mariella and she stated refill was sent to [facility] pharmacy in [place]. On 5/13 the pharmacy stated they never received a Rx refill request form Dr. Jimenez office, I called [facility] on the patient's behalf and [facility] confirmed that they were process the medication but they are waiting on a verbal conformation from the patient so script to be filled, they stated it is on the patient's account that she must give a verbal conformation before any Rx's is mailed out they stated they tried contact the patient without success, I called and spoke with the patient and spouse I advised them to call and give conformation for Rx's to be mailed out, per special conditions on patient's [facility] account. The patient and spouse verbalized understanding and agree to call for Rx's to get shipped. RH

## 2025-05-20 ENCOUNTER — TREATMENT (OUTPATIENT)
Age: 85
End: 2025-05-20
Payer: MEDICARE

## 2025-05-20 DIAGNOSIS — G89.29 CHRONIC PAIN OF LEFT KNEE: Primary | ICD-10-CM

## 2025-05-20 DIAGNOSIS — R26.2 DIFFICULTY WALKING: ICD-10-CM

## 2025-05-20 DIAGNOSIS — M25.562 CHRONIC PAIN OF LEFT KNEE: Primary | ICD-10-CM

## 2025-05-20 DIAGNOSIS — Z96.652 STATUS POST TOTAL KNEE REPLACEMENT, LEFT: ICD-10-CM

## 2025-05-20 NOTE — PROGRESS NOTES
Physical Therapy Daily Treatment Note    Patient: Ritu Martino  : 1940  Diagnosis/ICD-10 Code:  Chronic pain of left knee [M25.562, G89.29]  Referring practitioner: Ryder Jo MD  Today's Date: 2025    VISIT#: 4      Subjective   Ritu Martino reports: Doing ok, feeling like she's getting better but still sore.      Objective     Passive Range of Motion   Left Knee   Flexion: 114 degrees      Gait: ambulating with quad cane, improve maddy & step length compared to first day    See Exercise, Manual, and Modality Logs for complete treatment.     Patient Education: continue HEP    Assessment/Plan  Good response to session, noting improved gait mechanics and improving ROM. Initiated step ups today, initially compensating with UE pull, but with cues she was able to improve her form - just limited reps tolerated today. Continues to demonstrate quad weakness.    Progress per Plan of Care            Timed:         Manual Therapy:         mins  98494;     Therapeutic Exercise:    30     mins  99313;     Neuromuscular Jessica:        mins  43875;    Therapeutic Activity:     12     mins  40025;     Gait Training:           mins  04856;     Ultrasound:          mins  46436;    Ionto:                                   mins   33945  Self Care:                            mins   13505    Un-Timed:  Electrical Stimulation:         mins  35229 ( );  Dry Needling          mins self-pay  Traction          mins 98884  Re-Eval                               mins  35342    Timed Treatment:   42   mins   Total Treatment:     42   mins    Aylin Arenas, PT  Physical Therapist  [place] PT license #: [id number]  [place] PT license #: [id number]

## 2025-05-22 ENCOUNTER — TREATMENT (OUTPATIENT)
Age: 85
End: 2025-05-22
Payer: MEDICARE

## 2025-05-22 DIAGNOSIS — Z96.652 STATUS POST TOTAL KNEE REPLACEMENT, LEFT: ICD-10-CM

## 2025-05-22 DIAGNOSIS — G89.29 CHRONIC PAIN OF LEFT KNEE: Primary | ICD-10-CM

## 2025-05-22 DIAGNOSIS — R26.2 DIFFICULTY WALKING: ICD-10-CM

## 2025-05-22 DIAGNOSIS — M25.562 CHRONIC PAIN OF LEFT KNEE: Primary | ICD-10-CM

## 2025-05-22 NOTE — PROGRESS NOTES
Physical Therapy Daily Treatment Note    [facility] PT - [facility]  [address]  [address]  [place] [address]     Patient: Ritu Martino   : 1940  Referring practitioner: Ryder Jo MD  Date of Initial Visit: Type: THERAPY  Noted: 2025  Today's Date: 2025  Patient seen for 4 sessions         Ritu Martino reports: left knee is feeling better.  Less pain, doing a little more on her feet.  Walking short distances around the house without cane.        Subjective     Objective   Ambulates in and out of clinic with quad cane.  Noted improved step/stride length left LE.    AROM 110(position of comfort)-116 deg flexion - post stretching with encouragement  PROM 120 deg flexion L knee    See Exercise, Manual, and Modality Logs for complete treatment.   Gait training x 8 minutes today in clinic ambulating without assistive device and standby assist x 1. Ambulates in/around equipment, over carpeted area, low hurdles and stair ascending/descending with reciprocal gait ascending and step to gait descending using 1 rail.    Assessment/Plan  Subjectively reports continued L knee improvement with decreased pain and improved ambulation capability.  Benefits from manual soft tissue interventions and active/passive stretching to affected L LE musculature with subsequent improved A/PROM flexion of left knee post stretching.  Encouraged to perform appropriate ROM, stretching and strengthening exercises at home to improve freedom of movement as well as L quad contraction/strength.  Demonstrates safe in clinic capability for unassisted ambulation and stair performance this session without evident LOB or increased L knee pain.         Other Continue manual interventions to improve L knee ROM and flexibility L LE musculature.           Timed:  Manual Therapy:         mins  52693;  Therapeutic Exercise:    26     mins  90543;     Neuromuscular Jessica:        mins  86395;    Therapeutic  Activity:    10      mins  80107;     Gait Trainin    mins  28719;     Ultrasound:          mins  31021;    Self Care                    ___      mins 59697    Untimed:  Electrical Stimulation:         mins  43446 ( );  Mechanical Traction:         mins  10084;     Timed Treatment: 46     mins   Total Treatment:    46    mins  [facility]  Physical Therapist  Assistant  [id number]

## 2025-05-26 NOTE — ASSESSMENT & PLAN NOTE
On Pristiq and Xanax as needed.  She also has Ambien for insomnia. This works well. Advised to avoid alcohol.

## 2025-05-26 NOTE — ASSESSMENT & PLAN NOTE
Hypertension is stable and controlled  Continue current treatment regimen.  Blood pressure will be reassessed in 6 months.  Follows with nephrology.  On Lasix 20 mg, valsartan 80 mg, clonidine as needed.

## 2025-05-27 ENCOUNTER — OFFICE VISIT (OUTPATIENT)
Dept: INTERNAL MEDICINE | Facility: CLINIC | Age: 85
End: 2025-05-27
Payer: MEDICARE

## 2025-05-27 VITALS
SYSTOLIC BLOOD PRESSURE: 136 MMHG | OXYGEN SATURATION: 96 % | HEIGHT: 60 IN | HEART RATE: 106 BPM | BODY MASS INDEX: 29.53 KG/M2 | WEIGHT: 150.4 LBS | DIASTOLIC BLOOD PRESSURE: 76 MMHG

## 2025-05-27 DIAGNOSIS — I10 ESSENTIAL HYPERTENSION, BENIGN: ICD-10-CM

## 2025-05-27 DIAGNOSIS — E87.1 HYPONATREMIA: ICD-10-CM

## 2025-05-27 DIAGNOSIS — Z12.31 ENCOUNTER FOR SCREENING MAMMOGRAM FOR MALIGNANT NEOPLASM OF BREAST: ICD-10-CM

## 2025-05-27 DIAGNOSIS — E83.42 HYPOMAGNESEMIA: ICD-10-CM

## 2025-05-27 DIAGNOSIS — E61.1 IRON DEFICIENCY: ICD-10-CM

## 2025-05-27 DIAGNOSIS — G89.29 CHRONIC PAIN OF LEFT KNEE: ICD-10-CM

## 2025-05-27 DIAGNOSIS — E87.6 HYPOKALEMIA: ICD-10-CM

## 2025-05-27 DIAGNOSIS — M25.562 CHRONIC PAIN OF LEFT KNEE: ICD-10-CM

## 2025-05-27 DIAGNOSIS — Z00.00 MEDICARE ANNUAL WELLNESS VISIT, SUBSEQUENT: Primary | ICD-10-CM

## 2025-05-27 DIAGNOSIS — K14.8 TONGUE LESION: ICD-10-CM

## 2025-05-27 DIAGNOSIS — G89.29 CHRONIC BILATERAL LOW BACK PAIN WITHOUT SCIATICA: ICD-10-CM

## 2025-05-27 DIAGNOSIS — F41.9 ANXIETY: ICD-10-CM

## 2025-05-27 DIAGNOSIS — E83.39 HYPOPHOSPHATEMIA: ICD-10-CM

## 2025-05-27 DIAGNOSIS — M54.50 CHRONIC BILATERAL LOW BACK PAIN WITHOUT SCIATICA: ICD-10-CM

## 2025-05-27 RX ORDER — ALPRAZOLAM 0.5 MG
0.5 TABLET ORAL DAILY
Qty: 30 TABLET | Refills: 0 | Status: SHIPPED | OUTPATIENT
Start: 2025-05-27

## 2025-05-27 NOTE — PROGRESS NOTES
Subjective   The ABCs of the Annual Wellness Visit  Medicare Wellness Visit      Ritu Martino is a 85 y.o. patient who presents for a Medicare Wellness Visit.    The following portions of the patient's history were reviewed and   updated as appropriate: allergies, current medications, past family history, past medical history, past social history, past surgical history, and problem list.    Compared to one year ago, the patient's physical   health is better.  Compared to one year ago, the patient's mental   health is the same.    Recent Hospitalizations:  She was not admitted to the hospital during the last year.     Current Medical Providers:  Patient Care Team:  Melvin Jimenez MD as PCP - General (Internal Medicine)  Ai Flores MD as Consulting Physician (Nephrology)    Outpatient Medications Prior to Visit   Medication Sig Dispense Refill    acetaminophen (TYLENOL) 325 MG tablet Take 2 tablets by mouth Every 6 (Six) Hours As Needed for Mild Pain . (Patient taking differently: Take 500 mg by mouth Every 6 (Six) Hours As Needed for Mild Pain.)      Cholecalciferol (VITAMIN D) 2000 units capsule Take  by mouth Daily With Dinner.      cloNIDine (Catapres) 0.1 MG tablet 1 po bid prn sys greater or equal to 150 30 tablet 3    cycloSPORINE (RESTASIS) 0.05 % ophthalmic emulsion       desvenlafaxine (PRISTIQ) 100 MG 24 hr tablet TAKE 1 TABLET DAILY 90 tablet 3    furosemide (LASIX) 20 MG tablet Take 1 tablet by mouth 2 (Two) Times a Day. 120 tablet 2    Ibuprofen 3 %, Gabapentin 10 %, Baclofen 2 %, lidocaine 4 % Apply 1-2 g topically to the appropriate area as directed 3 (Three) to 4 (Four) times daily. 90 g 3    ketoconazole (NIZORAL) 2 % cream APPLY TO RASH UNDER BREAST 1-2X DAILY AS NEEDED WHEN FLARED. MAY MIX 50/50 WITH TRIAMCINOLONE CREAM.      levothyroxine (SYNTHROID, LEVOTHROID) 50 MCG tablet Take six days a week.      melatonin 5 MG tablet tablet Take 2 tablets by mouth.      meloxicam (MOBIC) 7.5 MG  tablet Take 1 tablet by mouth Daily. 90 tablet 0    omeprazole (priLOSEC) 40 MG capsule TAKE 1 CAPSULE DAILY 90 capsule 3    Probiotic Product (ALIGN PO) Take 1 capsule by mouth Daily With Lunch.      Psyllium (Metamucil) wafer wafer Take  by mouth Daily.      rosuvastatin (CRESTOR) 20 MG tablet TAKE 1 TABLET EVERY NIGHT 90 tablet 3    SODIUM CHLORIDE PO Take  by mouth 3 (Three) Times a Day. 2 TABLETS 3 6TIMES A DAY      valsartan (DIOVAN) 80 MG tablet Take 1 tablet by mouth 2 (Two) Times a Day. 180 tablet 1    Vibegron (Gemtesa) 75 MG tablet Take  by mouth.      vitamin B-12 (CYANOCOBALAMIN) 100 MCG tablet Take 0.5 tablets by mouth Daily.      zolpidem (AMBIEN) 10 MG tablet TAKE 1 TABLET AT NIGHT AS NEEDED FOR SLEEP 30 tablet 0    ALPRAZolam (XANAX) 0.5 MG tablet Take 0.5 tablets by mouth Daily. 45 tablet 0    Magnesium 400 MG capsule 1 p.o. twice daily 60 capsule 3    fluconazole (DIFLUCAN) 200 MG tablet Take 1 tablet by mouth Daily.       No facility-administered medications prior to visit.     No opioid medication identified on active medication list. I have reviewed chart for other potential  high risk medication/s and harmful drug interactions in the elderly.      Aspirin is not on active medication list.  Aspirin use is indicated based on review of current medical condition/s. Pros and cons of this therapy have been discussed with this patient. Benefits of this medication outweigh potential harm.  Patient has been instructed to start taking this medication..    Patient Active Problem List   Diagnosis    Anxiety    Hypothyroidism    Iron deficiency    Essential hypertension, benign    Fluent aphasia    Benzodiazepine dependence    Cervicalgia    Intertrigo    Insomnia    Diverticulitis    Arthritis    Bowel dysfunction    GERD (gastroesophageal reflux disease)    Hernia, hiatal    Seizures    Meningioma    Closed fracture of left distal fibula    Debility    Abdominal hernia    Compression fracture of L1  "lumbar vertebra, open, initial encounter    Compression fracture of T12 vertebra    Osteopenia    Compression fracture of L2 lumbar vertebra    Closed fracture of right inferior pubic ramus    Urinary frequency     Advance Care Planning Advance Directive is on file.  ACP discussion was held with the patient during this visit. Patient has an advance directive in EMR which is still valid.             Objective   Vitals:    25 1518   BP: 136/76   Pulse: 106   SpO2: 96%   Weight: 68.2 kg (150 lb 6.4 oz)   Height: 152.4 cm (60\")   PainSc: 0-No pain       Estimated body mass index is 29.37 kg/m² as calculated from the following:    Height as of this encounter: 152.4 cm (60\").    Weight as of this encounter: 68.2 kg (150 lb 6.4 oz).    BMI is >= 25 and <30. (Overweight) The following options were offered after discussion;: exercise counseling/recommendations           Does the patient have evidence of cognitive impairment? Yes  Lab Results   Component Value Date    CHLPL 176 2025    TRIG 103 2025    HDL 86 (H) 2025    LDL 72 2025    VLDL 18 2025    HGBA1C 5.00 2025                                                                                                Health  Risk Assessment    Smoking Status:  Social History     Tobacco Use   Smoking Status Former    Current packs/day: 0.00    Average packs/day: 0.3 packs/day for 15.0 years (3.8 ttl pk-yrs)    Types: Cigarettes    Start date: 1956    Quit date: 1981    Years since quittin.4    Passive exposure: Past   Smokeless Tobacco Never     Alcohol Consumption:  Social History     Substance and Sexual Activity   Alcohol Use Yes    Alcohol/week: 8.0 standard drinks of alcohol    Types: 4 Glasses of wine, 4 Shots of liquor per week    Comment: Stopped 3 months ago.       Fall Risk Screen  STEADI Fall Risk Assessment was completed, and patient is at LOW risk for falls.Assessment completed on:2025    Depression Screening "   Little interest or pleasure in doing things? Not at all   Feeling down, depressed, or hopeless? Not at all   PHQ-2 Total Score 0      Health Habits and Functional and Cognitive Screenin/27/2025     3:19 PM   Functional & Cognitive Status   Do you have difficulty preparing food and eating? No   Do you have difficulty bathing yourself, getting dressed or grooming yourself? No   Do you have difficulty using the toilet? No   Do you have difficulty moving around from place to place? No   Do you have trouble with steps or getting out of a bed or a chair? No   Do you need help using the phone?  No   Are you deaf or do you have serious difficulty hearing?  No   Do you need help to go to places out of walking distance? Yes   Do you need help shopping? No   Do you need help preparing meals?  No   Do you need help with housework?  No   Do you need help with laundry? No   Do you need help taking your medications? No   Do you need help managing money? No   Do you ever drive or ride in a car without wearing a seat belt? No   Have you felt unusual stress, anger or loneliness in the last month? No   Who do you live with? Spouse   If you need help, do you have trouble finding someone available to you? No   Have you been bothered in the last four weeks by sexual problems? No   Do you have difficulty concentrating, remembering or making decisions? No           Age-appropriate Screening Schedule:  Refer to the list below for future screening recommendations based on patient's age, sex and/or medical conditions. Orders for these recommended tests are listed in the plan section. The patient has been provided with a written plan.    Health Maintenance List  Health Maintenance   Topic Date Due    COVID-19 Vaccine ( season) 2025    ANNUAL WELLNESS VISIT  2025    INFLUENZA VACCINE  2025    DXA SCAN  2026    TDAP/TD VACCINES (2 - Tdap) 2029    RSV Vaccine - Adults  Completed    Pneumococcal  Vaccine 50+  Completed    ZOSTER VACCINE  Completed                                                                                                                                                CMS Preventative Services Quick Reference  Risk Factors Identified During Encounter  None Identified    The above risks/problems have been discussed with the patient.  Pertinent information has been shared with the patient in the After Visit Summary.  An After Visit Summary and PPPS were made available to the patient.    Follow Up:   Next Medicare Wellness visit to be scheduled in 1 year.         Additional E&M Note during same encounter follows:  Patient has additional, significant, and separately identifiable condition(s)/problem(s) that require work above and beyond the Medicare Wellness Visit     Chief Complaint  Medicare Wellness-subsequent    Subjective   HPI  Ritu is also being seen today for additional medical problem/s.          History of Present Illness  The patient came in for her annual wellness visit.    She had knee replacement surgery 5 weeks ago, and her leg is still sore with some pain. She wishes she had some pain medication and is currently undergoing physical therapy. There were no issues with the surgery itself. She takes 2 baby aspirins daily as a blood thinner.    She reports back pain that she hoped would get better after her knee surgery. The pain sometimes moves to her rib area but doesn't go down her leg. It gets worse as the day goes on, but she is usually pain-free in the mornings. She received 2 epidural injections in 2022 that helped with the pain. She manages the pain with ibuprofen and Tylenol and is open to trying another epidural injection.    Her sodium levels were normal on 05/08/2025 and 05/22/2025, but her magnesium levels were low at 1.8. She takes Calm Zone magnesium powder and recently started a magnesium pill as per the doctor's instructions. She tries to eat a balanced diet with  "green vegetables and a lot of cheese.    She is currently taking half a tablet of Xanax 0.5 mg daily, but it isn't working as well as when she took a full tablet. She feels more irritable and shaky. She has been on Pristiq for several years, which was initially prescribed by a psychiatrist during a hospital stay. She didn't follow up with the psychiatrist after being discharged. She feels her physical health has improved slightly, but her mental health is the same.    She noticed a lesion on her tongue for the past few weeks, which sometimes feels swollen and sore. She hasn't seen any growth and has an appointment with a dentist next month.    She reports having bowel movements twice a day that are mostly watery, which she thinks might be due to her medications, especially the magnesium.    She has a red spot on her ear from a burn caused by a curling iron. She plans to apply salve at home and expects it to go away in a few days. She saw a dermatologist a month ago and 3 months before that for a spot that cleared up. She needs a referral to a dermatologist closer to her.    She doesn't see an OB/GYN anymore. She sees Dr. Flores in nephrology every 6 months. She doesn't see Dr. Melchor anymore because she needed to see a surgeon. She doesn't get mammograms anymore. She has an advanced directive or living will.         Objective   Vital Signs:  /76   Pulse 106   Ht 152.4 cm (60\")   Wt 68.2 kg (150 lb 6.4 oz)   SpO2 96%   BMI 29.37 kg/m²   Physical Exam  Constitutional:       Appearance: Normal appearance.   HENT:      Head:      Comments: Fleshy enlargement of right tongue  Cardiovascular:      Rate and Rhythm: Normal rate and regular rhythm.      Heart sounds: Normal heart sounds. No murmur heard.  Pulmonary:      Effort: Pulmonary effort is normal.      Breath sounds: Normal breath sounds.   Musculoskeletal:      Right lower leg: No edema.      Left lower leg: No edema.      Comments: Surgical incision on " left knee is clean dry and intact.   Skin:     General: Skin is warm and dry.      Comments: Burn on left ear   Neurological:      Mental Status: She is alert.   Psychiatric:         Behavior: Behavior normal.           The following data was reviewed by: Melvin Jimenez MD on 05/27/2025:    CMP          3/4/2025    09:45 4/3/2025    10:23 5/22/2025    10:07   CMP   Glucose 100  96  104    BUN 12  13  12    Creatinine 0.81  0.73  0.73    EGFR 71.2  80.7  80.7    Sodium 138  138  138    Potassium 4.6  3.5  4.1    Chloride 96  104  104    Calcium 10.0  8.4  8.7    Total Protein 7.2   6.7    Albumin 4.6   4.5    Globulin 2.6   2.2    Total Bilirubin 0.6   0.4    Alkaline Phosphatase 62   88    AST (SGOT) 20   20    ALT (SGPT) 15   15    Albumin/Globulin Ratio 1.8   2.0    BUN/Creatinine Ratio 14.8  17.8  16.4    Anion Gap  8.0       CBC w/diff          3/4/2025    09:45 4/3/2025    10:23 5/22/2025    10:07   CBC w/Diff   WBC 6.56  5.21  5.14    RBC 3.74  3.34  3.29    Hemoglobin 11.9  10.2  10.3    Hematocrit 36.6  32.3  30.5    MCV 97.9  96.7  92.7    MCH 31.8  30.5  31.3    MCHC 32.5  31.6  33.8    RDW 12.1  12.0  11.9    Platelets 287  278  386    Neutrophil Rel % 68.6  63.3  71.0    Immature Granulocyte Rel %  0.4     Lymphocyte Rel % 16.8  20.2  14.4    Monocyte Rel % 9.5  9.8  10.3    Eosinophil Rel % 4.1  6.1  3.7    Basophil Rel % 0.5  0.2  0.4      Lipid Panel          11/26/2024    10:54 3/4/2025    09:45 5/22/2025    10:07   Lipid Panel   Total Cholesterol 219  228  176    Triglycerides 122  203  103    HDL Cholesterol 106  93  86    VLDL Cholesterol 20  34  18    LDL Cholesterol  93  101  72      TSH          11/26/2024    10:54 3/4/2025    09:45 5/22/2025    10:07   TSH   TSH 2.320  4.070  1.590      A1C Last 3 Results          4/3/2025    10:23   HGBA1C Last 3 Results   Hemoglobin A1C 5.00           Assessment and Plan      Medicare annual wellness visit, subsequent         Anxiety  Tried to wean. Not  tolerating.   Orders:    ALPRAZolam (XANAX) 0.5 MG tablet; Take 1 tablet by mouth Daily.    Comprehensive Metabolic Panel    CBC & Differential    TSH Rfx On Abnormal To Free T4    Magnesium    Phosphorus    Essential hypertension, benign  Well-controlled         Iron deficiency  Anemia stable       Hypokalemia    Orders:    Comprehensive Metabolic Panel    CBC & Differential    TSH Rfx On Abnormal To Free T4    Hyponatremia  Recent blood work within normal limits  Orders:    Comprehensive Metabolic Panel    CBC & Differential    TSH Rfx On Abnormal To Free T4    Hypophosphatemia  Increase phosphorus in diet and monitor       Chronic bilateral low back pain without sciatica  - History of acute on chronic compression fractures, imaging in 2022  - Epidural injections in 2022 provided relief; considering another  - Order epidural injection through interventional radiology or pain management  - If ineffective, consider MRI  Orders:    Epidural Block    Encounter for screening mammogram for malignant neoplasm of breast    Orders:    Mammo Screening Digital Tomosynthesis Bilateral With CAD; Future    Tongue lesion    Orders:    Ambulatory Referral to ENT (Otolaryngology)    Chronic pain of left knee  - Postoperative status following knee replacement  - Knee healing well, some soreness and pain  - No issues with surgery  - Takes 2 baby aspirins daily as a blood thinner  - requesting pain medication       Hypomagnesemia  - Magnesium levels improved to 2.2 as of 5 days ago  - Takes magnesium pill and powdered supplement.  She notes diarrhea  - Continue powdered supplement and bring bottle to next visit               Follow Up   Return in about 3 months (around 8/27/2025) for Recheck.  Patient was given instructions and counseling regarding her condition or for health maintenance advice. Please see specific information pulled into the AVS if appropriate.

## 2025-05-27 NOTE — PATIENT INSTRUCTIONS
1. Dairy  All dairy products contain phosphorus. This includes products like:    Yogurt: Yogurt is at the top of the high-phosphorous dairy products. A 6 oz container of plain, low-fat yogurt has 245 mg of phosphorus. That’s 20% of the amount you need in a day.  Milk: Milk isn’t too far behind yogurt. One cup of 2% milk provides 226 mg of phosphorus.  Cheese: Everything you enjoy cheese with -- even pizza, burgers, and pasta -- will get you one step closer to your daily phosphorus needs. Each 1 oz slice of sharp cheddar cheese has 129 mg of phosphorus. And 1 oz of mozzarella provides you with 100 mg of phosphorus.  2. Seafood  The Dietary Guidelines for Americans recommends eating at least 8 oz of fish per week as part of a balanced diet. And there are lots of phosphorus-rich options when it comes to seafood:    [place]: [place] is a great option because it’s high in healthy fats, low in mercury, and contains a good amount of phosphorus. For every 3 oz of wild Atlantic salmon you eat, you get 170 mg of phosphorus.  Scallops: Cook or steam 3 oz of scallops for over 360 mg of phosphorus. Just be sure to avoid this one if you have a shellfish allergy.  Sardines: A 3.75 oz can of sardines in oil (drained) packs 450 mg of phosphorus. Plus sardines are full of omega-3 fatty acids, calcium, and protein.      3. Chicken  Chicken has 195 mg of phosphorus for every 3 oz of roasted breast meat. It’s also an excellent source of protein. You can use chicken in a variety of recipes, such as oven-fried chicken and Ajiaco chicken soup.     4. Lentils  If you’re vegetarian or vegan, we didn’t forget about you! Lentils are a type of legume, and they’re an excellent choice for getting your daily dose of protein and phosphorus. For every ½ cup of boiled lentils, you’re consuming 178 mg of phosphorus.     You can choose from lentils such as:    Green lentils or green mung bean   Yellow lentils or split mung bean   Red lentils or masoor  dominick   Brown lentils  Black lentils   [place] lentils  5. Beef  If you add burgers to your dinner menu this week, you’ll get 174 mg of phosphorus for every 3 oz of lean ground beef. For additional phosphorus, you can layer in some cheese.    While beef is a good source of phosphorus, some research has linked red meat to inflammation. So your best bet is to eat beef in moderation and incorporate a variety of other phosphorus-rich foods.    6. Nuts  When you’re looking for a snack, reach for a handful of nuts. They are perfect to take on the go, and many nuts are heart-healthy. Plus, you have lots of options to choose from when it comes to high-phosphorus nuts:    Cashews: For every 1 oz of dry-roasted cashews, you’ll get 139 mg of phosphorus. That’s around 10% of your daily value.   Almonds: Almonds are great alone or sprinkled on top of a salad. However you consume them, 1 oz of raw almonds will give you 136 mg of phosphorus.  Peanuts: Technically peanuts are actually legumes, but most people consume them like nuts. Grab a 1 oz handful and you’ll get 100 mg of phosphorus.  7. Potatoes  Baked potatoes are a great side dish and can help you meet your daily phosphorus needs. For every medium russet baked potato, you’ll be consuming 123 mg of phosphorus.    Keep in mind that it’s more nutritious to eat potatoes with the peel on. Potato skins tend to have more minerals (phosphorus included) -- and fiber -- than just the inside of the potato.    8. Beans  Beans, beans, the magical fruit. The more you eat, the more… phosphorus you get.     Every ½ cup of canned kidney beans provides about 160 mg of phosphorus. The same amount of canned black beans provides nearly 130 mg, and canned conway beans provide around 110 mg.    9. Eggs  Whether you enjoy your eggs hard-boiled, fried, poached, or scrambled, you’ll be getting a small dose of phosphorus as well as some protein. Eat both the white and the yolk for the maximum possible  nutrients.    Every cooked large egg contains 86 mg of phosphorus. So eating 2 eggs for breakfast means you’ve covered 25% of your daily phosphorus intake.    10. Rice  Serve your entree with a serving of rice to meet both your whole grain and phosphorus needs. Choosing whole-grain rice -- like brown or wild rice -- is ideal because it has more nutrients than its white counterpart, including phosphorus.    A ½ cup of cooked, long-grain brown rice provides 104 mg of phosphorus. Cooked white rice contains about 34 mg per ½ cup.    11. Peas  Peas are another example of a plant-based source of phosphorus. A ½ cup serving of boiled peas provides 94 mg of the mineral. Mixing peas into your brown rice or eating it as a side to other foods can help you get even more phosphorus.    12. Oatmeal  A serving of oatmeal, or 1 cup of cooked oats, can pack in 180 mg of phosphorus. That’s over 25% of your daily needs. Even if you eat less than 1 cup of oats, you can sprinkle in some nuts or add a dollop of yogurt to get a sizable amount of the mineral. Plus, these extras will add protein to your oatmeal.    13. Whole grains  Whole grains are a great source of fiber, antioxidants, vitamins, and minerals -- including phosphorus. And you’re sure to find a whole grain that satisfies your taste buds with a wide range of options, such as:    Quinoa  Farro  Buckwheat  Millet  Barley  There are also lots of whole-grain bread options. One slice of whole-wheat bread provides around 65 mg of phosphorus. While this is a smaller amount on its own, a sandwich with two bread slices and 1 oz of cheese can provide around 317 mg of phosphorus. That’s just under half of the amount you need in a day.    Be sure to choose whole-grain breads that are minimally processed -- in other words, with fewer ingredients and no added sugar. Breads made from whole grains contain more nutrients than white and other heavily processed bread.    What does phosphorus do  for the body?  Just like every other mineral in the body, phosphorus has an important role to play. Here’s what phosphorus does for the body:    Makes energy for your body to use as fuel  Produces DNA and RNA (the building blocks of your genes)  Grows and repairs your tissues and cells  Works with calcium to maintain healthy, strong bones as you age  These are just a few of the ways phosphorus helps your body function at its best.     How much phosphorus per day is recommended?  The amount of phosphorus you need per day changes as you age, but it’s the same for both men and women. Adults who are 19 years or older should be consuming around 700 mg of phosphorus per day.    The recommended daily amount for kids depends on their age:    Birth to 6 months: 100 mg  7 to 12 months: 275 mg  1 to 3 years: 460 mg  4 to 8 years: 500 mg  9 to 18 years: 1,250 mg  What are the symptoms of a phosphorus deficiency?  Some common symptoms of low levels of phosphorus include:    Loss of appetite  Low red blood cell count (anemia)  Muscle weakness  Bone pain  Burning or prickling sensation on the skin  Confusion  Talk with a healthcare professional if you notice any of these symptoms. It’s very rare to become deficient in phosphorus -- and if it does happen, it’s usually not because of your diet. Certain groups of people may have a harder time absorbing enough phosphorus, including those with certain genetic conditions and hyperparathyroidism.     Certain medications can also affect how much phosphorus your body absorbs. Antacid medications that contain aluminum or magnesium are one common example. These medications can help relieve indigestion and heartburn. But using them for 3 months or longer can cause low phosphorus levels.      Dermatologists    [facility]  [address] [place] [id number] [phone]    [facility]  [address]  [place], [place] [phone]      Corinne Aggarwal  [address] [place] [address] [phone]    Dr. Sabi Canchola  [address] [address] [phone]

## 2025-05-27 NOTE — ASSESSMENT & PLAN NOTE
Tried to wean. Not tolerating.   Orders:    ALPRAZolam (XANAX) 0.5 MG tablet; Take 1 tablet by mouth Daily.    Comprehensive Metabolic Panel    CBC & Differential    TSH Rfx On Abnormal To Free T4    Magnesium    Phosphorus

## 2025-05-28 ENCOUNTER — TREATMENT (OUTPATIENT)
Age: 85
End: 2025-05-28
Payer: MEDICARE

## 2025-05-28 DIAGNOSIS — Z96.652 STATUS POST TOTAL KNEE REPLACEMENT, LEFT: ICD-10-CM

## 2025-05-28 DIAGNOSIS — M25.562 CHRONIC PAIN OF LEFT KNEE: Primary | ICD-10-CM

## 2025-05-28 DIAGNOSIS — R26.2 DIFFICULTY WALKING: ICD-10-CM

## 2025-05-28 DIAGNOSIS — G89.29 CHRONIC PAIN OF LEFT KNEE: Primary | ICD-10-CM

## 2025-05-28 NOTE — PROGRESS NOTES
Physical Therapy Daily Treatment Note    Patient: Ritu Martino  : 1940  Diagnosis/ICD-10 Code:  Chronic pain of left knee [M25.562, G89.29]  Referring practitioner: Ryder Jo MD  Today's Date: 2025    VISIT#: 5      Subjective   Ritu Martino reports: doing ok, knee getting better slowly.       Objective     L knee PROM: 115 deg    See Exercise, Manual, and Modality Logs for complete treatment.     Patient Education: continue HEP    Assessment/Plan  Good response to session, continued manual therapy in order to increase ROM. Focusing on quad engagement and strength. Progressing standing exercise as able focusing on functional strength and stability.     Progress per Plan of Care            Timed:         Manual Therapy:    10     mins  57393;     Therapeutic Exercise:    20     mins  45536;     Neuromuscular Jessica:   10     mins  89597;    Therapeutic Activity:         mins  88984;     Gait Training:           mins  58809;     Ultrasound:          mins  84749;    Ionto:                                   mins   45119  Self Care:                            mins   18071    Un-Timed:  Electrical Stimulation:         mins  22764 ( );  Dry Needling          mins self-pay  Traction          mins 23027  Re-Eval                               mins  16736    Timed Treatment:   40   mins   Total Treatment:     40   mins    Aylin Arenas, PT  Physical Therapist  [place] PT license #: [id number]  [place] PT license #: [id number]

## 2025-05-30 ENCOUNTER — TREATMENT (OUTPATIENT)
Age: 85
End: 2025-05-30
Payer: MEDICARE

## 2025-05-30 DIAGNOSIS — M25.562 CHRONIC PAIN OF LEFT KNEE: Primary | ICD-10-CM

## 2025-05-30 DIAGNOSIS — Z96.652 STATUS POST TOTAL KNEE REPLACEMENT, LEFT: ICD-10-CM

## 2025-05-30 DIAGNOSIS — R26.2 DIFFICULTY WALKING: ICD-10-CM

## 2025-05-30 DIAGNOSIS — G89.29 CHRONIC PAIN OF LEFT KNEE: Primary | ICD-10-CM

## 2025-05-30 NOTE — PROGRESS NOTES
Physical Therapy Progress Note    [facility]  [address]  [place] [address]            2025  Ryder Jo MD    Re: Ritu Martino  ________________________________________________________________    Ms. Ritu Martino, has attended 8/8 PT sessions to left knee since initial evaluation..  Treatment has consisted of: Manual interventions, therapeutic exercise, gait training as well as patient education and as needed modalities.    S: Ms. Ritu Martino states: left knee feels better since surgery/start of PT.  Feels that she is able to move her left knee and leg easier than before and feels more confident on her feet.  Still feels stiffness/restriction in regard to her knee bend.    Subjective Evaluation    Pain  Current pain rating: 3  At best pain ratin  At worst pain ratin  Quality: tight (stiff)  Relieving factors: change in position and ice         Objective          Active Range of Motion   Left Knee   Flexion: 112 degrees   Extension: 0 degrees     Passive Range of Motion   Left Knee   Flexion: 116 degrees   Extension: 0 degrees       See Exercise, Manual, and Modality Logs for complete treatment.       Assessment/Plan patient has been compliant, cooperative and motivated with rehab efforts today in regards to her left knee.  Subjectively, she reports that she has been able to move her left knee better with less in pain though still with some persistent stiffness.  Objectively her active and passive range of motion are improved versus initial measurements.  She is now ambulating long distances with quad cane, though can ambulate short distances/burst independently without evident loss of balance.  She is progressing well with her current exercise regimen and should continue to improve with further rehab efforts to allow her to return to her prior ADL/functional level activities.  Please advise at your exam !    Other  To Md with  letter.  Await further orders regarding PT intervention.            Manual Therapy:  10       mins  20384;  Therapeutic Exercise:     24    mins  93147;     Neuromuscular Jessica:        mins  55870;    Therapeutic Activity:    12      mins  27541;     Gait Training:           mins  89863;     Ultrasound:          mins  56783;    Electrical Stimulation:         mins  95323 ( );      Timed Treatment:   46   mins   Total Treatment:    46    mins    Costa Glass PTA,   Physical Therapist Assistant # [id number]

## 2025-06-03 ENCOUNTER — TREATMENT (OUTPATIENT)
Age: 85
End: 2025-06-03
Payer: MEDICARE

## 2025-06-03 DIAGNOSIS — M25.562 CHRONIC PAIN OF LEFT KNEE: Primary | ICD-10-CM

## 2025-06-03 DIAGNOSIS — G89.29 CHRONIC PAIN OF LEFT KNEE: Primary | ICD-10-CM

## 2025-06-03 DIAGNOSIS — R26.2 DIFFICULTY WALKING: ICD-10-CM

## 2025-06-03 DIAGNOSIS — Z96.652 STATUS POST TOTAL KNEE REPLACEMENT, LEFT: ICD-10-CM

## 2025-06-03 PROCEDURE — 97110 THERAPEUTIC EXERCISES: CPT | Performed by: PHYSICAL THERAPIST

## 2025-06-03 PROCEDURE — 97140 MANUAL THERAPY 1/> REGIONS: CPT | Performed by: PHYSICAL THERAPIST

## 2025-06-03 PROCEDURE — 97530 THERAPEUTIC ACTIVITIES: CPT | Performed by: PHYSICAL THERAPIST

## 2025-06-03 NOTE — PROGRESS NOTES
Physical Therapy Daily Treatment Note    [facility] - [facility]  [address]  [place] [address]     Patient: Ritu Martino   : 1940  Referring practitioner: Ryder Jo MD  Date of Initial Visit: Type: THERAPY  Noted: 2025  Today's Date: 6/3/2025  Patient seen for 7 sessions         Ritu Martino reports: Continue reports the other day in regards to left knee.  MD believes left knee is doing well.  Told to continue to ice and elevate to minimize and reduce swelling.        Subjective     Objective   Ambulates in and out of clinic with quad cane though uses inappropriately with just 1 leg of cane touching the ground.  See Exercise, Manual, and Modality Logs for complete treatment.   Reviewed essential range of motion, stretching, strengthening exercises with patient and   Discussed progression of gait to be performed without assistive device    Assessment/Plan Decreased subjective complaints of L knee discomfort, feels stretches are helping. Able to progress exercises without increased discomfort of L knee, just early fatigue of isolated musculature.   ambulates in clinic without assistive device or noted loss of balance.  Benefits from education of patient and caregiver regarding essential exercises as well as the benefit of continued ice and elevation.  Patient does respond positively to manual soft tissue interventions and passive stretching.  She is compliant and cooperative for rehab efforts and should continue to do well and progress fully towards all goals.        Other Re assess per primary PT next visit.  Check goals, ROM, etc.  Update Outcome measure.            Timed:  Manual Therapy:    15     mins  68956;  Therapeutic Exercise:    24     mins  06238;     Neuromuscular Jessica:        mins  95392;    Therapeutic Activity:     10     mins  05639;     Gait Training:           mins  12076;     Ultrasound:          mins  87756;    Self Care                     __5_      mins 71335    Untimed:  Electrical Stimulation:         mins  86226 ( );  Mechanical Traction:         mins  32832;     Timed Treatment:   54   mins   Total Treatment:     54   mins  Costa Glass PTA  Physical Therapist  Assistant  [id number]

## 2025-06-04 DIAGNOSIS — F41.9 ANXIETY: ICD-10-CM

## 2025-06-04 NOTE — TELEPHONE ENCOUNTER
Caller: Nu Martino    Relationship: Emergency Contact    Best call back number: [phone]     Requested Prescriptions:   Requested Prescriptions     Pending Prescriptions Disp Refills    ALPRAZolam (XANAX) 0.5 MG tablet 30 tablet 0     Sig: Take 1 tablet by mouth Daily.        Pharmacy where request should be sent: [facility] [address] [phone] [facility] [phone]      Last office visit with prescribing clinician: 5/27/2025   Last telemedicine visit with prescribing clinician: Visit date not found   Next office visit with prescribing clinician: 8/28/2025     Additional details provided by patient:     PATIENT WANTS TO REMIND DR CARTAGENA THAT THE SCRIPT HAS BEEN CHANGED TO 1 PILLED A DAY FOR 90 DAY SUPPLY    Does the patient have less than a 3 day supply:  [x] Yes  [] No    Would you like a call back once the refill request has been completed: [] Yes [] No    If the office needs to give you a call back, can they leave a voicemail: [] Yes [] No    Mj Webb   06/04/25 10:51 EDT

## 2025-06-05 ENCOUNTER — TREATMENT (OUTPATIENT)
Age: 85
End: 2025-06-05
Payer: MEDICARE

## 2025-06-05 DIAGNOSIS — M25.562 CHRONIC PAIN OF LEFT KNEE: Primary | ICD-10-CM

## 2025-06-05 DIAGNOSIS — Z96.652 STATUS POST TOTAL KNEE REPLACEMENT, LEFT: ICD-10-CM

## 2025-06-05 DIAGNOSIS — R26.2 DIFFICULTY WALKING: ICD-10-CM

## 2025-06-05 DIAGNOSIS — G89.29 CHRONIC PAIN OF LEFT KNEE: Primary | ICD-10-CM

## 2025-06-05 RX ORDER — ALPRAZOLAM 0.5 MG
0.5 TABLET ORAL DAILY
Qty: 30 TABLET | Refills: 0 | OUTPATIENT
Start: 2025-06-05

## 2025-06-05 NOTE — PROGRESS NOTES
Physical Therapy Progress Note/Re-assessment    Patient: Ritu Martino  : 1940  Referring practitioner: Ryder Jo MD  Today's Date: 2025    VISIT#: 8    Subjective   Ritu Martino reports: Doing pretty well, is definitely getting better, but has been having some left ankle pain. Still having some trouble on stairs. Mostly not using AD anymore.       Objective     Observations      GAIT: ambulates without AD, very slight antalgic gait.   TRANSFERS: BUE assist for sit to stand     Active Range of Motion   Left Knee   Flexion: 110 degrees   Extension: 3 degrees   Extensor la degrees      Passive Range of Motion   Left Knee   Flexion: 114 degrees   Extension: 0 degrees         Strength/Myotome Testing     Left Knee   Flexion: 4+/5  Extension: 4-/5    Left Knee   Quadriceps contraction: fair     See Exercise, Manual, and Modality Logs for complete treatment.     Patient Education:  continue HEP    Assessment & Plan       Assessment  Assessment details: Ritu is making good progress with therapy. We are seeing improvements in her knee ROM, strength, and gait mechanics. However, she continues to have knee weakness and difficulty on stairs and walking longer distances. She has met all STGs and making good progress toward remaining goals. Requires continued PT to address remaining deficits and return to PLOF.     Plan  Therapy options: will be seen for skilled therapy services  Frequency: 2x week  Treatment plan discussed with: patient        STG: (to be met in 4 weeks)  1. Pt will be independent and compliant with initial HEP in 2 weeks. Met  2. Pt will improve knee flexion AROM to > 105 degrees. Met  3. Pt will improve knee extension PROM to <5 degrees. Met  4. Pt will complete 10 SLR without extensor lag to indicate improved quad strength. Met  5. Pt will ambulate household distances without AD with minimally antalgic gait with good balance. Met     LTG: (to be met by DC) Progressing  1. Pt  will be independent with final HEP for self-management of condition by DC.  2. Pt will improve score on WOMAC outcome measure to <20/96 by DC.  3. Pt will improve knee flexion AROM to at least 115 degrees and knee extension to at least 0 deg in order to be able to walk with normal gait mechanics by DC.  4. Pt will improve knee strength to at least 4+/5 in order to be able to negotiate stairs with proper mechanics by DC.  5. Pt will be able to ambulate community distances with or without SPC and normal mechanics by DC.  6. Pt will be able to step over 4 inch obstacle without UE support and without LOB in order to be able to safely ambulate on uneven surfaces by DC.      Progress per Plan of Care            Timed:         Manual Therapy:    10     mins  84724;     Therapeutic Exercise:    20    mins  40054;     Neuromuscular Jessica:        mins  03703;    Therapeutic Activity:     10     mins  27393;     Gait Training:           mins  87846;     Ultrasound:          mins  03464;    Ionto:                                   mins   62697  Self Care:                            mins   83057    Un-Timed:  Electrical Stimulation:         mins  48392 ( );  Dry Needling          mins self-pay  Traction          mins 33671  Re-Eval                               mins  92762    Timed Treatment:   40   mins   Total Treatment:     40   mins    Aylin Arenas, PT  Physical Therapist  [place] [facility] license #: [id number]  [place] PT license #: [id number]

## 2025-06-11 ENCOUNTER — TREATMENT (OUTPATIENT)
Age: 85
End: 2025-06-11
Payer: MEDICARE

## 2025-06-11 NOTE — PROGRESS NOTES
Patient presented to clinic today with reports of new onset left ankle swelling and pain. Said she doesn't know what she did, it just started hurting. Never was swollen before. I assessed her ankle and she did appear to have some swelling medial ankle along with tenderness to her medial/anterior ankle and up into her calf. Given the new onset of these symptoms I called her surgeon, Dr. Jo, due to concern for a blood clot. Their office said they would put in a stat order for a doppler at [facility]. They said Ritu could go home and they would call her with an appointment update. I updated Ritu and her  with this information and they agreed. Instructed to go to the ER if symptoms worsen.

## 2025-06-12 ENCOUNTER — HOSPITAL ENCOUNTER (OUTPATIENT)
Dept: GENERAL RADIOLOGY | Facility: HOSPITAL | Age: 85
Discharge: HOME | End: 2025-06-12
Payer: MEDICARE

## 2025-06-12 ENCOUNTER — ANESTHESIA EVENT (OUTPATIENT)
Dept: PAIN MEDICINE | Facility: HOSPITAL | Age: 85
End: 2025-06-12
Payer: MEDICARE

## 2025-06-12 ENCOUNTER — ANESTHESIA (OUTPATIENT)
Dept: PAIN MEDICINE | Facility: HOSPITAL | Age: 85
End: 2025-06-12
Payer: MEDICARE

## 2025-06-12 ENCOUNTER — HOSPITAL ENCOUNTER (OUTPATIENT)
Dept: PAIN MEDICINE | Facility: HOSPITAL | Age: 85
Discharge: HOME | End: 2025-06-12
Payer: MEDICARE

## 2025-06-12 VITALS
TEMPERATURE: 96.6 F | HEART RATE: 71 BPM | RESPIRATION RATE: 16 BRPM | OXYGEN SATURATION: 98 % | DIASTOLIC BLOOD PRESSURE: 57 MMHG | SYSTOLIC BLOOD PRESSURE: 107 MMHG

## 2025-06-12 DIAGNOSIS — M54.16 LUMBAR RADICULOPATHY: Primary | ICD-10-CM

## 2025-06-12 DIAGNOSIS — R52 PAIN: ICD-10-CM

## 2025-06-12 PROCEDURE — 25010000002 METHYLPREDNISOLONE PER 80 MG: Performed by: ANESTHESIOLOGY

## 2025-06-12 PROCEDURE — 77003 FLUOROGUIDE FOR SPINE INJECT: CPT

## 2025-06-12 PROCEDURE — 25510000001 IOPAMIDOL 41 % SOLUTION: Performed by: ANESTHESIOLOGY

## 2025-06-12 RX ORDER — IOPAMIDOL 408 MG/ML
10 INJECTION, SOLUTION INTRATHECAL
Status: COMPLETED | OUTPATIENT
Start: 2025-06-12 | End: 2025-06-12

## 2025-06-12 RX ORDER — MIDAZOLAM HYDROCHLORIDE 1 MG/ML
1 INJECTION, SOLUTION INTRAMUSCULAR; INTRAVENOUS ONCE AS NEEDED
Status: DISCONTINUED | OUTPATIENT
Start: 2025-06-12 | End: 2025-06-13 | Stop reason: HOSPADM

## 2025-06-12 RX ORDER — FENTANYL CITRATE 50 UG/ML
50 INJECTION, SOLUTION INTRAMUSCULAR; INTRAVENOUS ONCE
Status: DISCONTINUED | OUTPATIENT
Start: 2025-06-12 | End: 2025-06-13 | Stop reason: HOSPADM

## 2025-06-12 RX ORDER — LIDOCAINE HYDROCHLORIDE 10 MG/ML
1 INJECTION, SOLUTION INFILTRATION; PERINEURAL ONCE
Status: DISCONTINUED | OUTPATIENT
Start: 2025-06-12 | End: 2025-06-13 | Stop reason: HOSPADM

## 2025-06-12 RX ORDER — METHYLPREDNISOLONE ACETATE 80 MG/ML
80 INJECTION, SUSPENSION INTRA-ARTICULAR; INTRALESIONAL; INTRAMUSCULAR; SOFT TISSUE ONCE
Status: COMPLETED | OUTPATIENT
Start: 2025-06-12 | End: 2025-06-12

## 2025-06-12 RX ADMIN — METHYLPREDNISOLONE ACETATE 80 MG: 80 INJECTION, SUSPENSION INTRA-ARTICULAR; INTRALESIONAL; INTRAMUSCULAR; SOFT TISSUE at 11:55

## 2025-06-12 RX ADMIN — IOPAMIDOL 10 ML: 408 INJECTION, SOLUTION INTRATHECAL at 11:55

## 2025-06-12 NOTE — ANESTHESIA PROCEDURE NOTES
PAIN Epidural block      Patient reassessed immediately prior to procedure    Patient location during procedure: pain clinic  Indication:procedure for pain  Performed By  Anesthesiologist: Todd Christopher MD  Preanesthetic Checklist  Completed: patient identified and risks and benefits discussed  Additional Notes  Diagnosis:     Post-Op Diagnosis Codes:     * Lumbar radiculopathy [M54.16]    Sedation:  none    Sedation time:    A lumbar epidural steroid injection with fluoroscopic guidance and an Isovue dye epidurogram was performed.  Under fluoroscopic guidance, the epidural space was identified and accessed, confirmed by loss of resistance to saline followed by injection of Isovue dye, which shows a good epidurogram.  The above medications were injected uneventfully.    Prep:  Pt Position:prone  Sterile Tech:cap, gloves, mask and sterile barrier  Prep:chlorhexidine gluconate and isopropyl alcohol  Monitoring:blood pressure monitoring, continuous pulse oximetry and EKG  Procedure:Sedation: no     Approach:midline  Guidance: fluoroscopy  Location:lumbar  Interspace: L5-S1.  Needle Type:Tuohy  Needle Gauge:20  Aspiration:negative  Medications:  Preservative Free Saline:1mL  Isovue:1mL  Comments:Isovue dye reveals good epidurogram  Depomedrol 80 mg  Post Assessment:  Pt Tolerance:patient tolerated the procedure well with no apparent complications  Complications:no

## 2025-06-12 NOTE — DISCHARGE INSTRUCTIONS
EPIDURAL STEROID INJECTION          An epidural steroid injection is a shot of steroid medicine and numbing medicine that is given into the space between the spinal cord and the bones of the back (epidural space).  The injection helps relieve pain by an irritated or swollen nerve root.    TELL YOUR HEALTH CARE PROVIDER ABOUT:  Any allergies you have  All medicines you are taking including any over the counter medicines  Any blood disorders you have  Any surgeries you have had  Any medical conditions you have  Whether you are pregnant or may be pregnant    WHAT ARE THE RISK?  Generally, this is a safe procedure. However,problems may occur, including  Headache  Bleeding  Infection  Allergic Reaction  Nerve Damage    WHAT CAN I EXPECT AFTER THE PROCEDURE?    INJECTION SITE  Remove the Band-Aid/s after 24 hours  Check your injection site every day for signs of infection.  Check for:             Redness             Bleeding (small amt is normal)             Warmth             Pus or bad odor  Some numbness may be experienced for several hours following the procedure.  Avoid using heat on the injection site for 24 hours. You may use ice intermittently if needed by placing a         towel between your skin and the ice bag and using the ice for 20 minutes 2-3 times a day.  Do not take baths, swim or use a hot tub for 24 hours.    ACTIVITY  No strenuous activity for 24 hours then return to normal activity as tolerated.  If your leg is numb, no driving until full sensation and strength has returned.    GENERAL INSTRUCTIONS:  The injection site may feel numb, use ice with caution if numbness is present and no heat for 24 hours or until numbness is gone.   If you have numbness or weakness in your arm or leg, use those areas with caution until normal sensation returns.  It is not uncommon to notice an increase in discomfort or a change in the location of discomfort for 3-4 days after the procedure.  If discomfort is noticed  at the injection site, ice may be            applied to that area for 20 min 2-3 times a day.  Take the pain medicine your physician has prescribed or over the counter pain relievers as long as you do not have any contraindications.  If you are a diabetic, monitor your blood sugar closely.  The steroids used in your procedure may increase your blood sugar level up to 36 hours after the injection.  If your blood sugar is greater than 250, call the physician that helps you monitor your blood sugar.  Keep all follow-up visits as scheduled by your health care provider. This is important.    CONTACT OUR OFFICE IF:  You have any of these signs of infection            -Redness, swelling, or warmth around your injection site.            -Fluid or blood coming from your injection site (small amt of blood is normal)            -Pus or a bad odor from your injection site            -A fever  You develop a severe headache or a stiff neck  You lose control of your bladder or bowel movements      PAIN MANAGEMENT CENTER HOURS   Monday-Friday 7:30 am. - 4:00 pm.  For any problem related to your procedure we can be reached at [phone].  If you experience an emergency with your procedure, call [phone] or go to the emergency room.    Back Exercises  These exercises help to make your trunk and back strong. They also help to keep the lower back flexible. Doing these exercises can help to prevent or lessen pain in your lower back.  If you have back pain, try to do these exercises 2-3 times each day or as told by your doctor.  As you get better, do the exercises once each day. Repeat the exercises more often as told by your doctor.  To stop back pain from coming back, do the exercises once each day, or as told by your doctor.  Do exercises exactly as told by your doctor. Stop right away if you feel sudden pain or your pain gets worse.  Exercises  Single knee to chest  Do these steps 3-5 times in a row for each leg:  Lie on your  back on a firm bed or the floor with your legs stretched out.  Bring one knee to your chest.  Grab your knee or thigh with both hands and hold it in place.  Pull on your knee until you feel a gentle stretch in your lower back or butt.  Keep doing the stretch for 10-30 seconds.  Slowly let go of your leg and straighten it.  Pelvic tilt  Do these steps 5-10 times in a row:  Lie on your back on a firm bed or the floor with your legs stretched out.  Bend your knees so they point up to the ceiling. Your feet should be flat on the floor.  Tighten your lower belly (abdomen) muscles to press your lower back against the floor. This will make your tailbone point up to the ceiling instead of pointing down to your feet or the floor.  Stay in this position for 5-10 seconds while you gently tighten your muscles and breathe evenly.  Cat-cow  Do these steps until your lower back bends more easily:  Get on your hands and knees on a firm bed or the floor. Keep your hands under your shoulders, and keep your knees under your hips. You may put padding under your knees.  Let your head hang down toward your chest. Tighten (contract) the muscles in your belly. Point your tailbone toward the floor so your lower back becomes rounded like the back of a cat.  Stay in this position for 5 seconds.  Slowly lift your head. Let the muscles of your belly relax. Point your tailbone up toward the ceiling so your back forms a sagging arch like the back of a cow.  Stay in this position for 5 seconds.     Press-ups  Do these steps 5-10 times in a row:  Lie on your belly (face-down) on a firm bed or the floor.  Place your hands near your head, about shoulder-width apart.  While you keep your back relaxed and keep your hips on the floor, slowly straighten your arms to raise the top half of your body and lift your shoulders. Do not use your back muscles. You may change where you place your hands to make yourself more comfortable.  Stay in this position for  5 seconds. Keep your back relaxed.  Slowly return to lying flat on the floor.     Bridges  Do these steps 10 times in a row:  Lie on your back on a firm bed or the floor.  Bend your knees so they point up to the ceiling. Your feet should be flat on the floor. Your arms should be flat at your sides, next to your body.  Tighten your butt muscles and lift your butt off the floor until your waist is almost as high as your knees. If you do not feel the muscles working in your butt and the back of your thighs, slide your feet 1-2 inches (2.5-5 cm) farther away from your butt.  Stay in this position for 3-5 seconds.  Slowly lower your butt to the floor, and let your butt muscles relax.  If this exercise is too easy, try doing it with your arms crossed over your chest.  Belly crunches  Do these steps 5-10 times in a row:  Lie on your back on a firm bed or the floor with your legs stretched out.  Bend your knees so they point up to the ceiling. Your feet should be flat on the floor.  Cross your arms over your chest.  Tip your chin a little bit toward your chest, but do not bend your neck.  Tighten your belly muscles and slowly raise your chest just enough to lift your shoulder blades a tiny bit off the floor. Avoid raising your body higher than that because it can put too much stress on your lower back.  Slowly lower your chest and your head to the floor.  Back lifts  Do these steps 5-10 times in a row:  Lie on your belly (face-down) with your arms at your sides, and rest your forehead on the floor.  Tighten the muscles in your legs and your butt.  Slowly lift your chest off the floor while you keep your hips on the floor. Keep the back of your head in line with the curve in your back. Look at the floor while you do this.  Stay in this position for 3-5 seconds.  Slowly lower your chest and your face to the floor.  Contact a doctor if:  Your back pain gets a lot worse when you do an exercise.  Your back pain does not get  better within 2 hours after you exercise.  If you have any of these problems, stop doing the exercises. Do not do them again unless your doctor says it is okay.  Get help right away if:  You have sudden, very bad back pain. If this happens, stop doing the exercises. Do not do them again unless your doctor says it is okay.  This information is not intended to replace advice given to you by your health care provider. Make sure you discuss any questions you have with your health care provider.  Document Revised: 03/02/2022 Document Reviewed: 03/02/2022  [employer] Patient Education © 2024 [employer]    What is [employer]?  [employer] is a fitness and wellness program offered at no additional cost to seniors 65+ on eligible Medicare plans that helps you get active, get fit, and connect with others.  The program is designed for all levels and abilities and provides access to online and in-person classes, over 15,000 fitness locations, and health & wellness discounts.  Before starting any exercise program, consult with your primary care provider.  For additional information and to check eligibility:  [email or web address]                      EPIDURAL STEROID INJECTION          An epidural steroid injection is a shot of steroid medicine and numbing medicine that is given into the space between the spinal cord and the bones of the back (epidural space).  The injection helps relieve pain by an irritated or swollen nerve root.    TELL YOUR HEALTH CARE PROVIDER ABOUT:  Any allergies you have  All medicines you are taking including any over the counter medicines  Any blood disorders you have  Any surgeries you have had  Any medical conditions you have  Whether you are pregnant or may be pregnant    WHAT ARE THE RISK?  Generally, this is a safe procedure. However,problems may occur, including  Headache  Bleeding  Infection  Allergic Reaction  Nerve Damage    WHAT CAN I EXPECT AFTER THE PROCEDURE?    INJECTION  SITE  Remove the Band-Aid/s after 24 hours  Check your injection site every day for signs of infection.  Check for:             Redness             Bleeding (small amt is normal)             Warmth             Pus or bad odor  Some numbness may be experienced for several hours following the procedure.  Avoid using heat on the injection site for 24 hours. You may use ice intermittently if needed by placing a         towel between your skin and the ice bag and using the ice for 20 minutes 2-3 times a day.  Do not take baths, swim or use a hot tub for 24 hours.    ACTIVITY  No strenuous activity for 24 hours then return to normal activity as tolerated.  If your leg is numb, no driving until full sensation and strength has returned.    GENERAL INSTRUCTIONS:  The injection site may feel numb, use ice with caution if numbness is present and no heat for 24 hours or until numbness is gone.   If you have numbness or weakness in your arm or leg, use those areas with caution until normal sensation returns.  It is not uncommon to notice an increase in discomfort or a change in the location of discomfort for 3-4 days after the procedure.  If discomfort is noticed at the injection site, ice may be            applied to that area for 20 min 2-3 times a day.  Take the pain medicine your physician has prescribed or over the counter pain relievers as long as you do not have any contraindications.  If you are a diabetic, monitor your blood sugar closely.  The steroids used in your procedure may increase your blood sugar level up to 36 hours after the injection.  If your blood sugar is greater than 250, call the physician that helps you monitor your blood sugar.  Keep all follow-up visits as scheduled by your health care provider. This is important.    CONTACT OUR OFFICE IF:  You have any of these signs of infection            -Redness, swelling, or warmth around your injection site.            -Fluid or blood coming from your  injection site (small amt of blood is normal)            -Pus or a bad odor from your injection site            -A fever  You develop a severe headache or a stiff neck  You lose control of your bladder or bowel movements      PAIN MANAGEMENT CENTER HOURS   Monday-Friday 7:30 am. - 4:00 pm.  For any problem related to your procedure we can be reached at [phone].  If you experience an emergency with your procedure, call [phone] or go to the emergency room.    Back Exercises  These exercises help to make your trunk and back strong. They also help to keep the lower back flexible. Doing these exercises can help to prevent or lessen pain in your lower back.  If you have back pain, try to do these exercises 2-3 times each day or as told by your doctor.  As you get better, do the exercises once each day. Repeat the exercises more often as told by your doctor.  To stop back pain from coming back, do the exercises once each day, or as told by your doctor.  Do exercises exactly as told by your doctor. Stop right away if you feel sudden pain or your pain gets worse.  Exercises  Single knee to chest  Do these steps 3-5 times in a row for each leg:  Lie on your back on a firm bed or the floor with your legs stretched out.  Bring one knee to your chest.  Grab your knee or thigh with both hands and hold it in place.  Pull on your knee until you feel a gentle stretch in your lower back or butt.  Keep doing the stretch for 10-30 seconds.  Slowly let go of your leg and straighten it.  Pelvic tilt  Do these steps 5-10 times in a row:  Lie on your back on a firm bed or the floor with your legs stretched out.  Bend your knees so they point up to the ceiling. Your feet should be flat on the floor.  Tighten your lower belly (abdomen) muscles to press your lower back against the floor. This will make your tailbone point up to the ceiling instead of pointing down to your feet or the floor.  Stay in this position for 5-10 seconds  while you gently tighten your muscles and breathe evenly.  Cat-cow  Do these steps until your lower back bends more easily:  Get on your hands and knees on a firm bed or the floor. Keep your hands under your shoulders, and keep your knees under your hips. You may put padding under your knees.  Let your head hang down toward your chest. Tighten (contract) the muscles in your belly. Point your tailbone toward the floor so your lower back becomes rounded like the back of a cat.  Stay in this position for 5 seconds.  Slowly lift your head. Let the muscles of your belly relax. Point your tailbone up toward the ceiling so your back forms a sagging arch like the back of a cow.  Stay in this position for 5 seconds.     Press-ups  Do these steps 5-10 times in a row:  Lie on your belly (face-down) on a firm bed or the floor.  Place your hands near your head, about shoulder-width apart.  While you keep your back relaxed and keep your hips on the floor, slowly straighten your arms to raise the top half of your body and lift your shoulders. Do not use your back muscles. You may change where you place your hands to make yourself more comfortable.  Stay in this position for 5 seconds. Keep your back relaxed.  Slowly return to lying flat on the floor.     Bridges  Do these steps 10 times in a row:  Lie on your back on a firm bed or the floor.  Bend your knees so they point up to the ceiling. Your feet should be flat on the floor. Your arms should be flat at your sides, next to your body.  Tighten your butt muscles and lift your butt off the floor until your waist is almost as high as your knees. If you do not feel the muscles working in your butt and the back of your thighs, slide your feet 1-2 inches (2.5-5 cm) farther away from your butt.  Stay in this position for 3-5 seconds.  Slowly lower your butt to the floor, and let your butt muscles relax.  If this exercise is too easy, try doing it with your arms crossed over your  chest.  Belly crunches  Do these steps 5-10 times in a row:  Lie on your back on a firm bed or the floor with your legs stretched out.  Bend your knees so they point up to the ceiling. Your feet should be flat on the floor.  Cross your arms over your chest.  Tip your chin a little bit toward your chest, but do not bend your neck.  Tighten your belly muscles and slowly raise your chest just enough to lift your shoulder blades a tiny bit off the floor. Avoid raising your body higher than that because it can put too much stress on your lower back.  Slowly lower your chest and your head to the floor.  Back lifts  Do these steps 5-10 times in a row:  Lie on your belly (face-down) with your arms at your sides, and rest your forehead on the floor.  Tighten the muscles in your legs and your butt.  Slowly lift your chest off the floor while you keep your hips on the floor. Keep the back of your head in line with the curve in your back. Look at the floor while you do this.  Stay in this position for 3-5 seconds.  Slowly lower your chest and your face to the floor.  Contact a doctor if:  Your back pain gets a lot worse when you do an exercise.  Your back pain does not get better within 2 hours after you exercise.  If you have any of these problems, stop doing the exercises. Do not do them again unless your doctor says it is okay.  Get help right away if:  You have sudden, very bad back pain. If this happens, stop doing the exercises. Do not do them again unless your doctor says it is okay.  This information is not intended to replace advice given to you by your health care provider. Make sure you discuss any questions you have with your health care provider.  Document Revised: 03/02/2022 Document Reviewed: 03/02/2022  [employer] Patient Education © 2024 [employer]    What is [employer]?  [employer] is a fitness and wellness program offered at no additional cost to seniors 65+ on eligible Medicare plans that helps you  get active, get fit, and connect with others.  The program is designed for all levels and abilities and provides access to online and in-person classes, over 15,000 fitness locations, and health & wellness discounts.  Before starting any exercise program, consult with your primary care provider.  For additional information and to check eligibility:  tools.[email or web address]

## 2025-06-12 NOTE — H&P
HISTORY:    The patient returns for another Lumbar epidural steroid injection today.  Her last injection was several years ago but she returns now with similar complaints of low back pain radiating into her lower extremity.  Conservative therapies included rest Tylenol.  Current Outpatient Medications on File Prior to Encounter   Medication Sig Dispense Refill    acetaminophen (TYLENOL) 325 MG tablet Take 2 tablets by mouth Every 6 (Six) Hours As Needed for Mild Pain . (Patient taking differently: Take 500 mg by mouth Every 6 (Six) Hours As Needed for Mild Pain.)      ALPRAZolam (XANAX) 0.5 MG tablet Take 1 tablet by mouth Daily. 30 tablet 0    Cholecalciferol (VITAMIN D) 2000 units capsule Take  by mouth Daily With Dinner.      cloNIDine (Catapres) 0.1 MG tablet 1 po bid prn sys greater or equal to 150 30 tablet 3    cycloSPORINE (RESTASIS) 0.05 % ophthalmic emulsion       desvenlafaxine (PRISTIQ) 100 MG 24 hr tablet TAKE 1 TABLET DAILY 90 tablet 3    furosemide (LASIX) 20 MG tablet Take 1 tablet by mouth 2 (Two) Times a Day. 120 tablet 2    Ibuprofen 3 %, Gabapentin 10 %, Baclofen 2 %, lidocaine 4 % Apply 1-2 g topically to the appropriate area as directed 3 (Three) to 4 (Four) times daily. 90 g 3    ketoconazole (NIZORAL) 2 % cream APPLY TO RASH UNDER BREAST 1-2X DAILY AS NEEDED WHEN FLARED. MAY MIX 50/50 WITH TRIAMCINOLONE CREAM.      levothyroxine (SYNTHROID, LEVOTHROID) 50 MCG tablet Take six days a week.      Magnesium 400 MG capsule 1 p.o. daily 60 capsule 3    melatonin 5 MG tablet tablet Take 2 tablets by mouth.      meloxicam (MOBIC) 7.5 MG tablet Take 1 tablet by mouth Daily. 90 tablet 0    omeprazole (priLOSEC) 40 MG capsule TAKE 1 CAPSULE DAILY 90 capsule 3    Probiotic Product (ALIGN PO) Take 1 capsule by mouth Daily With Lunch.      Psyllium (Metamucil) wafer wafer Take  by mouth Daily.      rosuvastatin (CRESTOR) 20 MG tablet TAKE 1 TABLET EVERY NIGHT 90 tablet 3    SODIUM CHLORIDE PO Take  by mouth  3 (Three) Times a Day. 2 TABLETS 3 6TIMES A DAY      valsartan (DIOVAN) 80 MG tablet Take 1 tablet by mouth 2 (Two) Times a Day. 180 tablet 1    Vibegron (Gemtesa) 75 MG tablet Take  by mouth.      vitamin B-12 (CYANOCOBALAMIN) 100 MCG tablet Take 0.5 tablets by mouth Daily.      zolpidem (AMBIEN) 10 MG tablet TAKE 1 TABLET AT NIGHT AS NEEDED FOR SLEEP 30 tablet 0     No current facility-administered medications on file prior to encounter.       Past Medical History:   Diagnosis Date    Anemia 2000    Anxiety     Arthritis     Bowel dysfunction 08/2021    since having surgery for diverticular infection    Chronic constipation     Depression     Diverticulitis 08/2021    w/ rupture and infection required surgery and ostomy    E coli infection 06/14/2022    Essential hypertension, benign     Fracture 06/2022    left ankle    Fracture, tibia and fibula Now wearing bood    GERD (gastroesophageal reflux disease)     Hernia, hiatal     Hypercholesterolemia     Hypertension     Hypokalemia     Hyponatremia 2014    chronic low with occasional normal level    Hypothyroidism 2018    estimated time frame pt nor  could remember exactly how long has been on medication    Insomnia     Iron deficiency     Seizures 2022    Tear of meniscus of knee 2000    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  Risks including failure of relief, infection and bleeding were discussed with the patient.  May return for subsequent injections as needed

## 2025-06-16 RX ORDER — ROSUVASTATIN CALCIUM 20 MG/1
20 TABLET, COATED ORAL NIGHTLY
Qty: 90 TABLET | Refills: 3 | Status: SHIPPED | OUTPATIENT
Start: 2025-06-16

## 2025-06-16 RX ORDER — DILTIAZEM HYDROCHLORIDE 120 MG/1
120 CAPSULE, EXTENDED RELEASE ORAL DAILY
Qty: 90 CAPSULE | Refills: 3 | OUTPATIENT
Start: 2025-06-16

## 2025-06-23 ENCOUNTER — TREATMENT (OUTPATIENT)
Age: 85
End: 2025-06-23
Payer: MEDICARE

## 2025-06-23 DIAGNOSIS — R26.2 DIFFICULTY WALKING: ICD-10-CM

## 2025-06-23 DIAGNOSIS — M25.562 CHRONIC PAIN OF LEFT KNEE: Primary | ICD-10-CM

## 2025-06-23 DIAGNOSIS — G89.29 CHRONIC PAIN OF LEFT KNEE: Primary | ICD-10-CM

## 2025-06-23 DIAGNOSIS — Z96.652 STATUS POST TOTAL KNEE REPLACEMENT, LEFT: ICD-10-CM

## 2025-06-23 PROCEDURE — 97530 THERAPEUTIC ACTIVITIES: CPT | Performed by: PHYSICAL THERAPIST

## 2025-06-23 PROCEDURE — 97110 THERAPEUTIC EXERCISES: CPT | Performed by: PHYSICAL THERAPIST

## 2025-06-23 NOTE — PROGRESS NOTES
Physical Therapy Daily Treatment Note    Patient: Ritu Martino  : 1940  Diagnosis/ICD-10 Code:  Chronic pain of left knee [M25.562, G89.29]  Referring practitioner: Ryder Jo MD  Today's Date: 2025    VISIT#: 9      Subjective   Ritu Martino reports: doing much better, ankle isn't swollen and doesn't hurt any more. The doppler was negative so no blood clot.       Objective     Left Knee flexion AROM: 100 degrees (pre-stretching)  Left knee flexion PROM: 114 degrees (post- stretching)    See Exercise, Manual, and Modality Logs for complete treatment.     Patient Education: continue HEP    Assessment/Plan  Good response to session, she was initially quite limited with her knee flexion ROM, but this is improved after stretching exercises. She fatigues quickly with standing exercises and requires frequent seated rest breaks.       Progress per Plan of Care            Timed:         Manual Therapy:         mins  93081;     Therapeutic Exercise:    27     mins  14324;     Neuromuscular Jessica:        mins  03024;    Therapeutic Activity:     13     mins  72374;     Gait Training:           mins  57575;     Ultrasound:          mins  18014;    Ionto:                                   mins   94835  Self Care:                            mins   28441    Un-Timed:  Electrical Stimulation:         mins  49293 ( );  Dry Needling          mins self-pay  Traction          mins [id number]  Re-Eval                               mins  68375    Timed Treatment:   40   mins   Total Treatment:     40   mins    Aylin Arenas, PT  Physical Therapist  [place] PT license #: [id number]  [place] PT license #: [id number]

## 2025-06-24 DIAGNOSIS — F51.01 PRIMARY INSOMNIA: ICD-10-CM

## 2025-06-24 RX ORDER — ZOLPIDEM TARTRATE 10 MG/1
10 TABLET ORAL NIGHTLY PRN
Qty: 30 TABLET | Refills: 0 | Status: SHIPPED | OUTPATIENT
Start: 2025-06-24

## 2025-06-25 ENCOUNTER — TREATMENT (OUTPATIENT)
Age: 85
End: 2025-06-25
Payer: MEDICARE

## 2025-06-25 DIAGNOSIS — Z96.652 STATUS POST TOTAL KNEE REPLACEMENT, LEFT: ICD-10-CM

## 2025-06-25 DIAGNOSIS — G89.29 CHRONIC PAIN OF LEFT KNEE: Primary | ICD-10-CM

## 2025-06-25 DIAGNOSIS — M25.562 CHRONIC PAIN OF LEFT KNEE: Primary | ICD-10-CM

## 2025-06-25 DIAGNOSIS — R26.2 DIFFICULTY WALKING: ICD-10-CM

## 2025-06-25 NOTE — PROGRESS NOTES
Physical Therapy Daily Treatment Note    Patient: Ritu Martino  : 1940  Diagnosis/ICD-10 Code:  Chronic pain of left knee [M25.562, G89.29]  Referring practitioner: Ryder Jo MD  Today's Date: 2025    VISIT#: 10      Subjective   Ritu Martino reports: Doing pretty well, felt fine after last time, just tired.      Objective     See Exercise, Manual, and Modality Logs for complete treatment.     Patient Education: continue HEP    Assessment/Plan  Good response to session, tolerated more standing activities today. Showing good progress with ROM. Still a little unsteady on her feet but will continue to work on this.     Progress per Plan of Care            Timed:         Manual Therapy:         mins  17376;     Therapeutic Exercise:    25     mins  24480;     Neuromuscular Jessica:        mins  50756;    Therapeutic Activity:     15     mins  41962;     Gait Training:           mins  04420;     Ultrasound:          mins  27239;    Ionto:                                   mins   80625  Self Care:                            mins   88010    Un-Timed:  Electrical Stimulation:         mins  19040 ( );  Dry Needling          mins self-pay  Traction          mins 15612  Re-Eval                               mins  60937    Timed Treatment:   40   mins   Total Treatment:     40   mins    Aylin Arenas, PT  Physical Therapist  [place] PT license #: [id number]  [place] PT license #: [id number]

## 2025-06-27 ENCOUNTER — HOSPITAL ENCOUNTER (OUTPATIENT)
Dept: MAMMOGRAPHY | Facility: HOSPITAL | Age: 85
Discharge: HOME | End: 2025-06-27
Admitting: STUDENT IN AN ORGANIZED HEALTH CARE EDUCATION/TRAINING PROGRAM
Payer: MEDICARE

## 2025-06-27 DIAGNOSIS — Z12.31 ENCOUNTER FOR SCREENING MAMMOGRAM FOR MALIGNANT NEOPLASM OF BREAST: ICD-10-CM

## 2025-06-27 PROCEDURE — 77063 BREAST TOMOSYNTHESIS BI: CPT

## 2025-06-27 PROCEDURE — 77067 SCR MAMMO BI INCL CAD: CPT

## 2025-06-30 ENCOUNTER — TELEPHONE (OUTPATIENT)
Dept: ORTHOPEDICS | Facility: OTHER | Age: 85
End: 2025-06-30
Payer: MEDICARE

## 2025-07-08 ENCOUNTER — LAB (OUTPATIENT)
Dept: LAB | Facility: HOSPITAL | Age: 85
End: 2025-07-08
Payer: MEDICARE

## 2025-07-08 ENCOUNTER — TRANSCRIBE ORDERS (OUTPATIENT)
Dept: LAB | Facility: HOSPITAL | Age: 85
End: 2025-07-08
Payer: MEDICARE

## 2025-07-08 DIAGNOSIS — E87.1 HYPOSMOLALITY SYNDROME: ICD-10-CM

## 2025-07-08 DIAGNOSIS — E87.1 HYPOSMOLALITY SYNDROME: Primary | ICD-10-CM

## 2025-07-08 LAB
ALBUMIN SERPL-MCNC: 3.9 G/DL (ref 3.5–5.2)
ALBUMIN/GLOB SERPL: 1.5 G/DL
ALP SERPL-CCNC: 60 U/L (ref 39–117)
ALT SERPL W P-5'-P-CCNC: 13 U/L (ref 1–33)
ANION GAP SERPL CALCULATED.3IONS-SCNC: 15 MMOL/L (ref 5–15)
AST SERPL-CCNC: 18 U/L (ref 1–32)
BILIRUB SERPL-MCNC: 0.2 MG/DL (ref 0–1.2)
BUN SERPL-MCNC: 12 MG/DL (ref 8–23)
BUN/CREAT SERPL: 20 (ref 7–25)
CALCIUM SPEC-SCNC: 8.8 MG/DL (ref 8.6–10.5)
CHLORIDE SERPL-SCNC: 100 MMOL/L (ref 98–107)
CO2 SERPL-SCNC: 24 MMOL/L (ref 22–29)
CREAT SERPL-MCNC: 0.6 MG/DL (ref 0.57–1)
EGFRCR SERPLBLD CKD-EPI 2021: 88.1 ML/MIN/1.73
GLOBULIN UR ELPH-MCNC: 2.6 GM/DL
GLUCOSE SERPL-MCNC: 71 MG/DL (ref 65–99)
POTASSIUM SERPL-SCNC: 3.2 MMOL/L (ref 3.5–5.2)
PROT SERPL-MCNC: 6.5 G/DL (ref 6–8.5)
SODIUM SERPL-SCNC: 139 MMOL/L (ref 136–145)

## 2025-07-08 PROCEDURE — 36415 COLL VENOUS BLD VENIPUNCTURE: CPT

## 2025-07-08 PROCEDURE — 80053 COMPREHEN METABOLIC PANEL: CPT

## 2025-07-09 ENCOUNTER — TREATMENT (OUTPATIENT)
Age: 85
End: 2025-07-09
Payer: MEDICARE

## 2025-07-09 DIAGNOSIS — G89.29 CHRONIC PAIN OF LEFT KNEE: Primary | ICD-10-CM

## 2025-07-09 DIAGNOSIS — F51.01 PRIMARY INSOMNIA: ICD-10-CM

## 2025-07-09 DIAGNOSIS — M25.562 CHRONIC PAIN OF LEFT KNEE: Primary | ICD-10-CM

## 2025-07-09 DIAGNOSIS — R26.2 DIFFICULTY WALKING: ICD-10-CM

## 2025-07-09 DIAGNOSIS — F41.9 ANXIETY: ICD-10-CM

## 2025-07-09 DIAGNOSIS — Z96.652 STATUS POST TOTAL KNEE REPLACEMENT, LEFT: ICD-10-CM

## 2025-07-09 RX ORDER — ALPRAZOLAM 0.5 MG
0.5 TABLET ORAL DAILY
Qty: 90 TABLET | Refills: 0 | Status: SHIPPED | OUTPATIENT
Start: 2025-07-09

## 2025-07-09 RX ORDER — ZOLPIDEM TARTRATE 10 MG/1
10 TABLET ORAL NIGHTLY PRN
Qty: 90 TABLET | Refills: 0 | Status: SHIPPED | OUTPATIENT
Start: 2025-07-09

## 2025-07-09 NOTE — TELEPHONE ENCOUNTER
Caller: Nu Martino    Relationship: Emergency Contact    Best call back number: [phone]     Requested Prescriptions:   Requested Prescriptions     Pending Prescriptions Disp Refills    ALPRAZolam (XANAX) 0.5 MG tablet 30 tablet 0     Sig: Take 1 tablet by mouth Daily.    zolpidem (AMBIEN) 10 MG tablet 30 tablet 0     Sig: Take 1 tablet by mouth At Night As Needed for Sleep.        Pharmacy where request should be sent: [facility] [address] [phone] [facility] [phone]      Last office visit with prescribing clinician: 5/27/2025   Last telemedicine visit with prescribing clinician: Visit date not found   Next office visit with prescribing clinician: 8/28/2025     Additional details provided by patient: STATES WAS INCREASED TO 1 A DAY OF THE ALPRAZOLAM NEEDS 90 DAY SUPPLY ON BOTH MEDICATIONS     Does the patient have less than a 3 day supply:  [] Yes  [x] No    Would you like a call back once the refill request has been completed: [] Yes [x] No    If the office needs to give you a call back, can they leave a voicemail: [] Yes [x] No    Mj Smith   07/09/25 09:15 EDT

## 2025-07-09 NOTE — PROGRESS NOTES
Physical Therapy Progress Note/Re-assessment    Patient: Ritu Martino  : 1940  Referring practitioner: Ryder Jo MD  Today's Date: 2025    VISIT#: 11    Subjective   Ritu Martino reports: Doing pretty well, knee still gets sore and stiff some.       WOMAC:     Objective     Observations      GAIT: ambulates without AD, very slight antalgic gait.   TRANSFERS: Danna, no UE support  BALANCE: SLS: 1 sec ea LE     Active Range of Motion   Left Knee   Flexion: 109 degrees   Extension: 8 degrees        Passive Range of Motion   Left Knee   Flexion: 114 degrees   Extension: 4 degrees         Strength/Myotome Testing      Left Knee   Flexion: 4+/5  Extension: 4-/5     Left Knee   Quadriceps contraction: fair     See Exercise, Manual, and Modality Logs for complete treatment.     Patient Education: continue HEP, reprinted HEP.    Assessment & Plan       Assessment  Assessment details: Ritu is making good progress with therapy. We are seeing improvements in her gait and transfers. She has overall seen progress with her ROM however, due to multiple missed visits due to other medical reasons she was only been seen 4 times in the last 4-5 weeks. Due to this, her knee extension ROM has actually gotten slightly worse. Overall she has met all STGs and making good progress toward remaining goals. However we need to continue therapy on a more regular basis in order to regain lost ROM. Requires continued PT to address remaining deficits and return to PLOF.     Plan  Therapy options: will be seen for skilled therapy services  Frequency: 2x week  Treatment plan discussed with: patient            STG: (to be met in 4 weeks)  1. Pt will be independent and compliant with initial HEP in 2 weeks. Met  2. Pt will improve knee flexion AROM to > 105 degrees. Met  3. Pt will improve knee extension PROM to <5 degrees. Met  4. Pt will complete 10 SLR without extensor lag to indicate improved quad strength. Met  5.  Pt will ambulate household distances without AD with minimally antalgic gait with good balance. Met     LTG: (to be met by DC)   1. Pt will be independent with final HEP for self-management of condition by DC. Progressing  2. Pt will improve score on WOMAC outcome measure to <20/96 by DC. Not met  3. Pt will improve knee flexion AROM to at least 115 degrees and knee extension to at least 0 deg in order to be able to walk with normal gait mechanics by DC. Not met  4. Pt will improve knee strength to at least 4+/5 in order to be able to negotiate stairs with proper mechanics by DC. Progressing  5. Pt will be able to ambulate community distances with or without SPC and normal mechanics by DC. progressing  6. Pt will be able to step over 4 inch obstacle without UE support and without LOB in order to be able to safely ambulate on uneven surfaces by DC. Not met    Progress per Plan of Care            Timed:         Manual Therapy:    10     mins  81619;     Therapeutic Exercise:    20     mins  55008;     Neuromuscular Jessica:        mins  58236;    Therapeutic Activity:     10     mins  46649;     Gait Training:           mins  33575;     Ultrasound:          mins  26962;    Ionto:                                   mins   63716  Self Care:                            mins   18486    Un-Timed:  Electrical Stimulation:         mins  21779 ( );  Dry Needling          mins self-pay  Traction          mins 66953  Re-Eval                               mins  50263    Timed Treatment:   40   mins   Total Treatment:     40   mins    Aylin Arenas PT  Physical Therapist  [place] PT license #: [id number]  [place] PT license #: [id number]

## 2025-07-17 ENCOUNTER — TREATMENT (OUTPATIENT)
Age: 85
End: 2025-07-17
Payer: MEDICARE

## 2025-07-17 DIAGNOSIS — G89.29 CHRONIC PAIN OF LEFT KNEE: Primary | ICD-10-CM

## 2025-07-17 DIAGNOSIS — R26.2 DIFFICULTY WALKING: ICD-10-CM

## 2025-07-17 DIAGNOSIS — Z96.652 STATUS POST TOTAL KNEE REPLACEMENT, LEFT: ICD-10-CM

## 2025-07-17 DIAGNOSIS — M25.562 CHRONIC PAIN OF LEFT KNEE: Primary | ICD-10-CM

## 2025-07-17 NOTE — PROGRESS NOTES
Physical Therapy Daily Treatment Note    [facility]  [address]  [place] [address]     Patient: Ritu Martino   : 1940  Referring practitioner: Ryder Jo MD  Date of Initial Visit: Type: THERAPY  Noted: 2025  Today's Date: 2025  Patient seen for 12 sessions         Ritu Martino reports:  left knee is better but it is stiff at times throughout the day.        Subjective     Objective   See Exercise, Manual, and Modality Logs for complete treatment.   Reviewed ROM, stretching and strengthening exercises per HEP with patient and  regarding proper repetitions, hold time and positions.  Discussed need for daily ROM - push self into mild discomfort with heel slides/strap pulls.  Discussed need for daily stretching of affected musculature to improve L LE flexibility.  Discussed ice utilization and positioning of L LE without support under knee for low load long duration stretch.    Assessment/Plan  Tightness/restrictions in left knee limiting ROM(greater extension vs flexion). Subjectively, reports not pushing L knee into discomfort zone.  Some soreness after exercise session, per report, but feels that knee moves better.  Benefits from verbal and tactile cues to push left knee into TKE.        Progress strengthening /stabilization /functional activity           Timed:  Manual Therapy:  15       mins  66630;  Therapeutic Exercise:   24      mins  80590;     Neuromuscular Jessica:        mins  64769;    Therapeutic Activity:       10   mins  48175;     Gait Training:           mins  38182;     Ultrasound:          mins  56320;    Self Care                    __15_      mins 93562    Untimed:  Electrical Stimulation:         mins  63681 ( );  Mechanical Traction:         mins  03605;     Timed Treatment:   54   mins   Total Treatment:      54  mins  Costa Glass PTA  Physical Therapist  Assistant  [id number]

## 2025-07-25 ENCOUNTER — TREATMENT (OUTPATIENT)
Age: 85
End: 2025-07-25
Payer: MEDICARE

## 2025-07-25 DIAGNOSIS — R26.2 DIFFICULTY WALKING: ICD-10-CM

## 2025-07-25 DIAGNOSIS — M25.562 CHRONIC PAIN OF LEFT KNEE: Primary | ICD-10-CM

## 2025-07-25 DIAGNOSIS — G89.29 CHRONIC PAIN OF LEFT KNEE: Primary | ICD-10-CM

## 2025-07-25 DIAGNOSIS — Z96.652 STATUS POST TOTAL KNEE REPLACEMENT, LEFT: ICD-10-CM

## 2025-07-25 NOTE — PROGRESS NOTES
Physical Therapy Daily Treatment Note    [facility]  [address]  [address]  [place] [address]     Patient: Ritu Martino   : 1940  Referring practitioner: Ryder Jo MD  Date of Initial Visit: Type: THERAPY  Noted: 2025  Today's Date: 2025  Patient seen for 13 sessions         Ritu aMrtino reports: left knee doing alright.  Occassionally gets a stab of pain but not bothering her constantly.  Hard time getting in and out of car states.          Subjective     Objective   See Exercise, Manual, and Modality Logs for complete treatment.   Added:  standing heel/toe raises // bars, side stepping // bars on foam beam, tandem walk foam beam in // bars    Gait training including stair climbing x 12 min in clinic consisting of ambulating through out clinic over wood floor/walking track and turf floor, navigating objects/equipment, traversing thresholds. Ambulates up/down stair module with progression from 2 rail use and step to ascending/descending to 1 rails use and reciprocal gait with ascending/descending.   Verbal and tactile cues to increase foot clearance as well as to achieve longer step/stride length.  Pt responds appropriately, but needs multiple reminders/cues to sustain positive gait changes      Assessment/Plan  Improved capability for balance and proprioception activities without significant LOB.  Able to progress LE strengthening exercises/activities, in regards to reps and resistance, without increased symptoms or discomfort only early fatigue of isolated musculature.  Continues to benefit from verbal/tactile cues to ensure positive sustained gait changes.          Progress per Plan of Care toward all goals.  Continue manual interventions to improve L knee freedom of movement as well as progression of L LE strengthening exercises.           Timed:  Manual Therapy:         mins  85927;  Therapeutic Exercise:    24     mins  83015;      Neuromuscular Jessica:   8     mins  08885;    Therapeutic Activity:          mins  41115;     Gait Trainin      mins  65379;     Ultrasound:          mins  11243;    Self Care                    ___      mins 68303    Untimed:  Electrical Stimulation:         mins  26702 ( );  Mechanical Traction:         mins  65987;     Timed Treatment:  44    mins   Total Treatment:     44   mins  Costa Glass, [facility]  Physical Therapist  Assistant  [id number]

## 2025-07-29 ENCOUNTER — TREATMENT (OUTPATIENT)
Age: 85
End: 2025-07-29
Payer: MEDICARE

## 2025-07-29 DIAGNOSIS — M25.562 CHRONIC PAIN OF LEFT KNEE: Primary | ICD-10-CM

## 2025-07-29 DIAGNOSIS — G89.29 CHRONIC PAIN OF LEFT KNEE: Primary | ICD-10-CM

## 2025-07-29 DIAGNOSIS — R26.2 DIFFICULTY WALKING: ICD-10-CM

## 2025-07-29 DIAGNOSIS — Z96.652 STATUS POST TOTAL KNEE REPLACEMENT, LEFT: ICD-10-CM

## 2025-07-29 PROCEDURE — 97110 THERAPEUTIC EXERCISES: CPT | Performed by: PHYSICAL THERAPIST

## 2025-07-29 PROCEDURE — 97530 THERAPEUTIC ACTIVITIES: CPT | Performed by: PHYSICAL THERAPIST

## 2025-07-29 PROCEDURE — 97112 NEUROMUSCULAR REEDUCATION: CPT | Performed by: PHYSICAL THERAPIST

## 2025-07-29 NOTE — PROGRESS NOTES
Physical Therapy Daily Treatment Note    Patient: Ritu Martino  : 1940  Diagnosis/ICD-10 Code:  Chronic pain of left knee [M25.562, G89.29]  Referring practitioner: Ryder Jo MD  Today's Date: 2025    VISIT#: 14      Subjective   Ritu Martino reports: Doing ok, gets tired still but thinks she is getting better.       Objective     See Exercise, Manual, and Modality Logs for complete treatment.     Patient Education: continue HEP    Assessment/Plan  Good response to session, focusing on more standing, functional & balance activities. She requires SBA to CGA occasional during balance activities in order to prevent falls or LOB. Demonstrates good form with step ups on 6 inch step. She no longer needs AD for ambulation for short distances.     Progress per Plan of Care            Timed:         Manual Therapy:         mins  48543;     Therapeutic Exercise:    20     mins  40768;     Neuromuscular Jessica:    10    mins  62871;    Therapeutic Activity:     12     mins  90451;     Gait Training:           mins  84152;     Ultrasound:          mins  01771;    Ionto:                                   mins   97988  Self Care:                            mins   53990    Un-Timed:  Electrical Stimulation:         mins  50121 ( );  Dry Needling          mins self-pay  Traction          mins 22666  Re-Eval                               mins  00528    Timed Treatment:   42   mins   Total Treatment:     42   mins    Aylin Arenas, PT  Physical Therapist  [place] PT license #: [id number]  [place] PT license #: [id number]

## 2025-07-31 ENCOUNTER — TREATMENT (OUTPATIENT)
Age: 85
End: 2025-07-31
Payer: MEDICARE

## 2025-07-31 DIAGNOSIS — Z96.652 STATUS POST TOTAL KNEE REPLACEMENT, LEFT: ICD-10-CM

## 2025-07-31 DIAGNOSIS — R26.2 DIFFICULTY WALKING: ICD-10-CM

## 2025-07-31 DIAGNOSIS — G89.29 CHRONIC PAIN OF LEFT KNEE: Primary | ICD-10-CM

## 2025-07-31 DIAGNOSIS — M25.562 CHRONIC PAIN OF LEFT KNEE: Primary | ICD-10-CM

## 2025-07-31 NOTE — PROGRESS NOTES
Physical Therapy Daily Treatment Note    [facility]  [address]  [address]  [place] [address]     Patient: Ritu Martino   : 1940  Referring practitioner: yRder Jo MD  Date of Initial Visit: Type: THERAPY  Noted: 2025  Today's Date: 2025  Patient seen for 15 sessions         Ritu Martino reports: some left knee soreness.  Feels it is from performing SLR at home with her old walking boot on.  States her  helps her with her exercises and thought the extra weight would be good for her.        Subjective     Objective   Ambulates in/out of clinic without assistive device.  Noted decreased step height/foot clearance.  Noted L knee stiffness with passive motion flex/ext planes without increased symptoms or discomfort reported.  Stiffness decreases with prolonged ROM.  Noted mild scar tissue/adhesions superior portion L knee incision  Noted decreased L patellar glides superior/inferior directions    See Exercise, Manual, and Modality Logs for complete treatment.   Ice application to affected area with L knee in full ext and heel propped on chair to maximize stretching via low load long duration stretch position.  Re issued SLR regimen pictures with clear instructions regarding performance with tennis shoe(light weight) vs walking boot(heavy weight)  Strategies discussed regarding daily exercise performance and the importance to maximize mobility, flexibility and strength to improve capability for daily ADL's and functional tasks.    Balance/proprioception activities performed in // bars consisting of  Heel/toe raises minimal support x 20 each  Tandem walking on foam beam x 4  Tandem stance on foam beam /off foam beam 2 x each foot leading x 30 sec holds  Feet together eyes open and eyes closed minimal support x 30 sec each     Assessment/Plan  Subjectively reports some L knee soreness(diffusely), feels it was from performing weighted SLR  exercises.  Objectively, she presents with L knee stiffness in flex/ext planes, decreased Sup/Inf patellar mobilization and evident posterior knee/hs tightness.  Positive response to manual intervention and benefits from education regarding condition and strategies to improve compliance and capability at home without irritation.            Progress per Plan of Care toward all goals.  Continue manual interventions to improve L knee patellar mobility, ROM and flexibility           Timed:  Manual Therapy:    18   mins  82887;  Therapeutic Exercise:    15     mins  28127;     Neuromuscular Jessica:   5    mins  91980;    Therapeutic Activity:          mins  48563;     Gait Training:           mins  93670;     Ultrasound:          mins  15859;    Self Care                    _10__      mins 09271    Untimed:  Electrical Stimulation:         mins  84744 ( );  Mechanical Traction:         mins  45706;     Timed Treatment:  48   mins   Total Treatment:   48    mins  Csota Glass PTA  Physical Therapist  Assistant  [id number]

## 2025-08-05 ENCOUNTER — TREATMENT (OUTPATIENT)
Age: 85
End: 2025-08-05
Payer: MEDICARE

## 2025-08-05 DIAGNOSIS — R26.2 DIFFICULTY WALKING: ICD-10-CM

## 2025-08-05 DIAGNOSIS — G89.29 CHRONIC PAIN OF LEFT KNEE: ICD-10-CM

## 2025-08-05 DIAGNOSIS — Z96.652 STATUS POST TOTAL KNEE REPLACEMENT, LEFT: Primary | ICD-10-CM

## 2025-08-05 DIAGNOSIS — M25.562 CHRONIC PAIN OF LEFT KNEE: ICD-10-CM

## 2025-08-05 PROCEDURE — 97140 MANUAL THERAPY 1/> REGIONS: CPT | Performed by: PHYSICAL THERAPIST

## 2025-08-05 PROCEDURE — 97164 PT RE-EVAL EST PLAN CARE: CPT | Performed by: PHYSICAL THERAPIST

## 2025-08-05 PROCEDURE — 97110 THERAPEUTIC EXERCISES: CPT | Performed by: PHYSICAL THERAPIST

## 2025-08-12 ENCOUNTER — TELEPHONE (OUTPATIENT)
Age: 85
End: 2025-08-12
Payer: MEDICARE

## 2025-08-13 ENCOUNTER — TELEPHONE (OUTPATIENT)
Dept: INTERNAL MEDICINE | Facility: CLINIC | Age: 85
End: 2025-08-13
Payer: MEDICARE

## 2025-08-14 ENCOUNTER — TREATMENT (OUTPATIENT)
Age: 85
End: 2025-08-14
Payer: MEDICARE

## 2025-08-14 DIAGNOSIS — G89.29 CHRONIC PAIN OF LEFT KNEE: ICD-10-CM

## 2025-08-14 DIAGNOSIS — R26.2 DIFFICULTY WALKING: ICD-10-CM

## 2025-08-14 DIAGNOSIS — Z96.652 STATUS POST TOTAL KNEE REPLACEMENT, LEFT: Primary | ICD-10-CM

## 2025-08-14 DIAGNOSIS — M25.562 CHRONIC PAIN OF LEFT KNEE: ICD-10-CM

## 2025-08-18 ENCOUNTER — TELEPHONE (OUTPATIENT)
Dept: INTERNAL MEDICINE | Facility: CLINIC | Age: 85
End: 2025-08-18
Payer: MEDICARE

## 2025-08-20 ENCOUNTER — TREATMENT (OUTPATIENT)
Age: 85
End: 2025-08-20
Payer: MEDICARE

## 2025-08-20 DIAGNOSIS — M25.562 CHRONIC PAIN OF LEFT KNEE: ICD-10-CM

## 2025-08-20 DIAGNOSIS — G89.29 CHRONIC PAIN OF LEFT KNEE: ICD-10-CM

## 2025-08-20 DIAGNOSIS — R26.2 DIFFICULTY WALKING: ICD-10-CM

## 2025-08-20 DIAGNOSIS — Z96.652 STATUS POST TOTAL KNEE REPLACEMENT, LEFT: Primary | ICD-10-CM

## 2025-08-21 LAB
ALBUMIN SERPL-MCNC: 4.4 G/DL (ref 3.5–5.2)
ALBUMIN/GLOB SERPL: 1.9 G/DL
ALP SERPL-CCNC: 63 U/L (ref 39–117)
ALT SERPL-CCNC: 14 U/L (ref 1–33)
AST SERPL-CCNC: 16 U/L (ref 1–32)
BASOPHILS # BLD AUTO: 0.02 10*3/MM3 (ref 0–0.2)
BASOPHILS NFR BLD AUTO: 0.4 % (ref 0–1.5)
BILIRUB SERPL-MCNC: 0.5 MG/DL (ref 0–1.2)
BUN SERPL-MCNC: 11 MG/DL (ref 8–23)
BUN/CREAT SERPL: 13.4 (ref 7–25)
CALCIUM SERPL-MCNC: 9.4 MG/DL (ref 8.6–10.5)
CHLORIDE SERPL-SCNC: 101 MMOL/L (ref 98–107)
CO2 SERPL-SCNC: 25.6 MMOL/L (ref 22–29)
CREAT SERPL-MCNC: 0.82 MG/DL (ref 0.57–1)
EGFRCR SERPLBLD CKD-EPI 2021: 70.2 ML/MIN/1.73
EOSINOPHIL # BLD AUTO: 0.22 10*3/MM3 (ref 0–0.4)
EOSINOPHIL NFR BLD AUTO: 4.3 % (ref 0.3–6.2)
ERYTHROCYTE [DISTWIDTH] IN BLOOD BY AUTOMATED COUNT: 12.8 % (ref 12.3–15.4)
GLOBULIN SER CALC-MCNC: 2.3 GM/DL
GLUCOSE SERPL-MCNC: 105 MG/DL (ref 65–99)
HCT VFR BLD AUTO: 34.5 % (ref 34–46.6)
HGB BLD-MCNC: 11.2 G/DL (ref 12–15.9)
IMM GRANULOCYTES # BLD AUTO: 0.02 10*3/MM3 (ref 0–0.05)
IMM GRANULOCYTES NFR BLD AUTO: 0.4 % (ref 0–0.5)
LYMPHOCYTES # BLD AUTO: 1 10*3/MM3 (ref 0.7–3.1)
LYMPHOCYTES NFR BLD AUTO: 19.7 % (ref 19.6–45.3)
MAGNESIUM SERPL-MCNC: 2 MG/DL (ref 1.6–2.4)
MCH RBC QN AUTO: 29.4 PG (ref 26.6–33)
MCHC RBC AUTO-ENTMCNC: 32.5 G/DL (ref 31.5–35.7)
MCV RBC AUTO: 90.6 FL (ref 79–97)
MONOCYTES # BLD AUTO: 0.5 10*3/MM3 (ref 0.1–0.9)
MONOCYTES NFR BLD AUTO: 9.9 % (ref 5–12)
NEUTROPHILS # BLD AUTO: 3.31 10*3/MM3 (ref 1.7–7)
NEUTROPHILS NFR BLD AUTO: 65.3 % (ref 42.7–76)
NRBC BLD AUTO-RTO: 0 /100 WBC (ref 0–0.2)
PHOSPHATE SERPL-MCNC: 3.7 MG/DL (ref 2.5–4.5)
PLATELET # BLD AUTO: 337 10*3/MM3 (ref 140–450)
POTASSIUM SERPL-SCNC: 4.2 MMOL/L (ref 3.5–5.2)
PROT SERPL-MCNC: 6.7 G/DL (ref 6–8.5)
RBC # BLD AUTO: 3.81 10*6/MM3 (ref 3.77–5.28)
SODIUM SERPL-SCNC: 141 MMOL/L (ref 136–145)
TSH SERPL DL<=0.005 MIU/L-ACNC: 3.72 UIU/ML (ref 0.27–4.2)
VIT B12 SERPL-MCNC: >2000 PG/ML (ref 211–946)
WBC # BLD AUTO: 5.07 10*3/MM3 (ref 3.4–10.8)

## 2025-08-27 ENCOUNTER — TREATMENT (OUTPATIENT)
Age: 85
End: 2025-08-27
Payer: MEDICARE

## 2025-08-27 DIAGNOSIS — Z96.652 STATUS POST TOTAL KNEE REPLACEMENT, LEFT: Primary | ICD-10-CM

## 2025-08-27 DIAGNOSIS — R26.2 DIFFICULTY WALKING: ICD-10-CM

## 2025-08-27 DIAGNOSIS — M25.562 CHRONIC PAIN OF LEFT KNEE: ICD-10-CM

## 2025-08-27 DIAGNOSIS — G89.29 CHRONIC PAIN OF LEFT KNEE: ICD-10-CM

## 2025-08-28 ENCOUNTER — OFFICE VISIT (OUTPATIENT)
Dept: INTERNAL MEDICINE | Facility: CLINIC | Age: 85
End: 2025-08-28
Payer: MEDICARE

## 2025-08-28 VITALS
BODY MASS INDEX: 30 KG/M2 | HEART RATE: 109 BPM | HEIGHT: 60 IN | SYSTOLIC BLOOD PRESSURE: 157 MMHG | DIASTOLIC BLOOD PRESSURE: 78 MMHG | WEIGHT: 152.8 LBS | OXYGEN SATURATION: 96 %

## 2025-08-28 DIAGNOSIS — E83.42 HYPOMAGNESEMIA: ICD-10-CM

## 2025-08-28 DIAGNOSIS — I10 ESSENTIAL HYPERTENSION, BENIGN: ICD-10-CM

## 2025-08-28 DIAGNOSIS — Z71.1 CONCERN ABOUT MEMORY: ICD-10-CM

## 2025-08-28 DIAGNOSIS — E87.1 HYPONATREMIA: ICD-10-CM

## 2025-08-28 DIAGNOSIS — E03.9 HYPOTHYROIDISM, UNSPECIFIED TYPE: ICD-10-CM

## 2025-08-28 DIAGNOSIS — Z96.659 STATUS POST KNEE REPLACEMENT, UNSPECIFIED LATERALITY: ICD-10-CM

## 2025-08-28 DIAGNOSIS — F41.9 ANXIETY: Primary | ICD-10-CM

## 2025-08-28 DIAGNOSIS — F51.01 PRIMARY INSOMNIA: ICD-10-CM

## 2025-08-28 RX ORDER — BRIMONIDINE 5 MG/G
GEL TOPICAL
COMMUNITY

## 2025-08-28 RX ORDER — DILTIAZEM HYDROCHLORIDE 120 MG/1
120 CAPSULE, COATED, EXTENDED RELEASE ORAL DAILY
Qty: 90 CAPSULE | Refills: 2 | Status: SHIPPED | OUTPATIENT
Start: 2025-08-28

## 2025-08-28 RX ORDER — FUROSEMIDE 20 MG/1
20 TABLET ORAL 2 TIMES DAILY
Qty: 120 TABLET | Refills: 2 | Status: SHIPPED | OUTPATIENT
Start: 2025-08-28

## 2025-08-28 RX ORDER — VALSARTAN 80 MG/1
80 TABLET ORAL 2 TIMES DAILY
Qty: 180 TABLET | Refills: 1 | Status: SHIPPED | OUTPATIENT
Start: 2025-08-28